# Patient Record
Sex: FEMALE | Race: WHITE
[De-identification: names, ages, dates, MRNs, and addresses within clinical notes are randomized per-mention and may not be internally consistent; named-entity substitution may affect disease eponyms.]

---

## 2018-03-18 ENCOUNTER — HOSPITAL ENCOUNTER (EMERGENCY)
Dept: HOSPITAL 72 - EMR | Age: 78
Discharge: HOME | End: 2018-03-18
Payer: MEDICARE

## 2018-03-18 VITALS — SYSTOLIC BLOOD PRESSURE: 107 MMHG | DIASTOLIC BLOOD PRESSURE: 60 MMHG

## 2018-03-18 VITALS — BODY MASS INDEX: 23.74 KG/M2 | WEIGHT: 134 LBS | HEIGHT: 63 IN

## 2018-03-18 DIAGNOSIS — Z95.0: ICD-10-CM
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
## 2018-03-18 DIAGNOSIS — I10: ICD-10-CM

## 2018-03-18 DIAGNOSIS — Z23: ICD-10-CM

## 2018-03-18 DIAGNOSIS — S81.812A: Primary | ICD-10-CM
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
## 2018-03-18 DIAGNOSIS — E11.9: ICD-10-CM

## 2018-03-18 PROCEDURE — 99284 EMERGENCY DEPT VISIT MOD MDM: CPT

## 2018-03-18 PROCEDURE — 90471 IMMUNIZATION ADMIN: CPT

## 2018-03-18 PROCEDURE — 90715 TDAP VACCINE 7 YRS/> IM: CPT

## 2018-03-18 NOTE — EMERGENCY ROOM REPORT
History of Present Illness


General


Chief Complaint:  Skin Rash/Abscess


Source:  Patient





Present Illness


HPI


Patient presents with injury to the left lower extremity


Injury occurred 3 days ago it is unclear exactly how the injury occurred


Patient felt that it might of been a bite from an animal


Caretaker family reports that the patient does have some underlying dementia


She also does get out of the house intermittently and the exact source of 

injury is not clear


Otherwise no reports of fever


Patient denies any knee pain denies any ankle pain/


Allergies:  


Coded Allergies:  


     No Known Allergies (Unverified , 3/26/14)





Patient History


Past Medical History:  see triage record


Pertinent Family History:  none


Last Menstrual Period:  na


Reviewed Nursing Documentation:  PMH: Agreed, PSxH: Agreed





Nursing Documentation-PMH


Past Medical History:  No History, Except For


Hx Cardiac Problems:  Yes


Hx Hypertension:  Yes


Hx Pacemaker:  Yes


Hx Diabetes:  Yes





Review of Systems


All Other Systems:  negative except mentioned in HPI





Physical Exam





Vital Signs








  Date Time  Temp Pulse Resp B/P (MAP) Pulse Ox O2 Delivery O2 Flow Rate FiO2


 


3/18/18 10:53 97.8 61 18 92/56 96 Room Air  





 97.9       








Sp02 EP Interpretation:  reviewed, normal


General Appearance:  well appearing, no apparent distress


Head:  normocephalic, atraumatic


Eyes:  bilateral eye PERRL, bilateral eye EOMI


ENT:  normal pharynx


Neck:  supple, thyroid normal


Respiratory:  lungs clear, normal breath sounds


Cardiovascular #1:  regular rate, rhythm


Gastrointestinal:  non tender, soft


Musculoskeletal:  normal inspection


Neurologic:  alert, responsive


Skin:  other - Patient has a semicircular approximately 3 cm laceration, 

appears to be secondarily scabbing already, no signs of surrounding erythema or 

fluctuance


Lymphatic:  no adenopathy





Procedures


Laceration/Wound Repair


Laceration/Wound Repair :  


   Consent:  Verbal


   Wound Location:  lower extremity


   Wound's Depth, Shape:  into muscle, irregular


   Wound Length (cm):  3


   Wound Explored:  contaminated


   Irrigated w/ Saline (ccs):  100


   Betadine Prep?:  Yes


   Wound Debrided:  minimal


   Layer Closure?:  No


   Sterile Dressing Applied?:  Yes


   Patient Tolerated:  Well


   Complications:  None


Progress


After appropriate cleansing patient had Steri-Strips applied the top of the 

laceration, the area has started to secondarily heal, further suture would be 

an appropriate





Medical Decision Making


Diagnostic Impression:  


 Primary Impression:  


 laceration


ER Course


Patient had wound care as noted above


Area does not appear to be actively infected





Already secondarily healing therefore suture was not applied





Patient placed on oral antibiotics requires close outpatient follow-up as was 

concern for possible secondary infection


Specifically given the patient's diabetic history





Last Vital Signs








  Date Time  Temp Pulse Resp B/P (MAP) Pulse Ox O2 Delivery O2 Flow Rate FiO2


 


3/18/18 12:10 98.0 87 20 107/60 99 Room Air  





 98.0       








Status:  improved


Disposition:  HOME, SELF-CARE


Condition:  Improved


Scripts


Amoxicillin/Potassium Clav 875-125* (AUGMENTIN 875-125 TABLET*) 1 Each Tablet


1 TAB ORAL TWICE A DAY, #14 TAB


   Prov: Jimmy Mi DO         3/18/18


Referrals:  


NOT CHOSEN IPA/MD,REFERRING (PCP)


Patient Instructions:  Skin Tear Care, Easy-to-Read, Nonsutured Laceration Care





Additional Instructions:  


Patient is provided with the discharge instructions notified to follow up with 

primary doctor in the next 2-3 days otherwise return to the er with any 

worsening symptoms.


Please note that this report is being documented using DRAGON technology.  This 

can lead to erroneous entry secondary to incorrect interpretation by the 

dictating instrument.











Jimmy Mi DO Mar 18, 2018 14:09

## 2018-04-12 ENCOUNTER — HOSPITAL ENCOUNTER (EMERGENCY)
Dept: HOSPITAL 72 - EMR | Age: 78
Discharge: HOME | End: 2018-04-12
Payer: MEDICARE

## 2018-04-12 VITALS — SYSTOLIC BLOOD PRESSURE: 163 MMHG | DIASTOLIC BLOOD PRESSURE: 83 MMHG

## 2018-04-12 VITALS — WEIGHT: 110 LBS | HEIGHT: 60 IN | BODY MASS INDEX: 21.6 KG/M2

## 2018-04-12 DIAGNOSIS — K44.9: ICD-10-CM

## 2018-04-12 DIAGNOSIS — K52.9: Primary | ICD-10-CM

## 2018-04-12 DIAGNOSIS — I10: ICD-10-CM

## 2018-04-12 DIAGNOSIS — M43.06: ICD-10-CM

## 2018-04-12 DIAGNOSIS — K57.30: ICD-10-CM

## 2018-04-12 DIAGNOSIS — M48.54XA: ICD-10-CM

## 2018-04-12 DIAGNOSIS — E11.9: ICD-10-CM

## 2018-04-12 DIAGNOSIS — Z95.0: ICD-10-CM

## 2018-04-12 LAB
ADD MANUAL DIFF: NO
ALBUMIN SERPL-MCNC: 3.5 G/DL (ref 3.4–5)
ALBUMIN/GLOB SERPL: 1.2 {RATIO} (ref 1–2.7)
ALP SERPL-CCNC: 51 U/L (ref 46–116)
ALT SERPL-CCNC: 15 U/L (ref 12–78)
ANION GAP SERPL CALC-SCNC: 9 MMOL/L (ref 5–15)
APPEARANCE UR: CLEAR
APTT BLD: 24 SEC (ref 23–33)
APTT PPP: (no result) S
AST SERPL-CCNC: 15 U/L (ref 15–37)
BASOPHILS NFR BLD AUTO: 0.3 % (ref 0–2)
BILIRUB SERPL-MCNC: 0.4 MG/DL (ref 0.2–1)
BUN SERPL-MCNC: 18 MG/DL (ref 7–18)
CALCIUM SERPL-MCNC: 8.9 MG/DL (ref 8.5–10.1)
CHLORIDE SERPL-SCNC: 110 MMOL/L (ref 98–107)
CO2 SERPL-SCNC: 26 MMOL/L (ref 21–32)
CREAT SERPL-MCNC: 1 MG/DL (ref 0.55–1.3)
EOSINOPHIL NFR BLD AUTO: 2.5 % (ref 0–3)
ERYTHROCYTE [DISTWIDTH] IN BLOOD BY AUTOMATED COUNT: 14.3 % (ref 11.6–14.8)
GLOBULIN SER-MCNC: 2.8 G/DL
GLUCOSE UR STRIP-MCNC: NEGATIVE MG/DL
HCT VFR BLD CALC: 34 % (ref 37–47)
HGB BLD-MCNC: 11.7 G/DL (ref 12–16)
INR PPP: 0.9 (ref 0.9–1.1)
KETONES UR QL STRIP: NEGATIVE
LEUKOCYTE ESTERASE UR QL STRIP: NEGATIVE
LYMPHOCYTES NFR BLD AUTO: 29.2 % (ref 20–45)
MCV RBC AUTO: 97 FL (ref 80–99)
MONOCYTES NFR BLD AUTO: 6.4 % (ref 1–10)
NEUTROPHILS NFR BLD AUTO: 61.7 % (ref 45–75)
NITRITE UR QL STRIP: NEGATIVE
PH UR STRIP: 5 [PH] (ref 4.5–8)
PLATELET # BLD: 194 K/UL (ref 150–450)
POTASSIUM SERPL-SCNC: 3.5 MMOL/L (ref 3.5–5.1)
PROT UR QL STRIP: NEGATIVE
RBC # BLD AUTO: 3.51 M/UL (ref 4.2–5.4)
SODIUM SERPL-SCNC: 145 MMOL/L (ref 136–145)
SP GR UR STRIP: 1.01 (ref 1–1.03)
UROBILINOGEN UR-MCNC: NORMAL MG/DL (ref 0–1)
WBC # BLD AUTO: 6.6 K/UL (ref 4.8–10.8)

## 2018-04-12 PROCEDURE — 85025 COMPLETE CBC W/AUTO DIFF WBC: CPT

## 2018-04-12 PROCEDURE — 36415 COLL VENOUS BLD VENIPUNCTURE: CPT

## 2018-04-12 PROCEDURE — 81003 URINALYSIS AUTO W/O SCOPE: CPT

## 2018-04-12 PROCEDURE — 85610 PROTHROMBIN TIME: CPT

## 2018-04-12 PROCEDURE — 96375 TX/PRO/DX INJ NEW DRUG ADDON: CPT

## 2018-04-12 PROCEDURE — 85730 THROMBOPLASTIN TIME PARTIAL: CPT

## 2018-04-12 PROCEDURE — 84484 ASSAY OF TROPONIN QUANT: CPT

## 2018-04-12 PROCEDURE — 86901 BLOOD TYPING SEROLOGIC RH(D): CPT

## 2018-04-12 PROCEDURE — 82962 GLUCOSE BLOOD TEST: CPT

## 2018-04-12 PROCEDURE — 96374 THER/PROPH/DIAG INJ IV PUSH: CPT

## 2018-04-12 PROCEDURE — 86850 RBC ANTIBODY SCREEN: CPT

## 2018-04-12 PROCEDURE — 74177 CT ABD & PELVIS W/CONTRAST: CPT

## 2018-04-12 PROCEDURE — 86900 BLOOD TYPING SEROLOGIC ABO: CPT

## 2018-04-12 PROCEDURE — 80053 COMPREHEN METABOLIC PANEL: CPT

## 2018-04-12 PROCEDURE — 93005 ELECTROCARDIOGRAM TRACING: CPT

## 2018-04-12 PROCEDURE — 83690 ASSAY OF LIPASE: CPT

## 2018-04-12 PROCEDURE — 99284 EMERGENCY DEPT VISIT MOD MDM: CPT

## 2018-04-12 NOTE — EMERGENCY ROOM REPORT
History of Present Illness


General


Chief Complaint:  Abdominal Pain


Source:  Patient





Present Illness


HPI


Patient is an 78-year-old female brought in by EMS after increased left-sided 

abdominal pain.  Patient reports having increased abdominal pain associated 

with nausea and vomiting.  She reports having a tightness sensation.  She 

denies any hematemesis.  She denies any bloody stools.  Patient denies any 

dysuria. The patient states she's had last bowel movement yesterday.  She 

believes that she has a hernia to the left lower abdomen.  The patient had 

prior history of pacemaker as well as the abdominal surgery for hernia.


Allergies:  


Coded Allergies:  


     No Known Allergies (Unverified , 3/26/14)





Patient History


Past Medical History:  see triage record


Past Surgical History:  pacemaker


Reviewed Nursing Documentation:  PMH: Agreed; PSxH: Agreed





Nursing Documentation-PMH


Past Medical History:  No Stated History


Hx Cardiac Problems:  Yes


Hx Hypertension:  Yes


Hx Pacemaker:  Yes


Hx Diabetes:  Yes





Review of Systems


All Other Systems:  negative except mentioned in HPI





Physical Exam





Vital Signs








  Date Time  Temp Pulse Resp B/P (MAP) Pulse Ox O2 Delivery O2 Flow Rate FiO2


 


4/12/18 15:10 98.0 78 16 142/60 98 Room Air  





 98.1       








Sp02 EP Interpretation:  reviewed, normal


General Appearance:  normal inspection, well appearing, no apparent distress, 

alert, GCS 15, Chronically Ill


Head:  atraumatic


ENT:  normal ENT inspection, hearing grossly normal, normal voice


Neck:  normal inspection, full range of motion, supple, no bony tend


Respiratory:  normal inspection, lungs clear, normal breath sounds, no 

respiratory distress, no retraction, no wheezing


Cardiovascular #1:  regular rate, rhythm, no edema


Gastrointestinal:  normal inspection, normal bowel sounds, non tender, soft, no 

guarding, no hernia


Genitourinary:  no CVA tenderness


Musculoskeletal:  normal inspection, back normal, normal range of motion


Neurologic:  normal inspection, alert, oriented x3, responsive, CNs III-XII nml 

as tested, speech normal


Psychiatric:  normal inspection, judgement/insight normal, mood/affect normal


Skin:  normal inspection, normal color, no rash





Medical Decision Making


Diagnostic Impression:  


 Primary Impression:  


 Abdominal pain


 Additional Impression:  


 Colitis


ER Course


Patient presented for abdominal pain. Differential diagnoses included ischemic 

bowel, appendicitis, perforated viscus, abdominal aortic aneurysm, inferior 

myocardial infarction, viral gastroenteritis. Because of complexity of patient'

s case laboratory testing and imaging studies were ordered.


CT imaging of the abdomen pelvis read by radiology showed evidence of the colon 

wall thickening consistent with possible colitis.  Laboratory testing was 

unremarkable.  Patient was noted to have improvement in discomfort after IV 

medications for nausea.  The patient was offered admission for further 

evaluation and treatment with Salvadorean .  The patient declined 

inpatient treatment stated she wanted to go home.  Patient was advised that she 

could return at any time.





Labs








Test


  4/12/18


15:55 4/12/18


17:30


 


White Blood Count


  6.6 K/UL


(4.8-10.8) 


 


 


Red Blood Count


  3.51 M/UL


(4.20-5.40) 


 


 


Hemoglobin


  11.7 G/DL


(12.0-16.0) 


 


 


Hematocrit


  34.0 %


(37.0-47.0) 


 


 


Mean Corpuscular Volume 97 FL (80-99)  


 


Mean Corpuscular Hemoglobin


  33.4 PG


(27.0-31.0) 


 


 


Mean Corpuscular Hemoglobin


Concent 34.6 G/DL


(32.0-36.0) 


 


 


Red Cell Distribution Width


  14.3 %


(11.6-14.8) 


 


 


Platelet Count


  194 K/UL


(150-450) 


 


 


Mean Platelet Volume


  7.9 FL


(6.5-10.1) 


 


 


Neutrophils (%) (Auto)


  61.7 %


(45.0-75.0) 


 


 


Lymphocytes (%) (Auto)


  29.2 %


(20.0-45.0) 


 


 


Monocytes (%) (Auto)


  6.4 %


(1.0-10.0) 


 


 


Eosinophils (%) (Auto)


  2.5 %


(0.0-3.0) 


 


 


Basophils (%) (Auto)


  0.3 %


(0.0-2.0) 


 


 


Prothrombin Time


  9.6 SEC


(9.30-11.50) 


 


 


Prothromb Time International


Ratio 0.9 (0.9-1.1) 


  


 


 


Activated Partial


Thromboplast Time 24 SEC (23-33) 


  


 


 


Sodium Level


  145 MMOL/L


(136-145) 


 


 


Potassium Level


  3.5 MMOL/L


(3.5-5.1) 


 


 


Chloride Level


  110 MMOL/L


() 


 


 


Carbon Dioxide Level


  26 MMOL/L


(21-32) 


 


 


Anion Gap


  9 mmol/L


(5-15) 


 


 


Blood Urea Nitrogen


  18 mg/dL


(7-18) 


 


 


Creatinine


  1.0 MG/DL


(0.55-1.30) 


 


 


Estimat Glomerular Filtration


Rate  mL/min (>60) 


  


 


 


Glucose Level


  95 MG/DL


() 


 


 


Calcium Level


  8.9 MG/DL


(8.5-10.1) 


 


 


Total Bilirubin


  0.4 MG/DL


(0.2-1.0) 


 


 


Aspartate Amino Transf


(AST/SGOT) 15 U/L (15-37) 


  


 


 


Alanine Aminotransferase


(ALT/SGPT) 15 U/L (12-78) 


  


 


 


Alkaline Phosphatase


  51 U/L


() 


 


 


Troponin I


  0.002 ng/mL


(0.000-0.056) 


 


 


Total Protein


  6.3 G/DL


(6.4-8.2) 


 


 


Albumin


  3.5 G/DL


(3.4-5.0) 


 


 


Globulin 2.8 g/dL  


 


Albumin/Globulin Ratio 1.2 (1.0-2.7)  


 


Lipase


  298 U/L


() 


 


 


Urine Color  Pale yellow 


 


Urine Appearance  Clear 


 


Urine pH  5 (4.5-8.0) 


 


Urine Specific Gravity


  


  1.015


(1.005-1.035)


 


Urine Protein


  


  Negative


(NEGATIVE)


 


Urine Glucose (UA)


  


  Negative


(NEGATIVE)


 


Urine Ketones


  


  Negative


(NEGATIVE)


 


Urine Occult Blood  1+ (NEGATIVE) 


 


Urine Nitrite


  


  Negative


(NEGATIVE)


 


Urine Bilirubin


  


  Negative


(NEGATIVE)


 


Urine Urobilinogen


  


  Normal MG/DL


(0.0-1.0)


 


Urine Leukocyte Esterase


  


  Negative


(NEGATIVE)


 


Urine RBC


  


  0-2 /HPF (0 -


2)


 


Urine WBC  0 /HPF (0 - 2) 


 


Urine Squamous Epithelial


Cells 


  None /LPF


(NONE/OCC)


 


Urine Bacteria


  


  Occasional


/HPF (NONE)











Last Vital Signs








  Date Time  Temp Pulse Resp B/P (MAP) Pulse Ox O2 Delivery O2 Flow Rate FiO2


 


4/12/18 15:10 98.0 78 16 142/60 98 Room Air  





 98.1       








Status:  improved


Disposition:  HOME, SELF-CARE


Condition:  Stable


Scripts


Dicyclomine Hcl* (DICYCLOMINE HCL*) 10 Mg Capsule


10 MG PO QID, #20 CAP


   Prov: Segun Rojas         4/12/18 


Famotidine (PEPCID) 20 Mg Tablet


20 MG ORAL BEDTIME, #7 TAB 0 Refills


   Prov: Segun Rojas         4/12/18











Segun Rojas Apr 12, 2018 15:34

## 2018-04-13 NOTE — DIAGNOSTIC IMAGING REPORT
Clinical Indication: Increasing left-sided abdominal pain, nausea, vomiting,

tightness sensation

 

Technique:   No oral contrast utilized, per emergency room physician request  IV

administration nonionic contrast. Venous phase spiral acquisition obtained through

the abdomen and pelvis. Multiplanar reconstructions were generated. Total dose length

product 654.97 mGycm. CTDIvol(s) 12.67 mGy. Dose reduction achieved using automated

exposure control

 

 

Comparison: none

 

Findings: There is colonic diverticulosis. No evidence of diverticulitis. The

appendix is normal. Small bowel loops are fluid-filled with equivocally mildly

enhancing walls. There is a moderate size hiatal hernia. The stomach and duodenum are

unremarkable. No small bowel distention. No free or loculated intraperitoneal air or

fluid. There are small bilateral inguinal hernias which contain only fat

 

The liver, gallbladder, bile ducts, pancreas, spleen, adrenals are unremarkable.

There are bilateral renal parapelvic cysts. There are bilateral subcentimeter renal

parenchymal lesions which are too small to characterize. No retroperitoneal or

mesenteric mass or adenopathy. No pelvic mass or adenopathy. Uterus and adnexal

structures are unremarkable.

 

There is bilateral L4 spondylolysis, grade 2 L4 on L5 spondylolisthesis and marked

secondary degenerative change, as well as degenerative spondylosis elsewhere. There

is a compression fracture deformity of the T9 vertebral body, age indeterminate.

Pacemaker wires are seen in the heart. Compressive atelectatic changes are seen at

both lung bases. The heart is enlarged

 

Impression: Mildly fluid-filled small bowel loops with slightly striking bowel wall

enhancement; of uncertain significance, could indicate mild enteritis changes

 

No other acute abnormality

 

Diverticulosis. No evidence of diverticulitis

 

Moderate size hiatal hernia

 

Cardiomegaly

 

Bilateral L4 spondylolysis, resultant grade 2 no 4 on L5 spondylolisthesis and

secondary degenerative change

 

T9 vertebral body compression fracture, age indeterminate. Consider MRI for better

characterization if this is considered clinically relevant

 

Bilateral renal parapelvic cysts. Bilateral subcentimeter renal parenchymal lesions

which are too small to characterize, most likely benign simple cortical cysts. No

further follow-up necessary

 

Other findings as noted, including pacemaker, small fat-containing bilateral inguinal

hernias, bilateral basilar compressive pulmonary parenchymal atelectasis.

 

This agrees with the preliminary interpretation provided overnight by [facility]

teleradiology service.

 

The CT scanner at [facility] is accredited by the American College of

Radiology and the scans are performed using protocols designed to limit radiation

exposure to as low as reasonably achievable to attain images of sufficient resolution

adequate for diagnostic evaluation.

## 2018-04-13 NOTE — CARDIOLOGY REPORT
--------------- APPROVED REPORT --------------





EKG Measurement

Heart [id number]

KS 182P30

[id number]

[id number]

[id number]





Normal sinus rhythm

Left axis deviation

Abnormal ECG

## 2018-09-08 ENCOUNTER — HOSPITAL ENCOUNTER (EMERGENCY)
Dept: HOSPITAL 72 - EMR | Age: 78
LOS: 1 days | Discharge: HOME | End: 2018-09-09
Payer: MEDICARE

## 2018-09-08 VITALS — DIASTOLIC BLOOD PRESSURE: 74 MMHG | SYSTOLIC BLOOD PRESSURE: 162 MMHG

## 2018-09-08 VITALS — HEIGHT: 64 IN | WEIGHT: 130 LBS | BODY MASS INDEX: 22.2 KG/M2

## 2018-09-08 DIAGNOSIS — F41.9: Primary | ICD-10-CM

## 2018-09-08 PROCEDURE — 99283 EMERGENCY DEPT VISIT LOW MDM: CPT

## 2018-09-08 NOTE — EMERGENCY ROOM REPORT
History of Present Illness


General


Chief Complaint:  Dyspnea/Respdistress


Source:  EMS





Present Illness


HPI


This 78F reportedly called 911 herself. The paramedics known this patient and 

state usually family keeps her there if/when she calls 911 but there was no 

family tonight. Pt. c/o "can't breathe." However while she says that she is 

very excited, lively, with no difficulty. She speaks only Beninese and we had a 

 on the phone. There really is nothing new. No cp, no cough, no 

fever. No travel. We spoke with her granddaughter who confirmed nothing new and 

she asked us to keep patient here overnight b/c it was hard for her to come 

pick her up. Reportedly no change in meds. 





Hx. limited due to age, language.


Allergies:  


Coded Allergies:  


     No Known Allergies (Unverified , 3/26/14)





Patient History


Limited by:  language barrier, age





Nursing Documentation-[facility]


Past Medical History:  No History, Except For


Hx Cardiac Problems:  Yes


Hx Hypertension:  Yes


Hx Pacemaker:  Yes


Hx Asthma:  Yes


Hx Diabetes:  Yes





Review of Systems


All Other Systems:  limited





Physical Exam





Vital Signs








  Date Time  Temp Pulse Resp B/P (MAP) Pulse Ox O2 Delivery O2 Flow Rate FiO2


 


9/8/18 21:26 98.0 88 16 162/74 100 Room Air  





 98.1       








General Appearance:  well appearing, no apparent distress


Head:  normocephalic, atraumatic


ENT:  hearing grossly normal, normal voice


Neck:  full range of motion, supple


Respiratory:  no respiratory distress, speaking full sentences


Musculoskeletal:  no calf tenderness


Neurologic:  alert, normal gait


Psychiatric:  anxious


Skin:  no rash





Medical Decision Making


Diagnostic Impression:  


 Primary Impression:  


 Anxiety


ER Course


Pt. is not at all dyspneic. RR 14-16, 99% on RA, able to talk with great energy

, full paragraphs without difficulty. She is an anxious person. On PE no acute 

abnorm. No w/u needed at this time. 





PE, ACS, pericarditis, pleural effusion, COPD exacerbation, asthma exacerbation

, CHF exacerbation, pulmonary edema, bronchitis, URI, pneumonia





A: Anxiety





Last Vital Signs








  Date Time  Temp Pulse Resp B/P (MAP) Pulse Ox O2 Delivery O2 Flow Rate FiO2


 


9/8/18 21:26 98.0 88 16 162/74 100 Room Air  





 98.1       








Status:  improved


Disposition:  HOME, SELF-CARE


Condition:  Stable


Patient Instructions:  Shortness of Breath, Easy-to-Read











Marquis Joel M.D. Sep 8, 2018 22:16

## 2018-09-09 VITALS — SYSTOLIC BLOOD PRESSURE: 162 MMHG | DIASTOLIC BLOOD PRESSURE: 74 MMHG

## 2018-09-16 ENCOUNTER — HOSPITAL ENCOUNTER (INPATIENT)
Dept: HOSPITAL 72 - EMR | Age: 78
LOS: 4 days | Discharge: LEFT BEFORE DISCHARGE | DRG: 280 | End: 2018-09-20
Payer: MEDICARE

## 2018-09-16 VITALS — SYSTOLIC BLOOD PRESSURE: 126 MMHG | DIASTOLIC BLOOD PRESSURE: 70 MMHG

## 2018-09-16 VITALS — SYSTOLIC BLOOD PRESSURE: 134 MMHG | DIASTOLIC BLOOD PRESSURE: 78 MMHG

## 2018-09-16 VITALS — WEIGHT: 134.04 LBS | BODY MASS INDEX: 22.88 KG/M2 | HEIGHT: 64 IN

## 2018-09-16 VITALS — DIASTOLIC BLOOD PRESSURE: 65 MMHG | SYSTOLIC BLOOD PRESSURE: 122 MMHG

## 2018-09-16 VITALS — DIASTOLIC BLOOD PRESSURE: 70 MMHG | SYSTOLIC BLOOD PRESSURE: 115 MMHG

## 2018-09-16 VITALS — DIASTOLIC BLOOD PRESSURE: 79 MMHG | SYSTOLIC BLOOD PRESSURE: 110 MMHG

## 2018-09-16 DIAGNOSIS — F03.90: ICD-10-CM

## 2018-09-16 DIAGNOSIS — I25.10: ICD-10-CM

## 2018-09-16 DIAGNOSIS — I50.23: ICD-10-CM

## 2018-09-16 DIAGNOSIS — F29: ICD-10-CM

## 2018-09-16 DIAGNOSIS — Z91.19: ICD-10-CM

## 2018-09-16 DIAGNOSIS — Z95.0: ICD-10-CM

## 2018-09-16 DIAGNOSIS — Z79.84: ICD-10-CM
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
## 2018-09-16 DIAGNOSIS — Z91.81: ICD-10-CM

## 2018-09-16 DIAGNOSIS — I82.A12: ICD-10-CM

## 2018-09-16 DIAGNOSIS — R06.00: ICD-10-CM

## 2018-09-16 DIAGNOSIS — E11.9: ICD-10-CM

## 2018-09-16 DIAGNOSIS — E78.00: ICD-10-CM

## 2018-09-16 DIAGNOSIS — I34.0: ICD-10-CM

## 2018-09-16 DIAGNOSIS — I36.1: ICD-10-CM

## 2018-09-16 DIAGNOSIS — Z95.1: ICD-10-CM

## 2018-09-16 DIAGNOSIS — I11.0: Primary | ICD-10-CM

## 2018-09-16 DIAGNOSIS — S32.392A: ICD-10-CM

## 2018-09-16 DIAGNOSIS — Z95.5: ICD-10-CM

## 2018-09-16 DIAGNOSIS — I21.4: ICD-10-CM

## 2018-09-16 DIAGNOSIS — Z79.82: ICD-10-CM

## 2018-09-16 DIAGNOSIS — S32.592A: ICD-10-CM

## 2018-09-16 DIAGNOSIS — G93.40: ICD-10-CM

## 2018-09-16 DIAGNOSIS — Z79.02: ICD-10-CM

## 2018-09-16 DIAGNOSIS — I25.5: ICD-10-CM

## 2018-09-16 LAB
ADD MANUAL DIFF: NO
ALBUMIN SERPL-MCNC: 3.2 G/DL (ref 3.4–5)
ALBUMIN/GLOB SERPL: 1 {RATIO} (ref 1–2.7)
ALP SERPL-CCNC: 70 U/L (ref 46–116)
ALT SERPL-CCNC: 24 U/L (ref 12–78)
ANION GAP SERPL CALC-SCNC: 11 MMOL/L (ref 5–15)
AST SERPL-CCNC: 27 U/L (ref 15–37)
BASOPHILS NFR BLD AUTO: 0.3 % (ref 0–2)
BILIRUB SERPL-MCNC: 0.6 MG/DL (ref 0.2–1)
BUN SERPL-MCNC: 11 MG/DL (ref 7–18)
CALCIUM SERPL-MCNC: 8.9 MG/DL (ref 8.5–10.1)
CHLORIDE SERPL-SCNC: 111 MMOL/L (ref 98–107)
CK MB SERPL-MCNC: 2.2 NG/ML (ref 0–3.6)
CK SERPL-CCNC: 488 U/L (ref 26–308)
CO2 SERPL-SCNC: 22 MMOL/L (ref 21–32)
CREAT SERPL-MCNC: 1 MG/DL (ref 0.55–1.3)
EOSINOPHIL NFR BLD AUTO: 1.3 % (ref 0–3)
ERYTHROCYTE [DISTWIDTH] IN BLOOD BY AUTOMATED COUNT: 17.5 % (ref 11.6–14.8)
GLOBULIN SER-MCNC: 3.3 G/DL
HCT VFR BLD CALC: 28.6 % (ref 37–47)
HGB BLD-MCNC: 8.9 G/DL (ref 12–16)
LYMPHOCYTES NFR BLD AUTO: 21.9 % (ref 20–45)
MCV RBC AUTO: 93 FL (ref 80–99)
MONOCYTES NFR BLD AUTO: 5.7 % (ref 1–10)
NEUTROPHILS NFR BLD AUTO: 70.8 % (ref 45–75)
PLATELET # BLD: 328 K/UL (ref 150–450)
POTASSIUM SERPL-SCNC: 4 MMOL/L (ref 3.5–5.1)
RBC # BLD AUTO: 3.08 M/UL (ref 4.2–5.4)
SODIUM SERPL-SCNC: 144 MMOL/L (ref 136–145)
WBC # BLD AUTO: 6.4 K/UL (ref 4.8–10.8)

## 2018-09-16 PROCEDURE — 80048 BASIC METABOLIC PNL TOTAL CA: CPT

## 2018-09-16 PROCEDURE — 93306 TTE W/DOPPLER COMPLETE: CPT

## 2018-09-16 PROCEDURE — 80053 COMPREHEN METABOLIC PANEL: CPT

## 2018-09-16 PROCEDURE — 80061 LIPID PANEL: CPT

## 2018-09-16 PROCEDURE — 83550 IRON BINDING TEST: CPT

## 2018-09-16 PROCEDURE — 84484 ASSAY OF TROPONIN QUANT: CPT

## 2018-09-16 PROCEDURE — 83735 ASSAY OF MAGNESIUM: CPT

## 2018-09-16 PROCEDURE — 93971 EXTREMITY STUDY: CPT

## 2018-09-16 PROCEDURE — 85730 THROMBOPLASTIN TIME PARTIAL: CPT

## 2018-09-16 PROCEDURE — 85610 PROTHROMBIN TIME: CPT

## 2018-09-16 PROCEDURE — 94664 DEMO&/EVAL PT USE INHALER: CPT

## 2018-09-16 PROCEDURE — 82746 ASSAY OF FOLIC ACID SERUM: CPT

## 2018-09-16 PROCEDURE — 96361 HYDRATE IV INFUSION ADD-ON: CPT

## 2018-09-16 PROCEDURE — 85651 RBC SED RATE NONAUTOMATED: CPT

## 2018-09-16 PROCEDURE — 71045 X-RAY EXAM CHEST 1 VIEW: CPT

## 2018-09-16 PROCEDURE — 36415 COLL VENOUS BLD VENIPUNCTURE: CPT

## 2018-09-16 PROCEDURE — 93005 ELECTROCARDIOGRAM TRACING: CPT

## 2018-09-16 PROCEDURE — 84443 ASSAY THYROID STIM HORMONE: CPT

## 2018-09-16 PROCEDURE — 82553 CREATINE MB FRACTION: CPT

## 2018-09-16 PROCEDURE — 83615 LACTATE (LD) (LDH) ENZYME: CPT

## 2018-09-16 PROCEDURE — 85025 COMPLETE CBC W/AUTO DIFF WBC: CPT

## 2018-09-16 PROCEDURE — 85060 BLOOD SMEAR INTERPRETATION: CPT

## 2018-09-16 PROCEDURE — 83880 ASSAY OF NATRIURETIC PEPTIDE: CPT

## 2018-09-16 PROCEDURE — 72192 CT PELVIS W/O DYE: CPT

## 2018-09-16 PROCEDURE — 85044 MANUAL RETICULOCYTE COUNT: CPT

## 2018-09-16 PROCEDURE — 96374 THER/PROPH/DIAG INJ IV PUSH: CPT

## 2018-09-16 PROCEDURE — 82378 CARCINOEMBRYONIC ANTIGEN: CPT

## 2018-09-16 PROCEDURE — 72170 X-RAY EXAM OF PELVIS: CPT

## 2018-09-16 PROCEDURE — 99291 CRITICAL CARE FIRST HOUR: CPT

## 2018-09-16 PROCEDURE — 82607 VITAMIN B-12: CPT

## 2018-09-16 PROCEDURE — 82550 ASSAY OF CK (CPK): CPT

## 2018-09-16 PROCEDURE — 96375 TX/PRO/DX INJ NEW DRUG ADDON: CPT

## 2018-09-16 PROCEDURE — 85007 BL SMEAR W/DIFF WBC COUNT: CPT

## 2018-09-16 PROCEDURE — 83690 ASSAY OF LIPASE: CPT

## 2018-09-16 PROCEDURE — 94640 AIRWAY INHALATION TREATMENT: CPT

## 2018-09-16 PROCEDURE — 83540 ASSAY OF IRON: CPT

## 2018-09-16 PROCEDURE — 86140 C-REACTIVE PROTEIN: CPT

## 2018-09-16 RX ADMIN — HEPARIN SODIUM SCH UNITS: 5000 INJECTION INTRAVENOUS; SUBCUTANEOUS at 20:36

## 2018-09-16 NOTE — CONSULTATION
DATE OF CONSULTATION:  09/16/2018



CARDIOLOGY CONSULTATION



CONSULTING PHYSICIAN:  Mc Nguyen M.D.



REFERRING PHYSICIAN:  Korin Junior M.D.



REASON FOR REFERRAL:  Abnormal cardiac enzymes.



HISTORY OF PRESENT ILLNESS:  This is an elderly Lebanese speaking female,

who really is a poor historian.  The patient was brought to the emergency

room at [facility], may have had a fall, the timing of which

is not really clear and part of the workup was cardiac enzymes, which were

mildly abnormal and this consultation was subsequently requested.  The

patient is being admitted to the hospital.  The patient herself denies any

pain.  As much as I can ascertain, she does indicate that she has had some

shortness of breath.  No palpitations.  No dizziness.  She indicates she

fell from a bed to the floor, but not clear when this occurred.  When she

presented to the emergency room, apparently, she was complaining of

shortness of breath, had several falls with contusion to her lip area,

apparently had an _____ several days ago with asthma exacerbation, and she

was told that was anxiety.  She does not want to come to the emergency

room and apparently, she has had some shortness of breath that worsens on

laying down.



The patient also has had delirium while she is in the intensive care

unit as noted above as well as ischemic cardiomyopathy with ejection

fraction most recently at [facility] of approximately 30%.  Based on last

ejection fraction at [facility] on 08/09/2018, it was actually an

ejection fraction of 23% with moderate mitral regurgitation and severe

tricuspid regurgitation being noted with a PA pressure in the 50s.



PAST MEDICAL HISTORY:  Positive for recent hospitalization at [facility] where she had presented with hypotension and was noted to

have a troponin of 17, underwent cardiac consultation by Dr. Tong, was

noted to have basically severe coronary disease, not be a candidate for

cardiac catheterization.  The patient underwent percutaneous coronary

intervention of the first obtuse marginal and the left anterior descending

artery.  She also has significant right coronary artery disease, but the

history indicates that the patient had severe agitation and was

subsequently discharged home relatively quickly after her percutaneous

coronary intervention.  She had drug-eluting stent that was placed in the

obtuse marginal 1 and another one in the 90% stenosis of left anterior

descending artery.  Her past medical history is positive for diabetes,

hyperlipidemia, hypertension, sick sinus syndrome, status post pacemaker

implantation, she has two pacemakers, one that was placed in 2000 and the

other one was in 2017 for sick sinus syndrome.



MEDICATIONS:  Previously had been listed as including metformin,

atorvastatin, Detrol, Klonopin, Tenormin, Diovan HCT, Prilosec, aspirin,

Plavix, and Crestor.



ALLERGIES:  She has no allergy to medications.  [facility] data indicates the

allergies to fish product derivatives and vancomycin.



SOCIAL HISTORY:  She denies any smoking or drinking.



REVIEW OF SYSTEMS:  GASTROINTESTINAL:  She denies any abdominal pain.

Denies any vomiting.  Denies any diarrhea.  Denies any bloody or black

bowel movement.  GENITOURINARY:   Denies any problem with her urination.

PULMONARY:  Denies any coughing.  CONSTITUTIONAL:  Denies any fevers,

chills, or night sweats.



PHYSICAL EXAM:

GENERAL:  Shows to be elderly female, in no respiratory distress.  She is

lying down with approximately 10 to 20 degrees head of bed elevation.

NECK:  Supple.

LUNGS:  Appear to be relatively clear to auscultation and

percussion.

CARDIAC:   S1 is normal.  S2 is normal.  Regular rate.  Holosystolic

regurgitant murmur noted.

ABDOMEN:  Soft and nontender.  Positive bowel sounds.

EXTREMITIES:  There is no edema.  There is ecchymosis of the area around

the left hip.

NEUROLOGICAL:  She is awake, alert, and responsive.



LABORATORY DATA:  Her blood tests at this time show white count of 6.4 with

a hemoglobin of 8.9 and platelet count of 328,000.  Sodium 144, potassium

4.0, chloride 111, bicarb 22, BUN of 11, creatinine 1.0, and glucose of

143.  Calcium is 8.9.  The CK of 488.  ProBNP of 3300.  Troponin of 0.457.

Albumin of 3.2.  Electrocardiogram shows atrial pacing, ventricular

sensing with some T-wave inversions in the inferolateral leads on the

ventricular spontaneously conducted QRS's and in direct comparison with

her prior EKGs done on 08/09/2018 at [facility].  The T-wave inversion

may not be all that new.  She did have some biphasic T-waves in 2, 3, AVF,

and some in lead V6 as well.  There may be slightly more prominent, but

that different equipment for recording was used at this time.  Her chest

x-ray shows cardiomegaly and two different pacemakers, one on the left and

one on the right.



ASSESSMENT AND PLAN:

1. History of multiple falls.

2. Pubic ramus fracture and pelvic fractures.

3. Coronary disease status post PCI of the LAD and obtuse marginal in

August of 2016 with significant right coronary artery stenosis

remaining.

4. Ischemic cardiomyopathy history.

5. Dementia history.

6. Delirium history.

7. Diabetes mellitus.

8. History of hypertension.

9. Permanent pacemaker implantation for sick sinus syndrome.

10. Possible gastrointestinal bleed based on communications with Dr. Tong regarding bloody stools post PCI.  Stool should be checked for

occult blood.  Iron studies should be performed.



PLAN:  Dr. Junior, this patient was seen in cardiac consultation.  She

seems to be denying any chest pain at this time for what that is ______.

Her EKG is not significantly abnormal though she does have mild troponin

abnormality, which in light of the fact that she has a history of coronary

disease and this needs to be followed.  Her creatinine is not

significantly elevated, but she is somewhat anemic with a hemoglobin of

8.9 in comparison.  Her last hemoglobin at [facility] was 10.9.  There are

some pain medications in the [facility] chart indicating that the patient may

have had some gastrointestinal bleeding and compliance with aspirin and

Plavix status is unknown.  The patient should have serial enzymes, EKGs,

and echocardiogram be monitored and I will let Dr. Tong, who is the

patient's primary treating cardiologist to follow the patient along,

should have stool studies, and stool for occult blood should be sent.









  ______________________________________________

  Mc Nguyen M.D.





DR:  VIPIN

D:  09/16/2018 14:35

T:  09/16/2018 17:19

JOB#:  [id number]

CC:

## 2018-09-16 NOTE — CARDIOLOGY PROGRESS NOTE
Assessment/Plan


Assessment/Plan


multipl fall 


minor torp abn 


anemia 


cad recenet pci lad om with colten with remaining rca disease not yeat treated 


dementia


med noncompliance 


icd ef 25% 8/2018


recent ? gi bleed  reproted wendy fonseca post pci called into dr alberto 








[id number]





Objective





Last 24 Hour Vital Signs








  Date Time  Temp Pulse Resp B/P (MAP) Pulse Ox O2 Delivery O2 Flow Rate FiO2


 


9/16/18 14:08 98.0 71 19 126/70 95 Room Air  





 98.0       


 


9/16/18 12:52  79 23  100 Room Air  21


 


9/16/18 12:43  72 14   Room Air  21


 


9/16/18 12:43  72 14  98 Room Air  21


 


9/16/18 12:10 98.4       


 


9/16/18 11:48 98.4  20 122/65 99 Room Air  





 98.4       


 


9/16/18 11:38 98.4 64 20 122/65  Room Air  





 98.4       











Laboratory Tests








Test


  9/16/18


12:00


 


White Blood Count


  6.4 K/UL


(4.8-10.8)


 


Red Blood Count


  3.08 M/UL


(4.20-5.40)  L


 


Hemoglobin


  8.9 G/DL


(12.0-16.0)  L


 


Hematocrit


  28.6 %


(37.0-47.0)  L


 


Mean Corpuscular Volume 93 FL (80-99)  


 


Mean Corpuscular Hemoglobin


  28.9 PG


(27.0-31.0)


 


Mean Corpuscular Hemoglobin


Concent 31.1 G/DL


(32.0-36.0)  L


 


Red Cell Distribution Width


  17.5 %


(11.6-14.8)  H


 


Platelet Count


  328 K/UL


(150-450)


 


Mean Platelet Volume


  7.2 FL


(6.5-10.1)


 


Neutrophils (%) (Auto)


  70.8 %


(45.0-75.0)


 


Lymphocytes (%) (Auto)


  21.9 %


(20.0-45.0)


 


Monocytes (%) (Auto)


  5.7 %


(1.0-10.0)


 


Eosinophils (%) (Auto)


  1.3 %


(0.0-3.0)


 


Basophils (%) (Auto)


  0.3 %


(0.0-2.0)


 


Sodium Level


  144 MMOL/L


(136-145)


 


Potassium Level


  4.0 MMOL/L


(3.5-5.1)


 


Chloride Level


  111 MMOL/L


()  H


 


Carbon Dioxide Level


  22 MMOL/L


(21-32)


 


Anion Gap


  11 mmol/L


(5-15)


 


Blood Urea Nitrogen


  11 mg/dL


(7-18)


 


Creatinine


  1.0 MG/DL


(0.55-1.30)


 


Estimat Glomerular Filtration


Rate  mL/min (>60)  


 


 


Glucose Level


  143 MG/DL


()  H


 


Calcium Level


  8.9 MG/DL


(8.5-10.1)


 


Total Bilirubin


  0.6 MG/DL


(0.2-1.0)


 


Aspartate Amino Transf


(AST/SGOT) 27 U/L (15-37)


 


 


Alanine Aminotransferase


(ALT/SGPT) 24 U/L (12-78)


 


 


Alkaline Phosphatase


  70 U/L


()


 


Total Creatine Kinase


  488 U/L


()  H


 


Creatine Kinase MB


  2.2 NG/ML


(0.0-3.6)


 


Creatine Kinase MB Relative


Index 0.4  


 


 


Troponin I


  0.457 ng/mL


(0.000-0.056)


 


Pro-B-Type Natriuretic Peptide


  27280 pg/mL


(0-125)  H


 


Total Protein


  6.5 G/DL


(6.4-8.2)


 


Albumin


  3.2 G/DL


(3.4-5.0)  L


 


Globulin 3.3 g/dL  


 


Albumin/Globulin Ratio 1.0 (1.0-2.7)  


 


Lipase


  180 U/L


()

















Mc Nguyen MD Sep 16, 2018 14:35

## 2018-09-16 NOTE — EMERGENCY ROOM REPORT
History of Present Illness


General


Chief Complaint:  General Complaint


Source:  Patient, Medical Record





Present Illness


HPI


Patient presents with complaints of shortness of breath


She has also had several falls with contusion to the left hip area


Patient saw me reports that the patient had called ambulance several days ago 

with asthma exacerbation





However they were told that it was anxiety and did not come to the emergency 

room





At this time patient feels that her breathing is worsened when laying flat





She has pain to the left hip area with obvious bruising


Denies any vomiting or diarrhea denies any pleurisy


Allergies:  


Coded Allergies:  


     No Known Allergies (Unverified , 3/26/14)





Patient History


Past Medical History:  see triage record


Pertinent Family History:  none


Last Menstrual Period:  unk


Reviewed Nursing Documentation:  PMH: Agreed; PSxH: Agreed





Nursing Documentation-PMH


Hx Cardiac Problems:  Yes


Hx Hypertension:  Yes


Hx Pacemaker:  Yes


Hx Asthma:  Yes


Hx Diabetes:  Yes





Review of Systems


All Other Systems:  negative except mentioned in HPI





Physical Exam





Vital Signs








  Date Time  Temp Pulse Resp B/P (MAP) Pulse Ox O2 Delivery O2 Flow Rate FiO2


 


9/16/18 11:38 98.4 64 20 122/65  Room Air  





 98.4       


 


9/16/18 11:48     99   








Sp02 EP Interpretation:  reviewed, normal


General Appearance:  mild distress - Appears short of breath


Head:  normocephalic, atraumatic


Eyes:  bilateral eye PERRL, bilateral eye EOMI


ENT:  normal pharynx, no angioedema


Neck:  supple, thyroid normal


Respiratory:  no retraction, no accessory muscle use, crackles - And wheezing 

bilaterally


Cardiovascular #1:  regular rate, rhythm, no edema


Gastrointestinal:  non tender, soft


Musculoskeletal:  other - Bruising to the left hip with ecchymosis


Neurologic:  alert, oriented x3, responsive


Skin:  other - As above


Lymphatic:  no adenopathy





Procedures


Critical Care Time


Critical Care Time


50 minutesMultiple re-evaluations initial critical presentation concerning for 

cardiopulmonary arrest and injury not including any procedural time





Medical Decision Making


Diagnostic Impression:  


 Primary Impression:  


 NSTEMI (non-ST elevated myocardial infarction)


 Additional Impressions:  


 CHF (congestive heart failure)


 Pelvic fracture


ER Course


Patient is a fairly complex patient with multiple differential to consideration 

including but not limited to cardiac cardiopulmonary and vascular emergencies





Patient appears short of breath some wheezing was noted as well


Breathing treatment was appropriately provided





Patient has done better except also reveals some congestion Lasix was provided





Patient's troponin level is elevated BNP is elevated


EKG shows a paced rhythm patient at this time does not have any chest pain


And shows concerning findings of non-ST elevation MI


Patient's CAT scan of the pelvic area also shows multiple fractures


Orthopedic consultation has been made





Patient will require admission and further evaluation





Labs








Test


  9/16/18


12:00


 


White Blood Count


  6.4 K/UL


(4.8-10.8)


 


Red Blood Count


  3.08 M/UL


(4.20-5.40)


 


Hemoglobin


  8.9 G/DL


(12.0-16.0)


 


Hematocrit


  28.6 %


(37.0-47.0)


 


Mean Corpuscular Volume 93 FL (80-99) 


 


Mean Corpuscular Hemoglobin


  28.9 PG


(27.0-31.0)


 


Mean Corpuscular Hemoglobin


Concent 31.1 G/DL


(32.0-36.0)


 


Red Cell Distribution Width


  17.5 %


(11.6-14.8)


 


Platelet Count


  328 K/UL


(150-450)


 


Mean Platelet Volume


  7.2 FL


(6.5-10.1)


 


Neutrophils (%) (Auto)


  70.8 %


(45.0-75.0)


 


Lymphocytes (%) (Auto)


  21.9 %


(20.0-45.0)


 


Monocytes (%) (Auto)


  5.7 %


(1.0-10.0)


 


Eosinophils (%) (Auto)


  1.3 %


(0.0-3.0)


 


Basophils (%) (Auto)


  0.3 %


(0.0-2.0)


 


Sodium Level


  144 MMOL/L


(136-145)


 


Potassium Level


  4.0 MMOL/L


(3.5-5.1)


 


Chloride Level


  111 MMOL/L


()


 


Carbon Dioxide Level


  22 MMOL/L


(21-32)


 


Anion Gap


  11 mmol/L


(5-15)


 


Blood Urea Nitrogen


  11 mg/dL


(7-18)


 


Creatinine


  1.0 MG/DL


(0.55-1.30)


 


Estimat Glomerular Filtration


Rate  mL/min (>60) 


 


 


Glucose Level


  143 MG/DL


()


 


Calcium Level


  8.9 MG/DL


(8.5-10.1)


 


Total Bilirubin


  0.6 MG/DL


(0.2-1.0)


 


Aspartate Amino Transf


(AST/SGOT) 27 U/L (15-37) 


 


 


Alanine Aminotransferase


(ALT/SGPT) 24 U/L (12-78) 


 


 


Alkaline Phosphatase


  70 U/L


()


 


Total Creatine Kinase


  488 U/L


()


 


Creatine Kinase MB


  2.2 NG/ML


(0.0-3.6)


 


Creatine Kinase MB Relative


Index 0.4 


 


 


Troponin I


  0.457 ng/mL


(0.000-0.056)


 


Pro-B-Type Natriuretic Peptide


  16081 pg/mL


(0-125)


 


Total Protein


  6.5 G/DL


(6.4-8.2)


 


Albumin


  3.2 G/DL


(3.4-5.0)


 


Globulin 3.3 g/dL 


 


Albumin/Globulin Ratio 1.0 (1.0-2.7) 


 


Lipase


  180 U/L


()








EKG Diagnostic Results


Rate:  other - pa


Rhythm:  other


ST Segments:  no acute changes





Rhythm Strip Diag. Results


EP Interpretation:  yes


Rate:  60


Rhythm:  no PVC's, no ectopy, other - paced





Chest X-Ray Diagnostic Results


Chest X-Ray Diagnostic Results :  


   Chest X-Ray Ordered:  Yes


   # of Views/Limited/Complete:  1 View


   Indication:  Chest Pain


   EP Interpretation:  Yes


   Interpretation:  no pneumothorax, other - Cardiomegaly, pulmonary congestion


   Impression:  Other - Acute CHF





Other X-Ray Diagnostic Results


Other X-Ray Diagnostic Results #1:  


   X-Ray ordered:  Pelvic


   # of Views/Limited Vs Complete:  1 View


   Indication:  Pain


   EP Interpretation:  Yes


   Interpretation:  no soft tissue swelling, other - Superior and inferior 

pubic rami fractures, displaced,


   Impression:  Other - Superior and inferior


Other X-Ray Diagnostic Results #2:  


   X-Ray ordered:  left femur


   # of Views/Limited Vs Complete:  2 View


   Indication:  Pain


   EP Interpretation:  Yes


   Interpretation:  no dislocation, no soft tissue swelling, no fractures


   Impression:  No acute disease - Of the femur


   Electronically Signed by:  Jimmy Mi DO





CT/MRI/US Diagnostic Results


CT/MRI/US Diagnostic Results :  


   Impression


CT pelvicComminuted left parasymphyseal fracture extending to the proximal 

portions of the left superior and


inferior pubic rami.


Mildlydistracted fracture of the superior left iliacwing paralleling and 

extending to the left SI joint


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 

L5with 4 mmoffset, stable


compared to 4/13/18.


Degenerative changeswith disc space loss, endplate osteophytes, and bilateral 

facet arthrosis, at L4-


5 and L5-S1.


Bilateral inguinal hernias containing loops of small bowel. No evidence of 

obstruction.





Last Vital Signs








  Date Time  Temp Pulse Resp B/P (MAP) Pulse Ox O2 Delivery O2 Flow Rate FiO2


 


9/16/18 11:48 98.4  20 122/65 99 Room Air  





 98.4       


 


9/16/18 11:38  64      








Status:  improved


Disposition:  ADMITTED AS INPATIENT


Condition:  Critical











Jimmy Mi DO Sep 16, 2018 11:57
inadequate energy intake

## 2018-09-17 VITALS — SYSTOLIC BLOOD PRESSURE: 147 MMHG | DIASTOLIC BLOOD PRESSURE: 79 MMHG

## 2018-09-17 VITALS — DIASTOLIC BLOOD PRESSURE: 70 MMHG | SYSTOLIC BLOOD PRESSURE: 142 MMHG

## 2018-09-17 VITALS — SYSTOLIC BLOOD PRESSURE: 145 MMHG | DIASTOLIC BLOOD PRESSURE: 80 MMHG

## 2018-09-17 VITALS — DIASTOLIC BLOOD PRESSURE: 85 MMHG | SYSTOLIC BLOOD PRESSURE: 140 MMHG

## 2018-09-17 VITALS — SYSTOLIC BLOOD PRESSURE: 159 MMHG | DIASTOLIC BLOOD PRESSURE: 91 MMHG

## 2018-09-17 LAB
ADD MANUAL DIFF: NO
ALBUMIN SERPL-MCNC: 3.1 G/DL (ref 3.4–5)
ALBUMIN/GLOB SERPL: 1 {RATIO} (ref 1–2.7)
ALP SERPL-CCNC: 58 U/L (ref 46–116)
ALT SERPL-CCNC: 26 U/L (ref 12–78)
ANION GAP SERPL CALC-SCNC: 11 MMOL/L (ref 5–15)
APTT BLD: 25 SEC (ref 23–33)
AST SERPL-CCNC: 25 U/L (ref 15–37)
BASOPHILS NFR BLD AUTO: 0.3 % (ref 0–2)
BILIRUB SERPL-MCNC: 0.6 MG/DL (ref 0.2–1)
BUN SERPL-MCNC: 17 MG/DL (ref 7–18)
CALCIUM SERPL-MCNC: 8.9 MG/DL (ref 8.5–10.1)
CHLORIDE SERPL-SCNC: 110 MMOL/L (ref 98–107)
CHOLEST SERPL-MCNC: 149 MG/DL (ref ?–200)
CO2 SERPL-SCNC: 23 MMOL/L (ref 21–32)
CREAT SERPL-MCNC: 1 MG/DL (ref 0.55–1.3)
EOSINOPHIL NFR BLD AUTO: 1.5 % (ref 0–3)
ERYTHROCYTE [DISTWIDTH] IN BLOOD BY AUTOMATED COUNT: 17.9 % (ref 11.6–14.8)
GLOBULIN SER-MCNC: 3.1 G/DL
HCT VFR BLD CALC: 28.4 % (ref 37–47)
HDLC SERPL-MCNC: 53 MG/DL (ref 40–60)
HGB BLD-MCNC: 8.9 G/DL (ref 12–16)
INR PPP: 1.2 (ref 0.9–1.1)
IRON SERPL-MCNC: 34 UG/DL (ref 50–175)
LYMPHOCYTES NFR BLD AUTO: 34.6 % (ref 20–45)
MCV RBC AUTO: 94 FL (ref 80–99)
MONOCYTES NFR BLD AUTO: 5 % (ref 1–10)
NEUTROPHILS NFR BLD AUTO: 58.6 % (ref 45–75)
PLATELET # BLD: 302 K/UL (ref 150–450)
POTASSIUM SERPL-SCNC: 4.2 MMOL/L (ref 3.5–5.1)
RBC # BLD AUTO: 3.02 M/UL (ref 4.2–5.4)
SODIUM SERPL-SCNC: 144 MMOL/L (ref 136–145)
TRIGL SERPL-MCNC: 69 MG/DL (ref 30–150)
WBC # BLD AUTO: 6.2 K/UL (ref 4.8–10.8)

## 2018-09-17 RX ADMIN — HEPARIN SODIUM SCH UNITS: 5000 INJECTION INTRAVENOUS; SUBCUTANEOUS at 20:47

## 2018-09-17 RX ADMIN — HEPARIN SODIUM SCH UNITS: 5000 INJECTION INTRAVENOUS; SUBCUTANEOUS at 08:36

## 2018-09-17 RX ADMIN — ALPRAZOLAM PRN MG: 0.5 TABLET ORAL at 22:20

## 2018-09-17 RX ADMIN — LORAZEPAM PRN MG: 2 INJECTION, SOLUTION INTRAMUSCULAR; INTRAVENOUS at 18:01

## 2018-09-17 NOTE — CONSULTATION
History of Present Illness


General


Chief Complaint:  General Complaint





Present Illness


HPI


78-year-old, Nigerian-speaking female with a history of psychosis coronary 

artery disease, pacemaker, and hypertension who presented to the emergency room 

because of shortness of breath, dizziness, and multiple falls. the pt is 

agitated and pw waxing and waning of consciousness. the pt is forgetful


Allergies:  


Coded Allergies:  


     No Known Allergies (Unverified , 3/26/14)





Medication History


Scheduled


Amoxicillin/Potassium Clav 875-125* (Augmentin 875-125 Tablet*), 1 TAB ORAL 

TWICE A DAY


Aspirin (Aspirin EC), 81 MG ORAL DAILY, (Reported)


Atenolol (Tenormin), 25 MG ORAL DAILY, (Reported)


Dicyclomine Hcl* (Dicyclomine Hcl*), 10 MG PO QID


Famotidine (Pepcid), 20 MG ORAL BEDTIME


No Known Medications* (NKM - No Known Medications*), 0 ., (Reported)





Scheduled PRN


Hydrocodone Bit/Acetaminophen 5-325* (Norco 5-325*), 1 TAB ORAL Q6H PRN for For 

Pain





Miscellaneous Medications


Unable to Obtain Medications (Unable To Obtain Meds), (Reported)





Patient History


Limited by:  medical condition


History Provided By:  Patient, Medical Record, PMD


Healthcare decision maker





Resuscitation status


Full Code


Advanced Directive on File








Past Medical/Surgical History


Past Medical/Surgical History:  


(1) right arm contusion


(2) Abdominal pain


(3) Colitis


(4) CHF (congestive heart failure)


(5) Pelvic fracture


(6) Pacemaker


(7) NSTEMI (non-ST elevated myocardial infarction)


(8) Dyspnea


(9) Delirium


(10) Acute on chronic systolic CHF (congestive heart failure)





Review of Systems


Psychiatric:  Reports: prior hx, anxiety, depressed feelings, emotional problems

, hallucinations





Physical Exam


General Appearance:  no apparent distress, alert, confused, agitated





Last 24 Hour Vital Signs








  Date Time  Temp Pulse Resp B/P (MAP) Pulse Ox O2 Delivery O2 Flow Rate FiO2


 


9/17/18 20:57  84 20  99 Nasal Cannula 2.0 28


 


9/17/18 20:50  80 18  98 Nasal Cannula 2.0 28


 


9/17/18 20:22  68 18   Room Air  21


 


9/17/18 20:00 99.1 85 18 159/91 (113) 97   





 99.1       


 


9/17/18 20:00  82      


 


9/17/18 16:00      Nasal Cannula 2.0 


 


9/17/18 14:36  65 18   Room Air  21


 


9/17/18 12:00 97.9 77 18 142/70 (94) 97   





 97.9       


 


9/17/18 12:00  81      


 


9/17/18 12:00      Nasal Cannula 2.0 


 


9/17/18 09:00  74      


 


9/17/18 08:34  65  147/79    


 


9/17/18 08:00 98.1 65 20 147/79 (101) 97   





 98.1       


 


9/17/18 08:00      Nasal Cannula 2.0 


 


9/17/18 04:00  83      


 


9/17/18 04:00      Nasal Cannula 2.0 


 


9/17/18 04:00 98.2 78 20 145/80 (101) 100   





 98.2       


 


9/17/18 00:00 98.3 72 20 140/85 (103) 95   





 98.3       


 


9/17/18 00:00  75      


 


9/17/18 00:00      Nasal Cannula 2.0 


 


9/16/18 23:33  77 20  99 Room Air  21


 


9/16/18 23:10  77 18  98 Room Air  21


 


9/16/18 22:58  79 18   Room Air  21

















Intake and Output  


 


 9/16/18 9/17/18





 19:00 07:00


 


Intake Total 120 ml 


 


Output Total 200 ml 


 


Balance -80 ml 


 


  


 


Intake Oral 120 ml 


 


Output Urine Total 200 ml 


 


# Voids 3 7











Laboratory Tests








Test


  9/17/18


03:59


 


White Blood Count


  6.2 K/UL


(4.8-10.8)


 


Red Blood Count


  3.02 M/UL


(4.20-5.40)  L


 


Hemoglobin


  8.9 G/DL


(12.0-16.0)  L


 


Hematocrit


  28.4 %


(37.0-47.0)  L


 


Mean Corpuscular Volume 94 FL (80-99)  


 


Mean Corpuscular Hemoglobin


  29.5 PG


(27.0-31.0)


 


Mean Corpuscular Hemoglobin


Concent 31.4 G/DL


(32.0-36.0)  L


 


Red Cell Distribution Width


  17.9 %


(11.6-14.8)  H


 


Platelet Count


  302 K/UL


(150-450)


 


Mean Platelet Volume


  6.7 FL


(6.5-10.1)


 


Neutrophils (%) (Auto)


  58.6 %


(45.0-75.0)


 


Lymphocytes (%) (Auto)


  34.6 %


(20.0-45.0)


 


Monocytes (%) (Auto)


  5.0 %


(1.0-10.0)


 


Eosinophils (%) (Auto)


  1.5 %


(0.0-3.0)


 


Basophils (%) (Auto)


  0.3 %


(0.0-2.0)


 


Prothrombin Time


  12.2 SEC


(9.30-11.50)  H


 


Prothromb Time International


Ratio 1.2 (0.9-1.1)


H


 


Activated Partial


Thromboplast Time 25 SEC (23-33)


 


 


Sodium Level


  144 MMOL/L


(136-145)


 


Potassium Level


  4.2 MMOL/L


(3.5-5.1)


 


Chloride Level


  110 MMOL/L


()  H


 


Carbon Dioxide Level


  23 MMOL/L


(21-32)


 


Anion Gap


  11 mmol/L


(5-15)


 


Blood Urea Nitrogen


  17 mg/dL


(7-18)


 


Creatinine


  1.0 MG/DL


(0.55-1.30)


 


Estimat Glomerular Filtration


Rate  mL/min (>60)  


 


 


Glucose Level


  89 MG/DL


()


 


Calcium Level


  8.9 MG/DL


(8.5-10.1)


 


Magnesium Level


  1.5 MG/DL


(1.8-2.4)  L


 


Iron Level


  34 ug/dL


()  L


 


Total Bilirubin


  0.6 MG/DL


(0.2-1.0)


 


Aspartate Amino Transf


(AST/SGOT) 25 U/L (15-37)


 


 


Alanine Aminotransferase


(ALT/SGPT) 26 U/L (12-78)


 


 


Alkaline Phosphatase


  58 U/L


()


 


Troponin I


  0.320 ng/mL


(0.000-0.056)


 


C-Reactive Protein,


Quantitative 1.0 mg/dL


(0.00-0.90)  H


 


Total Protein


  6.2 G/DL


(6.4-8.2)  L


 


Albumin


  3.1 G/DL


(3.4-5.0)  L


 


Globulin 3.1 g/dL  


 


Albumin/Globulin Ratio 1.0 (1.0-2.7)  


 


Triglycerides Level


  69 MG/DL


()


 


Cholesterol Level


  149 MG/DL (<


200)


 


LDL Cholesterol


  80 mg/dL


(<100)


 


HDL Cholesterol


  53 MG/DL


(40-60)


 


Cholesterol/HDL Ratio


  2.8 (3.3-4.4)


L


 


Thyroid Stimulating Hormone


(TSH) 3.306 uiU/mL


(0.358-3.740)








Height (Feet):  5


Height (Inches):  4.00


Weight (Pounds):  136


Medications





Current Medications








 Medications


  (Trade)  Dose


 Ordered  Sig/Violette


 Route


 PRN Reason  Start Time


 Stop Time Status Last Admin


Dose Admin


 


 Acetaminophen


  (Tylenol)  650 mg  Q4H  PRN


 ORAL


 FEVER  9/17/18 16:27


 10/16/18 16:26   


 


 


 Acetaminophen/


 Hydrocodone Bitart


  (Norco 5/325)  1 tab  Q6H  PRN


 ORAL


 Moderate Pain (Pain Scale 4-6)  9/17/18 16:29


 9/23/18 16:28   


 


 


 Albuterol/


 Ipratropium


  (Albuterol/


 Ipratropium)  3 ml  Q4H  PRN


 HHN


 Shortness of Breath  9/17/18 17:00


 9/22/18 16:59  9/17/18 20:56


 


 


 Alprazolam


  (Xanax)  1 mg  Q4H  PRN


 ORAL


 For Anxiety  9/17/18 16:28


 9/24/18 16:27   


 


 


 Aspirin


  (ASA)  162 mg  DAILY


 ORAL


   9/18/18 09:00


 10/17/18 08:59   


 


 


 Atenolol


  (Tenormin)  25 mg  DAILY


 ORAL


   9/18/18 09:00


 10/17/18 08:59   


 


 


 Diltiazem HCl


  (Cardizem)  10 mg  Q1H  PRN


 IV


 heart rate more than 120,   9/17/18 16:28


 10/16/18 16:27   


 


 


 Enalaprilat


  (Vasotec)  2.5 mg  Q6H  PRN


 IV


 sbp more than 160  9/17/18 18:00


 10/17/18 17:59   


 


 


 Heparin Sodium


  (Porcine)


  (Heparin 5000


 units/ml)  5,000 units  EVERY 12  HOURS


 SUBQ


   9/17/18 21:00


 10/16/18 20:59   


 


 


 Ketorolac


 Tromethamine


  (Toradol 30mg)  15 mg  Q6H  PRN


 IV


 moderate pain ( 4-6)  9/17/18 18:00


 9/22/18 17:59   


 


 


 Lorazepam


  (Ativan 2mg/ml


 1ml)  0.5 mg  Q4H  PRN


 IV


 For Anxiety  9/17/18 17:35


 9/24/18 17:14  9/17/18 18:01


 


 


 Morphine Sulfate


  (Morphine


 Sulfate)  2 mg  Q4H  PRN


 IVP


 severe Pain (Pain Scale 7-10)  9/17/18 16:29


 9/23/18 16:28   


 


 


 Nitroglycerin


  (Ntg)  0.4 mg  Q5MIN X 3 DOSES PRN


 SL


 Prn Chest Pain  9/17/18 16:30


 10/16/18 14:42   


 


 


 Ondansetron HCl


  (Zofran)  4 mg  Q6H  PRN


 IVP


 Nausea & Vomiting  9/17/18 16:29


 10/16/18 16:28   


 


 


 Polyethylene


 Glycol


  (Miralax)  17 gm  DAILYPRN  PRN


 ORAL


 Constipation  9/17/18 16:29


 10/17/18 16:28   


 


 


 Quetiapine


 Fumarate


  (SEROquel)  25 mg  Q6H  PRN


 ORAL


 ANXIETY/AGITATION  9/17/18 17:15


 10/17/18 17:14   


 


 


 Quetiapine


 Fumarate


  (SEROquel)  25 mg  THREE TIMES A  DAY


 ORAL


   9/17/18 18:00


 10/17/18 17:59  9/17/18 20:43


 


 


 Temazepam


  (Restoril)  15 mg  HSPRN  PRN


 ORAL


 Insomnia  9/17/18 21:00


 9/23/18 20:59   


 











Assessment/Plan


Problem List:  


(1) encephalopathy due to 


[place]essment [place] Plan[place]  [facility] [place]


psychotic d/o





seroquel prn


ativan prn





Status:  unchanged











Josh Nunez MD Sep 17, 2018 22:08

## 2018-09-17 NOTE — DIAGNOSTIC IMAGING REPORT
Indication: Chest pain

 

Technique: XRAY Chest 1v

 

Comparison: 1/20/2009

 

Findings: Interval placement of a right-sided pacemaker with lead tips projecting

over the expected regions of the right atrium and ventricle. The previously

identified left sided pacemaker is unchanged. Heart is enlarged. There is

interstitial opacification/edema and perihilar fullness. There are patchy bibasilar

airspace opacities. There is blunting of the bilateral costophrenic sulci, right

greater than left. No pneumothorax. There are degenerative changes of the spine.

 

Impression: 

Cardiomegaly with interstitial opacification/edema, patchy bibasilar airspace

opacities and possible trace bilateral pleural effusions. Findings are likely related

to CHF/pulmonary edema. Superimposed pneumonia not excluded. Clinical

correlation/follow-up recommended.

 

Bilateral chest pacemaker is in place.

 

This corresponds with the preliminary interpretation of the treating ER physician, as

documented in the electronic medical record. Study obtained via the emergency

department however patient admitted to the hospital at time of dictation of the final

report.

## 2018-09-17 NOTE — DIAGNOSTIC IMAGING REPORT
Indication: Pain, trauma

 

Technique: CT pelvis was performed utilizing automated exposure control without

intravenous contrast material. Axial, sagittal coronal images were generated.

 

CT dose: Total .48 mGycm; CTDI vol 12.46 mGy

 

Comparison: CT of the abdomen and pelvis 4/12/2018

 

Findings: There is a comminuted left parasymphyseal fracture extending to the left

inferior and, to a lesser extent, superior pubic rami. There is a mildly distracted

fracture of the left superior iliac wing paralleling and extending to the left

sacroiliac joint (series series 3 image #19; series 6 image #62). There is

degenerative change of the spine with disc space loss, endplate osteophytes, facet

arthrosis and vacuum disc phenomenon. There is bilateral L4 spondylolysis with

associated anterolisthesis of L4 relative to L5 with approximately 4 mm offset. This

is stable when compared to 4/12/2018.

 

There are bilateral inguinal hernias which contain a nonobstructed loops of small

bowel. There is extensive atherosclerotic calcification of the visualized abdominal

aorta, with areas of circumferential calcification. Calcification of the iliac

vessels also noted. There is calcifications in the posterior gluteal soft tissues.

 

IMPRESSION:

Left-sided pelvic fractures including the left pubic rami and iliac bone fractures as

detailed above.

 

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

compared to 4/12/2018.

 

Bilateral inguinal hernias containing loops of small bowel. No evidence of associated

small bowel obstruction.

 

This corresponds with the statrad preliminary report. 

 

The CT scanner at [facility] is accredited by the [employer] College [employer]

Radiology and the scans are performed using protocols designed to limit radiation

exposure to as low as reasonably achievable to attain images of sufficient resolution

adequate for diagnostic evaluation.

## 2018-09-17 NOTE — HISTORY & PHYSICAL
History and Physical


History & Physicial


Dictated for Int Med-dr Magana no. [id number].











Cheo Ortiz MD Sep 17, 2018 17:58

## 2018-09-17 NOTE — DIAGNOSTIC IMAGING REPORT
Indication: Pain

 

Technique: XRAY Pelvis 1v

 

Comparison: Images of the pelvis from CT of the abdomen and pelvis 4/12/2018.

 

Findings: Bones are demineralized. There is acute fracture involving the inferior

pubic ramus on the left. This likely also extends to the superior pubic ramus and

parasymphyseal region. No pubic symphysis widening. Additionally there is a

transverse lucency through the superior iliac bone on the left concerning for

fracture. There is degenerative change of the lower lumbar spine. There are

atherosclerotic vascular calcifications. Degenerative changes also noted in the

bilateral hips. No radiopaque foreign body identified.

 

IMPRESSION:

Left parasymphyseal fracture. Linear lucency through the left iliac bone concerning

for additional fracture.

## 2018-09-17 NOTE — DIAGNOSTIC IMAGING REPORT
Indication: Pain

 

Technique: XRAY Femur 2v L

 

Comparison: None

 

Findings: Bones appear demineralized. Fracture of the left pubic bone partially

visualized. The left hip is maintained, with some degenerative change. No femoral

fracture identified. Left knee joint is maintained, also some degenerative changes.

There are extensive atherosclerotic vascular calcifications. No radiopaque foreign

body identified.

 

Impression: No evidence of left femoral fracture. Additional findings as above.

## 2018-09-17 NOTE — CARDIOLOGY PROGRESS NOTE
Assessment/Plan


Assessment/Plan


congestive heart failure, systolic, acute on chronic


ischenmic cardiomyopathy


severe demential and delirium


the patient is agitated, standing in the mendez and refusing to cooperate


I spoke to the nurse and recommended to call admitting physician for sedation





Subjective


Subjective


the patient is agitated and delirious, refusing to go to her room and screaming





Objective





Last 24 Hour Vital Signs








  Date Time  Temp Pulse Resp B/P (MAP) Pulse Ox O2 Delivery O2 Flow Rate FiO2


 


9/17/18 14:36  65 18   Room Air  21


 


9/17/18 12:00 97.9 77 18 142/70 (94) 97   





 97.9       


 


9/17/18 12:00  81      


 


9/17/18 12:00      Nasal Cannula 2.0 


 


9/17/18 09:00  74      


 


9/17/18 08:34  65  147/79    


 


9/17/18 08:00 98.1 65 20 147/79 (101) 97   





 98.1       


 


9/17/18 08:00      Nasal Cannula 2.0 


 


9/17/18 04:00  83      


 


9/17/18 04:00      Nasal Cannula 2.0 


 


9/17/18 04:00 98.2 78 20 145/80 (101) 100   





 98.2       


 


9/17/18 00:00 98.3 72 20 140/85 (103) 95   





 98.3       


 


9/17/18 00:00  75      


 


9/17/18 00:00      Nasal Cannula 2.0 


 


9/16/18 23:33  77 20  99 Room Air  21


 


9/16/18 23:10  77 18  98 Room Air  21


 


9/16/18 22:58  79 18   Room Air  21


 


9/16/18 20:00  62      


 


9/16/18 20:00 97.9 66 20 115/70 (85) 95   





 97.9       


 


9/16/18 20:00      Room Air  


 


9/16/18 17:29      Room Air  


 


9/16/18 17:04 98.4 70 21 110/79 95 Room Air  21





 98.4       


 


9/16/18 16:50 97.7 77 20 134/78 (96)    





 97.7       








General Appearance:  alert, agitated, combative, other - confused


EENT:  PERRL/EOMI


Neck:  JVD


Rhythm:  NSR


Cardiovascular:  regular rhythm, gallop/S3


Respiratory/Chest:  crackles/rales


Abdomen:  non tender


Extremities:  no swelling











Intake and Output  


 


 9/16/18 9/17/18





 19:00 07:00


 


Intake Total 120 ml 


 


Output Total 200 ml 


 


Balance -80 ml 


 


  


 


Intake Oral 120 ml 


 


Output Urine Total 200 ml 


 


# Voids 3 7











Laboratory Tests








Test


  9/17/18


03:59


 


White Blood Count


  6.2 K/UL


(4.8-10.8)


 


Red Blood Count


  3.02 M/UL


(4.20-5.40)  L


 


Hemoglobin


  8.9 G/DL


(12.0-16.0)  L


 


Hematocrit


  28.4 %


(37.0-47.0)  L


 


Mean Corpuscular Volume 94 FL (80-99)  


 


Mean Corpuscular Hemoglobin


  29.5 PG


(27.0-31.0)


 


Mean Corpuscular Hemoglobin


Concent 31.4 G/DL


(32.0-36.0)  L


 


Red Cell Distribution Width


  17.9 %


(11.6-14.8)  H


 


Platelet Count


  302 K/UL


(150-450)


 


Mean Platelet Volume


  6.7 FL


(6.5-10.1)


 


Neutrophils (%) (Auto)


  58.6 %


(45.0-75.0)


 


Lymphocytes (%) (Auto)


  34.6 %


(20.0-45.0)


 


Monocytes (%) (Auto)


  5.0 %


(1.0-10.0)


 


Eosinophils (%) (Auto)


  1.5 %


(0.0-3.0)


 


Basophils (%) (Auto)


  0.3 %


(0.0-2.0)


 


Prothrombin Time


  12.2 SEC


(9.30-11.50)  H


 


Prothromb Time International


Ratio 1.2 (0.9-1.1)


H


 


Activated Partial


Thromboplast Time 25 SEC (23-33)


 


 


Sodium Level


  144 MMOL/L


(136-145)


 


Potassium Level


  4.2 MMOL/L


(3.5-5.1)


 


Chloride Level


  110 MMOL/L


()  H


 


Carbon Dioxide Level


  23 MMOL/L


(21-32)


 


Anion Gap


  11 mmol/L


(5-15)


 


Blood Urea Nitrogen


  17 mg/dL


(7-18)


 


Creatinine


  1.0 MG/DL


(0.55-1.30)


 


Estimat Glomerular Filtration


Rate  mL/min (>60)  


 


 


Glucose Level


  89 MG/DL


()


 


Calcium Level


  8.9 MG/DL


(8.5-10.1)


 


Magnesium Level


  1.5 MG/DL


(1.8-2.4)  L


 


Iron Level


  34 ug/dL


()  L


 


Total Bilirubin


  0.6 MG/DL


(0.2-1.0)


 


Aspartate Amino Transf


(AST/SGOT) 25 U/L (15-37)


 


 


Alanine Aminotransferase


(ALT/SGPT) 26 U/L (12-78)


 


 


Alkaline Phosphatase


  58 U/L


()


 


Troponin I


  0.320 ng/mL


(0.000-0.056)


 


C-Reactive Protein,


Quantitative 1.0 mg/dL


(0.00-0.90)  H


 


Total Protein


  6.2 G/DL


(6.4-8.2)  L


 


Albumin


  3.1 G/DL


(3.4-5.0)  L


 


Globulin 3.1 g/dL  


 


Albumin/Globulin Ratio 1.0 (1.0-2.7)  


 


Triglycerides Level


  69 MG/DL


()


 


Cholesterol Level


  149 MG/DL (<


200)


 


LDL Cholesterol


  80 mg/dL


(<100)


 


HDL Cholesterol


  53 MG/DL


(40-60)


 


Cholesterol/HDL Ratio


  2.8 (3.3-4.4)


L


 


Thyroid Stimulating Hormone


(TSH) 3.306 uiU/mL


(0.358-3.740)

















Lara Tong MD Sep 17, 2018 16:48

## 2018-09-17 NOTE — HISTORY AND PHYSICAL REPORT
DATE OF ADMISSION:  09/16/2018



CHIEF COMPLAINT:  The patient is an 78-year-old white female, who presents

with chief complaint of shortness of breath.



HISTORY OF PRESENT ILLNESS:  The patient was admitted to [facility] in August 2018.  The patient was diagnosed with acute

myocardial infarction, non-ST-elevated myocardial infarction.



The patient herself speaks mostly Maldivian.  The patient became short of

breath yesterday, September 16, 2018.  The patient was transported to

[facility].  The patient is admitted with shortness of breath

to rule out congestive heart failure versus acute myocardial infarction.



REVIEW OF SYSTEMS:  Unable to assess secondary to language barrier.



PAST MEDICAL HISTORY:  Significant for:



1. Diabetes type 2.

2. Hypertension.

3. Sick sinus syndrome.

4. Hypercholesterolemia.

5. Non-ST-elevated myocardial infarction as above.



PAST SURGICAL HISTORY:  Significant for:



1. Coronary artery bypass graft in 2017.

2. Pacemaker generator change in 2017.

3. History of elbow debridement.

4. Pacemaker implantation in 2014.

5. Debridement of septic elbow.



CURRENT MEDICATIONS:

1. Aspirin 81 mg one tablet p.o. daily.

2. Atenolol 25 mg p.o. daily.

3. Bentyl 10 mg p.o. 4 times daily p.r.n.

4. Pepcid 20 mg p.o. daily.

5. Norco 5/325 one tablet p.o. q.6 hours p.r.n.



ALLERGIES:  No known drug allergies.



SOCIAL HISTORY:  The patient is .  The patient denies tobacco or

alcohol use.



PHYSICAL EXAMINATION:

VITAL SIGNS:  Temperature 98.1, respirations 20, pulse 65, blood pressure

147/79.

GENERAL:  The patient is a well-developed, well-nourished, agitated white

female, in no apparent distress.

HEENT:  Eyes, pupils equal and responsive to light and accommodation.

Extraocular movements are intact.

NECK:  Supple without lymphadenopathy.

CHEST:  Lungs with few crackles in bilateral bases, otherwise clear to

auscultation.  No wheezes or rales.

CARDIOVASCULAR:  Regular rhythm and rate.  S1, S2 are normal without

murmurs, rubs, or gallops.

ABDOMEN:  Soft, nontender, and nondistended.  Positive bowel sounds.  No

evidence of hepatosplenomegaly.  Currently, no rebound or guarding

noted.

EXTREMITIES:  Negative for clubbing, cyanosis, or edema.

RECTAL:  Refused.

GENITAL:  Refused.

NEUROLOGIC:  Cranial nerves II through XII are grossly intact without focal

deficits.  Motor strength is 5/5 bilaterally intact.  Deep tendon reflexes

are 2+, plantar.



LABORATORY STUDIES:  WBC 6.4, hemoglobin 8.9, hematocrit 25.6, platelets

328,000.  Sodium 144, potassium 4.0, chloride 111, CO2 22, BUN 11,

creatinine 1.0, glucose 143.  BNP elevated at 33,171.  Troponin elevated

at 0.457.  Chest x-ray revealed cardiomegaly with interstitial

opacification/edema consistent with congestive heart failure and/or

pulmonary edema.



ASSESSMENT:  This is an 78-year-old white female with:



1. Shortness of breath.

2. Congestive heart failure.

3. Diabetes type 2.

4. Hypertension.

5. Sick sinus syndrome.

6. Hypercholesterolemia.

7. Pacemaker in situ.



TREATMENT:

1. Shortness of breath.  This is probably secondary to congestive heart

failure.  Cardiology consultation has been obtained with Dr. Tong.  We

will follow recommendations of Cardiology.  An echocardiogram is

pending.

2. Congestive heart failure.  As above, Cardiology consultation has been

obtained with Dr. Lara Tong.  We will follow recommendations of

Cardiology.

3. Diabetes type 2.  The patient has been started on NovoLog sliding

scale.

4. Hypertension.  Continue atenolol as above.

5. Sick sinus syndrome.  The patient is status post pacemaker

implantation.

6. Hypercholesterolemia, stable.

7. Pacemaker in situ.









  ______________________________________________

  Cheo Ortiz M.D. DR:  Carlee

D:  09/17/2018 17:56

T:  09/17/2018 18:07

JOB#:  [id number]

CC:

## 2018-09-17 NOTE — CARDIOLOGY REPORT
--------------- APPROVED REPORT --------------





EXAM: Two-dimensional and M-mode echocardiogram with Doppler and color 

Doppler.



INDICATION

Left Ventricular Function



M-Mode DIMENSIONS 

IVSd1.1 (0.7-1.1cm)Left Atrium (MM)5.1 (1.6-4.0cm)

LVDd5.8 (3.5-5.6cm)Aortic Root2.7 (2.0-3.7cm)

PWd1.1 (0.7-1.1cm)Aortic Cusp Exc.1.2 (1.5-2.0cm)



LVDs4.2 (2.5-4.0cm)

PWs1.4 cm





Mild left ventricular enlargement. 

Global left ventricular hypokinesis. 

Left ventricular ejection fraction estimated to be 30-35 %.

Borderline left ventricular hypertrophy.

No evidence of pericardial effusion.

Mild left atrial enlargement. 

Right cardiac chamber sizes are within normal limits.

Aortic valve calcification with decreased cusp excursion c/w aortic stenosis.

Mildly thickened mitral valve leaflets with normal excursion.

Moderate mitral annulus and aortic root calcification.

Normal pulmonic valve structure. 

Normal tricuspid valve structure.  

IVC dilated at 2.5 cm with physiological collapse, suggestive of increased RA 

pressure.



A  color flow and spectral Doppler study was performed and revealed:

Mild aortic insufficiency.

Peak aortic valve gradient of 12 mmHg and a mean of 6 mmHg.

Aortic valve area 1.3 cm2 calculated by continuity equation.

Moderate to severe mitral regurgitation.

Mitral inflow indicates increased left atrial pressure, suggestive restrictive pattern 

(Grade III).

Moderate tricuspid regurgitation.

Tricuspid systolic velocities suggests peak right ventricular systolic pressure of 83 

mmHg, consistent with severe pulmonary hypertension.

Moderate pulmonic regurgitation present.

## 2018-09-17 NOTE — CONSULTATION
History of Present Illness


General


Date patient seen:  Sep 17, 2018


Chief Complaint:  General Complaint





Present Illness


HPI


78 year old female wth hx of CAD, pacemaker, htn presented to ER by paramedics  

with complaints of shortness of breath, dizziness and episodes of fall. 


She has pain to the left hip area with obvious bruising.  Her troponin was 

positive. She is admitted to telemetry/akilyn to rule out pacemaker malfunction 

and rule out arrhythmias.


Allergies:  


Coded Allergies:  


     No Known Allergies (Unverified , 3/26/14)





Medication History


Scheduled


Amoxicillin/Potassium Clav 875-125* (Augmentin 875-125 Tablet*), 1 TAB ORAL 

TWICE A DAY


Aspirin (Aspirin EC), 81 MG ORAL DAILY, (Reported)


Atenolol (Tenormin), 25 MG ORAL DAILY, (Reported)


Dicyclomine Hcl* (Dicyclomine Hcl*), 10 MG PO QID


Famotidine (Pepcid), 20 MG ORAL BEDTIME


No Known Medications* (NKM - No Known Medications*), 0 ., (Reported)





Scheduled PRN


Hydrocodone Bit/Acetaminophen 5-325* (Norco 5-325*), 1 TAB ORAL Q6H PRN for For 

Pain





Miscellaneous Medications


Unable to Obtain Medications (Unable To Obtain Meds), (Reported)





Patient History


Healthcare decision maker





Resuscitation status


Full Code


Advanced Directive on File








Past Medical/Surgical History


Past Medical/Surgical History:  


(1) CHF (congestive heart failure)


(2) Pacemaker





Review of Systems


All Other Systems:  negative except mentioned in HPI





Physical Exam


General Appearance:  WD/WN


Lines, tubes and drains:  peripheral


HEENT:  normocephalic, atraumatic


Neck:  non-tender, supple


Respiratory/Chest:  chest wall non-tender, lungs clear


Cardiovascular/Chest:  normal peripheral pulses, normal rate


Abdomen:  normal bowel sounds, non tender


Genitourinary/Rectal:  normal genital exam, normal rectal exam


Skin Exam:  normal pigmentation


Neurologic:  CNs II-XII grossly normal





Last 24 Hour Vital Signs








  Date Time  Temp Pulse Resp B/P (MAP) Pulse Ox O2 Delivery O2 Flow Rate FiO2


 


9/17/18 09:00  74      


 


9/17/18 08:34  65  147/79    


 


9/17/18 08:00 98.1 65 20 147/79 (101) 97   





 98.1       


 


9/17/18 08:00      Nasal Cannula 2.0 


 


9/17/18 04:00  83      


 


9/17/18 04:00      Nasal Cannula 2.0 


 


9/17/18 04:00 98.2 78 20 145/80 (101) 100   





 98.2       


 


9/17/18 00:00 98.3 72 20 140/85 (103) 95   





 98.3       


 


9/17/18 00:00  75      


 


9/17/18 00:00      Nasal Cannula 2.0 


 


9/16/18 23:33  77 20  99 Room Air  21


 


9/16/18 23:10  77 18  98 Room Air  21


 


9/16/18 22:58  79 18   Room Air  21


 


9/16/18 20:00  62      


 


9/16/18 20:00 97.9 66 20 115/70 (85) 95   





 97.9       


 


9/16/18 20:00      Room Air  


 


9/16/18 17:29      Room Air  


 


9/16/18 17:04 98.4 70 21 110/79 95 Room Air  21





 98.4       


 


9/16/18 16:50 97.7 77 20 134/78 (96)    





 97.7       


 


9/16/18 15:54 98.4 70 21 110/79 95 Room Air  21





 98.4       


 


9/16/18 14:08 98.0 71 19 126/70 95 Room Air  





 98.0       


 


9/16/18 12:52  79 23  100 Room Air  21 9/16/18 12:43  72 14   Room Air  21


 


9/16/18 12:43  72 14  98 Room Air  21


 


9/16/18 12:10 98.4       


 


9/16/18 11:48 98.4  20 122/65 99 Room Air  





 98.4       


 


9/16/18 11:38 98.4 64 20 122/65  Room Air  





 98.4       

















Intake and Output  


 


 9/16/18 9/17/18





 19:00 07:00


 


Intake Total 120 ml 


 


Output Total 200 ml 


 


Balance -80 ml 


 


  


 


Intake Oral 120 ml 


 


Output Urine Total 200 ml 


 


# Voids 3 7











Laboratory Tests








Test


  9/16/18


12:00 9/17/18


03:59


 


White Blood Count


  6.4 K/UL


(4.8-10.8) 6.2 K/UL


(4.8-10.8)


 


Red Blood Count


  3.08 M/UL


(4.20-5.40)  L 3.02 M/UL


(4.20-5.40)  L


 


Hemoglobin


  8.9 G/DL


(12.0-16.0)  L 8.9 G/DL


(12.0-16.0)  L


 


Hematocrit


  28.6 %


(37.0-47.0)  L 28.4 %


(37.0-47.0)  L


 


Mean Corpuscular Volume 93 FL (80-99)   94 FL (80-99)  


 


Mean Corpuscular Hemoglobin


  28.9 PG


(27.0-31.0) 29.5 PG


(27.0-31.0)


 


Mean Corpuscular Hemoglobin


Concent 31.1 G/DL


(32.0-36.0)  L 31.4 G/DL


(32.0-36.0)  L


 


Red Cell Distribution Width


  17.5 %


(11.6-14.8)  H 17.9 %


(11.6-14.8)  H


 


Platelet Count


  328 K/UL


(150-450) 302 K/UL


(150-450)


 


Mean Platelet Volume


  7.2 FL


(6.5-10.1) 6.7 FL


(6.5-10.1)


 


Neutrophils (%) (Auto)


  70.8 %


(45.0-75.0) 58.6 %


(45.0-75.0)


 


Lymphocytes (%) (Auto)


  21.9 %


(20.0-45.0) 34.6 %


(20.0-45.0)


 


Monocytes (%) (Auto)


  5.7 %


(1.0-10.0) 5.0 %


(1.0-10.0)


 


Eosinophils (%) (Auto)


  1.3 %


(0.0-3.0) 1.5 %


(0.0-3.0)


 


Basophils (%) (Auto)


  0.3 %


(0.0-2.0) 0.3 %


(0.0-2.0)


 


Sodium Level


  144 MMOL/L


(136-145) 144 MMOL/L


(136-145)


 


Potassium Level


  4.0 MMOL/L


(3.5-5.1) 4.2 MMOL/L


(3.5-5.1)


 


Chloride Level


  111 MMOL/L


()  H 110 MMOL/L


()  H


 


Carbon Dioxide Level


  22 MMOL/L


(21-32) 23 MMOL/L


(21-32)


 


Anion Gap


  11 mmol/L


(5-15) 11 mmol/L


(5-15)


 


Blood Urea Nitrogen


  11 mg/dL


(7-18) 17 mg/dL


(7-18)


 


Creatinine


  1.0 MG/DL


(0.55-1.30) 1.0 MG/DL


(0.55-1.30)


 


Estimat Glomerular Filtration


Rate  mL/min (>60)  


   mL/min (>60)  


 


 


Glucose Level


  143 MG/DL


()  H 89 MG/DL


()


 


Calcium Level


  8.9 MG/DL


(8.5-10.1) 8.9 MG/DL


(8.5-10.1)


 


Total Bilirubin


  0.6 MG/DL


(0.2-1.0) 0.6 MG/DL


(0.2-1.0)


 


Aspartate Amino Transf


(AST/SGOT) 27 U/L (15-37)


  25 U/L (15-37)


 


 


Alanine Aminotransferase


(ALT/SGPT) 24 U/L (12-78)


  26 U/L (12-78)


 


 


Alkaline Phosphatase


  70 U/L


() 58 U/L


()


 


Total Creatine Kinase


  488 U/L


()  H 


 


 


Creatine Kinase MB


  2.2 NG/ML


(0.0-3.6) 


 


 


Creatine Kinase MB Relative


Index 0.4  


  


 


 


Troponin I


  0.457 ng/mL


(0.000-0.056) 0.320 ng/mL


(0.000-0.056)


 


Pro-B-Type Natriuretic Peptide


  40958 pg/mL


(0-125)  H 


 


 


Total Protein


  6.5 G/DL


(6.4-8.2) 6.2 G/DL


(6.4-8.2)  L


 


Albumin


  3.2 G/DL


(3.4-5.0)  L 3.1 G/DL


(3.4-5.0)  L


 


Globulin 3.3 g/dL   3.1 g/dL  


 


Albumin/Globulin Ratio 1.0 (1.0-2.7)   1.0 (1.0-2.7)  


 


Lipase


  180 U/L


() 


 


 


Prothrombin Time


  


  12.2 SEC


(9.30-11.50)  H


 


Prothromb Time International


Ratio 


  1.2 (0.9-1.1)


H


 


Activated Partial


Thromboplast Time 


  25 SEC (23-33)


 


 


Magnesium Level


  


  1.5 MG/DL


(1.8-2.4)  L


 


Iron Level


  


  34 ug/dL


()  L


 


C-Reactive Protein,


Quantitative 


  1.0 mg/dL


(0.00-0.90)  H


 


Triglycerides Level


  


  69 MG/DL


()


 


Cholesterol Level


  


  149 MG/DL (<


200)


 


LDL Cholesterol


  


  80 mg/dL


(<100)


 


HDL Cholesterol


  


  53 MG/DL


(40-60)


 


Cholesterol/HDL Ratio


  


  2.8 (3.3-4.4)


L


 


Thyroid Stimulating Hormone


(TSH) 


  3.306 uiU/mL


(0.358-3.740)








Height (Feet):  5


Height (Inches):  4.00


Weight (Pounds):  136


Medications





Current Medications








 Medications


  (Trade)  Dose


 Ordered  Sig/Violette


 Route


 PRN Reason  Start Time


 Stop Time Status Last Admin


Dose Admin


 


 Acetaminophen


  (Tylenol)  650 mg  Q4H  PRN


 ORAL


 FEVER  9/16/18 14:43


 10/16/18 14:42   


 


 


 Acetaminophen/


 Hydrocodone Bitart


  (Norco 5/325)  1 tab  Q6H  PRN


 ORAL


 Moderate Pain (Pain Scale 4-6)  9/16/18 23:45


 9/23/18 23:44  9/17/18 00:04


 


 


 Albuterol/


 Ipratropium


  (Albuterol/


 Ipratropium)  3 ml  EVERY 4 HOURS  PRN


 HHN


 Shortness of Breath  9/16/18 14:43


 9/21/18 14:42  9/16/18 23:12


 


 


 Alprazolam


  (Xanax)  1 mg  Q4H  PRN


 ORAL


 For Anxiety  9/17/18 07:15


 9/24/18 07:14   


 


 


 Aspirin


  (ASA)  162 mg  DAILY


 ORAL


   9/17/18 09:00


 10/17/18 08:59  9/17/18 08:33


 


 


 Atenolol


  (Tenormin)  25 mg  DAILY


 ORAL


   9/17/18 09:00


 10/17/18 08:59  9/17/18 08:34


 


 


 Diltiazem HCl


  (Cardizem)  10 mg  Q1H  PRN


 IV


 heart rate more than 120,   9/16/18 14:00


 10/16/18 13:59   


 


 


 Enalaprilat


  (Vasotec)  2.5 mg  EVERY 6 HOURS  PRN


 IV


 sbp more than 160  9/16/18 14:44


 10/16/18 14:43   


 


 


 Heparin Sodium


  (Porcine)


  (Heparin 5000


 units/ml)  5,000 units  EVERY 12  HOURS


 SUBQ


   9/16/18 21:00


 10/16/18 20:59  9/17/18 08:36


 


 


 Ketorolac


 Tromethamine


  (Toradol 30mg)  15 mg  Q6HR  PRN


 IV


 moderate pain ( 4-6)  9/16/18 14:48


 9/21/18 14:47   


 


 


 Morphine Sulfate


  (Morphine


 Sulfate)  2 mg  Q4H  PRN


 IVP


 severe Pain (Pain Scale 7-10)  9/16/18 14:48


 9/23/18 14:47   


 


 


 Nitroglycerin


  (Ntg)  0.4 mg  Q5MIN X 3 DOSES PRN


 SL


 Prn Chest Pain  9/16/18 14:43


 10/16/18 14:42   


 


 


 Ondansetron HCl


  (Zofran)  4 mg  Q6H  PRN


 IVP


 Nausea & Vomiting  9/16/18 14:43


 10/16/18 14:42   


 


 


 Polyethylene


 Glycol


  (Miralax)  17 gm  DAILYPRN  PRN


 ORAL


 Constipation  9/16/18 14:43


 10/16/18 14:42   


 


 


 Temazepam


  (Restoril)  15 mg  HSPRN  PRN


 ORAL


 Insomnia  9/16/18 21:00


 9/23/18 20:59  9/16/18 23:08


 











Assessment/Plan


Problem List:  


(1) Dyspnea


ICD Codes:  R06.00 - Dyspnea, unspecified


SNOMED:  350336634


(2) Pacemaker


ICD Codes:  Z95.0 - Presence of cardiac pacemaker


SNOMED:  108871315


(3) NSTEMI (non-ST elevated myocardial infarction)


ICD Codes:  I21.4 - Non-ST elevation (NSTEMI) myocardial infarction


SNOMED:  402723278


Assessment/Plan


pt's cxr is clear


she in not a high risk for PE


f/u troponin


check echo


f/u cardiology recommendations


keep bp at high normal levels











Korin Junior MD Sep 17, 2018 11:43

## 2018-09-17 NOTE — CONSULTATION
DATE OF CONSULTATION:  09/17/2018



ORTHOPEDIC CONSULTATION



CONSULTING PHYSICIAN:  Raffi Nix M.D.



REQUESTING PHYSICIAN:  Juan Carlos Magana M.D.



DIAGNOSIS:  Pelvic fracture.



HISTORY:  The patient is an 78-year-old, Italian-speaking woman with a

history of coronary artery disease, pacemaker, and hypertension who

presented to the emergency room because of shortness of breath, dizziness,

and multiple falls.  She was admitted to the hospital because of

presumptive cardiac complaints.  She had difficulty ambulating reportedly

at that time as well.



PAST MEDICAL HISTORY:  Significant for coronary artery disease and has had

percutaneous coronary intervention.  She has a pacemaker.



MEDICATIONS:  It is unclear _____ all medications include, but they may

include metformin, atorvastatin, Detrol, Tenormin, Diovan, Prilosec,

Plavix, and Crestor.



ALLERGIES:  She has allergies to fish product derivatives and vancomycin.



REVIEW OF SYSTEMS:  A 12-point review of system is difficult to elicit

because she is a relatively poor historian.



PHYSICAL EXAMINATION:

GENERAL:  She is well appearing and resting comfortably in bed.

EXTREMITIES:  A bilateral hip examination reveals normal range of motion,

no pain with log rolling, and ability to straight leg raise bilaterally.

She has no pelvic pain with lateral compression test or AP compression

test.



CT scan of 09/16/2018 of the pelvis revealed a comminuted left

parasymphyseal fracture to the proximal portions of the left superior and

inferior pubic rami and mildly distracted fracture of the left iliac wing

paralleling and extending into the left sacroiliac joint.  There are

degenerative changes of her spine noted as well.



The patient has multiple pelvic fractures that do not clinically appear

to be acute.  These may be old in nature and she has had multiple other

falls as well.  She has no pain on examination today.  No operation is

warranted from an orthopedic standpoint.  She may be weightbearing as

tolerated and seen by Physiotherapy.  Should she remain bed-bound,

appropriate DVT prophylaxis is recommended.



Thank you for the opportunity to consult.









  ______________________________________________

  Raffi Nix M.D.





DR:  CANDY

D:  09/17/2018 15:36

T:  09/17/2018 17:30

JOB#:  [id number]

CC:

## 2018-09-18 VITALS — DIASTOLIC BLOOD PRESSURE: 88 MMHG | SYSTOLIC BLOOD PRESSURE: 157 MMHG

## 2018-09-18 VITALS — DIASTOLIC BLOOD PRESSURE: 81 MMHG | SYSTOLIC BLOOD PRESSURE: 121 MMHG

## 2018-09-18 VITALS — SYSTOLIC BLOOD PRESSURE: 144 MMHG | DIASTOLIC BLOOD PRESSURE: 83 MMHG

## 2018-09-18 VITALS — DIASTOLIC BLOOD PRESSURE: 76 MMHG | SYSTOLIC BLOOD PRESSURE: 133 MMHG

## 2018-09-18 VITALS — DIASTOLIC BLOOD PRESSURE: 78 MMHG | SYSTOLIC BLOOD PRESSURE: 158 MMHG

## 2018-09-18 VITALS — SYSTOLIC BLOOD PRESSURE: 143 MMHG | DIASTOLIC BLOOD PRESSURE: 68 MMHG

## 2018-09-18 LAB
% IRON SATURATION: 12 % (ref 15–50)
ADD MANUAL DIFF: NO
ANION GAP SERPL CALC-SCNC: 12 MMOL/L (ref 5–15)
APTT BLD: 26 SEC (ref 23–33)
BASOPHILS NFR BLD AUTO: 0.3 % (ref 0–2)
BUN SERPL-MCNC: 20 MG/DL (ref 7–18)
CALCIUM SERPL-MCNC: 9.1 MG/DL (ref 8.5–10.1)
CHLORIDE SERPL-SCNC: 109 MMOL/L (ref 98–107)
CO2 SERPL-SCNC: 23 MMOL/L (ref 21–32)
CREAT SERPL-MCNC: 1.2 MG/DL (ref 0.55–1.3)
EOSINOPHIL NFR BLD AUTO: 1.2 % (ref 0–3)
ERYTHROCYTE [DISTWIDTH] IN BLOOD BY AUTOMATED COUNT: 17.5 % (ref 11.6–14.8)
HCT VFR BLD CALC: 28.9 % (ref 37–47)
HGB BLD-MCNC: 9 G/DL (ref 12–16)
INR PPP: 1.2 (ref 0.9–1.1)
IRON SERPL-MCNC: 29 UG/DL (ref 50–175)
LDH SERPL L TO P-CCNC: 285 U/L (ref 81–234)
LYMPHOCYTES NFR BLD AUTO: 20.9 % (ref 20–45)
MCV RBC AUTO: 92 FL (ref 80–99)
MONOCYTES NFR BLD AUTO: 6 % (ref 1–10)
NEUTROPHILS NFR BLD AUTO: 71.6 % (ref 45–75)
PLATELET # BLD: 273 K/UL (ref 150–450)
POTASSIUM SERPL-SCNC: 4.3 MMOL/L (ref 3.5–5.1)
RBC # BLD AUTO: 3.14 M/UL (ref 4.2–5.4)
SODIUM SERPL-SCNC: 144 MMOL/L (ref 136–145)
TIBC SERPL-MCNC: 237 UG/DL (ref 250–450)
UNSATURATED IRON BINDING: 208 UG/DL (ref 112–346)
WBC # BLD AUTO: 7.5 K/UL (ref 4.8–10.8)

## 2018-09-18 RX ADMIN — ALPRAZOLAM PRN MG: 0.5 TABLET ORAL at 09:53

## 2018-09-18 RX ADMIN — ATENOLOL SCH MG: 25 TABLET ORAL at 09:03

## 2018-09-18 RX ADMIN — HYDROCODONE BITARTRATE AND ACETAMINOPHEN PRN TAB: 5; 325 TABLET ORAL at 11:50

## 2018-09-18 RX ADMIN — LORAZEPAM PRN MG: 2 INJECTION, SOLUTION INTRAMUSCULAR; INTRAVENOUS at 23:53

## 2018-09-18 RX ADMIN — HEPARIN SODIUM SCH UNITS: 5000 INJECTION INTRAVENOUS; SUBCUTANEOUS at 09:00

## 2018-09-18 RX ADMIN — ASPIRIN 81 MG SCH MG: 81 TABLET ORAL at 09:03

## 2018-09-18 RX ADMIN — LORAZEPAM PRN MG: 2 INJECTION, SOLUTION INTRAMUSCULAR; INTRAVENOUS at 08:20

## 2018-09-18 RX ADMIN — HEPARIN SODIUM SCH UNITS: 5000 INJECTION INTRAVENOUS; SUBCUTANEOUS at 21:28

## 2018-09-18 RX ADMIN — HEPARIN SODIUM SCH UNITS: 5000 INJECTION INTRAVENOUS; SUBCUTANEOUS at 09:04

## 2018-09-18 RX ADMIN — LORAZEPAM PRN MG: 2 INJECTION, SOLUTION INTRAMUSCULAR; INTRAVENOUS at 01:30

## 2018-09-18 NOTE — PSYCH CONSULT PROGRESS NOTE
Psych Consult Progress Note


Vital Signs





Last 24 Hour Vital Signs








  Date Time  Temp Pulse Resp B/P (MAP) Pulse Ox O2 Delivery O2 Flow Rate FiO2


 


9/18/18 12:00 97.7 100 21 133/76 (95) 99   





 97.7       


 


9/18/18 10:05  78 18   Room Air  21


 


9/18/18 09:03  97  143/68    


 


9/18/18 09:00      Nasal Cannula 2.0 


 


9/18/18 08:00 97.7 97 21 143/68 (93) 94   





 97.7       


 


9/18/18 08:00  84      


 


9/18/18 04:00 98.6 81 21 157/88 (111) 97   





 98.6       


 


9/18/18 04:00  90      


 


9/18/18 00:00  86      


 


9/18/18 00:00 98.9 80 19 144/83 (103) 97   





 98.9       


 


9/17/18 21:00      Nasal Cannula 2.0 


 


9/17/18 20:57  84 20  99 Nasal Cannula 2.0 28


 


9/17/18 20:50  80 18  98 Nasal Cannula 2.0 28


 


9/17/18 20:22  68 18   Room Air  21


 


9/17/18 20:00 99.1 85 18 159/91 (113) 97   





 99.1       


 


9/17/18 20:00  82      


 


9/17/18 16:00      Nasal Cannula 2.0 


 


9/17/18 14:36  65 18   Room Air  21








Labs





Laboratory Tests








Test


  9/18/18


06:55


 


White Blood Count


  7.5 K/UL


(4.8-10.8)


 


Red Blood Count


  3.14 M/UL


(4.20-5.40)  L


 


Hemoglobin


  9.0 G/DL


(12.0-16.0)  L


 


Hematocrit


  28.9 %


(37.0-47.0)  L


 


Mean Corpuscular Volume 92 FL (80-99)  


 


Mean Corpuscular Hemoglobin


  28.8 PG


(27.0-31.0)


 


Mean Corpuscular Hemoglobin


Concent 31.3 G/DL


(32.0-36.0)  L


 


Red Cell Distribution Width


  17.5 %


(11.6-14.8)  H


 


Platelet Count


  273 K/UL


(150-450)


 


Mean Platelet Volume


  7.0 FL


(6.5-10.1)


 


Neutrophils (%) (Auto)


  71.6 %


(45.0-75.0)


 


Lymphocytes (%) (Auto)


  20.9 %


(20.0-45.0)


 


Monocytes (%) (Auto)


  6.0 %


(1.0-10.0)


 


Eosinophils (%) (Auto)


  1.2 %


(0.0-3.0)


 


Basophils (%) (Auto)


  0.3 %


(0.0-2.0)


 


Differential Total Cells


Counted 100  


 


 


Neutrophils % (Manual) 73 % (45-75)  


 


Lymphocytes % (Manual) 21 % (20-45)  


 


Monocytes % (Manual) 5 % (1-10)  


 


Eosinophils % (Manual) 1 % (0-3)  


 


Basophils % (Manual) 0 % (0-2)  


 


Band Neutrophils 0 % (0-8)  


 


Platelet Estimate Adequate  


 


Platelet Morphology Normal  


 


Hypochromasia 2+  


 


Anisocytosis 1+  


 


Erythrocyte Sedimentation Rate


  15 MM/HR


(0-30)


 


Reticulocyte Count


  2.9 %


(0.0-2.0)  H


 


Prothrombin Time


  12.9 SEC


(9.30-11.50)  H


 


Prothromb Time International


Ratio 1.2 (0.9-1.1)


H


 


Activated Partial


Thromboplast Time 26 SEC (23-33)


 


 


Sodium Level


  144 MMOL/L


(136-145)


 


Potassium Level


  4.3 MMOL/L


(3.5-5.1)


 


Chloride Level


  109 MMOL/L


()  H


 


Carbon Dioxide Level


  23 MMOL/L


(21-32)


 


Anion Gap


  12 mmol/L


(5-15)


 


Blood Urea Nitrogen


  20 mg/dL


(7-18)  H


 


Creatinine


  1.2 MG/DL


(0.55-1.30)


 


Estimat Glomerular Filtration


Rate  mL/min (>60)  


 


 


Glucose Level


  92 MG/DL


()


 


Calcium Level


  9.1 MG/DL


(8.5-10.1)


 


Iron Level


  29 ug/dL


()  L


 


Total Iron Binding Capacity


  237 ug/dL


(250-450)  L


 


Percent Iron Saturation 12 % (15-50)  L


 


Unsaturated Iron Binding


  208 ug/dL


(112-346)


 


Lactate Dehydrogenase


  285 U/L


()  H


 


Troponin I


  0.174 ng/mL


(0.000-0.056)


 


Pro-B-Type Natriuretic Peptide


  > 14736 pg/mL


(0-125)  H


 


Carcinoembryonic Antigen Pending  


 


Vitamin B12 Level


  606 PG/ML


(193-986)


 


Folate


  18.2 NG/ML


(8.6-58.9)








Medications





Current Medications








 Medications


  (Trade)  Dose


 Ordered  Sig/Violette


 Route


 PRN Reason  Start Time


 Stop Time Status Last Admin


Dose Admin


 


 Acetaminophen


  (Tylenol)  650 mg  Q4H  PRN


 ORAL


 FEVER  9/17/18 16:27


 10/16/18 16:26   


 


 


 Acetaminophen/


 Hydrocodone Bitart


  (Norco 5/325)  1 tab  Q6H  PRN


 ORAL


 Moderate Pain (Pain Scale 4-6)  9/17/18 16:29


 9/23/18 16:28  9/18/18 11:50


 


 


 Albuterol/


 Ipratropium


  (Albuterol/


 Ipratropium)  3 ml  Q4H  PRN


 HHN


 Shortness of Breath  9/17/18 17:00


 9/22/18 16:59  9/17/18 20:56


 


 


 Alprazolam


  (Xanax)  1 mg  Q4H  PRN


 ORAL


 For Anxiety  9/17/18 16:28


 9/24/18 16:27  9/18/18 09:53


 


 


 Aspirin


  (ASA)  162 mg  DAILY


 ORAL


   9/18/18 09:00


 10/17/18 08:59  9/18/18 09:03


 


 


 Atenolol


  (Tenormin)  25 mg  DAILY


 ORAL


   9/18/18 09:00


 10/17/18 08:59  9/18/18 09:03


 


 


 Diltiazem HCl


  (Cardizem)  10 mg  Q1H  PRN


 IV


 heart rate more than 120,   9/17/18 16:28


 10/16/18 16:27   


 


 


 Enalaprilat


  (Vasotec)  2.5 mg  Q6H  PRN


 IV


 sbp more than 160  9/17/18 18:00


 10/17/18 17:59   


 


 


 Heparin Sodium


  (Porcine)


  (Heparin 5000


 units/ml)  5,000 units  EVERY 12  HOURS


 SUBQ


   9/17/18 21:00


 10/16/18 20:59   


 


 


 Ketorolac


 Tromethamine


  (Toradol 30mg)  15 mg  Q6H  PRN


 IV


 moderate pain ( 4-6)  9/17/18 18:00


 9/22/18 17:59   


 


 


 Lorazepam


  (Ativan 2mg/ml


 1ml)  0.5 mg  Q4H  PRN


 IV


 For Anxiety  9/17/18 17:35


 9/24/18 17:14  9/18/18 08:20


 


 


 Morphine Sulfate


  (Morphine


 Sulfate)  2 mg  Q4H  PRN


 IVP


 severe Pain (Pain Scale 7-10)  9/17/18 16:29


 9/23/18 16:28   


 


 


 Nitroglycerin


  (Ntg)  0.4 mg  Q5MIN X 3 DOSES PRN


 SL


 Prn Chest Pain  9/17/18 16:30


 10/16/18 14:42   


 


 


 Ondansetron HCl


  (Zofran)  4 mg  Q6H  PRN


 IVP


 Nausea & Vomiting  9/17/18 16:29


 10/16/18 16:28   


 


 


 Polyethylene


 Glycol


  (Miralax)  17 gm  DAILYPRN  PRN


 ORAL


 Constipation  9/17/18 16:29


 10/17/18 16:28   


 


 


 Quetiapine


 Fumarate


  (SEROquel)  25 mg  Q6H  PRN


 ORAL


 ANXIETY/AGITATION  9/17/18 17:15


 10/17/18 17:14  9/18/18 05:01


 


 


 Quetiapine


 Fumarate


  (SEROquel)  50 mg  THREE TIMES A  DAY


 ORAL


   9/18/18 13:00


 10/18/18 12:59  9/18/18 13:57


 


 


 Temazepam


  (Restoril)  15 mg  HSPRN  PRN


 ORAL


 Insomnia  9/17/18 21:00


 9/23/18 20:59   


 








Problems:  


(1) encephalopathy due to 


Assessment & Plan:  encephalopathy due to C


psychotic d/o





seroquel prn


ativan prn


haldol prn


benadryl prn


dc the sitter 


restraints 














Josh Nunez MD Sep 18, 2018 14:06

## 2018-09-18 NOTE — CARDIOLOGY REPORT
--------------- APPROVED REPORT --------------





EKG Measurement

Heart [id number]

LA 

176P31

[id number]

[id number]

[id number]





Normal sinus rhythm

Left axis deviation

Septal infarct, age undetermined

Abnormal ECG

## 2018-09-18 NOTE — INTERNAL MED PROGRESS NOTE
Subjective


Date of Service:  Sep 18, 2018


Physician Name


Ortiz,Cheo


Attending Physician


Juan Carlos Magana MD





Current Medications








 Medications


  (Trade)  Dose


 Ordered  Sig/Violette


 Route


 PRN Reason  Start Time


 Stop Time Status Last Admin


Dose Admin


 


 Acetaminophen


  (Tylenol)  650 mg  Q4H  PRN


 ORAL


 FEVER  9/17/18 16:27


 10/16/18 16:26   


 


 


 Acetaminophen/


 Hydrocodone Bitart


  (Norco 5/325)  1 tab  Q6H  PRN


 ORAL


 Moderate Pain (Pain Scale 4-6)  9/17/18 16:29


 9/23/18 16:28  9/18/18 11:50


 


 


 Albuterol/


 Ipratropium


  (Albuterol/


 Ipratropium)  3 ml  Q4H  PRN


 HHN


 Shortness of Breath  9/17/18 17:00


 9/22/18 16:59  9/17/18 20:56


 


 


 Alprazolam


  (Xanax)  1 mg  Q4H  PRN


 ORAL


 For Anxiety  9/17/18 16:28


 9/24/18 16:27  9/18/18 09:53


 


 


 Aspirin


  (ASA)  162 mg  DAILY


 ORAL


   9/18/18 09:00


 10/17/18 08:59  9/18/18 09:03


 


 


 Atenolol


  (Tenormin)  25 mg  DAILY


 ORAL


   9/18/18 09:00


 10/17/18 08:59  9/18/18 09:03


 


 


 Diltiazem HCl


  (Cardizem)  10 mg  Q1H  PRN


 IV


 heart rate more than 120,   9/17/18 16:28


 10/16/18 16:27   


 


 


 Enalaprilat


  (Vasotec)  2.5 mg  Q6H  PRN


 IV


 sbp more than 160  9/17/18 18:00


 10/17/18 17:59   


 


 


 Heparin Sodium


  (Porcine)


  (Heparin 5000


 units/ml)  5,000 units  EVERY 12  HOURS


 SUBQ


   9/17/18 21:00


 10/16/18 20:59   


 


 


 Ketorolac


 Tromethamine


  (Toradol 30mg)  15 mg  Q6H  PRN


 IV


 moderate pain ( 4-6)  9/17/18 18:00


 9/22/18 17:59   


 


 


 Lorazepam


  (Ativan 2mg/ml


 1ml)  0.5 mg  Q4H  PRN


 IV


 For Anxiety  9/17/18 17:35


 9/24/18 17:14  9/18/18 08:20


 


 


 Morphine Sulfate


  (Morphine


 Sulfate)  2 mg  Q4H  PRN


 IVP


 severe Pain (Pain Scale 7-10)  9/17/18 16:29


 9/23/18 16:28   


 


 


 Nitroglycerin


  (Ntg)  0.4 mg  Q5MIN X 3 DOSES PRN


 SL


 Prn Chest Pain  9/17/18 16:30


 10/16/18 14:42   


 


 


 Ondansetron HCl


  (Zofran)  4 mg  Q6H  PRN


 IVP


 Nausea & Vomiting  9/17/18 16:29


 10/16/18 16:28   


 


 


 Polyethylene


 Glycol


  (Miralax)  17 gm  DAILYPRN  PRN


 ORAL


 Constipation  9/17/18 16:29


 10/17/18 16:28   


 


 


 Quetiapine


 Fumarate


  (SEROquel)  25 mg  Q6H  PRN


 ORAL


 ANXIETY/AGITATION  9/17/18 17:15


 10/17/18 17:14  9/18/18 05:01


 


 


 Quetiapine


 Fumarate


  (SEROquel)  50 mg  THREE TIMES A  DAY


 ORAL


   9/18/18 13:00


 10/18/18 12:59  9/18/18 13:57


 


 


 Temazepam


  (Restoril)  15 mg  HSPRN  PRN


 ORAL


 Insomnia  9/17/18 21:00


 9/23/18 20:59   


 








Allergies:  


Coded Allergies:  


     No Known Allergies (Unverified , 3/26/14)


ROS Limited/Unobtainable:  Yes


Subjective


77 YO F admitted with shortness of breath.  Now kathya heart failure.  Cover for 

Int Yovanny-Dr Magana





Objective





Last Vital Signs








  Date Time  Temp Pulse Resp B/P (MAP) Pulse Ox O2 Delivery O2 Flow Rate FiO2


 


9/18/18 16:00 98.5 81 21 121/81 (94) 96   





 98.5       


 


9/18/18 10:05      Room Air  21


 


9/18/18 09:00       2.0 








General Appearance:  WD/WN, no apparent distress, alert


EENT:  PERRL/EOMI, normal ENT inspection


Neck:  non-tender, normal alignment, supple, normal inspection


Cardiovascular:  normal peripheral pulses, normal rate, regular rhythm, no 

gallop/murmur, no JVD


Respiratory/Chest:  chest wall non-tender, respiratory distress, crackles/rales

, rhonchi - bilaterally, expiratory wheezing


Abdomen:  normal bowel sounds, non tender, soft, no organomegaly, no mass


Extremities:  normal range of motion, non-tender


Edema:  trace edema


Neurologic:  CNs II-XII grossly normal, no motor/sensory deficits


Skin:  normal pigmentation, warm/dry





Laboratory Tests








Test


  9/18/18


06:55


 


White Blood Count


  7.5 K/UL


(4.8-10.8)


 


Red Blood Count


  3.14 M/UL


(4.20-5.40)  L


 


Hemoglobin


  9.0 G/DL


(12.0-16.0)  L


 


Hematocrit


  28.9 %


(37.0-47.0)  L


 


Mean Corpuscular Volume 92 FL (80-99)  


 


Mean Corpuscular Hemoglobin


  28.8 PG


(27.0-31.0)


 


Mean Corpuscular Hemoglobin


Concent 31.3 G/DL


(32.0-36.0)  L


 


Red Cell Distribution Width


  17.5 %


(11.6-14.8)  H


 


Platelet Count


  273 K/UL


(150-450)


 


Mean Platelet Volume


  7.0 FL


(6.5-10.1)


 


Neutrophils (%) (Auto)


  71.6 %


(45.0-75.0)


 


Lymphocytes (%) (Auto)


  20.9 %


(20.0-45.0)


 


Monocytes (%) (Auto)


  6.0 %


(1.0-10.0)


 


Eosinophils (%) (Auto)


  1.2 %


(0.0-3.0)


 


Basophils (%) (Auto)


  0.3 %


(0.0-2.0)


 


Differential Total Cells


Counted 100  


 


 


Neutrophils % (Manual) 73 % (45-75)  


 


Lymphocytes % (Manual) 21 % (20-45)  


 


Monocytes % (Manual) 5 % (1-10)  


 


Eosinophils % (Manual) 1 % (0-3)  


 


Basophils % (Manual) 0 % (0-2)  


 


Band Neutrophils 0 % (0-8)  


 


Platelet Estimate Adequate  


 


Platelet Morphology Normal  


 


Hypochromasia 2+  


 


Anisocytosis 1+  


 


Erythrocyte Sedimentation Rate


  15 MM/HR


(0-30)


 


Reticulocyte Count


  2.9 %


(0.0-2.0)  H


 


Prothrombin Time


  12.9 SEC


(9.30-11.50)  H


 


Prothromb Time International


Ratio 1.2 (0.9-1.1)


H


 


Activated Partial


Thromboplast Time 26 SEC (23-33)


 


 


Sodium Level


  144 MMOL/L


(136-145)


 


Potassium Level


  4.3 MMOL/L


(3.5-5.1)


 


Chloride Level


  109 MMOL/L


()  H


 


Carbon Dioxide Level


  23 MMOL/L


(21-32)


 


Anion Gap


  12 mmol/L


(5-15)


 


Blood Urea Nitrogen


  20 mg/dL


(7-18)  H


 


Creatinine


  1.2 MG/DL


(0.55-1.30)


 


Estimat Glomerular Filtration


Rate  mL/min (>60)  


 


 


Glucose Level


  92 MG/DL


()


 


Calcium Level


  9.1 MG/DL


(8.5-10.1)


 


Iron Level


  29 ug/dL


()  L


 


Total Iron Binding Capacity


  237 ug/dL


(250-450)  L


 


Percent Iron Saturation 12 % (15-50)  L


 


Unsaturated Iron Binding


  208 ug/dL


(112-346)


 


Lactate Dehydrogenase


  285 U/L


()  H


 


Troponin I


  0.174 ng/mL


(0.000-0.056)


 


Pro-B-Type Natriuretic Peptide


  > 62220 pg/mL


(0-125)  H


 


Carcinoembryonic Antigen Pending  


 


Vitamin B12 Level


  606 PG/ML


(193-986)


 


Folate


  18.2 NG/ML


(8.6-58.9)

















Intake and Output  


 


 9/17/18 9/18/18





 19:00 07:00


 


Intake Total 600 ml 1015 ml


 


Output Total 3 ml 


 


Balance 597 ml 1015 ml


 


  


 


Intake Oral 600 ml 1015 ml


 


Output Urine Total 3 ml 


 


# Voids  6











Assessment/Plan


Problem List:  


(1) Sick sinus syndrome


Assessment & Plan:  S/P pacemaker





(2) Hypercholesteremia


(3) Fracture of pubic ramus


Assessment & Plan:  Non surgical - see ortho note





(4) Closed fracture of symphysis pubis


Assessment & Plan:  Non surgical-see ortho note.





(5) Diabetes mellitus, type II


(6) HTN (hypertension)


Assessment & Plan:  Continue vasotec and diltiazem





(7) Dyspnea


(8) Acute on chronic systolic CHF (congestive heart failure)


Assessment & Plan:  BNP >35,000.  see cardiology note.  LVEF=35%





(9) Pacemaker


Status:  not improved











Cheo Ortiz MD Sep 18, 2018 17:59

## 2018-09-18 NOTE — GENERAL PROGRESS NOTE
Assessment/Plan


Assessment/Plan


encephalopathy due to [facility]


psychotic d/o





seroquel prn


ativan prn





Subjective


Neurologic/Psychiatric:  Reports: anxiety, depressed, emotional problems


Allergies:  


Coded Allergies:  


     No Known Allergies (Unverified , 3/26/14)


Subjective


yelling agitated





Objective





Last 24 Hour Vital Signs








  Date Time  Temp Pulse Resp B/P (MAP) Pulse Ox O2 Delivery O2 Flow Rate FiO2


 


9/18/18 12:00 97.7 100 21 133/76 (95) 99   





 97.7       


 


9/18/18 09:03  97  143/68    


 


9/18/18 09:00      Nasal Cannula 2.0 


 


9/18/18 08:00 97.7 97 21 143/68 (93) 94   





 97.7       


 


9/18/18 08:00  84      


 


9/18/18 04:00 98.6 81 21 157/88 (111) 97   





 98.6       


 


9/18/18 04:00  90      


 


9/18/18 00:00  86      


 


9/18/18 00:00 98.9 80 19 144/83 (103) 97   





 98.9       


 


9/17/18 21:00      Nasal Cannula 2.0 


 


9/17/18 20:57  84 20  99 Nasal Cannula 2.0 28


 


9/17/18 20:50  80 18  98 Nasal Cannula 2.0 28


 


9/17/18 20:22  68 18   Room Air  21


 


9/17/18 20:00 99.1 85 18 159/91 (113) 97   





 99.1       


 


9/17/18 20:00  82      


 


9/17/18 16:00      Nasal Cannula 2.0 


 


9/17/18 14:36  65 18   Room Air  21

















Intake and Output  


 


 9/17/18 9/18/18





 19:00 07:00


 


Intake Total 600 ml 1015 ml


 


Output Total 3 ml 


 


Balance 597 ml 1015 ml


 


  


 


Intake Oral 600 ml 1015 ml


 


Output Urine Total 3 ml 


 


# Voids  6








Laboratory Tests


9/18/18 06:55: 


White Blood Count 7.5, Red Blood Count 3.14L, Hemoglobin 9.0L, Hematocrit 28.9L

, Mean Corpuscular Volume 92, Mean Corpuscular Hemoglobin 28.8, Mean 

Corpuscular Hemoglobin Concent 31.3L, Red Cell Distribution Width 17.5H, 

Platelet Count 273, Mean Platelet Volume 7.0, Neutrophils (%) (Auto) 71.6, 

Lymphocytes (%) (Auto) 20.9, Monocytes (%) (Auto) 6.0, Eosinophils (%) (Auto) 

1.2, Basophils (%) (Auto) 0.3, Erythrocyte Sedimentation Rate 15, Reticulocyte 

Count [Pending], Prothrombin Time 12.9H, Prothromb Time International Ratio 1.2H

, Activated Partial Thromboplast Time 26, Sodium Level 144, Potassium Level 4.3

, Chloride Level 109H, Carbon Dioxide Level 23, Anion Gap 12, Blood Urea 

Nitrogen 20H, Creatinine 1.2, Estimat Glomerular Filtration Rate , Glucose 

Level 92, Calcium Level 9.1, Iron Level 29L, Total Iron Binding Capacity 237L, 

Percent Iron Saturation 12L, Unsaturated Iron Binding 208, Lactate 

Dehydrogenase 285H, Troponin I 0.174H, Pro-B-Type Natriuretic Peptide > 04552Y, 

Carcinoembryonic Antigen [Pending], Vitamin B12 Level 606, Folate 18.2


Height (Feet):  5


Height (Inches):  4.00


Weight (Pounds):  136


General Appearance:  no apparent distress, alert, confused, agitated











Josh Nunez MD Sep 18, 2018 12:08

## 2018-09-18 NOTE — CARDIOLOGY REPORT
--------------- APPROVED REPORT --------------





EKG Measurement

Heart [id number]

GA 

148P23

[id number]

[id number]

[id number]





atrial pacing

## 2018-09-18 NOTE — PULMONOLOGY PROGRESS NOTE
Assessment/Plan


Problems:  


(1) Acute on chronic systolic CHF (congestive heart failure)


(2) Dyspnea


(3) Pacemaker


(4) NSTEMI (non-ST elevated myocardial infarction)


(5) Delirium


Assessment/Plan


diuretics


optimize cardiac meds


f/u BNP


sedation, and psychiatry f/u


f/u cxr


respiratory treatment


symptomatic treatment.





Subjective


ROS Limited/Unobtainable:  No


Constitutional:  Reports: no symptoms


HEENT:  Repors: no symptoms


Respiratory:  Reports: no symptoms


Allergies:  


Coded Allergies:  


     No Known Allergies (Unverified , 3/26/14)





Objective





Last 24 Hour Vital Signs








  Date Time  Temp Pulse Resp B/P (MAP) Pulse Ox O2 Delivery O2 Flow Rate FiO2


 


9/18/18 09:03  97  143/68    


 


9/18/18 09:00      Nasal Cannula 2.0 


 


9/18/18 08:00 97.7 97 21 143/68 (93) 94   





 97.7       


 


9/18/18 04:00 98.6 81 21 157/88 (111) 97   





 98.6       


 


9/18/18 04:00  90      


 


9/18/18 00:00  86      


 


9/18/18 00:00 98.9 80 19 144/83 (103) 97   





 98.9       


 


9/17/18 21:00      Nasal Cannula 2.0 


 


9/17/18 20:57  84 20  99 Nasal Cannula 2.0 28


 


9/17/18 20:50  80 18  98 Nasal Cannula 2.0 28


 


9/17/18 20:22  68 18   Room Air  21


 


9/17/18 20:00 99.1 85 18 159/91 (113) 97   





 99.1       


 


9/17/18 20:00  82      


 


9/17/18 16:00      Nasal Cannula 2.0 


 


9/17/18 14:36  65 18   Room Air  21


 


9/17/18 12:00 97.9 77 18 142/70 (94) 97   





 97.9       


 


9/17/18 12:00  81      


 


9/17/18 12:00      Nasal Cannula 2.0 

















Intake and Output  


 


 9/17/18 9/18/18





 19:00 07:00


 


Intake Total 600 ml 1015 ml


 


Output Total 3 ml 


 


Balance 597 ml 1015 ml


 


  


 


Intake Oral 600 ml 1015 ml


 


Output Urine Total 3 ml 


 


# Voids  6








General Appearance:  WD/WN


HEENT:  normocephalic, atraumatic


Respiratory/Chest:  chest wall non-tender, lungs clear


Breasts:  no masses


Cardiovascular:  normal peripheral pulses


Abdomen:  normal bowel sounds, soft, non tender


Genitourinary:  normal external genitalia


Extremities:  no clubbing


Neurologic/Psychiatric:  abnormal gait


Lymphatic:  no groin adenopathy


Laboratory Tests


9/18/18 06:55: 


White Blood Count 7.5, Red Blood Count 3.14L, Hemoglobin 9.0L, Hematocrit 28.9L

, Mean Corpuscular Volume 92, Mean Corpuscular Hemoglobin 28.8, Mean 

Corpuscular Hemoglobin Concent 31.3L, Red Cell Distribution Width 17.5H, 

Platelet Count 273, Mean Platelet Volume 7.0, Neutrophils (%) (Auto) 71.6, 

Lymphocytes (%) (Auto) 20.9, Monocytes (%) (Auto) 6.0, Eosinophils (%) (Auto) 

1.2, Basophils (%) (Auto) 0.3, Erythrocyte Sedimentation Rate 15, Reticulocyte 

Count [Pending], Prothrombin Time 12.9H, Prothromb Time International Ratio 1.2H

, Activated Partial Thromboplast Time 26, Sodium Level 144, Potassium Level 4.3

, Chloride Level 109H, Carbon Dioxide Level 23, Anion Gap 12, Blood Urea 

Nitrogen 20H, Creatinine 1.2, Estimat Glomerular Filtration Rate , Glucose 

Level 92, Calcium Level 9.1, Iron Level 29L, Total Iron Binding Capacity 237L, 

Percent Iron Saturation 12L, Unsaturated Iron Binding 208, Lactate 

Dehydrogenase 285H, Troponin I 0.174H, Pro-B-Type Natriuretic Peptide > 30313I, 

Carcinoembryonic Antigen [Pending], Vitamin B12 Level 606, Folate 18.2





Current Medications








 Medications


  (Trade)  Dose


 Ordered  Sig/Violette


 Route


 PRN Reason  Start Time


 Stop Time Status Last Admin


Dose Admin


 


 Acetaminophen


  (Tylenol)  650 mg  Q4H  PRN


 ORAL


 FEVER  9/17/18 16:27


 10/16/18 16:26   


 


 


 Acetaminophen/


 Hydrocodone Bitart


  (Norco 5/325)  1 tab  Q6H  PRN


 ORAL


 Moderate Pain (Pain Scale 4-6)  9/17/18 16:29


 9/23/18 16:28   


 


 


 Albuterol/


 Ipratropium


  (Albuterol/


 Ipratropium)  3 ml  Q4H  PRN


 HHN


 Shortness of Breath  9/17/18 17:00


 9/22/18 16:59  9/17/18 20:56


 


 


 Alprazolam


  (Xanax)  1 mg  Q4H  PRN


 ORAL


 For Anxiety  9/17/18 16:28


 9/24/18 16:27  9/18/18 09:53


 


 


 Aspirin


  (ASA)  162 mg  DAILY


 ORAL


   9/18/18 09:00


 10/17/18 08:59  9/18/18 09:03


 


 


 Atenolol


  (Tenormin)  25 mg  DAILY


 ORAL


   9/18/18 09:00


 10/17/18 08:59  9/18/18 09:03


 


 


 Diltiazem HCl


  (Cardizem)  10 mg  Q1H  PRN


 IV


 heart rate more than 120,   9/17/18 16:28


 10/16/18 16:27   


 


 


 Enalaprilat


  (Vasotec)  2.5 mg  Q6H  PRN


 IV


 sbp more than 160  9/17/18 18:00


 10/17/18 17:59   


 


 


 Heparin Sodium


  (Porcine)


  (Heparin 5000


 units/ml)  5,000 units  EVERY 12  HOURS


 SUBQ


   9/17/18 21:00


 10/16/18 20:59   


 


 


 Ketorolac


 Tromethamine


  (Toradol 30mg)  15 mg  Q6H  PRN


 IV


 moderate pain ( 4-6)  9/17/18 18:00


 9/22/18 17:59   


 


 


 Lorazepam


  (Ativan 2mg/ml


 1ml)  0.5 mg  Q4H  PRN


 IV


 For Anxiety  9/17/18 17:35


 9/24/18 17:14  9/18/18 08:20


 


 


 Morphine Sulfate


  (Morphine


 Sulfate)  2 mg  Q4H  PRN


 IVP


 severe Pain (Pain Scale 7-10)  9/17/18 16:29


 9/23/18 16:28   


 


 


 Nitroglycerin


  (Ntg)  0.4 mg  Q5MIN X 3 DOSES PRN


 SL


 Prn Chest Pain  9/17/18 16:30


 10/16/18 14:42   


 


 


 Ondansetron HCl


  (Zofran)  4 mg  Q6H  PRN


 IVP


 Nausea & Vomiting  9/17/18 16:29


 10/16/18 16:28   


 


 


 Polyethylene


 Glycol


  (Miralax)  17 gm  DAILYPRN  PRN


 ORAL


 Constipation  9/17/18 16:29


 10/17/18 16:28   


 


 


 Quetiapine


 Fumarate


  (SEROquel)  25 mg  Q6H  PRN


 ORAL


 ANXIETY/AGITATION  9/17/18 17:15


 10/17/18 17:14  9/18/18 05:01


 


 


 Quetiapine


 Fumarate


  (SEROquel)  25 mg  THREE TIMES A  DAY


 ORAL


   9/17/18 18:00


 10/17/18 17:59  9/18/18 09:03


 


 


 Temazepam


  (Restoril)  15 mg  HSPRN  PRN


 ORAL


 Insomnia  9/17/18 21:00


 9/23/18 20:59   


 

















Korin Junior MD Sep 18, 2018 10:32

## 2018-09-19 VITALS — DIASTOLIC BLOOD PRESSURE: 85 MMHG | SYSTOLIC BLOOD PRESSURE: 160 MMHG

## 2018-09-19 VITALS — DIASTOLIC BLOOD PRESSURE: 63 MMHG | SYSTOLIC BLOOD PRESSURE: 114 MMHG

## 2018-09-19 VITALS — SYSTOLIC BLOOD PRESSURE: 140 MMHG | DIASTOLIC BLOOD PRESSURE: 81 MMHG

## 2018-09-19 VITALS — DIASTOLIC BLOOD PRESSURE: 90 MMHG | SYSTOLIC BLOOD PRESSURE: 148 MMHG

## 2018-09-19 VITALS — DIASTOLIC BLOOD PRESSURE: 77 MMHG | SYSTOLIC BLOOD PRESSURE: 148 MMHG

## 2018-09-19 VITALS — SYSTOLIC BLOOD PRESSURE: 152 MMHG | DIASTOLIC BLOOD PRESSURE: 93 MMHG

## 2018-09-19 LAB
ADD MANUAL DIFF: NO
ALBUMIN SERPL-MCNC: 3 G/DL (ref 3.4–5)
ALBUMIN/GLOB SERPL: 0.9 {RATIO} (ref 1–2.7)
ALP SERPL-CCNC: 58 U/L (ref 46–116)
ALT SERPL-CCNC: 24 U/L (ref 12–78)
ANION GAP SERPL CALC-SCNC: 10 MMOL/L (ref 5–15)
AST SERPL-CCNC: 25 U/L (ref 15–37)
BASOPHILS NFR BLD AUTO: 0.3 % (ref 0–2)
BILIRUB SERPL-MCNC: 0.7 MG/DL (ref 0.2–1)
BUN SERPL-MCNC: 15 MG/DL (ref 7–18)
CALCIUM SERPL-MCNC: 8.9 MG/DL (ref 8.5–10.1)
CHLORIDE SERPL-SCNC: 112 MMOL/L (ref 98–107)
CO2 SERPL-SCNC: 24 MMOL/L (ref 21–32)
CREAT SERPL-MCNC: 1.1 MG/DL (ref 0.55–1.3)
EOSINOPHIL NFR BLD AUTO: 0.9 % (ref 0–3)
ERYTHROCYTE [DISTWIDTH] IN BLOOD BY AUTOMATED COUNT: 18 % (ref 11.6–14.8)
GLOBULIN SER-MCNC: 3.4 G/DL
HCT VFR BLD CALC: 30.6 % (ref 37–47)
HGB BLD-MCNC: 9.8 G/DL (ref 12–16)
LYMPHOCYTES NFR BLD AUTO: 15.1 % (ref 20–45)
MCV RBC AUTO: 93 FL (ref 80–99)
MONOCYTES NFR BLD AUTO: 7.3 % (ref 1–10)
NEUTROPHILS NFR BLD AUTO: 76.4 % (ref 45–75)
PLATELET # BLD: 287 K/UL (ref 150–450)
POTASSIUM SERPL-SCNC: 3.6 MMOL/L (ref 3.5–5.1)
RBC # BLD AUTO: 3.29 M/UL (ref 4.2–5.4)
SODIUM SERPL-SCNC: 146 MMOL/L (ref 136–145)
WBC # BLD AUTO: 6.8 K/UL (ref 4.8–10.8)

## 2018-09-19 RX ADMIN — LORAZEPAM PRN MG: 2 INJECTION, SOLUTION INTRAMUSCULAR; INTRAVENOUS at 22:44

## 2018-09-19 RX ADMIN — HEPARIN SODIUM SCH UNITS: 5000 INJECTION INTRAVENOUS; SUBCUTANEOUS at 20:34

## 2018-09-19 RX ADMIN — ALPRAZOLAM PRN MG: 0.5 TABLET ORAL at 09:14

## 2018-09-19 RX ADMIN — LOSARTAN POTASSIUM SCH MG: 25 TABLET, FILM COATED ORAL at 18:00

## 2018-09-19 RX ADMIN — CARVEDILOL SCH MG: 6.25 TABLET, FILM COATED ORAL at 20:33

## 2018-09-19 RX ADMIN — ASPIRIN 81 MG SCH MG: 81 TABLET ORAL at 09:13

## 2018-09-19 RX ADMIN — MORPHINE SULFATE PRN MG: 2 INJECTION, SOLUTION INTRAMUSCULAR; INTRAVENOUS at 14:44

## 2018-09-19 RX ADMIN — IPRATROPIUM BROMIDE AND ALBUTEROL SULFATE PRN ML: .5; 3 SOLUTION RESPIRATORY (INHALATION) at 21:15

## 2018-09-19 RX ADMIN — HEPARIN SODIUM SCH UNITS: 5000 INJECTION INTRAVENOUS; SUBCUTANEOUS at 09:16

## 2018-09-19 RX ADMIN — ATENOLOL SCH MG: 25 TABLET ORAL at 09:14

## 2018-09-19 RX ADMIN — HYDROCODONE BITARTRATE AND ACETAMINOPHEN PRN TAB: 5; 325 TABLET ORAL at 11:49

## 2018-09-19 NOTE — PULMONOLOGY PROGRESS NOTE
Assessment/Plan


Problems:  


(1) Acute on chronic systolic CHF (congestive heart failure)


(2) Dyspnea


(3) Pacemaker


(4) NSTEMI (non-ST elevated myocardial infarction)


(5) Delirium


Assessment/Plan


diuretics


optimize cardiac meds


f/u BNP


sedation, and psychiatry f/u


respiratory treatment


symptomatic treatment.





f/u cardiology recommendation





Subjective


ROS Limited/Unobtainable:  No


Constitutional:  Reports: no symptoms


HEENT:  Repors: no symptoms


Respiratory:  Reports: no symptoms


Allergies:  


Coded Allergies:  


     No Known Allergies (Unverified , 3/26/14)





Objective





Last 24 Hour Vital Signs








  Date Time  Temp Pulse Resp B/P (MAP) Pulse Ox O2 Delivery O2 Flow Rate FiO2


 


9/19/18 09:14  81  148/77    


 


9/19/18 09:00      Nasal Cannula 2.0 


 


9/19/18 08:00 98.1 81 20 148/77 (100) 93   





 98.1       


 


9/19/18 07:54  83      


 


9/19/18 04:00  80      


 


9/19/18 04:00 97.8 87 20 152/93 (112) 94   





 97.8       


 


9/19/18 00:00 97.0 93 20 114/63 (80) 97   





 97.0       


 


9/19/18 00:00  70      


 


9/18/18 22:07  77 18   Room Air  21


 


9/18/18 21:00      Nasal Cannula 2.0 


 


9/18/18 20:00 97.8 64 20 158/78 (104) 95   





 97.8       


 


9/18/18 20:00  68      


 


9/18/18 16:00  89      


 


9/18/18 16:00 98.5 81 21 121/81 (94) 96   





 98.5       


 


9/18/18 12:00 97.7 100 21 133/76 (95) 99   





 97.7       


 


9/18/18 12:00  76      

















Intake and Output  


 


 9/18/18 9/19/18





 19:00 07:00


 


Intake Total 90 ml 


 


Balance 90 ml 


 


  


 


Intake Oral 90 ml 


 


# Voids 2 3








General Appearance:  WD/WN


HEENT:  normocephalic, atraumatic


Respiratory/Chest:  chest wall non-tender, lungs clear, no respiratory distress


Cardiovascular:  normal peripheral pulses, normal rate


Abdomen:  normal bowel sounds, soft, non tender


Genitourinary:  normal external genitalia


Extremities:  no clubbing


Skin:  no rash


Laboratory Tests


9/19/18 09:30: 


White Blood Count 6.8, Red Blood Count 3.29L, Hemoglobin 9.8L, Hematocrit 30.6L

, Mean Corpuscular Volume 93, Mean Corpuscular Hemoglobin 29.8, Mean 

Corpuscular Hemoglobin Concent 32.0, Red Cell Distribution Width 18.0H, 

Platelet Count 287, Mean Platelet Volume 7.4, Neutrophils (%) (Auto) 76.4H, 

Lymphocytes (%) (Auto) 15.1L, Monocytes (%) (Auto) 7.3, Eosinophils (%) (Auto) 

0.9, Basophils (%) (Auto) 0.3, Sodium Level 146H, Potassium Level 3.6, Chloride 

Level 112H, Carbon Dioxide Level 24, Anion Gap 10, Blood Urea Nitrogen 15, 

Creatinine 1.1, Estimat Glomerular Filtration Rate , Glucose Level 138H, 

Calcium Level 8.9, Total Bilirubin 0.7, Aspartate Amino Transf (AST/SGOT) 25, 

Alanine Aminotransferase (ALT/SGPT) 24, Alkaline Phosphatase 58, Pro-B-Type 

Natriuretic Peptide 67335J, Total Protein 6.4, Albumin 3.0L, Globulin 3.4, 

Albumin/Globulin Ratio 0.9L





Current Medications








 Medications


  (Trade)  Dose


 Ordered  Sig/Violette


 Route


 PRN Reason  Start Time


 Stop Time Status Last Admin


Dose Admin


 


 Acetaminophen


  (Tylenol)  650 mg  Q4H  PRN


 ORAL


 FEVER  9/17/18 16:27


 10/16/18 16:26   


 


 


 Acetaminophen/


 Hydrocodone Bitart


  (Norco 5/325)  1 tab  Q6H  PRN


 ORAL


 Moderate Pain (Pain Scale 4-6)  9/17/18 16:29


 9/23/18 16:28  9/18/18 11:50


 


 


 Albuterol/


 Ipratropium


  (Albuterol/


 Ipratropium)  3 ml  Q4H  PRN


 HHN


 Shortness of Breath  9/17/18 17:00


 9/22/18 16:59  9/17/18 20:56


 


 


 Aspirin


  (ASA)  162 mg  DAILY


 ORAL


   9/18/18 09:00


 10/17/18 08:59  9/19/18 09:13


 


 


 Atenolol


  (Tenormin)  25 mg  DAILY


 ORAL


   9/18/18 09:00


 10/17/18 08:59  9/19/18 09:14


 


 


 Diltiazem HCl


  (Cardizem)  10 mg  Q1H  PRN


 IV


 heart rate more than 120,   9/17/18 16:28


 10/16/18 16:27   


 


 


 Enalaprilat


  (Vasotec)  2.5 mg  Q6H  PRN


 IV


 sbp more than 160  9/17/18 18:00


 10/17/18 17:59   


 


 


 Heparin Sodium


  (Porcine)


  (Heparin 5000


 units/ml)  5,000 units  EVERY 12  HOURS


 SUBQ


   9/17/18 21:00


 10/16/18 20:59  9/19/18 09:16


 


 


 Ketorolac


 Tromethamine


  (Toradol 30mg)  15 mg  Q6H  PRN


 IV


 moderate pain ( 4-6)  9/17/18 18:00


 9/22/18 17:59   


 


 


 Lorazepam


  (Ativan 2mg/ml


 1ml)  1 mg  Q4H  PRN


 IV


 For Anxiety  9/19/18 12:00


 9/26/18 11:59   


 


 


 Morphine Sulfate


  (Morphine


 Sulfate)  2 mg  Q4H  PRN


 IVP


 severe Pain (Pain Scale 7-10)  9/17/18 16:29


 9/23/18 16:28   


 


 


 Nitroglycerin


  (Ntg)  0.4 mg  Q5MIN X 3 DOSES PRN


 SL


 Prn Chest Pain  9/17/18 16:30


 10/16/18 14:42   


 


 


 Ondansetron HCl


  (Zofran)  4 mg  Q6H  PRN


 IVP


 Nausea & Vomiting  9/17/18 16:29


 10/16/18 16:28   


 


 


 Polyethylene


 Glycol


  (Miralax)  17 gm  DAILYPRN  PRN


 ORAL


 Constipation  9/17/18 16:29


 10/17/18 16:28   


 


 


 Quetiapine


 Fumarate


  (SEROquel)  25 mg  Q6H  PRN


 ORAL


 ANXIETY/AGITATION  9/17/18 17:15


 10/17/18 17:14  9/18/18 05:01


 


 


 Quetiapine


 Fumarate


  (SEROquel)  50 mg  THREE TIMES A  DAY


 ORAL


   9/18/18 13:00


 10/18/18 12:59  9/19/18 09:14


 


 


 Temazepam


  (Restoril)  15 mg  HSPRN  PRN


 ORAL


 Insomnia  9/17/18 21:00


 9/23/18 20:59   


 

















Korin Junior MD Sep 19, 2018 11:11

## 2018-09-19 NOTE — INTERNAL MED PROGRESS NOTE
Subjective


Date of Service:  Sep 19, 2018


Physician Name


DianaCheo


Attending Physician


Juan Carlos Magana MD





Current Medications








 Medications


  (Trade)  Dose


 Ordered  Sig/Violette


 Route


 PRN Reason  Start Time


 Stop Time Status Last Admin


Dose Admin


 


 Acetaminophen


  (Tylenol)  650 mg  Q4H  PRN


 ORAL


 FEVER  9/17/18 16:27


 10/16/18 16:26   


 


 


 Acetaminophen/


 Hydrocodone Bitart


  (Norco 5/325)  1 tab  Q6H  PRN


 ORAL


 Moderate Pain (Pain Scale 4-6)  9/17/18 16:29


 9/23/18 16:28  9/19/18 11:49


 


 


 Albuterol/


 Ipratropium


  (Albuterol/


 Ipratropium)  3 ml  Q4H  PRN


 HHN


 Shortness of Breath  9/17/18 17:00


 9/22/18 16:59  9/17/18 20:56


 


 


 Aspirin


  (ASA)  162 mg  DAILY


 ORAL


   9/18/18 09:00


 10/17/18 08:59  9/19/18 09:13


 


 


 Atenolol


  (Tenormin)  25 mg  DAILY


 ORAL


   9/18/18 09:00


 10/17/18 08:59  9/19/18 09:14


 


 


 Diltiazem HCl


  (Cardizem)  10 mg  Q1H  PRN


 IV


 heart rate more than 120,   9/17/18 16:28


 10/16/18 16:27   


 


 


 Enalaprilat


  (Vasotec)  2.5 mg  Q6H  PRN


 IV


 sbp more than 160  9/17/18 18:00


 10/17/18 17:59   


 


 


 Heparin Sodium


  (Porcine)


  (Heparin 5000


 units/ml)  5,000 units  EVERY 12  HOURS


 SUBQ


   9/17/18 21:00


 10/16/18 20:59  9/19/18 09:16


 


 


 Ketorolac


 Tromethamine


  (Toradol 30mg)  15 mg  Q6H  PRN


 IV


 moderate pain ( 4-6)  9/17/18 18:00


 9/22/18 17:59   


 


 


 Lorazepam


  (Ativan 2mg/ml


 1ml)  1 mg  Q4H  PRN


 IM


 Agitation  9/19/18 15:45


 9/26/18 15:44   


 


 


 Morphine Sulfate


  (Morphine


 Sulfate)  2 mg  Q4H  PRN


 IVP


 severe Pain (Pain Scale 7-10)  9/17/18 16:29


 9/23/18 16:28  9/19/18 14:44


 


 


 Nitroglycerin


  (Ntg)  0.4 mg  Q5MIN X 3 DOSES PRN


 SL


 Prn Chest Pain  9/17/18 16:30


 10/16/18 14:42   


 


 


 Ondansetron HCl


  (Zofran)  4 mg  Q6H  PRN


 IVP


 Nausea & Vomiting  9/17/18 16:29


 10/16/18 16:28   


 


 


 Polyethylene


 Glycol


  (Miralax)  17 gm  DAILYPRN  PRN


 ORAL


 Constipation  9/17/18 16:29


 10/17/18 16:28   


 


 


 Quetiapine


 Fumarate


  (SEROquel)  25 mg  Q6H  PRN


 ORAL


 ANXIETY/AGITATION  9/17/18 17:15


 10/17/18 17:14  9/18/18 05:01


 


 


 Quetiapine


 Fumarate


  (SEROquel)  50 mg  THREE TIMES A  DAY


 ORAL


   9/18/18 13:00


 10/18/18 12:59  9/19/18 09:14


 


 


 Temazepam


  (Restoril)  15 mg  HSPRN  PRN


 ORAL


 Insomnia  9/17/18 21:00


 9/23/18 20:59   


 








Allergies:  


Coded Allergies:  


     No Known Allergies (Unverified , 3/26/14)


ROS Limited/Unobtainable:  Yes


Subjective


79 YO F admitted with shortness of breath.  Now kathya heart failure.  Cover for 

Int Med-Dr Magana





Objective





Last Vital Signs








  Date Time  Temp Pulse Resp B/P (MAP) Pulse Ox O2 Delivery O2 Flow Rate FiO2


 


9/19/18 16:00  80      


 


9/19/18 16:00 97.5  18 148/90 (109) 99   





 97.5       


 


9/19/18 09:00      Nasal Cannula 2.0 


 


9/18/18 22:07        21











Laboratory Tests








Test


  9/19/18


09:30


 


White Blood Count


  6.8 K/UL


(4.8-10.8)


 


Red Blood Count


  3.29 M/UL


(4.20-5.40)  L


 


Hemoglobin


  9.8 G/DL


(12.0-16.0)  L


 


Hematocrit


  30.6 %


(37.0-47.0)  L


 


Mean Corpuscular Volume 93 FL (80-99)  


 


Mean Corpuscular Hemoglobin


  29.8 PG


(27.0-31.0)


 


Mean Corpuscular Hemoglobin


Concent 32.0 G/DL


(32.0-36.0)


 


Red Cell Distribution Width


  18.0 %


(11.6-14.8)  H


 


Platelet Count


  287 K/UL


(150-450)


 


Mean Platelet Volume


  7.4 FL


(6.5-10.1)


 


Neutrophils (%) (Auto)


  76.4 %


(45.0-75.0)  H


 


Lymphocytes (%) (Auto)


  15.1 %


(20.0-45.0)  L


 


Monocytes (%) (Auto)


  7.3 %


(1.0-10.0)


 


Eosinophils (%) (Auto)


  0.9 %


(0.0-3.0)


 


Basophils (%) (Auto)


  0.3 %


(0.0-2.0)


 


Sodium Level


  146 MMOL/L


(136-145)  H


 


Potassium Level


  3.6 MMOL/L


(3.5-5.1)


 


Chloride Level


  112 MMOL/L


()  H


 


Carbon Dioxide Level


  24 MMOL/L


(21-32)


 


Anion Gap


  10 mmol/L


(5-15)


 


Blood Urea Nitrogen


  15 mg/dL


(7-18)


 


Creatinine


  1.1 MG/DL


(0.55-1.30)


 


Estimat Glomerular Filtration


Rate  mL/min (>60)  


 


 


Glucose Level


  138 MG/DL


()  H


 


Calcium Level


  8.9 MG/DL


(8.5-10.1)


 


Total Bilirubin


  0.7 MG/DL


(0.2-1.0)


 


Aspartate Amino Transf


(AST/SGOT) 25 U/L (15-37)


 


 


Alanine Aminotransferase


(ALT/SGPT) 24 U/L (12-78)


 


 


Alkaline Phosphatase


  58 U/L


()


 


Pro-B-Type Natriuretic Peptide


  46506 pg/mL


(0-125)  H


 


Total Protein


  6.4 G/DL


(6.4-8.2)


 


Albumin


  3.0 G/DL


(3.4-5.0)  L


 


Globulin 3.4 g/dL  


 


Albumin/Globulin Ratio


  0.9 (1.0-2.7)


L

















Intake and Output  


 


 9/18/18 9/19/18





 19:00 07:00


 


Intake Total 90 ml 


 


Balance 90 ml 


 


  


 


Intake Oral 90 ml 


 


# Voids 2 3








Objective


General Appearance:  WD/WN, no apparent distress, alert


EENT:  PERRL/EOMI, normal ENT inspection


Neck:  non-tender, normal alignment, supple, normal inspection


Cardiovascular:  normal peripheral pulses, normal rate, regular rhythm, no 

gallop/murmur, no JVD


Respiratory/Chest:  chest wall non-tender, respiratory distress, crackles/rales

, rhonchi - bilaterally, expiratory wheezing


Abdomen:  normal bowel sounds, non tender, soft, no organomegaly, no mass


Extremities:  normal range of motion, non-tender


Edema:  trace edema


Neurologic:  CNs II-XII grossly normal, no motor/sensory deficits


Skin:  normal pigmentation, warm/dry





Assessment/Plan


Problem List:  


(1) Sick sinus syndrome


Assessment & Plan:  S/P pacemaker





(2) Hypercholesteremia


(3) Fracture of pubic ramus


Assessment & Plan:  Non surgical - see ortho note





(4) Closed fracture of symphysis pubis


Assessment & Plan:  Non surgical-see ortho note.





(5) Diabetes mellitus, type II


(6) HTN (hypertension)


Assessment & Plan:  Continue vasotec and diltiazem





(7) Dyspnea


(8) Acute on chronic systolic CHF (congestive heart failure)


Assessment & Plan:  BNP >35,000.  see cardiology note.  LVEF=35%





(9) Pacemaker


(10) Psychosis


Assessment & Plan:  See psych note.





Status:  progressing











Cheo Ortiz MD Sep 19, 2018 17:45

## 2018-09-19 NOTE — DIAGNOSTIC IMAGING REPORT
. Indication: Dyspnea

 

Technique: One view of the chest

 

Comparison: 9/16/2018

 

Findings: Bilateral pacemakers are again demonstrated. Bilateral interstitial

congestive changes and airspace consolidation appear slightly increased from the

prior studies. Left costophrenic angle remains slightly blunted, right costophrenic

angle is obscured by the overlying pacemaker power pack

 

Impression: Slightly increased interstitial congestion and bilateral airspace

opacities, over 3 days.

 

Other stable findings as described

## 2018-09-19 NOTE — CARDIOLOGY PROGRESS NOTE
Assessment/Plan


Assessment/Plan


the patient is restrianted, she does not seem ot be in acute distress, she 

clinically does not looks like CHF will change to cardvedilol and to losartan, 

add furosemide and atorvastatin, consider GI work up for recent GI bleed





Subjective


Subjective


the patient is laying down in restraint, both wrist


confused


 denies chest pin





Objective





Last 24 Hour Vital Signs








  Date Time  Temp Pulse Resp B/P (MAP) Pulse Ox O2 Delivery O2 Flow Rate FiO2


 


9/19/18 16:00  80      


 


9/19/18 16:00 97.5 84 18 148/90 (109) 99   





 97.5       


 


9/19/18 12:03  78      


 


9/19/18 12:00 97.3 73 20 140/81 (100) 96   





 97.3       


 


9/19/18 10:05  71 16   Room Air  21


 


9/19/18 09:14  81  148/77    


 


9/19/18 09:00      Nasal Cannula 2.0 


 


9/19/18 08:00 98.1 81 20 148/77 (100) 93   





 98.1       


 


9/19/18 07:54  83      


 


9/19/18 04:00  80      


 


9/19/18 04:00 97.8 87 20 152/93 (112) 94   





 97.8       


 


9/19/18 00:00 97.0 93 20 114/63 (80) 97   





 97.0       


 


9/19/18 00:00  70      


 


9/18/18 22:07  77 18   Room Air  21


 


9/18/18 21:00      Nasal Cannula 2.0 


 


9/18/18 20:00 97.8 64 20 158/78 (104) 95   





 97.8       


 


9/18/18 20:00  68      








General Appearance:  cachetic, other - confused


EENT:  PERRL/EOMI


Neck:  JVD


Rhythm:  NSR


Cardiovascular:  normal rate, gallop/S4


Respiratory/Chest:  crackles/rales -  at bases


Abdomen:  non tender


Extremities:  normal capillary refill


Neurologic:  disoriented











Intake and Output  


 


 9/18/18 9/19/18





 19:00 07:00


 


Intake Total 90 ml 


 


Balance 90 ml 


 


  


 


Intake Oral 90 ml 


 


# Voids 2 3











Laboratory Tests








Test


  9/19/18


09:30


 


White Blood Count


  6.8 K/UL


(4.8-10.8)


 


Red Blood Count


  3.29 M/UL


(4.20-5.40)  L


 


Hemoglobin


  9.8 G/DL


(12.0-16.0)  L


 


Hematocrit


  30.6 %


(37.0-47.0)  L


 


Mean Corpuscular Volume 93 FL (80-99)  


 


Mean Corpuscular Hemoglobin


  29.8 PG


(27.0-31.0)


 


Mean Corpuscular Hemoglobin


Concent 32.0 G/DL


(32.0-36.0)


 


Red Cell Distribution Width


  18.0 %


(11.6-14.8)  H


 


Platelet Count


  287 K/UL


(150-450)


 


Mean Platelet Volume


  7.4 FL


(6.5-10.1)


 


Neutrophils (%) (Auto)


  76.4 %


(45.0-75.0)  H


 


Lymphocytes (%) (Auto)


  15.1 %


(20.0-45.0)  L


 


Monocytes (%) (Auto)


  7.3 %


(1.0-10.0)


 


Eosinophils (%) (Auto)


  0.9 %


(0.0-3.0)


 


Basophils (%) (Auto)


  0.3 %


(0.0-2.0)


 


Sodium Level


  146 MMOL/L


(136-145)  H


 


Potassium Level


  3.6 MMOL/L


(3.5-5.1)


 


Chloride Level


  112 MMOL/L


()  H


 


Carbon Dioxide Level


  24 MMOL/L


(21-32)


 


Anion Gap


  10 mmol/L


(5-15)


 


Blood Urea Nitrogen


  15 mg/dL


(7-18)


 


Creatinine


  1.1 MG/DL


(0.55-1.30)


 


Estimat Glomerular Filtration


Rate  mL/min (>60)  


 


 


Glucose Level


  138 MG/DL


()  H


 


Calcium Level


  8.9 MG/DL


(8.5-10.1)


 


Total Bilirubin


  0.7 MG/DL


(0.2-1.0)


 


Aspartate Amino Transf


(AST/SGOT) 25 U/L (15-37)


 


 


Alanine Aminotransferase


(ALT/SGPT) 24 U/L (12-78)


 


 


Alkaline Phosphatase


  58 U/L


()


 


Pro-B-Type Natriuretic Peptide


  14039 pg/mL


(0-125)  H


 


Total Protein


  6.4 G/DL


(6.4-8.2)


 


Albumin


  3.0 G/DL


(3.4-5.0)  L


 


Globulin 3.4 g/dL  


 


Albumin/Globulin Ratio


  0.9 (1.0-2.7)


L

















Maxim Tonge Y. MD Sep 19, 2018 17:55

## 2018-09-19 NOTE — GENERAL PROGRESS NOTE
Assessment/Plan


Problem List:  


(1) encephalopathy due to 


Assessment & Plan:  encephalopathy due to [facility]


psychotic d/o





seroquel prn


ativan prn


haldol prn


benadryl prn


restraints 





Status:  stable, progressing


Assessment/Plan


encephalopathy due to [facility]


psychotic d/o





seroquel prn


ativan prn





Subjective


Date patient seen:  Sep 19, 2018


Neurologic/Psychiatric:  Reports: anxiety, depressed, emotional problems


Allergies:  


Coded Allergies:  


     No Known Allergies (Unverified , 3/26/14)


Subjective


yelling agitated





Objective





Last 24 Hour Vital Signs








  Date Time  Temp Pulse Resp B/P (MAP) Pulse Ox O2 Delivery O2 Flow Rate FiO2


 


9/19/18 09:14  81  148/77    


 


9/19/18 09:00      Nasal Cannula 2.0 


 


9/19/18 08:00 98.1 81 20 148/77 (100) 93   





 98.1       


 


9/19/18 07:54  83      


 


9/19/18 04:00  80      


 


9/19/18 04:00 97.8 87 20 152/93 (112) 94   





 97.8       


 


9/19/18 00:00 97.0 93 20 114/63 (80) 97   





 97.0       


 


9/19/18 00:00  70      


 


9/18/18 22:07  77 18   Room Air  21


 


9/18/18 21:00      Nasal Cannula 2.0 


 


9/18/18 20:00 97.8 64 20 158/78 (104) 95   





 97.8       


 


9/18/18 20:00  68      


 


9/18/18 16:00  89      


 


9/18/18 16:00 98.5 81 21 121/81 (94) 96   





 98.5       


 


9/18/18 12:00 97.7 100 21 133/76 (95) 99   





 97.7       


 


9/18/18 12:00  76      

















Intake and Output  


 


 9/18/18 9/19/18





 19:00 07:00


 


Intake Total 90 ml 


 


Balance 90 ml 


 


  


 


Intake Oral 90 ml 


 


# Voids 2 3








Laboratory Tests


9/19/18 09:30: 


White Blood Count 6.8, Red Blood Count 3.29L, Hemoglobin 9.8L, Hematocrit 30.6L

, Mean Corpuscular Volume 93, Mean Corpuscular Hemoglobin 29.8, Mean 

Corpuscular Hemoglobin Concent 32.0, Red Cell Distribution Width 18.0H, 

Platelet Count 287, Mean Platelet Volume 7.4, Neutrophils (%) (Auto) 76.4H, 

Lymphocytes (%) (Auto) 15.1L, Monocytes (%) (Auto) 7.3, Eosinophils (%) (Auto) 

0.9, Basophils (%) (Auto) 0.3, Sodium Level 146H, Potassium Level 3.6, Chloride 

Level 112H, Carbon Dioxide Level 24, Anion Gap 10, Blood Urea Nitrogen 15, 

Creatinine 1.1, Estimat Glomerular Filtration Rate , Glucose Level 138H, 

Calcium Level 8.9, Total Bilirubin 0.7, Aspartate Amino Transf (AST/SGOT) 25, 

Alanine Aminotransferase (ALT/SGPT) 24, Alkaline Phosphatase 58, Pro-B-Type 

Natriuretic Peptide 08048I, Total Protein 6.4, Albumin 3.0L, Globulin 3.4, 

Albumin/Globulin Ratio 0.9L


Height (Feet):  5


Height (Inches):  4.00


Weight (Pounds):  134


General Appearance:  no apparent distress, alert, confused, agitated











Josh Nunez MD Sep 19, 2018 11:06

## 2018-09-20 VITALS — SYSTOLIC BLOOD PRESSURE: 168 MMHG | DIASTOLIC BLOOD PRESSURE: 70 MMHG

## 2018-09-20 VITALS — DIASTOLIC BLOOD PRESSURE: 90 MMHG | SYSTOLIC BLOOD PRESSURE: 149 MMHG

## 2018-09-20 VITALS — DIASTOLIC BLOOD PRESSURE: 98 MMHG | SYSTOLIC BLOOD PRESSURE: 154 MMHG

## 2018-09-20 VITALS — SYSTOLIC BLOOD PRESSURE: 150 MMHG | DIASTOLIC BLOOD PRESSURE: 84 MMHG

## 2018-09-20 VITALS — SYSTOLIC BLOOD PRESSURE: 170 MMHG | DIASTOLIC BLOOD PRESSURE: 69 MMHG

## 2018-09-20 RX ADMIN — HEPARIN SODIUM SCH UNITS: 5000 INJECTION INTRAVENOUS; SUBCUTANEOUS at 08:17

## 2018-09-20 RX ADMIN — CARVEDILOL SCH MG: 6.25 TABLET, FILM COATED ORAL at 08:16

## 2018-09-20 RX ADMIN — MORPHINE SULFATE PRN MG: 2 INJECTION, SOLUTION INTRAMUSCULAR; INTRAVENOUS at 08:18

## 2018-09-20 RX ADMIN — LORAZEPAM PRN MG: 2 INJECTION, SOLUTION INTRAMUSCULAR; INTRAVENOUS at 05:26

## 2018-09-20 RX ADMIN — ASPIRIN 81 MG SCH MG: 81 TABLET ORAL at 08:16

## 2018-09-20 RX ADMIN — IPRATROPIUM BROMIDE AND ALBUTEROL SULFATE PRN ML: .5; 3 SOLUTION RESPIRATORY (INHALATION) at 03:06

## 2018-09-20 RX ADMIN — LOSARTAN POTASSIUM SCH MG: 25 TABLET, FILM COATED ORAL at 08:16

## 2018-09-20 NOTE — PULMONOLOGY PROGRESS NOTE
Assessment/Plan


Problems:  


(1) DVT of left axillary vein, acute


(2) Acute on chronic systolic CHF (congestive heart failure)


(3) Dyspnea


(4) Pacemaker


(5) NSTEMI (non-ST elevated myocardial infarction)


(6) Delirium


(7) Psychosis


Assessment/Plan


diuretics, on laix PO


optimize cardiac meds


f/u BNP


sedation, and psychiatry f/u


respiratory treatment


symptomatic treatment.





f/u cardiology recommendation





About left arm DVT, I am not sure if anticoagulation is a good option because 

of pts dementia and high risk of fall.





Subjective


ROS Limited/Unobtainable:  No


Constitutional:  Reports: no symptoms


HEENT:  Repors: no symptoms


Allergies:  


Coded Allergies:  


     No Known Allergies (Unverified , 3/26/14)





Objective





Last 24 Hour Vital Signs








  Date Time  Temp Pulse Resp B/P (MAP) Pulse Ox O2 Delivery O2 Flow Rate FiO2


 


9/20/18 09:00      Room Air  


 


9/20/18 08:16    154/98    


 


9/20/18 08:16  94  154/98    


 


9/20/18 08:00  79      


 


9/20/18 08:00 98.1 94 22 154/98 (116) 98   





 98.1       


 


9/20/18 03:53    170/69    


 


9/20/18 03:30    150/84 (106)    


 


9/20/18 03:18  95 24  96 Room Air  21


 


9/20/18 03:18        21


 


9/20/18 03:10  95 20  94 Room Air  21


 


9/20/18 01:00    170/69 (102)    


 


9/20/18 00:19  94      


 


9/20/18 00:00 98.0 98 20 168/70 (102) 98   





 98.0       


 


9/19/18 21:27  93 24  99 Room Air  21


 


9/19/18 21:15        21


 


9/19/18 21:15  87 20   Room Air  21


 


9/19/18 21:15  87 20  98 Room Air  21


 


9/19/18 21:00      Room Air  


 


9/19/18 20:33  88  160/85    


 


9/19/18 20:30 97.9 88 20 160/85 (110) 98   





 97.9       


 


9/19/18 20:00  84      


 


9/19/18 16:00  80      


 


9/19/18 16:00 97.5 84 18 148/90 (109) 99   





 97.5       


 


9/19/18 12:03  78      


 


9/19/18 12:00 97.3 73 20 140/81 (100) 96   





 97.3       

















Intake and Output  


 


 9/19/18 9/20/18





 19:00 07:00


 


Intake Total 120 ml 


 


Balance 120 ml 


 


  


 


Intake Oral 120 ml 


 


# Voids 1 4








General Appearance:  WD/WN


HEENT:  normocephalic


Respiratory/Chest:  chest wall non-tender, lungs clear


Breasts:  no masses


Cardiovascular:  normal rate


Abdomen:  normal bowel sounds, soft, non tender


Genitourinary:  normal external genitalia


Extremities:  no cyanosis





Current Medications








 Medications


  (Trade)  Dose


 Ordered  Sig/Violette


 Route


 PRN Reason  Start Time


 Stop Time Status Last Admin


Dose Admin


 


 Acetaminophen


  (Tylenol)  650 mg  Q4H  PRN


 ORAL


 FEVER  9/17/18 16:27


 10/16/18 16:26   


 


 


 Acetaminophen/


 Hydrocodone Bitart


  (Norco 5/325)  1 tab  Q6H  PRN


 ORAL


 Moderate Pain (Pain Scale 4-6)  9/17/18 16:29


 9/23/18 16:28  9/19/18 11:49


 


 


 Albuterol/


 Ipratropium


  (Albuterol/


 Ipratropium)  3 ml  Q6HRT  PRN


 HHN


 Shortness of Breath  9/19/18 01:00


 9/24/18 00:59  9/20/18 03:06


 


 


 Aspirin


  (ASA)  162 mg  DAILY


 ORAL


   9/18/18 09:00


 10/17/18 08:59  9/20/18 08:16


 


 


 Atorvastatin


 Calcium


  (Lipitor)  20 mg  BEDTIME


 ORAL


   9/19/18 21:00


 10/19/18 20:59  9/19/18 20:33


 


 


 Carvedilol


  (Coreg)  6.25 mg  EVERY 12  HOURS


 ORAL


   9/19/18 21:00


 10/19/18 20:59  9/20/18 08:16


 


 


 Diltiazem HCl


  (Cardizem)  10 mg  Q1H  PRN


 IV


 heart rate more than 120,   9/17/18 16:28


 10/16/18 16:27   


 


 


 Enalaprilat


  (Vasotec)  2.5 mg  Q6H  PRN


 IV


 sbp more than 160  9/17/18 18:00


 10/17/18 17:59  9/20/18 03:53


 


 


 Furosemide


  (Lasix)  40 mg  DAILY


 ORAL


   9/20/18 09:00


 10/20/18 08:59  9/20/18 08:15


 


 


 Heparin Sodium


  (Porcine)


  (Heparin 5000


 units/ml)  5,000 units  EVERY 12  HOURS


 SUBQ


   9/17/18 21:00


 10/16/18 20:59  9/20/18 08:17


 


 


 Ketorolac


 Tromethamine


  (Toradol 30mg)  15 mg  Q6H  PRN


 IV


 moderate pain ( 4-6)  9/17/18 18:00


 9/22/18 17:59   


 


 


 Lorazepam


  (Ativan 2mg/ml


 1ml)  1 mg  Q4H  PRN


 IM


 Agitation  9/19/18 15:45


 9/26/18 15:44  9/20/18 05:26


 


 


 Losartan Potassium


  (Cozaar)  25 mg  DAILY


 ORAL


   9/19/18 18:00


 10/19/18 17:59  9/20/18 08:16


 


 


 Morphine Sulfate


  (Morphine


 Sulfate)  2 mg  Q4H  PRN


 IVP


 severe Pain (Pain Scale 7-10)  9/17/18 16:29


 9/23/18 16:28  9/20/18 08:18


 


 


 Nitroglycerin


  (Ntg)  0.4 mg  Q5MIN X 3 DOSES PRN


 SL


 Prn Chest Pain  9/17/18 16:30


 10/16/18 14:42   


 


 


 Ondansetron HCl


  (Zofran)  4 mg  Q6H  PRN


 IVP


 Nausea & Vomiting  9/17/18 16:29


 10/16/18 16:28   


 


 


 Polyethylene


 Glycol


  (Miralax)  17 gm  DAILYPRN  PRN


 ORAL


 Constipation  9/17/18 16:29


 10/17/18 16:28   


 


 


 Quetiapine


 Fumarate


  (SEROquel)  25 mg  Q6H  PRN


 ORAL


 ANXIETY/AGITATION  9/17/18 17:15


 10/17/18 17:14  9/18/18 05:01


 


 


 Quetiapine


 Fumarate


  (SEROquel)  50 mg  THREE TIMES A  DAY


 ORAL


   9/18/18 13:00


 10/18/18 12:59  9/20/18 08:15


 


 


 Temazepam


  (Restoril)  15 mg  HSPRN  PRN


 ORAL


 Insomnia  9/17/18 21:00


 9/23/18 20:59   


 

















Korin Junior MD Sep 20, 2018 10:38

## 2018-09-20 NOTE — CARDIOLOGY PROGRESS NOTE
Assessment/Plan


Assessment/Plan


noted arm DVT the patient is restrained, otherwise she is very agitated and 

walks out of the floor her respiratory status is improved she is not a 

candidate for anticoagulation due to recent GI bleed, I believe it is better to 

discharge her she is better at home I had long discussion with other providers 

and with her  I told him that they should comply with prescribed 

medications, and her  promised to bring her to my office in one week. 

She should be discharged on b-blockers, arb and aspirin and furosemide





Subjective


Subjective


the patient is laying down in restraint, both wrist


confused


 denies chest pain  she is confused





Objective





Last 24 Hour Vital Signs








  Date Time  Temp Pulse Resp B/P (MAP) Pulse Ox O2 Delivery O2 Flow Rate FiO2


 


9/20/18 12:00 97.9 85 22 149/90 (109) 95   





 97.9       


 


9/20/18 12:00  92      


 


9/20/18 09:00      Room Air  


 


9/20/18 08:16    154/98    


 


9/20/18 08:16  94  154/98    


 


9/20/18 08:00  79      


 


9/20/18 08:00 98.1 94 22 154/98 (116) 98   





 98.1       


 


9/20/18 03:53    170/69    


 


9/20/18 03:30    150/84 (106)    


 


9/20/18 03:18  95 24  96 Room Air  21


 


9/20/18 03:18        21


 


9/20/18 03:10  95 20  94 Room Air  21


 


9/20/18 01:00    170/69 (102)    


 


9/20/18 00:19  94      


 


9/20/18 00:00 98.0 98 20 168/70 (102) 98   





 98.0       


 


9/19/18 21:27  93 24  99 Room Air  21


 


9/19/18 21:15        21


 


9/19/18 21:15  87 20   Room Air  21


 


9/19/18 21:15  87 20  98 Room Air  21


 


9/19/18 21:00      Room Air  


 


9/19/18 20:33  88  160/85    


 


9/19/18 20:30 97.9 88 20 160/85 (110) 98   





 97.9       


 


9/19/18 20:00  84      








General Appearance:  agitated - confused


EENT:  PERRL/EOMI


Neck:  JVD


Rhythm:  NSR


Cardiovascular:  normal rate, gallop/S3


Respiratory/Chest:  crackles/rales


Abdomen:  decreased bowel sounds


Extremities:  normal range of motion











Intake and Output  


 


 9/19/18 9/20/18





 19:00 07:00


 


Intake Total 120 ml 


 


Balance 120 ml 


 


  


 


Intake Oral 120 ml 


 


# Voids 1 4

















Lara Tong MD Sep 20, 2018 17:46

## 2018-09-21 NOTE — DISCHARGE SUMMARY
Discharge Summary


Discharge Summary


_


DATE OF ADMISSION: 09/16/2018


DATE OF DISCHARGE: 09/20/2018





CONSULTANTS: 


Dr. Lara Nix


   


[facility] COURSE:


Patient is an 78-year-old Faroese speaking female, who presented to ED with 

chief complaint of shortness of breath.  Patient was admitted to [facility] on 

August 2018.  She was diagnosed with acute MI.  She became short of breath, 

symptoms started on 09/16/2018.  She also had several falls with contusion to 

the left hip area.  She had pain to the left hip area without obvious bruising.

  She has medical history significant for type 2 diabetes mellitus, hypertension

, sick sinus syndrome, hypercholesterolemia and recent non-ST elevated MI. She 

was transported to [facility] for further evaluation. 





On evaluation at ED, vital signs were stable.  Blood work did not show any 

leukocytosis, hemoglobin was 8.9, hematocrit was 26.  Troponin was elevated to 

0.457.  BNP was elevated at 33,000.  EKG showed paced rhythm.  Chest x-ray 

revealed cardiomegaly with interstitial opacification/edema consistent with 

congestive heart failure and/or pulmonary edema.  She was given nebulizer 

treatment and Lasix IV.  X-ray of the pelvic area showed superior and inferior 

pubic rami fractures.  Left femur x-ray was negative for fractures.  A pelvic 

CT was then done and showed left sided pelvic fractures including the left 

pubic rami and iliac bone.  


She was admitted to [facility] for evaluation of non-ST elevated MI and pelvic 

fracture.  





She was seen by cardiologist.  Upon review of records from [facility], patient 

has ischemic cardiomyopathy with ejection fraction of 23% with moderate mitral 

regurgitation and severe tricuspid regurgitation being noted with PA pressure 

in the 50s.  She had severe coronary heart disease; she had drug eluding stent  

placed in the first obtuse marginal and left anterior descending artery.  She 

also had significant right coronary artery disease.  She has 2 pacemakers, one 

inserted on 2000 and another one on 2017 for sick sinus syndrome.





She underwent evaluation by orthopedic surgeon.  Patient had multiple pelvic 

fractures appear to be not clinically acute.  Maybe old in nature and she had 

multiple other falls as well.  Examination revealed no pain.  No surgical 

intervention was warranted.  She was advised weightbearing as tolerated.





Patient was agitated and screaming, refusing to cooperate with care.  

Psychiatric evaluation was done.  Patient was placed on restraints.  She was 

given cocktail of Ativan, Haldol and Benadryl prn.  She was given Seroquel.





She had a venous duplex scan.  Imaging showed acute thrombosis in the subclavian

, axillary and brachial vein.  There was acute thrombosis in the left basilar 

vein at the upper arm level.  Anticoagulation not a good option due to patient'

s dementia and high risk of falls.





Full treatment was not carried out as patient left AMA.





FINAL DIAGNOSES: 


Acute on chronic systolic congestive heart failure


Non-ST elevated MI


Acute DVT of the left axillary vein


Encephalopathy due to general medical condition


Ischemic cardiomyopathy


Delirium


Psychosis


Sick sinus syndrome status post pacemaker


Hypercholesterolemia


Pelvic fracture with history of multiple falls


Diabetes mellitus type 2


Hypertension


Coronary artery disease status post PCI of LAD and obtuse marginal in August 2016 with significant right coronary artery stenosis


Possible GI bleed from recent hospitalization





DISPOSITION: Patient left AMA





I have been assigned to dictate discharge summary on this account, and I was 

not involved in the patient's management.











Anna Proctor NP Sep 21, 2018 13:58

## 2018-09-23 NOTE — DIAGNOSTIC IMAGING REPORT
--------------- APPROVED REPORT --------------





CPT Code: 33701



Present Symptoms

Comments: Swelling



2D Evaluation







LEFT UPPER EXTREMITY: Venous imaging reveals acute thrombus in the subclavian, axillary 

and brachial veins. The remaining segments are within normal limits. There is no evidence 

of thrombus within the internal jugular, innominate, radial and ulnar veins.  



SUPERFICIAL VENOUS SYSTEM: Imaging reveals acute thrombus in the left basilic vein at 

upper arm level. Imaging also reveals patency of the left cephalic vein. 

Imaging also reveals a hematoma (3.5cm x 1.6cm) at the upper arm level.

ESCOBAR Conklin was notified of abnormal results at 0945 hours.

## 2018-10-02 ENCOUNTER — HOSPITAL ENCOUNTER (INPATIENT)
Dept: HOSPITAL 72 - EMR | Age: 78
LOS: 7 days | Discharge: HOME HEALTH | DRG: 469 | End: 2018-10-09
Payer: MEDICARE

## 2018-10-02 VITALS — BODY MASS INDEX: 19.3 KG/M2 | HEIGHT: 66 IN | WEIGHT: 120.06 LBS

## 2018-10-02 VITALS — SYSTOLIC BLOOD PRESSURE: 140 MMHG | DIASTOLIC BLOOD PRESSURE: 90 MMHG

## 2018-10-02 VITALS — DIASTOLIC BLOOD PRESSURE: 60 MMHG | SYSTOLIC BLOOD PRESSURE: 148 MMHG

## 2018-10-02 VITALS — SYSTOLIC BLOOD PRESSURE: 142 MMHG | DIASTOLIC BLOOD PRESSURE: 84 MMHG

## 2018-10-02 VITALS — DIASTOLIC BLOOD PRESSURE: 59 MMHG | SYSTOLIC BLOOD PRESSURE: 120 MMHG

## 2018-10-02 VITALS — SYSTOLIC BLOOD PRESSURE: 142 MMHG | DIASTOLIC BLOOD PRESSURE: 88 MMHG

## 2018-10-02 VITALS — SYSTOLIC BLOOD PRESSURE: 138 MMHG | DIASTOLIC BLOOD PRESSURE: 78 MMHG

## 2018-10-02 VITALS — DIASTOLIC BLOOD PRESSURE: 73 MMHG | SYSTOLIC BLOOD PRESSURE: 157 MMHG

## 2018-10-02 DIAGNOSIS — J45.909: ICD-10-CM

## 2018-10-02 DIAGNOSIS — Z91.81: ICD-10-CM

## 2018-10-02 DIAGNOSIS — B96.20: ICD-10-CM

## 2018-10-02 DIAGNOSIS — I11.0: ICD-10-CM

## 2018-10-02 DIAGNOSIS — G93.40: ICD-10-CM

## 2018-10-02 DIAGNOSIS — A41.9: ICD-10-CM

## 2018-10-02 DIAGNOSIS — E11.9: ICD-10-CM

## 2018-10-02 DIAGNOSIS — J18.9: ICD-10-CM

## 2018-10-02 DIAGNOSIS — I21.4: ICD-10-CM

## 2018-10-02 DIAGNOSIS — G93.41: ICD-10-CM

## 2018-10-02 DIAGNOSIS — F29: ICD-10-CM

## 2018-10-02 DIAGNOSIS — S72.011A: Primary | ICD-10-CM
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
## 2018-10-02 DIAGNOSIS — E78.00: ICD-10-CM
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
## 2018-10-02 DIAGNOSIS — Z95.1: ICD-10-CM

## 2018-10-02 DIAGNOSIS — Z95.0: ICD-10-CM

## 2018-10-02 DIAGNOSIS — F03.90: ICD-10-CM

## 2018-10-02 DIAGNOSIS — I25.10: ICD-10-CM

## 2018-10-02 DIAGNOSIS — N39.0: ICD-10-CM

## 2018-10-02 DIAGNOSIS — I50.20: ICD-10-CM

## 2018-10-02 LAB
ADD MANUAL DIFF: NO
ALBUMIN SERPL-MCNC: 3.1 G/DL (ref 3.4–5)
ALBUMIN/GLOB SERPL: 1 {RATIO} (ref 1–2.7)
ALP SERPL-CCNC: 69 U/L (ref 46–116)
ALT SERPL-CCNC: 27 U/L (ref 12–78)
ANION GAP SERPL CALC-SCNC: 10 MMOL/L (ref 5–15)
APPEARANCE UR: (no result)
APTT PPP: (no result) S
AST SERPL-CCNC: 27 U/L (ref 15–37)
BASOPHILS NFR BLD AUTO: 0.4 % (ref 0–2)
BILIRUB SERPL-MCNC: 0.4 MG/DL (ref 0.2–1)
BUN SERPL-MCNC: 20 MG/DL (ref 7–18)
CALCIUM SERPL-MCNC: 8.9 MG/DL (ref 8.5–10.1)
CHLORIDE SERPL-SCNC: 106 MMOL/L (ref 98–107)
CO2 SERPL-SCNC: 26 MMOL/L (ref 21–32)
CREAT SERPL-MCNC: 1.1 MG/DL (ref 0.55–1.3)
EOSINOPHIL NFR BLD AUTO: 0.4 % (ref 0–3)
ERYTHROCYTE [DISTWIDTH] IN BLOOD BY AUTOMATED COUNT: 17.3 % (ref 11.6–14.8)
GLOBULIN SER-MCNC: 3.2 G/DL
GLUCOSE UR STRIP-MCNC: NEGATIVE MG/DL
HCT VFR BLD CALC: 30.9 % (ref 37–47)
HGB BLD-MCNC: 9.5 G/DL (ref 12–16)
KETONES UR QL STRIP: NEGATIVE
LEUKOCYTE ESTERASE UR QL STRIP: (no result)
LYMPHOCYTES NFR BLD AUTO: 13.5 % (ref 20–45)
MCV RBC AUTO: 93 FL (ref 80–99)
MONOCYTES NFR BLD AUTO: 7.1 % (ref 1–10)
NEUTROPHILS NFR BLD AUTO: 78.6 % (ref 45–75)
NITRITE UR QL STRIP: POSITIVE
PH UR STRIP: 5 [PH] (ref 4.5–8)
PLATELET # BLD: 443 K/UL (ref 150–450)
POTASSIUM SERPL-SCNC: 4.4 MMOL/L (ref 3.5–5.1)
PROT UR QL STRIP: (no result)
RBC # BLD AUTO: 3.34 M/UL (ref 4.2–5.4)
SODIUM SERPL-SCNC: 141 MMOL/L (ref 136–145)
SP GR UR STRIP: 1.01 (ref 1–1.03)
UROBILINOGEN UR-MCNC: NORMAL MG/DL (ref 0–1)
WBC # BLD AUTO: 10.5 K/UL (ref 4.8–10.8)

## 2018-10-02 PROCEDURE — 96375 TX/PRO/DX INJ NEW DRUG ADDON: CPT

## 2018-10-02 PROCEDURE — 96374 THER/PROPH/DIAG INJ IV PUSH: CPT

## 2018-10-02 PROCEDURE — 85651 RBC SED RATE NONAUTOMATED: CPT

## 2018-10-02 PROCEDURE — 72131 CT LUMBAR SPINE W/O DYE: CPT

## 2018-10-02 PROCEDURE — 85060 BLOOD SMEAR INTERPRETATION: CPT

## 2018-10-02 PROCEDURE — 83615 LACTATE (LD) (LDH) ENZYME: CPT

## 2018-10-02 PROCEDURE — 82746 ASSAY OF FOLIC ACID SERUM: CPT

## 2018-10-02 PROCEDURE — 82607 VITAMIN B-12: CPT

## 2018-10-02 PROCEDURE — 36415 COLL VENOUS BLD VENIPUNCTURE: CPT

## 2018-10-02 PROCEDURE — 85007 BL SMEAR W/DIFF WBC COUNT: CPT

## 2018-10-02 PROCEDURE — 86703 HIV-1/HIV-2 1 RESULT ANTBDY: CPT

## 2018-10-02 PROCEDURE — 93005 ELECTROCARDIOGRAM TRACING: CPT

## 2018-10-02 PROCEDURE — 82378 CARCINOEMBRYONIC ANTIGEN: CPT

## 2018-10-02 PROCEDURE — 87181 SC STD AGAR DILUTION PER AGT: CPT

## 2018-10-02 PROCEDURE — 85025 COMPLETE CBC W/AUTO DIFF WBC: CPT

## 2018-10-02 PROCEDURE — 71045 X-RAY EXAM CHEST 1 VIEW: CPT

## 2018-10-02 PROCEDURE — 86705 HEP B CORE ANTIBODY IGM: CPT

## 2018-10-02 PROCEDURE — 84443 ASSAY THYROID STIM HORMONE: CPT

## 2018-10-02 PROCEDURE — 99285 EMERGENCY DEPT VISIT HI MDM: CPT

## 2018-10-02 PROCEDURE — 82962 GLUCOSE BLOOD TEST: CPT

## 2018-10-02 PROCEDURE — 81003 URINALYSIS AUTO W/O SCOPE: CPT

## 2018-10-02 PROCEDURE — 72170 X-RAY EXAM OF PELVIS: CPT

## 2018-10-02 PROCEDURE — 94150 VITAL CAPACITY TEST: CPT

## 2018-10-02 PROCEDURE — 83540 ASSAY OF IRON: CPT

## 2018-10-02 PROCEDURE — 94003 VENT MGMT INPAT SUBQ DAY: CPT

## 2018-10-02 PROCEDURE — 80053 COMPREHEN METABOLIC PANEL: CPT

## 2018-10-02 PROCEDURE — 93306 TTE W/DOPPLER COMPLETE: CPT

## 2018-10-02 PROCEDURE — 72192 CT PELVIS W/O DYE: CPT

## 2018-10-02 PROCEDURE — 83550 IRON BINDING TEST: CPT

## 2018-10-02 PROCEDURE — 96361 HYDRATE IV INFUSION ADD-ON: CPT

## 2018-10-02 PROCEDURE — 80048 BASIC METABOLIC PNL TOTAL CA: CPT

## 2018-10-02 PROCEDURE — 84100 ASSAY OF PHOSPHORUS: CPT

## 2018-10-02 PROCEDURE — 86803 HEPATITIS C AB TEST: CPT

## 2018-10-02 PROCEDURE — 87340 HEPATITIS B SURFACE AG IA: CPT

## 2018-10-02 PROCEDURE — 83735 ASSAY OF MAGNESIUM: CPT

## 2018-10-02 PROCEDURE — 85044 MANUAL RETICULOCYTE COUNT: CPT

## 2018-10-02 PROCEDURE — 85730 THROMBOPLASTIN TIME PARTIAL: CPT

## 2018-10-02 PROCEDURE — 83036 HEMOGLOBIN GLYCOSYLATED A1C: CPT

## 2018-10-02 PROCEDURE — 86709 HEPATITIS A IGM ANTIBODY: CPT

## 2018-10-02 PROCEDURE — 87086 URINE CULTURE/COLONY COUNT: CPT

## 2018-10-02 PROCEDURE — 85610 PROTHROMBIN TIME: CPT

## 2018-10-02 PROCEDURE — 87040 BLOOD CULTURE FOR BACTERIA: CPT

## 2018-10-02 RX ADMIN — ZOLPIDEM TARTRATE PRN MG: 5 TABLET ORAL at 22:09

## 2018-10-02 RX ADMIN — HALOPERIDOL LACTATE PRN MG: 5 INJECTION, SOLUTION INTRAMUSCULAR at 18:42

## 2018-10-02 RX ADMIN — LORAZEPAM PRN MG: 2 INJECTION, SOLUTION INTRAMUSCULAR; INTRAVENOUS at 20:22

## 2018-10-02 RX ADMIN — HEPARIN SODIUM SCH UNITS: 5000 INJECTION INTRAVENOUS; SUBCUTANEOUS at 21:00

## 2018-10-02 RX ADMIN — TRAZODONE HYDROCHLORIDE SCH MG: 50 TABLET ORAL at 20:58

## 2018-10-02 RX ADMIN — CLONAZEPAM SCH MG: 0.5 TABLET ORAL at 17:43

## 2018-10-02 RX ADMIN — HYDROCODONE BITARTRATE AND ACETAMINOPHEN PRN TAB: 5; 325 TABLET ORAL at 18:52

## 2018-10-02 RX ADMIN — DEXTROSE AND SODIUM CHLORIDE SCH MLS/HR: 5; .45 INJECTION, SOLUTION INTRAVENOUS at 15:36

## 2018-10-02 NOTE — DIAGNOSTIC IMAGING REPORT
Indication: Reason For Exam: PAIN

 

Technique: 2 views of the right hip and one view of the pelvis

 

Comparison: 9/16/2018

 

Findings: Interim development of comminuted subcapital right femoral neck fracture.

Unusual soft tissue calcifications are seen medial to the greater trochanter. Complex

left superior and inferior pubic ramus fracture also demonstrated. Left iliac bone

fracture demonstrated on recent CT scan is not clearly evident on plain radiograph.

 

Impression: Positive for right hip fracture, as described.

 

Findings previously discussed by phone with Dr. Giraldo in the emergency room

## 2018-10-02 NOTE — EMERGENCY ROOM REPORT
History of Present Illness


General


Chief Complaint:  Pain


Source:  Patient





Present Illness


HPI


This patient states that she has had multiple falls over the past 3 weeks.  She 

states that she has generalized weakness and pain in her right hip.  She states 

that she has been unable to walk and keeps falling at home.  She complains of 

pain in her right hip and inability to walk for the past week.  She states that 

she has fallen down multiple times.  She lives with her elderly  who has 

very severe edema and cannot walk and she cares for him.


Allergies:  


Coded Allergies:  


     No Known Allergies (Unverified , 3/26/14)





Patient History


Past Medical History:  see triage record, DM, HTN, asthma, dementia


Past Surgical History:  pacemaker


Social History:  Denies: smoking, alcohol use, drug use


Pregnant Now:  No


Reviewed Nursing Documentation:  PMH: Agreed; PSxH: Agreed





Nursing Documentation-PMH


Hx Cardiac Problems:  Yes


Hx Hypertension:  Yes


Hx Pacemaker:  Yes - left chest pacemaker


Hx Asthma:  Yes


Hx Diabetes:  Yes


Hx Cancer:  No


Hx Gastrointestinal Problems:  No


Hx Neurological Problems:  Yes


Hx Dementia:  Yes





Review of Systems


All Other Systems:  negative except mentioned in HPI





Physical Exam





Vital Signs








  Date Time  Temp Pulse Resp B/P (MAP) Pulse Ox O2 Delivery O2 Flow Rate FiO2


 


10/2/18 08:50  83 18 157/73 100 Room Air  


 


10/2/18 09:02 98.0       





 98.0       


 


10/2/18 10:35       1.0 








Sp02 EP Interpretation:  reviewed, normal


General Appearance:  no apparent distress, alert, GCS 15, non-toxic


Head:  normocephalic, atraumatic


Eyes:  bilateral eye normal inspection, bilateral eye PERRL


ENT:  hearing grossly normal, normal pharynx, no angioedema, normal voice


Neck:  full range of motion, supple/symm/no masses


Respiratory:  chest non-tender, lungs clear, normal breath sounds, no 

respiratory distress, no retraction, no accessory muscle use, speaking full 

sentences


Cardiovascular #1:  regular rate, rhythm, no edema


Gastrointestinal:  normal bowel sounds, non tender, soft, non-distended, no 

guarding, no rebound


Rectal:  deferred


Musculoskeletal:  other - Unable to ambulate.  Severe pain with palpation and 

ROM of R. hip.


Neurologic:  alert, oriented x3, responsive, motor strength/tone normal, 

sensory intact, speech normal


Psychiatric:  judgement/insight normal, memory normal, mood/affect normal, no 

suicidal/homicidal ideation


Skin:  normal color, no rash, warm/dry, well hydrated





Medical Decision Making


Diagnostic Impression:  


 Primary Impression:  


 Hip fracture, right


ER Course


This patient has a right hip fracture.  She will need surgical repair.  She is 

admitted for surgical repair.  She will also need a social work and case 

manager evaluation.  She and her elderly  reside home alone together and 

are not able to safely go about their activities of daily living.





Laboratory Tests








Test


  10/2/18


10:45


 


White Blood Count


  10.5 K/UL


(4.8-10.8)


 


Red Blood Count


  3.34 M/UL


(4.20-5.40)  L


 


Hemoglobin


  9.5 G/DL


(12.0-16.0)  L


 


Hematocrit


  30.9 %


(37.0-47.0)  L


 


Mean Corpuscular Volume 93 FL (80-99)  


 


Mean Corpuscular Hemoglobin


  28.6 PG


(27.0-31.0)


 


Mean Corpuscular Hemoglobin


Concent 30.8 G/DL


(32.0-36.0)  L


 


Red Cell Distribution Width


  17.3 %


(11.6-14.8)  H


 


Platelet Count


  443 K/UL


(150-450)


 


Mean Platelet Volume


  6.9 FL


(6.5-10.1)


 


Neutrophils (%) (Auto)


  78.6 %


(45.0-75.0)  H


 


Lymphocytes (%) (Auto)


  13.5 %


(20.0-45.0)  L


 


Monocytes (%) (Auto)


  7.1 %


(1.0-10.0)


 


Eosinophils (%) (Auto)


  0.4 %


(0.0-3.0)


 


Basophils (%) (Auto)


  0.4 %


(0.0-2.0)


 


Urine Color Pale yellow  


 


Urine Appearance Cloudy  


 


Urine pH 5 (4.5-8.0)  


 


Urine Specific Gravity


  1.015


(1.005-1.035)


 


Urine Protein


  1+ (NEGATIVE)


H


 


Urine Glucose (UA)


  Negative


(NEGATIVE)


 


Urine Ketones


  Negative


(NEGATIVE)


 


Urine Blood


  1+ (NEGATIVE)


H


 


Urine Nitrite


  Positive


(NEGATIVE)  H


 


Urine Bilirubin


  Negative


(NEGATIVE)


 


Urine Urobilinogen


  Normal MG/DL


(0.0-1.0)


 


Urine Leukocyte Esterase


  1+ (NEGATIVE)


H


 


Urine RBC


  0-2 /HPF (0 -


2)


 


Urine WBC


  5-10 /HPF (0 -


2)  H


 


Urine Squamous Epithelial


Cells Few /LPF


(NONE/OCC)


 


Urine Bacteria


  Many /HPF


(NONE)  H


 


Sodium Level


  141 MMOL/L


(136-145)


 


Potassium Level


  4.4 MMOL/L


(3.5-5.1)


 


Chloride Level


  106 MMOL/L


()


 


Carbon Dioxide Level


  26 MMOL/L


(21-32)


 


Anion Gap


  10 mmol/L


(5-15)


 


Blood Urea Nitrogen


  20 mg/dL


(7-18)  H


 


Creatinine


  1.1 MG/DL


(0.55-1.30)


 


Estimate Glomerular


Filtration Rate  mL/min (>60)  


 


 


Glucose Level


  89 MG/DL


()


 


Calcium Level


  8.9 MG/DL


(8.5-10.1)


 


Total Bilirubin


  0.4 MG/DL


(0.2-1.0)


 


Aspartate Amino Transferase


(AST) 27 U/L (15-37)


 


 


Alanine Aminotransferase (ALT)


  27 U/L (12-78)


 


 


Alkaline Phosphatase


  69 U/L


()


 


Total Protein


  6.3 G/DL


(6.4-8.2)  L


 


Albumin


  3.1 G/DL


(3.4-5.0)  L


 


Globulin 3.2 g/dL  


 


Albumin/Globulin Ratio 1.0 (1.0-2.7)  








CT/MRI/US Diagnostic Results


CT/MRI/US Diagnostic Results :  


   Imaging Test Ordered:  CT abd/pelvis, CT L-spine


   Impression


Impression: No acute bony trauma. No significant interim change from prior 

abdomen


pelvis CT of 4/12/2018


 


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 

Degenerative


change, resulting in possible impingement upon the spinal canal and narrowing 

of the


bilateral neural foramina. This is unchanged from earlier study of April 2018,


however.


 


Other less extensive multilevel degenerative changes, as detailed above.


 


Possible left renal mass, new/increased from prior CT scan of 4/12/2018. 

Although


likely a hyperattenuating proteinaceous cyst, solid neoplasm not excludable.


Recommend ultrasound for better characterization


 


Findings discussed by phone with Dr. Giraldo in the emergency room at the time 

of


interpretation





Impression: Comminuted fracture of the subcapital right femoral neck, as 

described


 


Unusual calcific/ossific densities about the right hip joint, as described, not


evident on prior study of 2 1/2 weeks earlier. This could be rapidly progressive


heterotopic ossification, given recent trauma to this area, but development 

over 16


days would be very unusual. Significance/etiology otherwise uncertain


 


Subacute left superior and inferior pubic fracture, left posterior iliac bone


fracture again demonstrated, with evidence of a slight degree of healing of the


inferior pubic ramus in the interim.


 


Previously demonstrated right inguinal hernia no longer contains bowel. 

Previously


demonstrated left inguinal hernia persists, appears smaller than on the previous


study.


 


Generalized mild soft tissue edema noted


 


Findings discussed by phone with Dr. Giraldo in the emergency room at the time 

of


interpretation





Last Vital Signs








  Date Time  Temp Pulse Resp B/P (MAP) Pulse Ox O2 Delivery O2 Flow Rate FiO2


 


10/2/18 12:33 98.0       


 


10/2/18 10:35  74 18 140/90 100 Nasal Cannula 1.0 








Disposition:  ADMITTED AS INPATIENT


Condition:  Serious


Referrals:  


NON PHYSICIAN (PCP)











Malika Marquis DO Oct 2, 2018 13:00

## 2018-10-02 NOTE — DIAGNOSTIC IMAGING REPORT
Indication: Right hip and leg pain, trauma

 

Technique: Noncontrast spiral acquisitions obtained through the pelvis. Multiplanar

reconstructions generated. Total dose length product 361.92 mGycm. CTDIvol(s) 11.52

mGy.  Dose reduction achieved using automated exposure control

 

 

Comparison: 9/16/2018

 

Findings:  There is a fracture of the right femoral head neck junction. This is

comminuted. There is upwardly displaced by about one half bone width, slightly

posteriorly angulated, anteriorly displaced by about one half bone width. There is

significant unusual calcific densities are seen posterior to the left acetabulum,

posterior to the left greater trochanter, lateral to the right ischium, and medial to

the proximal femur. These are not evident on the previous exam. There is mild

swelling of the adjacent musculature, but no definite soft tissue hematoma is

demonstrated. There is mild edema of the subcutaneous fat.

 

Fracture deformity of the right inferior and superior pubic rami are again

demonstrated, with some callus formation about the inferior pubic ramus fracture that

was not evident previously, otherwise unchanged in appearance there is also fracture

deformity of the posterior iliac bone, with evidence of some healing.

 

Previously demonstrated right inguinal no longer contains bowel, still contains a

small amount of fat. Previously demonstrated left inguinal hernia is still present,

but appears to contain less bowel than the prior study.

 

Degenerative changes of the lumbosacral junction are again noted.

 

Impression: Comminuted fracture of the subcapital right femoral neck, as described

 

Unusual calcific/ossific densities about the right hip joint, as described, not

evident on prior study of 2 1/2 weeks earlier. This could be rapidly progressive

heterotopic ossification, given recent trauma to this area, but development over 16

days would be very unusual. Significance/etiology otherwise uncertain

 

Subacute left superior and inferior pubic fracture, left posterior iliac bone

fracture again demonstrated, with evidence of a slight degree of healing of the

inferior pubic ramus in the interim.

 

Previously demonstrated right inguinal hernia no longer contains bowel. Previously

demonstrated left inguinal hernia persists, appears smaller than on the previous

study.

 

Generalized mild soft tissue edema noted

 

Findings discussed by phone with Dr. Giraldo in the emergency room at the time of

interpretation

 

The CT scanner at [facility] is accredited by the American College of

Radiology and the scans are performed using protocols designed to limit radiation

exposure to as low as reasonably achievable to attain images of sufficient resolution

adequate for diagnostic evaluation.

## 2018-10-02 NOTE — DIAGNOSTIC IMAGING REPORT
Indications: Reason For Exam: PAIN

 

Technique: Spiral acquisitions obtained through the lumbar spine. Multiplanar

reconstructions were generated. No IV contrast utilized. Total dose length product

485.71 mGycm. CTDIvol(s) 14.03 mGy.  Dose reduction achieved using automated exposure

control

 

 

Comparison: Reference made to abdomen pelvis CT dated 4/12/2018  

 

Findings: There is bilateral L4 spondylolysis. This results in grade 2 L4 on L5

spondylolisthesis. There is severe degenerative change at L4-5, with complete

obliteration of the disc, significant degenerative remodeling, and considerable

proliferative change posterior to the posterior margin of the L4 vertebral body. The

appearance, however, is unchanged from the previous abdomen pelvis CT of April 2018

 

Bony alignment is normal at the other segments. The vertebral body heights are

preserved. There is degenerative disc narrowing at T12-L1. The remaining lumbar disc

spaces are preserved. There is bone-on-bone apposition of the L2-3 and L3-4 spinous

processes with some secondary degenerative change as a result. No acute fractures. No

dislocations.

 

At at L1-2, there is circumferential annular bulge which may result in minimal

compromise of the right neural foramen. No significant spinal stenosis or left neural

foraminal stenosis.

 

At L2-3, there is bilateral facet arthrosis. There is mild circumferential annular

bulge, which may impinge slightly upon the bilateral neural foramina. Desiccation

spinal stenosis.

 

At L3-4, no significant disc bulge or protrusion or spinal stenosis. There is

bilateral facet arthrosis.

 

At L4-5, the alignment abnormality results in significant kinking of the spinal

canal. It is uncertain whether this results in significant impingement upon the

thecal sac, but at its narrowest point the spinal canal measures approximately 9 mm

minimum AP dimension. The alignment abnormality does result in at least moderate

narrowing of the bilateral neural foramina.

 

At L5-S1, there is minimal circumferential annular bulge which does not significant

compromise the spinal canal or the neural foramina.

 

The included extraspinal soft tissues demonstrate bilateral pleural effusions. There

are extensive vascular calcifications there is a 1.1 cm exophytic lesion of the upper

pole of the right kidney. Previous CT exam demonstrates a nonexophytic

hypoattenuating area at the same location, current finding more prominent than

previously..

 

Impression: No acute bony trauma. No significant interim change from prior abdomen

pelvis CT of 4/12/2018

 

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 Degenerative

change, resulting in possible impingement upon the spinal canal and narrowing of the

bilateral neural foramina. This is unchanged from earlier study of April 2018,

however.

 

Other less extensive multilevel degenerative changes, as detailed above.

 

Possible left renal mass, new/increased from prior CT scan of 4/12/2018. Although

likely a hyperattenuating proteinaceous cyst, solid neoplasm not excludable.

Recommend ultrasound for better characterization

 

Findings discussed by phone with Dr. Giraldo in the emergency room at the time of

interpretation

 

The CT scanner at [facility] is accredited by the American College of

Radiology and the scans are performed using protocols designed to limit radiation

exposure to as low as reasonably achievable to attain images of sufficient resolution

adequate for diagnostic evaluation.

## 2018-10-02 NOTE — HISTORY & PHYSICAL
History and Physical


History & Physicial


Dictated for Int Med Dr Magana no. [id number].











Cheo Ortiz MD Oct 2, 2018 18:41

## 2018-10-03 VITALS — DIASTOLIC BLOOD PRESSURE: 89 MMHG | SYSTOLIC BLOOD PRESSURE: 154 MMHG

## 2018-10-03 VITALS — DIASTOLIC BLOOD PRESSURE: 85 MMHG | SYSTOLIC BLOOD PRESSURE: 126 MMHG

## 2018-10-03 VITALS — SYSTOLIC BLOOD PRESSURE: 116 MMHG | DIASTOLIC BLOOD PRESSURE: 60 MMHG

## 2018-10-03 VITALS — SYSTOLIC BLOOD PRESSURE: 130 MMHG | DIASTOLIC BLOOD PRESSURE: 62 MMHG

## 2018-10-03 VITALS — SYSTOLIC BLOOD PRESSURE: 113 MMHG | DIASTOLIC BLOOD PRESSURE: 61 MMHG

## 2018-10-03 LAB
ADD MANUAL DIFF: NO
ALBUMIN SERPL-MCNC: 2.9 G/DL (ref 3.4–5)
ALBUMIN/GLOB SERPL: 0.9 {RATIO} (ref 1–2.7)
ALP SERPL-CCNC: 55 U/L (ref 46–116)
ALT SERPL-CCNC: 24 U/L (ref 12–78)
ANION GAP SERPL CALC-SCNC: 10 MMOL/L (ref 5–15)
APTT BLD: 27 SEC (ref 23–33)
AST SERPL-CCNC: 29 U/L (ref 15–37)
BASOPHILS NFR BLD AUTO: 0.3 % (ref 0–2)
BILIRUB SERPL-MCNC: 0.6 MG/DL (ref 0.2–1)
BUN SERPL-MCNC: 20 MG/DL (ref 7–18)
CALCIUM SERPL-MCNC: 8.8 MG/DL (ref 8.5–10.1)
CHLORIDE SERPL-SCNC: 107 MMOL/L (ref 98–107)
CO2 SERPL-SCNC: 26 MMOL/L (ref 21–32)
CREAT SERPL-MCNC: 1 MG/DL (ref 0.55–1.3)
EOSINOPHIL NFR BLD AUTO: 0.6 % (ref 0–3)
ERYTHROCYTE [DISTWIDTH] IN BLOOD BY AUTOMATED COUNT: 17.7 % (ref 11.6–14.8)
GLOBULIN SER-MCNC: 3.2 G/DL
HCT VFR BLD CALC: 32.7 % (ref 37–47)
HGB BLD-MCNC: 10 G/DL (ref 12–16)
INR PPP: 1.1 (ref 0.9–1.1)
LYMPHOCYTES NFR BLD AUTO: 20.6 % (ref 20–45)
MCV RBC AUTO: 93 FL (ref 80–99)
MONOCYTES NFR BLD AUTO: 6.5 % (ref 1–10)
NEUTROPHILS NFR BLD AUTO: 72 % (ref 45–75)
PLATELET # BLD: 475 K/UL (ref 150–450)
POTASSIUM SERPL-SCNC: 4.2 MMOL/L (ref 3.5–5.1)
RBC # BLD AUTO: 3.5 M/UL (ref 4.2–5.4)
SODIUM SERPL-SCNC: 143 MMOL/L (ref 136–145)
WBC # BLD AUTO: 9.5 K/UL (ref 4.8–10.8)

## 2018-10-03 RX ADMIN — LOSARTAN POTASSIUM SCH MG: 25 TABLET, FILM COATED ORAL at 08:42

## 2018-10-03 RX ADMIN — ATENOLOL SCH MG: 25 TABLET ORAL at 08:41

## 2018-10-03 RX ADMIN — MORPHINE SULFATE PRN MG: 4 INJECTION INTRAVENOUS at 09:38

## 2018-10-03 RX ADMIN — TRAZODONE HYDROCHLORIDE SCH MG: 50 TABLET ORAL at 20:47

## 2018-10-03 RX ADMIN — CLONAZEPAM SCH MG: 0.5 TABLET ORAL at 05:47

## 2018-10-03 RX ADMIN — LORAZEPAM PRN MG: 2 INJECTION, SOLUTION INTRAMUSCULAR; INTRAVENOUS at 06:22

## 2018-10-03 RX ADMIN — DEXTROSE AND SODIUM CHLORIDE SCH MLS/HR: 5; .45 INJECTION, SOLUTION INTRAVENOUS at 08:42

## 2018-10-03 RX ADMIN — CLONAZEPAM SCH MG: 0.5 TABLET ORAL at 17:02

## 2018-10-03 RX ADMIN — MORPHINE SULFATE PRN MG: 4 INJECTION INTRAVENOUS at 15:19

## 2018-10-03 RX ADMIN — HEPARIN SODIUM SCH UNITS: 5000 INJECTION INTRAVENOUS; SUBCUTANEOUS at 20:48

## 2018-10-03 RX ADMIN — DEXTROSE AND SODIUM CHLORIDE SCH MLS/HR: 5; .45 INJECTION, SOLUTION INTRAVENOUS at 22:07

## 2018-10-03 RX ADMIN — HEPARIN SODIUM SCH UNITS: 5000 INJECTION INTRAVENOUS; SUBCUTANEOUS at 08:46

## 2018-10-03 RX ADMIN — CLONAZEPAM SCH MG: 0.5 TABLET ORAL at 00:05

## 2018-10-03 RX ADMIN — HALOPERIDOL LACTATE PRN MG: 5 INJECTION, SOLUTION INTRAMUSCULAR at 04:36

## 2018-10-03 RX ADMIN — CLONAZEPAM SCH MG: 0.5 TABLET ORAL at 12:24

## 2018-10-03 NOTE — CONSULTATION
DATE OF CONSULTATION:  10/03/2018



PAIN MANAGEMENT CONSULTATION



CONSULTING PHYSICIAN:  Behnoush Zarrini, M.D.



REFERRING PHYSICIAN:  Korin Junior M.D.



PHYSICIAN ASSISTANT:  ROSIE Fitzpatrick.



CHIEF COMPLAINT:  Right hip pain.



HISTORY OF PRESENT ILLNESS:  This is an 78-year-old female, who is being

seen on the Med/Surg floor of [facility] for initial

comprehensive pain management consultation.  The patient is admitted under

the care of Dr. Magana, being seen by Dr. Ortiz, Italian-speaking, and

complaining of right hip pain, found to be on the floor.  CT scan of the

right hip was found to have a comminuted fracture of the right femoral

neck and is going for surgery with Dr. Bella.  At this time, she is on

morphine 2 to 4 mg IV every 4 hours as needed for moderate-to-severe pain

with Norco 5/325 one tablet every 4 hours as needed for mild pain.  At

this time, the patient is comfortable and showing no signs of pain or

distress at this time.



PAST MEDICAL HISTORY:  Diabetes mellitus, hypertension, sick sinus

syndrome, hypercholesteremia, and history of non-STEMI.



PAST SURGICAL HISTORY:  Coronary artery bypass, pacemaker placement,

history of elbow debridement, and pacemaker implantation.



SOCIAL HISTORY:  Denies smoking tobacco, drinking alcohol, or IV drug

abuse.



MEDICATIONS:  Aspirin, atenolol, Klonopin, clonidine, clopidogrel,

dicyclomine, Pepcid, furosemide, Norco, losartan, metoprolol, potassium,

Crestor, Janumet, trazodone, and vitamin D.



ALLERGIES:  No known drug allergies.



REVIEW OF SYSTEMS:  Denies rash, fever, chills, sweating, dizziness,

drowsiness, blurred vision, sore throat, or change in her weight.  No

shortness of breath, chest pain, palpitations, or cough.  She is

complaining of right hip pain.



PHYSICAL EXAMINATION:

GENERAL:  Alert and awake.

VITAL SIGNS:  Blood pressure 113/64, heart rate 75, oxygen saturation 95%,

respirations 18, and temperature 98.4 degrees Fahrenheit.

HEENT:  PERRLA.

NECK:  Range of motion is full in all directions.  No tenderness to

paracervical muscles.  No adenopathy.

LUNGS:  Decreased breath sounds bilaterally.

HEART:  S1 and S2 regular.

ABDOMEN:  Benign.

BACK:  Range of motion is full in flexion and extension.

EXTREMITIES:  Upper and lower extremity range of motion is decreased due to

the patient's condition with tenderness to palpation on the right hip.

Ecchymosis noted.

NEUROLOGIC:  Sensory is intact.  Reflexes are not obtainable.  No

adenopathy.



ASSESSMENT:  This is an 78-year-old female with right hip fracture and

right hip pain status post fall.  The patient will continue on Norco and

morphine as needed.  The patient is awaiting to have surgical intervention

with the orthopedic surgeon for the right hip fracture.  The patient was

discussed with Dr. Ennis and he concurred.  We will follow up with the

patient.



Thank you very much for the courtesy of this consultation.







  ______________________________________________

  Behnoush Zarrini, M.D.





  ______________________________________________

  ROSIE Ron





DR:  GURU

D:  10/03/2018 08:21

T:  10/03/2018 17:33

JOB#:  [id number]

CC:

## 2018-10-03 NOTE — DIAGNOSTIC IMAGING REPORT
Indication: Shortness of breath

 

Technique: One view of the chest

 

Comparison: 9/19/2018

 

Findings: Bilateral pacemakers are again demonstrated. There is possibly some right

perihilar infiltrate. The heart is enlarged. There is equivocal slight blunting of

left costophrenic sulcus. The remainder of the lungs are clear. Previously

demonstrated generalized mild interstitial congestion has resolved

 

Impression: Suspect right perihilar infiltrate

 

Questionable trace left pleural effusion

## 2018-10-03 NOTE — CONSULTATION
History of Present Illness


General


Date patient seen:  Oct 3, 2018





Present Illness


Allergies:  


Coded Allergies:  


     No Known Allergies (Unverified , 3/26/14)





Medication History


Scheduled


Amoxicillin/Potassium Clav 875-125* (Augmentin 875-125 Tablet*), 1 TAB ORAL 

TWICE A DAY


Aspirin (Aspirin EC), 81 MG ORAL DAILY, (Reported)


Atenolol (Tenormin), 25 MG ORAL DAILY, (Reported)


Clonazepam* (Klonopin*), 0.5 MG ORAL Q6H, (Reported)


Clonidine Hcl* (Catapres*), 0.1 MG ORAL EVERY 8 HOURS, (Reported)


Clopidogrel* (Clopidogrel*), 75 MG ORAL DAILY, (Reported)


Dicyclomine Hcl* (Dicyclomine Hcl*), 10 MG PO QID


Famotidine (Pepcid), 20 MG ORAL BEDTIME


Furosemide* (Lasix*), 40 MG ORAL DAILY, (Reported)


Losartan Potassium* (Losartan Potassium*), 25 MG ORAL DAILY, (Reported)


Metoprolol Succinate* (Metoprolol Succinate*), 50 MG ORAL DAILY, (Reported)


No Known Medications* (NKM - No Known Medications*), 0 ., (Reported)


Potassium Chloride (Potassium Chloride), 10 MEQ ORAL DAILY, (Reported)


Rosuvastatin Calcium* (Crestor*), 10 MG ORAL DAILY, (Reported)


Sitagliptin Phos/Metformin Hcl (Janumet Xr 50-1,000 Mg Tablet), 1 TAB ORAL DAILY

, (Reported)


Trazodone* (Trazodone*), 50 MG ORAL BEDTIME, (Reported)


Vitamin D (Vitamin D3), 5,000 UNITS ORAL DAILY, (Reported)





Scheduled PRN


Hydrocodone Bit/Acetaminophen 5-325* (Norco 5-325*), 1 TAB ORAL Q6H PRN for For 

Pain





Miscellaneous Medications


Unable to Obtain Medications (Unable To Obtain Meds), (Reported)





Patient History


Healthcare decision maker





Resuscitation status


Full Code


Advanced Directive on File








Physical Exam





Last 24 Hour Vital Signs








  Date Time  Temp Pulse Resp B/P (MAP) Pulse Ox O2 Delivery O2 Flow Rate FiO2


 


10/3/18 04:00 98.4 75 18 113/61 (78) 95   





 98.4       


 


10/3/18 00:00 98.2 87 18 130/62 (84) 99   





 98.2       


 


10/2/18 21:00      Room Air  


 


10/2/18 20:00 99.3 81 19 142/84 (103) 97   





 99.3       


 


10/2/18 18:52 98.1       


 


10/2/18 18:36 98.1 77 18 148/60 (89) 98   





 98.1       


 


10/2/18 15:46      Room Air  


 


10/2/18 15:40 97.8 77 18 138/78 (98) 95   





 97.8       


 


10/2/18 15:15 98.1 73 16 144/88 97 Room Air  


 


10/2/18 15:14 98.1 73 18 142/88 97 Room Air  





 98.1       


 


10/2/18 13:03 98.0       


 


10/2/18 12:33 98.0       


 


10/2/18 12:00 98.2 80 18 120/59 97 Room Air  





 98.2       


 


10/2/18 11:24 98.0       


 


10/2/18 10:54 98.0       


 


10/2/18 10:35 97.9 74 18 140/90 100 Nasal Cannula 1.0 





 97.9       


 


10/2/18 09:02 98.0  18 157/73 100 Room Air  





 98.0       


 


10/2/18 08:50  83 18 157/73 100 Room Air  

















Intake and Output  


 


 10/2/18 10/3/18





 19:00 07:00


 


Intake Total 50 ml 250 ml


 


Balance 50 ml 250 ml


 


  


 


Intake Oral 0 ml 


 


IV Total 50 ml 250 ml











Laboratory Tests








Test


  10/2/18


10:45 10/3/18


06:10


 


White Blood Count


  10.5 K/UL


(4.8-10.8) 9.5 K/UL


(4.8-10.8)


 


Red Blood Count


  3.34 M/UL


(4.20-5.40)  L 3.50 M/UL


(4.20-5.40)  L


 


Hemoglobin


  9.5 G/DL


(12.0-16.0)  L 10.0 G/DL


(12.0-16.0)  L


 


Hematocrit


  30.9 %


(37.0-47.0)  L 32.7 %


(37.0-47.0)  L


 


Mean Corpuscular Volume 93 FL (80-99)   93 FL (80-99)  


 


Mean Corpuscular Hemoglobin


  28.6 PG


(27.0-31.0) 28.5 PG


(27.0-31.0)


 


Mean Corpuscular Hemoglobin


Concent 30.8 G/DL


(32.0-36.0)  L 30.5 G/DL


(32.0-36.0)  L


 


Red Cell Distribution Width


  17.3 %


(11.6-14.8)  H 17.7 %


(11.6-14.8)  H


 


Platelet Count


  443 K/UL


(150-450) 475 K/UL


(150-450)  H


 


Mean Platelet Volume


  6.9 FL


(6.5-10.1) 6.7 FL


(6.5-10.1)


 


Neutrophils (%) (Auto)


  78.6 %


(45.0-75.0)  H 72.0 %


(45.0-75.0)


 


Lymphocytes (%) (Auto)


  13.5 %


(20.0-45.0)  L 20.6 %


(20.0-45.0)


 


Monocytes (%) (Auto)


  7.1 %


(1.0-10.0) 6.5 %


(1.0-10.0)


 


Eosinophils (%) (Auto)


  0.4 %


(0.0-3.0) 0.6 %


(0.0-3.0)


 


Basophils (%) (Auto)


  0.4 %


(0.0-2.0) 0.3 %


(0.0-2.0)


 


Urine Color Pale yellow   


 


Urine Appearance Cloudy   


 


Urine pH 5 (4.5-8.0)   


 


Urine Specific Gravity


  1.015


(1.005-1.035) 


 


 


Urine Protein


  1+ (NEGATIVE)


H 


 


 


Urine Glucose (UA)


  Negative


(NEGATIVE) 


 


 


Urine Ketones


  Negative


(NEGATIVE) 


 


 


Urine Blood


  1+ (NEGATIVE)


H 


 


 


Urine Nitrite


  Positive


(NEGATIVE)  H 


 


 


Urine Bilirubin


  Negative


(NEGATIVE) 


 


 


Urine Urobilinogen


  Normal MG/DL


(0.0-1.0) 


 


 


Urine Leukocyte Esterase


  1+ (NEGATIVE)


H 


 


 


Urine RBC


  0-2 /HPF (0 -


2) 


 


 


Urine WBC


  5-10 /HPF (0 -


2)  H 


 


 


Urine Squamous Epithelial


Cells Few /LPF


(NONE/OCC) 


 


 


Urine Bacteria


  Many /HPF


(NONE)  H 


 


 


Sodium Level


  141 MMOL/L


(136-145) Pending  


 


 


Potassium Level


  4.4 MMOL/L


(3.5-5.1) Pending  


 


 


Chloride Level


  106 MMOL/L


() Pending  


 


 


Carbon Dioxide Level


  26 MMOL/L


(21-32) Pending  


 


 


Anion Gap


  10 mmol/L


(5-15) 


 


 


Blood Urea Nitrogen


  20 mg/dL


(7-18)  H Pending  


 


 


Creatinine


  1.1 MG/DL


(0.55-1.30) Pending  


 


 


Estimat Glomerular Filtration


Rate  mL/min (>60)  


  Pending  


 


 


Glucose Level


  89 MG/DL


() Pending  


 


 


Calcium Level


  8.9 MG/DL


(8.5-10.1) Pending  


 


 


Total Bilirubin


  0.4 MG/DL


(0.2-1.0) Pending  


 


 


Aspartate Amino Transf


(AST/SGOT) 27 U/L (15-37)


  Pending  


 


 


Alanine Aminotransferase


(ALT/SGPT) 27 U/L (12-78)


  Pending  


 


 


Alkaline Phosphatase


  69 U/L


() Pending  


 


 


Total Protein


  6.3 G/DL


(6.4-8.2)  L Pending  


 


 


Albumin


  3.1 G/DL


(3.4-5.0)  L Pending  


 


 


Globulin 3.2 g/dL   Pending  


 


Albumin/Globulin Ratio 1.0 (1.0-2.7)   


 


Thyroid Stimulating Hormone


(TSH) 


  Pending  


 








Height (Feet):  5


Height (Inches):  6.00


Weight (Pounds):  120


Medications





Current Medications








 Medications


  (Trade)  Dose


 Ordered  Sig/Violette


 Route


 PRN Reason  Start Time


 Stop Time Status Last Admin


Dose Admin


 


 Acetaminophen


  (Tylenol)  650 mg  Q4H  PRN


 ORAL


 fever  10/2/18 14:30


 11/1/18 14:29   


 


 


 Acetaminophen/


 Hydrocodone Bitart


  (Norco 5/325)  1 tab  Q6H  PRN


 ORAL


 For PAIN 1-3  10/2/18 14:30


 10/9/18 14:29  10/2/18 18:52


 


 


 Al Hydroxide/Mg


 Hydroxide


  (Mylanta II)  30 ml  Q6H  PRN


 ORAL


 dyspepsia  10/2/18 14:23


 11/1/18 14:22   


 


 


 Atenolol


  (Tenormin)  25 mg  DAILY


 ORAL


   10/3/18 09:00


 11/2/18 08:59   


 


 


 Clonazepam


  (KlonoPIN)  0.5 mg  Q6HR


 ORAL


   10/2/18 18:00


 10/9/18 17:59  10/3/18 05:47


 


 


 Dextrose


  (Dextrose 50%)  25 ml  Q30M  PRN


 IV


 Hypoglycemia  10/2/18 14:30


 11/1/18 14:25   


 


 


 Dextrose


  (Dextrose 50%)  50 ml  Q30M  PRN


 IV


 hypoglycemia  10/2/18 14:30


 11/1/18 14:29   


 


 


 Dextrose/Sodium


 Chloride  1,000 ml @ 


 50 mls/hr  Q20H


 IV


   10/2/18 14:22


 11/1/18 14:21  10/2/18 15:36


 


 


 Haloperidol


 Lactate


  (Haldol)  5 mg  Q4H  PRN


 IM


 Agitation  10/2/18 18:45


 11/1/18 18:44  10/3/18 04:36


 


 


 Heparin Sodium


  (Porcine)


  (Heparin 5000


 units/ml)  5,000 units  EVERY 12  HOURS


 SUBQ


   10/2/18 21:00


 11/1/18 20:59  10/2/18 21:00


 


 


 Lorazepam


  (Ativan 2mg/ml


 1ml)  0.5 mg  Q4H  PRN


 IV


 For Anxiety  10/2/18 14:30


 10/9/18 14:29  10/3/18 06:22


 


 


 Losartan Potassium


  (Cozaar)  25 mg  DAILY


 ORAL


   10/3/18 09:00


 11/2/18 08:59   


 


 


 Morphine Sulfate


  (Morphine


 Sulfate)  2 mg  Q4H  PRN


 IVP


 For Pain 4-6  10/2/18 14:30


 10/9/18 14:29   


 


 


 Morphine Sulfate


  (Morphine


 Sulfate)  4 mg  Q4H  PRN


 IVP


 For Pain 7-10  10/2/18 14:30


 10/9/18 14:29   


 


 


 Ondansetron HCl


  (Zofran)  4 mg  Q6H  PRN


 IVP


 Nausea & Vomiting  10/2/18 14:30


 11/1/18 14:29   


 


 


 Polyethylene


 Glycol


  (Miralax)  17 gm  HSPRN  PRN


 ORAL


 Constipation  10/2/18 14:30


 11/1/18 14:29   


 


 


 Quetiapine


 Fumarate


  (SEROquel)  25 mg  QHS


 ORAL


   10/3/18 00:30


 11/2/18 00:29  10/3/18 00:34


 


 


 Trazodone HCl


  (Desyrel)  50 mg  BEDTIME


 ORAL


   10/2/18 21:00


 11/1/18 20:59  10/2/18 20:58


 


 


 Zolpidem Tartrate


  (Ambien)  5 mg  HSPRN  PRN


 ORAL


 Insomnia  10/2/18 14:30


 10/9/18 14:29  10/2/18 22:09


 











Assessment/Plan


Assessment/Plan


(1) Right hip fracture


(2) Right hip pain


(3) S/p fall


seen dictated.











Ethan Newby Oct 3, 2018 08:16

## 2018-10-03 NOTE — INTERNAL MED PROGRESS NOTE
Subjective


Date of Service:  Oct 3, 2018


Physician Name


Cheo Ortiz


Attending Physician


Juan Carlos Magana MD





Current Medications








 Medications


  (Trade)  Dose


 Ordered  Sig/Violette


 Route


 PRN Reason  Start Time


 Stop Time Status Last Admin


Dose Admin


 


 Acetaminophen


  (Tylenol)  650 mg  Q4H  PRN


 ORAL


 fever  10/2/18 14:30


 11/1/18 14:29   


 


 


 Acetaminophen/


 Hydrocodone Bitart


  (Norco 5/325)  1 tab  Q6H  PRN


 ORAL


 For PAIN 1-3  10/2/18 14:30


 10/9/18 14:29  10/2/18 18:52


 


 


 Al Hydroxide/Mg


 Hydroxide


  (Mylanta II)  30 ml  Q6H  PRN


 ORAL


 dyspepsia  10/2/18 14:23


 11/1/18 14:22   


 


 


 Atenolol


  (Tenormin)  25 mg  DAILY


 ORAL


   10/3/18 09:00


 11/2/18 08:59  10/3/18 08:41


 


 


 Clonazepam


  (KlonoPIN)  0.5 mg  Q6HR


 ORAL


   10/2/18 18:00


 10/9/18 17:59  10/3/18 12:24


 


 


 Dextrose


  (Dextrose 50%)  25 ml  Q30M  PRN


 IV


 Hypoglycemia  10/2/18 14:30


 11/1/18 14:25   


 


 


 Dextrose


  (Dextrose 50%)  50 ml  Q30M  PRN


 IV


 hypoglycemia  10/2/18 14:30


 11/1/18 14:29   


 


 


 Dextrose/Sodium


 Chloride  1,000 ml @ 


 50 mls/hr  Q20H


 IV


   10/2/18 14:22


 11/1/18 14:21  10/3/18 08:42


 


 


 Haloperidol


 Lactate


  (Haldol)  5 mg  Q4H  PRN


 IM


 Agitation  10/2/18 18:45


 11/1/18 18:44  10/3/18 04:36


 


 


 Heparin Sodium


  (Porcine)


  (Heparin 5000


 units/ml)  5,000 units  EVERY 12  HOURS


 SUBQ


   10/2/18 21:00


 11/1/18 20:59  10/3/18 08:46


 


 


 Lorazepam


  (Ativan 2mg/ml


 1ml)  0.5 mg  Q4H  PRN


 IV


 For Anxiety  10/2/18 14:30


 10/9/18 14:29  10/3/18 06:22


 


 


 Losartan Potassium


  (Cozaar)  25 mg  DAILY


 ORAL


   10/3/18 09:00


 11/2/18 08:59  10/3/18 08:42


 


 


 Morphine Sulfate


  (Morphine


 Sulfate)  2 mg  Q4H  PRN


 IVP


 For Pain 4-6  10/2/18 14:30


 10/9/18 14:29   


 


 


 Morphine Sulfate


  (Morphine


 Sulfate)  4 mg  Q4H  PRN


 IVP


 For Pain 7-10  10/2/18 14:30


 10/9/18 14:29  10/3/18 09:38


 


 


 Ondansetron HCl


  (Zofran)  4 mg  Q6H  PRN


 IVP


 Nausea & Vomiting  10/2/18 14:30


 11/1/18 14:29   


 


 


 Polyethylene


 Glycol


  (Miralax)  17 gm  HSPRN  PRN


 ORAL


 Constipation  10/2/18 14:30


 11/1/18 14:29   


 


 


 Quetiapine


 Fumarate


  (SEROquel)  25 mg  QHS


 ORAL


   10/3/18 00:30


 11/2/18 00:29  10/3/18 00:34


 


 


 Trazodone HCl


  (Desyrel)  50 mg  BEDTIME


 ORAL


   10/2/18 21:00


 11/1/18 20:59  10/2/18 20:58


 


 


 Zolpidem Tartrate


  (Ambien)  5 mg  HSPRN  PRN


 ORAL


 Insomnia  10/2/18 14:30


 10/9/18 14:29  10/2/18 22:09


 








Allergies:  


Coded Allergies:  


     No Known Allergies (Unverified , 3/26/14)


ROS Limited/Unobtainable:  Yes


Subjective


79 YO F admitted with right hip pain; S/P fall.  Now right femoral neck 

fracture.  Cover for Int Yovanny-Dr Magana





Objective





Last Vital Signs








  Date Time  Temp Pulse Resp B/P (MAP) Pulse Ox O2 Delivery O2 Flow Rate FiO2


 


10/3/18 12:07 97.7 69 18 126/85 (99) 99   





 97.7       


 


10/3/18 09:00      Room Air  


 


10/2/18 10:35       1.0 











Laboratory Tests








Test


  10/3/18


06:10


 


White Blood Count


  9.5 K/UL


(4.8-10.8)


 


Red Blood Count


  3.50 M/UL


(4.20-5.40)  L


 


Hemoglobin


  10.0 G/DL


(12.0-16.0)  L


 


Hematocrit


  32.7 %


(37.0-47.0)  L


 


Mean Corpuscular Volume 93 FL (80-99)  


 


Mean Corpuscular Hemoglobin


  28.5 PG


(27.0-31.0)


 


Mean Corpuscular Hemoglobin


Concent 30.5 G/DL


(32.0-36.0)  L


 


Red Cell Distribution Width


  17.7 %


(11.6-14.8)  H


 


Platelet Count


  475 K/UL


(150-450)  H


 


Mean Platelet Volume


  6.7 FL


(6.5-10.1)


 


Neutrophils (%) (Auto)


  72.0 %


(45.0-75.0)


 


Lymphocytes (%) (Auto)


  20.6 %


(20.0-45.0)


 


Monocytes (%) (Auto)


  6.5 %


(1.0-10.0)


 


Eosinophils (%) (Auto)


  0.6 %


(0.0-3.0)


 


Basophils (%) (Auto)


  0.3 %


(0.0-2.0)


 


Sodium Level


  143 MMOL/L


(136-145)


 


Potassium Level


  4.2 MMOL/L


(3.5-5.1)


 


Chloride Level


  107 MMOL/L


()


 


Carbon Dioxide Level


  26 MMOL/L


(21-32)


 


Anion Gap


  10 mmol/L


(5-15)


 


Blood Urea Nitrogen


  20 mg/dL


(7-18)  H


 


Creatinine


  1.0 MG/DL


(0.55-1.30)


 


Estimat Glomerular Filtration


Rate  mL/min (>60)  


 


 


Glucose Level


  114 MG/DL


()  H


 


Calcium Level


  8.8 MG/DL


(8.5-10.1)


 


Total Bilirubin


  0.6 MG/DL


(0.2-1.0)


 


Aspartate Amino Transf


(AST/SGOT) 29 U/L (15-37)


 


 


Alanine Aminotransferase


(ALT/SGPT) 24 U/L (12-78)


 


 


Alkaline Phosphatase


  55 U/L


()


 


Total Protein


  6.1 G/DL


(6.4-8.2)  L


 


Albumin


  2.9 G/DL


(3.4-5.0)  L


 


Globulin 3.2 g/dL  


 


Albumin/Globulin Ratio


  0.9 (1.0-2.7)


L


 


Thyroid Stimulating Hormone


(TSH) 3.103 uiU/mL


(0.358-3.740)











Microbiology








 Date/Time


Source Procedure


Growth Status


 


 


 10/2/18 10:45


Urine,Clean Catch Urine Culture - Preliminary


Gram Negative Bacillus 1 Resulted

















Intake and Output  


 


 10/2/18 10/3/18





 19:00 07:00


 


Intake Total 50 ml 250 ml


 


Balance 50 ml 250 ml


 


  


 


Intake Oral 0 ml 


 


IV Total 50 ml 250 ml








Objective


PHYSICAL EXAMINATION:


GENERAL:  The patient is well-developed and well-nourished thin-appearing


white female, in no apparent distress.


HEENT:  Eyes, pupils are equal and responsive to light and accommodation.


Extraocular movements are intact.


NECK:  Supple without lymphadenopathy.


CHEST:  Lungs are clear to auscultation bilaterally without wheezes or


rales.


CARDIOVASCULAR:  Regular rhythm and rate.  S1 and S2 are normal without


murmurs, rubs, or gallops.


ABDOMEN:  Soft, nontender, and nondistended.  Positive bowel sounds.  No


evidence of hepatosplenomegaly.  Currently, no rebound or guarding


noted.


EXTREMITIES:  Negative for clubbing, cyanosis, or edema.  Pain to palp right hip


RECTAL/GENITAL:  Refused.


NEUROLOGIC:  Cranial nerves II through XII are grossly intact without focal


deficits.  Motor strength is 5/5 bilaterally.  Deep tendon reflexes are 2+


plantar.





Assessment/Plan


Problem List:  


(1) Hypercholesterolemia


(2) CAD (coronary artery disease)


Assessment & Plan:  see dardiology note





(3) Fracture of femoral neck, right, closed


Assessment & Plan:  Surgery on Thurs 10/4/18-see ortho note.





(4) HTN (hypertension)


(5) Sick sinus syndrome


Assessment & Plan:  S/P pacemaker





(6) Pacemaker


Status:  not improved











Cheo Ortiz MD Oct 3, 2018 12:37

## 2018-10-03 NOTE — CONSULTATION
History of Present Illness


General


Date patient seen:  Oct 3, 2018


Chief Complaint:  Pain





Present Illness


HPI


78-year-old [place]-speaking female, presents with chief complaint of right hip 

pain. The pt is having waxing and waning of consciousness. the pt has episodes 

of agitation


Allergies:  


Coded Allergies:  


     No Known Allergies (Unverified , 3/26/14)





Medication History


Scheduled


Amoxicillin/Potassium Clav 875-125* (Augmentin 875-125 Tablet*), 1 TAB ORAL 

TWICE A DAY


Aspirin (Aspirin EC), 81 MG ORAL DAILY, (Reported)


Atenolol (Tenormin), 25 MG ORAL DAILY, (Reported)


Clonazepam* (Klonopin*), 0.5 MG ORAL Q6H, (Reported)


Clonidine Hcl* (Catapres*), 0.1 MG ORAL EVERY 8 HOURS, (Reported)


Clopidogrel* (Clopidogrel*), 75 MG ORAL DAILY, (Reported)


Dicyclomine Hcl* (Dicyclomine Hcl*), 10 MG PO QID


Famotidine (Pepcid), 20 MG ORAL BEDTIME


Furosemide* (Lasix*), 40 MG ORAL DAILY, (Reported)


Losartan Potassium* (Losartan Potassium*), 25 MG ORAL DAILY, (Reported)


Metoprolol Succinate* (Metoprolol Succinate*), 50 MG ORAL DAILY, (Reported)


No Known Medications* (NKM - No Known Medications*), 0 ., (Reported)


Potassium Chloride (Potassium Chloride), 10 MEQ ORAL DAILY, (Reported)


Rosuvastatin Calcium* (Crestor*), 10 MG ORAL DAILY, (Reported)


Sitagliptin Phos/Metformin Hcl (Janumet Xr 50-1,000 Mg Tablet), 1 TAB ORAL DAILY

, (Reported)


Trazodone* (Trazodone*), 50 MG ORAL BEDTIME, (Reported)


Vitamin D (Vitamin D3), 5,000 UNITS ORAL DAILY, (Reported)





Scheduled PRN


Hydrocodone Bit/Acetaminophen 5-325* (Norco 5-325*), 1 TAB ORAL Q6H PRN for For 

Pain





Miscellaneous Medications


Unable to Obtain Medications (Unable To Obtain Meds), (Reported)





Patient History


Limited by:  medical condition


History Provided By:  Patient, Medical Record


Healthcare decision maker





Resuscitation status


Full Code


Advanced Directive on File








Past Medical/Surgical History


Past Medical/Surgical History:  


(1) Delirium


(2) Acute on chronic systolic CHF (congestive heart failure)


(3) Colitis


(4) Abdominal pain


(5) right arm contusion


(6) encephalopathy due to 


(7) Diabetes mellitus, type II


(8) Hypercholesteremia


(9) Closed fracture of symphysis pubis


(10) Fracture of pubic ramus


(11) Psychosis


(12) DVT of left axillary vein, acute


(13) Hip fracture, right


(14) Sick sinus syndrome


(15) CAD (coronary artery disease)


(16) Hypercholesterolemia


(17) HTN (hypertension)


(18) Pacemaker


(19) Fracture of femoral neck, right, closed





Review of Systems


Psychiatric:  Reports: prior hx, anxiety





Physical Exam


General Appearance:  no apparent distress, alert, agitated


Neurologic:  depressed affect





Last 24 Hour Vital Signs








  Date Time  Temp Pulse Resp B/P (MAP) Pulse Ox O2 Delivery O2 Flow Rate FiO2


 


10/3/18 12:07 97.7 69 18 126/85 (99) 99   





 97.7       


 


10/3/18 10:08 97.7       


 


10/3/18 09:00      Room Air  


 


10/3/18 08:42    116/60    


 


10/3/18 08:41  93  116/60    


 


10/3/18 08:00 97.7 93 21 116/60 (78) 93   





 97.7       


 


10/3/18 04:00 98.4 75 18 113/61 (78) 95   





 98.4       


 


10/3/18 00:00 98.2 87 18 130/62 (84) 99   





 98.2       


 


10/2/18 21:00      Room Air  


 


10/2/18 20:00 99.3 81 19 142/84 (103) 97   





 99.3       


 


10/2/18 18:52 98.1       


 


10/2/18 18:36 98.1 77 18 148/60 (89) 98   





 98.1       


 


10/2/18 15:46      Room Air  


 


10/2/18 15:40 97.8 77 18 138/78 (98) 95   





 97.8       


 


10/2/18 15:15 98.1 73 16 144/88 97 Room Air  


 


10/2/18 15:14 98.1 73 18 142/88 97 Room Air  





 98.1       

















Intake and Output  


 


 10/2/18 10/3/18





 19:00 07:00


 


Intake Total 50 ml 250 ml


 


Balance 50 ml 250 ml


 


  


 


Intake Oral 0 ml 


 


IV Total 50 ml 250 ml











Laboratory Tests








Test


  10/3/18


06:10


 


White Blood Count


  9.5 K/UL


(4.8-10.8)


 


Red Blood Count


  3.50 M/UL


(4.20-5.40)  L


 


Hemoglobin


  10.0 G/DL


(12.0-16.0)  L


 


Hematocrit


  32.7 %


(37.0-47.0)  L


 


Mean Corpuscular Volume 93 FL (80-99)  


 


Mean Corpuscular Hemoglobin


  28.5 PG


(27.0-31.0)


 


Mean Corpuscular Hemoglobin


Concent 30.5 G/DL


(32.0-36.0)  L


 


Red Cell Distribution Width


  17.7 %


(11.6-14.8)  H


 


Platelet Count


  475 K/UL


(150-450)  H


 


Mean Platelet Volume


  6.7 FL


(6.5-10.1)


 


Neutrophils (%) (Auto)


  72.0 %


(45.0-75.0)


 


Lymphocytes (%) (Auto)


  20.6 %


(20.0-45.0)


 


Monocytes (%) (Auto)


  6.5 %


(1.0-10.0)


 


Eosinophils (%) (Auto)


  0.6 %


(0.0-3.0)


 


Basophils (%) (Auto)


  0.3 %


(0.0-2.0)


 


Sodium Level


  143 MMOL/L


(136-145)


 


Potassium Level


  4.2 MMOL/L


(3.5-5.1)


 


Chloride Level


  107 MMOL/L


()


 


Carbon Dioxide Level


  26 MMOL/L


(21-32)


 


Anion Gap


  10 mmol/L


(5-15)


 


Blood Urea Nitrogen


  20 mg/dL


(7-18)  H


 


Creatinine


  1.0 MG/DL


(0.55-1.30)


 


Estimat Glomerular Filtration


Rate  mL/min (>60)  


 


 


Glucose Level


  114 MG/DL


()  H


 


Calcium Level


  8.8 MG/DL


(8.5-10.1)


 


Total Bilirubin


  0.6 MG/DL


(0.2-1.0)


 


Aspartate Amino Transf


(AST/SGOT) 29 U/L (15-37)


 


 


Alanine Aminotransferase


(ALT/SGPT) 24 U/L (12-78)


 


 


Alkaline Phosphatase


  55 U/L


()


 


Total Protein


  6.1 G/DL


(6.4-8.2)  L


 


Albumin


  2.9 G/DL


(3.4-5.0)  L


 


Globulin 3.2 g/dL  


 


Albumin/Globulin Ratio


  0.9 (1.0-2.7)


L


 


Thyroid Stimulating Hormone


(TSH) 3.103 uiU/mL


(0.358-3.740)








Height (Feet):  5


Height (Inches):  6.00


Weight (Pounds):  120


Medications





Current Medications








 Medications


  (Trade)  Dose


 Ordered  Sig/Violette


 Route


 PRN Reason  Start Time


 Stop Time Status Last Admin


Dose Admin


 


 Acetaminophen


  (Tylenol)  650 mg  Q4H  PRN


 ORAL


 fever  10/2/18 14:30


 11/1/18 14:29   


 


 


 Acetaminophen/


 Hydrocodone Bitart


  (Norco 5/325)  1 tab  Q6H  PRN


 ORAL


 For PAIN 1-3  10/2/18 14:30


 10/9/18 14:29  10/2/18 18:52


 


 


 Al Hydroxide/Mg


 Hydroxide


  (Mylanta II)  30 ml  Q6H  PRN


 ORAL


 dyspepsia  10/2/18 14:23


 11/1/18 14:22   


 


 


 Atenolol


  (Tenormin)  25 mg  DAILY


 ORAL


   10/3/18 09:00


 11/2/18 08:59  10/3/18 08:41


 


 


 Clonazepam


  (KlonoPIN)  0.5 mg  Q6HR


 ORAL


   10/2/18 18:00


 10/9/18 17:59  10/3/18 12:24


 


 


 Dextrose


  (Dextrose 50%)  25 ml  Q30M  PRN


 IV


 Hypoglycemia  10/2/18 14:30


 11/1/18 14:25   


 


 


 Dextrose


  (Dextrose 50%)  50 ml  Q30M  PRN


 IV


 hypoglycemia  10/2/18 14:30


 11/1/18 14:29   


 


 


 Dextrose/Sodium


 Chloride  1,000 ml @ 


 50 mls/hr  Q20H


 IV


   10/2/18 14:22


 11/1/18 14:21  10/3/18 08:42


 


 


 Haloperidol


 Lactate


  (Haldol)  5 mg  Q4H  PRN


 IM


 Agitation  10/2/18 18:45


 11/1/18 18:44  10/3/18 04:36


 


 


 Heparin Sodium


  (Porcine)


  (Heparin 5000


 units/ml)  5,000 units  EVERY 12  HOURS


 SUBQ


   10/2/18 21:00


 11/1/18 20:59  10/3/18 08:46


 


 


 Lorazepam


  (Ativan 2mg/ml


 1ml)  0.5 mg  Q4H  PRN


 IV


 For Anxiety  10/2/18 14:30


 10/9/18 14:29  10/3/18 06:22


 


 


 Losartan Potassium


  (Cozaar)  25 mg  DAILY


 ORAL


   10/3/18 09:00


 11/2/18 08:59  10/3/18 08:42


 


 


 Morphine Sulfate


  (Morphine


 Sulfate)  2 mg  Q4H  PRN


 IVP


 For Pain 4-6  10/2/18 14:30


 10/9/18 14:29   


 


 


 Morphine Sulfate


  (Morphine


 Sulfate)  4 mg  Q4H  PRN


 IVP


 For Pain 7-10  10/2/18 14:30


 10/9/18 14:29  10/3/18 09:38


 


 


 Ondansetron HCl


  (Zofran)  4 mg  Q6H  PRN


 IVP


 Nausea & Vomiting  10/2/18 14:30


 11/1/18 14:29   


 


 


 Polyethylene


 Glycol


  (Miralax)  17 gm  HSPRN  PRN


 ORAL


 Constipation  10/2/18 14:30


 11/1/18 14:29   


 


 


 Quetiapine


 Fumarate


  (SEROquel)  25 mg  QHS


 ORAL


   10/3/18 00:30


 11/2/18 00:29  10/3/18 00:34


 


 


 Trazodone HCl


  (Desyrel)  50 mg  BEDTIME


 ORAL


   10/2/18 21:00


 11/1/18 20:59  10/2/18 20:58


 


 


 Zolpidem Tartrate


  (Ambien)  5 mg  HSPRN  PRN


 ORAL


 Insomnia  10/2/18 14:30


 10/9/18 14:29  10/2/18 22:09


 











Assessment/Plan


Status:  stable


Status Narrative


encephalopathy due to metabolic condition





-seroquel prn


-provided keyonna/Josh Burgos MD Oct 3, 2018 14:34

## 2018-10-03 NOTE — HISTORY AND PHYSICAL REPORT
DATE OF ADMISSION:  10/02/2018



CHIEF COMPLAINT:  The patient is an 78-year-old Stateless-speaking female,

presents with chief complaint of right hip pain.



HISTORY OF PRESENT ILLNESS:  The patient was admitted to [facility] from 09/16/2018 to 09/22/2018.  The patient was status post non-ST

elevated myocardial infarction.  The patient also had congestive heart

failure.  Please see history and physical and discharge summary dictated

at that time.



The patient has been falling since she returned home.  The patient

presented to [facility] emergency room after being found on the floor.  The

patient was complaining of right hip pain.  A CT scan of the right hip

revealed comminuted fracture of the right femoral neck.  The patient is

admitted for right femoral neck fracture.



PAST MEDICAL HISTORY:  Significant for:



1. Diabetes, type 2.

2. Hypertension.

3. Sick sinus syndrome.

4. Hypercholesterolemia.

5. Non ST elevated myocardial infarction as above.



PAST SURGICAL HISTORY:  Significant for:



1. Coronary artery bypass graft in 2017.

2. Pacemaker generator change in 2017.

3. History of elbow debridement.

4. Pacemaker implantation in 2014.



CURRENT MEDICATIONS:

1. Aspirin 81 mg p.o. daily.

2. Atenolol 25 mg p.o. daily.

3. Klonopin 0.5 mg p.o. q.6 h. p.r.n.

4. Clonidine 0.1 mg p.o. q.8 h.

5. Clopidogrel 75 mg p.o. daily.

6. Dicyclomine 10 mg p.o. four times daily.

7. Pepcid 20 mg p.o. daily.

8. Furosemide 40 mg p.o. daily.

9. Norco 5/325 mg one tablet p.o. q.6 h.

10. Losartan 25 mg p.o. daily.

11. Metoprolol 50 mg p.o. daily.

12. Potassium chloride 10 mEq p.o. daily.

13. Crestor 10 mg p.o. daily.

14. Janumet 50/1000 one tablet p.o. daily.

15. Trazodone 50 mg p.o. at bedtime.

16. Vitamin D 400 mg one tablet p.o. daily.



ALLERGIES:  No known drug allergies.



SOCIAL HISTORY:  The patient is  and lives with her .  The

patient denies tobacco or alcohol use.



REVIEW OF SYSTEMS:  CONSTITUTIONAL:  The patient denies weight loss or

weight gain.  The patient denies fevers or chills.  HEENT:  The patient

denies ear or throat pain.  The patient denies headache.  CARDIOVASCULAR:

The patient denies palpitations or chest pain.  CHEST:  The patient denies

wheezing or shortness of breath.  ABDOMINAL:  The patient denies nausea,

vomiting, diarrhea, or constipation.  GENITOURINARY:  The patient denies

dysuria or increased frequency of urination.  NEUROMUSCULAR:  The patient

complains of right hip pain as above.  The patient denies seizures or

generalized weakness.



PHYSICAL EXAMINATION:

VITAL SIGNS:  Temperature 98.0 degrees, respirations 18, pulse 73, and

blood pressure 142/80.

GENERAL:  The patient is well-developed and well-nourished thin-appearing

white female, in no apparent distress.

HEENT:  Eyes, pupils are equal and responsive to light and accommodation.

Extraocular movements are intact.

NECK:  Supple without lymphadenopathy.

CHEST:  Lungs are clear to auscultation bilaterally without wheezes or

rales.

CARDIOVASCULAR:  Regular rhythm and rate.  S1 and S2 are normal without

murmurs, rubs, or gallops.

ABDOMEN:  Soft, nontender, and nondistended.  Positive bowel sounds.  No

evidence of hepatosplenomegaly.  Currently, no rebound or guarding

noted.

EXTREMITIES:  Negative for clubbing, cyanosis, or edema.

RECTAL/GENITAL:  Refused.

NEUROLOGIC:  Cranial nerves II through XII are grossly intact without focal

deficits.  Motor strength is 5/5 bilaterally.  Deep tendon reflexes are 2+

plantar.



LABORATORY STUDIES:  WBC 10.5, hemoglobin 9.5, hematocrit 30.9, and

platelets 442,000.  Sodium 141, potassium 4.4, chloride 106, CO2 26, BUN

20, creatinine 1.1, and glucose 89.  CT of the pelvis revealed a

comminuted fracture of the right femoral neck.



ASSESSMENT:  This is an 78-year-old white female.



1. Comminuted fracture of the right femoral neck.

2. Coronary artery disease.

3. Hypertension.

4. Sick sinus syndrome.

5. Hypercholesterolemia.

6. Diabetes, type 2.

7. Pacemaker.



TREATMENT:

1. Right femoral neck fracture.  An Orthopedic consultation has been

obtained with Dr. Juan Antonio Bella.  The patient will require surgery.  We will

follow recommendations of Orthopedic Surgery.

2. Coronary artery disease.  A Cardiology consultation has been obtained

with Dr. Tong.  The patient is to follow as an outpatient by Dr. Tong.

3. Diabetes type 2.  A regular insulin sliding scale has been

instituted.

4. Hypertension.  Continue clonidine, metoprolol, and losartan as

above.

5. Sick sinus syndrome.

6. Hypercholesterolemia.  The patient is status post pacemaker

implantation.

7. Hypercholesterolemia.  Continue Crestor as above.

8. Pacemaker in situ.









  ______________________________________________

  Cheo Ortiz M.D.





DR:  ROSI

D:  10/02/2018 18:40

T:  10/03/2018 01:35

JOB#:  [id number]

CC:

## 2018-10-03 NOTE — CONSULTATION
History of Present Illness


General


Date patient seen:  Oct 3, 2018


Chief Complaint:  Pain





Present Illness


HPI


78 year old female with hx of pacemaker, psychosis, DM, CAD presented to ER 

with CC of  multiple falls over the past 3 weeks.  She states that she has 

generalized weakness and pain in her right hip.  She states that she has been 

unable to walk and keeps falling at home.  She complains of pain in her right 

hip and inability to walk for the past week.  she was diagnosed to have hip 

fracture and admitted for further work up.


Allergies:  


Coded Allergies:  


     No Known Allergies (Unverified , 3/26/14)





Medication History


Scheduled


Amoxicillin/Potassium Clav 875-125* (Augmentin 875-125 Tablet*), 1 TAB ORAL 

TWICE A DAY


Aspirin (Aspirin EC), 81 MG ORAL DAILY, (Reported)


Atenolol (Tenormin), 25 MG ORAL DAILY, (Reported)


Clonazepam* (Klonopin*), 0.5 MG ORAL Q6H, (Reported)


Clonidine Hcl* (Catapres*), 0.1 MG ORAL EVERY 8 HOURS, (Reported)


Clopidogrel* (Clopidogrel*), 75 MG ORAL DAILY, (Reported)


Dicyclomine Hcl* (Dicyclomine Hcl*), 10 MG PO QID


Famotidine (Pepcid), 20 MG ORAL BEDTIME


Furosemide* (Lasix*), 40 MG ORAL DAILY, (Reported)


Losartan Potassium* (Losartan Potassium*), 25 MG ORAL DAILY, (Reported)


Metoprolol Succinate* (Metoprolol Succinate*), 50 MG ORAL DAILY, (Reported)


No Known Medications* (NKM - No Known Medications*), 0 ., (Reported)


Potassium Chloride (Potassium Chloride), 10 MEQ ORAL DAILY, (Reported)


Rosuvastatin Calcium* (Crestor*), 10 MG ORAL DAILY, (Reported)


Sitagliptin Phos/Metformin Hcl (Janumet Xr 50-1,000 Mg Tablet), 1 TAB ORAL DAILY

, (Reported)


Trazodone* (Trazodone*), 50 MG ORAL BEDTIME, (Reported)


Vitamin D (Vitamin D3), 5,000 UNITS ORAL DAILY, (Reported)





Scheduled PRN


Hydrocodone Bit/Acetaminophen 5-325* (Norco 5-325*), 1 TAB ORAL Q6H PRN for For 

Pain





Miscellaneous Medications


Unable to Obtain Medications (Unable To Obtain Meds), (Reported)





Patient History


Healthcare decision maker





Resuscitation status


Full Code


Advanced Directive on File








Past Medical/Surgical History


Past Medical/Surgical History:  


(1) Pacemaker


(2) HTN (hypertension)


(3) CAD (coronary artery disease)


(4) Sick sinus syndrome


(5) Psychosis


(6) Diabetes mellitus, type II





Review of Systems


All Other Systems:  negative except mentioned in HPI





Physical Exam


General Appearance:  cachetic


Lines, tubes and drains:  peripheral


HEENT:  normocephalic, atraumatic


Neck:  non-tender, normal alignment


Respiratory/Chest:  chest wall non-tender, lungs clear


Breasts:  no masses


Cardiovascular/Chest:  normal peripheral pulses


Abdomen:  normal bowel sounds, non tender


Genitourinary/Rectal:  normal genital exam


Extremities:  normal range of motion


Skin Exam:  normal pigmentation





Last 24 Hour Vital Signs








  Date Time  Temp Pulse Resp B/P (MAP) Pulse Ox O2 Delivery O2 Flow Rate FiO2


 


10/3/18 12:07 97.7 69 18 126/85 (99) 99   





 97.7       


 


10/3/18 10:08 97.7       


 


10/3/18 09:00      Room Air  


 


10/3/18 08:42    116/60    


 


10/3/18 08:41  93  116/60    


 


10/3/18 08:00 97.7 93 21 116/60 (78) 93   





 97.7       


 


10/3/18 04:00 98.4 75 18 113/61 (78) 95   





 98.4       


 


10/3/18 00:00 98.2 87 18 130/62 (84) 99   





 98.2       


 


10/2/18 21:00      Room Air  


 


10/2/18 20:00 99.3 81 19 142/84 (103) 97   





 99.3       


 


10/2/18 18:52 98.1       


 


10/2/18 18:36 98.1 77 18 148/60 (89) 98   





 98.1       


 


10/2/18 15:46      Room Air  


 


10/2/18 15:40 97.8 77 18 138/78 (98) 95   





 97.8       


 


10/2/18 15:15 98.1 73 16 144/88 97 Room Air  


 


10/2/18 15:14 98.1 73 18 142/88 97 Room Air  





 98.1       

















Intake and Output  


 


 10/2/18 10/3/18





 19:00 07:00


 


Intake Total 50 ml 250 ml


 


Balance 50 ml 250 ml


 


  


 


Intake Oral 0 ml 


 


IV Total 50 ml 250 ml











Laboratory Tests








Test


  10/3/18


06:10


 


White Blood Count


  9.5 K/UL


(4.8-10.8)


 


Red Blood Count


  3.50 M/UL


(4.20-5.40)  L


 


Hemoglobin


  10.0 G/DL


(12.0-16.0)  L


 


Hematocrit


  32.7 %


(37.0-47.0)  L


 


Mean Corpuscular Volume 93 FL (80-99)  


 


Mean Corpuscular Hemoglobin


  28.5 PG


(27.0-31.0)


 


Mean Corpuscular Hemoglobin


Concent 30.5 G/DL


(32.0-36.0)  L


 


Red Cell Distribution Width


  17.7 %


(11.6-14.8)  H


 


Platelet Count


  475 K/UL


(150-450)  H


 


Mean Platelet Volume


  6.7 FL


(6.5-10.1)


 


Neutrophils (%) (Auto)


  72.0 %


(45.0-75.0)


 


Lymphocytes (%) (Auto)


  20.6 %


(20.0-45.0)


 


Monocytes (%) (Auto)


  6.5 %


(1.0-10.0)


 


Eosinophils (%) (Auto)


  0.6 %


(0.0-3.0)


 


Basophils (%) (Auto)


  0.3 %


(0.0-2.0)


 


Sodium Level


  143 MMOL/L


(136-145)


 


Potassium Level


  4.2 MMOL/L


(3.5-5.1)


 


Chloride Level


  107 MMOL/L


()


 


Carbon Dioxide Level


  26 MMOL/L


(21-32)


 


Anion Gap


  10 mmol/L


(5-15)


 


Blood Urea Nitrogen


  20 mg/dL


(7-18)  H


 


Creatinine


  1.0 MG/DL


(0.55-1.30)


 


Estimat Glomerular Filtration


Rate  mL/min (>60)  


 


 


Glucose Level


  114 MG/DL


()  H


 


Calcium Level


  8.8 MG/DL


(8.5-10.1)


 


Total Bilirubin


  0.6 MG/DL


(0.2-1.0)


 


Aspartate Amino Transf


(AST/SGOT) 29 U/L (15-37)


 


 


Alanine Aminotransferase


(ALT/SGPT) 24 U/L (12-78)


 


 


Alkaline Phosphatase


  55 U/L


()


 


Total Protein


  6.1 G/DL


(6.4-8.2)  L


 


Albumin


  2.9 G/DL


(3.4-5.0)  L


 


Globulin 3.2 g/dL  


 


Albumin/Globulin Ratio


  0.9 (1.0-2.7)


L


 


Thyroid Stimulating Hormone


(TSH) 3.103 uiU/mL


(0.358-3.740)








Height (Feet):  5


Height (Inches):  6.00


Weight (Pounds):  120


Medications





Current Medications








 Medications


  (Trade)  Dose


 Ordered  Sig/Violette


 Route


 PRN Reason  Start Time


 Stop Time Status Last Admin


Dose Admin


 


 Acetaminophen


  (Tylenol)  650 mg  Q4H  PRN


 ORAL


 fever  10/2/18 14:30


 11/1/18 14:29   


 


 


 Acetaminophen/


 Hydrocodone Bitart


  (Norco 5/325)  1 tab  Q6H  PRN


 ORAL


 For PAIN 1-3  10/2/18 14:30


 10/9/18 14:29  10/2/18 18:52


 


 


 Al Hydroxide/Mg


 Hydroxide


  (Mylanta II)  30 ml  Q6H  PRN


 ORAL


 dyspepsia  10/2/18 14:23


 11/1/18 14:22   


 


 


 Atenolol


  (Tenormin)  25 mg  DAILY


 ORAL


   10/3/18 09:00


 11/2/18 08:59  10/3/18 08:41


 


 


 Clonazepam


  (KlonoPIN)  0.5 mg  Q6HR


 ORAL


   10/2/18 18:00


 10/9/18 17:59  10/3/18 12:24


 


 


 Dextrose


  (Dextrose 50%)  25 ml  Q30M  PRN


 IV


 Hypoglycemia  10/2/18 14:30


 11/1/18 14:25   


 


 


 Dextrose


  (Dextrose 50%)  50 ml  Q30M  PRN


 IV


 hypoglycemia  10/2/18 14:30


 11/1/18 14:29   


 


 


 Dextrose/Sodium


 Chloride  1,000 ml @ 


 50 mls/hr  Q20H


 IV


   10/2/18 14:22


 11/1/18 14:21  10/3/18 08:42


 


 


 Haloperidol


 Lactate


  (Haldol)  5 mg  Q4H  PRN


 IM


 Agitation  10/2/18 18:45


 11/1/18 18:44  10/3/18 04:36


 


 


 Heparin Sodium


  (Porcine)


  (Heparin 5000


 units/ml)  5,000 units  EVERY 12  HOURS


 SUBQ


   10/2/18 21:00


 11/1/18 20:59  10/3/18 08:46


 


 


 Lorazepam


  (Ativan 2mg/ml


 1ml)  0.5 mg  Q4H  PRN


 IV


 For Anxiety  10/2/18 14:30


 10/9/18 14:29  10/3/18 06:22


 


 


 Losartan Potassium


  (Cozaar)  25 mg  DAILY


 ORAL


   10/3/18 09:00


 11/2/18 08:59  10/3/18 08:42


 


 


 Morphine Sulfate


  (Morphine


 Sulfate)  2 mg  Q4H  PRN


 IVP


 For Pain 4-6  10/2/18 14:30


 10/9/18 14:29   


 


 


 Morphine Sulfate


  (Morphine


 Sulfate)  4 mg  Q4H  PRN


 IVP


 For Pain 7-10  10/2/18 14:30


 10/9/18 14:29  10/3/18 09:38


 


 


 Ondansetron HCl


  (Zofran)  4 mg  Q6H  PRN


 IVP


 Nausea & Vomiting  10/2/18 14:30


 11/1/18 14:29   


 


 


 Polyethylene


 Glycol


  (Miralax)  17 gm  HSPRN  PRN


 ORAL


 Constipation  10/2/18 14:30


 11/1/18 14:29   


 


 


 Quetiapine


 Fumarate


  (SEROquel)  25 mg  QHS


 ORAL


   10/3/18 00:30


 11/2/18 00:29  10/3/18 00:34


 


 


 Trazodone HCl


  (Desyrel)  50 mg  BEDTIME


 ORAL


   10/2/18 21:00


 11/1/18 20:59  10/2/18 20:58


 


 


 Zolpidem Tartrate


  (Ambien)  5 mg  HSPRN  PRN


 ORAL


 Insomnia  10/2/18 14:30


 10/9/18 14:29  10/2/18 22:09


 











Assessment/Plan


Problem List:  


(1) Fracture of femoral neck, right, closed


ICD Codes:  S72.001A - Fracture of unspecified part of neck of right femur, 

initial encounter for closed fracture


SNOMED:  482089983


(2) Pacemaker


ICD Codes:  Z95.0 - Presence of cardiac pacemaker


SNOMED:  315626764


(3) HTN (hypertension)


ICD Codes:  I10 - Essential (primary) hypertension


SNOMED:  63520679


(4) CAD (coronary artery disease)


ICD Codes:  I25.10 - Atherosclerotic heart disease of native coronary artery 

without angina pectoris


SNOMED:  68755398


(5) Sick sinus syndrome


ICD Codes:  I49.5 - Sick sinus syndrome


SNOMED:  43926313


(6) Hip fracture, right


ICD Codes:  S72.001A - Fracture of unspecified part of neck of right femur, 

initial encounter for closed fracture


SNOMED:  156683725


(7) Hypercholesterolemia


ICD Codes:  E78.00 - Pure hypercholesterolemia, unspecified


SNOMED:  54072735


Assessment/Plan


surgical evaluation


symptomatic treatment


pain management


cxr, Echo for cardiac evaluation


sliding scale, diabetic diet


dvt prophylaxis.











Korin Junior MD Oct 3, 2018 14:21

## 2018-10-04 VITALS — DIASTOLIC BLOOD PRESSURE: 62 MMHG | SYSTOLIC BLOOD PRESSURE: 130 MMHG

## 2018-10-04 VITALS — DIASTOLIC BLOOD PRESSURE: 80 MMHG | SYSTOLIC BLOOD PRESSURE: 154 MMHG

## 2018-10-04 VITALS — DIASTOLIC BLOOD PRESSURE: 58 MMHG | SYSTOLIC BLOOD PRESSURE: 118 MMHG

## 2018-10-04 VITALS — DIASTOLIC BLOOD PRESSURE: 56 MMHG | SYSTOLIC BLOOD PRESSURE: 121 MMHG

## 2018-10-04 VITALS — SYSTOLIC BLOOD PRESSURE: 115 MMHG | DIASTOLIC BLOOD PRESSURE: 55 MMHG

## 2018-10-04 VITALS — DIASTOLIC BLOOD PRESSURE: 71 MMHG | SYSTOLIC BLOOD PRESSURE: 142 MMHG

## 2018-10-04 VITALS — SYSTOLIC BLOOD PRESSURE: 157 MMHG | DIASTOLIC BLOOD PRESSURE: 91 MMHG

## 2018-10-04 VITALS — SYSTOLIC BLOOD PRESSURE: 152 MMHG | DIASTOLIC BLOOD PRESSURE: 70 MMHG

## 2018-10-04 VITALS — DIASTOLIC BLOOD PRESSURE: 54 MMHG | SYSTOLIC BLOOD PRESSURE: 114 MMHG

## 2018-10-04 VITALS — DIASTOLIC BLOOD PRESSURE: 73 MMHG | SYSTOLIC BLOOD PRESSURE: 148 MMHG

## 2018-10-04 VITALS — DIASTOLIC BLOOD PRESSURE: 65 MMHG | SYSTOLIC BLOOD PRESSURE: 127 MMHG

## 2018-10-04 VITALS — SYSTOLIC BLOOD PRESSURE: 118 MMHG | DIASTOLIC BLOOD PRESSURE: 56 MMHG

## 2018-10-04 VITALS — SYSTOLIC BLOOD PRESSURE: 155 MMHG | DIASTOLIC BLOOD PRESSURE: 75 MMHG

## 2018-10-04 LAB
ADD MANUAL DIFF: NO
ANION GAP SERPL CALC-SCNC: 8 MMOL/L (ref 5–15)
BASOPHILS NFR BLD AUTO: 0.3 % (ref 0–2)
BUN SERPL-MCNC: 12 MG/DL (ref 7–18)
CALCIUM SERPL-MCNC: 8.7 MG/DL (ref 8.5–10.1)
CHLORIDE SERPL-SCNC: 108 MMOL/L (ref 98–107)
CO2 SERPL-SCNC: 28 MMOL/L (ref 21–32)
CREAT SERPL-MCNC: 0.8 MG/DL (ref 0.55–1.3)
EOSINOPHIL NFR BLD AUTO: 0.7 % (ref 0–3)
ERYTHROCYTE [DISTWIDTH] IN BLOOD BY AUTOMATED COUNT: 17.1 % (ref 11.6–14.8)
HCT VFR BLD CALC: 30.7 % (ref 37–47)
HGB BLD-MCNC: 9.6 G/DL (ref 12–16)
LYMPHOCYTES NFR BLD AUTO: 14.2 % (ref 20–45)
MCV RBC AUTO: 93 FL (ref 80–99)
MONOCYTES NFR BLD AUTO: 7.8 % (ref 1–10)
NEUTROPHILS NFR BLD AUTO: 76.9 % (ref 45–75)
PLATELET # BLD: 453 K/UL (ref 150–450)
POTASSIUM SERPL-SCNC: 3.8 MMOL/L (ref 3.5–5.1)
RBC # BLD AUTO: 3.31 M/UL (ref 4.2–5.4)
SODIUM SERPL-SCNC: 143 MMOL/L (ref 136–145)
WBC # BLD AUTO: 8 K/UL (ref 4.8–10.8)

## 2018-10-04 PROCEDURE — 0SRR0JZ REPLACEMENT OF RIGHT HIP JOINT, FEMORAL SURFACE WITH SYNTHETIC SUBSTITUTE, OPEN APPROACH: ICD-10-PCS

## 2018-10-04 RX ADMIN — MORPHINE SULFATE PRN MG: 4 INJECTION INTRAVENOUS at 05:40

## 2018-10-04 RX ADMIN — HEPARIN SODIUM SCH UNITS: 5000 INJECTION INTRAVENOUS; SUBCUTANEOUS at 09:00

## 2018-10-04 RX ADMIN — DEXTROSE, SODIUM CHLORIDE, AND POTASSIUM CHLORIDE SCH MLS/HR: 5; .45; .15 INJECTION INTRAVENOUS at 22:25

## 2018-10-04 RX ADMIN — TRAZODONE HYDROCHLORIDE SCH MG: 50 TABLET ORAL at 22:02

## 2018-10-04 RX ADMIN — ZOLPIDEM TARTRATE PRN MG: 5 TABLET ORAL at 00:14

## 2018-10-04 RX ADMIN — CLONAZEPAM SCH MG: 0.5 TABLET ORAL at 00:14

## 2018-10-04 RX ADMIN — LORAZEPAM PRN MG: 2 INJECTION, SOLUTION INTRAMUSCULAR; INTRAVENOUS at 12:57

## 2018-10-04 RX ADMIN — HEPARIN SODIUM SCH UNITS: 5000 INJECTION INTRAVENOUS; SUBCUTANEOUS at 22:03

## 2018-10-04 RX ADMIN — LOSARTAN POTASSIUM SCH MG: 25 TABLET, FILM COATED ORAL at 09:00

## 2018-10-04 RX ADMIN — CLONAZEPAM SCH MG: 0.5 TABLET ORAL at 22:04

## 2018-10-04 RX ADMIN — DOCUSATE SODIUM SCH MG: 100 CAPSULE, LIQUID FILLED ORAL at 22:02

## 2018-10-04 RX ADMIN — ATENOLOL SCH MG: 25 TABLET ORAL at 09:00

## 2018-10-04 RX ADMIN — CLONAZEPAM SCH MG: 0.5 TABLET ORAL at 05:39

## 2018-10-04 RX ADMIN — CLONAZEPAM SCH MG: 0.5 TABLET ORAL at 12:00

## 2018-10-04 NOTE — INTERNAL MED PROGRESS NOTE
Subjective


Date of Service:  Oct 4, 2018


Physician Name


Cheo Ortiz


Attending Physician


Juan Carlos Magana MD





Current Medications








 Medications


  (Trade)  Dose


 Ordered  Sig/Violette


 Route


 PRN Reason  Start Time


 Stop Time Status Last Admin


Dose Admin


 


 Acetaminophen


  (Tylenol)  650 mg  Q4H  PRN


 ORAL


 fever  10/2/18 14:30


 11/1/18 14:29   


 


 


 Acetaminophen/


 Hydrocodone Bitart


  (Norco 5/325)  1 tab  Q1H  PRN


 ORAL


 Mild Pain (Pain Scale 1-3)  10/4/18 17:15


 10/4/18 20:00   


 


 


 Acetaminophen/


 Hydrocodone Bitart


  (Norco 5/325)  1 tab  Q6H  PRN


 ORAL


 For PAIN 1-3  10/2/18 14:30


 10/9/18 14:29  10/2/18 18:52


 


 


 Acetaminophen/


 Hydrocodone Bitart


  (Norco 5/325)  2 tab  Q6H  PRN


 ORAL


 Severe Pain (Pain Scale 7-10)  10/4/18 17:30


 10/11/18 17:29 UNV  


 


 


 Acetaminophen/


 Hydrocodone Bitart


  (Norco 7.5/325)  1 tab  Q1H  PRN


 ORAL


 Moderate Pain (Pain Scale 4-6)  10/4/18 17:15


 10/4/18 20:00   


 


 


 Al Hydroxide/Mg


 Hydroxide


  (Mylanta II)  30 ml  Q6H  PRN


 ORAL


 dyspepsia  10/2/18 14:23


 11/1/18 14:22   


 


 


 Al Hydroxide/Mg


 Hydroxide


  (Mylanta)  15 ml  Q1H  PRN


 ORAL


 gi upset  10/4/18 17:15


 10/4/18 20:00   


 


 


 Atenolol


  (Tenormin)  25 mg  DAILY


 ORAL


   10/3/18 09:00


 11/2/18 08:59  10/3/18 08:41


 


 


 Atropine Sulfate


  (Atropine 0.4mg/


 ml)  0.5 mg  Q5M  PRN


 IVP


 HR<40  10/4/18 17:15


 10/4/18 20:00   


 


 


 Cefazolin Sodium


 2 gm/Dextrose  110 ml @ 


 220 mls/hr  EVERY 8  HOURS


 IV


   10/4/18 22:00


 10/5/18 06:29 UNV  


 


 


 Ceftriaxone


 Sodium 1 gm/


 Dextrose  55 ml @ 


 110 mls/hr  Q24H


 IVPB


   10/4/18 12:30


 10/11/18 12:29  10/4/18 13:07


 


 


 Clonazepam


  (KlonoPIN)  0.5 mg  Q6HR


 ORAL


   10/2/18 18:00


 10/9/18 17:59  10/4/18 05:39


 


 


 Dextrose


  (Dextrose 50%)  25 ml  Q30M  PRN


 IV


 Hypoglycemia  10/2/18 14:30


 11/1/18 14:25   


 


 


 Dextrose


  (Dextrose 50%)  50 ml  Q30M  PRN


 IV


 hypoglycemia  10/2/18 14:30


 11/1/18 14:29   


 


 


 Dextrose/


 Electrolytes  1,000 ml @ 


 75 mls/hr  [id number]


 IV


   10/4/18 17:28


 11/3/18 17:27 UNV  


 


 


 Dextrose/Sodium


 Chloride  1,000 ml @ 


 50 mls/hr  Q20H


 IV


   10/2/18 14:22


 11/1/18 14:21  10/3/18 22:07


 


 


 Diphenhydramine


 HCl


  (Benadryl)  25 mg  Q15M  PRN


 IVP


 Itching  10/4/18 17:15


 10/4/18 20:00   


 


 


 Docusate Sodium


  (Colace)  100 mg  THREE TIMES A  DAY


 ORAL


   10/4/18 18:00


 11/3/18 17:59 UNV  


 


 


 Fentanyl Citrate


  (Sublimaze 100


 mcg/2 mL)  25 mcg  Q10M  PRN


 IV


 Moderate Pain (Pain Scale 4-6)  10/4/18 17:15


 10/4/18 20:00   


 


 


 Ferrous Sulfate


  (Feosol)  325 mg  THREE TIMES A  DAY


 ORAL


   10/4/18 18:00


 11/3/18 17:59 UNV  


 


 


 Haloperidol


 Lactate


  (Haldol)  5 mg  Q4H  PRN


 IM


 Agitation  10/2/18 18:45


 11/1/18 18:44  10/3/18 04:36


 


 


 Heparin Sodium


  (Porcine)


  (Heparin 5000


 units/ml)  5,000 units  EVERY 12  HOURS


 SUBQ


   10/2/18 21:00


 11/1/18 20:59  10/3/18 20:48


 


 


 Hydralazine HCl


  (Apresoline)  5 mg  Q30M  PRN


 IV


 SBP>160 / DBP>90  10/4/18 17:15


 10/4/18 20:00   


 


 


 Hydromorphone HCl


  (Dilaudid)  0.5 mg  Q15M  PRN


 IVP


 Severe Pain (Pain Scale 7-10)  10/4/18 17:15


 10/4/18 20:00   


 


 


 Ketorolac


 Tromethamine


  (Toradol 30mg)  15 mg  Q1H  PRN


 IV


 Moderate Breakthru Pain (5-7)  10/4/18 17:15


 10/4/18 20:00   


 


 


 Ketorolac


 Tromethamine


  (Toradol 30mg)  30 mg  Q1H  PRN


 IV


 Severe Breakthru Pain (>7)  10/4/18 17:15


 10/4/18 20:00   


 


 


 Labetalol HCl


  (Normodyne)  5 mg  Q10M  PRN


 IV


 SBP>160 / DBP>90  10/4/18 17:15


 10/4/18 20:00   


 


 


 Lactated Ringer's  1,000 ml @ 


 10 mls/hr  Q24H


 IVLG


   10/4/18 17:01


 10/4/18 20:00   


 


 


 Lorazepam


  (Ativan 2mg/ml


 1ml)  0.5 mg  Q4H  PRN


 IV


 For Anxiety  10/2/18 14:30


 10/9/18 14:29  10/4/18 12:57


 


 


 Lorazepam


  (Ativan 2mg/ml


 1ml)  1 mg  Q15M  PRN


 IV


 For Anxiety  10/4/18 17:15


 10/4/18 20:00   


 


 


 Losartan Potassium


  (Cozaar)  25 mg  DAILY


 ORAL


   10/3/18 09:00


 11/2/18 08:59  10/3/18 08:42


 


 


 Magnesium


 Hydroxide


  (Mom)  30 ml  DAILYPRN  PRN


 ORAL


 Constipation  10/4/18 17:30


 11/3/18 17:29 UNV  


 


 


 Meperidine HCl


  (Demerol)  25 mg  Q5M  PRN


 IVP


 Shivering.May repeat x 1  10/4/18 17:15


 10/4/18 20:00   


 


 


 Midazolam HCl


  (Versed 2mg/2ml


 vial)  1 mg  Q15M  PRN


 IVP


 For Anxiety  10/4/18 17:15


 10/4/18 20:00   


 


 


 Morphine Sulfate


  (Morphine


 Sulfate)  1 mg  Q3H  PRN


 IVP


 Pain scale 1-3  10/4/18 17:30


 10/11/18 17:29 UNV  


 


 


 Morphine Sulfate


  (Morphine


 Sulfate)  2 mg  Q3H  PRN


 IVP


 Moderate Pain (Pain Scale 4-6)  10/4/18 17:30


 10/11/18 17:29 UNV  


 


 


 Morphine Sulfate


  (Morphine


 Sulfate)  2 mg  Q4H  PRN


 IVP


 For Pain 4-6  10/2/18 14:30


 10/9/18 14:29   


 


 


 Morphine Sulfate


  (Morphine


 Sulfate)  4 mg  Q4H  PRN


 IVP


 For Pain 7-10  10/2/18 14:30


 10/9/18 14:29  10/4/18 05:40


 


 


 Ondansetron HCl


  (Zofran)  4 mg  Q1H  PRN


 IVP


 Nausea & Vomiting  10/4/18 17:15


 10/4/18 20:00   


 


 


 Ondansetron HCl


  (Zofran)  4 mg  Q6H  PRN


 IVP


 Nausea & Vomiting  10/2/18 14:30


 11/1/18 14:29   


 


 


 Oxycodone/


 Acetaminophen


  (Percocet 5-325)  1 tab  Q1H  PRN


 ORAL


 Severe Pain (Pain Scale 7-10)  10/4/18 17:15


 10/4/18 20:00   


 


 


 Polyethylene


 Glycol


  (Miralax)  17 gm  HSPRN  PRN


 ORAL


 Constipation  10/2/18 14:30


 11/1/18 14:29   


 


 


 Prochlorperazine


  (Compazine)  10 mg  Q6H  PRN


 IVP


 Nausea & Vomiting  10/4/18 17:30


 11/3/18 17:29 UNV  


 


 


 Quetiapine


 Fumarate


  (SEROquel)  12.5 mg  Q4H  PRN


 ORAL


 agitation  10/3/18 14:45


 11/2/18 14:44   


 


 


 Quetiapine


 Fumarate


  (SEROquel)  25 mg  QHS


 ORAL


   10/3/18 00:30


 11/2/18 00:29  10/3/18 20:47


 


 


 Trazodone HCl


  (Desyrel)  50 mg  BEDTIME


 ORAL


   10/2/18 21:00


 11/1/18 20:59  10/3/18 20:47


 


 


 Zolpidem Tartrate


  (Ambien)  5 mg  HSPRN  PRN


 ORAL


 Insomnia  10/2/18 14:30


 10/9/18 14:29  10/4/18 00:14


 








Allergies:  


Coded Allergies:  


     No Known Allergies (Unverified , 3/26/14)


ROS Limited/Unobtainable:  No


Constitutional:  Reports: no symptoms


HEENT:  Reports: no symptoms


Cardiovascular:  Reports: no symptoms


Respiratory:  Reports: no symptoms


Gastrointestinal/Abdominal:  Reports: no symptoms


Genitourinary:  Reports: no symptoms


Neurologic/Psychiatric:  Reports: no symptoms


Subjective


77 YO F admitted with right hip pain; S/P fall.  Now right femoral neck 

fracture.  Cover for Int Med-Dr Magana.  S/P ORIF right hip fracture 10/4/18





Objective





Last Vital Signs








  Date Time  Temp Pulse Resp B/P (MAP) Pulse Ox O2 Delivery O2 Flow Rate FiO2


 


10/4/18 19:35 98.8 77 13 127/65 100 Nasal Cannula 3 





 98.8       











Laboratory Tests








Test


  10/4/18


10:20


 


White Blood Count


  8.0 K/UL


(4.8-10.8)


 


Red Blood Count


  3.31 M/UL


(4.20-5.40)  L


 


Hemoglobin


  9.6 G/DL


(12.0-16.0)  L


 


Hematocrit


  30.7 %


(37.0-47.0)  L


 


Mean Corpuscular Volume 93 FL (80-99)  


 


Mean Corpuscular Hemoglobin


  28.9 PG


(27.0-31.0)


 


Mean Corpuscular Hemoglobin


Concent 31.1 G/DL


(32.0-36.0)  L


 


Red Cell Distribution Width


  17.1 %


(11.6-14.8)  H


 


Platelet Count


  453 K/UL


(150-450)  H


 


Mean Platelet Volume


  7.1 FL


(6.5-10.1)


 


Neutrophils (%) (Auto)


  76.9 %


(45.0-75.0)  H


 


Lymphocytes (%) (Auto)


  14.2 %


(20.0-45.0)  L


 


Monocytes (%) (Auto)


  7.8 %


(1.0-10.0)


 


Eosinophils (%) (Auto)


  0.7 %


(0.0-3.0)


 


Basophils (%) (Auto)


  0.3 %


(0.0-2.0)


 


Sodium Level


  143 MMOL/L


(136-145)


 


Potassium Level


  3.8 MMOL/L


(3.5-5.1)


 


Chloride Level


  108 MMOL/L


()  H


 


Carbon Dioxide Level


  28 MMOL/L


(21-32)


 


Anion Gap


  8 mmol/L


(5-15)


 


Blood Urea Nitrogen


  12 mg/dL


(7-18)


 


Creatinine


  0.8 MG/DL


(0.55-1.30)


 


Estimat Glomerular Filtration


Rate  mL/min (>60)  


 


 


Glucose Level


  139 MG/DL


()  H


 


Calcium Level


  8.7 MG/DL


(8.5-10.1)











Microbiology








 Date/Time


Source Procedure


Growth Status


 


 


 10/2/18 10:45


Urine,Clean Catch Urine Culture - Final


Escherichia Coli Complete

















Intake and Output  


 


 10/3/18 10/4/18





 19:00 07:00


 


Intake Total 500 ml 550 ml


 


Balance 500 ml 550 ml


 


  


 


IV Total 500 ml 550 ml


 


# Voids 3 7








Objective


PHYSICAL EXAMINATION:


GENERAL:  The patient is well-developed and well-nourished thin-appearing


white female, in no apparent distress.


HEENT:  Eyes, pupils are equal and responsive to light and accommodation.


Extraocular movements are intact.


NECK:  Supple without lymphadenopathy.


CHEST:  Lungs are clear to auscultation bilaterally without wheezes or


rales.


CARDIOVASCULAR:  Regular rhythm and rate.  S1 and S2 are normal without


murmurs, rubs, or gallops.


ABDOMEN:  Soft, nontender, and nondistended.  Positive bowel sounds.  No


evidence of hepatosplenomegaly.  Currently, no rebound or guarding


noted.


EXTREMITIES:  Negative for clubbing, cyanosis, or edema.  Pain to palp right hip


RECTAL/GENITAL:  Refused.


NEUROLOGIC:  Cranial nerves II through XII are grossly intact without focal


deficits.  Motor strength is 5/5 bilaterally.  Deep tendon reflexes are 2+


plantar.





Assessment/Plan


Problem List:  


(1) Hypercholesterolemia


(2) CAD (coronary artery disease)


Assessment & Plan:  see dardiology note





(3) Fracture of femoral neck, right, closed


Assessment & Plan:  S/P ORIF Thurs 10/4/18-see ortho note.





(4) HTN (hypertension)


(5) Sick sinus syndrome


Assessment & Plan:  S/P pacemaker





(6) Pacemaker


Status:  not improved











Cheo Ortiz MD Oct 4, 2018 19:48

## 2018-10-04 NOTE — PSYCH CONSULT PROGRESS NOTE
Psych Consult Progress Note


Consult


10/2/18





encephalopathy due to metabolic condition





-seroquel prn


-provided ro/st


Vital Signs





Last 24 Hour Vital Signs








  Date Time  Temp Pulse Resp B/P (MAP) Pulse Ox O2 Delivery O2 Flow Rate FiO2


 


10/4/18 19:40 98.8 80 15 118/58 100 Nasal Cannula 3 





 98.8       


 


10/4/18 19:35 98.8 77 13 127/65 100 Nasal Cannula 3 





 98.8       


 


10/4/18 19:20  84 12 121/56 100 Nasal Cannula 3 


 


10/4/18 19:10  80 18 114/54 100 Simple Mask 6 


 


10/4/18 18:59  78 13 115/55 100 Simple Mask 6 


 


10/4/18 18:54  75 15 118/56 100 Simple Mask 6 


 


10/4/18 18:54 209.7 82 16  100   


 


10/4/18 18:51 98.7 78 26 142/71 100 Simple Mask 6 





 98.7       


 


10/4/18 15:54 98.6 78 22 152/70 (97) 99   





 98.6       


 


10/4/18 12:09 97.7 81 20 155/75 (101) 97   





 97.7       


 


10/4/18 09:00      Room Air  


 


10/4/18 08:22 94.1 95 19 157/91 (113) 100   





 94.1       


 


10/4/18 04:00 97.3 85 20 154/80 (104) 99   





 97.3       


 


10/4/18 00:00 97.9 80 22 148/73 (98) 99   





 97.9       








Labs





Laboratory Tests








Test


  10/4/18


10:20


 


White Blood Count


  8.0 K/UL


(4.8-10.8)


 


Red Blood Count


  3.31 M/UL


(4.20-5.40)  L


 


Hemoglobin


  9.6 G/DL


(12.0-16.0)  L


 


Hematocrit


  30.7 %


(37.0-47.0)  L


 


Mean Corpuscular Volume 93 FL (80-99)  


 


Mean Corpuscular Hemoglobin


  28.9 PG


(27.0-31.0)


 


Mean Corpuscular Hemoglobin


Concent 31.1 G/DL


(32.0-36.0)  L


 


Red Cell Distribution Width


  17.1 %


(11.6-14.8)  H


 


Platelet Count


  453 K/UL


(150-450)  H


 


Mean Platelet Volume


  7.1 FL


(6.5-10.1)


 


Neutrophils (%) (Auto)


  76.9 %


(45.0-75.0)  H


 


Lymphocytes (%) (Auto)


  14.2 %


(20.0-45.0)  L


 


Monocytes (%) (Auto)


  7.8 %


(1.0-10.0)


 


Eosinophils (%) (Auto)


  0.7 %


(0.0-3.0)


 


Basophils (%) (Auto)


  0.3 %


(0.0-2.0)


 


Sodium Level


  143 MMOL/L


(136-145)


 


Potassium Level


  3.8 MMOL/L


(3.5-5.1)


 


Chloride Level


  108 MMOL/L


()  H


 


Carbon Dioxide Level


  28 MMOL/L


(21-32)


 


Anion Gap


  8 mmol/L


(5-15)


 


Blood Urea Nitrogen


  12 mg/dL


(7-18)


 


Creatinine


  0.8 MG/DL


(0.55-1.30)


 


Estimat Glomerular Filtration


Rate  mL/min (>60)  


 


 


Glucose Level


  139 MG/DL


()  H


 


Calcium Level


  8.7 MG/DL


(8.5-10.1)








Medications





Current Medications








 Medications


  (Trade)  Dose


 Ordered  Sig/Violette


 Route


 PRN Reason  Start Time


 Stop Time Status Last Admin


Dose Admin


 


 Acetaminophen


  (Tylenol)  650 mg  Q4H  PRN


 ORAL


 fever  10/2/18 14:30


 11/1/18 14:29   


 


 


 Acetaminophen/


 Hydrocodone Bitart


  (Norco 5/325)  1 tab  Q6H  PRN


 ORAL


 For PAIN 1-3  10/2/18 14:30


 10/9/18 14:29  10/2/18 18:52


 


 


 Acetaminophen/


 Hydrocodone Bitart


  (Norco 5/325)  2 tab  Q6H  PRN


 ORAL


 Severe Pain (Pain Scale 7-10)  10/4/18 17:30


 10/11/18 17:29   


 


 


 Al Hydroxide/Mg


 Hydroxide


  (Mylanta II)  30 ml  Q6H  PRN


 ORAL


 dyspepsia  10/2/18 14:23


 11/1/18 14:22   


 


 


 Atenolol


  (Tenormin)  25 mg  DAILY


 ORAL


   10/3/18 09:00


 11/2/18 08:59  10/3/18 08:41


 


 


 Cefazolin Sodium  50 ml @ 


 100 mls/hr  Q8H


 IV


   10/5/18 01:00


 10/5/18 09:29   


 


 


 Ceftriaxone


 Sodium 1 gm/


 Dextrose  55 ml @ 


 110 mls/hr  Q24H


 IVPB


   10/4/18 12:30


 10/11/18 12:29  10/4/18 13:07


 


 


 Clonazepam


  (KlonoPIN)  0.5 mg  Q6HR


 ORAL


   10/2/18 18:00


 10/9/18 17:59  10/4/18 22:04


 


 


 Dextrose


  (Dextrose 50%)  25 ml  Q30M  PRN


 IV


 Hypoglycemia  10/2/18 14:30


 11/1/18 14:25   


 


 


 Dextrose


  (Dextrose 50%)  50 ml  Q30M  PRN


 IV


 hypoglycemia  10/2/18 14:30


 11/1/18 14:29   


 


 


 Dextrose/


 Electrolytes  1,000 ml @ 


 75 mls/hr  [id number]


 IV


   10/4/18 21:00


 11/3/18 20:59   


 


 


 Docusate Sodium


  (Colace)  100 mg  THREE TIMES A  DAY


 ORAL


   10/4/18 18:00


 11/3/18 17:59  10/4/18 22:02


 


 


 Ferrous Sulfate


  (Feosol)  325 mg  THREE TIMES A  DAY


 ORAL


   10/4/18 18:00


 11/3/18 17:59  10/4/18 22:02


 


 


 Haloperidol


 Lactate


  (Haldol)  5 mg  Q4H  PRN


 IM


 Agitation  10/2/18 18:45


 11/1/18 18:44  10/3/18 04:36


 


 


 Heparin Sodium


  (Porcine)


  (Heparin 5000


 units/ml)  5,000 units  EVERY 12  HOURS


 SUBQ


   10/2/18 21:00


 11/1/18 20:59  10/4/18 22:03


 


 


 Lorazepam


  (Ativan 2mg/ml


 1ml)  0.5 mg  Q4H  PRN


 IV


 For Anxiety  10/2/18 14:30


 10/9/18 14:29  10/4/18 12:57


 


 


 Losartan Potassium


  (Cozaar)  25 mg  DAILY


 ORAL


   10/3/18 09:00


 11/2/18 08:59  10/3/18 08:42


 


 


 Magnesium


 Hydroxide


  (Mom)  30 ml  DAILYPRN  PRN


 ORAL


 Constipation  10/4/18 17:30


 11/3/18 17:29   


 


 


 Morphine Sulfate


  (Morphine


 Sulfate)  1 mg  Q3H  PRN


 IVP


 Pain scale 1-3  10/4/18 17:30


 10/11/18 17:29   


 


 


 Morphine Sulfate


  (Morphine


 Sulfate)  2 mg  Q3H  PRN


 IVP


 Moderate Pain (Pain Scale 4-6)  10/4/18 17:30


 10/11/18 17:29   


 


 


 Morphine Sulfate


  (Morphine


 Sulfate)  4 mg  Q4H  PRN


 IVP


 For Pain 7-10  10/2/18 14:30


 10/9/18 14:29  10/4/18 05:40


 


 


 Ondansetron HCl


  (Zofran)  4 mg  Q6H  PRN


 IVP


 Nausea & Vomiting  10/2/18 14:30


 11/1/18 14:29   


 


 


 Polyethylene


 Glycol


  (Miralax)  17 gm  HSPRN  PRN


 ORAL


 Constipation  10/2/18 14:30


 11/1/18 14:29   


 


 


 Prochlorperazine


  (Compazine)  10 mg  Q6H  PRN


 IVP


 Nausea & Vomiting  10/4/18 17:30


 11/3/18 17:29   


 


 


 Quetiapine


 Fumarate


  (SEROquel)  12.5 mg  Q4H  PRN


 ORAL


 agitation  10/3/18 14:45


 11/2/18 14:44   


 


 


 Quetiapine


 Fumarate


  (SEROquel)  25 mg  QHS


 ORAL


   10/3/18 00:30


 11/2/18 00:29  10/4/18 22:02


 


 


 Trazodone HCl


  (Desyrel)  50 mg  BEDTIME


 ORAL


   10/2/18 21:00


 11/1/18 20:59  10/4/18 22:02


 


 


 Zolpidem Tartrate


  (Ambien)  5 mg  HSPRN  PRN


 ORAL


 Insomnia  10/2/18 14:30


 10/9/18 14:29  10/4/18 00:14


 








Problems:  


(1) Psychosis


(2) encephalopathy due to 











Josh Nunez MD Oct 4, 2018 22:06

## 2018-10-04 NOTE — IMMEDIATE POST-OP EVALUATION
Immediate Post-Op Evalulation


Immediate Post-Op Evalulation


Procedure:  R Hip Hemiarthroplasty


Date of Evaluation:  Oct 4, 2018


Time of Evaluation:  19:05


IV Fluids:  300 NS


Blood Products:  0


Estimated Blood Loss:  25


Urinary Output:  100


Blood Pressure Systolic:  142


Blood Pressure Diastolic:  71


Pulse Rate:  82


Respiratory Rate:  16


O2 Sat by Pulse Oximetry:  100


Temperature (Fahrenheit):  98.7


Pain Score (1-10):  1


Nausea:  No


Vomiting:  No


Complications


0


Patient Status:  awake, reacts, patent, none


Hydration Status:  adequate


Dru Gram Ancef IV


Given Within 1 Hr of Incision:  Yes


Time Given:  17:36











Darrell Herndon MD Oct 4, 2018 17:10

## 2018-10-04 NOTE — OPERATIVE NOTE - PDOC
Operative Note


Operative Note


Pre-op Diagnosis:


right femoral neck fracture


Procedure:


see op report


Post-op Diagnosis:  same as pre-op plus


Operative Findings:  consistent w/pre-op dx studies


Specimen:  none


Complications:  none


Condition:  stable


Estimated Blood Loss:  none


Implant(s) used?:  Yes











Juan Antonio Bella MD Oct 4, 2018 17:29

## 2018-10-04 NOTE — ANETHESIA PREOPERATIVE EVAL
Anesthesia Pre-op PMH/ROS


General


Date of Evaluation:  Oct 4, 2018


Time of Evaluation:  17:11


Anesthesiologist:  Yanick


ASA Score:  ASA 4 - Emergency


Mallampati Score


Class I : Soft palate, uvula, fauces, pillars visible


Class II: Soft palate, uvula, fauces visible


Class III: Soft palate, base of uvula visible


Class IV: Only hard plate visible


Mallampati Classification:  Class III


Surgeon:  Shayne


Diagnosis:  R Hip Fx


Surgical Procedure:  R Hip Hemiarthroplasty


Anesthesia History:  none


Family History:  no anesthesia problems


Allergies:  


Coded Allergies:  


     No Known Allergies (Unverified , 3/26/14)


Medications:  see eMAR





Past Medical History


Cardiovascular:  Reports: HTN, CAD - CHF, arrhythmia - SSS, other - HL


Gastrointestinal/Genitourinary:  Reports: other - Colitis


Neurologic/Psychiatric:  Reports: dementia, depression/anxiety


Hematology/Immune:  Reports: anemia


Musculoskeletal/Integumentary:  Reports: other - R Hip Fracture


PSxH Narrative:


Pacemaker





Anesthesia Pre-op Phys. Exam


Physician Exam





Last Vital Signs








  Date Time  Temp Pulse Resp B/P (MAP) Pulse Ox O2 Delivery O2 Flow Rate FiO2


 


10/4/18 15:54 98.6 78 22 152/70 (97) 99   





 98.6       


 


10/4/18 09:00      Room Air  


 


10/2/18 10:35       1.0 








Constitutional:  NAD


Neurologic:  CN 2-12 intact


Cardiovascular:  RRR


Respiratory:  CTA


Gastrointestinal:  S/NT/ND





Airway Exam


Mallampati Score:  Class III


MO:  limited


ROM:  limited


Teeth:  missing, intact





Anesthesia Pre-op A/P


Labs





Hematology








Test


  10/4/18


10:20


 


White Blood Count


  8.0 K/UL


(4.8-10.8)


 


Red Blood Count


  3.31 M/UL


(4.20-5.40)  L


 


Hemoglobin


  9.6 G/DL


(12.0-16.0)  L


 


Hematocrit


  30.7 %


(37.0-47.0)  L


 


Mean Corpuscular Volume 93 FL (80-99)  


 


Mean Corpuscular Hemoglobin


  28.9 PG


(27.0-31.0)


 


Mean Corpuscular Hemoglobin


Concent 31.1 G/DL


(32.0-36.0)  L


 


Red Cell Distribution Width


  17.1 %


(11.6-14.8)  H


 


Platelet Count


  453 K/UL


(150-450)  H


 


Mean Platelet Volume


  7.1 FL


(6.5-10.1)


 


Neutrophils (%) (Auto)


  76.9 %


(45.0-75.0)  H


 


Lymphocytes (%) (Auto)


  14.2 %


(20.0-45.0)  L


 


Monocytes (%) (Auto)


  7.8 %


(1.0-10.0)


 


Eosinophils (%) (Auto)


  0.7 %


(0.0-3.0)


 


Basophils (%) (Auto)


  0.3 %


(0.0-2.0)








Coagulation








Test


  10/3/18


18:00


 


Prothrombin Time


  11.9 SEC


(9.30-11.50)  H


 


Prothromb Time International


Ratio 1.1 (0.9-1.1)  


 


 


Activated Partial


Thromboplast Time 27 SEC (23-33)


 








Chemistry








Test


  10/4/18


10:20


 


Sodium Level


  143 MMOL/L


(136-145)


 


Potassium Level


  3.8 MMOL/L


(3.5-5.1)


 


Chloride Level


  108 MMOL/L


()  H


 


Carbon Dioxide Level


  28 MMOL/L


(21-32)


 


Anion Gap


  8 mmol/L


(5-15)


 


Blood Urea Nitrogen


  12 mg/dL


(7-18)


 


Creatinine


  0.8 MG/DL


(0.55-1.30)


 


Estimat Glomerular Filtration


Rate  mL/min (>60)  


 


 


Glucose Level


  139 MG/DL


()  H


 


Calcium Level


  8.7 MG/DL


(8.5-10.1)











Risk Assessment & Plan


Assessment:


ASA 4


Plan:


GA, SED, Spinal


Status Change Before Surgery:  No





Pre-Antibiotics


Dru Gram Ancef IV


Given Within 1 Hr of Incision:  Yes


Time Given:  17:36











Darrell Herndon MD Oct 4, 2018 17:09

## 2018-10-04 NOTE — PULMONOLOGY PROGRESS NOTE
Assessment/Plan


Problems:  


(1) Fracture of femoral neck, right, closed


(2) Pacemaker


(3) HTN (hypertension)


(4) CAD (coronary artery disease)


(5) Sick sinus syndrome


(6) Hip fracture, right


(7) Hypercholesterolemia


Assessment/Plan


cxr reviewed, questionable Right perihilar infiltrate noted. doubt pneumonia, 

will call ID


titrate cardiology meds


symptomatic treatment


surgery when ok with Cardio





Subjective


ROS Limited/Unobtainable:  No


Constitutional:  Reports: no symptoms


HEENT:  Repors: no symptoms


Respiratory:  Reports: no symptoms


Allergies:  


Coded Allergies:  


     No Known Allergies (Unverified , 3/26/14)





Objective





Last 24 Hour Vital Signs








  Date Time  Temp Pulse Resp B/P (MAP) Pulse Ox O2 Delivery O2 Flow Rate FiO2


 


10/4/18 12:09 97.7 81 20 155/75 (101) 97   





 97.7       


 


10/4/18 09:00      Room Air  


 


10/4/18 08:22 94.1 95 19 157/91 (113) 100   





 94.1       


 


10/4/18 04:00 97.3 85 20 154/80 (104) 99   





 97.3       


 


10/4/18 00:00 97.9 80 22 148/73 (98) 99   





 97.9       


 


10/3/18 21:00      Room Air  


 


10/3/18 20:00 97.9 99 20 154/89 (110) 100   





 97.9       


 


10/3/18 15:49 97.7       

















Intake and Output  


 


 10/3/18 10/4/18





 19:00 07:00


 


Intake Total 500 ml 550 ml


 


Balance 500 ml 550 ml


 


  


 


IV Total 500 ml 550 ml


 


# Voids 3 7








General Appearance:  WD/WN


HEENT:  normocephalic, anicteric


Breasts:  no masses


Cardiovascular:  normal peripheral pulses, regular rhythm


Abdomen:  soft, non tender, no organomegaly


Extremities:  no cyanosis


Skin:  no rash





Microbiology








 Date/Time


Source Procedure


Growth Status


 


 


 10/2/18 10:45


Urine,Clean Catch Urine Culture - Final


Escherichia Coli Complete








Laboratory Tests


10/3/18 18:00: 


Prothrombin Time 11.9H, Prothromb Time International Ratio 1.1, Activated 

Partial Thromboplast Time 27


10/4/18 10:20: 


White Blood Count 8.0, Red Blood Count 3.31L, Hemoglobin 9.6L, Hematocrit 30.7L

, Mean Corpuscular Volume 93, Mean Corpuscular Hemoglobin 28.9, Mean 

Corpuscular Hemoglobin Concent 31.1L, Red Cell Distribution Width 17.1H, 

Platelet Count 453H, Mean Platelet Volume 7.1, Neutrophils (%) (Auto) 76.9H, 

Lymphocytes (%) (Auto) 14.2L, Monocytes (%) (Auto) 7.8, Eosinophils (%) (Auto) 

0.7, Basophils (%) (Auto) 0.3, Sodium Level 143, Potassium Level 3.8, Chloride 

Level 108H, Carbon Dioxide Level 28, Anion Gap 8, Blood Urea Nitrogen 12, 

Creatinine 0.8, Estimat Glomerular Filtration Rate , Glucose Level 139H, 

Calcium Level 8.7





Current Medications








 Medications


  (Trade)  Dose


 Ordered  Sig/Violette


 Route


 PRN Reason  Start Time


 Stop Time Status Last Admin


Dose Admin


 


 Acetaminophen


  (Tylenol)  650 mg  Q4H  PRN


 ORAL


 fever  10/2/18 14:30


 11/1/18 14:29   


 


 


 Acetaminophen/


 Hydrocodone Bitart


  (Norco 5/325)  1 tab  Q6H  PRN


 ORAL


 For PAIN 1-3  10/2/18 14:30


 10/9/18 14:29  10/2/18 18:52


 


 


 Al Hydroxide/Mg


 Hydroxide


  (Mylanta II)  30 ml  Q6H  PRN


 ORAL


 dyspepsia  10/2/18 14:23


 11/1/18 14:22   


 


 


 Atenolol


  (Tenormin)  25 mg  DAILY


 ORAL


   10/3/18 09:00


 11/2/18 08:59  10/3/18 08:41


 


 


 Ceftriaxone


 Sodium 1 gm/


 Dextrose  55 ml @ 


 110 mls/hr  Q24H


 IVPB


   10/4/18 12:30


 10/11/18 12:29  10/4/18 13:07


 


 


 Clonazepam


  (KlonoPIN)  0.5 mg  Q6HR


 ORAL


   10/2/18 18:00


 10/9/18 17:59  10/4/18 05:39


 


 


 Dextrose


  (Dextrose 50%)  25 ml  Q30M  PRN


 IV


 Hypoglycemia  10/2/18 14:30


 11/1/18 14:25   


 


 


 Dextrose


  (Dextrose 50%)  50 ml  Q30M  PRN


 IV


 hypoglycemia  10/2/18 14:30


 11/1/18 14:29   


 


 


 Dextrose/Sodium


 Chloride  1,000 ml @ 


 50 mls/hr  Q20H


 IV


   10/2/18 14:22


 11/1/18 14:21  10/3/18 22:07


 


 


 Haloperidol


 Lactate


  (Haldol)  5 mg  Q4H  PRN


 IM


 Agitation  10/2/18 18:45


 11/1/18 18:44  10/3/18 04:36


 


 


 Heparin Sodium


  (Porcine)


  (Heparin 5000


 units/ml)  5,000 units  EVERY 12  HOURS


 SUBQ


   10/2/18 21:00


 11/1/18 20:59  10/3/18 20:48


 


 


 Lorazepam


  (Ativan 2mg/ml


 1ml)  0.5 mg  Q4H  PRN


 IV


 For Anxiety  10/2/18 14:30


 10/9/18 14:29  10/4/18 12:57


 


 


 Losartan Potassium


  (Cozaar)  25 mg  DAILY


 ORAL


   10/3/18 09:00


 11/2/18 08:59  10/3/18 08:42


 


 


 Morphine Sulfate


  (Morphine


 Sulfate)  2 mg  Q4H  PRN


 IVP


 For Pain 4-6  10/2/18 14:30


 10/9/18 14:29   


 


 


 Morphine Sulfate


  (Morphine


 Sulfate)  4 mg  Q4H  PRN


 IVP


 For Pain 7-10  10/2/18 14:30


 10/9/18 14:29  10/4/18 05:40


 


 


 Ondansetron HCl


  (Zofran)  4 mg  Q6H  PRN


 IVP


 Nausea & Vomiting  10/2/18 14:30


 11/1/18 14:29   


 


 


 Polyethylene


 Glycol


  (Miralax)  17 gm  HSPRN  PRN


 ORAL


 Constipation  10/2/18 14:30


 11/1/18 14:29   


 


 


 Quetiapine


 Fumarate


  (SEROquel)  12.5 mg  Q4H  PRN


 ORAL


 agitation  10/3/18 14:45


 11/2/18 14:44   


 


 


 Quetiapine


 Fumarate


  (SEROquel)  25 mg  QHS


 ORAL


   10/3/18 00:30


 11/2/18 00:29  10/3/18 20:47


 


 


 Trazodone HCl


  (Desyrel)  50 mg  BEDTIME


 ORAL


   10/2/18 21:00


 11/1/18 20:59  10/3/18 20:47


 


 


 Zolpidem Tartrate


  (Ambien)  5 mg  HSPRN  PRN


 ORAL


 Insomnia  10/2/18 14:30


 10/9/18 14:29  10/4/18 00:14


 

















Korin Junior MD Oct 4, 2018 15:08

## 2018-10-04 NOTE — GENERAL PROGRESS NOTE
Assessment/Plan


Assessment/Plan


(1) Right hip fracture


(2) Right hip pain


(3) S/p fall


Patient to be continued on Morphine and Norco as needed.


D/w Dr. Ennis and he concurred.





Subjective


Date patient seen:  Oct 4, 2018


Time patient seen:  01:15 - pm


Allergies:  


Coded Allergies:  


     No Known Allergies (Unverified , 3/26/14)


Subjective


REVIEW OF SYSTEMS:  Denies rash, fever, chills, sweating, dizziness,


drowsiness, blurred vision, sore throat, or change in her weight.  No


shortness of breath, chest pain, palpitations, or cough.  She is


complaining of right hip pain.





SUBJECTIVE: Patient is in bed and showing no signs of pain or distress 


at this time. Had been c/o pain which was severe and received 3 doses


of Morphine and 1 Norco in the last 24hrs. Waiting for Ortho eval.





Objective





Last 24 Hour Vital Signs








  Date Time  Temp Pulse Resp B/P (MAP) Pulse Ox O2 Delivery O2 Flow Rate FiO2


 


10/4/18 12:09 97.7 81 20 155/75 (101) 97   





 97.7       


 


10/4/18 09:00      Room Air  


 


10/4/18 08:22 94.1 95 19 157/91 (113) 100   





 94.1       


 


10/4/18 04:00 97.3 85 20 154/80 (104) 99   





 97.3       


 


10/4/18 00:00 97.9 80 22 148/73 (98) 99   





 97.9       


 


10/3/18 21:00      Room Air  


 


10/3/18 20:00 97.9 99 20 154/89 (110) 100   





 97.9       


 


10/3/18 15:49 97.7       

















Intake and Output  


 


 10/3/18 10/4/18





 19:00 07:00


 


Intake Total 500 ml 550 ml


 


Balance 500 ml 550 ml


 


  


 


IV Total 500 ml 550 ml


 


# Voids 3 7








Laboratory Tests


10/3/18 18:00: 


Prothrombin Time 11.9H, Prothromb Time International Ratio 1.1, Activated 

Partial Thromboplast Time 27


10/4/18 10:20: 


White Blood Count 8.0, Red Blood Count 3.31L, Hemoglobin 9.6L, Hematocrit 30.7L

, Mean Corpuscular Volume 93, Mean Corpuscular Hemoglobin 28.9, Mean 

Corpuscular Hemoglobin Concent 31.1L, Red Cell Distribution Width 17.1H, 

Platelet Count 453H, Mean Platelet Volume 7.1, Neutrophils (%) (Auto) 76.9H, 

Lymphocytes (%) (Auto) 14.2L, Monocytes (%) (Auto) 7.8, Eosinophils (%) (Auto) 

0.7, Basophils (%) (Auto) 0.3, Sodium Level 143, Potassium Level 3.8, Chloride 

Level 108H, Carbon Dioxide Level 28, Anion Gap 8, Blood Urea Nitrogen 12, 

Creatinine 0.8, Estimat Glomerular Filtration Rate , Glucose Level 139H, 

Calcium Level 8.7


Height (Feet):  5


Height (Inches):  6.00


Weight (Pounds):  120


Objective





GENERAL:  Alert and awake.


LUNGS:  Decreased breath sounds bilaterally.


HEART:  S1 and S2 regular.


ABDOMEN:  Benign.


EXTREMITIES:  No CCE noted.


NEURO: No changes.











Ethan Newby Oct 4, 2018 14:16

## 2018-10-04 NOTE — PRE-PROCEDURE NOTE/ATTESTATION
Pre-Procedure Note/Attestation


Complete Prior to Procedure


Planned Procedure:  right


Procedure Narrative:


hip hemiarthroplasty





Indications for Procedure


Pre-Operative Diagnosis:


right femoral neck fracture





Attestation


I attest that I discussed the nature of the procedure; its benefits; risks and 

complications; and alternatives (and the risks and benefits of such alternatives

), prior to the procedure, with the patient (or the patient's legal 

representative).





I attest that, if there was a reasonable possibility of needing a blood 

transfusion, the patient (or the patient's legal representative) was given the 

[facility] standardized written summary, pursuant 

to the [place] Blood Safety Act ([place] Health and Safety Code # 1645, as 

amended).





I attest that I re-evaluated the patient just prior to the surgery and that 

there has been no change in the patient's H&P, except as documented below:











Juan Antonio Bella MD Oct 4, 2018 17:29

## 2018-10-04 NOTE — CONSULTATION
DATE OF CONSULTATION:  10/02/2018



NOTE: "POOR AUDIO QUALITY"



ORTHOPEDIC CONSULTATION



CONSULTING PHYSICIAN:  Juan Antonio Bella M.D.



CHIEF COMPLAINT:  Right hip pain.



HISTORY OF PRESENT ILLNESS:  The patient is a pleasant female, who

sustained a mechanical fall.  She was brought to the ER where she was

diagnosed with a hip fracture.  Orthopedic consultation was obtained for

further care and recommendation.



She has history of multiple falls and generalized weakness.



PAST MEDICAL HISTORY:  Significant for diabetes, hypertension, asthma, and

dementia.



PAST SURGICAL HISTORY:  Pacemaker placement.



MEDICATIONS:  Reviewed from the intake chart.



ALLERGIES:  None.



SOCIAL HISTORY:  The patient resides with her .



FAMILY HISTORY:  Noncontributory.



PHYSICAL EXAMINATION:

GENERAL:  The patient is alert.  She is resting comfortably at this time in

bed.

EXTREMITIES:  She has pain with internal and external rotation of the right

leg.  The right leg is shortened and internally rotated.



DIAGNOSTIC DATA:  Imaging studies showed displaced femoral neck fracture.



ASSESSMENT:  Right displaced femoral neck fracture.



DISCUSSION:  At this point, what I recommend is to proceed with right hip

hemiarthroplasty.  She will be medically optimized _____ of surgery is

tomorrow, Wednesday.  Risks, limitations, expectations, and complications

of procedure were discussed in detail with her family, Jennie who is her

daughter.









  ______________________________________________

  Juan Antonio Bella M.D.





DR:  Alysha

D:  10/04/2018 17:34

T:  10/04/2018 22:49

JOB#:  [id number]

CC:

## 2018-10-05 VITALS — DIASTOLIC BLOOD PRESSURE: 81 MMHG | SYSTOLIC BLOOD PRESSURE: 144 MMHG

## 2018-10-05 VITALS — DIASTOLIC BLOOD PRESSURE: 94 MMHG | SYSTOLIC BLOOD PRESSURE: 159 MMHG

## 2018-10-05 VITALS — SYSTOLIC BLOOD PRESSURE: 142 MMHG | DIASTOLIC BLOOD PRESSURE: 96 MMHG

## 2018-10-05 VITALS — DIASTOLIC BLOOD PRESSURE: 96 MMHG | SYSTOLIC BLOOD PRESSURE: 158 MMHG

## 2018-10-05 VITALS — DIASTOLIC BLOOD PRESSURE: 76 MMHG | SYSTOLIC BLOOD PRESSURE: 129 MMHG

## 2018-10-05 LAB
ADD MANUAL DIFF: YES
ALBUMIN SERPL-MCNC: 2.8 G/DL (ref 3.4–5)
ALBUMIN/GLOB SERPL: 0.9 {RATIO} (ref 1–2.7)
ALP SERPL-CCNC: 51 U/L (ref 46–116)
ALT SERPL-CCNC: 26 U/L (ref 12–78)
ANION GAP SERPL CALC-SCNC: 11 MMOL/L (ref 5–15)
AST SERPL-CCNC: 41 U/L (ref 15–37)
BILIRUB SERPL-MCNC: 0.9 MG/DL (ref 0.2–1)
BUN SERPL-MCNC: 9 MG/DL (ref 7–18)
CALCIUM SERPL-MCNC: 8.4 MG/DL (ref 8.5–10.1)
CHLORIDE SERPL-SCNC: 107 MMOL/L (ref 98–107)
CO2 SERPL-SCNC: 23 MMOL/L (ref 21–32)
CREAT SERPL-MCNC: 0.8 MG/DL (ref 0.55–1.3)
ERYTHROCYTE [DISTWIDTH] IN BLOOD BY AUTOMATED COUNT: 16.7 % (ref 11.6–14.8)
GLOBULIN SER-MCNC: 3 G/DL
HCT VFR BLD CALC: 29.1 % (ref 37–47)
HGB BLD-MCNC: 9.2 G/DL (ref 12–16)
MCV RBC AUTO: 91 FL (ref 80–99)
PHOSPHATE SERPL-MCNC: 2.5 MG/DL (ref 2.5–4.9)
PLATELET # BLD: 376 K/UL (ref 150–450)
POTASSIUM SERPL-SCNC: 3.5 MMOL/L (ref 3.5–5.1)
RBC # BLD AUTO: 3.19 M/UL (ref 4.2–5.4)
SODIUM SERPL-SCNC: 140 MMOL/L (ref 136–145)
WBC # BLD AUTO: 11.8 K/UL (ref 4.8–10.8)

## 2018-10-05 RX ADMIN — TRAZODONE HYDROCHLORIDE SCH MG: 50 TABLET ORAL at 21:24

## 2018-10-05 RX ADMIN — HEPARIN SODIUM SCH UNITS: 5000 INJECTION INTRAVENOUS; SUBCUTANEOUS at 09:55

## 2018-10-05 RX ADMIN — HYDROCODONE BITARTRATE AND ACETAMINOPHEN PRN TAB: 5; 325 TABLET ORAL at 09:53

## 2018-10-05 RX ADMIN — LOSARTAN POTASSIUM SCH MG: 25 TABLET, FILM COATED ORAL at 09:52

## 2018-10-05 RX ADMIN — CLONAZEPAM SCH MG: 0.5 TABLET ORAL at 00:00

## 2018-10-05 RX ADMIN — CLONAZEPAM SCH MG: 0.5 TABLET ORAL at 11:13

## 2018-10-05 RX ADMIN — DEXTROSE, SODIUM CHLORIDE, AND POTASSIUM CHLORIDE SCH MLS/HR: 5; .45; .15 INJECTION INTRAVENOUS at 23:16

## 2018-10-05 RX ADMIN — CLONAZEPAM SCH MG: 0.5 TABLET ORAL at 17:47

## 2018-10-05 RX ADMIN — HEPARIN SODIUM SCH UNITS: 5000 INJECTION INTRAVENOUS; SUBCUTANEOUS at 21:26

## 2018-10-05 RX ADMIN — CEFAZOLIN SODIUM SCH MLS/HR: 1 SOLUTION INTRAVENOUS at 02:07

## 2018-10-05 RX ADMIN — CLONAZEPAM SCH MG: 0.5 TABLET ORAL at 23:15

## 2018-10-05 RX ADMIN — DOCUSATE SODIUM SCH MG: 100 CAPSULE, LIQUID FILLED ORAL at 17:47

## 2018-10-05 RX ADMIN — LORAZEPAM PRN MG: 2 INJECTION, SOLUTION INTRAMUSCULAR; INTRAVENOUS at 23:15

## 2018-10-05 RX ADMIN — HALOPERIDOL LACTATE PRN MG: 5 INJECTION, SOLUTION INTRAMUSCULAR at 09:03

## 2018-10-05 RX ADMIN — ATENOLOL SCH MG: 25 TABLET ORAL at 09:52

## 2018-10-05 RX ADMIN — CEFAZOLIN SODIUM SCH MLS/HR: 1 SOLUTION INTRAVENOUS at 09:00

## 2018-10-05 RX ADMIN — DOCUSATE SODIUM SCH MG: 100 CAPSULE, LIQUID FILLED ORAL at 09:52

## 2018-10-05 RX ADMIN — DEXTROSE, SODIUM CHLORIDE, AND POTASSIUM CHLORIDE SCH MLS/HR: 5; .45; .15 INJECTION INTRAVENOUS at 11:13

## 2018-10-05 RX ADMIN — CLONAZEPAM SCH MG: 0.5 TABLET ORAL at 06:05

## 2018-10-05 RX ADMIN — HALOPERIDOL LACTATE PRN MG: 5 INJECTION, SOLUTION INTRAMUSCULAR at 02:00

## 2018-10-05 RX ADMIN — DOCUSATE SODIUM SCH MG: 100 CAPSULE, LIQUID FILLED ORAL at 14:06

## 2018-10-05 RX ADMIN — HYDROCODONE BITARTRATE AND ACETAMINOPHEN PRN TAB: 5; 325 TABLET ORAL at 19:55

## 2018-10-05 RX ADMIN — LORAZEPAM PRN MG: 2 INJECTION, SOLUTION INTRAMUSCULAR; INTRAVENOUS at 15:17

## 2018-10-05 RX ADMIN — MORPHINE SULFATE PRN MG: 4 INJECTION INTRAVENOUS at 11:14

## 2018-10-05 NOTE — DIAGNOSTIC IMAGING REPORT
Indication: Postoperative, status post right hip arthroplasty

 

Technique: One view of the pelvis

 

Comparison: 10/2/2018

 

Findings: Interim right hip hemiarthroplasty for repair of previously demonstrated

subcapital fracture. Satisfactory anatomic alignment of the prosthesis. Gas within

the soft tissues presumably reflects retained air from the surgical exposure. A Fuller

catheter is noted. Old healed fracture deformities of the left pubic bone are noted.

 

Impression: Postoperative right hip. No unusual features

## 2018-10-05 NOTE — 48 HOUR POST ANESTHESIA EVAL
Post Anesthesia Evaluation


Procedure:  R Hip Hemiarthroplasty


Date of Evaluation:  Oct 5, 2018


Time of Evaluation:  06:10


Blood Pressure Systolic:  159


0:  94


Pulse Rate:  117


Respiratory Rate:  20


Temperature (Fahrenheit):  97.9


O2 Sat by Pulse Oximetry:  100


Airway:  patent


Nausea:  No


Vomiting:  No


Pain Intensity:  0


Hydration Status:  adequate


Cardiopulmonary Status:


at baseline


Mental Status/LOC:  patient returned to baseline


Post-Anesthesia Complications:


0


Follow-up care needed:  N/A - further care as per primary team











Betzy Price MD Oct 5, 2018 10:00

## 2018-10-05 NOTE — PULMONOLOGY PROGRESS NOTE
Assessment/Plan


Problems:  


(1) Fracture of femoral neck, right, closed


(2) Pacemaker


(3) HTN (hypertension)


(4) CAD (coronary artery disease)


(5) Sick sinus syndrome


(6) Hip fracture, right


(7) Hypercholesterolemia


Assessment/Plan


doing better


surgery done yesterday


titrate cardiology meds


symptomatic treatment


pt/ot





Subjective


ROS Limited/Unobtainable:  No


Constitutional:  Reports: no symptoms


HEENT:  Repors: no symptoms


Respiratory:  Reports: no symptoms


Allergies:  


Coded Allergies:  


     No Known Allergies (Unverified , 3/26/14)





Objective





Last 24 Hour Vital Signs








  Date Time  Temp Pulse Resp B/P (MAP) Pulse Ox O2 Delivery O2 Flow Rate FiO2


 


10/5/18 11:14 97.9       


 


10/5/18 10:23 97.9       


 


10/5/18 10:00 208.2 117 20  100   


 


10/5/18 09:53 97.9       


 


10/5/18 09:52    142/96    


 


10/5/18 09:52  63  142/96    


 


10/5/18 08:00 97.5 63 20 142/96 (111) 100   





 97.5       


 


10/5/18 04:00 97.9 117 20 159/94 (115) 100   





 97.9       


 


10/5/18 00:00 97.7 103 20 144/81 (102) 98   





 97.7       


 


10/4/18 21:00      Room Air  


 


10/4/18 20:00 97.4 88 17 130/62 (84) 97   





 97.4       


 


10/4/18 19:40 98.8 80 15 118/58 100 Nasal Cannula 3 





 98.8       


 


10/4/18 19:35 98.8 77 13 127/65 100 Nasal Cannula 3 





 98.8       


 


10/4/18 19:20  84 12 121/56 100 Nasal Cannula 3 


 


10/4/18 19:10  80 18 114/54 100 Simple Mask 6 


 


10/4/18 18:59  78 13 115/55 100 Simple Mask 6 


 


10/4/18 18:54  75 15 118/56 100 Simple Mask 6 


 


10/4/18 18:54 209.7 82 16  100   


 


10/4/18 18:51 98.7 78 26 142/71 100 Simple Mask 6 





 98.7       


 


10/4/18 15:54 98.6 78 22 152/70 (97) 99   





 98.6       

















Intake and Output  


 


 10/4/18 10/5/18





 19:00 07:00


 


Intake Total 880 ml 580 ml


 


Output Total 125 ml 


 


Balance 755 ml 580 ml


 


  


 


Intake Oral  480 ml


 


IV Total 880 ml 100 ml


 


Output Urine Total 100 ml 


 


Estimated Blood Loss 25 ml 


 


# Voids 4 








Objective


General Appearance:  WD/WN


HEENT:  normocephalic, anicteric


Breasts:  no masses


Cardiovascular:  normal peripheral pulses, regular rhythm


Abdomen:  soft, non tender, no organomegaly


Extremities:  no cyanosis


Skin:  no rash


Laboratory Tests


10/5/18 09:50: 


White Blood Count 11.8H, Red Blood Count 3.19L, Hemoglobin 9.2L, Hematocrit 

29.1L, Mean Corpuscular Volume 91, Mean Corpuscular Hemoglobin 28.7, Mean 

Corpuscular Hemoglobin Concent 31.5L, Red Cell Distribution Width 16.7H, 

Platelet Count 376, Mean Platelet Volume 6.8, Neutrophils (%) (Auto) , 

Lymphocytes (%) (Auto) , Monocytes (%) (Auto) , Eosinophils (%) (Auto) , 

Basophils (%) (Auto) , Differential Total Cells Counted 100, Neutrophils % (

Manual) 86H, Lymphocytes % (Manual) 8L, Monocytes % (Manual) 6, Eosinophils % (

Manual) 0, Basophils % (Manual) 0, Band Neutrophils 0, Platelet Estimate 

Adequate, Platelet Morphology Normal, Hypochromasia 2+, Anisocytosis 1+, Sodium 

Level 140, Potassium Level 3.5, Chloride Level 107, Carbon Dioxide Level 23, 

Anion Gap 11, Blood Urea Nitrogen 9, Creatinine 0.8, Estimat Glomerular 

Filtration Rate , Glucose Level 118H, Calcium Level 8.4L, Phosphorus Level 2.5, 

Magnesium Level 1.2L, Total Bilirubin 0.9, Aspartate Amino Transf (AST/SGOT) 41H

, Alanine Aminotransferase (ALT/SGPT) 26, Alkaline Phosphatase 51, Total 

Protein 5.8L, Albumin 2.8L, Globulin 3.0, Albumin/Globulin Ratio 0.9L





Current Medications








 Medications


  (Trade)  Dose


 Ordered  Sig/Violette


 Route


 PRN Reason  Start Time


 Stop Time Status Last Admin


Dose Admin


 


 Acetaminophen


  (Tylenol)  650 mg  Q4H  PRN


 ORAL


 fever  10/2/18 14:30


 11/1/18 14:29   


 


 


 Acetaminophen/


 Hydrocodone Bitart


  (Norco 5/325)  1 tab  Q4H  PRN


 ORAL


 Moderate Pain (Pain Scale 4-6)  10/5/18 09:06


 10/12/18 09:05   


 


 


 Acetaminophen/


 Hydrocodone Bitart


  (Norco 5/325)  1 tab  Q6H  PRN


 ORAL


 For PAIN 1-3  10/2/18 14:30


 10/9/18 14:29  10/5/18 09:53


 


 


 Al Hydroxide/Mg


 Hydroxide


  (Mylanta II)  30 ml  Q6H  PRN


 ORAL


 dyspepsia  10/2/18 14:23


 11/1/18 14:22   


 


 


 Atenolol


  (Tenormin)  25 mg  DAILY


 ORAL


   10/3/18 09:00


 11/2/18 08:59  10/5/18 09:52


 


 


 Ceftriaxone


 Sodium 1 gm/


 Dextrose  55 ml @ 


 110 mls/hr  Q24H


 IVPB


   10/4/18 12:30


 10/11/18 12:29  10/4/18 13:07


 


 


 Clonazepam


  (KlonoPIN)  0.5 mg  Q6HR


 ORAL


   10/2/18 18:00


 10/9/18 17:59  10/5/18 11:13


 


 


 Dextrose


  (Dextrose 50%)  25 ml  Q30M  PRN


 IV


 Hypoglycemia  10/2/18 14:30


 11/1/18 14:25   


 


 


 Dextrose


  (Dextrose 50%)  50 ml  Q30M  PRN


 IV


 hypoglycemia  10/2/18 14:30


 11/1/18 14:29   


 


 


 Dextrose/


 Electrolytes  1,000 ml @ 


 75 mls/hr  [id number]


 IV


   10/4/18 21:00


 11/3/18 20:59  10/5/18 11:13


 


 


 Docusate Sodium


  (Colace)  100 mg  THREE TIMES A  DAY


 ORAL


   10/4/18 18:00


 11/3/18 17:59  10/5/18 09:52


 


 


 Ferrous Sulfate


  (Feosol)  325 mg  THREE TIMES A  DAY


 ORAL


   10/4/18 18:00


 11/3/18 17:59  10/5/18 09:53


 


 


 Haloperidol


 Lactate


  (Haldol)  5 mg  Q4H  PRN


 IM


 Agitation  10/2/18 18:45


 11/1/18 18:44  10/5/18 09:03


 


 


 Heparin Sodium


  (Porcine)


  (Heparin 5000


 units/ml)  5,000 units  EVERY 12  HOURS


 SUBQ


   10/2/18 21:00


 11/1/18 20:59  10/5/18 09:55


 


 


 Lorazepam


  (Ativan 2mg/ml


 1ml)  0.5 mg  Q4H  PRN


 IV


 For Anxiety  10/2/18 14:30


 10/9/18 14:29  10/4/18 12:57


 


 


 Losartan Potassium


  (Cozaar)  25 mg  DAILY


 ORAL


   10/3/18 09:00


 11/2/18 08:59  10/5/18 09:52


 


 


 Magnesium


 Hydroxide


  (Mom)  30 ml  DAILYPRN  PRN


 ORAL


 Constipation  10/4/18 17:30


 11/3/18 17:29   


 


 


 Morphine Sulfate


  (Morphine


 Sulfate)  4 mg  Q4H  PRN


 IVP


 For Pain 7-10  10/2/18 14:30


 10/9/18 14:29  10/5/18 11:14


 


 


 Ondansetron HCl


  (Zofran)  4 mg  Q6H  PRN


 IVP


 Nausea & Vomiting  10/2/18 14:30


 11/1/18 14:29   


 


 


 Polyethylene


 Glycol


  (Miralax)  17 gm  HSPRN  PRN


 ORAL


 Constipation  10/2/18 14:30


 11/1/18 14:29   


 


 


 Prochlorperazine


  (Compazine)  10 mg  Q6H  PRN


 IVP


 Nausea & Vomiting  10/4/18 17:30


 11/3/18 17:29   


 


 


 Quetiapine


 Fumarate


  (SEROquel)  12.5 mg  Q4H  PRN


 ORAL


 agitation  10/3/18 14:45


 11/2/18 14:44   


 


 


 Quetiapine


 Fumarate


  (SEROquel)  25 mg  QHS


 ORAL


   10/3/18 00:30


 11/2/18 00:29  10/4/18 22:02


 


 


 Trazodone HCl


  (Desyrel)  50 mg  BEDTIME


 ORAL


   10/2/18 21:00


 11/1/18 20:59  10/4/18 22:02


 


 


 Zolpidem Tartrate


  (Ambien)  5 mg  HSPRN  PRN


 ORAL


 Insomnia  10/2/18 14:30


 10/9/18 14:29  10/4/18 00:14


 

















Korin Junior MD Oct 5, 2018 12:12

## 2018-10-05 NOTE — GENERAL PROGRESS NOTE
Assessment/Plan


Assessment/Plan


(1) Right hip fracture


(2) Right hip pain


(3) S/p fall and right hip arthroplasty 


Patient to be continued on Morphine and Norco as needed.


D/w Dr. Ennis and he concurred.





Subjective


Date patient seen:  Oct 5, 2018


Time patient seen:  07:15 - am


Allergies:  


Coded Allergies:  


     No Known Allergies (Unverified , 3/26/14)


Subjective


REVIEW OF SYSTEMS:  Denies rash, fever, chills, sweating, dizziness,


drowsiness, blurred vision, sore throat, or change in her weight.  No


shortness of breath, chest pain, palpitations, or cough.  She is


complaining of right hip pain.





SUBJECTIVE: Patient is s/p right hip hemiarthroplasty. She is in


bed with  at bed side. No signs of pain or distress at this time.





Objective





Last 24 Hour Vital Signs








  Date Time  Temp Pulse Resp B/P (MAP) Pulse Ox O2 Delivery O2 Flow Rate FiO2


 


10/5/18 04:00 97.9 117 20 159/94 (115) 100   





 97.9       


 


10/5/18 00:00 97.7 103 20 144/81 (102) 98   





 97.7       


 


10/4/18 21:00      Room Air  


 


10/4/18 20:00 97.4 88 17 130/62 (84) 97   





 97.4       


 


10/4/18 19:40 98.8 80 15 118/58 100 Nasal Cannula 3 





 98.8       


 


10/4/18 19:35 98.8 77 13 127/65 100 Nasal Cannula 3 





 98.8       


 


10/4/18 19:20  84 12 121/56 100 Nasal Cannula 3 


 


10/4/18 19:10  80 18 114/54 100 Simple Mask 6 


 


10/4/18 18:59  78 13 115/55 100 Simple Mask 6 


 


10/4/18 18:54  75 15 118/56 100 Simple Mask 6 


 


10/4/18 18:54 209.7 82 16  100   


 


10/4/18 18:51 98.7 78 26 142/71 100 Simple Mask 6 





 98.7       


 


10/4/18 15:54 98.6 78 22 152/70 (97) 99   





 98.6       


 


10/4/18 12:09 97.7 81 20 155/75 (101) 97   





 97.7       

















Intake and Output  


 


 10/4/18 10/5/18





 19:00 07:00


 


Intake Total 880 ml 580 ml


 


Output Total 125 ml 


 


Balance 755 ml 580 ml


 


  


 


Intake Oral  480 ml


 


IV Total 880 ml 100 ml


 


Output Urine Total 100 ml 


 


Estimated Blood Loss 25 ml 


 


# Voids 4 








Laboratory Tests


10/4/18 10:20: 


White Blood Count 8.0, Red Blood Count 3.31L, Hemoglobin 9.6L, Hematocrit 30.7L

, Mean Corpuscular Volume 93, Mean Corpuscular Hemoglobin 28.9, Mean 

Corpuscular Hemoglobin Concent 31.1L, Red Cell Distribution Width 17.1H, 

Platelet Count 453H, Mean Platelet Volume 7.1, Neutrophils (%) (Auto) 76.9H, 

Lymphocytes (%) (Auto) 14.2L, Monocytes (%) (Auto) 7.8, Eosinophils (%) (Auto) 

0.7, Basophils (%) (Auto) 0.3, Sodium Level 143, Potassium Level 3.8, Chloride 

Level 108H, Carbon Dioxide Level 28, Anion Gap 8, Blood Urea Nitrogen 12, 

Creatinine 0.8, Estimat Glomerular Filtration Rate , Glucose Level 139H, 

Calcium Level 8.7


Height (Feet):  5


Height (Inches):  6.00


Weight (Pounds):  120


Objective


GENERAL:  Alert and awake.


LUNGS:  Decreased breath sounds bilaterally.


HEART:  S1 and S2 regular.


ABDOMEN:  Benign.


EXTREMITIES:  No CCE noted. Right hip bandages placed. 


NEURO: No changes.











Ethan Newby Oct 5, 2018 09:12

## 2018-10-05 NOTE — INTERNAL MED PROGRESS NOTE
Subjective


Date of Service:  Oct 5, 2018


Physician Name


Cheo Ortiz


Attending Physician


Juan Carlos Magana MD





Current Medications








 Medications


  (Trade)  Dose


 Ordered  Sig/Violette


 Route


 PRN Reason  Start Time


 Stop Time Status Last Admin


Dose Admin


 


 Acetaminophen


  (Tylenol)  650 mg  Q4H  PRN


 ORAL


 fever  10/2/18 14:30


 11/1/18 14:29   


 


 


 Acetaminophen/


 Hydrocodone Bitart


  (Norco 5/325)  1 tab  Q4H  PRN


 ORAL


 Moderate Pain (Pain Scale 4-6)  10/5/18 09:06


 10/12/18 09:05   


 


 


 Acetaminophen/


 Hydrocodone Bitart


  (Norco 5/325)  1 tab  Q6H  PRN


 ORAL


 For PAIN 1-3  10/2/18 14:30


 10/9/18 14:29  10/5/18 19:55


 


 


 Al Hydroxide/Mg


 Hydroxide


  (Mylanta II)  30 ml  Q6H  PRN


 ORAL


 dyspepsia  10/2/18 14:23


 11/1/18 14:22   


 


 


 Atenolol


  (Tenormin)  25 mg  DAILY


 ORAL


   10/3/18 09:00


 11/2/18 08:59  10/5/18 09:52


 


 


 Ceftriaxone


 Sodium 1 gm/


 Dextrose  55 ml @ 


 110 mls/hr  Q24H


 IVPB


   10/6/18 12:30


 10/13/18 12:29   


 


 


 Clonazepam


  (KlonoPIN)  0.5 mg  Q6HR


 ORAL


   10/2/18 18:00


 10/9/18 17:59  10/5/18 17:47


 


 


 Dextrose


  (Dextrose 50%)  25 ml  Q30M  PRN


 IV


 Hypoglycemia  10/2/18 14:30


 11/1/18 14:25   


 


 


 Dextrose


  (Dextrose 50%)  50 ml  Q30M  PRN


 IV


 hypoglycemia  10/2/18 14:30


 11/1/18 14:29   


 


 


 Dextrose/


 Electrolytes  1,000 ml @ 


 75 mls/hr  Q22Q50C


 IV


   10/4/18 21:00


 11/3/18 20:59  10/5/18 11:13


 


 


 Docusate Sodium


  (Colace)  100 mg  THREE TIMES A  DAY


 ORAL


   10/4/18 18:00


 11/3/18 17:59  10/5/18 17:47


 


 


 Ferrous Sulfate


  (Feosol)  325 mg  THREE TIMES A  DAY


 ORAL


   10/4/18 18:00


 11/3/18 17:59  10/5/18 17:47


 


 


 Haloperidol


 Lactate


  (Haldol)  5 mg  Q4H  PRN


 IM


 Agitation  10/2/18 18:45


 11/1/18 18:44  10/5/18 09:03


 


 


 Heparin Sodium


  (Porcine)


  (Heparin 5000


 units/ml)  5,000 units  EVERY 12  HOURS


 SUBQ


   10/2/18 21:00


 11/1/18 20:59  10/5/18 09:55


 


 


 Lorazepam


  (Ativan 2mg/ml


 1ml)  0.5 mg  Q4H  PRN


 IV


 For Anxiety  10/2/18 14:30


 10/9/18 14:29  10/5/18 15:17


 


 


 Losartan Potassium


  (Cozaar)  25 mg  DAILY


 ORAL


   10/3/18 09:00


 11/2/18 08:59  10/5/18 09:52


 


 


 Magnesium


 Hydroxide


  (Mom)  30 ml  DAILYPRN  PRN


 ORAL


 Constipation  10/4/18 17:30


 11/3/18 17:29   


 


 


 Morphine Sulfate


  (Morphine


 Sulfate)  4 mg  Q4H  PRN


 IVP


 For Pain 7-10  10/2/18 14:30


 10/9/18 14:29  10/5/18 11:14


 


 


 Ondansetron HCl


  (Zofran)  4 mg  Q6H  PRN


 IVP


 Nausea & Vomiting  10/2/18 14:30


 11/1/18 14:29   


 


 


 Polyethylene


 Glycol


  (Miralax)  17 gm  HSPRN  PRN


 ORAL


 Constipation  10/2/18 14:30


 11/1/18 14:29   


 


 


 Prochlorperazine


  (Compazine)  10 mg  Q6H  PRN


 IVP


 Nausea & Vomiting  10/4/18 17:30


 11/3/18 17:29   


 


 


 Quetiapine


 Fumarate


  (SEROquel)  12.5 mg  Q4H  PRN


 ORAL


 agitation  10/3/18 14:45


 11/2/18 14:44   


 


 


 Quetiapine


 Fumarate


  (SEROquel)  25 mg  QHS


 ORAL


   10/3/18 00:30


 11/2/18 00:29  10/4/18 22:02


 


 


 Trazodone HCl


  (Desyrel)  50 mg  BEDTIME


 ORAL


   10/2/18 21:00


 11/1/18 20:59  10/4/18 22:02


 


 


 Zolpidem Tartrate


  (Ambien)  5 mg  HSPRN  PRN


 ORAL


 Insomnia  10/2/18 14:30


 10/9/18 14:29  10/4/18 00:14


 








Allergies:  


Coded Allergies:  


     No Known Allergies (Unverified , 3/26/14)


Subjective


77 YO F admitted with right hip pain; S/P fall.  Now right femoral neck 

fracture.  Cover for Int Med-Dr Magana.  S/P ORIF right hip fracture 10/4/18





Objective





Last Vital Signs








  Date Time  Temp Pulse Resp B/P (MAP) Pulse Ox O2 Delivery O2 Flow Rate FiO2


 


10/5/18 11:44 97.9       


 


10/5/18 10:00  117 20  100   


 


10/5/18 09:52    142/96    


 


10/5/18 09:00      Room Air  


 


10/4/18 19:40       3 











Laboratory Tests








Test


  10/5/18


09:50


 


White Blood Count


  11.8 K/UL


(4.8-10.8)  H


 


Red Blood Count


  3.19 M/UL


(4.20-5.40)  L


 


Hemoglobin


  9.2 G/DL


(12.0-16.0)  L


 


Hematocrit


  29.1 %


(37.0-47.0)  L


 


Mean Corpuscular Volume 91 FL (80-99)  


 


Mean Corpuscular Hemoglobin


  28.7 PG


(27.0-31.0)


 


Mean Corpuscular Hemoglobin


Concent 31.5 G/DL


(32.0-36.0)  L


 


Red Cell Distribution Width


  16.7 %


(11.6-14.8)  H


 


Platelet Count


  376 K/UL


(150-450)


 


Mean Platelet Volume


  6.8 FL


(6.5-10.1)


 


Neutrophils (%) (Auto)


  % (45.0-75.0)


 


 


Lymphocytes (%) (Auto)


  % (20.0-45.0)


 


 


Monocytes (%) (Auto)  % (1.0-10.0)  


 


Eosinophils (%) (Auto)  % (0.0-3.0)  


 


Basophils (%) (Auto)  % (0.0-2.0)  


 


Differential Total Cells


Counted 100  


 


 


Neutrophils % (Manual) 86 % (45-75)  H


 


Lymphocytes % (Manual) 8 % (20-45)  L


 


Monocytes % (Manual) 6 % (1-10)  


 


Eosinophils % (Manual) 0 % (0-3)  


 


Basophils % (Manual) 0 % (0-2)  


 


Band Neutrophils 0 % (0-8)  


 


Platelet Estimate Adequate  


 


Platelet Morphology Normal  


 


Hypochromasia 2+  


 


Anisocytosis 1+  


 


Sodium Level


  140 MMOL/L


(136-145)


 


Potassium Level


  3.5 MMOL/L


(3.5-5.1)


 


Chloride Level


  107 MMOL/L


()


 


Carbon Dioxide Level


  23 MMOL/L


(21-32)


 


Anion Gap


  11 mmol/L


(5-15)


 


Blood Urea Nitrogen


  9 mg/dL (7-18)


 


 


Creatinine


  0.8 MG/DL


(0.55-1.30)


 


Estimat Glomerular Filtration


Rate  mL/min (>60)  


 


 


Glucose Level


  118 MG/DL


()  H


 


Calcium Level


  8.4 MG/DL


(8.5-10.1)  L


 


Phosphorus Level


  2.5 MG/DL


(2.5-4.9)


 


Magnesium Level


  1.2 MG/DL


(1.8-2.4)  L


 


Total Bilirubin


  0.9 MG/DL


(0.2-1.0)


 


Aspartate Amino Transf


(AST/SGOT) 41 U/L (15-37)


H


 


Alanine Aminotransferase


(ALT/SGPT) 26 U/L (12-78)


 


 


Alkaline Phosphatase


  51 U/L


()


 


Total Protein


  5.8 G/DL


(6.4-8.2)  L


 


Albumin


  2.8 G/DL


(3.4-5.0)  L


 


Globulin 3.0 g/dL  


 


Albumin/Globulin Ratio


  0.9 (1.0-2.7)


L

















Intake and Output  


 


 10/4/18 10/5/18





 19:00 07:00


 


Intake Total 880 ml 580 ml


 


Output Total 125 ml 


 


Balance 755 ml 580 ml


 


  


 


Intake Oral  480 ml


 


IV Total 880 ml 100 ml


 


Output Urine Total 100 ml 


 


Estimated Blood Loss 25 ml 


 


# Voids 4 








Objective


PHYSICAL EXAMINATION:


GENERAL:  The patient is well-developed and well-nourished thin-appearing


white female, in no apparent distress.


HEENT:  Eyes, pupils are equal and responsive to light and accommodation.


Extraocular movements are intact.


NECK:  Supple without lymphadenopathy.


CHEST:  Lungs are clear to auscultation bilaterally without wheezes or


rales.


CARDIOVASCULAR:  Regular rhythm and rate.  S1 and S2 are normal without


murmurs, rubs, or gallops.


ABDOMEN:  Soft, nontender, and nondistended.  Positive bowel sounds.  No


evidence of hepatosplenomegaly.  Currently, no rebound or guarding


noted.


EXTREMITIES:  Negative for clubbing, cyanosis, or edema.  Pain to palp right hip


RECTAL/GENITAL:  Refused.


NEUROLOGIC:  Cranial nerves II through XII are grossly intact without focal


deficits.  Motor strength is 5/5 bilaterally.  Deep tendon reflexes are 2+


plantar.





Assessment/Plan


Problem List:  


(1) Hypercholesterolemia


(2) CAD (coronary artery disease)


Assessment & Plan:  see dardiology note





(3) Fracture of femoral neck, right, closed


Assessment & Plan:  S/P ORIF Thurs 10/4/18-see ortho note.





(4) HTN (hypertension)


(5) Sick sinus syndrome


Assessment & Plan:  S/P pacemaker





(6) Pacemaker


Status:  progressing











Cheo Ortiz MD Oct 5, 2018 20:20

## 2018-10-05 NOTE — OPERATIVE NOTE - DICTATED
DATE OF OPERATION:  10/04/2018



PREOPERATIVE DIAGNOSIS:  Right displaced femoral neck fracture.



POSTOPERATIVE DIAGNOSIS:  Right displaced femoral neck fracture.



PROCEDURE:  Right hip hemiarthroplasty.



SURGEON:  Juan Antonio Bella M.D.



ANESTHESIA:  General.



INDICATION FOR PROCEDURE:  A pleasant female, who sustained mechanical

fall.  She was diagnosed with displaced femoral neck fracture.  Internal

fixation with right hip hemiarthroplasty.  Risks, limitations,

expectations, and complications of procedure were discussed in detail.

All questions were addressed.



DESCRIPTION OF PROCEDURE:  After informed consent was obtained, the patient

was brought to the operating room.  We placed the patient under general

anesthesia.  The patient was then carefully placed in lateral decubitus

position.  Right hip was prepped and draped in sterile manner.  Time-out

was performed.  Posterolateral approach to the posterior hip was

performed.  The head was removed.  Sequential broaching up to 10 was

performed.  A 10 standard 0 head-and-neck combo was selected.  Hip was

reduced, flexed to 120 degrees, 90 degrees flexion, and internal rotation

to 60 degrees.  Extension and external rotation were stable and this would

be clinically equal.  She had a little bit laxity to the soft tissue and

therefore, we felt that implant selection was reasonable.  Therefore, the trial

components were removed.  Final implants were packed into place.  Hip

flexed to 130 degrees, 90 degrees flexion, and internal rotation to 60

degrees.  Extension and external rotation were stable.  Leg lengths were

clinically equal.  Good soft tissue tensioning.  At this point, the skin

was closed with #1 Vicryl suture, 2-0 Vicryl suture, and 3-0 Monocryl

suture.  Steri-Strips and sterile dressing were applied.  The patient was

awoken and taken to recovery room with stable vital signs.



ESTIMATED BLOOD LOSS:  Minimal.



COMPLICATIONS:  None.



SPECIMENS:  None.



IMPLANTS:  Include consensus size 10 stem, high offset stem with a 0-47

bipolar head.









  ______________________________________________

  Juan Antonio Bella M.D.





DR:  Iron

D:  10/04/2018 18:38

T:  10/05/2018 04:43

JOB#:  [id number]

CC:



CECE

## 2018-10-05 NOTE — CONSULTATION
DATE OF CONSULTATION:  10/05/2018



INFECTIOUS DISEASE CONSULTATION



CONSULTING PHYSICIAN:  Jamey Dorman M.D.



REFERRING PHYSICIAN:  Juan Carlos Magana M.D. and Korin Junior M.D.



REASON FOR CONSULTATION:  Evaluation of the patient for probable urinary

tract infection.



HISTORY OF PRESENT ILLNESS:  The patient is an 78-year-old female with

multiple medical problems who was admitted to this medical center after

the patient had multiple falls at home and was found to have right femoral

neck fracture.  The patient underwent surgery yesterday.  The patient has

been confused since admission.  I am not sure what the patient's baseline

mental status; however, I tried to reach patient's family though

unfortunately I was not able to reach anyone.  According to latest notes

less than a month ago, the patient was alert and oriented.  The patient

has positive urine culture.  Infectious Diseases consultation requested

for further evaluation of the patient and antibiotic management.



PAST MEDICAL HISTORY:

1. Diabetes.

2. Hypertension.

3. Sick sinus syndrome.

4. ______ .

5. History of non-ST elevation.

6. History of coronary artery bypass surgery.

7. History of pacemaker in 2017.

8. History of pacemaker implantation.



ALLERGIES:  No known drug allergies.



SOCIAL HISTORY:  The patient lives at home.



FAMILY HISTORY:  Unavailable.



REVIEW OF SYSTEMS:  Unobtainable.



PHYSICAL EXAMINATION:

VITAL SIGNS:  Temperature 97.8, pulse 86, respiratory rate 18.

HEENT:  No pale conjunctivae.  No icterus.

NECK:  No lymphadenopathy.

CHEST:  Clear.

HEART:  S1 and S2.

ABDOMEN:  Soft.

EXTREMITIES:  Incision site on the right hip is healing.  No sign of

erythema.

NEUROLOGIC:  Confused.



LABORATORY AND DIAGNOSTIC DATA:  White blood 11.8, hemoglobin 9.2,

platelets 376.  UA showed positive nitrites, 5 to 10 white blood cells.

BUN 9 and creatinine 0.8.  ALT, AST, alkaline phosphatase unremarkable.

Urine culture is growing more than 100,000 colonies of E. coli.  Chest

x-ray, right perihilar infiltrate.  CT of pelvis subcapital right femoral

neck fracture.



ASSESSMENT:  The patient is an 78-year-old female with:



1. Probable urinary tract infection.  Urine culture, E. coli.

2. Probable pneumonia.

3. Probable sepsis (history of several falls and altered level of

consciousness), this may be due to an infectious process.

4. Right hip fracture status post ORIF.



PLAN:

1. We will start patient on Rocephin 1 g daily for coverage of possible

healthcare-associated pneumonia and ______ hospital-acquired

pneumonia/urinary tract infection.

2. Monitor CBC.

3. Monitor BMP.

4. Monitor cultures, urine and blood.

5. Based on the patient's clinical course and labs, we will do further

recommendations.

6. Monitor chest x-ray.



Thank you for this consultation.  I will follow the patient with you

during this hospital.









  ______________________________________________

  Jamey Dorman M.D.





DR:  Bushra

D:  10/05/2018 14:05

T:  10/05/2018 17:24

JOB#:  [id number]

CC:

## 2018-10-05 NOTE — PROGRESS NOTE
DATE:  10/03/2018



SUBJECTIVE:  No issues overnight.



OBJECTIVE:  The patient is resting comfortably at this time in bed.  Stable

vital signs.  Right hip examination shows internally rotated and

shortened.  Posterior calf is soft.  Dorsalis pedis +2.



ASSESSMENT:  Right displaced femoral neck fracture.



DISCUSSION:  At this point, we will follow up with Cardiology consult.  She

is optimized for surgery.  We will proceed accordingly.  She should be

able to have surgery tomorrow.  She will be made NPO after midnight in

anticipation of surgery.









  ______________________________________________

  Juan Antonio Bella M.D.





DR:  YUNG

D:  10/04/2018 17:36

T:  10/05/2018 03:52

JOB#:  [id number]

CC:

## 2018-10-06 VITALS — DIASTOLIC BLOOD PRESSURE: 70 MMHG | SYSTOLIC BLOOD PRESSURE: 130 MMHG

## 2018-10-06 VITALS — SYSTOLIC BLOOD PRESSURE: 138 MMHG | DIASTOLIC BLOOD PRESSURE: 82 MMHG

## 2018-10-06 VITALS — SYSTOLIC BLOOD PRESSURE: 146 MMHG | DIASTOLIC BLOOD PRESSURE: 84 MMHG

## 2018-10-06 VITALS — SYSTOLIC BLOOD PRESSURE: 137 MMHG | DIASTOLIC BLOOD PRESSURE: 82 MMHG

## 2018-10-06 VITALS — DIASTOLIC BLOOD PRESSURE: 78 MMHG | SYSTOLIC BLOOD PRESSURE: 136 MMHG

## 2018-10-06 LAB
% IRON SATURATION: 6 % (ref 15–50)
ADD MANUAL DIFF: NO
ALBUMIN SERPL-MCNC: 2.6 G/DL (ref 3.4–5)
ALBUMIN/GLOB SERPL: 0.8 {RATIO} (ref 1–2.7)
ALP SERPL-CCNC: 56 U/L (ref 46–116)
ALT SERPL-CCNC: 24 U/L (ref 12–78)
ANION GAP SERPL CALC-SCNC: 11 MMOL/L (ref 5–15)
APTT BLD: 27 SEC (ref 23–33)
AST SERPL-CCNC: 39 U/L (ref 15–37)
BASOPHILS NFR BLD AUTO: 0.5 % (ref 0–2)
BILIRUB SERPL-MCNC: 0.7 MG/DL (ref 0.2–1)
BUN SERPL-MCNC: 8 MG/DL (ref 7–18)
CALCIUM SERPL-MCNC: 8.5 MG/DL (ref 8.5–10.1)
CHLORIDE SERPL-SCNC: 108 MMOL/L (ref 98–107)
CO2 SERPL-SCNC: 24 MMOL/L (ref 21–32)
CREAT SERPL-MCNC: 0.8 MG/DL (ref 0.55–1.3)
EOSINOPHIL NFR BLD AUTO: 0.1 % (ref 0–3)
ERYTHROCYTE [DISTWIDTH] IN BLOOD BY AUTOMATED COUNT: 17.1 % (ref 11.6–14.8)
GLOBULIN SER-MCNC: 3.3 G/DL
HCT VFR BLD CALC: 32.7 % (ref 37–47)
HGB BLD-MCNC: 10.5 G/DL (ref 12–16)
INR PPP: 1.1 (ref 0.9–1.1)
IRON SERPL-MCNC: 14 UG/DL (ref 50–175)
LDH SERPL L TO P-CCNC: 461 U/L (ref 81–234)
LYMPHOCYTES NFR BLD AUTO: 9.3 % (ref 20–45)
MCV RBC AUTO: 91 FL (ref 80–99)
MONOCYTES NFR BLD AUTO: 5.8 % (ref 1–10)
NEUTROPHILS NFR BLD AUTO: 84.3 % (ref 45–75)
PHOSPHATE SERPL-MCNC: 2.5 MG/DL (ref 2.5–4.9)
PLATELET # BLD: 324 K/UL (ref 150–450)
POTASSIUM SERPL-SCNC: 3.4 MMOL/L (ref 3.5–5.1)
RBC # BLD AUTO: 3.58 M/UL (ref 4.2–5.4)
SODIUM SERPL-SCNC: 143 MMOL/L (ref 136–145)
TIBC SERPL-MCNC: 216 UG/DL (ref 250–450)
UNSATURATED IRON BINDING: 202 UG/DL (ref 112–346)
WBC # BLD AUTO: 11.8 K/UL (ref 4.8–10.8)

## 2018-10-06 RX ADMIN — CLONAZEPAM SCH MG: 0.5 TABLET ORAL at 23:44

## 2018-10-06 RX ADMIN — LOSARTAN POTASSIUM SCH MG: 25 TABLET, FILM COATED ORAL at 09:22

## 2018-10-06 RX ADMIN — CLONAZEPAM SCH MG: 0.5 TABLET ORAL at 05:00

## 2018-10-06 RX ADMIN — HEPARIN SODIUM SCH UNITS: 5000 INJECTION INTRAVENOUS; SUBCUTANEOUS at 09:24

## 2018-10-06 RX ADMIN — DOCUSATE SODIUM SCH MG: 100 CAPSULE, LIQUID FILLED ORAL at 17:04

## 2018-10-06 RX ADMIN — DOCUSATE SODIUM SCH MG: 100 CAPSULE, LIQUID FILLED ORAL at 09:22

## 2018-10-06 RX ADMIN — TRAZODONE HYDROCHLORIDE SCH MG: 50 TABLET ORAL at 21:55

## 2018-10-06 RX ADMIN — HYDROCODONE BITARTRATE AND ACETAMINOPHEN PRN TAB: 5; 325 TABLET ORAL at 05:00

## 2018-10-06 RX ADMIN — CLONAZEPAM SCH MG: 0.5 TABLET ORAL at 17:04

## 2018-10-06 RX ADMIN — CLONAZEPAM SCH MG: 0.5 TABLET ORAL at 12:45

## 2018-10-06 RX ADMIN — ATENOLOL SCH MG: 25 TABLET ORAL at 09:23

## 2018-10-06 RX ADMIN — SODIUM CHLORIDE SCH MLS/HR: 0.9 INJECTION INTRAVENOUS at 12:46

## 2018-10-06 RX ADMIN — HEPARIN SODIUM SCH UNITS: 5000 INJECTION INTRAVENOUS; SUBCUTANEOUS at 21:56

## 2018-10-06 RX ADMIN — DOCUSATE SODIUM SCH MG: 100 CAPSULE, LIQUID FILLED ORAL at 12:54

## 2018-10-06 RX ADMIN — DEXTROSE, SODIUM CHLORIDE, AND POTASSIUM CHLORIDE SCH MLS/HR: 5; .45; .15 INJECTION INTRAVENOUS at 12:55

## 2018-10-06 NOTE — INTERNAL MED PROGRESS NOTE
Subjective


Date of Service:  Oct 6, 2018


Physician Name


Cheo Ortiz


Attending Physician


Juan Carlos Magana MD





Current Medications








 Medications


  (Trade)  Dose


 Ordered  Sig/Violette


 Route


 PRN Reason  Start Time


 Stop Time Status Last Admin


Dose Admin


 


 Acetaminophen


  (Tylenol)  650 mg  Q4H  PRN


 ORAL


 fever  10/2/18 14:30


 11/1/18 14:29   


 


 


 Acetaminophen/


 Hydrocodone Bitart


  (Norco 5/325)  1 tab  Q4H  PRN


 ORAL


 Moderate Pain (Pain Scale 4-6)  10/5/18 09:06


 10/12/18 09:05   


 


 


 Acetaminophen/


 Hydrocodone Bitart


  (Norco 5/325)  1 tab  Q6H  PRN


 ORAL


 For PAIN 1-3  10/2/18 14:30


 10/9/18 14:29  10/6/18 05:00


 


 


 Al Hydroxide/Mg


 Hydroxide


  (Mylanta II)  30 ml  Q6H  PRN


 ORAL


 dyspepsia  10/2/18 14:23


 11/1/18 14:22   


 


 


 Atenolol


  (Tenormin)  25 mg  DAILY


 ORAL


   10/3/18 09:00


 11/2/18 08:59  10/6/18 09:23


 


 


 Ceftriaxone


 Sodium 1 gm/


 Dextrose  55 ml @ 


 110 mls/hr  Q24H


 IVPB


   10/6/18 12:30


 10/13/18 12:29  10/6/18 12:46


 


 


 Clonazepam


  (KlonoPIN)  0.5 mg  Q6HR


 ORAL


   10/2/18 18:00


 10/9/18 17:59  10/6/18 17:04


 


 


 Dextrose


  (Dextrose 50%)  25 ml  Q30M  PRN


 IV


 Hypoglycemia  10/2/18 14:30


 11/1/18 14:25   


 


 


 Dextrose


  (Dextrose 50%)  50 ml  Q30M  PRN


 IV


 hypoglycemia  10/2/18 14:30


 11/1/18 14:29   


 


 


 Docusate Sodium


  (Colace)  100 mg  THREE TIMES A  DAY


 ORAL


   10/4/18 18:00


 11/3/18 17:59  10/6/18 17:04


 


 


 Ferrous Sulfate


  (Feosol)  325 mg  THREE TIMES A  DAY


 ORAL


   10/4/18 18:00


 11/3/18 17:59  10/6/18 17:04


 


 


 Haloperidol


 Lactate


  (Haldol)  5 mg  Q4H  PRN


 IM


 Agitation  10/2/18 18:45


 11/1/18 18:44  10/5/18 09:03


 


 


 Heparin Sodium


  (Porcine)


  (Heparin 5000


 units/ml)  5,000 units  EVERY 12  HOURS


 SUBQ


   10/2/18 21:00


 11/1/18 20:59  10/6/18 09:24


 


 


 Lorazepam


  (Ativan 2mg/ml


 1ml)  0.5 mg  Q4H  PRN


 IV


 For Anxiety  10/2/18 14:30


 10/9/18 14:29  10/5/18 23:15


 


 


 Losartan Potassium


  (Cozaar)  25 mg  DAILY


 ORAL


   10/3/18 09:00


 11/2/18 08:59  10/6/18 09:22


 


 


 Magnesium


 Hydroxide


  (Mom)  30 ml  DAILYPRN  PRN


 ORAL


 Constipation  10/4/18 17:30


 11/3/18 17:29   


 


 


 Morphine Sulfate


  (Morphine


 Sulfate)  4 mg  Q4H  PRN


 IVP


 For Pain 7-10  10/2/18 14:30


 10/9/18 14:29  10/5/18 11:14


 


 


 Ondansetron HCl


  (Zofran)  4 mg  Q6H  PRN


 IVP


 Nausea & Vomiting  10/2/18 14:30


 11/1/18 14:29   


 


 


 Polyethylene


 Glycol


  (Miralax)  17 gm  HSPRN  PRN


 ORAL


 Constipation  10/2/18 14:30


 11/1/18 14:29   


 


 


 Potassium Chloride


  (K-Dur)  20 meq  DAILY


 ORAL


   10/6/18 21:00


 11/5/18 20:59   


 


 


 Prochlorperazine


  (Compazine)  10 mg  Q6H  PRN


 IVP


 Nausea & Vomiting  10/4/18 17:30


 11/3/18 17:29   


 


 


 Quetiapine


 Fumarate


  (SEROquel)  12.5 mg  Q4H  PRN


 ORAL


 agitation  10/3/18 14:45


 11/2/18 14:44   


 


 


 Quetiapine


 Fumarate


  (SEROquel)  25 mg  QHS


 ORAL


   10/3/18 00:30


 11/2/18 00:29  10/5/18 21:24


 


 


 Trazodone HCl


  (Desyrel)  50 mg  BEDTIME


 ORAL


   10/2/18 21:00


 11/1/18 20:59  10/5/18 21:24


 


 


 Zolpidem Tartrate


  (Ambien)  5 mg  HSPRN  PRN


 ORAL


 Insomnia  10/2/18 14:30


 10/9/18 14:29  10/4/18 00:14


 








Allergies:  


Coded Allergies:  


     No Known Allergies (Unverified , 3/26/14)


ROS Limited/Unobtainable:  Yes


Subjective


79 YO F admitted with right hip pain; S/P fall.  Now right femoral neck 

fracture.  Cover for Int Med-Dr Magana.  S/P ORIF right hip fracture 10/4/18





Objective





Last Vital Signs








  Date Time  Temp Pulse Resp B/P (MAP) Pulse Ox O2 Delivery O2 Flow Rate FiO2


 


10/6/18 16:00 98.8 98 18 136/78 (97)    





 98.8       


 


10/6/18 12:00     96   


 


10/6/18 07:46      Room Air  


 


10/4/18 19:40       3 











Laboratory Tests








Test


  10/6/18


07:10


 


White Blood Count


  11.8 K/UL


(4.8-10.8)  H


 


Red Blood Count


  3.58 M/UL


(4.20-5.40)  L


 


Hemoglobin


  10.5 G/DL


(12.0-16.0)  L


 


Hematocrit


  32.7 %


(37.0-47.0)  L


 


Mean Corpuscular Volume 91 FL (80-99)  


 


Mean Corpuscular Hemoglobin


  29.3 PG


(27.0-31.0)


 


Mean Corpuscular Hemoglobin


Concent 32.0 G/DL


(32.0-36.0)


 


Red Cell Distribution Width


  17.1 %


(11.6-14.8)  H


 


Platelet Count


  324 K/UL


(150-450)


 


Mean Platelet Volume


  6.1 FL


(6.5-10.1)  L


 


Neutrophils (%) (Auto)


  84.3 %


(45.0-75.0)  H


 


Lymphocytes (%) (Auto)


  9.3 %


(20.0-45.0)  L


 


Monocytes (%) (Auto)


  5.8 %


(1.0-10.0)


 


Eosinophils (%) (Auto)


  0.1 %


(0.0-3.0)


 


Basophils (%) (Auto)


  0.5 %


(0.0-2.0)


 


Differential Total Cells


Counted 100  


 


 


Neutrophils % (Manual) 88 % (45-75)  H


 


Lymphocytes % (Manual) 8 % (20-45)  L


 


Monocytes % (Manual) 4 % (1-10)  


 


Eosinophils % (Manual) 0 % (0-3)  


 


Basophils % (Manual) 0 % (0-2)  


 


Band Neutrophils 0 % (0-8)  


 


Platelet Estimate Adequate  


 


Platelet Morphology Normal  


 


Anisocytosis 1+  


 


Erythrocyte Sedimentation Rate


  22 MM/HR


(0-30)


 


Reticulocyte Count


  1.8 %


(0.0-2.0)


 


Prothrombin Time


  12.0 SEC


(9.30-11.50)  H


 


Prothromb Time International


Ratio 1.1 (0.9-1.1)  


 


 


Activated Partial


Thromboplast Time 27 SEC (23-33)


 


 


Sodium Level


  143 MMOL/L


(136-145)


 


Potassium Level


  3.4 MMOL/L


(3.5-5.1)  L


 


Chloride Level


  108 MMOL/L


()  H


 


Carbon Dioxide Level


  24 MMOL/L


(21-32)


 


Anion Gap


  11 mmol/L


(5-15)


 


Blood Urea Nitrogen


  8 mg/dL (7-18)


 


 


Creatinine


  0.8 MG/DL


(0.55-1.30)


 


Estimat Glomerular Filtration


Rate  mL/min (>60)  


 


 


Glucose Level


  135 MG/DL


()  H


 


Calcium Level


  8.5 MG/DL


(8.5-10.1)


 


Phosphorus Level


  2.5 MG/DL


(2.5-4.9)


 


Magnesium Level


  1.4 MG/DL


(1.8-2.4)  L


 


Iron Level


  14 ug/dL


()  L


 


Total Iron Binding Capacity


  216 ug/dL


(250-450)  L


 


Percent Iron Saturation 6 % (15-50)  L


 


Unsaturated Iron Binding


  202 ug/dL


(112-346)


 


Total Bilirubin


  0.7 MG/DL


(0.2-1.0)


 


Aspartate Amino Transf


(AST/SGOT) 39 U/L (15-37)


H


 


Alanine Aminotransferase


(ALT/SGPT) 24 U/L (12-78)


 


 


Alkaline Phosphatase


  56 U/L


()


 


Lactate Dehydrogenase


  461 U/L


()  H


 


Total Protein


  5.9 G/DL


(6.4-8.2)  L


 


Albumin


  2.6 G/DL


(3.4-5.0)  L


 


Globulin 3.3 g/dL  


 


Albumin/Globulin Ratio


  0.8 (1.0-2.7)


L


 


Carcinoembryonic Antigen Pending  


 


Vitamin B12 Level


  959 PG/ML


(193-986)


 


Folate


  21.6 NG/ML


(8.6-58.9)


 


Hepatitis A IgM Antibody Pending  


 


Hepatitis B Surface Antigen Pending  


 


Hepatitis B Core IgM Antibody Pending  


 


Hepatitis C Antibody Pending  


 


HIV (1&2) Antibody Rapid


  Negative


(NEGATIVE)

















Intake and Output  


 


 10/5/18 10/6/18





 19:00 07:00


 


Intake Total 585 ml 1375 ml


 


Balance 585 ml 1375 ml


 


  


 


Intake Oral 510 ml 550 ml


 


IV Total 75 ml 825 ml


 


# Voids 2 4








Objective


PHYSICAL EXAMINATION:


GENERAL:  The patient is well-developed and well-nourished thin-appearing


white female, in no apparent distress.


HEENT:  Eyes, pupils are equal and responsive to light and accommodation.


Extraocular movements are intact.


NECK:  Supple without lymphadenopathy.


CHEST:  Lungs are clear to auscultation bilaterally without wheezes or


rales.


CARDIOVASCULAR:  Regular rhythm and rate.  S1 and S2 are normal without


murmurs, rubs, or gallops.


ABDOMEN:  Soft, nontender, and nondistended.  Positive bowel sounds.  No


evidence of hepatosplenomegaly.  Currently, no rebound or guarding


noted.


EXTREMITIES:  Negative for clubbing, cyanosis, or edema.  Pain to palp right hip


RECTAL/GENITAL:  Refused.


NEUROLOGIC:  Cranial nerves II through XII are grossly intact without focal


deficits.  Motor strength is 5/5 bilaterally.  Deep tendon reflexes are 2+


plantar.





Assessment/Plan


Problem List:  


(1) Hypercholesterolemia


(2) CAD (coronary artery disease)


Assessment & Plan:  see dardiology note





(3) Fracture of femoral neck, right, closed


Assessment & Plan:  S/P ORIF Thurs 10/4/18-see ortho note.





(4) HTN (hypertension)


(5) Sick sinus syndrome


Assessment & Plan:  S/P pacemaker





(6) Pacemaker


(7) UTI (urinary tract infection)


Assessment & Plan:  E.Coli.  Continue rocephin per ID





(8) E coli infection


Status:  progressing


Assessment/Plan


Discharge planning:  SNF vs Acute rehab











Cheo Ortiz MD Oct 6, 2018 17:31

## 2018-10-06 NOTE — PULMONOLOGY PROGRESS NOTE
Assessment/Plan


Problems:  


(1) Fracture of femoral neck, right, closed


(2) Pacemaker


(3) HTN (hypertension)


(4) CAD (coronary artery disease)


(5) Sick sinus syndrome


(6) Hip fracture, right


(7) Hypercholesterolemia


Assessment/Plan


confused


doing better


surgery done yesterday


titrate cardiology meds


symptomatic treatment


pt/ot 


dc planning





Subjective


ROS Limited/Unobtainable:  No


Constitutional:  Reports: no symptoms


HEENT:  Repors: no symptoms


Allergies:  


Coded Allergies:  


     No Known Allergies (Unverified , 3/26/14)





Objective





Last 24 Hour Vital Signs








  Date Time  Temp Pulse Resp B/P (MAP) Pulse Ox O2 Delivery O2 Flow Rate FiO2


 


10/6/18 09:23  94  138/62    


 


10/6/18 09:22    138/62    


 


10/6/18 08:00 98.7 94 22 137/82 (100) 100   





 98.7       


 


10/6/18 07:46      Room Air  


 


10/6/18 05:30 98.9       


 


10/6/18 04:00 98.9 101 20 138/82 (100) 98   





 98.9       


 


10/5/18 23:30 98.2 104 20 129/76 (93) 100   





 98.2       


 


10/5/18 23:27      Room Air  


 


10/5/18 20:22 98.2 117 20 158/96 (116) 100   





 98.2       

















Intake and Output  


 


 10/5/18 10/6/18





 19:00 07:00


 


Intake Total 585 ml 1375 ml


 


Balance 585 ml 1375 ml


 


  


 


Intake Oral 510 ml 550 ml


 


IV Total 75 ml 825 ml


 


# Voids 2 4








Objective


General Appearance:  WD/WN


HEENT:  normocephalic, anicteric


Breasts:  no masses


Cardiovascular:  normal peripheral pulses, regular rhythm


Abdomen:  soft, non tender, no organomegaly


Extremities:  no cyanosis


Skin:  no rash


Laboratory Tests


10/6/18 07:10: 


White Blood Count 11.8H, Red Blood Count 3.58L, Hemoglobin 10.5L, Hematocrit 

32.7L, Mean Corpuscular Volume 91, Mean Corpuscular Hemoglobin 29.3, Mean 

Corpuscular Hemoglobin Concent 32.0, Red Cell Distribution Width 17.1H, 

Platelet Count 324, Mean Platelet Volume 6.1L, Neutrophils (%) (Auto) 84.3H, 

Lymphocytes (%) (Auto) 9.3L, Monocytes (%) (Auto) 5.8, Eosinophils (%) (Auto) 

0.1, Basophils (%) (Auto) 0.5, Neutrophils % (Manual) [Pending], Lymphocytes % (

Manual) [Pending], Platelet Estimate [Pending], Platelet Morphology [Pending], 

Erythrocyte Sedimentation Rate 22, Reticulocyte Count [Pending], Prothrombin 

Time 12.0H, Prothromb Time International Ratio 1.1, Activated Partial 

Thromboplast Time 27, Sodium Level 143, Potassium Level 3.4L, Chloride Level 

108H, Carbon Dioxide Level 24, Anion Gap 11, Blood Urea Nitrogen 8, Creatinine 

0.8, Estimat Glomerular Filtration Rate , Glucose Level 135H, Calcium Level 8.5

, Phosphorus Level 2.5, Magnesium Level 1.4L, Iron Level 14L, Total Iron 

Binding Capacity 216L, Percent Iron Saturation 6L, Unsaturated Iron Binding 202

, Total Bilirubin 0.7, Aspartate Amino Transf (AST/SGOT) 39H, Alanine 

Aminotransferase (ALT/SGPT) 24, Alkaline Phosphatase 56, Lactate Dehydrogenase 

461H, Total Protein 5.9L, Albumin 2.6L, Globulin 3.3, Albumin/Globulin Ratio 

0.8L, Carcinoembryonic Antigen [Pending], Vitamin B12 Level 959, Folate 21.6, 

Hepatitis A IgM Antibody [Pending], Hepatitis B Surface Antigen [Pending], 

Hepatitis B Core IgM Antibody [Pending], Hepatitis C Antibody [Pending], HIV (1&

2) Antibody Rapid [Pending]





Current Medications








 Medications


  (Trade)  Dose


 Ordered  Sig/Violette


 Route


 PRN Reason  Start Time


 Stop Time Status Last Admin


Dose Admin


 


 Acetaminophen


  (Tylenol)  650 mg  Q4H  PRN


 ORAL


 fever  10/2/18 14:30


 11/1/18 14:29   


 


 


 Acetaminophen/


 Hydrocodone Bitart


  (Norco 5/325)  1 tab  Q4H  PRN


 ORAL


 Moderate Pain (Pain Scale 4-6)  10/5/18 09:06


 10/12/18 09:05   


 


 


 Acetaminophen/


 Hydrocodone Bitart


  (Norco 5/325)  1 tab  Q6H  PRN


 ORAL


 For PAIN 1-3  10/2/18 14:30


 10/9/18 14:29  10/6/18 05:00


 


 


 Al Hydroxide/Mg


 Hydroxide


  (Mylanta II)  30 ml  Q6H  PRN


 ORAL


 dyspepsia  10/2/18 14:23


 11/1/18 14:22   


 


 


 Atenolol


  (Tenormin)  25 mg  DAILY


 ORAL


   10/3/18 09:00


 11/2/18 08:59  10/6/18 09:23


 


 


 Ceftriaxone


 Sodium 1 gm/


 Dextrose  55 ml @ 


 110 mls/hr  Q24H


 IVPB


   10/6/18 12:30


 10/13/18 12:29   


 


 


 Clonazepam


  (KlonoPIN)  0.5 mg  Q6HR


 ORAL


   10/2/18 18:00


 10/9/18 17:59  10/6/18 05:00


 


 


 Dextrose


  (Dextrose 50%)  25 ml  Q30M  PRN


 IV


 Hypoglycemia  10/2/18 14:30


 11/1/18 14:25   


 


 


 Dextrose


  (Dextrose 50%)  50 ml  Q30M  PRN


 IV


 hypoglycemia  10/2/18 14:30


 11/1/18 14:29   


 


 


 Dextrose/


 Electrolytes  1,000 ml @ 


 75 mls/hr  [id number]


 IV


   10/4/18 21:00


 11/3/18 20:59  10/5/18 23:16


 


 


 Docusate Sodium


  (Colace)  100 mg  THREE TIMES A  DAY


 ORAL


   10/4/18 18:00


 11/3/18 17:59  10/6/18 09:22


 


 


 Ferrous Sulfate


  (Feosol)  325 mg  THREE TIMES A  DAY


 ORAL


   10/4/18 18:00


 11/3/18 17:59  10/6/18 09:22


 


 


 Haloperidol


 Lactate


  (Haldol)  5 mg  Q4H  PRN


 IM


 Agitation  10/2/18 18:45


 11/1/18 18:44  10/5/18 09:03


 


 


 Heparin Sodium


  (Porcine)


  (Heparin 5000


 units/ml)  5,000 units  EVERY 12  HOURS


 SUBQ


   10/2/18 21:00


 11/1/18 20:59  10/6/18 09:24


 


 


 Lorazepam


  (Ativan 2mg/ml


 1ml)  0.5 mg  Q4H  PRN


 IV


 For Anxiety  10/2/18 14:30


 10/9/18 14:29  10/5/18 23:15


 


 


 Losartan Potassium


  (Cozaar)  25 mg  DAILY


 ORAL


   10/3/18 09:00


 11/2/18 08:59  10/6/18 09:22


 


 


 Magnesium


 Hydroxide


  (Mom)  30 ml  DAILYPRN  PRN


 ORAL


 Constipation  10/4/18 17:30


 11/3/18 17:29   


 


 


 Morphine Sulfate


  (Morphine


 Sulfate)  4 mg  Q4H  PRN


 IVP


 For Pain 7-10  10/2/18 14:30


 10/9/18 14:29  10/5/18 11:14


 


 


 Ondansetron HCl


  (Zofran)  4 mg  Q6H  PRN


 IVP


 Nausea & Vomiting  10/2/18 14:30


 11/1/18 14:29   


 


 


 Polyethylene


 Glycol


  (Miralax)  17 gm  HSPRN  PRN


 ORAL


 Constipation  10/2/18 14:30


 11/1/18 14:29   


 


 


 Prochlorperazine


  (Compazine)  10 mg  Q6H  PRN


 IVP


 Nausea & Vomiting  10/4/18 17:30


 11/3/18 17:29   


 


 


 Quetiapine


 Fumarate


  (SEROquel)  12.5 mg  Q4H  PRN


 ORAL


 agitation  10/3/18 14:45


 11/2/18 14:44   


 


 


 Quetiapine


 Fumarate


  (SEROquel)  25 mg  QHS


 ORAL


   10/3/18 00:30


 11/2/18 00:29  10/5/18 21:24


 


 


 Trazodone HCl


  (Desyrel)  50 mg  BEDTIME


 ORAL


   10/2/18 21:00


 11/1/18 20:59  10/5/18 21:24


 


 


 Zolpidem Tartrate


  (Ambien)  5 mg  HSPRN  PRN


 ORAL


 Insomnia  10/2/18 14:30


 10/9/18 14:29  10/4/18 00:14


 

















Korin Junior MD Oct 6, 2018 11:48

## 2018-10-06 NOTE — GENERAL PROGRESS NOTE
Assessment/Plan


Problem List:  


(1) Psychosis


ICD Codes:  F29 - Unspecified psychosis not due to a substance or known 

physiological condition


SNOMED:  54260884


(2) encephalopathy due to 


Status:  stable, progressing


Assessment/Plan


encephalopathy due to metabolic condition





-seroquel prn


-provided ro/st





Subjective


Neurologic/Psychiatric:  Reports: anxiety


Allergies:  


Coded Allergies:  


     No Known Allergies (Unverified , 3/26/14)





Objective





Last 24 Hour Vital Signs








  Date Time  Temp Pulse Resp B/P (MAP) Pulse Ox O2 Delivery O2 Flow Rate FiO2


 


10/6/18 21:00      Room Air  


 


10/6/18 20:00 98.1 97 19 130/70 (90) 98   





 98.1       


 


10/6/18 16:00 98.8 98 18 136/78 (97)    





 98.8       


 


10/6/18 12:00 98.6 112 20 146/84 (104) 96   





 98.6       


 


10/6/18 09:23  94  138/62    


 


10/6/18 09:22    138/62    


 


10/6/18 08:00 98.7 94 22 137/82 (100) 100   





 98.7       


 


10/6/18 07:46      Room Air  


 


10/6/18 05:30 98.9       


 


10/6/18 04:00 98.9 101 20 138/82 (100) 98   





 98.9       

















Intake and Output  


 


 10/5/18 10/6/18





 19:00 07:00


 


Intake Total 585 ml 1375 ml


 


Balance 585 ml 1375 ml


 


  


 


Intake Oral 510 ml 550 ml


 


IV Total 75 ml 825 ml


 


# Voids 2 4








Laboratory Tests


10/6/18 07:10: 


White Blood Count 11.8H, Red Blood Count 3.58L, Hemoglobin 10.5L, Hematocrit 

32.7L, Mean Corpuscular Volume 91, Mean Corpuscular Hemoglobin 29.3, Mean 

Corpuscular Hemoglobin Concent 32.0, Red Cell Distribution Width 17.1H, 

Platelet Count 324, Mean Platelet Volume 6.1L, Neutrophils (%) (Auto) 84.3H, 

Lymphocytes (%) (Auto) 9.3L, Monocytes (%) (Auto) 5.8, Eosinophils (%) (Auto) 

0.1, Basophils (%) (Auto) 0.5, Differential Total Cells Counted 100, 

Neutrophils % (Manual) 88H, Lymphocytes % (Manual) 8L, Monocytes % (Manual) 4, 

Eosinophils % (Manual) 0, Basophils % (Manual) 0, Band Neutrophils 0, Platelet 

Estimate Adequate, Platelet Morphology Normal, Anisocytosis 1+, Erythrocyte 

Sedimentation Rate 22, Reticulocyte Count 1.8, Prothrombin Time 12.0H, 

Prothromb Time International Ratio 1.1, Activated Partial Thromboplast Time 27, 

Sodium Level 143, Potassium Level 3.4L, Chloride Level 108H, Carbon Dioxide 

Level 24, Anion Gap 11, Blood Urea Nitrogen 8, Creatinine 0.8, Estimat 

Glomerular Filtration Rate , Glucose Level 135H, Calcium Level 8.5, Phosphorus 

Level 2.5, Magnesium Level 1.4L, Iron Level 14L, Total Iron Binding Capacity 

216L, Percent Iron Saturation 6L, Unsaturated Iron Binding 202, Total Bilirubin 

0.7, Aspartate Amino Transf (AST/SGOT) 39H, Alanine Aminotransferase (ALT/SGPT) 

24, Alkaline Phosphatase 56, Lactate Dehydrogenase 461H, Total Protein 5.9L, 

Albumin 2.6L, Globulin 3.3, Albumin/Globulin Ratio 0.8L, Carcinoembryonic 

Antigen [Pending], Vitamin B12 Level 959, Folate 21.6, Hepatitis A IgM Antibody 

[Pending], Hepatitis B Surface Antigen [Pending], Hepatitis B Core IgM Antibody 

[Pending], Hepatitis C Antibody [Pending], HIV (1&2) Antibody Rapid Negative


Height (Feet):  5


Height (Inches):  6.00


Weight (Pounds):  120


General Appearance:  no apparent distress, alert, confused











Josh Nunez MD Oct 6, 2018 23:49

## 2018-10-06 NOTE — PSYCH CONSULT PROGRESS NOTE
Psych Consult Progress Note


Vital Signs





Last 24 Hour Vital Signs








  Date Time  Temp Pulse Resp B/P (MAP) Pulse Ox O2 Delivery O2 Flow Rate FiO2


 


10/6/18 21:00      Room Air  


 


10/6/18 20:00 98.1 97 19 130/70 (90) 98   





 98.1       


 


10/6/18 16:00 98.8 98 18 136/78 (97)    





 98.8       


 


10/6/18 12:00 98.6 112 20 146/84 (104) 96   





 98.6       


 


10/6/18 09:23  94  138/62    


 


10/6/18 09:22    138/62    


 


10/6/18 08:00 98.7 94 22 137/82 (100) 100   





 98.7       


 


10/6/18 07:46      Room Air  


 


10/6/18 05:30 98.9       


 


10/6/18 04:00 98.9 101 20 138/82 (100) 98   





 98.9       








Labs





Laboratory Tests








Test


  10/6/18


07:10


 


White Blood Count


  11.8 K/UL


(4.8-10.8)  H


 


Red Blood Count


  3.58 M/UL


(4.20-5.40)  L


 


Hemoglobin


  10.5 G/DL


(12.0-16.0)  L


 


Hematocrit


  32.7 %


(37.0-47.0)  L


 


Mean Corpuscular Volume 91 FL (80-99)  


 


Mean Corpuscular Hemoglobin


  29.3 PG


(27.0-31.0)


 


Mean Corpuscular Hemoglobin


Concent 32.0 G/DL


(32.0-36.0)


 


Red Cell Distribution Width


  17.1 %


(11.6-14.8)  H


 


Platelet Count


  324 K/UL


(150-450)


 


Mean Platelet Volume


  6.1 FL


(6.5-10.1)  L


 


Neutrophils (%) (Auto)


  84.3 %


(45.0-75.0)  H


 


Lymphocytes (%) (Auto)


  9.3 %


(20.0-45.0)  L


 


Monocytes (%) (Auto)


  5.8 %


(1.0-10.0)


 


Eosinophils (%) (Auto)


  0.1 %


(0.0-3.0)


 


Basophils (%) (Auto)


  0.5 %


(0.0-2.0)


 


Differential Total Cells


Counted 100  


 


 


Neutrophils % (Manual) 88 % (45-75)  H


 


Lymphocytes % (Manual) 8 % (20-45)  L


 


Monocytes % (Manual) 4 % (1-10)  


 


Eosinophils % (Manual) 0 % (0-3)  


 


Basophils % (Manual) 0 % (0-2)  


 


Band Neutrophils 0 % (0-8)  


 


Platelet Estimate Adequate  


 


Platelet Morphology Normal  


 


Anisocytosis 1+  


 


Erythrocyte Sedimentation Rate


  22 MM/HR


(0-30)


 


Reticulocyte Count


  1.8 %


(0.0-2.0)


 


Prothrombin Time


  12.0 SEC


(9.30-11.50)  H


 


Prothromb Time International


Ratio 1.1 (0.9-1.1)  


 


 


Activated Partial


Thromboplast Time 27 SEC (23-33)


 


 


Sodium Level


  143 MMOL/L


(136-145)


 


Potassium Level


  3.4 MMOL/L


(3.5-5.1)  L


 


Chloride Level


  108 MMOL/L


()  H


 


Carbon Dioxide Level


  24 MMOL/L


(21-32)


 


Anion Gap


  11 mmol/L


(5-15)


 


Blood Urea Nitrogen


  8 mg/dL (7-18)


 


 


Creatinine


  0.8 MG/DL


(0.55-1.30)


 


Estimat Glomerular Filtration


Rate  mL/min (>60)  


 


 


Glucose Level


  135 MG/DL


()  H


 


Calcium Level


  8.5 MG/DL


(8.5-10.1)


 


Phosphorus Level


  2.5 MG/DL


(2.5-4.9)


 


Magnesium Level


  1.4 MG/DL


(1.8-2.4)  L


 


Iron Level


  14 ug/dL


()  L


 


Total Iron Binding Capacity


  216 ug/dL


(250-450)  L


 


Percent Iron Saturation 6 % (15-50)  L


 


Unsaturated Iron Binding


  202 ug/dL


(112-346)


 


Total Bilirubin


  0.7 MG/DL


(0.2-1.0)


 


Aspartate Amino Transf


(AST/SGOT) 39 U/L (15-37)


H


 


Alanine Aminotransferase


(ALT/SGPT) 24 U/L (12-78)


 


 


Alkaline Phosphatase


  56 U/L


()


 


Lactate Dehydrogenase


  461 U/L


()  H


 


Total Protein


  5.9 G/DL


(6.4-8.2)  L


 


Albumin


  2.6 G/DL


(3.4-5.0)  L


 


Globulin 3.3 g/dL  


 


Albumin/Globulin Ratio


  0.8 (1.0-2.7)


L


 


Carcinoembryonic Antigen Pending  


 


Vitamin B12 Level


  959 PG/ML


(193-986)


 


Folate


  21.6 NG/ML


(8.6-58.9)


 


Hepatitis A IgM Antibody Pending  


 


Hepatitis B Surface Antigen Pending  


 


Hepatitis B Core IgM Antibody Pending  


 


Hepatitis C Antibody Pending  


 


HIV (1&2) Antibody Rapid


  Negative


(NEGATIVE)








Medications





Current Medications








 Medications


  (Trade)  Dose


 Ordered  Sig/Violette


 Route


 PRN Reason  Start Time


 Stop Time Status Last Admin


Dose Admin


 


 Acetaminophen


  (Tylenol)  650 mg  Q4H  PRN


 ORAL


 fever  10/2/18 14:30


 11/1/18 14:29   


 


 


 Acetaminophen/


 Hydrocodone Bitart


  (Norco 5/325)  1 tab  Q4H  PRN


 ORAL


 Moderate Pain (Pain Scale 4-6)  10/5/18 09:06


 10/12/18 09:05   


 


 


 Acetaminophen/


 Hydrocodone Bitart


  (Norco 5/325)  1 tab  Q6H  PRN


 ORAL


 For PAIN 1-3  10/2/18 14:30


 10/9/18 14:29  10/6/18 05:00


 


 


 Al Hydroxide/Mg


 Hydroxide


  (Mylanta II)  30 ml  Q6H  PRN


 ORAL


 dyspepsia  10/2/18 14:23


 11/1/18 14:22   


 


 


 Atenolol


  (Tenormin)  25 mg  DAILY


 ORAL


   10/3/18 09:00


 11/2/18 08:59  10/6/18 09:23


 


 


 Ceftriaxone


 Sodium 1 gm/


 Dextrose  55 ml @ 


 110 mls/hr  Q24H


 IVPB


   10/6/18 12:30


 10/13/18 12:29  10/6/18 12:46


 


 


 Clonazepam


  (KlonoPIN)  0.5 mg  Q6HR


 ORAL


   10/2/18 18:00


 10/9/18 17:59  10/6/18 23:44


 


 


 Dextrose


  (Dextrose 50%)  25 ml  Q30M  PRN


 IV


 Hypoglycemia  10/2/18 14:30


 11/1/18 14:25   


 


 


 Dextrose


  (Dextrose 50%)  50 ml  Q30M  PRN


 IV


 hypoglycemia  10/2/18 14:30


 11/1/18 14:29   


 


 


 Docusate Sodium


  (Colace)  100 mg  THREE TIMES A  DAY


 ORAL


   10/4/18 18:00


 11/3/18 17:59  10/6/18 17:04


 


 


 Ferrous Sulfate


  (Feosol)  325 mg  THREE TIMES A  DAY


 ORAL


   10/4/18 18:00


 11/3/18 17:59  10/6/18 17:04


 


 


 Haloperidol


 Lactate


  (Haldol)  5 mg  Q4H  PRN


 IM


 Agitation  10/2/18 18:45


 11/1/18 18:44  10/5/18 09:03


 


 


 Heparin Sodium


  (Porcine)


  (Heparin 5000


 units/ml)  5,000 units  EVERY 12  HOURS


 SUBQ


   10/2/18 21:00


 11/1/18 20:59  10/6/18 21:56


 


 


 Lorazepam


  (Ativan 2mg/ml


 1ml)  0.5 mg  Q4H  PRN


 IV


 For Anxiety  10/2/18 14:30


 10/9/18 14:29  10/5/18 23:15


 


 


 Losartan Potassium


  (Cozaar)  25 mg  DAILY


 ORAL


   10/3/18 09:00


 11/2/18 08:59  10/6/18 09:22


 


 


 Magnesium


 Hydroxide


  (Mom)  30 ml  DAILYPRN  PRN


 ORAL


 Constipation  10/4/18 17:30


 11/3/18 17:29   


 


 


 Morphine Sulfate


  (Morphine


 Sulfate)  4 mg  Q4H  PRN


 IVP


 For Pain 7-10  10/2/18 14:30


 10/9/18 14:29  10/5/18 11:14


 


 


 Ondansetron HCl


  (Zofran)  4 mg  Q6H  PRN


 IVP


 Nausea & Vomiting  10/2/18 14:30


 11/1/18 14:29   


 


 


 Polyethylene


 Glycol


  (Miralax)  17 gm  HSPRN  PRN


 ORAL


 Constipation  10/2/18 14:30


 11/1/18 14:29   


 


 


 Potassium Chloride


  (K-Dur)  20 meq  DAILY


 ORAL


   10/6/18 21:00


 11/5/18 20:59  10/6/18 21:55


 


 


 Prochlorperazine


  (Compazine)  10 mg  Q6H  PRN


 IVP


 Nausea & Vomiting  10/4/18 17:30


 11/3/18 17:29   


 


 


 Quetiapine


 Fumarate


  (SEROquel)  12.5 mg  Q4H  PRN


 ORAL


 agitation  10/3/18 14:45


 11/2/18 14:44   


 


 


 Quetiapine


 Fumarate


  (SEROquel)  25 mg  QHS


 ORAL


   10/3/18 00:30


 11/2/18 00:29  10/6/18 21:55


 


 


 Trazodone HCl


  (Desyrel)  50 mg  BEDTIME


 ORAL


   10/2/18 21:00


 11/1/18 20:59  10/6/18 21:55


 


 


 Zolpidem Tartrate


  (Ambien)  5 mg  HSPRN  PRN


 ORAL


 Insomnia  10/2/18 14:30


 10/9/18 14:29  10/4/18 00:14


 








Problems:  


(1) Psychosis


(2) encephalopathy due to 


Assessment & Plan:  encephalopathy due to metabolic condition





-seroquel prn


-provided keyonna/Josh Burgos MD Oct 6, 2018 23:49

## 2018-10-07 VITALS — DIASTOLIC BLOOD PRESSURE: 83 MMHG | SYSTOLIC BLOOD PRESSURE: 138 MMHG

## 2018-10-07 VITALS — DIASTOLIC BLOOD PRESSURE: 63 MMHG | SYSTOLIC BLOOD PRESSURE: 125 MMHG

## 2018-10-07 VITALS — SYSTOLIC BLOOD PRESSURE: 156 MMHG | DIASTOLIC BLOOD PRESSURE: 79 MMHG

## 2018-10-07 VITALS — DIASTOLIC BLOOD PRESSURE: 78 MMHG | SYSTOLIC BLOOD PRESSURE: 132 MMHG

## 2018-10-07 VITALS — SYSTOLIC BLOOD PRESSURE: 136 MMHG | DIASTOLIC BLOOD PRESSURE: 86 MMHG

## 2018-10-07 VITALS — DIASTOLIC BLOOD PRESSURE: 78 MMHG | SYSTOLIC BLOOD PRESSURE: 135 MMHG

## 2018-10-07 VITALS — DIASTOLIC BLOOD PRESSURE: 81 MMHG | SYSTOLIC BLOOD PRESSURE: 142 MMHG

## 2018-10-07 VITALS — SYSTOLIC BLOOD PRESSURE: 137 MMHG | DIASTOLIC BLOOD PRESSURE: 82 MMHG

## 2018-10-07 VITALS — SYSTOLIC BLOOD PRESSURE: 136 MMHG | DIASTOLIC BLOOD PRESSURE: 88 MMHG

## 2018-10-07 LAB
ADD MANUAL DIFF: NO
ANION GAP SERPL CALC-SCNC: 10 MMOL/L (ref 5–15)
ANION GAP SERPL CALC-SCNC: 9 MMOL/L (ref 5–15)
BASOPHILS NFR BLD AUTO: 0.3 % (ref 0–2)
BUN SERPL-MCNC: 10 MG/DL (ref 7–18)
BUN SERPL-MCNC: 9 MG/DL (ref 7–18)
CALCIUM SERPL-MCNC: 8.3 MG/DL (ref 8.5–10.1)
CALCIUM SERPL-MCNC: 8.5 MG/DL (ref 8.5–10.1)
CHLORIDE SERPL-SCNC: 109 MMOL/L (ref 98–107)
CHLORIDE SERPL-SCNC: 111 MMOL/L (ref 98–107)
CO2 SERPL-SCNC: 23 MMOL/L (ref 21–32)
CO2 SERPL-SCNC: 24 MMOL/L (ref 21–32)
CREAT SERPL-MCNC: 0.7 MG/DL (ref 0.55–1.3)
CREAT SERPL-MCNC: 0.8 MG/DL (ref 0.55–1.3)
EOSINOPHIL NFR BLD AUTO: 0.3 % (ref 0–3)
EOSINOPHIL NFR BLD AUTO: 0.3 % (ref 0–3)
EOSINOPHIL NFR BLD AUTO: 0.5 % (ref 0–3)
ERYTHROCYTE [DISTWIDTH] IN BLOOD BY AUTOMATED COUNT: 17 % (ref 11.6–14.8)
ERYTHROCYTE [DISTWIDTH] IN BLOOD BY AUTOMATED COUNT: 17.1 % (ref 11.6–14.8)
ERYTHROCYTE [DISTWIDTH] IN BLOOD BY AUTOMATED COUNT: 17.2 % (ref 11.6–14.8)
HCT VFR BLD CALC: 29.9 % (ref 37–47)
HCT VFR BLD CALC: 30.1 % (ref 37–47)
HCT VFR BLD CALC: 35.4 % (ref 37–47)
HGB BLD-MCNC: 11.1 G/DL (ref 12–16)
HGB BLD-MCNC: 9.4 G/DL (ref 12–16)
HGB BLD-MCNC: 9.4 G/DL (ref 12–16)
LYMPHOCYTES NFR BLD AUTO: 10.5 % (ref 20–45)
LYMPHOCYTES NFR BLD AUTO: 10.5 % (ref 20–45)
LYMPHOCYTES NFR BLD AUTO: 9.4 % (ref 20–45)
MCV RBC AUTO: 92 FL (ref 80–99)
MCV RBC AUTO: 92 FL (ref 80–99)
MCV RBC AUTO: 96 FL (ref 80–99)
MONOCYTES NFR BLD AUTO: 6.1 % (ref 1–10)
MONOCYTES NFR BLD AUTO: 7.3 % (ref 1–10)
MONOCYTES NFR BLD AUTO: 7.5 % (ref 1–10)
NEUTROPHILS NFR BLD AUTO: 81.4 % (ref 45–75)
NEUTROPHILS NFR BLD AUTO: 82.4 % (ref 45–75)
NEUTROPHILS NFR BLD AUTO: 82.8 % (ref 45–75)
PLATELET # BLD: 288 K/UL (ref 150–450)
PLATELET # BLD: 295 K/UL (ref 150–450)
PLATELET # BLD: 304 K/UL (ref 150–450)
POTASSIUM SERPL-SCNC: 3.5 MMOL/L (ref 3.5–5.1)
POTASSIUM SERPL-SCNC: 3.5 MMOL/L (ref 3.5–5.1)
RBC # BLD AUTO: 3.25 M/UL (ref 4.2–5.4)
RBC # BLD AUTO: 3.27 M/UL (ref 4.2–5.4)
RBC # BLD AUTO: 3.68 M/UL (ref 4.2–5.4)
SODIUM SERPL-SCNC: 143 MMOL/L (ref 136–145)
SODIUM SERPL-SCNC: 143 MMOL/L (ref 136–145)
WBC # BLD AUTO: 11.1 K/UL (ref 4.8–10.8)
WBC # BLD AUTO: 11.2 K/UL (ref 4.8–10.8)
WBC # BLD AUTO: 9.6 K/UL (ref 4.8–10.8)

## 2018-10-07 RX ADMIN — DOCUSATE SODIUM SCH MG: 100 CAPSULE, LIQUID FILLED ORAL at 13:48

## 2018-10-07 RX ADMIN — CLONAZEPAM SCH MG: 0.5 TABLET ORAL at 05:53

## 2018-10-07 RX ADMIN — DOCUSATE SODIUM SCH MG: 100 CAPSULE, LIQUID FILLED ORAL at 08:56

## 2018-10-07 RX ADMIN — HEPARIN SODIUM SCH UNITS: 5000 INJECTION INTRAVENOUS; SUBCUTANEOUS at 08:46

## 2018-10-07 RX ADMIN — DOCUSATE SODIUM SCH MG: 100 CAPSULE, LIQUID FILLED ORAL at 13:20

## 2018-10-07 RX ADMIN — DOCUSATE SODIUM SCH MG: 100 CAPSULE, LIQUID FILLED ORAL at 18:34

## 2018-10-07 RX ADMIN — LOSARTAN POTASSIUM SCH MG: 25 TABLET, FILM COATED ORAL at 08:48

## 2018-10-07 RX ADMIN — CLONAZEPAM SCH MG: 0.5 TABLET ORAL at 13:48

## 2018-10-07 RX ADMIN — HEPARIN SODIUM SCH UNITS: 5000 INJECTION INTRAVENOUS; SUBCUTANEOUS at 21:14

## 2018-10-07 RX ADMIN — ATENOLOL SCH MG: 25 TABLET ORAL at 08:48

## 2018-10-07 RX ADMIN — SODIUM CHLORIDE SCH MLS/HR: 0.9 INJECTION INTRAVENOUS at 13:20

## 2018-10-07 RX ADMIN — CLONAZEPAM SCH MG: 0.5 TABLET ORAL at 13:20

## 2018-10-07 RX ADMIN — CLONAZEPAM SCH MG: 0.5 TABLET ORAL at 18:34

## 2018-10-07 NOTE — INTERNAL MED PROGRESS NOTE
Subjective


Date of Service:  Oct 7, 2018


Physician Name


Cheo Ortiz


Attending Physician


Juan Carlos Magana MD





Current Medications








 Medications


  (Trade)  Dose


 Ordered  Sig/Violette


 Route


 PRN Reason  Start Time


 Stop Time Status Last Admin


Dose Admin


 


 Acetaminophen


  (Tylenol)  650 mg  Q4H  PRN


 ORAL


 fever  10/2/18 14:30


 11/1/18 14:29   


 


 


 Acetaminophen/


 Hydrocodone Bitart


  (Norco 5/325)  1 tab  Q4H  PRN


 ORAL


 Moderate Pain (Pain Scale 4-6)  10/5/18 09:06


 10/12/18 09:05   


 


 


 Acetaminophen/


 Hydrocodone Bitart


  (Norco 5/325)  1 tab  Q6H  PRN


 ORAL


 For PAIN 1-3  10/2/18 14:30


 10/9/18 14:29  10/6/18 05:00


 


 


 Al Hydroxide/Mg


 Hydroxide


  (Mylanta II)  30 ml  Q6H  PRN


 ORAL


 dyspepsia  10/2/18 14:23


 11/1/18 14:22   


 


 


 Atenolol


  (Tenormin)  25 mg  DAILY


 ORAL


   10/3/18 09:00


 11/2/18 08:59  10/7/18 08:48


 


 


 Ceftriaxone


 Sodium 1 gm/


 Dextrose  55 ml @ 


 110 mls/hr  Q24H


 IVPB


   10/6/18 12:30


 10/13/18 12:29  10/7/18 13:20


 


 


 Clonazepam


  (KlonoPIN)  0.5 mg  Q6HR


 ORAL


   10/2/18 18:00


 10/9/18 17:59  10/7/18 05:53


 


 


 Dextrose


  (Dextrose 50%)  25 ml  Q30M  PRN


 IV


 Hypoglycemia  10/2/18 14:30


 11/1/18 14:25   


 


 


 Dextrose


  (Dextrose 50%)  50 ml  Q30M  PRN


 IV


 hypoglycemia  10/2/18 14:30


 11/1/18 14:29   


 


 


 Docusate Sodium


  (Colace)  100 mg  THREE TIMES A  DAY


 ORAL


   10/4/18 18:00


 11/3/18 17:59  10/6/18 17:04


 


 


 Ferrous Sulfate


  (Feosol)  325 mg  THREE TIMES A  DAY


 ORAL


   10/4/18 18:00


 11/3/18 17:59  10/7/18 08:48


 


 


 Haloperidol


 Lactate


  (Haldol)  5 mg  Q4H  PRN


 IM


 Agitation  10/2/18 18:45


 11/1/18 18:44  10/5/18 09:03


 


 


 Heparin Sodium


  (Porcine)


  (Heparin 5000


 units/ml)  5,000 units  EVERY 12  HOURS


 SUBQ


   10/2/18 21:00


 11/1/18 20:59  10/7/18 08:46


 


 


 Lorazepam


  (Ativan 2mg/ml


 1ml)  0.5 mg  Q4H  PRN


 IV


 For Anxiety  10/2/18 14:30


 10/9/18 14:29  10/5/18 23:15


 


 


 Losartan Potassium


  (Cozaar)  25 mg  DAILY


 ORAL


   10/3/18 09:00


 11/2/18 08:59  10/7/18 08:48


 


 


 Magnesium


 Hydroxide


  (Mom)  30 ml  DAILYPRN  PRN


 ORAL


 Constipation  10/4/18 17:30


 11/3/18 17:29   


 


 


 Morphine Sulfate


  (Morphine


 Sulfate)  4 mg  Q4H  PRN


 IVP


 For Pain 7-10  10/2/18 14:30


 10/9/18 14:29  10/5/18 11:14


 


 


 Ondansetron HCl


  (Zofran)  4 mg  Q6H  PRN


 IVP


 Nausea & Vomiting  10/2/18 14:30


 11/1/18 14:29   


 


 


 Polyethylene


 Glycol


  (Miralax)  17 gm  HSPRN  PRN


 ORAL


 Constipation  10/2/18 14:30


 11/1/18 14:29   


 


 


 Potassium Chloride


  (K-Dur)  20 meq  DAILY


 ORAL


   10/6/18 21:00


 11/5/18 20:59  10/7/18 08:47


 


 


 Prochlorperazine


  (Compazine)  10 mg  Q6H  PRN


 IVP


 Nausea & Vomiting  10/4/18 17:30


 11/3/18 17:29   


 


 


 Quetiapine


 Fumarate


  (SEROquel)  12.5 mg  Q4H  PRN


 ORAL


 agitation  10/3/18 14:45


 11/2/18 14:44   


 


 


 Quetiapine


 Fumarate


  (SEROquel)  25 mg  QHS


 ORAL


   10/3/18 00:30


 11/2/18 00:29  10/6/18 21:55


 


 


 Trazodone HCl


  (Desyrel)  50 mg  BEDTIME


 ORAL


   10/2/18 21:00


 11/1/18 20:59  10/6/18 21:55


 


 


 Zolpidem Tartrate


  (Ambien)  5 mg  HSPRN  PRN


 ORAL


 Insomnia  10/2/18 14:30


 10/9/18 14:29  10/4/18 00:14


 








Allergies:  


Coded Allergies:  


     No Known Allergies (Unverified , 3/26/14)


ROS Limited/Unobtainable:  No


Constitutional:  Reports: no symptoms


HEENT:  Reports: no symptoms


Cardiovascular:  Reports: no symptoms


Respiratory:  Reports: no symptoms


Gastrointestinal/Abdominal:  Reports: no symptoms


Genitourinary:  Reports: no symptoms


Neurologic/Psychiatric:  Reports: no symptoms


Subjective


79 YO F admitted with right hip pain; S/P fall.  Now right femoral neck 

fracture.  Cover for Int Med-Dr Magana.  S/P ORIF right hip fracture 10/4/18.  

Patient had syncopal episode transferring from chair to bed today-now on Tele





Objective





Last Vital Signs








  Date Time  Temp Pulse Resp B/P (MAP) Pulse Ox O2 Delivery O2 Flow Rate FiO2


 


10/7/18 11:21  71 18 138/83 (101) 100   


 


10/7/18 09:00      Room Air  


 


10/7/18 08:00 98.6       





 98.6       


 


10/4/18 19:40       3 











Laboratory Tests








Test


  10/7/18


06:35 10/7/18


11:45


 


White Blood Count


  11.1 K/UL


(4.8-10.8)  H 9.6 K/UL


(4.8-10.8)


 


Red Blood Count


  3.27 M/UL


(4.20-5.40)  L 3.25 M/UL


(4.20-5.40)  L


 


Hemoglobin


  9.4 G/DL


(12.0-16.0)  L 9.4 G/DL


(12.0-16.0)  L


 


Hematocrit


  30.1 %


(37.0-47.0)  L 29.9 %


(37.0-47.0)  L


 


Mean Corpuscular Volume 92 FL (80-99)   92 FL (80-99)  


 


Mean Corpuscular Hemoglobin


  28.7 PG


(27.0-31.0) 29.0 PG


(27.0-31.0)


 


Mean Corpuscular Hemoglobin


Concent 31.2 G/DL


(32.0-36.0)  L 31.4 G/DL


(32.0-36.0)  L


 


Red Cell Distribution Width


  17.0 %


(11.6-14.8)  H 17.2 %


(11.6-14.8)  H


 


Platelet Count


  295 K/UL


(150-450) 288 K/UL


(150-450)


 


Mean Platelet Volume


  5.8 FL


(6.5-10.1)  L 6.2 FL


(6.5-10.1)  L


 


Neutrophils (%) (Auto)


  82.8 %


(45.0-75.0)  H 82.4 %


(45.0-75.0)  H


 


Lymphocytes (%) (Auto)


  10.5 %


(20.0-45.0)  L 9.4 %


(20.0-45.0)  L


 


Monocytes (%) (Auto)


  6.1 %


(1.0-10.0) 7.5 %


(1.0-10.0)


 


Eosinophils (%) (Auto)


  0.3 %


(0.0-3.0) 0.3 %


(0.0-3.0)


 


Basophils (%) (Auto)


  0.3 %


(0.0-2.0) 0.3 %


(0.0-2.0)


 


Sodium Level


  143 MMOL/L


(136-145) 143 MMOL/L


(136-145)


 


Potassium Level


  3.5 MMOL/L


(3.5-5.1) 3.5 MMOL/L


(3.5-5.1)


 


Chloride Level


  111 MMOL/L


()  H 109 MMOL/L


()  H


 


Carbon Dioxide Level


  23 MMOL/L


(21-32) 24 MMOL/L


(21-32)


 


Anion Gap


  9 mmol/L


(5-15) 10 mmol/L


(5-15)


 


Blood Urea Nitrogen


  10 mg/dL


(7-18) 9 mg/dL (7-18)


 


 


Creatinine


  0.8 MG/DL


(0.55-1.30) 0.7 MG/DL


(0.55-1.30)


 


Estimat Glomerular Filtration


Rate  mL/min (>60)  


   mL/min (>60)  


 


 


Glucose Level


  88 MG/DL


() 104 MG/DL


()


 


Calcium Level


  8.3 MG/DL


(8.5-10.1)  L 8.5 MG/DL


(8.5-10.1)











Microbiology








 Date/Time


Source Procedure


Growth Status


 


 


 10/5/18 18:45


Blood Blood Culture - Preliminary


NO GROWTH AFTER 24 HOURS Resulted


 


 10/5/18 18:30


Blood Blood Culture - Preliminary


NO GROWTH AFTER 24 HOURS Resulted

















Intake and Output  


 


 10/6/18 10/7/18





 19:00 07:00


 


Intake Total 75 ml 560 ml


 


Balance 75 ml 560 ml


 


  


 


Intake Oral  560 ml


 


IV Total 75 ml 


 


# Voids  4








Objective


PHYSICAL EXAMINATION:


GENERAL:  The patient is well-developed and well-nourished thin-appearing


white female, in no apparent distress.


HEENT:  Eyes, pupils are equal and responsive to light and accommodation.


Extraocular movements are intact.


NECK:  Supple without lymphadenopathy.


CHEST:  Lungs are clear to auscultation bilaterally without wheezes or


rales.


CARDIOVASCULAR:  Regular rhythm and rate.  S1 and S2 are normal without


murmurs, rubs, or gallops.


ABDOMEN:  Soft, nontender, and nondistended.  Positive bowel sounds.  No


evidence of hepatosplenomegaly.  Currently, no rebound or guarding


noted.


EXTREMITIES:  Negative for clubbing, cyanosis, or edema.  Pain to palp right hip


RECTAL/GENITAL:  Refused.


NEUROLOGIC:  Cranial nerves II through XII are grossly intact without focal


deficits.  Motor strength is 5/5 bilaterally.  Deep tendon reflexes are 2+


plantar.





Assessment/Plan


Problem List:  


(1) Hypercholesterolemia


(2) CAD (coronary artery disease)


Assessment & Plan:  see dardiology note





(3) Fracture of femoral neck, right, closed


Assessment & Plan:  S/P ORIF Thurs 10/4/18-see ortho note.





(4) HTN (hypertension)


(5) Sick sinus syndrome


Assessment & Plan:  S/P pacemaker





(6) Pacemaker


(7) UTI (urinary tract infection)


Assessment & Plan:  E.Coli.  Continue rocephin per ID





(8) E coli infection


(9) Syncope


Assessment & Plan:  ?vasovagal vs hypoglycemia?  Transfer to [facility]; [facility] 

qAC & qHS





Status:  not improved


Assessment/Plan


Discharge planning:  SNF vs Acute rehab











Cheo Ortiz MD Oct 7, 2018 14:42

## 2018-10-07 NOTE — GENERAL PROGRESS NOTE
Assessment/Plan


Assessment/Plan


(1) Right hip fracture


(2) Right hip pain


(3) S/p fall and right hip arthroplasty 


Patient to be discontinued off Morphine and


continued on Norco or Tylenol as needed.


D/w Dr. Ennis and he concurred.





Subjective


Date patient seen:  Oct 7, 2018


Time patient seen:  03:45 - pm


Allergies:  


Coded Allergies:  


     No Known Allergies (Unverified , 3/26/14)


Subjective


REVIEW OF SYSTEMS:  Denies rash, fever, chills, sweating, dizziness,


drowsiness, blurred vision, sore throat, or change in her weight.  No


shortness of breath, chest pain, palpitations, or cough.  She is


complaining of right hip pain.





SUBJECTIVE: Patient is in bed showing no signs of pain or distress.


Has not requested the Morphine or Norco in the last 24hrs. D/w nurse.





Objective





Last 24 Hour Vital Signs








  Date Time  Temp Pulse Resp B/P (MAP) Pulse Ox O2 Delivery O2 Flow Rate FiO2


 


10/7/18 11:21  71 18 138/83 (101) 100   


 


10/7/18 11:00  77 18 142/81 (101) 100   


 


10/7/18 10:50  69 18 156/79 (104) 95   


 


10/7/18 09:00      Room Air  


 


10/7/18 08:48    132/78    


 


10/7/18 08:48  96  132/78    


 


10/7/18 08:00 98.6 96 18 132/78 (96) 98   





 98.6       


 


10/7/18 04:00 99.6 93 19 125/63 (83) 98   





 99.6       


 


10/7/18 00:00 98.0 95 20 137/82 (100) 98   





 98.0       


 


10/6/18 21:00      Room Air  


 


10/6/18 20:00 98.1 97 19 130/70 (90) 98   





 98.1       

















Intake and Output  


 


 10/6/18 10/7/18





 19:00 07:00


 


Intake Total 75 ml 560 ml


 


Balance 75 ml 560 ml


 


  


 


Intake Oral  560 ml


 


IV Total 75 ml 


 


# Voids  4








Laboratory Tests


10/7/18 06:35: 


White Blood Count 11.1H, Red Blood Count 3.27L, Hemoglobin 9.4L, Hematocrit 

30.1L, Mean Corpuscular Volume 92, Mean Corpuscular Hemoglobin 28.7, Mean 

Corpuscular Hemoglobin Concent 31.2L, Red Cell Distribution Width 17.0H, 

Platelet Count 295, Mean Platelet Volume 5.8L, Neutrophils (%) (Auto) 82.8H, 

Lymphocytes (%) (Auto) 10.5L, Monocytes (%) (Auto) 6.1, Eosinophils (%) (Auto) 

0.3, Basophils (%) (Auto) 0.3, Sodium Level 143, Potassium Level 3.5, Chloride 

Level 111H, Carbon Dioxide Level 23, Anion Gap 9, Blood Urea Nitrogen 10, 

Creatinine 0.8, Estimat Glomerular Filtration Rate , Glucose Level 88, Calcium 

Level 8.3L


10/7/18 11:45: 


White Blood Count 9.6, Red Blood Count 3.25L, Hemoglobin 9.4L, Hematocrit 29.9L

, Mean Corpuscular Volume 92, Mean Corpuscular Hemoglobin 29.0, Mean 

Corpuscular Hemoglobin Concent 31.4L, Red Cell Distribution Width 17.2H, 

Platelet Count 288, Mean Platelet Volume 6.2L, Neutrophils (%) (Auto) 82.4H, 

Lymphocytes (%) (Auto) 9.4L, Monocytes (%) (Auto) 7.5, Eosinophils (%) (Auto) 

0.3, Basophils (%) (Auto) 0.3, Sodium Level 143, Potassium Level 3.5, Chloride 

Level 109H, Carbon Dioxide Level 24, Anion Gap 10, Blood Urea Nitrogen 9, 

Creatinine 0.7, Estimat Glomerular Filtration Rate , Glucose Level 104, Calcium 

Level 8.5


Height (Feet):  5


Height (Inches):  6.00


Weight (Pounds):  120


Objective


GENERAL:  Alert and awake.


LUNGS:  Decreased breath sounds bilaterally.


HEART:  S1 and S2 regular.


ABDOMEN:  Benign.


EXTREMITIES:  No CCE noted. Right hip bandages placed. 


NEURO: No changes.











Ethan Newby Oct 7, 2018 16:16

## 2018-10-08 VITALS — SYSTOLIC BLOOD PRESSURE: 120 MMHG | DIASTOLIC BLOOD PRESSURE: 79 MMHG

## 2018-10-08 VITALS — SYSTOLIC BLOOD PRESSURE: 159 MMHG | DIASTOLIC BLOOD PRESSURE: 75 MMHG

## 2018-10-08 VITALS — DIASTOLIC BLOOD PRESSURE: 71 MMHG | SYSTOLIC BLOOD PRESSURE: 133 MMHG

## 2018-10-08 VITALS — SYSTOLIC BLOOD PRESSURE: 126 MMHG | DIASTOLIC BLOOD PRESSURE: 64 MMHG

## 2018-10-08 VITALS — DIASTOLIC BLOOD PRESSURE: 82 MMHG | SYSTOLIC BLOOD PRESSURE: 145 MMHG

## 2018-10-08 VITALS — SYSTOLIC BLOOD PRESSURE: 154 MMHG | DIASTOLIC BLOOD PRESSURE: 85 MMHG

## 2018-10-08 LAB
ANION GAP SERPL CALC-SCNC: 14 MMOL/L (ref 5–15)
BUN SERPL-MCNC: 12 MG/DL (ref 7–18)
CALCIUM SERPL-MCNC: 8.3 MG/DL (ref 8.5–10.1)
CHLORIDE SERPL-SCNC: 109 MMOL/L (ref 98–107)
CO2 SERPL-SCNC: 18 MMOL/L (ref 21–32)
CREAT SERPL-MCNC: 0.6 MG/DL (ref 0.55–1.3)
POTASSIUM SERPL-SCNC: 4.6 MMOL/L (ref 3.5–5.1)
SODIUM SERPL-SCNC: 141 MMOL/L (ref 136–145)

## 2018-10-08 RX ADMIN — DOCUSATE SODIUM SCH MG: 100 CAPSULE, LIQUID FILLED ORAL at 17:35

## 2018-10-08 RX ADMIN — DOCUSATE SODIUM SCH MG: 100 CAPSULE, LIQUID FILLED ORAL at 12:27

## 2018-10-08 RX ADMIN — ATENOLOL SCH MG: 25 TABLET ORAL at 09:23

## 2018-10-08 RX ADMIN — HEPARIN SODIUM SCH UNITS: 5000 INJECTION INTRAVENOUS; SUBCUTANEOUS at 09:26

## 2018-10-08 RX ADMIN — HEPARIN SODIUM SCH UNITS: 5000 INJECTION INTRAVENOUS; SUBCUTANEOUS at 21:25

## 2018-10-08 RX ADMIN — DOCUSATE SODIUM SCH MG: 100 CAPSULE, LIQUID FILLED ORAL at 09:23

## 2018-10-08 RX ADMIN — SODIUM CHLORIDE SCH MLS/HR: 0.9 INJECTION INTRAVENOUS at 14:23

## 2018-10-08 RX ADMIN — LOSARTAN POTASSIUM SCH MG: 25 TABLET, FILM COATED ORAL at 09:23

## 2018-10-08 RX ADMIN — CLONAZEPAM SCH MG: 0.5 TABLET ORAL at 00:04

## 2018-10-08 RX ADMIN — CLONAZEPAM SCH MG: 0.5 TABLET ORAL at 06:04

## 2018-10-08 NOTE — PULMONOLOGY PROGRESS NOTE
Assessment/Plan


Problems:  


(1) Fracture of femoral neck, right, closed


(2) Pacemaker


(3) HTN (hypertension)


(4) CAD (coronary artery disease)


(5) Sick sinus syndrome


(6) Hip fracture, right


(7) Hypercholesterolemia


Assessment/Plan


sinus rhythm, v-paced\


confused


doing better


surgery done yesterday


titrate cardiology meds


symptomatic treatment


pt/ot 


dc planning





Subjective


ROS Limited/Unobtainable:  Yes


Interval Events:  confused


HEENT:  Repors: no symptoms


Allergies:  


Coded Allergies:  


     No Known Allergies (Unverified , 3/26/14)





Objective





Last 24 Hour Vital Signs








  Date Time  Temp Pulse Resp B/P (MAP) Pulse Ox O2 Delivery O2 Flow Rate FiO2


 


10/8/18 09:23    159/75    


 


10/8/18 09:23  100  159/75    


 


10/8/18 07:55 96.3 100 18 159/75 (103) 100   





 96.3       


 


10/8/18 04:02 97.9 86 19 133/71 (91) 97   





 97.9       


 


10/8/18 00:37 97.7 81 19 126/64 (84) 98   





 97.7       


 


10/8/18 00:00  99      


 


10/7/18 21:00      Room Air  


 


10/7/18 20:00  76      


 


10/7/18 19:19 98.1 89 18 135/78 (97) 99   





 98.1       


 


10/7/18 16:00  84      


 


10/7/18 16:00 98.8 96 22 136/86 (103) 98   





 98.8       


 


10/7/18 12:00  79      


 


10/7/18 12:00 98.7 86 20 136/88 (104) 100   





 98.7       

















Intake and Output  


 


 10/7/18 10/8/18





 19:00 07:00


 


  


 


# Voids  2








Objective


General Appearance:  WD/WN


HEENT:  normocephalic, anicteric


Breasts:  no masses


Cardiovascular:  normal peripheral pulses, regular rhythm


Abdomen:  soft, non tender, no organomegaly


Extremities:  no cyanosis


Skin:  no rash





Microbiology








 Date/Time


Source Procedure


Growth Status


 


 


 10/5/18 18:45


Blood Blood Culture - Preliminary


NO GROWTH AFTER 48 HOURS Resulted


 


 10/5/18 18:30


Blood Blood Culture - Preliminary


NO GROWTH AFTER 48 HOURS Resulted








Laboratory Tests


10/7/18 16:15: 


White Blood Count 11.2H, Red Blood Count 3.68L, Hemoglobin 11.1L, Hematocrit 

35.4L, Mean Corpuscular Volume 96, Mean Corpuscular Hemoglobin 30.1, Mean 

Corpuscular Hemoglobin Concent 31.4L, Red Cell Distribution Width 17.1H, 

Platelet Count 304, Mean Platelet Volume 7.0, Neutrophils (%) (Auto) 81.4H, 

Lymphocytes (%) (Auto) 10.5L, Monocytes (%) (Auto) 7.3, Eosinophils (%) (Auto) 

0.5, Basophils (%) (Auto) 0.3


10/8/18 08:15: 


Sodium Level 141, Potassium Level 4.6, Chloride Level 109H, Carbon Dioxide 

Level 18L, Anion Gap 14, Blood Urea Nitrogen 12, Creatinine 0.6, Estimat 

Glomerular Filtration Rate , Glucose Level 155H, Calcium Level 8.3L





Current Medications








 Medications


  (Trade)  Dose


 Ordered  Sig/Violette


 Route


 PRN Reason  Start Time


 Stop Time Status Last Admin


Dose Admin


 


 Acetaminophen


  (Tylenol)  650 mg  Q4H  PRN


 ORAL


 moderate pain/fever  10/7/18 16:15


 11/1/18 16:14   


 


 


 Acetaminophen/


 Hydrocodone Bitart


  (Norco 5/325)  1 tab  Q4H  PRN


 ORAL


 severe pain  10/7/18 16:15


 10/12/18 16:14  10/8/18 02:52


 


 


 Al Hydroxide/Mg


 Hydroxide


  (Mylanta II)  30 ml  Q6H  PRN


 ORAL


 dyspepsia  10/7/18 15:16


 11/1/18 15:15   


 


 


 Atenolol


  (Tenormin)  25 mg  DAILY


 ORAL


   10/8/18 09:00


 11/2/18 08:59  10/8/18 09:23


 


 


 Ceftriaxone


 Sodium 1 gm/


 Dextrose  55 ml @ 


 110 mls/hr  Q24H


 IVPB


   10/8/18 12:30


 10/13/18 12:29   


 


 


 Dextrose


  (Dextrose 50%)  25 ml  Q30M  PRN


 IV


 Hypoglycemia  10/7/18 15:30


 11/1/18 14:25   


 


 


 Dextrose


  (Dextrose 50%)  50 ml  Q30M  PRN


 IV


 hypoglycemia  10/7/18 15:30


 11/1/18 14:29   


 


 


 Docusate Sodium


  (Colace)  100 mg  THREE TIMES A  DAY


 ORAL


   10/7/18 18:00


 11/3/18 17:59  10/8/18 09:23


 


 


 Ferrous Sulfate


  (Feosol)  325 mg  TIAC


 ORAL


   10/7/18 16:30


 11/6/18 16:29  10/8/18 06:04


 


 


 Heparin Sodium


  (Porcine)


  (Heparin 5000


 units/ml)  5,000 units  EVERY 12  HOURS


 SUBQ


   10/7/18 21:00


 11/1/18 20:59  10/8/18 09:26


 


 


 Losartan Potassium


  (Cozaar)  25 mg  DAILY


 ORAL


   10/8/18 09:00


 11/2/18 08:59  10/8/18 09:23


 


 


 Magnesium


 Hydroxide


  (Mom)  30 ml  DAILYPRN  PRN


 ORAL


 Constipation  10/7/18 15:19


 11/3/18 15:18   


 


 


 Ondansetron HCl


  (Zofran)  4 mg  Q6H  PRN


 IVP


 Nausea & Vomiting  10/7/18 15:19


 11/1/18 15:18   


 


 


 Polyethylene


 Glycol


  (Miralax)  17 gm  HSPRN  PRN


 ORAL


 Constipation  10/7/18 15:25


 11/6/18 15:18   


 


 


 Potassium Chloride


  (K-Dur)  20 meq  DAILY


 ORAL


   10/8/18 09:00


 11/5/18 20:59  10/8/18 09:24


 


 


 Prochlorperazine


  (Compazine)  10 mg  Q6H  PRN


 IVP


 Nausea & Vomiting  10/7/18 15:20


 11/3/18 15:19   


 


 


 Quetiapine


 Fumarate


  (SEROquel)  25 mg  Q4H  PRN


 ORAL


 agitation  10/8/18 11:20


 11/7/18 11:19   


 


 


 Quetiapine


 Fumarate


  (SEROquel)  25 mg  QHS


 ORAL


   10/7/18 21:00


 11/2/18 00:29  10/7/18 21:12


 

















Korin Junior MD Oct 8, 2018 12:04

## 2018-10-08 NOTE — CARDIOLOGY REPORT
--------------- APPROVED REPORT --------------





EKG Measurement

Heart [id number]

WV 

[id number]

[id number]

[id number]

[id number]





Normal sinus rhythm

Septal infarct, age undetermined

Lateral infarct, age undetermined

Abnormal ECG

## 2018-10-08 NOTE — CARDIOLOGY REPORT
--------------- APPROVED REPORT --------------





EXAM: Two-dimensional and M-mode echocardiogram with Doppler and color Doppler.



M-Mode DIMENSIONS 

IVSd1.3 (0.7-1.1cm)Left Atrium (MM)4.3 (1.6-4.0cm)

LVDd6.3 (3.5-5.6cm)Aortic Root3.4 (2.0-3.7cm)

PWd1.1 (0.7-1.1cm)Aortic Cusp Exc.1.2 (1.5-2.0cm)



LVDs5.4 (2.5-4.0cm)

PWs1.5 cm





Limited 2-D Echo study due to pt's resistance.

Moderate left ventricular enlargement. 

Global left ventricular hypokinesis, worse in posterior wall.

Left ventricular ejection fraction estimated to be 30-35 %.

Increased E point-interventricular septal separation c/w left ventricular 

dysfunction.

Mild left ventricular hypertrophy.

No evidence of pericardial effusion.

Moderate bi-atrial enlargement. 

Right cardiac chamber size is within normal limits.

Aortic valve calcification with decreased cusp excursion c/w aortic stenosis.

Thickened mitral valve leaflets with normal excursion.

Moderate mitral annulus and aortic root calcification.

Normal pulmonic valve structure. 

Normal tricuspid valve structure.  

No subcostal views obtained.

Pacemaker wire present in the right side chambers.



A  color flow and spectral Doppler study was performed and revealed:

Mild aortic insufficiency.

Peak aortic valve gradient of 20 mmHg and a mean of 8 mmHg.

Aortic valve area 1.2 cm2 calculated by continuity equation suggestive of mild to 

moderate aortic stenosis.

Moderate mitral regurgitation.

Mitral inflow velocities indicates possible pseudo normalization pattern implying 

moderately elevated left atrial pressure (Grade II ).   

Moderate tricuspid regurgitation.

Tricuspid systolic velocities suggests peak right ventricular systolic pressure of 82 

mmHg, consistent with severe pulmonary hypertension.

Moderate pulmonic regurgitation present.

## 2018-10-08 NOTE — INFECTIOUS DISEASES PROG NOTE
Assessment/Plan


Assessment/Plan


ASSESSMENT:  The patient is an 78-year-old female with:








Mild leukocytosis 


Probable urinary tract infection.  Urine culture, E. coli. (R amp, bactrim 

otherwise S)


 Probable pneumonia.


Probable sepsis (history of several falls and altered level of consciousness), 

this may be due to an infectious process.





Neg: HIV< Hep panel 





Right hip fracture status post ORIF.


Diabetes.


Hypertension.


Sick sinus syndrome.


History of non-ST elevation.


History of coronary artery bypass surgery.


History of pacemaker in 2017.


History of pacemaker implantation.














PLAN:








- cont on Rocephin d # 3/5-7


- Monitor CBC. BMP


- Monitor CXR.


- Monitor cultures, urine and blood.


-CBC, CXR am





Subjective


Allergies:  


Coded Allergies:  


     No Known Allergies (Unverified , 3/26/14)


Subjective


afebrile


 mild leukocytosis, no cbc today





Objective


Vital Signs





Last 24 Hour Vital Signs








  Date Time  Temp Pulse Resp B/P (MAP) Pulse Ox O2 Delivery O2 Flow Rate FiO2


 


10/8/18 09:23    159/75    


 


10/8/18 09:23  100  159/75    


 


10/8/18 07:55 96.3 100 18 159/75 (103) 100   





 96.3       


 


10/8/18 04:02 97.9 86 19 133/71 (91) 97   





 97.9       


 


10/8/18 00:37 97.7 81 19 126/64 (84) 98   





 97.7       


 


10/8/18 00:00  99      


 


10/7/18 21:00      Room Air  


 


10/7/18 20:00  76      


 


10/7/18 19:19 98.1 89 18 135/78 (97) 99   





 98.1       


 


10/7/18 16:00  84      


 


10/7/18 16:00 98.8 96 22 136/86 (103) 98   





 98.8       








Height (Feet):  5


Height (Inches):  6.00


Weight (Pounds):  120


Objective


General Appearance:  WD/WN


HEENT:  normocephalic, anicteric


Breasts:  no masses


Cardiovascular:  normal peripheral pulses, regular rhythm


Abdomen:  soft, non tender, no organomegaly


Extremities:  no cyanosis


Skin:  no rash





Microbiology








 Date/Time


Source Procedure


Growth Status


 


 


 10/5/18 18:45


Blood Blood Culture - Preliminary


NO GROWTH AFTER 48 HOURS Resulted


 


 10/5/18 18:30


Blood Blood Culture - Preliminary


NO GROWTH AFTER 48 HOURS Resulted











Laboratory Tests








Test


  10/7/18


16:15 10/8/18


08:15


 


White Blood Count


  11.2 K/UL


(4.8-10.8)  H 


 


 


Red Blood Count


  3.68 M/UL


(4.20-5.40)  L 


 


 


Hemoglobin


  11.1 G/DL


(12.0-16.0)  L 


 


 


Hematocrit


  35.4 %


(37.0-47.0)  L 


 


 


Mean Corpuscular Volume 96 FL (80-99)   


 


Mean Corpuscular Hemoglobin


  30.1 PG


(27.0-31.0) 


 


 


Mean Corpuscular Hemoglobin


Concent 31.4 G/DL


(32.0-36.0)  L 


 


 


Red Cell Distribution Width


  17.1 %


(11.6-14.8)  H 


 


 


Platelet Count


  304 K/UL


(150-450) 


 


 


Mean Platelet Volume


  7.0 FL


(6.5-10.1) 


 


 


Neutrophils (%) (Auto)


  81.4 %


(45.0-75.0)  H 


 


 


Lymphocytes (%) (Auto)


  10.5 %


(20.0-45.0)  L 


 


 


Monocytes (%) (Auto)


  7.3 %


(1.0-10.0) 


 


 


Eosinophils (%) (Auto)


  0.5 %


(0.0-3.0) 


 


 


Basophils (%) (Auto)


  0.3 %


(0.0-2.0) 


 


 


Sodium Level


  


  141 MMOL/L


(136-145)


 


Potassium Level


  


  4.6 MMOL/L


(3.5-5.1)


 


Chloride Level


  


  109 MMOL/L


()  H


 


Carbon Dioxide Level


  


  18 MMOL/L


(21-32)  L


 


Anion Gap


  


  14 mmol/L


(5-15)


 


Blood Urea Nitrogen


  


  12 mg/dL


(7-18)


 


Creatinine


  


  0.6 MG/DL


(0.55-1.30)


 


Estimat Glomerular Filtration


Rate 


   mL/min (>60)  


 


 


Glucose Level


  


  155 MG/DL


()  H


 


Calcium Level


  


  8.3 MG/DL


(8.5-10.1)  L











Current Medications








 Medications


  (Trade)  Dose


 Ordered  Sig/Violette


 Route


 PRN Reason  Start Time


 Stop Time Status Last Admin


Dose Admin


 


 Acetaminophen


  (Tylenol)  650 mg  Q4H  PRN


 ORAL


 moderate pain/fever  10/7/18 16:15


 11/1/18 16:14   


 


 


 Acetaminophen/


 Hydrocodone Bitart


  (Norco 5/325)  1 tab  Q4H  PRN


 ORAL


 severe pain  10/7/18 16:15


 10/12/18 16:14  10/8/18 02:52


 


 


 Al Hydroxide/Mg


 Hydroxide


  (Mylanta II)  30 ml  Q6H  PRN


 ORAL


 dyspepsia  10/7/18 15:16


 11/1/18 15:15   


 


 


 Atenolol


  (Tenormin)  25 mg  DAILY


 ORAL


   10/8/18 09:00


 11/2/18 08:59  10/8/18 09:23


 


 


 Ceftriaxone


 Sodium 1 gm/


 Dextrose  55 ml @ 


 110 mls/hr  Q24H


 IVPB


   10/8/18 12:30


 10/13/18 12:29   


 


 


 Dextrose


  (Dextrose 50%)  25 ml  Q30M  PRN


 IV


 Hypoglycemia  10/7/18 15:30


 11/1/18 14:25   


 


 


 Dextrose


  (Dextrose 50%)  50 ml  Q30M  PRN


 IV


 hypoglycemia  10/7/18 15:30


 11/1/18 14:29   


 


 


 Docusate Sodium


  (Colace)  100 mg  THREE TIMES A  DAY


 ORAL


   10/7/18 18:00


 11/3/18 17:59  10/8/18 09:23


 


 


 Ferrous Sulfate


  (Feosol)  325 mg  TIAC


 ORAL


   10/7/18 16:30


 11/6/18 16:29  10/8/18 06:04


 


 


 Heparin Sodium


  (Porcine)


  (Heparin 5000


 units/ml)  5,000 units  EVERY 12  HOURS


 SUBQ


   10/7/18 21:00


 11/1/18 20:59  10/8/18 09:26


 


 


 Losartan Potassium


  (Cozaar)  25 mg  DAILY


 ORAL


   10/8/18 09:00


 11/2/18 08:59  10/8/18 09:23


 


 


 Magnesium


 Hydroxide


  (Mom)  30 ml  DAILYPRN  PRN


 ORAL


 Constipation  10/7/18 15:19


 11/3/18 15:18   


 


 


 Ondansetron HCl


  (Zofran)  4 mg  Q6H  PRN


 IVP


 Nausea & Vomiting  10/7/18 15:19


 11/1/18 15:18   


 


 


 Polyethylene


 Glycol


  (Miralax)  17 gm  HSPRN  PRN


 ORAL


 Constipation  10/7/18 15:25


 11/6/18 15:18   


 


 


 Potassium Chloride


  (K-Dur)  20 meq  DAILY


 ORAL


   10/8/18 09:00


 11/5/18 20:59  10/8/18 09:24


 


 


 Prochlorperazine


  (Compazine)  10 mg  Q6H  PRN


 IVP


 Nausea & Vomiting  10/7/18 15:20


 11/3/18 15:19   


 


 


 Quetiapine


 Fumarate


  (SEROquel)  25 mg  Q4H  PRN


 ORAL


 agitation  10/8/18 11:20


 11/7/18 11:19   


 


 


 Quetiapine


 Fumarate


  (SEROquel)  25 mg  QHS


 ORAL


   10/7/18 21:00


 11/2/18 00:29  10/7/18 21:12


 

















Olivia Rosas M.D. Oct 8, 2018 12:13

## 2018-10-08 NOTE — GENERAL PROGRESS NOTE
Assessment/Plan


Assessment/Plan


(1) Right hip fracture


(2) Right hip pain


(3) S/p fall and right hip arthroplasty 


Patient to be continued on Norco or Tylenol as needed.


D/w Dr. Ennis and he concurred.





Subjective


Date patient seen:  Oct 8, 2018


Time patient seen:  07:45 - am


Allergies:  


Coded Allergies:  


     No Known Allergies (Unverified , 3/26/14)


Subjective


REVIEW OF SYSTEMS:  Denies rash, fever, chills, sweating, dizziness,


drowsiness, blurred vision, sore throat, or change in her weight.  No


shortness of breath, chest pain, palpitations, or cough.  She is


complaining of right hip pain.





SUBJECTIVE: Patient has continued pain more so with movement. 


The pain has been tolerated on the norco using one dose in the 


last 24hrs. No new complaints at this time.





Objective





Last 24 Hour Vital Signs








  Date Time  Temp Pulse Resp B/P (MAP) Pulse Ox O2 Delivery O2 Flow Rate FiO2


 


10/8/18 07:55 96.3 100 18 159/75 (103) 100   





 96.3       


 


10/8/18 04:02 97.9 86 19 133/71 (91) 97   





 97.9       


 


10/8/18 00:37 97.7 81 19 126/64 (84) 98   





 97.7       


 


10/8/18 00:00  99      


 


10/7/18 21:00      Room Air  


 


10/7/18 20:00  76      


 


10/7/18 19:19 98.1 89 18 135/78 (97) 99   





 98.1       


 


10/7/18 16:00  84      


 


10/7/18 16:00 98.8 96 22 136/86 (103) 98   





 98.8       


 


10/7/18 12:00  79      


 


10/7/18 12:00 98.7 86 20 136/88 (104) 100   





 98.7       


 


10/7/18 11:21  71 18 138/83 (101) 100   


 


10/7/18 11:00  77 18 142/81 (101) 100   


 


10/7/18 10:50  69 18 156/79 (104) 95   


 


10/7/18 09:00      Room Air  

















Intake and Output  


 


 10/7/18 10/8/18





 19:00 07:00


 


  


 


# Voids  2








Laboratory Tests


10/7/18 11:45: 


White Blood Count 9.6, Red Blood Count 3.25L, Hemoglobin 9.4L, Hematocrit 29.9L

, Mean Corpuscular Volume 92, Mean Corpuscular Hemoglobin 29.0, Mean 

Corpuscular Hemoglobin Concent 31.4L, Red Cell Distribution Width 17.2H, 

Platelet Count 288, Mean Platelet Volume 6.2L, Neutrophils (%) (Auto) 82.4H, 

Lymphocytes (%) (Auto) 9.4L, Monocytes (%) (Auto) 7.5, Eosinophils (%) (Auto) 

0.3, Basophils (%) (Auto) 0.3, Sodium Level 143, Potassium Level 3.5, Chloride 

Level 109H, Carbon Dioxide Level 24, Anion Gap 10, Blood Urea Nitrogen 9, 

Creatinine 0.7, Estimat Glomerular Filtration Rate , Glucose Level 104, Calcium 

Level 8.5


10/7/18 16:15: 


White Blood Count 11.2H, Red Blood Count 3.68L, Hemoglobin 11.1L, Hematocrit 

35.4L, Mean Corpuscular Volume 96, Mean Corpuscular Hemoglobin 30.1, Mean 

Corpuscular Hemoglobin Concent 31.4L, Red Cell Distribution Width 17.1H, 

Platelet Count 304, Mean Platelet Volume 7.0, Neutrophils (%) (Auto) 81.4H, 

Lymphocytes (%) (Auto) 10.5L, Monocytes (%) (Auto) 7.3, Eosinophils (%) (Auto) 

0.5, Basophils (%) (Auto) 0.3


10/8/18 08:15: 


Sodium Level [Pending], Potassium Level [Pending], Chloride Level [Pending], 

Carbon Dioxide Level [Pending], Blood Urea Nitrogen [Pending], Creatinine [

Pending], Estimat Glomerular Filtration Rate [Pending], Glucose Level [Pending]

, Calcium Level [Pending]


Height (Feet):  5


Height (Inches):  6.00


Weight (Pounds):  120


Objective


GENERAL:  Alert and awake.


LUNGS:  Decreased breath sounds bilaterally.


HEART:  S1 and S2 regular.


ABDOMEN:  Benign.


EXTREMITIES:  No CCE noted. Right hip bandages placed. 


NEURO: No changes.











Ethan Newby Oct 8, 2018 08:57

## 2018-10-08 NOTE — CONSULTATION
History of Present Illness


General


Date patient seen:  Oct 8, 2018


Time patient seen:  15:23


Chief Complaint:  Pain





Present Illness


HPI


78 year old female complains of right hip and right leg pain, 10/10 pain score 

and noted screaming and swelling noted on right ankle. The patient was admitted 

to [facility] from 09/16/2018 to 09/22/2018.  The patient was status post non-ST 

elevated myocardial infarction.  The patient also had congestive heart failure. 

The patient has been falling since she returned home.  The patient presented to 

[facility] emergency room after being found on the floor.  The patient was 

complaining of right hip pain.  A CT scan of the right hip


revealed comminuted fracture of the right femoral neck.  The patient is 

admitted for right femoral neck fracture.








Limited 2-D Echo study due to pt's resistance.


Moderate left ventricular enlargement. 


Global left ventricular hypokinesis, worse in posterior wall.


Left ventricular ejection fraction estimated to be 30-35 %.


Increased E point-interventricular septal separation c/w left ventricular 


dysfunction.


Mild left ventricular hypertrophy.


No evidence of pericardial effusion.


Moderate bi-atrial enlargement. 


Right cardiac chamber size is within normal limits.


Aortic valve calcification with decreased cusp excursion c/w aortic stenosis.


Thickened mitral valve leaflets with normal excursion.


Moderate mitral annulus and aortic root calcification.


Normal pulmonic valve structure. 


Normal tricuspid valve structure.  


No subcostal views obtained.


Pacemaker wire present in the right side chambers.





A  color flow and spectral Doppler study was performed and revealed:


Mild aortic insufficiency.


Peak aortic valve gradient of 20 mmHg and a mean of 8 mmHg.      


Aortic valve area 1.2 cm2 calculated by continuity equation suggestive of mild 

to 


moderate aortic stenosis.


Moderate mitral regurgitation.


Mitral inflow velocities indicates possible pseudo normalization pattern 

implying 


moderately elevated left atrial pressure (Grade II ).   


Moderate tricuspid regurgitation.


Tricuspid systolic velocities suggests peak right ventricular systolic pressure 

of 82 


mmHg, consistent with severe pulmonary hypertension.


Moderate pulmonic regurgitation present.


Allergies:  


Coded Allergies:  


     No Known Allergies (Unverified , 3/26/14)





Medication History


Scheduled


Amoxicillin/Potassium Clav 875-125* (Augmentin 875-125 Tablet*), 1 TAB ORAL 

TWICE A DAY


Aspirin (Aspirin EC), 81 MG ORAL DAILY, (Reported)


Atenolol (Tenormin), 25 MG ORAL DAILY, (Reported)


Clonazepam* (Klonopin*), 0.5 MG ORAL Q6H, (Reported)


Clonidine Hcl* (Catapres*), 0.1 MG ORAL EVERY 8 HOURS, (Reported)


Clopidogrel* (Clopidogrel*), 75 MG ORAL DAILY, (Reported)


Dicyclomine Hcl* (Dicyclomine Hcl*), 10 MG PO QID


Famotidine (Pepcid), 20 MG ORAL BEDTIME


Furosemide* (Lasix*), 40 MG ORAL DAILY, (Reported)


Losartan Potassium* (Losartan Potassium*), 25 MG ORAL DAILY, (Reported)


Metoprolol Succinate* (Metoprolol Succinate*), 50 MG ORAL DAILY, (Reported)


No Known Medications* (NKM - No Known Medications*), 0 ., (Reported)


Potassium Chloride (Potassium Chloride), 10 MEQ ORAL DAILY, (Reported)


Rosuvastatin Calcium* (Crestor*), 10 MG ORAL DAILY, (Reported)


Sitagliptin Phos/Metformin Hcl (Janumet Xr 50-1,000 Mg Tablet), 1 TAB ORAL DAILY

, (Reported)


Trazodone* (Trazodone*), 50 MG ORAL BEDTIME, (Reported)


Vitamin D (Vitamin D3), 5,000 UNITS ORAL DAILY, (Reported)





Scheduled PRN


Hydrocodone Bit/Acetaminophen 5-325* (Norco 5-325*), 1 TAB ORAL Q6H PRN for For 

Pain





Miscellaneous Medications


Unable to Obtain Medications (Unable To Obtain Meds), (Reported)





Patient History


Healthcare decision maker





Resuscitation status


Full Code


Advanced Directive on File








Review of Systems


Constitutional:  Reports: no symptoms


Eye:  Reports: no symptoms


ENT:  Reports: no symptoms


Respiratory:  Reports: no symptoms


Cardiovascular:  Reports: no symptoms


Gastrointestinal:  Reports: no symptoms


Genitourinary:  Reports: no symptoms


Musculoskeletal:  Reports: no symptoms


Skin:  Reports: no symptoms


Psychiatric:  Reports: no symptoms


Neurological:  Reports: no symptoms


Endocrine:  Reports: no symptoms


Hematologic/Lymphatic:  Reports: no symptoms





Physical Exam


General Appearance:  no apparent distress, lethargic, confused


Lines, tubes and drains:  peripheral


HEENT:  normocephalic, atraumatic, anicteric, mucous membranes moist, PERRL


Neck:  non-tender, normal alignment, supple, normal inspection


Respiratory/Chest:  chest wall non-tender, lungs clear, normal breath sounds


Cardiovascular/Chest:  normal peripheral pulses, normal rate, regular rhythm, 

diastolic murmur, systolic murmur, gallop/S3, gallop/S4, pacemaker/AICD


Abdomen:  normal bowel sounds, non tender


Extremities:  normal range of motion, non-tender, normal inspection


Neurologic:  CNs II-XII grossly normal, disoriented





Last 24 Hour Vital Signs








  Date Time  Temp Pulse Resp B/P (MAP) Pulse Ox O2 Delivery O2 Flow Rate FiO2


 


10/8/18 16:00 97.7 85 18 120/79 (93) 99   





 97.7       


 


10/8/18 16:00  98      


 


10/8/18 12:00 97.7 79 18 154/85 (108) 100   





 97.7       


 


10/8/18 12:00  84      


 


10/8/18 09:23    159/75    


 


10/8/18 09:23  100  159/75    


 


10/8/18 09:00      Room Air  


 


10/8/18 07:55 96.3 100 18 159/75 (103) 100   





 96.3       


 


10/8/18 04:02 97.9 86 19 133/71 (91) 97   





 97.9       


 


10/8/18 00:37 97.7 81 19 126/64 (84) 98   





 97.7       


 


10/8/18 00:00  99      

















Intake and Output  


 


 10/7/18 10/8/18





 19:00 07:00


 


  


 


# Voids  2











Laboratory Tests








Test


  10/8/18


08:15


 


Sodium Level


  141 MMOL/L


(136-145)


 


Potassium Level


  4.6 MMOL/L


(3.5-5.1)


 


Chloride Level


  109 MMOL/L


()  H


 


Carbon Dioxide Level


  18 MMOL/L


(21-32)  L


 


Anion Gap


  14 mmol/L


(5-15)


 


Blood Urea Nitrogen


  12 mg/dL


(7-18)


 


Creatinine


  0.6 MG/DL


(0.55-1.30)


 


Estimat Glomerular Filtration


Rate  mL/min (>60)  


 


 


Glucose Level


  155 MG/DL


()  H


 


Calcium Level


  8.3 MG/DL


(8.5-10.1)  L








Height (Feet):  5


Height (Inches):  6.00


Weight (Pounds):  120


Medications





Current Medications








 Medications


  (Trade)  Dose


 Ordered  Sig/Violette


 Route


 PRN Reason  Start Time


 Stop Time Status Last Admin


Dose Admin


 


 Acetaminophen


  (Tylenol)  650 mg  Q4H  PRN


 ORAL


 moderate pain/fever  10/7/18 16:15


 11/1/18 16:14   


 


 


 Acetaminophen/


 Hydrocodone Bitart


  (Norco 5/325)  1 tab  Q4H  PRN


 ORAL


 severe pain  10/7/18 16:15


 10/12/18 16:14  10/8/18 02:52


 


 


 Al Hydroxide/Mg


 Hydroxide


  (Mylanta II)  30 ml  Q6H  PRN


 ORAL


 dyspepsia  10/7/18 15:16


 11/1/18 15:15   


 


 


 Atenolol


  (Tenormin)  25 mg  DAILY


 ORAL


   10/8/18 09:00


 11/2/18 08:59  10/8/18 09:23


 


 


 Ceftriaxone


 Sodium 1 gm/


 Dextrose  55 ml @ 


 110 mls/hr  Q24H


 IVPB


   10/8/18 12:30


 10/13/18 12:29  10/8/18 14:23


 


 


 Dextrose


  (Dextrose 50%)  25 ml  Q30M  PRN


 IV


 Hypoglycemia  10/7/18 15:30


 11/1/18 14:25   


 


 


 Dextrose


  (Dextrose 50%)  50 ml  Q30M  PRN


 IV


 hypoglycemia  10/7/18 15:30


 11/1/18 14:29   


 


 


 Docusate Sodium


  (Colace)  100 mg  THREE TIMES A  DAY


 ORAL


   10/7/18 18:00


 11/3/18 17:59  10/8/18 17:35


 


 


 Ferrous Sulfate


  (Feosol)  325 mg  TIAC


 ORAL


   10/7/18 16:30


 11/6/18 16:29  10/8/18 17:35


 


 


 Heparin Sodium


  (Porcine)


  (Heparin 5000


 units/ml)  5,000 units  EVERY 12  HOURS


 SUBQ


   10/7/18 21:00


 11/1/18 20:59  10/8/18 21:25


 


 


 Losartan Potassium


  (Cozaar)  25 mg  DAILY


 ORAL


   10/8/18 09:00


 11/2/18 08:59  10/8/18 09:23


 


 


 Magnesium


 Hydroxide


  (Mom)  30 ml  DAILYPRN  PRN


 ORAL


 Constipation  10/7/18 15:19


 11/3/18 15:18   


 


 


 Ondansetron HCl


  (Zofran)  4 mg  Q6H  PRN


 IVP


 Nausea & Vomiting  10/7/18 15:19


 11/1/18 15:18   


 


 


 Polyethylene


 Glycol


  (Miralax)  17 gm  HSPRN  PRN


 ORAL


 Constipation  10/7/18 15:25


 11/6/18 15:18   


 


 


 Potassium Chloride


  (K-Dur)  20 meq  DAILY


 ORAL


   10/8/18 09:00


 11/5/18 20:59  10/8/18 09:24


 


 


 Prochlorperazine


  (Compazine)  10 mg  Q6H  PRN


 IVP


 Nausea & Vomiting  10/7/18 15:20


 11/3/18 15:19   


 


 


 Quetiapine


 Fumarate


  (SEROquel)  25 mg  Q4H  PRN


 ORAL


 agitation  10/8/18 11:20


 11/7/18 11:19   


 


 


 Quetiapine


 Fumarate


  (SEROquel)  25 mg  QHS


 ORAL


   10/7/18 21:00


 11/2/18 00:29  10/8/18 21:17


 











Assessment/Plan


Status:  stable


Assessment/Plan


Assessment:


1. Diabetes, type 2.


2. Hypertension.


3. Sick sinus syndrome.


4. Hypercholesterolemia.


5. Non ST elevated myocardial infarction as above.


6. Coronary artery bypass graft in 2017.


7. Pacemaker generator change in 2017.


8.  History of elbow debridement.


9. Pacemaker implantation in 2014.


10. Hip fracture s/p sugery 10/4


11. Aortic stenosis


12. Mitral regurgitation





Plan:


Pain control


Incentive spirometry


Aspirin


Crestor


TTE: LVEF 35% with AS and MR and elevated filling pressures


-change atenolol to coreg 3.125 BID and titrate to max tolerated


-Losartan


-Add aldactone


-CV stable, no indication for cath/stress test at this time











Raffi Izaguirre MD Oct 8, 2018 21:38

## 2018-10-08 NOTE — GENERAL PROGRESS NOTE
Assessment/Plan


Problem List:  


(1) Psychosis


ICD Codes:  F29 - Unspecified psychosis not due to a substance or known 

physiological condition


SNOMED:  09810045


(2) encephalopathy due to 


Assessment & Plan:  encephalopathy due to metabolic condition





-seroquel prn


-provided ro/st





Status:  deteriorating


Assessment/Plan


encephalopathy due to metabolic condition





-seroquel prn


-provided ro/st


-seroquel standing


-dc haldol


-dc sitter


-restraints





Subjective


Date patient seen:  Oct 8, 2018


Neurologic/Psychiatric:  Reports: anxiety, emotional problems


Allergies:  


Coded Allergies:  


     No Known Allergies (Unverified , 3/26/14)


Subjective


the pt was agitated and had a sitter pulls out IV noncompliance





Objective





Last 24 Hour Vital Signs








  Date Time  Temp Pulse Resp B/P (MAP) Pulse Ox O2 Delivery O2 Flow Rate FiO2


 


10/8/18 09:23    159/75    


 


10/8/18 09:23  100  159/75    


 


10/8/18 07:55 96.3 100 18 159/75 (103) 100   





 96.3       


 


10/8/18 04:02 97.9 86 19 133/71 (91) 97   





 97.9       


 


10/8/18 00:37 97.7 81 19 126/64 (84) 98   





 97.7       


 


10/8/18 00:00  99      


 


10/7/18 21:00      Room Air  


 


10/7/18 20:00  76      


 


10/7/18 19:19 98.1 89 18 135/78 (97) 99   





 98.1       


 


10/7/18 16:00  84      


 


10/7/18 16:00 98.8 96 22 136/86 (103) 98   





 98.8       


 


10/7/18 12:00  79      


 


10/7/18 12:00 98.7 86 20 136/88 (104) 100   





 98.7       


 


10/7/18 11:21  71 18 138/83 (101) 100   


 


10/7/18 11:00  77 18 142/81 (101) 100   


 


10/7/18 10:50  69 18 156/79 (104) 95   

















Intake and Output  


 


 10/7/18 10/8/18





 19:00 07:00


 


  


 


# Voids  2








Laboratory Tests


10/7/18 11:45: 


White Blood Count 9.6, Red Blood Count 3.25L, Hemoglobin 9.4L, Hematocrit 29.9L

, Mean Corpuscular Volume 92, Mean Corpuscular Hemoglobin 29.0, Mean 

Corpuscular Hemoglobin Concent 31.4L, Red Cell Distribution Width 17.2H, 

Platelet Count 288, Mean Platelet Volume 6.2L, Neutrophils (%) (Auto) 82.4H, 

Lymphocytes (%) (Auto) 9.4L, Monocytes (%) (Auto) 7.5, Eosinophils (%) (Auto) 

0.3, Basophils (%) (Auto) 0.3, Sodium Level 143, Potassium Level 3.5, Chloride 

Level 109H, Carbon Dioxide Level 24, Anion Gap 10, Blood Urea Nitrogen 9, 

Creatinine 0.7, Estimat Glomerular Filtration Rate , Glucose Level 104, Calcium 

Level 8.5


10/7/18 16:15: 


White Blood Count 11.2H, Red Blood Count 3.68L, Hemoglobin 11.1L, Hematocrit 

35.4L, Mean Corpuscular Volume 96, Mean Corpuscular Hemoglobin 30.1, Mean 

Corpuscular Hemoglobin Concent 31.4L, Red Cell Distribution Width 17.1H, 

Platelet Count 304, Mean Platelet Volume 7.0, Neutrophils (%) (Auto) 81.4H, 

Lymphocytes (%) (Auto) 10.5L, Monocytes (%) (Auto) 7.3, Eosinophils (%) (Auto) 

0.5, Basophils (%) (Auto) 0.3


10/8/18 08:15: 


Sodium Level 141, Potassium Level 4.6, Chloride Level 109H, Carbon Dioxide 

Level 18L, Anion Gap 14, Blood Urea Nitrogen 12, Creatinine 0.6, Estimat 

Glomerular Filtration Rate , Glucose Level 155H, Calcium Level 8.3L


Height (Feet):  5


Height (Inches):  6.00


Weight (Pounds):  120


General Appearance:  alert, confused, agitated











Josh Nunez MD Oct 8, 2018 10:38

## 2018-10-08 NOTE — PSYCH CONSULT PROGRESS NOTE
Psych Consult Progress Note


Consult


10/7/18


Vital Signs





Last 24 Hour Vital Signs








  Date Time  Temp Pulse Resp B/P (MAP) Pulse Ox O2 Delivery O2 Flow Rate FiO2


 


10/8/18 21:00      Room Air  


 


10/8/18 20:00 98.5 87 19 145/82 (103) 95   





 98.5       


 


10/8/18 20:00  94      


 


10/8/18 16:00 97.7 85 18 120/79 (93) 99   





 97.7       


 


10/8/18 16:00  98      


 


10/8/18 12:00 97.7 79 18 154/85 (108) 100   





 97.7       


 


10/8/18 12:00  84      


 


10/8/18 09:23    159/75    


 


10/8/18 09:23  100  159/75    


 


10/8/18 09:00      Room Air  


 


10/8/18 07:55 96.3 100 18 159/75 (103) 100   





 96.3       


 


10/8/18 04:02 97.9 86 19 133/71 (91) 97   





 97.9       


 


10/8/18 00:37 97.7 81 19 126/64 (84) 98   





 97.7       


 


10/8/18 00:00  99      








Labs





Laboratory Tests








Test


  10/8/18


08:15


 


Sodium Level


  141 MMOL/L


(136-145)


 


Potassium Level


  4.6 MMOL/L


(3.5-5.1)


 


Chloride Level


  109 MMOL/L


()  H


 


Carbon Dioxide Level


  18 MMOL/L


(21-32)  L


 


Anion Gap


  14 mmol/L


(5-15)


 


Blood Urea Nitrogen


  12 mg/dL


(7-18)


 


Creatinine


  0.6 MG/DL


(0.55-1.30)


 


Estimat Glomerular Filtration


Rate  mL/min (>60)  


 


 


Glucose Level


  155 MG/DL


()  H


 


Calcium Level


  8.3 MG/DL


(8.5-10.1)  L








Medications





Current Medications








 Medications


  (Trade)  Dose


 Ordered  Sig/Violette


 Route


 PRN Reason  Start Time


 Stop Time Status Last Admin


Dose Admin


 


 Acetaminophen


  (Tylenol)  650 mg  Q4H  PRN


 ORAL


 moderate pain/fever  10/7/18 16:15


 11/1/18 16:14   


 


 


 Acetaminophen/


 Hydrocodone Bitart


  (Norco 5/325)  1 tab  Q4H  PRN


 ORAL


 severe pain  10/7/18 16:15


 10/12/18 16:14  10/8/18 02:52


 


 


 Al Hydroxide/Mg


 Hydroxide


  (Mylanta II)  30 ml  Q6H  PRN


 ORAL


 dyspepsia  10/7/18 15:16


 11/1/18 15:15   


 


 


 Atenolol


  (Tenormin)  25 mg  DAILY


 ORAL


   10/8/18 09:00


 11/2/18 08:59  10/8/18 09:23


 


 


 Ceftriaxone


 Sodium 1 gm/


 Dextrose  55 ml @ 


 110 mls/hr  Q24H


 IVPB


   10/8/18 12:30


 10/13/18 12:29  10/8/18 14:23


 


 


 Dextrose


  (Dextrose 50%)  25 ml  Q30M  PRN


 IV


 Hypoglycemia  10/7/18 15:30


 11/1/18 14:25   


 


 


 Dextrose


  (Dextrose 50%)  50 ml  Q30M  PRN


 IV


 hypoglycemia  10/7/18 15:30


 11/1/18 14:29   


 


 


 Docusate Sodium


  (Colace)  100 mg  THREE TIMES A  DAY


 ORAL


   10/7/18 18:00


 11/3/18 17:59  10/8/18 17:35


 


 


 Ferrous Sulfate


  (Feosol)  325 mg  TIAC


 ORAL


   10/7/18 16:30


 11/6/18 16:29  10/8/18 17:35


 


 


 Heparin Sodium


  (Porcine)


  (Heparin 5000


 units/ml)  5,000 units  EVERY 12  HOURS


 SUBQ


   10/7/18 21:00


 11/1/18 20:59  10/8/18 21:25


 


 


 Losartan Potassium


  (Cozaar)  25 mg  DAILY


 ORAL


   10/8/18 09:00


 11/2/18 08:59  10/8/18 09:23


 


 


 Magnesium


 Hydroxide


  (Mom)  30 ml  DAILYPRN  PRN


 ORAL


 Constipation  10/7/18 15:19


 11/3/18 15:18   


 


 


 Ondansetron HCl


  (Zofran)  4 mg  Q6H  PRN


 IVP


 Nausea & Vomiting  10/7/18 15:19


 11/1/18 15:18   


 


 


 Polyethylene


 Glycol


  (Miralax)  17 gm  HSPRN  PRN


 ORAL


 Constipation  10/7/18 15:25


 11/6/18 15:18   


 


 


 Potassium Chloride


  (K-Dur)  20 meq  DAILY


 ORAL


   10/8/18 09:00


 11/5/18 20:59  10/8/18 09:24


 


 


 Prochlorperazine


  (Compazine)  10 mg  Q6H  PRN


 IVP


 Nausea & Vomiting  10/7/18 15:20


 11/3/18 15:19   


 


 


 Quetiapine


 Fumarate


  (SEROquel)  25 mg  Q4H  PRN


 ORAL


 agitation  10/8/18 11:20


 11/7/18 11:19   


 


 


 Quetiapine


 Fumarate


  (SEROquel)  25 mg  QHS


 ORAL


   10/7/18 21:00


 11/2/18 00:29  10/8/18 21:17


 








Problems:  


(1) Psychosis


(2) encephalopathy due to 


Assessment & Plan:  encephalopathy due to metabolic condition





-seroquel prn


-provided ro/st


-seroquel standing














Josh Nunez MD Oct 8, 2018 23:32

## 2018-10-08 NOTE — INTERNAL MED PROGRESS NOTE
Subjective


Date of Service:  Oct 8, 2018


Physician Name


Cheo Ortiz


Attending Physician


Juan Carlos Magana MD





Current Medications








 Medications


  (Trade)  Dose


 Ordered  Sig/Violette


 Route


 PRN Reason  Start Time


 Stop Time Status Last Admin


Dose Admin


 


 Acetaminophen


  (Tylenol)  650 mg  Q4H  PRN


 ORAL


 moderate pain/fever  10/7/18 16:15


 11/1/18 16:14   


 


 


 Acetaminophen/


 Hydrocodone Bitart


  (Norco 5/325)  1 tab  Q4H  PRN


 ORAL


 severe pain  10/7/18 16:15


 10/12/18 16:14  10/8/18 02:52


 


 


 Al Hydroxide/Mg


 Hydroxide


  (Mylanta II)  30 ml  Q6H  PRN


 ORAL


 dyspepsia  10/7/18 15:16


 11/1/18 15:15   


 


 


 Atenolol


  (Tenormin)  25 mg  DAILY


 ORAL


   10/8/18 09:00


 11/2/18 08:59  10/8/18 09:23


 


 


 Ceftriaxone


 Sodium 1 gm/


 Dextrose  55 ml @ 


 110 mls/hr  Q24H


 IVPB


   10/8/18 12:30


 10/13/18 12:29  10/8/18 14:23


 


 


 Dextrose


  (Dextrose 50%)  25 ml  Q30M  PRN


 IV


 Hypoglycemia  10/7/18 15:30


 11/1/18 14:25   


 


 


 Dextrose


  (Dextrose 50%)  50 ml  Q30M  PRN


 IV


 hypoglycemia  10/7/18 15:30


 11/1/18 14:29   


 


 


 Docusate Sodium


  (Colace)  100 mg  THREE TIMES A  DAY


 ORAL


   10/7/18 18:00


 11/3/18 17:59  10/8/18 17:35


 


 


 Ferrous Sulfate


  (Feosol)  325 mg  TIAC


 ORAL


   10/7/18 16:30


 11/6/18 16:29  10/8/18 17:35


 


 


 Heparin Sodium


  (Porcine)


  (Heparin 5000


 units/ml)  5,000 units  EVERY 12  HOURS


 SUBQ


   10/7/18 21:00


 11/1/18 20:59  10/8/18 09:26


 


 


 Losartan Potassium


  (Cozaar)  25 mg  DAILY


 ORAL


   10/8/18 09:00


 11/2/18 08:59  10/8/18 09:23


 


 


 Magnesium


 Hydroxide


  (Mom)  30 ml  DAILYPRN  PRN


 ORAL


 Constipation  10/7/18 15:19


 11/3/18 15:18   


 


 


 Ondansetron HCl


  (Zofran)  4 mg  Q6H  PRN


 IVP


 Nausea & Vomiting  10/7/18 15:19


 11/1/18 15:18   


 


 


 Polyethylene


 Glycol


  (Miralax)  17 gm  HSPRN  PRN


 ORAL


 Constipation  10/7/18 15:25


 11/6/18 15:18   


 


 


 Potassium Chloride


  (K-Dur)  20 meq  DAILY


 ORAL


   10/8/18 09:00


 11/5/18 20:59  10/8/18 09:24


 


 


 Prochlorperazine


  (Compazine)  10 mg  Q6H  PRN


 IVP


 Nausea & Vomiting  10/7/18 15:20


 11/3/18 15:19   


 


 


 Quetiapine


 Fumarate


  (SEROquel)  25 mg  Q4H  PRN


 ORAL


 agitation  10/8/18 11:20


 11/7/18 11:19   


 


 


 Quetiapine


 Fumarate


  (SEROquel)  25 mg  QHS


 ORAL


   10/7/18 21:00


 11/2/18 00:29  10/7/18 21:12


 








Allergies:  


Coded Allergies:  


     No Known Allergies (Unverified , 3/26/14)


ROS Limited/Unobtainable:  No


Constitutional:  Reports: no symptoms


HEENT:  Reports: no symptoms


Cardiovascular:  Reports: no symptoms


Respiratory:  Reports: no symptoms


Gastrointestinal/Abdominal:  Reports: no symptoms


Genitourinary:  Reports: no symptoms


Neurologic/Psychiatric:  Reports: no symptoms


Subjective


79 YO F admitted with right hip pain; S/P fall.  Now right femoral neck 

fracture.  Cover for Int Med-Dr Magana.  S/P ORIF right hip fracture 10/4/18.  

Patient had syncopal episode transferring from chair to bed today-now on Tele





Objective





Last Vital Signs








  Date Time  Temp Pulse Resp B/P (MAP) Pulse Ox O2 Delivery O2 Flow Rate FiO2


 


10/8/18 16:00 97.7 85 18 120/79 (93) 99   





 97.7       


 


10/8/18 09:00      Room Air  


 


10/4/18 19:40       3 











Laboratory Tests








Test


  10/8/18


08:15


 


Sodium Level


  141 MMOL/L


(136-145)


 


Potassium Level


  4.6 MMOL/L


(3.5-5.1)


 


Chloride Level


  109 MMOL/L


()  H


 


Carbon Dioxide Level


  18 MMOL/L


(21-32)  L


 


Anion Gap


  14 mmol/L


(5-15)


 


Blood Urea Nitrogen


  12 mg/dL


(7-18)


 


Creatinine


  0.6 MG/DL


(0.55-1.30)


 


Estimat Glomerular Filtration


Rate  mL/min (>60)  


 


 


Glucose Level


  155 MG/DL


()  H


 


Calcium Level


  8.3 MG/DL


(8.5-10.1)  L

















Intake and Output  


 


 10/7/18 10/8/18





 19:00 07:00


 


  


 


# Voids  2








Objective


PHYSICAL EXAMINATION:


GENERAL:  The patient is well-developed and well-nourished thin-appearing


white female, in no apparent distress.


HEENT:  Eyes, pupils are equal and responsive to light and accommodation.


Extraocular movements are intact.


NECK:  Supple without lymphadenopathy.


CHEST:  Lungs are clear to auscultation bilaterally without wheezes or


rales.


CARDIOVASCULAR:  Regular rhythm and rate.  S1 and S2 are normal without


murmurs, rubs, or gallops.


ABDOMEN:  Soft, nontender, and nondistended.  Positive bowel sounds.  No


evidence of hepatosplenomegaly.  Currently, no rebound or guarding


noted.


EXTREMITIES:  Negative for clubbing, cyanosis, or edema.  Pain to palp right hip


RECTAL/GENITAL:  Refused.


NEUROLOGIC:  Cranial nerves II through XII are grossly intact without focal


deficits.  Motor strength is 5/5 bilaterally.  Deep tendon reflexes are 2+


plantar.





Assessment/Plan


Problem List:  


(1) Hypercholesterolemia


(2) CAD (coronary artery disease)


Assessment & Plan:  see dardiology note





(3) Fracture of femoral neck, right, closed


Assessment & Plan:  S/P ORIF Thurs 10/4/18-see ortho note.





(4) HTN (hypertension)


(5) Sick sinus syndrome


Assessment & Plan:  S/P pacemaker





(6) Pacemaker


(7) UTI (urinary tract infection)


Assessment & Plan:  E.Coli.  Continue rocephin per ID





(8) E coli infection


(9) Syncope


Assessment & Plan:  ?vasovagal vs hypoglycemia?  Transfer to [facility] & qHS





Status:  stable


Assessment/Plan


Discharge planning:  SNF vs Acute rehab











Cheo Ortiz MD Oct 8, 2018 19:23

## 2018-10-08 NOTE — CARDIOLOGY REPORT
--------------- APPROVED REPORT --------------





EKG Measurement

Heart [id number]

WI [id number]

[id number]

[id number]

[id number]





Left axis deviation

Left ventricular hypertrophy with repolarization abnormality

Possible Lateral infarct, age undetermined

Abnormal ECG

## 2018-10-09 VITALS — SYSTOLIC BLOOD PRESSURE: 126 MMHG | DIASTOLIC BLOOD PRESSURE: 89 MMHG

## 2018-10-09 VITALS — SYSTOLIC BLOOD PRESSURE: 149 MMHG | DIASTOLIC BLOOD PRESSURE: 73 MMHG

## 2018-10-09 VITALS — SYSTOLIC BLOOD PRESSURE: 130 MMHG | DIASTOLIC BLOOD PRESSURE: 67 MMHG

## 2018-10-09 VITALS — DIASTOLIC BLOOD PRESSURE: 72 MMHG | SYSTOLIC BLOOD PRESSURE: 135 MMHG

## 2018-10-09 VITALS — DIASTOLIC BLOOD PRESSURE: 80 MMHG | SYSTOLIC BLOOD PRESSURE: 133 MMHG

## 2018-10-09 LAB
ADD MANUAL DIFF: NO
ALBUMIN SERPL-MCNC: 2.4 G/DL (ref 3.4–5)
ALBUMIN/GLOB SERPL: 0.7 {RATIO} (ref 1–2.7)
ALP SERPL-CCNC: 52 U/L (ref 46–116)
ALT SERPL-CCNC: 16 U/L (ref 12–78)
ANION GAP SERPL CALC-SCNC: 10 MMOL/L (ref 5–15)
AST SERPL-CCNC: 31 U/L (ref 15–37)
BASOPHILS NFR BLD AUTO: 0.3 % (ref 0–2)
BILIRUB SERPL-MCNC: 0.4 MG/DL (ref 0.2–1)
BUN SERPL-MCNC: 10 MG/DL (ref 7–18)
CALCIUM SERPL-MCNC: 8.5 MG/DL (ref 8.5–10.1)
CHLORIDE SERPL-SCNC: 110 MMOL/L (ref 98–107)
CO2 SERPL-SCNC: 24 MMOL/L (ref 21–32)
CREAT SERPL-MCNC: 0.8 MG/DL (ref 0.55–1.3)
EOSINOPHIL NFR BLD AUTO: 1.1 % (ref 0–3)
ERYTHROCYTE [DISTWIDTH] IN BLOOD BY AUTOMATED COUNT: 16.8 % (ref 11.6–14.8)
GLOBULIN SER-MCNC: 3.3 G/DL
HCT VFR BLD CALC: 32.5 % (ref 37–47)
HGB BLD-MCNC: 10.1 G/DL (ref 12–16)
LYMPHOCYTES NFR BLD AUTO: 18.7 % (ref 20–45)
MCV RBC AUTO: 92 FL (ref 80–99)
MONOCYTES NFR BLD AUTO: 7.4 % (ref 1–10)
NEUTROPHILS NFR BLD AUTO: 72.5 % (ref 45–75)
PLATELET # BLD: 210 K/UL (ref 150–450)
POTASSIUM SERPL-SCNC: 3.7 MMOL/L (ref 3.5–5.1)
RBC # BLD AUTO: 3.54 M/UL (ref 4.2–5.4)
SODIUM SERPL-SCNC: 144 MMOL/L (ref 136–145)
WBC # BLD AUTO: 7.4 K/UL (ref 4.8–10.8)

## 2018-10-09 RX ADMIN — DOCUSATE SODIUM SCH MG: 100 CAPSULE, LIQUID FILLED ORAL at 09:16

## 2018-10-09 RX ADMIN — ATENOLOL SCH MG: 25 TABLET ORAL at 09:17

## 2018-10-09 RX ADMIN — SODIUM CHLORIDE SCH MLS/HR: 0.9 INJECTION INTRAVENOUS at 11:33

## 2018-10-09 RX ADMIN — HEPARIN SODIUM SCH UNITS: 5000 INJECTION INTRAVENOUS; SUBCUTANEOUS at 09:20

## 2018-10-09 RX ADMIN — DOCUSATE SODIUM SCH MG: 100 CAPSULE, LIQUID FILLED ORAL at 11:58

## 2018-10-09 RX ADMIN — LOSARTAN POTASSIUM SCH MG: 25 TABLET, FILM COATED ORAL at 09:17

## 2018-10-09 NOTE — GENERAL PROGRESS NOTE
Assessment/Plan


Problem List:  


(1) Psychosis


ICD Codes:  F29 - Unspecified psychosis not due to a substance or known 

physiological condition


SNOMED:  [id number]


(2) encephalopathy due to 


Assessment & Plan:  encephalopathy due to metabolic condition





-seroquel prn


-provided ro/st


-seroquel standing





Status:  stable


Assessment/Plan


encephalopathy due to metabolic condition





-seroquel prn


-provided ro/st


-seroquel standing


-dc haldol


-dc sitter


-restraints





Subjective


Date patient seen:  Oct 9, 2018


Neurologic/Psychiatric:  Reports: anxiety, depressed, emotional problems


Allergies:  


Coded Allergies:  


     No Known Allergies (Unverified , 3/26/14)


Subjective


the pt was agitated and unable to make decisions. the pts family would like to 

take the pt home.





Objective





Last 24 Hour Vital Signs








  Date Time  Temp Pulse Resp B/P (MAP) Pulse Ox O2 Delivery O2 Flow Rate FiO2


 


10/9/18 09:17    133/80    


 


10/9/18 09:17  83  133/80    


 


10/9/18 08:00 97.9 83 19 133/80 (97) 100   





 97.9       


 


10/9/18 04:00  85      


 


10/9/18 04:00 99.0 72 19 130/67 (88) 99   





 99.0       


 


10/9/18 00:00  97      


 


10/9/18 00:00 99.1 94 19 126/89 (101) 100   





 99.1       


 


10/8/18 21:00      Room Air  


 


10/8/18 20:00 98.5 87 19 145/82 (103) 95   





 98.5       


 


10/8/18 20:00  94      


 


10/8/18 16:00 97.7 85 18 120/79 (93) 99   





 97.7       


 


10/8/18 16:00  98      


 


10/8/18 12:00 97.7 79 18 154/85 (108) 100   





 97.7       


 


10/8/18 12:00  84      

















Intake and Output  


 


 10/8/18 10/9/18





 19:00 07:00


 


Intake Total 60 ml 60 ml


 


Balance 60 ml 60 ml


 


  


 


Intake Oral 60 ml 60 ml


 


# Voids 2 2








Laboratory Tests


10/9/18 07:45: 


White Blood Count 7.4, Red Blood Count 3.54L, Hemoglobin 10.1L, Hematocrit 32.5L

, Mean Corpuscular Volume 92, Mean Corpuscular Hemoglobin 28.4, Mean 

Corpuscular Hemoglobin Concent 30.9L, Red Cell Distribution Width 16.8H, 

Platelet Count 210, Mean Platelet Volume 6.8, Neutrophils (%) (Auto) 72.5, 

Lymphocytes (%) (Auto) 18.7L, Monocytes (%) (Auto) 7.4, Eosinophils (%) (Auto) 

1.1, Basophils (%) (Auto) 0.3, Sodium Level 144, Potassium Level 3.7, Chloride 

Level 110H, Carbon Dioxide Level 24, Anion Gap 10, Blood Urea Nitrogen 10, 

Creatinine 0.8, Estimat Glomerular Filtration Rate , Glucose Level 94, Calcium 

Level 8.5, Total Bilirubin 0.4, Aspartate Amino Transf (AST/SGOT) 31, Alanine 

Aminotransferase (ALT/SGPT) 16, Alkaline Phosphatase 52, Total Protein 5.7L, 

Albumin 2.4L, Globulin 3.3, Albumin/Globulin Ratio 0.7L


Height (Feet):  5


Height (Inches):  6.00


Weight (Pounds):  120


General Appearance:  no apparent distress, alert, confused


Neurologic:  alert, disoriented, depressed affect











Josh Nunez MD Oct 9, 2018 11:41

## 2018-10-09 NOTE — CARDIOLOGY PROGRESS NOTE
Assessment/Plan


Status:  stable


Assessment/Plan


Assessment:


1. Diabetes, type 2.


2. Hypertension.


3. Sick sinus syndrome.


4. Hypercholesterolemia.


5. Non ST elevated myocardial infarction as above.


6. Coronary artery bypass graft in 2017.


7. Pacemaker generator change in 2017.


8.  History of elbow debridement.


9. Pacemaker implantation in 2014.


10. Hip fracture s/p sugery 10/4


11. Aortic stenosis


12. Mitral regurgitation





Plan:


Pain control


Incentive spirometry


Aspirin


Crestor


TTE: LVEF 35% with AS and MR and elevated filling pressures


-change atenolol to coreg 3.125 BID and titrate to max tolerated


-Losartan


-Add aldactone


-CV stable, no indication for cath/stress test at this time





Subjective


Cardiovascular:  Reports: no symptoms


Respiratory:  Reports: no symptoms


Gastrointestinal/Abdominal:  Reports: no symptoms


Genitourinary:  Reports: no symptoms


Subjective


no acute events, patient altered, vitals stable, mild pain





Objective





Last 24 Hour Vital Signs








  Date Time  Temp Pulse Resp B/P (MAP) Pulse Ox O2 Delivery O2 Flow Rate FiO2


 


10/9/18 12:00 97.7 78 20 135/72 (93) 100   





 97.7       


 


10/9/18 12:00  68      


 


10/9/18 09:17    133/80    


 


10/9/18 09:17  83  133/80    


 


10/9/18 09:00      Room Air  


 


10/9/18 08:00  77      


 


10/9/18 08:00 97.9 83 19 133/80 (97) 100   





 97.9       


 


10/9/18 04:00  85      


 


10/9/18 04:00 99.0 72 19 130/67 (88) 99   





 99.0       


 


10/9/18 00:00  97      


 


10/9/18 00:00 99.1 94 19 126/89 (101) 100   





 99.1       


 


10/8/18 21:00      Room Air  


 


10/8/18 20:00 98.5 87 19 145/82 (103) 95   





 98.5       


 


10/8/18 20:00  94      


 


10/8/18 16:00 97.7 85 18 120/79 (93) 99   





 97.7       


 


10/8/18 16:00  98      








General Appearance:  no apparent distress, agitated, combative


EENT:  PERRL/EOMI, normal ENT inspection, TMs normal


Neck:  non-tender, normal alignment, supple, normal inspection


Rhythm:  NSR


Cardiovascular:  normal peripheral pulses, normal rate, regular rhythm, 

diastolic murmur, systolic murmur


Respiratory/Chest:  chest wall non-tender, lungs clear, normal breath sounds, 

no respiratory distress


Abdomen:  normal bowel sounds, non tender, soft, no organomegaly


Extremities:  normal range of motion, non-tender, normal inspection


Neurologic:  CNs II-XII grossly normal, no motor/sensory deficits, disoriented, 

unresponsiveness











Intake and Output  


 


 10/8/18 10/9/18





 19:00 07:00


 


Intake Total 60 ml 60 ml


 


Balance 60 ml 60 ml


 


  


 


Intake Oral 60 ml 60 ml


 


# Voids 2 2











Laboratory Tests








Test


  10/9/18


07:45


 


White Blood Count


  7.4 K/UL


(4.8-10.8)


 


Red Blood Count


  3.54 M/UL


(4.20-5.40)  L


 


Hemoglobin


  10.1 G/DL


(12.0-16.0)  L


 


Hematocrit


  32.5 %


(37.0-47.0)  L


 


Mean Corpuscular Volume 92 FL (80-99)  


 


Mean Corpuscular Hemoglobin


  28.4 PG


(27.0-31.0)


 


Mean Corpuscular Hemoglobin


Concent 30.9 G/DL


(32.0-36.0)  L


 


Red Cell Distribution Width


  16.8 %


(11.6-14.8)  H


 


Platelet Count


  210 K/UL


(150-450)


 


Mean Platelet Volume


  6.8 FL


(6.5-10.1)


 


Neutrophils (%) (Auto)


  72.5 %


(45.0-75.0)


 


Lymphocytes (%) (Auto)


  18.7 %


(20.0-45.0)  L


 


Monocytes (%) (Auto)


  7.4 %


(1.0-10.0)


 


Eosinophils (%) (Auto)


  1.1 %


(0.0-3.0)


 


Basophils (%) (Auto)


  0.3 %


(0.0-2.0)


 


Sodium Level


  144 MMOL/L


(136-145)


 


Potassium Level


  3.7 MMOL/L


(3.5-5.1)


 


Chloride Level


  110 MMOL/L


()  H


 


Carbon Dioxide Level


  24 MMOL/L


(21-32)


 


Anion Gap


  10 mmol/L


(5-15)


 


Blood Urea Nitrogen


  10 mg/dL


(7-18)


 


Creatinine


  0.8 MG/DL


(0.55-1.30)


 


Estimat Glomerular Filtration


Rate  mL/min (>60)  


 


 


Glucose Level


  94 MG/DL


()


 


Calcium Level


  8.5 MG/DL


(8.5-10.1)


 


Total Bilirubin


  0.4 MG/DL


(0.2-1.0)


 


Aspartate Amino Transf


(AST/SGOT) 31 U/L (15-37)


 


 


Alanine Aminotransferase


(ALT/SGPT) 16 U/L (12-78)


 


 


Alkaline Phosphatase


  52 U/L


()


 


Total Protein


  5.7 G/DL


(6.4-8.2)  L


 


Albumin


  2.4 G/DL


(3.4-5.0)  L


 


Globulin 3.3 g/dL  


 


Albumin/Globulin Ratio


  0.7 (1.0-2.7)


L

















Raffi Izaguirre MD Oct 9, 2018 14:35

## 2018-10-09 NOTE — PULMONOLOGY PROGRESS NOTE
Assessment/Plan


Problems:  


(1) Fracture of femoral neck, right, closed


(2) Pacemaker


(3) HTN (hypertension)


(4) CAD (coronary artery disease)


(5) Sick sinus syndrome


(6) Hip fracture, right


(7) Hypercholesterolemia


Assessment/Plan


sinus rhythm, v-paced\


confused


doing better


surgery done yesterday


titrate cardiology meds


symptomatic treatment


pt/ot 


dc planning home, family doesn't want any skilled facility





Subjective


ROS Limited/Unobtainable:  No


Constitutional:  Reports: no symptoms


HEENT:  Repors: no symptoms


Respiratory:  Reports: no symptoms


Allergies:  


Coded Allergies:  


     No Known Allergies (Unverified , 3/26/14)





Objective





Last 24 Hour Vital Signs








  Date Time  Temp Pulse Resp B/P (MAP) Pulse Ox O2 Delivery O2 Flow Rate FiO2


 


10/9/18 09:17    133/80    


 


10/9/18 09:17  83  133/80    


 


10/9/18 08:00 97.9 83 19 133/80 (97) 100   





 97.9       


 


10/9/18 04:00  85      


 


10/9/18 04:00 99.0 72 19 130/67 (88) 99   





 99.0       


 


10/9/18 00:00  97      


 


10/9/18 00:00 99.1 94 19 126/89 (101) 100   





 99.1       


 


10/8/18 21:00      Room Air  


 


10/8/18 20:00 98.5 87 19 145/82 (103) 95   





 98.5       


 


10/8/18 20:00  94      


 


10/8/18 16:00 97.7 85 18 120/79 (93) 99   





 97.7       


 


10/8/18 16:00  98      


 


10/8/18 12:00 97.7 79 18 154/85 (108) 100   





 97.7       


 


10/8/18 12:00  84      

















Intake and Output  


 


 10/8/18 10/9/18





 19:00 07:00


 


Intake Total 60 ml 60 ml


 


Balance 60 ml 60 ml


 


  


 


Intake Oral 60 ml 60 ml


 


# Voids 2 2








Objective


General Appearance:  WD/WN


HEENT:  normocephalic, anicteric


Breasts:  no masses


Cardiovascular:  normal peripheral pulses, regular rhythm


Abdomen:  soft, non tender, no organomegaly


Extremities:  no cyanosis


Skin:  no rash


Laboratory Tests


10/9/18 07:45: 


White Blood Count 7.4, Red Blood Count 3.54L, Hemoglobin 10.1L, Hematocrit 32.5L

, Mean Corpuscular Volume 92, Mean Corpuscular Hemoglobin 28.4, Mean 

Corpuscular Hemoglobin Concent 30.9L, Red Cell Distribution Width 16.8H, 

Platelet Count 210, Mean Platelet Volume 6.8, Neutrophils (%) (Auto) 72.5, 

Lymphocytes (%) (Auto) 18.7L, Monocytes (%) (Auto) 7.4, Eosinophils (%) (Auto) 

1.1, Basophils (%) (Auto) 0.3, Sodium Level 144, Potassium Level 3.7, Chloride 

Level 110H, Carbon Dioxide Level 24, Anion Gap 10, Blood Urea Nitrogen 10, 

Creatinine 0.8, Estimat Glomerular Filtration Rate , Glucose Level 94, Calcium 

Level 8.5, Total Bilirubin 0.4, Aspartate Amino Transf (AST/SGOT) 31, Alanine 

Aminotransferase (ALT/SGPT) 16, Alkaline Phosphatase 52, Total Protein 5.7L, 

Albumin 2.4L, Globulin 3.3, Albumin/Globulin Ratio 0.7L





Current Medications








 Medications


  (Trade)  Dose


 Ordered  Sig/Violette


 Route


 PRN Reason  Start Time


 Stop Time Status Last Admin


Dose Admin


 


 Acetaminophen


  (Tylenol)  650 mg  Q4H  PRN


 ORAL


 moderate pain/fever  10/7/18 16:15


 11/1/18 16:14   


 


 


 Acetaminophen/


 Hydrocodone Bitart


  (Norco 5/325)  1 tab  Q4H  PRN


 ORAL


 severe pain  10/7/18 16:15


 10/12/18 16:14  10/8/18 02:52


 


 


 Al Hydroxide/Mg


 Hydroxide


  (Mylanta II)  30 ml  Q6H  PRN


 ORAL


 dyspepsia  10/7/18 15:16


 11/1/18 15:15   


 


 


 Atenolol


  (Tenormin)  25 mg  DAILY


 ORAL


   10/8/18 09:00


 11/2/18 08:59  10/9/18 09:17


 


 


 Ceftriaxone


 Sodium 1 gm/


 Dextrose  55 ml @ 


 110 mls/hr  Q24H


 IVPB


   10/8/18 12:30


 10/13/18 12:29  10/8/18 14:23


 


 


 Dextrose


  (Dextrose 50%)  25 ml  Q30M  PRN


 IV


 Hypoglycemia  10/7/18 15:30


 11/1/18 14:25   


 


 


 Dextrose


  (Dextrose 50%)  50 ml  Q30M  PRN


 IV


 hypoglycemia  10/7/18 15:30


 11/1/18 14:29   


 


 


 Docusate Sodium


  (Colace)  100 mg  THREE TIMES A  DAY


 ORAL


   10/7/18 18:00


 11/3/18 17:59  10/9/18 09:16


 


 


 Ferrous Sulfate


  (Feosol)  325 mg  TIAC


 ORAL


   10/7/18 16:30


 11/6/18 16:29  10/9/18 06:06


 


 


 Heparin Sodium


  (Porcine)


  (Heparin 5000


 units/ml)  5,000 units  EVERY 12  HOURS


 SUBQ


   10/7/18 21:00


 11/1/18 20:59  10/9/18 09:20


 


 


 Losartan Potassium


  (Cozaar)  25 mg  DAILY


 ORAL


   10/8/18 09:00


 11/2/18 08:59  10/9/18 09:17


 


 


 Magnesium


 Hydroxide


  (Mom)  30 ml  DAILYPRN  PRN


 ORAL


 Constipation  10/7/18 15:19


 11/3/18 15:18   


 


 


 Ondansetron HCl


  (Zofran)  4 mg  Q6H  PRN


 IVP


 Nausea & Vomiting  10/7/18 15:19


 11/1/18 15:18   


 


 


 Polyethylene


 Glycol


  (Miralax)  17 gm  HSPRN  PRN


 ORAL


 Constipation  10/7/18 15:25


 11/6/18 15:18   


 


 


 Potassium Chloride


  (K-Dur)  20 meq  DAILY


 ORAL


   10/8/18 09:00


 11/5/18 20:59  10/9/18 09:17


 


 


 Prochlorperazine


  (Compazine)  10 mg  Q6H  PRN


 IVP


 Nausea & Vomiting  10/7/18 15:20


 11/3/18 15:19   


 


 


 Quetiapine


 Fumarate


  (SEROquel)  25 mg  Q4H  PRN


 ORAL


 agitation  10/8/18 11:20


 11/7/18 11:19   


 


 


 Quetiapine


 Fumarate


  (SEROquel)  25 mg  QHS


 ORAL


   10/7/18 21:00


 11/2/18 00:29  10/8/18 21:17


 

















Korin Junior MD Oct 9, 2018 11:10

## 2018-10-09 NOTE — DIAGNOSTIC IMAGING REPORT
Indication: Dyspnea

 

Comparison:  10/3/2018

 

A single view chest radiograph was obtained.

 

Findings:

 

Cardiomegaly is present and relatively stable. There is a hazy opacity at the right

lung bases. There is a possible effusion accounting for this. Central vessels appear

prominent at the hilum. There is no evidence of interstitial edema or pneumonia.

Bones are unremarkable. Pacemaker also present on the left.

 

IMPRESSION:

 

No significant change from the previous examination.

 

Possible small right pleural effusion

 

Hilar vascular prominence without overt CHF.

 

Bilateral pacemakers

## 2018-10-09 NOTE — INFECTIOUS DISEASES PROG NOTE
Assessment/Plan


Assessment/Plan


ASSESSMENT:  The patient is an 78-year-old female with:








Mild leukocytosis , SP


Probable urinary tract infection.  Urine culture, E. coli. (R amp, bactrim 

otherwise S)


 Probable pneumonia.


Probable sepsis (history of several falls and altered level of consciousness), 

this may be due to an infectious process.- SP





Neg: HIV< Hep panel 





Right hip fracture status post ORIF.


Diabetes.


Hypertension.


Sick sinus syndrome.


History of non-ST elevation.


History of coronary artery bypass surgery.


History of pacemaker in 2017.


History of pacemaker implantation.














PLAN:








- cont on Rocephin d # 4/5-7


- Monitor CBC. BMP


- Monitor CXR.


- Monitor cultures, urine and blood.


-f/u CXR am





Subjective


Allergies:  


Coded Allergies:  


     No Known Allergies (Unverified , 3/26/14)


Subjective


afebrile


leukocytosis resolved


Bcx NTD





Objective


Vital Signs





Last 24 Hour Vital Signs








  Date Time  Temp Pulse Resp B/P (MAP) Pulse Ox O2 Delivery O2 Flow Rate FiO2


 


10/9/18 09:17    133/80    


 


10/9/18 09:17  83  133/80    


 


10/9/18 08:00 97.9 83 19 133/80 (97) 100   





 97.9       


 


10/9/18 04:00  85      


 


10/9/18 04:00 99.0 72 19 130/67 (88) 99   





 99.0       


 


10/9/18 00:00  97      


 


10/9/18 00:00 99.1 94 19 126/89 (101) 100   





 99.1       


 


10/8/18 21:00      Room Air  


 


10/8/18 20:00 98.5 87 19 145/82 (103) 95   





 98.5       


 


10/8/18 20:00  94      


 


10/8/18 16:00 97.7 85 18 120/79 (93) 99   





 97.7       


 


10/8/18 16:00  98      








Height (Feet):  5


Height (Inches):  6.00


Weight (Pounds):  120


Objective


General Appearance:  WD/WN


HEENT:  normocephalic, anicteric


Breasts:  no masses


Cardiovascular:  normal peripheral pulses, regular rhythm


Abdomen:  soft, non tender, no organomegaly


Extremities:  no cyanosis


Skin:  no rash





Laboratory Tests








Test


  10/9/18


07:45


 


White Blood Count


  7.4 K/UL


(4.8-10.8)


 


Red Blood Count


  3.54 M/UL


(4.20-5.40)  L


 


Hemoglobin


  10.1 G/DL


(12.0-16.0)  L


 


Hematocrit


  32.5 %


(37.0-47.0)  L


 


Mean Corpuscular Volume 92 FL (80-99)  


 


Mean Corpuscular Hemoglobin


  28.4 PG


(27.0-31.0)


 


Mean Corpuscular Hemoglobin


Concent 30.9 G/DL


(32.0-36.0)  L


 


Red Cell Distribution Width


  16.8 %


(11.6-14.8)  H


 


Platelet Count


  210 K/UL


(150-450)


 


Mean Platelet Volume


  6.8 FL


(6.5-10.1)


 


Neutrophils (%) (Auto)


  72.5 %


(45.0-75.0)


 


Lymphocytes (%) (Auto)


  18.7 %


(20.0-45.0)  L


 


Monocytes (%) (Auto)


  7.4 %


(1.0-10.0)


 


Eosinophils (%) (Auto)


  1.1 %


(0.0-3.0)


 


Basophils (%) (Auto)


  0.3 %


(0.0-2.0)


 


Sodium Level


  144 MMOL/L


(136-145)


 


Potassium Level


  3.7 MMOL/L


(3.5-5.1)


 


Chloride Level


  110 MMOL/L


()  H


 


Carbon Dioxide Level


  24 MMOL/L


(21-32)


 


Anion Gap


  10 mmol/L


(5-15)


 


Blood Urea Nitrogen


  10 mg/dL


(7-18)


 


Creatinine


  0.8 MG/DL


(0.55-1.30)


 


Estimat Glomerular Filtration


Rate  mL/min (>60)  


 


 


Glucose Level


  94 MG/DL


()


 


Calcium Level


  8.5 MG/DL


(8.5-10.1)


 


Total Bilirubin


  0.4 MG/DL


(0.2-1.0)


 


Aspartate Amino Transf


(AST/SGOT) 31 U/L (15-37)


 


 


Alanine Aminotransferase


(ALT/SGPT) 16 U/L (12-78)


 


 


Alkaline Phosphatase


  52 U/L


()


 


Total Protein


  5.7 G/DL


(6.4-8.2)  L


 


Albumin


  2.4 G/DL


(3.4-5.0)  L


 


Globulin 3.3 g/dL  


 


Albumin/Globulin Ratio


  0.7 (1.0-2.7)


L











Current Medications








 Medications


  (Trade)  Dose


 Ordered  Sig/Violette


 Route


 PRN Reason  Start Time


 Stop Time Status Last Admin


Dose Admin


 


 Acetaminophen


  (Tylenol)  650 mg  Q4H  PRN


 ORAL


 moderate pain/fever  10/7/18 16:15


 11/1/18 16:14   


 


 


 Acetaminophen/


 Hydrocodone Bitart


  (Norco 5/325)  1 tab  Q4H  PRN


 ORAL


 severe pain  10/7/18 16:15


 10/12/18 16:14  10/8/18 02:52


 


 


 Al Hydroxide/Mg


 Hydroxide


  (Mylanta II)  30 ml  Q6H  PRN


 ORAL


 dyspepsia  10/7/18 15:16


 11/1/18 15:15   


 


 


 Atenolol


  (Tenormin)  25 mg  DAILY


 ORAL


   10/8/18 09:00


 11/2/18 08:59  10/9/18 09:17


 


 


 Ceftriaxone


 Sodium 1 gm/


 Dextrose  55 ml @ 


 110 mls/hr  Q24H


 IVPB


   10/8/18 12:30


 10/13/18 12:29  10/9/18 11:33


 


 


 Dextrose


  (Dextrose 50%)  25 ml  Q30M  PRN


 IV


 Hypoglycemia  10/7/18 15:30


 11/1/18 14:25   


 


 


 Dextrose


  (Dextrose 50%)  50 ml  Q30M  PRN


 IV


 hypoglycemia  10/7/18 15:30


 11/1/18 14:29   


 


 


 Docusate Sodium


  (Colace)  100 mg  THREE TIMES A  DAY


 ORAL


   10/7/18 18:00


 11/3/18 17:59  10/9/18 11:58


 


 


 Ferrous Sulfate


  (Feosol)  325 mg  TIAC


 ORAL


   10/7/18 16:30


 11/6/18 16:29  10/9/18 11:33


 


 


 Heparin Sodium


  (Porcine)


  (Heparin 5000


 units/ml)  5,000 units  EVERY 12  HOURS


 SUBQ


   10/7/18 21:00


 11/1/18 20:59  10/9/18 09:20


 


 


 Losartan Potassium


  (Cozaar)  25 mg  DAILY


 ORAL


   10/8/18 09:00


 11/2/18 08:59  10/9/18 09:17


 


 


 Magnesium


 Hydroxide


  (Mom)  30 ml  DAILYPRN  PRN


 ORAL


 Constipation  10/7/18 15:19


 11/3/18 15:18   


 


 


 Ondansetron HCl


  (Zofran)  4 mg  Q6H  PRN


 IVP


 Nausea & Vomiting  10/7/18 15:19


 11/1/18 15:18   


 


 


 Polyethylene


 Glycol


  (Miralax)  17 gm  HSPRN  PRN


 ORAL


 Constipation  10/7/18 15:25


 11/6/18 15:18   


 


 


 Potassium Chloride


  (K-Dur)  20 meq  DAILY


 ORAL


   10/8/18 09:00


 11/5/18 20:59  10/9/18 09:17


 


 


 Prochlorperazine


  (Compazine)  10 mg  Q6H  PRN


 IVP


 Nausea & Vomiting  10/7/18 15:20


 11/3/18 15:19   


 


 


 Quetiapine


 Fumarate


  (SEROquel)  25 mg  Q4H  PRN


 ORAL


 agitation  10/8/18 11:20


 11/7/18 11:19   


 


 


 Quetiapine


 Fumarate


  (SEROquel)  25 mg  QHS


 ORAL


   10/7/18 21:00


 11/2/18 00:29  10/8/18 21:17


 

















Olivia Rosas M.D. Oct 9, 2018 12:19

## 2018-10-12 NOTE — DISCHARGE SUMMARY
Discharge Summary


Discharge Summary


_


DATE OF ADMISSION: 10/02/2018 





DATE OF DISCHARGE: 10/09/2018





REASON FOR ADMISSION: 


78 years old female with past medical history of diabetes mellitus, hypertension

, asthma, dementia, pacemaker, was brought for evaluation due to generalized 

weakness and right hip pain.  


Patient reported multiply falls over the past 3 weeks.  


Patient reported inability to walk . 


She stated, that she kept falling at home.  


Upon evaluation vital signs were stable.  


No leukocytosis, evidence of anemia with hemoglobin 9.5 hematocrit 30.9 


Urinalysis with evidence of UTI.  


Stable electrolytes and renal parameters.  


X-ray right hip revealed comminuted R hip fracture.


CT L-spine revealed no acute bony trauma , bilateral   spondylolysis at L4 and 

spondylolisthesis L4 on L5 ;  less extensive other  multilevel degenerative 

changes.  Possible left renal mass,  increased from prior CT scan on 4/12.  


CT of the pelvis revealed comminuted fracture of the subcapital right femoral 

neck.  


Chest x-ray revealed suspicion of right perihilar infiltrate.  


Patient admitted with diagnoses  status post fall , acute right hip fracture, 

urinary tract infection, probable pneumonia, probable sepsis (with history of 

several falls and altered level of consciousness, possibly due to infectious 

process), coronary artery disease with  history of CABG, sick sinus syndrome , 

status post pacemaker, hypertension, diabetes mellitus, asthma ,dementia , 

anemia.  





CONSULTANTS:


cardiologist  Dr. Izaguirre


pulmonary Dr. Junior


ID specialist Dr. Dorman


surgery Dr. Bella


psychiatrist 


pain specialist Dr. Ennis  


 


HOSPITAL COURSE: 


Patient admitted to telemetry floor.  


Orthopedic surgery consult was requested.  


Patient subsequently undergone right hip hemiarthroplasty on October 4.  


Patient was working with physical and occupational therapists.  


Fall precautions maintained.  Hip surgery  precautions maintained.


Pain management was addressed as per pain specialist recommendations.  


Pain was controlled.  


Bowel regimen instituted.  


DVT and GI prophylaxis provided.


Patient started initially on empiric antibiotic.  


Infectious disease specialist followed.  Patient  noted to have  leukocytosis .


Chest x-ray with   possible pneumonia .


Urine culture revealed Escherichia coli.  Blood culture were negative. 


Antibiotic provided as per ID specialist recommendations.  


According to ID  specialist , patient probably had sepsis due to history of 

multiple falls at home  and altered level of consciousness, which could be 

possibly due to infectious process.  


Leukocytosis resolved , no   fevers.  


Hepatitis panel and HIV  test were negative.


Cardiologist closely followed.  


Echocardiogram revealed ejection fraction of 30-35%, grade 2 diastolic 

dysfunction, moderate mitral regurgitation, moderate aortic stenosis, and 

evidence of severe pulmonary hypertension with right ventricular systolic 

pressure of 82.  Cardiologist optimized anti-failure medication regimen. 


Atenolol  was changed to Coreg and to be  titrated to maximum dose  as 

tolerated as outpatient .    Aldactone was added.  ARB was continued.   


Blood rpessure was stable with current regimen. 


Cardiovascular status was stable,  no evidence of decompensation at this time.  


Per cardiologist,  no indication for cardiac catheterization or stress test at 

this time.  


Patient was V pacing on telemetry.  


Supplemental oxygen provided as needed to keep pulse oximetry above 92%.  


Pulmonary toilet provided as needed.  


No evidence of asthma exacerbation. 


Blood sugar was stable, DlO4d-3.3.


Hemoglobin and hematocrit were closely monitored with goal to keep hemoglobin 

above 7.  


CEA was negative.  Patient started on iron supplement.  


Prior to discharge hemoglobin 10.1 hematocrit 22.5.   


Psychiatrist seen and evaluated patient, and  diagnosed patient with 

encephalopathy due to metabolic condition and psychosis.  


Patient started on Seroquel.  


Reality orientation and supportive therapy provided.  


Family declined SNF placement.  


Patient was discharged home with home health services to follow .











FINAL DIAGNOSES: 


Status post multiply falls


Right hip displaced femoral neck fracture


Status post right hip hemiarthroplasty


Encephalopathy due to metabolic condition


Probably sepsis


UTI Escherichia coli


Probably pneumonia


Coronary artery disease with  history of CABG


Sick sinus syndrome , status post permanent pacemaker implantation


Hypertension


Diabetes mellitus


Dementia


Anemia


Cardiomyopathy(  per Echo)


Aortic stenosis


Mitral regurgitation


Severe pulmonary hypertension





DISCHARGE MEDICATIONS:


See Medication Reconciliation list.





DISCHARGE INSTRUCTIONS:


Patient was discharged home with home health services.  


Follow up with primary care provider in one week.





I have been assigned to dictate discharge summary for this account. I was not 

involved in the patient's management.











Kavitha Carrero NP Oct 12, 2018 10:28

## 2018-10-14 ENCOUNTER — HOSPITAL ENCOUNTER (INPATIENT)
Dept: HOSPITAL 72 - EMR | Age: 78
LOS: 5 days | Discharge: NURSING, REHAB OR LONG-TERM CARE | DRG: 467 | End: 2018-10-19
Payer: MEDICARE

## 2018-10-14 VITALS — SYSTOLIC BLOOD PRESSURE: 152 MMHG | DIASTOLIC BLOOD PRESSURE: 74 MMHG

## 2018-10-14 VITALS — DIASTOLIC BLOOD PRESSURE: 54 MMHG | SYSTOLIC BLOOD PRESSURE: 111 MMHG

## 2018-10-14 VITALS — HEIGHT: 63 IN | BODY MASS INDEX: 24.14 KG/M2 | WEIGHT: 136.25 LBS

## 2018-10-14 VITALS — SYSTOLIC BLOOD PRESSURE: 120 MMHG | DIASTOLIC BLOOD PRESSURE: 58 MMHG

## 2018-10-14 VITALS — SYSTOLIC BLOOD PRESSURE: 150 MMHG | DIASTOLIC BLOOD PRESSURE: 72 MMHG

## 2018-10-14 VITALS — SYSTOLIC BLOOD PRESSURE: 119 MMHG | DIASTOLIC BLOOD PRESSURE: 76 MMHG

## 2018-10-14 VITALS — SYSTOLIC BLOOD PRESSURE: 110 MMHG | DIASTOLIC BLOOD PRESSURE: 59 MMHG

## 2018-10-14 VITALS — DIASTOLIC BLOOD PRESSURE: 59 MMHG | SYSTOLIC BLOOD PRESSURE: 117 MMHG

## 2018-10-14 VITALS — SYSTOLIC BLOOD PRESSURE: 110 MMHG | DIASTOLIC BLOOD PRESSURE: 57 MMHG
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
## 2018-10-14 DIAGNOSIS — E78.5: ICD-10-CM

## 2018-10-14 DIAGNOSIS — I25.10: ICD-10-CM

## 2018-10-14 DIAGNOSIS — E11.9: ICD-10-CM

## 2018-10-14 DIAGNOSIS — F29: ICD-10-CM

## 2018-10-14 DIAGNOSIS — F02.81: ICD-10-CM

## 2018-10-14 DIAGNOSIS — Z86.718: ICD-10-CM

## 2018-10-14 DIAGNOSIS — G30.9: ICD-10-CM
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
## 2018-10-14 DIAGNOSIS — Z95.0: ICD-10-CM

## 2018-10-14 DIAGNOSIS — S32.512A: ICD-10-CM

## 2018-10-14 DIAGNOSIS — Z79.82: ICD-10-CM

## 2018-10-14 DIAGNOSIS — I25.2: ICD-10-CM

## 2018-10-14 DIAGNOSIS — F02.80: ICD-10-CM

## 2018-10-14 DIAGNOSIS — T84.020A: Primary | ICD-10-CM

## 2018-10-14 DIAGNOSIS — I27.20: ICD-10-CM

## 2018-10-14 DIAGNOSIS — I35.0: ICD-10-CM

## 2018-10-14 DIAGNOSIS — G93.40: ICD-10-CM

## 2018-10-14 LAB
ADD MANUAL DIFF: NO
ALBUMIN SERPL-MCNC: 2.5 G/DL (ref 3.4–5)
ALBUMIN/GLOB SERPL: 0.8 {RATIO} (ref 1–2.7)
ALP SERPL-CCNC: 64 U/L (ref 46–116)
ALT SERPL-CCNC: 22 U/L (ref 12–78)
ANION GAP SERPL CALC-SCNC: 6 MMOL/L (ref 5–15)
APTT BLD: 22 SEC (ref 23–33)
AST SERPL-CCNC: 23 U/L (ref 15–37)
BASOPHILS NFR BLD AUTO: 0.3 % (ref 0–2)
BILIRUB SERPL-MCNC: 0.3 MG/DL (ref 0.2–1)
BUN SERPL-MCNC: 14 MG/DL (ref 7–18)
CALCIUM SERPL-MCNC: 8.7 MG/DL (ref 8.5–10.1)
CHLORIDE SERPL-SCNC: 109 MMOL/L (ref 98–107)
CO2 SERPL-SCNC: 25 MMOL/L (ref 21–32)
CREAT SERPL-MCNC: 0.9 MG/DL (ref 0.55–1.3)
EOSINOPHIL NFR BLD AUTO: 1.2 % (ref 0–3)
ERYTHROCYTE [DISTWIDTH] IN BLOOD BY AUTOMATED COUNT: 17.6 % (ref 11.6–14.8)
GLOBULIN SER-MCNC: 3.2 G/DL
HCT VFR BLD CALC: 29.4 % (ref 37–47)
HGB BLD-MCNC: 9.1 G/DL (ref 12–16)
INR PPP: 1.1 (ref 0.9–1.1)
LYMPHOCYTES NFR BLD AUTO: 18.4 % (ref 20–45)
MCV RBC AUTO: 92 FL (ref 80–99)
MONOCYTES NFR BLD AUTO: 9.4 % (ref 1–10)
NEUTROPHILS NFR BLD AUTO: 70.7 % (ref 45–75)
PLATELET # BLD: 239 K/UL (ref 150–450)
POTASSIUM SERPL-SCNC: 3.8 MMOL/L (ref 3.5–5.1)
RBC # BLD AUTO: 3.2 M/UL (ref 4.2–5.4)
SODIUM SERPL-SCNC: 140 MMOL/L (ref 136–145)
WBC # BLD AUTO: 6.1 K/UL (ref 4.8–10.8)

## 2018-10-14 PROCEDURE — 96375 TX/PRO/DX INJ NEW DRUG ADDON: CPT

## 2018-10-14 PROCEDURE — 80048 BASIC METABOLIC PNL TOTAL CA: CPT

## 2018-10-14 PROCEDURE — 83735 ASSAY OF MAGNESIUM: CPT

## 2018-10-14 PROCEDURE — 85730 THROMBOPLASTIN TIME PARTIAL: CPT

## 2018-10-14 PROCEDURE — 93005 ELECTROCARDIOGRAM TRACING: CPT

## 2018-10-14 PROCEDURE — 87070 CULTURE OTHR SPECIMN AEROBIC: CPT

## 2018-10-14 PROCEDURE — 76001: CPT

## 2018-10-14 PROCEDURE — 85610 PROTHROMBIN TIME: CPT

## 2018-10-14 PROCEDURE — 94003 VENT MGMT INPAT SUBQ DAY: CPT

## 2018-10-14 PROCEDURE — 84100 ASSAY OF PHOSPHORUS: CPT

## 2018-10-14 PROCEDURE — 87081 CULTURE SCREEN ONLY: CPT

## 2018-10-14 PROCEDURE — 94150 VITAL CAPACITY TEST: CPT

## 2018-10-14 PROCEDURE — 85025 COMPLETE CBC W/AUTO DIFF WBC: CPT

## 2018-10-14 PROCEDURE — 72170 X-RAY EXAM OF PELVIS: CPT

## 2018-10-14 PROCEDURE — 82803 BLOOD GASES ANY COMBINATION: CPT

## 2018-10-14 PROCEDURE — 96374 THER/PROPH/DIAG INJ IV PUSH: CPT

## 2018-10-14 PROCEDURE — 99285 EMERGENCY DEPT VISIT HI MDM: CPT

## 2018-10-14 PROCEDURE — 36415 COLL VENOUS BLD VENIPUNCTURE: CPT

## 2018-10-14 PROCEDURE — 87205 SMEAR GRAM STAIN: CPT

## 2018-10-14 PROCEDURE — 36600 WITHDRAWAL OF ARTERIAL BLOOD: CPT

## 2018-10-14 PROCEDURE — 71045 X-RAY EXAM CHEST 1 VIEW: CPT

## 2018-10-14 PROCEDURE — 80053 COMPREHEN METABOLIC PANEL: CPT

## 2018-10-14 PROCEDURE — 87075 CULTR BACTERIA EXCEPT BLOOD: CPT

## 2018-10-14 RX ADMIN — HEPARIN SODIUM SCH UNITS: 5000 INJECTION INTRAVENOUS; SUBCUTANEOUS at 20:26

## 2018-10-14 RX ADMIN — TRAZODONE HYDROCHLORIDE SCH MG: 50 TABLET ORAL at 20:26

## 2018-10-14 NOTE — DIAGNOSTIC IMAGING REPORT
INDICATION: Pain

 

COMPARISON: CT dated 9/16/80

 

FINDINGS: Patient is status post right total hip arthroplasty with 

dislocation of the arthroplasty.  Fracture deformity of the left inferior 

and superior pubic rami seen.  Limited visualization of the sacrum due to 

bowel.  Decreased bone mineral density.

 

IMPRESSION: 

1.  Status post right total hip arthroplasty with dislocation of the 

arthroplasty.  Correlation can be obtained with CT for exact anatomic 

location.

 

2.  Fracture deformity of left superior pubic rami.

 

 

[employer]

 

10/14/18 12:05 Call Doctor Regarding Other, called Kennedy Lockhart 

 

10/14/18 12:17 Call Doctor Regarding Other, called Bob DINH on 10/14 12:

17 (-07:00)

## 2018-10-14 NOTE — EMERGENCY ROOM REPORT
History of Present Illness


General


Chief Complaint:  Pain


Source:  Medical Record





Present Illness


HPI


Patient is an 78-year-old female presented after fall from the bed.  Patient 

had a recent right hip fracture and was noted to have the surgical repair.  The 

patient's approximately 10 days postoperative.  The patient had been unable to 

ambulate since falling.  History is markedly limited by patient's mental status 

and dementia.


Allergies:  


Coded Allergies:  


     No Known Allergies (Unverified , 3/26/14)





Patient History


Past Medical History:  see triage record


Reviewed Nursing Documentation:  PMH: Agreed; PSxH: Agreed





Nursing Documentation-PMH


Past Medical History:  No History, Except For


Hx Cardiac Problems:  Yes


Hx Hypertension:  Yes


Hx Pacemaker:  Yes - left chest pacemaker


Hx Asthma:  Yes


Hx Diabetes:  Yes


Hx Cancer:  No


Hx Gastrointestinal Problems:  No


Hx Neurological Problems:  Yes


Hx Dementia:  Yes





Review of Systems


All Other Systems:  limited - by mental status





Physical Exam





Vital Signs








  Date Time  Temp Pulse Resp B/P (MAP) Pulse Ox O2 Delivery O2 Flow Rate FiO2


 


10/14/18 10:52 97.8 70 13 111/54 100 Room Air  





 97.9       








Sp02 EP Interpretation:  reviewed, normal


General Appearance:  normal inspection, alert, Chronically Ill


Head:  atraumatic


ENT:  normal ENT inspection, hearing grossly normal, normal voice


Neck:  normal inspection, supple, no bony tend, limited range of motion


Respiratory:  normal inspection, lungs clear, normal breath sounds, no 

respiratory distress, no retraction, no wheezing


Cardiovascular #1:  regular rate, rhythm, edema - right lower extremity


Gastrointestinal:  normal inspection, normal bowel sounds, non tender, soft, no 

guarding, no hernia


Genitourinary:  no CVA tenderness


Musculoskeletal:  normal inspection, back normal, other - deformity and 

shortening of left hip


Neurologic:  normal inspection, alert, responsive, speech normal


Psychiatric:  normal inspection, mood/affect normal


Skin:  normal inspection, normal color, no rash





Procedures


Procedural Sedation


Consent:  Emergent


Time out called at:  13:13


Pre-Sedation Assessment:  Elective


Airway Assessment (Malampati):  I


Heart:  abnormal - chf


Lungs:  normal


Abdomen:  normal


Extremities:  abnormal - dislocation


Plan for Moderate Sedation:  Propofol


ASA Score:  III


Procedure Narrative


The patient was given IV propofol in 10 mg aliquots until the patient was 

adequately sedated.  The reduction was attempted without successful reduction.


Start Time:  13:15


End Time:  13:25


Communication:  No Apparent Limitation


Mental Status:  Awake


Respiration:  Unlabored


Skin Condition:  WNL


Abdomen:  WNL


Nausea:  NO


Vomiting:  NO


Additional Comments:


unable to reduce hip


Post procedure xray without change in alignement.





Medical Decision Making


Diagnostic Impression:  


 Primary Impression:  


 Hip dislocation, right


 Additional Impression:  


 Closed fracture of symphysis pubis


ER Course


Patient presented for hip pain.  Differential diagnosis included was not 

limited to fracture, dislocation, sprain, contusion and among others.  X-ray 

imaging of the right hip the interpreted by me showed posterior dislocation of 

prosthesis.  The pelvic fracture was noted.  The patient's hip could not be 

successfully reduced by me in emergency department.  Dr. Bella was contacted 

for orthopedic consult.  Dr. Juan Carlos Magana was contacted for inpatient management





Labs








Test


  10/14/18


11:50


 


White Blood Count


  6.1 K/UL


(4.8-10.8)


 


Red Blood Count


  3.20 M/UL


(4.20-5.40)


 


Hemoglobin


  9.1 G/DL


(12.0-16.0)


 


Hematocrit


  29.4 %


(37.0-47.0)


 


Mean Corpuscular Volume 92 FL (80-99) 


 


Mean Corpuscular Hemoglobin


  28.6 PG


(27.0-31.0)


 


Mean Corpuscular Hemoglobin


Concent 31.2 G/DL


(32.0-36.0)


 


Red Cell Distribution Width


  17.6 %


(11.6-14.8)


 


Platelet Count


  239 K/UL


(150-450)


 


Mean Platelet Volume


  7.9 FL


(6.5-10.1)


 


Neutrophils (%) (Auto)


  70.7 %


(45.0-75.0)


 


Lymphocytes (%) (Auto)


  18.4 %


(20.0-45.0)


 


Monocytes (%) (Auto)


  9.4 %


(1.0-10.0)


 


Eosinophils (%) (Auto)


  1.2 %


(0.0-3.0)


 


Basophils (%) (Auto)


  0.3 %


(0.0-2.0)


 


Prothrombin Time


  11.4 SEC


(9.30-11.50)


 


Prothromb Time International


Ratio 1.1 (0.9-1.1) 


 


 


Activated Partial


Thromboplast Time 22 SEC (23-33) 


 


 


Sodium Level


  140 MMOL/L


(136-145)


 


Potassium Level


  3.8 MMOL/L


(3.5-5.1)


 


Chloride Level


  109 MMOL/L


()


 


Carbon Dioxide Level


  25 MMOL/L


(21-32)


 


Anion Gap


  6 mmol/L


(5-15)


 


Blood Urea Nitrogen


  14 mg/dL


(7-18)


 


Creatinine


  0.9 MG/DL


(0.55-1.30)


 


Estimat Glomerular Filtration


Rate  mL/min (>60) 


 


 


Glucose Level


  134 MG/DL


()


 


Calcium Level


  8.7 MG/DL


(8.5-10.1)


 


Total Bilirubin


  0.3 MG/DL


(0.2-1.0)


 


Aspartate Amino Transf


(AST/SGOT) 23 U/L (15-37) 


 


 


Alanine Aminotransferase


(ALT/SGPT) 22 U/L (12-78) 


 


 


Alkaline Phosphatase


  64 U/L


()


 


Total Protein


  5.7 G/DL


(6.4-8.2)


 


Albumin


  2.5 G/DL


(3.4-5.0)


 


Globulin 3.2 g/dL 


 


Albumin/Globulin Ratio 0.8 (1.0-2.7) 











Last Vital Signs








  Date Time  Temp Pulse Resp B/P (MAP) Pulse Ox O2 Delivery O2 Flow Rate FiO2


 


10/14/18 11:55 97.9       


 


10/14/18 11:09  61 16 111/54 100 Room Air  








Status:  improved


Referrals:  


NOT CHOSEN [facility]/MD,REFERRING (PCP)











Segun Rojas MD Oct 14, 2018 12:32

## 2018-10-15 VITALS — DIASTOLIC BLOOD PRESSURE: 63 MMHG | SYSTOLIC BLOOD PRESSURE: 106 MMHG

## 2018-10-15 VITALS — SYSTOLIC BLOOD PRESSURE: 150 MMHG | DIASTOLIC BLOOD PRESSURE: 94 MMHG

## 2018-10-15 VITALS — SYSTOLIC BLOOD PRESSURE: 158 MMHG | DIASTOLIC BLOOD PRESSURE: 76 MMHG

## 2018-10-15 VITALS — DIASTOLIC BLOOD PRESSURE: 58 MMHG | SYSTOLIC BLOOD PRESSURE: 119 MMHG

## 2018-10-15 VITALS — DIASTOLIC BLOOD PRESSURE: 60 MMHG | SYSTOLIC BLOOD PRESSURE: 118 MMHG

## 2018-10-15 VITALS — DIASTOLIC BLOOD PRESSURE: 58 MMHG | SYSTOLIC BLOOD PRESSURE: 131 MMHG

## 2018-10-15 VITALS — SYSTOLIC BLOOD PRESSURE: 141 MMHG | DIASTOLIC BLOOD PRESSURE: 82 MMHG

## 2018-10-15 VITALS — DIASTOLIC BLOOD PRESSURE: 77 MMHG | SYSTOLIC BLOOD PRESSURE: 165 MMHG

## 2018-10-15 VITALS — SYSTOLIC BLOOD PRESSURE: 162 MMHG | DIASTOLIC BLOOD PRESSURE: 73 MMHG

## 2018-10-15 VITALS — DIASTOLIC BLOOD PRESSURE: 78 MMHG | SYSTOLIC BLOOD PRESSURE: 155 MMHG

## 2018-10-15 VITALS — DIASTOLIC BLOOD PRESSURE: 92 MMHG | SYSTOLIC BLOOD PRESSURE: 161 MMHG

## 2018-10-15 VITALS — SYSTOLIC BLOOD PRESSURE: 163 MMHG | DIASTOLIC BLOOD PRESSURE: 81 MMHG

## 2018-10-15 LAB
ADD MANUAL DIFF: NO
ALBUMIN SERPL-MCNC: 2.2 G/DL (ref 3.4–5)
ALBUMIN/GLOB SERPL: 0.8 {RATIO} (ref 1–2.7)
ALP SERPL-CCNC: 64 U/L (ref 46–116)
ALT SERPL-CCNC: 19 U/L (ref 12–78)
ANION GAP SERPL CALC-SCNC: 6 MMOL/L (ref 5–15)
APTT BLD: 27 SEC (ref 23–33)
AST SERPL-CCNC: 22 U/L (ref 15–37)
BASOPHILS NFR BLD AUTO: 0.6 % (ref 0–2)
BILIRUB SERPL-MCNC: 0.3 MG/DL (ref 0.2–1)
BUN SERPL-MCNC: 14 MG/DL (ref 7–18)
CALCIUM SERPL-MCNC: 8.3 MG/DL (ref 8.5–10.1)
CHLORIDE SERPL-SCNC: 110 MMOL/L (ref 98–107)
CO2 SERPL-SCNC: 24 MMOL/L (ref 21–32)
CREAT SERPL-MCNC: 0.8 MG/DL (ref 0.55–1.3)
EOSINOPHIL NFR BLD AUTO: 1.7 % (ref 0–3)
ERYTHROCYTE [DISTWIDTH] IN BLOOD BY AUTOMATED COUNT: 17.3 % (ref 11.6–14.8)
GLOBULIN SER-MCNC: 2.9 G/DL
HCT VFR BLD CALC: 28 % (ref 37–47)
HGB BLD-MCNC: 8.9 G/DL (ref 12–16)
INR PPP: 1.1 (ref 0.9–1.1)
LYMPHOCYTES NFR BLD AUTO: 22.1 % (ref 20–45)
MCV RBC AUTO: 91 FL (ref 80–99)
MONOCYTES NFR BLD AUTO: 7.6 % (ref 1–10)
NEUTROPHILS NFR BLD AUTO: 68 % (ref 45–75)
PLATELET # BLD: 241 K/UL (ref 150–450)
POTASSIUM SERPL-SCNC: 3.8 MMOL/L (ref 3.5–5.1)
RBC # BLD AUTO: 3.07 M/UL (ref 4.2–5.4)
SODIUM SERPL-SCNC: 140 MMOL/L (ref 136–145)
WBC # BLD AUTO: 5.3 K/UL (ref 4.8–10.8)

## 2018-10-15 PROCEDURE — 0SW90JZ REVISION OF SYNTHETIC SUBSTITUTE IN RIGHT HIP JOINT, OPEN APPROACH: ICD-10-PCS

## 2018-10-15 RX ADMIN — TRAZODONE HYDROCHLORIDE SCH MG: 50 TABLET ORAL at 21:00

## 2018-10-15 RX ADMIN — IMIPRAMINE HYDROCHLORIDE SCH MG: 10 TABLET, FILM COATED ORAL at 08:29

## 2018-10-15 RX ADMIN — LOSARTAN POTASSIUM SCH MG: 25 TABLET, FILM COATED ORAL at 09:00

## 2018-10-15 RX ADMIN — IMIPRAMINE HYDROCHLORIDE SCH MG: 10 TABLET, FILM COATED ORAL at 09:00

## 2018-10-15 RX ADMIN — HEPARIN SODIUM SCH UNITS: 5000 INJECTION INTRAVENOUS; SUBCUTANEOUS at 08:32

## 2018-10-15 RX ADMIN — HEPARIN SODIUM SCH UNITS: 5000 INJECTION INTRAVENOUS; SUBCUTANEOUS at 21:00

## 2018-10-15 RX ADMIN — METOPROLOL SUCCINATE SCH MG: 50 TABLET, EXTENDED RELEASE ORAL at 11:47

## 2018-10-15 RX ADMIN — FUROSEMIDE SCH MG: 40 TABLET ORAL at 08:30

## 2018-10-15 RX ADMIN — ASPIRIN SCH MG: 81 TABLET, DELAYED RELEASE ORAL at 08:30

## 2018-10-15 RX ADMIN — FUROSEMIDE SCH MG: 40 TABLET ORAL at 09:00

## 2018-10-15 RX ADMIN — ASPIRIN SCH MG: 81 TABLET, DELAYED RELEASE ORAL at 09:00

## 2018-10-15 RX ADMIN — LOSARTAN POTASSIUM SCH MG: 25 TABLET, FILM COATED ORAL at 08:30

## 2018-10-15 RX ADMIN — DEXTROSE, SODIUM CHLORIDE, AND POTASSIUM CHLORIDE SCH MLS/HR: 5; .45; .15 INJECTION INTRAVENOUS at 22:31

## 2018-10-15 RX ADMIN — HEPARIN SODIUM SCH UNITS: 5000 INJECTION INTRAVENOUS; SUBCUTANEOUS at 09:00

## 2018-10-15 NOTE — CONSULTATION
History of Present Illness


General


Date patient seen:  Oct 15, 2018


Chief Complaint:  Pain





Present Illness


HPI


 78-year-old female with hx of dementia, CAD, HTN, with recent fall and 

fracture of right hip and s/p R hip replacement  presented to ER by paramedics  

after and an episode of  fall from the bed.   The patient had been unable to 

ambulate since falling.  Pt was diagnosed to have a dislocation of her right 

and admitted for surgical correction.


Allergies:  


Coded Allergies:  


     No Known Allergies (Unverified , 3/26/14)





Medication History


Scheduled


Amoxicillin/Potassium Clav 875-125* (Augmentin 875-125 Tablet*), 1 TAB ORAL 

TWICE A DAY


Aspirin (Aspirin EC), 81 MG ORAL DAILY, (Reported)


Atenolol (Tenormin), 25 MG ORAL DAILY, (Reported)


Atenolol* (Tenormin*), 50 MG ORAL BID, (Reported)


Clonazepam* (Klonopin*), 0.5 MG ORAL Q6H, (Reported)


Clonidine Hcl* (Catapres*), 0.1 MG ORAL EVERY 8 HOURS, (Reported)


Clopidogrel* (Clopidogrel*), 75 MG ORAL DAILY, (Reported)


Dicyclomine Hcl* (Dicyclomine Hcl*), 10 MG PO QID


Famotidine (Pepcid), 20 MG ORAL BEDTIME


Furosemide* (Lasix*), 40 MG ORAL DAILY, (Reported)


Imipramine Hcl* (Tofranil*), 25 MG ORAL DAILY, (Reported)


Losartan Potassium* (Losartan Potassium*), 25 MG ORAL DAILY, (Reported)


Metoprolol Succinate* (Metoprolol Succinate*), 50 MG ORAL DAILY, (Reported)


No Known Medications* (NKM - No Known Medications*), 0 ., (Reported)


Potassium Chloride (Potassium Chloride), 10 MEQ ORAL DAILY, (Reported)


Rosuvastatin Calcium* (Crestor*), 10 MG ORAL DAILY, (Reported)


Sitagliptin Phos/Metformin Hcl (Janumet Xr 50-1,000 Mg Tablet), 1 TAB ORAL DAILY

, (Reported)


Trazodone* (Trazodone*), 50 MG ORAL BEDTIME, (Reported)


Vitamin D (Vitamin D3), 5,000 UNITS ORAL DAILY, (Reported)





Scheduled PRN


Hydrocodone Bit/Acetaminophen 5-325* (Norco 5-325*), 1 TAB ORAL Q6H PRN for For 

Pain





Miscellaneous Medications


Unable to Obtain Medications (Unable To Obtain Meds), (Reported)





Patient History


Healthcare decision maker





Resuscitation status


Full Code


Advanced Directive on File


No





Past Medical/Surgical History


Past Medical/Surgical History:  


(1) Pacemaker


(2) HTN (hypertension)


(3) CAD (coronary artery disease)


(4) Psychosis





Review of Systems


All Other Systems:  negative except mentioned in HPI





Physical Exam


General Appearance:  cachetic


Lines, tubes and drains:  peripheral


HEENT:  normocephalic, atraumatic


Neck:  non-tender, normal alignment


Respiratory/Chest:  chest wall non-tender, lungs clear


Cardiovascular/Chest:  normal peripheral pulses, normal rate


Abdomen:  normal bowel sounds, non tender


Genitourinary/Rectal:  normal genital exam


Extremities:  normal range of motion





Last 24 Hour Vital Signs








  Date Time  Temp Pulse Resp B/P (MAP) Pulse Ox O2 Delivery O2 Flow Rate FiO2


 


10/15/18 09:00      Room Air  


 


10/15/18 09:00    119/58    


 


10/15/18 08:00 97.9 76 19 119/58 (78) 98   





 97.9       


 


10/15/18 07:45  80      


 


10/15/18 04:00  77      


 


10/15/18 04:00 97.0 76 20 118/60 (79) 98   





 97.0       


 


10/15/18 00:00 97.3 69 20 106/63 (77) 98   





 97.3       


 


10/15/18 00:00  68      


 


10/14/18 21:00      Room Air  


 


10/14/18 20:00 98.1 68 20 117/59 (78) 97   





 98.1       


 


10/14/18 20:00  68      


 


10/14/18 17:55      Room Air  


 


10/14/18 17:01  61      


 


10/14/18 16:45 97.7 65 19 110/57 (74) 95   





 97.7       


 


10/14/18 16:44 97.9 69 15 150/72 100 Room Air  





 97.9       


 


10/14/18 15:56  69 15 150/72 100 Room Air  


 


10/14/18 14:45  68 12 152/74 100 Room Air  


 


10/14/18 13:38 97.9 77 15 110/59 100 Room Air  





 97.9       


 


10/14/18 13:12 98.2 60 15  100 Room Air  





 98.2 69   94   





  64   96   





  62   100   





  72      


 


10/14/18 13:12      Room Air  


 


10/14/18 13:08 97.9       


 


10/14/18 12:39 97.9 61 16 120/58 100 Room Air  





 97.9       


 


10/14/18 11:55 97.9       


 


10/14/18 11:09 97.9 61 16 111/54 100 Room Air  





 97.9       

















Intake and Output  


 


 10/14/18 10/15/18





 19:00 07:00


 


Intake Total  220 ml


 


Balance  220 ml


 


  


 


Intake Oral  220 ml


 


# Voids 1 2











Laboratory Tests








Test


  10/14/18


11:50 10/15/18


07:01


 


White Blood Count


  6.1 K/UL


(4.8-10.8) 5.3 K/UL


(4.8-10.8)


 


Red Blood Count


  3.20 M/UL


(4.20-5.40)  L 3.07 M/UL


(4.20-5.40)  L


 


Hemoglobin


  9.1 G/DL


(12.0-16.0)  L 8.9 G/DL


(12.0-16.0)  L


 


Hematocrit


  29.4 %


(37.0-47.0)  L 28.0 %


(37.0-47.0)  L


 


Mean Corpuscular Volume 92 FL (80-99)   91 FL (80-99)  


 


Mean Corpuscular Hemoglobin


  28.6 PG


(27.0-31.0) 29.1 PG


(27.0-31.0)


 


Mean Corpuscular Hemoglobin


Concent 31.2 G/DL


(32.0-36.0)  L 31.9 G/DL


(32.0-36.0)  L


 


Red Cell Distribution Width


  17.6 %


(11.6-14.8)  H 17.3 %


(11.6-14.8)  H


 


Platelet Count


  239 K/UL


(150-450) 241 K/UL


(150-450)


 


Mean Platelet Volume


  7.9 FL


(6.5-10.1) 7.4 FL


(6.5-10.1)


 


Neutrophils (%) (Auto)


  70.7 %


(45.0-75.0) 68.0 %


(45.0-75.0)


 


Lymphocytes (%) (Auto)


  18.4 %


(20.0-45.0)  L 22.1 %


(20.0-45.0)


 


Monocytes (%) (Auto)


  9.4 %


(1.0-10.0) 7.6 %


(1.0-10.0)


 


Eosinophils (%) (Auto)


  1.2 %


(0.0-3.0) 1.7 %


(0.0-3.0)


 


Basophils (%) (Auto)


  0.3 %


(0.0-2.0) 0.6 %


(0.0-2.0)


 


Prothrombin Time


  11.4 SEC


(9.30-11.50) 11.4 SEC


(9.30-11.50)


 


Prothromb Time International


Ratio 1.1 (0.9-1.1)  


  1.1 (0.9-1.1)  


 


 


Activated Partial


Thromboplast Time 22 SEC (23-33)


L 27 SEC (23-33)


 


 


Sodium Level


  140 MMOL/L


(136-145) 140 MMOL/L


(136-145)


 


Potassium Level


  3.8 MMOL/L


(3.5-5.1) 3.8 MMOL/L


(3.5-5.1)


 


Chloride Level


  109 MMOL/L


()  H 110 MMOL/L


()  H


 


Carbon Dioxide Level


  25 MMOL/L


(21-32) 24 MMOL/L


(21-32)


 


Anion Gap


  6 mmol/L


(5-15) 6 mmol/L


(5-15)


 


Blood Urea Nitrogen


  14 mg/dL


(7-18) 14 mg/dL


(7-18)


 


Creatinine


  0.9 MG/DL


(0.55-1.30) 0.8 MG/DL


(0.55-1.30)


 


Estimat Glomerular Filtration


Rate  mL/min (>60)  


   mL/min (>60)  


 


 


Glucose Level


  134 MG/DL


()  H 102 MG/DL


()


 


Calcium Level


  8.7 MG/DL


(8.5-10.1) 8.3 MG/DL


(8.5-10.1)  L


 


Total Bilirubin


  0.3 MG/DL


(0.2-1.0) 0.3 MG/DL


(0.2-1.0)


 


Aspartate Amino Transf


(AST/SGOT) 23 U/L (15-37)


  22 U/L (15-37)


 


 


Alanine Aminotransferase


(ALT/SGPT) 22 U/L (12-78)


  19 U/L (12-78)


 


 


Alkaline Phosphatase


  64 U/L


() 64 U/L


()


 


Total Protein


  5.7 G/DL


(6.4-8.2)  L 5.1 G/DL


(6.4-8.2)  L


 


Albumin


  2.5 G/DL


(3.4-5.0)  L 2.2 G/DL


(3.4-5.0)  L


 


Globulin 3.2 g/dL   2.9 g/dL  


 


Albumin/Globulin Ratio


  0.8 (1.0-2.7)


L 0.8 (1.0-2.7)


L








Height (Feet):  5


Height (Inches):  4.00


Weight (Pounds):  120


Medications





Current Medications








 Medications


  (Trade)  Dose


 Ordered  Sig/Violette


 Route


 PRN Reason  Start Time


 Stop Time Status Last Admin


Dose Admin


 


 Acetaminophen/


 Hydrocodone Bitart


  (Norco 5/325)  1 tab  Q6H  PRN


 ORAL


 For Pain  10/14/18 17:45


 10/21/18 17:44  10/15/18 01:35


 


 


 Aspirin


  (Ecotrin)  81 mg  DAILY


 ORAL


   10/15/18 09:00


 11/14/18 08:59   


 


 


 Atenolol


  (Tenormin)  50 mg  BID


 ORAL


   10/14/18 18:00


 11/13/18 17:59 UNV  


 


 


 Clonazepam


  (KlonoPIN)  0.5 mg  Q6H  PRN


 ORAL


 For Anxiety  10/14/18 17:45


 10/21/18 17:44   


 


 


 Famotidine


  (Pepcid)  20 mg  DAILY


 ORAL


   10/15/18 09:00


 11/14/18 08:59   


 


 


 Furosemide


  (Lasix)  40 mg  DAILY


 ORAL


   10/15/18 09:00


 11/14/18 08:59   


 


 


 Heparin Sodium


  (Porcine)


  (Heparin 5000


 units/ml)  5,000 units  EVERY 12  HOURS


 SUBQ


   10/14/18 21:00


 11/13/18 20:59  10/14/18 20:26


 


 


 Imipramine HCl


  (Tofranil)  25 mg  DAILY


 ORAL


   10/15/18 09:00


 11/14/18 08:59   


 


 


 Losartan Potassium


  (Cozaar)  25 mg  DAILY


 ORAL


   10/15/18 09:00


 11/14/18 08:59   


 


 


 Metoprolol


 Succinate


  (Toprol XL)  50 mg  DAILY


 ORAL


   10/15/18 09:00


 11/14/18 08:59 UNV  


 


 


 Potassium Chloride


  (K-Dur)  10 meq  DAILY


 ORAL


   10/15/18 09:00


 11/14/18 08:59   


 


 


 Trazodone HCl


  (Desyrel)  50 mg  BEDTIME


 ORAL


   10/14/18 21:00


 11/13/18 20:59  10/14/18 20:26


 











Assessment/Plan


Problem List:  


(1) Hip dislocation, right


ICD Codes:  S73.004A - Unspecified dislocation of right hip, initial encounter


SNOMED:  816161397


(2) Delirium


ICD Codes:  R41.0 - Disorientation, unspecified


SNOMED:  7045588


(3) CAD (coronary artery disease)


ICD Codes:  I25.10 - Atherosclerotic heart disease of native coronary artery 

without angina pectoris


SNOMED:  60056159


(4) HTN (hypertension)


ICD Codes:  I10 - Essential (primary) hypertension


SNOMED:  41821403


(5) Pacemaker


ICD Codes:  Z95.0 - Presence of cardiac pacemaker


SNOMED:  930276052


(6) Diabetes mellitus, type II


ICD Codes:  E11.9 - Type 2 diabetes mellitus without complications


SNOMED:  43648745


Assessment/Plan


symptomatic treatment


psychology f/u


sliding scale


iv fluids


ortho consult


dvt prophylaxis


monitor  BP











Korin Junior MD Oct 15, 2018 11:00

## 2018-10-15 NOTE — ANETHESIA PREOPERATIVE EVAL
Anesthesia Pre-op PMH/ROS


General


Date of Evaluation:  Oct 15, 2018


Time of Evaluation:  19:30


Anesthesiologist:  xenia


ASA Score:  ASA 4


Mallampati Score


Class I : Soft palate, uvula, fauces, pillars visible


Class II: Soft palate, uvula, fauces visible


Class III: Soft palate, base of uvula visible


Class IV: Only hard plate visible


Mallampati Classification:  Class III


Surgeon:  john


Diagnosis:  hip dislocation


Surgical Procedure:  closed and open right hip reduction


Anesthesia History:  none


Family History:  no anesthesia problems


Allergies:  


Coded Allergies:  


     No Known Allergies (Unverified , 3/26/14)


Medications:  see eMAR


Patient NPO?:  Yes


NPO Date:  Oct 15, 2018


NPO Time:  0800





Past Medical History


Cardiovascular:  Reports: HTN, CAD, arrhythmia - hx sick sinus, other - 

pacemaker AV paced - 2014; chf ef 35%


Pulmonary:  Reports: asthma, other - chf


Gastrointestinal/Genitourinary:  Denies: GERD, CRI, ESRD, other


Neurologic/Psychiatric:  Reports: dementia, depression/anxiety; 


   Denies: CVA, TIA, other


Endocrine:  Reports: DM; 


   Denies: hypothyroidism, steroids, other


HEENT:  Denies: cataract (L), cataract (R), glaucoma, [place] (L), [place] (R), other


Hematology/Immune:  Denies: anemia, DVT, bleeding disorder, other


Musculoskeletal/Integumentary:  Reports: DJD, other - dislocated hip; 


   Denies: OA, RA, DDD, edema


PSxH Narrative:


hip replacement 10d ago; cardiac bypass 2017





Anesthesia Pre-op Phys. Exam


Physician Exam





Last Vital Signs








  Date Time  Temp Pulse Resp B/P (MAP) Pulse Ox O2 Delivery O2 Flow Rate FiO2


 


10/15/18 16:00 97.7 89 18 131/58 (82) 98   





 97.7       


 


10/15/18 09:00      Room Air  








Constitutional:  other - vss at this time; disoriented


Neurologic:  other - demented


Cardiovascular:  other - av paced


Respiratory:  CTA


Gastrointestinal:  S/NT/ND





Airway Exam


Mallampati Classification


2


Mallampati Score:  Class II


MO:  limited


ROM:  limited


Dentures:  no upper, no lower





Anesthesia Pre-op A/P


Labs





Hematology








Test


  10/15/18


07:01


 


White Blood Count


  5.3 K/UL


(4.8-10.8)


 


Red Blood Count


  3.07 M/UL


(4.20-5.40)  L


 


Hemoglobin


  8.9 G/DL


(12.0-16.0)  L


 


Hematocrit


  28.0 %


(37.0-47.0)  L


 


Mean Corpuscular Volume 91 FL (80-99)  


 


Mean Corpuscular Hemoglobin


  29.1 PG


(27.0-31.0)


 


Mean Corpuscular Hemoglobin


Concent 31.9 G/DL


(32.0-36.0)  L


 


Red Cell Distribution Width


  17.3 %


(11.6-14.8)  H


 


Platelet Count


  241 K/UL


(150-450)


 


Mean Platelet Volume


  7.4 FL


(6.5-10.1)


 


Neutrophils (%) (Auto)


  68.0 %


(45.0-75.0)


 


Lymphocytes (%) (Auto)


  22.1 %


(20.0-45.0)


 


Monocytes (%) (Auto)


  7.6 %


(1.0-10.0)


 


Eosinophils (%) (Auto)


  1.7 %


(0.0-3.0)


 


Basophils (%) (Auto)


  0.6 %


(0.0-2.0)








Coagulation








Test


  10/15/18


07:01


 


Prothrombin Time


  11.4 SEC


(9.30-11.50)


 


Prothromb Time International


Ratio 1.1 (0.9-1.1)  


 


 


Activated Partial


Thromboplast Time 27 SEC (23-33)


 








Chemistry








Test


  10/15/18


07:01


 


Sodium Level


  140 MMOL/L


(136-145)


 


Potassium Level


  3.8 MMOL/L


(3.5-5.1)


 


Chloride Level


  110 MMOL/L


()  H


 


Carbon Dioxide Level


  24 MMOL/L


(21-32)


 


Anion Gap


  6 mmol/L


(5-15)


 


Blood Urea Nitrogen


  14 mg/dL


(7-18)


 


Creatinine


  0.8 MG/DL


(0.55-1.30)


 


Estimat Glomerular Filtration


Rate  mL/min (>60)  


 


 


Glucose Level


  102 MG/DL


()


 


Calcium Level


  8.3 MG/DL


(8.5-10.1)  L


 


Total Bilirubin


  0.3 MG/DL


(0.2-1.0)


 


Aspartate Amino Transf


(AST/SGOT) 22 U/L (15-37)


 


 


Alanine Aminotransferase


(ALT/SGPT) 19 U/L (12-78)


 


 


Alkaline Phosphatase


  64 U/L


()


 


Total Protein


  5.1 G/DL


(6.4-8.2)  L


 


Albumin


  2.2 G/DL


(3.4-5.0)  L


 


Globulin 2.9 g/dL  


 


Albumin/Globulin Ratio


  0.8 (1.0-2.7)


L











Studies


Pre-op Studies:  EKG - AV paced, echo - ef 35%





Risk Assessment & Plan


Assessment:


admitted to ;


Plan:


general


Status Change Before Surgery:  No





Pre-Antibiotics


Drug:  ancef


Given Within 1 Hr of Incision:  Yes


Time Given:  20:15











Latoya Burns CRNA Oct 15, 2018 20:48

## 2018-10-15 NOTE — CONSULTATION
History of Present Illness


General


Chief Complaint:  Pain





Present Illness


HPI


78-year-old female presented with mmp who was admitted for hip displacement. 

The pt was agitated and yelling. the pt is [place] speaking and is 

disorganized. the pt is unable to provide hx. The pt is disoriented. She get 

Trazodone outside of the hospital


Allergies:  


Coded Allergies:  


     No Known Allergies (Unverified , 3/26/14)





Medication History


Scheduled


Amoxicillin/Potassium Clav 875-125* (Augmentin 875-125 Tablet*), 1 TAB ORAL 

TWICE A DAY


Aspirin (Aspirin EC), 81 MG ORAL DAILY, (Reported)


Atenolol (Tenormin), 25 MG ORAL DAILY, (Reported)


Atenolol* (Tenormin*), 50 MG ORAL BID, (Reported)


Clonazepam* (Klonopin*), 0.5 MG ORAL Q6H, (Reported)


Clonidine Hcl* (Catapres*), 0.1 MG ORAL EVERY 8 HOURS, (Reported)


Clopidogrel* (Clopidogrel*), 75 MG ORAL DAILY, (Reported)


Dicyclomine Hcl* (Dicyclomine Hcl*), 10 MG PO QID


Famotidine (Pepcid), 20 MG ORAL BEDTIME


Furosemide* (Lasix*), 40 MG ORAL DAILY, (Reported)


Imipramine Hcl* (Tofranil*), 25 MG ORAL DAILY, (Reported)


Losartan Potassium* (Losartan Potassium*), 25 MG ORAL DAILY, (Reported)


Metoprolol Succinate* (Metoprolol Succinate*), 50 MG ORAL DAILY, (Reported)


No Known Medications* (NKM - No Known Medications*), 0 ., (Reported)


Potassium Chloride (Potassium Chloride), 10 MEQ ORAL DAILY, (Reported)


Rosuvastatin Calcium* (Crestor*), 10 MG ORAL DAILY, (Reported)


Sitagliptin Phos/Metformin Hcl (Janumet Xr 50-1,000 Mg Tablet), 1 TAB ORAL DAILY

, (Reported)


Trazodone* (Trazodone*), 50 MG ORAL BEDTIME, (Reported)


Vitamin D (Vitamin D3), 5,000 UNITS ORAL DAILY, (Reported)





Scheduled PRN


Hydrocodone Bit/Acetaminophen 5-325* (Norco 5-325*), 1 TAB ORAL Q6H PRN for For 

Pain





Miscellaneous Medications


Unable to Obtain Medications (Unable To Obtain Meds), (Reported)





Patient History


Limited by:  medical condition


History Provided By:  Patient, Medical Record


Healthcare decision maker





Resuscitation status


Full Code


Advanced Directive on File


No





Past Medical/Surgical History


Past Medical/Surgical History:  


(1) Delirium


(2) Acute on chronic systolic CHF (congestive heart failure)


(3) Colitis


(4) Diabetes mellitus, type II


(5) Closed fracture of symphysis pubis


(6) Psychosis


(7) DVT of left axillary vein, acute


(8) Sick sinus syndrome


(9) CAD (coronary artery disease)


(10) HTN (hypertension)


(11) Pacemaker


(12) Fracture of femoral neck, right, closed


(13) Hip fracture requiring operative repair


(14) UTI (urinary tract infection)


(15) E coli infection


(16) Hip dislocation, right





Review of Systems


Psychiatric:  Reports: anxiety, hallucinations





Physical Exam


General Appearance:  alert, confused, severe distress, agitated





Last 24 Hour Vital Signs








  Date Time  Temp Pulse Resp B/P (MAP) Pulse Ox O2 Delivery O2 Flow Rate FiO2


 


10/15/18 09:00      Room Air  


 


10/15/18 09:00    119/58    


 


10/15/18 08:00 97.9 76 19 119/58 (78) 98   





 97.9       


 


10/15/18 07:45  80      


 


10/15/18 04:00  77      


 


10/15/18 04:00 97.0 76 20 118/60 (79) 98   





 97.0       


 


10/15/18 00:00 97.3 69 20 106/63 (77) 98   





 97.3       


 


10/15/18 00:00  68      


 


10/14/18 21:00      Room Air  


 


10/14/18 20:00 98.1 68 20 117/59 (78) 97   





 98.1       


 


10/14/18 20:00  68      


 


10/14/18 17:55      Room Air  


 


10/14/18 17:01  61      


 


10/14/18 16:45 97.7 65 19 110/57 (74) 95   





 97.7       


 


10/14/18 16:44 97.9 69 15 150/72 100 Room Air  





 97.9       


 


10/14/18 15:56  69 15 150/72 100 Room Air  


 


10/14/18 14:45  68 12 152/74 100 Room Air  


 


10/14/18 13:38 97.9 77 15 110/59 100 Room Air  





 97.9       


 


10/14/18 13:12 98.2 60 15  100 Room Air  





 98.2 69   94   





  64   96   





  62   100   





  72      


 


10/14/18 13:12      Room Air  


 


10/14/18 13:08 97.9       


 


10/14/18 12:39 97.9 61 16 120/58 100 Room Air  





 97.9       


 


10/14/18 11:55 97.9       


 


10/14/18 11:09 97.9 61 16 111/54 100 Room Air  





 97.9       


 


10/14/18 10:52 97.8 70 13 111/54 100 Room Air  





 97.9       

















Intake and Output  


 


 10/14/18 10/15/18





 19:00 07:00


 


Intake Total  220 ml


 


Balance  220 ml


 


  


 


Intake Oral  220 ml


 


# Voids 1 2











Laboratory Tests








Test


  10/14/18


11:50 10/15/18


07:01


 


White Blood Count


  6.1 K/UL


(4.8-10.8) 5.3 K/UL


(4.8-10.8)


 


Red Blood Count


  3.20 M/UL


(4.20-5.40)  L 3.07 M/UL


(4.20-5.40)  L


 


Hemoglobin


  9.1 G/DL


(12.0-16.0)  L 8.9 G/DL


(12.0-16.0)  L


 


Hematocrit


  29.4 %


(37.0-47.0)  L 28.0 %


(37.0-47.0)  L


 


Mean Corpuscular Volume 92 FL (80-99)   91 FL (80-99)  


 


Mean Corpuscular Hemoglobin


  28.6 PG


(27.0-31.0) 29.1 PG


(27.0-31.0)


 


Mean Corpuscular Hemoglobin


Concent 31.2 G/DL


(32.0-36.0)  L 31.9 G/DL


(32.0-36.0)  L


 


Red Cell Distribution Width


  17.6 %


(11.6-14.8)  H 17.3 %


(11.6-14.8)  H


 


Platelet Count


  239 K/UL


(150-450) 241 K/UL


(150-450)


 


Mean Platelet Volume


  7.9 FL


(6.5-10.1) 7.4 FL


(6.5-10.1)


 


Neutrophils (%) (Auto)


  70.7 %


(45.0-75.0) 68.0 %


(45.0-75.0)


 


Lymphocytes (%) (Auto)


  18.4 %


(20.0-45.0)  L 22.1 %


(20.0-45.0)


 


Monocytes (%) (Auto)


  9.4 %


(1.0-10.0) 7.6 %


(1.0-10.0)


 


Eosinophils (%) (Auto)


  1.2 %


(0.0-3.0) 1.7 %


(0.0-3.0)


 


Basophils (%) (Auto)


  0.3 %


(0.0-2.0) 0.6 %


(0.0-2.0)


 


Prothrombin Time


  11.4 SEC


(9.30-11.50) 11.4 SEC


(9.30-11.50)


 


Prothromb Time International


Ratio 1.1 (0.9-1.1)  


  1.1 (0.9-1.1)  


 


 


Activated Partial


Thromboplast Time 22 SEC (23-33)


L 27 SEC (23-33)


 


 


Sodium Level


  140 MMOL/L


(136-145) 140 MMOL/L


(136-145)


 


Potassium Level


  3.8 MMOL/L


(3.5-5.1) 3.8 MMOL/L


(3.5-5.1)


 


Chloride Level


  109 MMOL/L


()  H 110 MMOL/L


()  H


 


Carbon Dioxide Level


  25 MMOL/L


(21-32) 24 MMOL/L


(21-32)


 


Anion Gap


  6 mmol/L


(5-15) 6 mmol/L


(5-15)


 


Blood Urea Nitrogen


  14 mg/dL


(7-18) 14 mg/dL


(7-18)


 


Creatinine


  0.9 MG/DL


(0.55-1.30) 0.8 MG/DL


(0.55-1.30)


 


Estimat Glomerular Filtration


Rate  mL/min (>60)  


   mL/min (>60)  


 


 


Glucose Level


  134 MG/DL


()  H 102 MG/DL


()


 


Calcium Level


  8.7 MG/DL


(8.5-10.1) 8.3 MG/DL


(8.5-10.1)  L


 


Total Bilirubin


  0.3 MG/DL


(0.2-1.0) 0.3 MG/DL


(0.2-1.0)


 


Aspartate Amino Transf


(AST/SGOT) 23 U/L (15-37)


  22 U/L (15-37)


 


 


Alanine Aminotransferase


(ALT/SGPT) 22 U/L (12-78)


  19 U/L (12-78)


 


 


Alkaline Phosphatase


  64 U/L


() 64 U/L


()


 


Total Protein


  5.7 G/DL


(6.4-8.2)  L 5.1 G/DL


(6.4-8.2)  L


 


Albumin


  2.5 G/DL


(3.4-5.0)  L 2.2 G/DL


(3.4-5.0)  L


 


Globulin 3.2 g/dL   2.9 g/dL  


 


Albumin/Globulin Ratio


  0.8 (1.0-2.7)


L 0.8 (1.0-2.7)


L








Height (Feet):  5


Height (Inches):  4.00


Weight (Pounds):  120


Medications





Current Medications








 Medications


  (Trade)  Dose


 Ordered  Sig/Violette


 Route


 PRN Reason  Start Time


 Stop Time Status Last Admin


Dose Admin


 


 Acetaminophen/


 Hydrocodone Bitart


  (Norco 5/325)  1 tab  Q6H  PRN


 ORAL


 For Pain  10/14/18 17:45


 10/21/18 17:44  10/15/18 01:35


 


 


 Aspirin


  (Ecotrin)  81 mg  DAILY


 ORAL


   10/15/18 09:00


 11/14/18 08:59   


 


 


 Atenolol


  (Tenormin)  50 mg  BID


 ORAL


   10/14/18 18:00


 11/13/18 17:59 UNV  


 


 


 Clonazepam


  (KlonoPIN)  0.5 mg  Q6H  PRN


 ORAL


 For Anxiety  10/14/18 17:45


 10/21/18 17:44   


 


 


 Famotidine


  (Pepcid)  20 mg  DAILY


 ORAL


   10/15/18 09:00


 11/14/18 08:59   


 


 


 Furosemide


  (Lasix)  40 mg  DAILY


 ORAL


   10/15/18 09:00


 11/14/18 08:59   


 


 


 Heparin Sodium


  (Porcine)


  (Heparin 5000


 units/ml)  5,000 units  EVERY 12  HOURS


 SUBQ


   10/14/18 21:00


 11/13/18 20:59  10/14/18 20:26


 


 


 Imipramine HCl


  (Tofranil)  25 mg  DAILY


 ORAL


   10/15/18 09:00


 11/14/18 08:59   


 


 


 Losartan Potassium


  (Cozaar)  25 mg  DAILY


 ORAL


   10/15/18 09:00


 11/14/18 08:59   


 


 


 Metoprolol


 Succinate


  (Toprol XL)  50 mg  DAILY


 ORAL


   10/15/18 09:00


 11/14/18 08:59 UNV  


 


 


 Potassium Chloride


  (K-Dur)  10 meq  DAILY


 ORAL


   10/15/18 09:00


 11/14/18 08:59   


 


 


 Trazodone HCl


  (Desyrel)  50 mg  BEDTIME


 ORAL


   10/14/18 21:00


 11/13/18 20:59  10/14/18 20:26


 











Assessment/Plan


Status:  stable


Assessment/Plan


Dementia with behavioral dist


Encephalopathy


Psychotic d/o





Risperdal .5mg tid 


ativan prn


trazodone 50mg qhs











Josh Nunez MD Oct 15, 2018 10:52

## 2018-10-15 NOTE — CONSULTATION
DATE OF CONSULTATION:  10/14/2018



ORTHOPEDIC CONSULTATION



CONSULTING PHYSICIAN:  Juan Antonio Bella M.D.



CHIEF COMPLAINT:  Right hip pain.



HISTORY OF PRESENT ILLNESS:  The patient is an 78-year-old female, who

underwent a right hip hemiarthroplasty last week.  She subsequently was

brought to the ER with shortened, internally rotated leg.  Imaging studies

showed what appeared to be dislocation of the hip prosthesis.  Now, the

patient was noticed to have a fall from her bed.



PAST MEDICAL HISTORY:  Reviewed from the intake chart.



SURGICAL HISTORY:  Reviewed from the intake chart.



MEDICATION:  Reviewed from the intake chart.



PHYSICAL EXAMINATION:

SKIN:  Shows incision is clean, dry, and intact.  No ecchymosis or swelling

of the right hip.

MUSCULOSKELETAL:  Right leg is shortened and internally rotated.



DIAGNOSTIC DATA:  Imaging studies showed hip dislocation with

hemiarthroplasty.  No obvious loosening or periprosthetic fracture.



ASSESSMENT:  Right hip hemiarthroplasty dislocation.



DISCUSSION:  At this point, ER tried multiple times to try to reduce it.

It was unsuccessful.  Therefore, we will preop her for possible closed

reduction versus open reduction in the operating room tomorrow.  She will

be made n.p.o. after midnight in anticipation of surgery.









  ______________________________________________

  Juan Antonio Bella M.D.





DR:  Alysha

D:  10/15/2018 20:56

T:  10/15/2018 21:51

JOB#:  [id number]

CC:

## 2018-10-15 NOTE — OPERATIVE NOTE - PDOC
Operative Note


Operative Note


Pre-op Diagnosis:


right hip dislocation


Procedure:


see op report


Post-op Diagnosis:  same as pre-op plus


Operative Findings:  consistent w/pre-op dx studies


Anesthesia:  general


Specimen:  none


Complications:  none


Condition:  stable


Estimated Blood Loss:  none


Implant(s) used?:  Juan Antonio Lim MD Oct 15, 2018 20:01

## 2018-10-15 NOTE — OPERATIVE NOTE - DICTATED
DATE OF OPERATION:  10/15/2018



NOTE: "POOR AUDIO QUALITY"



SURGEON:  Juan Antonio Bella M.D.



PREOPERATIVE DIAGNOSIS:  Right dislocated hemiarthroplasty.



POSTOPERATIVE DIAGNOSIS:  Right dislocated hemiarthroplasty.



PROCEDURE:  Open reduction of a right hip hemiarthroplasty dislocation.



SURGEON:  Juan Antonio Bella M.D.



ANESTHESIA:  General.



INDICATION FOR PROCEDURE:  The patient is a pleasant 78-year-old female,

who sustained a mechanical fall and underwent right hip hemiarthroplasty

approximately a week ago.  She was in rehabilitation center.  She was

noted to have a fall from her bed and subsequently presented with

shortened, internally rotated leg.  Attempted reduction of the hip

dislocation was attempted by the ER staff, which was unsuccessful and

therefore, she was brought to the ER for possible closed reduction versus

open reduction.  I attempted to contact her daughter, Shoshana, was

attempted, however, voicemail was full at the contact number provided.



DESCRIPTION OF PROCEDURE:  After informed consent was obtained, the patient

was brought to the operating room.  The patient was placed under sedation.

Attempted reduction of the hip was performed which was unsuccessful.

Therefore, it was felt that a formal open reduction was necessary.  The

patient was then carefully positioned in lateral decubitus position.  The

right hip was prepped and draped in sterile manner.  Previous skin

incision was marked out.  The skin was incised.  Blunt dissection through

the fascia was performed.  A hematoma was evacuated.  Once this was done,

care was taken to make sure there was no any soft tissue in the acetabulum

that might be causing dislocation difficult with capsule head torn off the

greater tuberosity.  Reduction maneuver was performed.  Once reduction

maneuver was performed, hip flexed to 110 degrees, 90 degrees of flexion,

internal rotation was 45 in extension, extension in external rotation was

somewhat tight.  It seemed like the patient had been out for at least a

couple of days given that the soft tissue tension was much harder than I

remembered it to be last week.  Therefore, given that there was no

loosening of the implant and postoperative x-rays looked reasonable in

terms of the leg length, so it was felt that this was appropriate soft

tissue tensioning.  The capsule was reapproximated to the greater

tuberosity.  Fascia candelario was approximated with #1 Vicryl suture, 2-0

Vicryl suture, and 3-0 Monocryl suture.  Steri-Strips and a sterile

dressing were applied.  The patient was awoken and taken to recovery room

with stable signs.



ESTIMATED BLOOD LOSS:  None.



COMPLICATIONS:  None.



SPECIMENS:  Include right hip aspirate for cultures.









  ______________________________________________

  Juan Antonio Bella M.D.





DR:  Alysha

D:  10/15/2018 20:59

T:  10/15/2018 22:26

JOB#:  [id number]

CC:

## 2018-10-15 NOTE — PSYCH CONSULT PROGRESS NOTE
Psych Consult Progress Note


Consult


10/14/18





the pt was agitated and confused. the pt was yelling and became more agitated 

when seeing her dislocated hip


Vital Signs





Last 24 Hour Vital Signs








  Date Time  Temp Pulse Resp B/P (MAP) Pulse Ox O2 Delivery O2 Flow Rate FiO2


 


10/15/18 21:42  75 14 158/76 100 Nasal Cannula 3 


 


10/15/18 21:35 97.7 76 14 165/77 100 Nasal Cannula 3 





 97.7       


 


10/15/18 21:30  75 15 155/78 100 Nasal Cannula 3 


 


10/15/18 21:20  76 14 162/73 100 Nasal Cannula 3 


 


10/15/18 21:15 210.0 73 14  98   


 


10/15/18 21:10  73 14 150/94 100 Simple Mask 6 


 


10/15/18 21:05  73 17 163/81 100 Simple Mask 6 


 


10/15/18 21:00 98.9 73 14 161/92 100 Simple Mask 6 





 98.9       


 


10/15/18 16:00 97.7 89 18 131/58 (82) 98   





 97.7       


 


10/15/18 16:00  86      


 


10/15/18 13:03  97      


 


10/15/18 12:00 97.7 83 19 141/82 (101) 92   





 97.7       


 


10/15/18 11:47  83  141/82    


 


10/15/18 09:00      Room Air  


 


10/15/18 09:00    119/58    


 


10/15/18 08:00 97.9 76 19 119/58 (78) 98   





 97.9       


 


10/15/18 07:45  80      


 


10/15/18 04:00  77      


 


10/15/18 04:00 97.0 76 20 118/60 (79) 98   





 97.0       


 


10/15/18 00:00 97.3 69 20 106/63 (77) 98   





 97.3       


 


10/15/18 00:00  68      








Labs





Laboratory Tests








Test


  10/15/18


07:01


 


White Blood Count


  5.3 K/UL


(4.8-10.8)


 


Red Blood Count


  3.07 M/UL


(4.20-5.40)  L


 


Hemoglobin


  8.9 G/DL


(12.0-16.0)  L


 


Hematocrit


  28.0 %


(37.0-47.0)  L


 


Mean Corpuscular Volume 91 FL (80-99)  


 


Mean Corpuscular Hemoglobin


  29.1 PG


(27.0-31.0)


 


Mean Corpuscular Hemoglobin


Concent 31.9 G/DL


(32.0-36.0)  L


 


Red Cell Distribution Width


  17.3 %


(11.6-14.8)  H


 


Platelet Count


  241 K/UL


(150-450)


 


Mean Platelet Volume


  7.4 FL


(6.5-10.1)


 


Neutrophils (%) (Auto)


  68.0 %


(45.0-75.0)


 


Lymphocytes (%) (Auto)


  22.1 %


(20.0-45.0)


 


Monocytes (%) (Auto)


  7.6 %


(1.0-10.0)


 


Eosinophils (%) (Auto)


  1.7 %


(0.0-3.0)


 


Basophils (%) (Auto)


  0.6 %


(0.0-2.0)


 


Prothrombin Time


  11.4 SEC


(9.30-11.50)


 


Prothromb Time International


Ratio 1.1 (0.9-1.1)  


 


 


Activated Partial


Thromboplast Time 27 SEC (23-33)


 


 


Sodium Level


  140 MMOL/L


(136-145)


 


Potassium Level


  3.8 MMOL/L


(3.5-5.1)


 


Chloride Level


  110 MMOL/L


()  H


 


Carbon Dioxide Level


  24 MMOL/L


(21-32)


 


Anion Gap


  6 mmol/L


(5-15)


 


Blood Urea Nitrogen


  14 mg/dL


(7-18)


 


Creatinine


  0.8 MG/DL


(0.55-1.30)


 


Estimat Glomerular Filtration


Rate  mL/min (>60)  


 


 


Glucose Level


  102 MG/DL


()


 


Calcium Level


  8.3 MG/DL


(8.5-10.1)  L


 


Total Bilirubin


  0.3 MG/DL


(0.2-1.0)


 


Aspartate Amino Transf


(AST/SGOT) 22 U/L (15-37)


 


 


Alanine Aminotransferase


(ALT/SGPT) 19 U/L (12-78)


 


 


Alkaline Phosphatase


  64 U/L


()


 


Total Protein


  5.1 G/DL


(6.4-8.2)  L


 


Albumin


  2.2 G/DL


(3.4-5.0)  L


 


Globulin 2.9 g/dL  


 


Albumin/Globulin Ratio


  0.8 (1.0-2.7)


L








Medications





Current Medications








 Medications


  (Trade)  Dose


 Ordered  Sig/Violette


 Route


 PRN Reason  Start Time


 Stop Time Status Last Admin


Dose Admin


 


 Acetaminophen/


 Hydrocodone Bitart


  (Norco 5/325)  1 tab  Q6H  PRN


 ORAL


 For Pain  10/14/18 17:45


 10/21/18 17:44  10/15/18 01:35


 


 


 Aspirin


  (Ecotrin)  81 mg  DAILY


 ORAL


   10/15/18 09:00


 11/14/18 08:59   


 


 


 Dextrose/


 Electrolytes  1,000 ml @ 


 75 mls/hr  [id number]


 IV


   10/15/18 20:00


 11/14/18 19:59  10/15/18 22:31


 


 


 Famotidine


  (Pepcid)  20 mg  DAILY


 ORAL


   10/15/18 09:00


 11/14/18 08:59   


 


 


 Fentanyl Citrate


  (Sublimaze 100


 mcg/2 mL)  25 mcg  Q10M  PRN


 IV


 Moderate Pain (Pain Scale 4-6)  10/15/18 20:45


 10/16/18 01:00   


 


 


 Furosemide


  (Lasix)  40 mg  DAILY


 ORAL


   10/15/18 09:00


 11/14/18 08:59   


 


 


 Heparin Sodium


  (Porcine)


  (Heparin 5000


 units/ml)  5,000 units  EVERY 12  HOURS


 SUBQ


   10/14/18 21:00


 11/13/18 20:59  10/14/18 20:26


 


 


 Hydromorphone HCl


  (Dilaudid)  0.5 mg  Q15M  PRN


 IVP


 Severe Pain (Pain Scale 7-10)  10/15/18 20:45


 10/16/18 01:00  10/15/18 22:31


 


 


 Imipramine HCl


  (Tofranil)  25 mg  DAILY


 ORAL


   10/15/18 09:00


 11/14/18 08:59   


 


 


 Lorazepam


  (Ativan 2mg/ml


 1ml)  0.5 mg  Q15M  PRN


 IV


 For Anxiety  10/15/18 20:45


 10/16/18 01:00   


 


 


 Lorazepam


  (Ativan 2mg/ml


 1ml)  2 mg  Q6H  PRN


 IV


 For Anxiety  10/15/18 17:45


 10/22/18 17:44   


 


 


 Losartan Potassium


  (Cozaar)  25 mg  DAILY


 ORAL


   10/15/18 09:00


 11/14/18 08:59   


 


 


 Metoclopramide HCl


  (Reglan)  10 mg  Q1H  PRN


 IVP


 Nausea & Vomiting  10/15/18 20:45


 10/16/18 01:00   


 


 


 Metoprolol


 Succinate


  (Toprol XL)  50 mg  DAILY


 ORAL


   10/15/18 11:47


 11/14/18 11:46   


 


 


 Ondansetron HCl


  (Zofran)  4 mg  Q1H  PRN


 IVP


 Nausea & Vomiting  10/15/18 20:45


 10/16/18 01:00   


 


 


 Potassium Chloride


  (K-Dur)  10 meq  DAILY


 ORAL


   10/15/18 09:00


 11/14/18 08:59   


 


 


 Risperidone


  (RisperDAL)  0.5 mg  TID


 ORAL


   10/15/18 13:00


 11/14/18 12:59   


 


 


 Trazodone HCl


  (Desyrel)  50 mg  BEDTIME


 ORAL


   10/14/18 21:00


 11/13/18 20:59  10/14/18 20:26


 








Problems:  


(1) Delirium


Assessment & Plan:  Dementia with behavioral dist


Encephalopathy


Psychotic d/o





Risperdal .5mg tid 


ativan prn


trazodone 50mg qhs





(2) Psychosis


(3) Hip dislocation, right


Status:  Acute











Josh Nunez MD Oct 15, 2018 22:59

## 2018-10-15 NOTE — HISTORY & PHYSICAL
History and Physical


History & Physicial


Dictated for Int Med-dr Magana no. [id number].











Cheo Ortiz MD Oct 15, 2018 18:13

## 2018-10-15 NOTE — PRE-PROCEDURE NOTE/ATTESTATION
Pre-Procedure Note/Attestation


Complete Prior to Procedure


Planned Procedure:  right


Procedure Narrative:


hip closed reduction verses open reduction





Indications for Procedure


Pre-Operative Diagnosis:


right hip dislocation





Attestation


I attest that I discussed the nature of the procedure; its benefits; risks and 

complications; and alternatives (and the risks and benefits of such alternatives

), prior to the procedure, with the patient (or the patient's legal 

representative).





I attest that, if there was a reasonable possibility of needing a blood 

transfusion, the patient (or the patient's legal representative) was given the 

[facility] standardized written summary, pursuant 

to the [place] Blood Safety Act ([place] Health and Safety Code # 1645, as 

amended).





I attest that I re-evaluated the patient just prior to the surgery and that 

there has been no change in the patient's H&P, except as documented below:











Juan Antonio Bella MD Oct 15, 2018 20:01

## 2018-10-15 NOTE — HISTORY AND PHYSICAL REPORT
DATE OF ADMISSION:  10/14/2018



CHIEF COMPLAINT:  This is an 78-year-old German-speaking female who

presents with chief complaint of right hip pain.



HISTORY OF PRESENT ILLNESS:  The patient was admitted to [facility] from October 2, 2018 to October 9, 2018.  The patient was admitted

with right femoral neck fracture.  The patient is status post right hip

hemiarthroplasty on October 4, 2018.



The patient was discharged home with home health.  The patient then fell

out of bed yesterday October 14, 2018.  The patient was transported to

[facility] emergency room with right hip pain.  An x-ray

demonstrated a dislocation of the right hip arthroplasty.  The patient was

admitted with dislocation of the right hip arthroplasty for Orthopedic

consultation.



REVIEW OF SYSTEMS:  Unable to assess secondary to patient's mental

status.



PAST MEDICAL HISTORY:  Significant for:



1. Right hip fracture as above.

2. Diabetes type 2.

3. Hypertension.

4. Sick sinus syndrome.

5. Hypercholesterolemia.

6. Coronary artery disease, status post non-ST elevated myocardial

infarction.

7. Hypertension.

8. Aortic stenosis.

9. Pulmonary hypertension.

10. Alzheimer's dementia.



PAST SURGICAL HISTORY:  Significant for:



1. Right hip hemiarthroplasty on October 4, 2018.

2. Coronary artery bypass graft in 2017.

3. Pacemaker generator change in 2017.

4. Elbow debridement.

5. Pacemaker implantation in 2014.



CURRENT MEDICATIONS:

1. Aspirin 81 mg one tablet p.o. daily.

2. Atenolol 25 mg p.o. daily.

3. Klonopin 0.5 mg p.o. twice daily.

4. Clonidine 0.1 mg p.o. q.8 hours p.r.n.

5. Clopidogrel 75 mg p.o. daily.

6. Dicyclomine 10 mg p.o. four times daily.

7. Pepcid 20 mg p.o. at bedtime.

8. Lasix 40 mg p.o. daily.

9. Norco 5/325 mg one tablet p.o. q.6 h. p.r.n.

10. Imipramine 25 mg p.o. daily.

11. Losartan 25 mg p.o. daily.

12. Metoprolol 50 mg p.o. daily.

13. Potassium chloride 10 mEq p.o. daily.

14. Rosuvastatin 10 mg p.o. daily.

15. Sitagliptin/metformin 50/1000 one tab p.o. daily.

16. Trazodone 50 mg p.o. at bedtime.



ALLERGIES:  No known drug allergies.



SOCIAL HISTORY:  The patient is  and lives with her .  The

patient denies tobacco or alcohol use.



PHYSICAL EXAMINATION:

VITAL SIGNS:  Temperature 97.9, respirations 19, pulse 76, blood pressure

119/58.

GENERAL:  The patient is well-developed and well-nourished confused elderly

female, in no apparent distress.

HEENT:  Eyes, pupils are equal and responsive to light and accommodation.

Extraocular movements are intact.

NECK:  Supple without lymphadenopathy.

CHEST:  Lungs are clear to auscultation bilaterally without wheezes or

rales.

CARDIOVASCULAR:  Regular rate.  S1 and S2 are normal without murmurs, rubs,

or gallops.

ABDOMEN:  Soft, nontender, nondistended.  Positive bowel sounds.  No

evidence of hepatosplenomegaly.  Currently, no rebound or guarding

noted.

EXTREMITIES:  Negative for clubbing, cyanosis, or edema.  There is pain to

palpation in the right hip.

RECTAL/GENITAL:  Refused.

NEUROLOGICAL:  Cranial nerves II through XII are grossly intact without

focal deficits.  Motor strength is 5/5 bilaterally.  Deep tendon reflexes

2+ plantar.



LABORATORY AND DIAGNOSTIC DATA:  An x-ray of the pelvis showed dislocation

of the right total hip arthroplasty and fracture of the left superior

pubic rami.  WBC 6.1, hemoglobin 9.1, hematocrit 29.4, platelets 239,000.

Sodium 140, potassium 3.8, chloride 109, CO2 25, BUN 14, creatinine 0.9,

glucose 134.



ASSESSMENT:  This is an 78-year-old white female.



1. Dislocation of the right hip arthroplasty.

2. Left superior rami fracture.

3. Coronary artery disease.

4. Diabetes type 2.

5. Hypertension.

6. Sick sinus syndrome.

7. Hypercholesteremia.

8. Aortic stenosis.

9. Alzheimer's dementia.

10. Pulmonary hypertension.



TREATMENT:

1. Dislocation of the right hip arthroplasty.  An Orthopedic

consultation obtained with Dr. Paco Bella.  We will follow recommendation

of Orthopedic Surgery.  The patient will require repair of dislocation

right hip arthroplasty.

2. Left superior rami fracture.

3. Coronary artery disease.  The patient is status post coronary artery

bypass graft in 2017.

4. Diabetes type 2.  The patient has been started on NovoLog sliding

scale.

5. Hypertension.  The patient is currently hypotensive.  Hold

antihypertensive medication.

6. Sick sinus syndrome.  The patient is status post pacemaker

implantation.

7. Hypercholesteremia.  Continue rosuvastatin as above.

8. Aortic stenosis.

9. Pulmonary hypertension.

10. Alzheimer's dementia.









  ______________________________________________

  Cheo Ortiz M.D.





DR:  Davi

D:  10/15/2018 18:12

T:  10/15/2018 22:39

JOB#:  [id number]

CC:

## 2018-10-15 NOTE — IMMEDIATE POST-OP EVALUATION
Immediate Post-Op Evalulation


Immediate Post-Op Evalulation


Procedure:  closed and open reduction of right hip


Date of Evaluation:  Oct 15, 2018


Time of Evaluation:  21:15


IV Fluids:  400


Blood Products:  0


Estimated Blood Loss:  5


Blood Pressure Systolic:  164


Blood Pressure Diastolic:  81


Pulse Rate:  73


Respiratory Rate:  14


O2 Sat by Pulse Oximetry:  98


Temperature (Fahrenheit):  98.9


Pain Score (1-10):  0


Nausea:  No


Vomiting:  No


Complications


none


Patient Status:  awake, reacts, patent


Hydration Status:  adequate


Drug:  ancef


Given Within 1 Hr of Incision:  Yes


Time Given:  20:15











Latoya Burns CRNA Oct 15, 2018 21:15

## 2018-10-16 VITALS — SYSTOLIC BLOOD PRESSURE: 150 MMHG | DIASTOLIC BLOOD PRESSURE: 84 MMHG

## 2018-10-16 VITALS — SYSTOLIC BLOOD PRESSURE: 174 MMHG | DIASTOLIC BLOOD PRESSURE: 94 MMHG

## 2018-10-16 VITALS — SYSTOLIC BLOOD PRESSURE: 144 MMHG | DIASTOLIC BLOOD PRESSURE: 82 MMHG

## 2018-10-16 VITALS — DIASTOLIC BLOOD PRESSURE: 55 MMHG | SYSTOLIC BLOOD PRESSURE: 117 MMHG

## 2018-10-16 VITALS — SYSTOLIC BLOOD PRESSURE: 113 MMHG | DIASTOLIC BLOOD PRESSURE: 61 MMHG

## 2018-10-16 VITALS — DIASTOLIC BLOOD PRESSURE: 69 MMHG | SYSTOLIC BLOOD PRESSURE: 132 MMHG

## 2018-10-16 VITALS — DIASTOLIC BLOOD PRESSURE: 88 MMHG | SYSTOLIC BLOOD PRESSURE: 144 MMHG

## 2018-10-16 RX ADMIN — ASPIRIN SCH MG: 81 TABLET, DELAYED RELEASE ORAL at 08:52

## 2018-10-16 RX ADMIN — FUROSEMIDE SCH MG: 40 TABLET ORAL at 08:52

## 2018-10-16 RX ADMIN — SODIUM CHLORIDE PRN MG: 9 INJECTION, SOLUTION INTRAVENOUS at 09:11

## 2018-10-16 RX ADMIN — TRAZODONE HYDROCHLORIDE SCH MG: 50 TABLET ORAL at 21:10

## 2018-10-16 RX ADMIN — IMIPRAMINE HYDROCHLORIDE SCH MG: 10 TABLET, FILM COATED ORAL at 08:51

## 2018-10-16 RX ADMIN — LORAZEPAM PRN MG: 2 INJECTION, SOLUTION INTRAMUSCULAR; INTRAVENOUS at 08:53

## 2018-10-16 RX ADMIN — HEPARIN SODIUM SCH UNITS: 5000 INJECTION INTRAVENOUS; SUBCUTANEOUS at 21:11

## 2018-10-16 RX ADMIN — DEXTROSE, SODIUM CHLORIDE, AND POTASSIUM CHLORIDE SCH MLS/HR: 5; .45; .15 INJECTION INTRAVENOUS at 09:02

## 2018-10-16 RX ADMIN — HEPARIN SODIUM SCH UNITS: 5000 INJECTION INTRAVENOUS; SUBCUTANEOUS at 08:53

## 2018-10-16 RX ADMIN — DEXTROSE, SODIUM CHLORIDE, AND POTASSIUM CHLORIDE SCH MLS/HR: 5; .45; .15 INJECTION INTRAVENOUS at 21:17

## 2018-10-16 RX ADMIN — LOSARTAN POTASSIUM SCH MG: 25 TABLET, FILM COATED ORAL at 08:51

## 2018-10-16 RX ADMIN — SODIUM CHLORIDE PRN MG: 9 INJECTION, SOLUTION INTRAVENOUS at 02:15

## 2018-10-16 RX ADMIN — METOPROLOL SUCCINATE SCH MG: 50 TABLET, EXTENDED RELEASE ORAL at 08:52

## 2018-10-16 NOTE — 48 HOUR POST ANESTHESIA EVAL
Post Anesthesia Evaluation


Procedure:  closed and open reduction of right hip


Date of Evaluation:  Oct 16, 2018


Time of Evaluation:  13:25


Nausea:  No


Vomiting:  No











Amadou Louise MD Oct 16, 2018 13:25

## 2018-10-16 NOTE — GENERAL PROGRESS NOTE
Assessment/Plan


Problem List:  


(1) Delirium


Assessment & Plan:  Dementia with behavioral dist


Encephalopathy


Psychotic d/o





Risperdal .5mg tid 


ativan prn


trazodone 50mg qhs


ICD Codes:  R41.0 - Disorientation, unspecified


SNOMED:  0661798


(2) Psychosis


ICD Codes:  F29 - Unspecified psychosis not due to a substance or known 

physiological condition


SNOMED:  70484880


(3) Hip dislocation, right


ICD Codes:  S73.004A - Unspecified dislocation of right hip, initial encounter


SNOMED:  217994536


Status:  stable, progressing





Subjective


Date patient seen:  Oct 16, 2018


Neurologic/Psychiatric:  Reports: anxiety, depressed, emotional problems


Allergies:  


Coded Allergies:  


     No Known Allergies (Unverified , 3/26/14)


Subjective


the pt cont to be confused however calm and resting in NAD





Objective





Last 24 Hour Vital Signs








  Date Time  Temp Pulse Resp B/P (MAP) Pulse Ox O2 Delivery O2 Flow Rate FiO2


 


10/16/18 09:11 98.0       


 


10/16/18 08:52  96  144/82    


 


10/16/18 08:51    144/82    


 


10/16/18 07:10  105  144/88 (106) 97   


 


10/16/18 04:00 98.0 115 22 150/84 (106) 86   





 98.0       


 


10/16/18 04:00  79      


 


10/16/18 00:00 98.1 118 22 174/94 (120) 88   





 98.1       


 


10/16/18 00:00  131      


 


10/15/18 21:42  75 14 158/76 100 Nasal Cannula 3 


 


10/15/18 21:35 97.7 76 14 165/77 100 Nasal Cannula 3 





 97.7       


 


10/15/18 21:30  75 15 155/78 100 Nasal Cannula 3 


 


10/15/18 21:20  76 14 162/73 100 Nasal Cannula 3 


 


10/15/18 21:15 210.0 73 14  98   


 


10/15/18 21:10  73 14 150/94 100 Simple Mask 6 


 


10/15/18 21:05  73 17 163/81 100 Simple Mask 6 


 


10/15/18 21:00      Room Air  


 


10/15/18 21:00 98.9 73 14 161/92 100 Simple Mask 6 





 98.9       


 


10/15/18 20:00  93      


 


10/15/18 16:00 97.7 89 18 131/58 (82) 98   





 97.7       


 


10/15/18 16:00  86      


 


10/15/18 13:03  97      


 


10/15/18 12:00 97.7 83 19 141/82 (101) 92   





 97.7       


 


10/15/18 11:47  83  141/82    

















Intake and Output  


 


 10/15/18 10/16/18





 19:00 07:00


 


Intake Total  450 ml


 


Output Total  10 ml


 


Balance  440 ml


 


  


 


IV Total  450 ml


 


Output Urine Total  0 ml


 


Estimated Blood Loss  10 ml


 


# Voids 3 








Height (Feet):  5


Height (Inches):  3.00


Weight (Pounds):  120


General Appearance:  no apparent distress, alert, confused


Neurologic:  depressed affect











Josh Nunez MD Oct 16, 2018 11:14

## 2018-10-16 NOTE — INTERNAL MED PROGRESS NOTE
Subjective


Date of Service:  Oct 16, 2018


Physician Name


Cheo Ortiz


Attending Physician


Juan Carlos Magana MD





Current Medications








 Medications


  (Trade)  Dose


 Ordered  Sig/Violette


 Route


 PRN Reason  Start Time


 Stop Time Status Last Admin


Dose Admin


 


 Acetaminophen/


 Hydrocodone Bitart


  (Norco 5/325)  1 tab  Q6H  PRN


 ORAL


 For Pain  10/14/18 17:45


 10/21/18 17:44  10/15/18 01:35


 


 


 Aspirin


  (Ecotrin)  81 mg  DAILY


 ORAL


   10/15/18 09:00


 11/14/18 08:59  10/16/18 08:52


 


 


 Dextrose/


 Electrolytes  1,000 ml @ 


 75 mls/hr  [id number]


 IV


   10/15/18 20:00


 11/14/18 19:59  10/16/18 09:02


 


 


 Famotidine


  (Pepcid)  20 mg  DAILY


 ORAL


   10/15/18 09:00


 11/14/18 08:59  10/16/18 08:52


 


 


 Furosemide


  (Lasix)  40 mg  DAILY


 ORAL


   10/15/18 09:00


 11/14/18 08:59  10/16/18 08:52


 


 


 Heparin Sodium


  (Porcine)


  (Heparin 5000


 units/ml)  5,000 units  EVERY 12  HOURS


 SUBQ


   10/14/18 21:00


 11/13/18 20:59  10/16/18 08:53


 


 


 Hydromorphone HCl


  (Dilaudid)  0.5 mg  Q4H  PRN


 IVP


 For SEVERE Pain 7-10/10  10/16/18 06:00


 10/23/18 01:59  10/16/18 09:11


 


 


 Imipramine HCl


  (Tofranil)  25 mg  DAILY


 ORAL


   10/15/18 09:00


 11/14/18 08:59  10/16/18 08:51


 


 


 Lorazepam


  (Ativan 2mg/ml


 1ml)  2 mg  Q6H  PRN


 IV


 For Anxiety  10/15/18 17:45


 10/22/18 17:44  10/16/18 08:53


 


 


 Losartan Potassium


  (Cozaar)  25 mg  DAILY


 ORAL


   10/15/18 09:00


 11/14/18 08:59  10/16/18 08:51


 


 


 Metoprolol


 Succinate


  (Toprol XL)  50 mg  DAILY


 ORAL


   10/15/18 11:47


 11/14/18 11:46  10/16/18 08:52


 


 


 Potassium Chloride


  (K-Dur)  10 meq  DAILY


 ORAL


   10/15/18 09:00


 11/14/18 08:59  10/16/18 08:52


 


 


 Risperidone


  (RisperDAL)  0.5 mg  BEDTIME


 ORAL


   10/16/18 21:00


 11/15/18 20:59   


 


 


 Trazodone HCl


  (Desyrel)  50 mg  BEDTIME


 ORAL


   10/14/18 21:00


 11/13/18 20:59  10/14/18 20:26


 








Allergies:  


Coded Allergies:  


     No Known Allergies (Unverified , 3/26/14)


ROS Limited/Unobtainable:  No


Constitutional:  Reports: no symptoms


HEENT:  Reports: no symptoms


Cardiovascular:  Reports: no symptoms


Respiratory:  Reports: no symptoms


Gastrointestinal/Abdominal:  Reports: no symptoms


Genitourinary:  Reports: no symptoms


Neurologic/Psychiatric:  Reports: no symptoms


Subjective


77 YO F admitted with dislocation of right hip hemiarthroplasty.  S/P open 

reduction right hip hemiarthroplasty dislocation on 10/15/18.  Cover for [facility]

-Dr Magana.





Objective





Last Vital Signs








  Date Time  Temp Pulse Resp B/P (MAP) Pulse Ox O2 Delivery O2 Flow Rate FiO2


 


10/16/18 12:00 97.2 83 18 113/61 (78) 99   





 97.2       


 


10/16/18 09:00      Room Air  


 


10/15/18 21:42       3 








General Appearance:  WD/WN, no apparent distress, alert


EENT:  PERRL/EOMI, normal ENT inspection


Neck:  non-tender, normal alignment, supple, normal inspection


Cardiovascular:  normal peripheral pulses, normal rate, regular rhythm, no 

gallop/murmur, no JVD


Respiratory/Chest:  chest wall non-tender, lungs clear, normal breath sounds, 

no respiratory distress, no accessory muscle use


Abdomen:  normal bowel sounds, non tender, soft, no organomegaly, no mass


Extremities:  normal range of motion, other - tender right hip


Neurologic:  CNs II-XII grossly normal, no motor/sensory deficits


Skin:  normal pigmentation, warm/dry





Microbiology








 Date/Time


Source Procedure


Growth Status


 


 


 10/14/18 14:00


Nasal Nares MRSA Culture - Final


Staphylococcus Aureus - Mrsa Complete





 10/15/18 20:27


Hip Right Gram Stain - Final Resulted





 10/15/18 20:27


Hip Right Aerobic Culture - Preliminary


NO GROWTH Resulted


 


 10/15/18 20:27


Hip Right Anaerobic Culture


Pending Resulted


 


 10/14/18 14:00


Rectum VRE Culture - Final


NO VANCOMYCIN RESISTANT ENTEROCOCCUS ... Complete

















Intake and Output  


 


 10/15/18 10/16/18





 19:00 07:00


 


Intake Total  450 ml


 


Output Total  10 ml


 


Balance  440 ml


 


  


 


IV Total  450 ml


 


Output Urine Total  0 ml


 


Estimated Blood Loss  10 ml


 


# Voids 3 











Assessment/Plan


Problem List:  


(1) Hypercholesterolemia


(2) Aortic stenosis


(3) Pulmonary hypertension


(4) Alzheimer's dementia


(5) Recurrent dislocation, right hip


Assessment & Plan:  S/P open reduction right hip hemiarthroplasty dislocation 

on 10/15/18-see ortho note





(6) Fracture of superior pubic ramus


(7) CAD (coronary artery disease)


(8) Diabetes mellitus, type II


(9) HTN (hypertension)


Assessment & Plan:  Continue cozaar





(10) Sick sinus syndrome











Cheo Ortiz MD Oct 16, 2018 16:22

## 2018-10-16 NOTE — PULMONOLOGY PROGRESS NOTE
Assessment/Plan


Problems:  


(1) Hip dislocation, right


(2) Delirium


(3) CAD (coronary artery disease)


(4) HTN (hypertension)


(5) Pacemaker


(6) Diabetes mellitus, type II


Assessment/Plan


symptomatic treatment


pain management


ortho f/u


sliding scale


one episode of fever.


Open reduction was done yesterday.





Subjective


ROS Limited/Unobtainable:  No


Constitutional:  Reports: no symptoms


HEENT:  Repors: no symptoms


Respiratory:  Reports: no symptoms


Allergies:  


Coded Allergies:  


     No Known Allergies (Unverified , 3/26/14)





Objective





Last 24 Hour Vital Signs








  Date Time  Temp Pulse Resp B/P (MAP) Pulse Ox O2 Delivery O2 Flow Rate FiO2


 


10/16/18 09:11 98.0       


 


10/16/18 08:52  96  144/82    


 


10/16/18 08:51    144/82    


 


10/16/18 07:10  105  144/88 (106) 97   


 


10/16/18 04:00 98.0 115 22 150/84 (106) 86   





 98.0       


 


10/16/18 04:00  79      


 


10/16/18 00:00 98.1 118 22 174/94 (120) 88   





 98.1       


 


10/16/18 00:00  131      


 


10/15/18 21:42  75 14 158/76 100 Nasal Cannula 3 


 


10/15/18 21:35 97.7 76 14 165/77 100 Nasal Cannula 3 





 97.7       


 


10/15/18 21:30  75 15 155/78 100 Nasal Cannula 3 


 


10/15/18 21:20  76 14 162/73 100 Nasal Cannula 3 


 


10/15/18 21:15 210.0 73 14  98   


 


10/15/18 21:10  73 14 150/94 100 Simple Mask 6 


 


10/15/18 21:05  73 17 163/81 100 Simple Mask 6 


 


10/15/18 21:00      Room Air  


 


10/15/18 21:00 98.9 73 14 161/92 100 Simple Mask 6 





 98.9       


 


10/15/18 20:00  93      


 


10/15/18 16:00 97.7 89 18 131/58 (82) 98   





 97.7       


 


10/15/18 16:00  86      


 


10/15/18 13:03  97      


 


10/15/18 12:00 97.7 83 19 141/82 (101) 92   





 97.7       


 


10/15/18 11:47  83  141/82    

















Intake and Output  


 


 10/15/18 10/16/18





 19:00 07:00


 


Intake Total  450 ml


 


Output Total  10 ml


 


Balance  440 ml


 


  


 


IV Total  450 ml


 


Output Urine Total  0 ml


 


Estimated Blood Loss  10 ml


 


# Voids 3 








General Appearance:  cachetic


HEENT:  normocephalic, atraumatic


Respiratory/Chest:  chest wall non-tender, lungs clear


Cardiovascular:  normal peripheral pulses, normal rate


Abdomen:  normal bowel sounds, soft, non tender


Skin:  no rash


Neurologic/Psychiatric:  CNs II-XII grossly normal





Microbiology








 Date/Time


Source Procedure


Growth Status


 


 


 10/14/18 14:00


Nasal Nares MRSA Culture - Final


Staphylococcus Aureus - Mrsa Complete





 10/15/18 20:27


Hip Right Gram Stain - Final Resulted





 10/15/18 20:27


Hip Right Aerobic Culture - Preliminary


NO GROWTH Resulted


 


 10/15/18 20:27


Hip Right Anaerobic Culture


Pending Resulted


 


 10/14/18 14:00


Rectum VRE Culture - Final


NO VANCOMYCIN RESISTANT ENTEROCOCCUS ... Complete











Current Medications








 Medications


  (Trade)  Dose


 Ordered  Sig/Violette


 Route


 PRN Reason  Start Time


 Stop Time Status Last Admin


Dose Admin


 


 Acetaminophen/


 Hydrocodone Bitart


  (Norco 5/325)  1 tab  Q6H  PRN


 ORAL


 For Pain  10/14/18 17:45


 10/21/18 17:44  10/15/18 01:35


 


 


 Aspirin


  (Ecotrin)  81 mg  DAILY


 ORAL


   10/15/18 09:00


 11/14/18 08:59  10/16/18 08:52


 


 


 Dextrose/


 Electrolytes  1,000 ml @ 


 75 mls/hr  [id number]


 IV


   10/15/18 20:00


 11/14/18 19:59  10/16/18 09:02


 


 


 Famotidine


  (Pepcid)  20 mg  DAILY


 ORAL


   10/15/18 09:00


 11/14/18 08:59  10/16/18 08:52


 


 


 Furosemide


  (Lasix)  40 mg  DAILY


 ORAL


   10/15/18 09:00


 11/14/18 08:59  10/16/18 08:52


 


 


 Heparin Sodium


  (Porcine)


  (Heparin 5000


 units/ml)  5,000 units  EVERY 12  HOURS


 SUBQ


   10/14/18 21:00


 11/13/18 20:59  10/16/18 08:53


 


 


 Hydromorphone HCl


  (Dilaudid)  0.5 mg  Q4H  PRN


 IVP


 For SEVERE Pain 7-10/10  10/16/18 06:00


 10/23/18 01:59  10/16/18 09:11


 


 


 Imipramine HCl


  (Tofranil)  25 mg  DAILY


 ORAL


   10/15/18 09:00


 11/14/18 08:59  10/16/18 08:51


 


 


 Lorazepam


  (Ativan 2mg/ml


 1ml)  2 mg  Q6H  PRN


 IV


 For Anxiety  10/15/18 17:45


 10/22/18 17:44  10/16/18 08:53


 


 


 Losartan Potassium


  (Cozaar)  25 mg  DAILY


 ORAL


   10/15/18 09:00


 11/14/18 08:59  10/16/18 08:51


 


 


 Metoprolol


 Succinate


  (Toprol XL)  50 mg  DAILY


 ORAL


   10/15/18 11:47


 11/14/18 11:46  10/16/18 08:52


 


 


 Potassium Chloride


  (K-Dur)  10 meq  DAILY


 ORAL


   10/15/18 09:00


 11/14/18 08:59  10/16/18 08:52


 


 


 Risperidone


  (RisperDAL)  0.5 mg  BEDTIME


 ORAL


   10/16/18 21:00


 11/15/18 20:59   


 


 


 Trazodone HCl


  (Desyrel)  50 mg  BEDTIME


 ORAL


   10/14/18 21:00


 11/13/18 20:59  10/14/18 20:26


 

















Korin Junior MD Oct 16, 2018 11:30

## 2018-10-16 NOTE — DIAGNOSTIC IMAGING REPORT
Indication: POST-OP, hip pain, status post prosthesis dislocation

 

Technique: One view of the pelvis

 

Comparison: 10/14/2018

 

Findings: Interim reduction of previously demonstrated dislocated right hip

hemiarthroplasty prosthesis, prosthesis in good position. A small amount of gas is

seen in the soft tissues, presumably retained air from the surgical exposure. Old

fracture deformity of the left pubic bones is noted.

 

Impression: Interim reduction of previously demonstrated dislocated right hip

hemiarthroplasty prosthesis. No unusual features

## 2018-10-17 VITALS — DIASTOLIC BLOOD PRESSURE: 59 MMHG | SYSTOLIC BLOOD PRESSURE: 123 MMHG

## 2018-10-17 VITALS — DIASTOLIC BLOOD PRESSURE: 90 MMHG | SYSTOLIC BLOOD PRESSURE: 142 MMHG

## 2018-10-17 VITALS — DIASTOLIC BLOOD PRESSURE: 96 MMHG | SYSTOLIC BLOOD PRESSURE: 151 MMHG

## 2018-10-17 VITALS — DIASTOLIC BLOOD PRESSURE: 68 MMHG | SYSTOLIC BLOOD PRESSURE: 129 MMHG

## 2018-10-17 VITALS — SYSTOLIC BLOOD PRESSURE: 125 MMHG | DIASTOLIC BLOOD PRESSURE: 66 MMHG

## 2018-10-17 VITALS — DIASTOLIC BLOOD PRESSURE: 72 MMHG | SYSTOLIC BLOOD PRESSURE: 120 MMHG

## 2018-10-17 VITALS — SYSTOLIC BLOOD PRESSURE: 108 MMHG | DIASTOLIC BLOOD PRESSURE: 71 MMHG

## 2018-10-17 LAB
ADD MANUAL DIFF: NO
ALBUMIN SERPL-MCNC: 2.2 G/DL (ref 3.4–5)
ALBUMIN/GLOB SERPL: 0.7 {RATIO} (ref 1–2.7)
ALP SERPL-CCNC: 57 U/L (ref 46–116)
ALT SERPL-CCNC: 16 U/L (ref 12–78)
ANION GAP SERPL CALC-SCNC: 10 MMOL/L (ref 5–15)
AST SERPL-CCNC: 44 U/L (ref 15–37)
BASOPHILS NFR BLD AUTO: 0.5 % (ref 0–2)
BILIRUB SERPL-MCNC: 0.5 MG/DL (ref 0.2–1)
BUN SERPL-MCNC: 10 MG/DL (ref 7–18)
CALCIUM SERPL-MCNC: 8.5 MG/DL (ref 8.5–10.1)
CHLORIDE SERPL-SCNC: 106 MMOL/L (ref 98–107)
CO2 SERPL-SCNC: 23 MMOL/L (ref 21–32)
CREAT SERPL-MCNC: 0.7 MG/DL (ref 0.55–1.3)
EOSINOPHIL NFR BLD AUTO: 0.7 % (ref 0–3)
ERYTHROCYTE [DISTWIDTH] IN BLOOD BY AUTOMATED COUNT: 17.8 % (ref 11.6–14.8)
GLOBULIN SER-MCNC: 3 G/DL
HCT VFR BLD CALC: 29.3 % (ref 37–47)
HGB BLD-MCNC: 9.1 G/DL (ref 12–16)
LYMPHOCYTES NFR BLD AUTO: 15.4 % (ref 20–45)
MCV RBC AUTO: 93 FL (ref 80–99)
MONOCYTES NFR BLD AUTO: 6 % (ref 1–10)
NEUTROPHILS NFR BLD AUTO: 77.4 % (ref 45–75)
PHOSPHATE SERPL-MCNC: 3.1 MG/DL (ref 2.5–4.9)
PLATELET # BLD: 221 K/UL (ref 150–450)
POTASSIUM SERPL-SCNC: 4.3 MMOL/L (ref 3.5–5.1)
RBC # BLD AUTO: 3.14 M/UL (ref 4.2–5.4)
SODIUM SERPL-SCNC: 139 MMOL/L (ref 136–145)
WBC # BLD AUTO: 5.8 K/UL (ref 4.8–10.8)

## 2018-10-17 RX ADMIN — DEXTROSE, SODIUM CHLORIDE, AND POTASSIUM CHLORIDE SCH MLS/HR: 5; .45; .15 INJECTION INTRAVENOUS at 12:10

## 2018-10-17 RX ADMIN — FUROSEMIDE SCH MG: 40 TABLET ORAL at 09:03

## 2018-10-17 RX ADMIN — IMIPRAMINE HYDROCHLORIDE SCH MG: 10 TABLET, FILM COATED ORAL at 09:03

## 2018-10-17 RX ADMIN — SODIUM CHLORIDE PRN MG: 9 INJECTION, SOLUTION INTRAVENOUS at 00:12

## 2018-10-17 RX ADMIN — METOPROLOL SUCCINATE SCH MG: 50 TABLET, EXTENDED RELEASE ORAL at 09:03

## 2018-10-17 RX ADMIN — LORAZEPAM PRN MG: 2 INJECTION, SOLUTION INTRAMUSCULAR; INTRAVENOUS at 08:33

## 2018-10-17 RX ADMIN — DEXTROSE, SODIUM CHLORIDE, AND POTASSIUM CHLORIDE SCH MLS/HR: 5; .45; .15 INJECTION INTRAVENOUS at 17:45

## 2018-10-17 RX ADMIN — HEPARIN SODIUM SCH UNITS: 5000 INJECTION INTRAVENOUS; SUBCUTANEOUS at 09:06

## 2018-10-17 RX ADMIN — ASPIRIN SCH MG: 81 TABLET, DELAYED RELEASE ORAL at 09:03

## 2018-10-17 RX ADMIN — HEPARIN SODIUM SCH UNITS: 5000 INJECTION INTRAVENOUS; SUBCUTANEOUS at 20:09

## 2018-10-17 RX ADMIN — LOSARTAN POTASSIUM SCH MG: 25 TABLET, FILM COATED ORAL at 09:03

## 2018-10-17 NOTE — GENERAL PROGRESS NOTE
Assessment/Plan


Problem List:  


(1) Delirium


Assessment & Plan:  Dementia with behavioral dist


Encephalopathy


Psychotic d/o





Risperdal .5mg tid 


ativan prn


trazodone 50mg qhs


ICD Codes:  R41.0 - Disorientation, unspecified


SNOMED:  3903766


(2) Psychosis


ICD Codes:  F29 - Unspecified psychosis not due to a substance or known 

physiological condition


SNOMED:  99874074


(3) Hip dislocation, right


ICD Codes:  S73.004A - Unspecified dislocation of right hip, initial encounter


SNOMED:  938062233


Status:  stable - risperdal .5 bid





Subjective


Date patient seen:  Oct 17, 2018


Neurologic/Psychiatric:  Reports: anxiety, depressed


Allergies:  


Coded Allergies:  


     No Known Allergies (Unverified , 3/26/14)


Subjective


the pt cont to be confused and was agitated this am. the pt was given ativan





Objective





Last 24 Hour Vital Signs








  Date Time  Temp Pulse Resp B/P (MAP) Pulse Ox O2 Delivery O2 Flow Rate FiO2


 


10/17/18 09:03  87  120/72    


 


10/17/18 09:03    120/72    


 


10/17/18 09:00      Room Air  


 


10/17/18 08:00  101      


 


10/17/18 08:00 97.7 87 18 120/72 (88) 92   





 97.7       


 


10/17/18 04:00  87      


 


10/17/18 04:00 97.0 79 20 129/68 (88) 93   





 97.0       


 


10/17/18 00:00  94      


 


10/17/18 00:00 97.0 84 23 108/71 (83) 97   





 97.0       


 


10/16/18 21:00      Room Air  


 


10/16/18 20:00 96.8 88 19 132/69 (90) 97   





 96.8       


 


10/16/18 20:00  86      


 


10/16/18 16:00 97.1 76 18 117/55 (75) 97   





 97.1       


 


10/16/18 16:00  76      


 


10/16/18 12:00 97.2 83 18 113/61 (78) 99   





 97.2       


 


10/16/18 12:00  78      

















Intake and Output  


 


 10/16/18 10/17/18





 19:00 07:00


 


Intake Total 150 ml 1075 ml


 


Output Total 1000 ml 310 ml


 


Balance -850 ml 765 ml


 


  


 


Intake Oral 150 ml 150 ml


 


IV Total  925 ml


 


Output Urine Total 1000 ml 300 ml


 


Estimated Blood Loss  10 ml


 


# Voids  3








Laboratory Tests


10/17/18 06:13: 


White Blood Count 5.8, Red Blood Count 3.14L, Hemoglobin 9.1L, Hematocrit 29.3L

, Mean Corpuscular Volume 93, Mean Corpuscular Hemoglobin 29.0, Mean 

Corpuscular Hemoglobin Concent 31.1L, Red Cell Distribution Width 17.8H, 

Platelet Count 221, Mean Platelet Volume 7.4, Neutrophils (%) (Auto) 77.4H, 

Lymphocytes (%) (Auto) 15.4L, Monocytes (%) (Auto) 6.0, Eosinophils (%) (Auto) 

0.7, Basophils (%) (Auto) 0.5, Sodium Level 139, Potassium Level 4.3, Chloride 

Level 106, Carbon Dioxide Level 23, Anion Gap 10, Blood Urea Nitrogen 10, 

Creatinine 0.7, Estimat Glomerular Filtration Rate , Glucose Level 105, Calcium 

Level 8.5, Phosphorus Level 3.1, Magnesium Level 1.4L, Total Bilirubin 0.5, 

Aspartate Amino Transf (AST/SGOT) 44H, Alanine Aminotransferase (ALT/SGPT) 16, 

Alkaline Phosphatase 57, Total Protein 5.2L, Albumin 2.2L, Globulin 3.0, Albumin

/Globulin Ratio 0.7L


Height (Feet):  5


Height (Inches):  3.00


Weight (Pounds):  136


General Appearance:  no apparent distress, alert, confused, agitated











Josh Nunez MD Oct 17, 2018 11:02

## 2018-10-17 NOTE — INTERNAL MED PROGRESS NOTE
Subjective


Date of Service:  Oct 17, 2018


Physician Name


Cheo Ortiz


Attending Physician


Juan Carlos Magana MD





Current Medications








 Medications


  (Trade)  Dose


 Ordered  Sig/Violette


 Route


 PRN Reason  Start Time


 Stop Time Status Last Admin


Dose Admin


 


 Acetaminophen/


 Hydrocodone Bitart


  (Norco 5/325)  1 tab  Q6H  PRN


 ORAL


 For Pain  10/14/18 17:45


 10/21/18 17:44  10/15/18 01:35


 


 


 Aspirin


  (Ecotrin)  81 mg  DAILY


 ORAL


   10/15/18 09:00


 11/14/18 08:59  10/17/18 09:03


 


 


 Dextrose/


 Electrolytes  1,000 ml @ 


 75 mls/hr  [id number]


 IV


   10/15/18 20:00


 11/14/18 19:59  10/16/18 21:17


 


 


 Famotidine


  (Pepcid)  20 mg  DAILY


 ORAL


   10/15/18 09:00


 11/14/18 08:59  10/17/18 09:03


 


 


 Furosemide


  (Lasix)  40 mg  DAILY


 ORAL


   10/15/18 09:00


 11/14/18 08:59  10/17/18 09:03


 


 


 Heparin Sodium


  (Porcine)


  (Heparin 5000


 units/ml)  5,000 units  EVERY 12  HOURS


 SUBQ


   10/14/18 21:00


 11/13/18 20:59  10/17/18 09:06


 


 


 Hydromorphone HCl


  (Dilaudid)  0.5 mg  Q4H  PRN


 IVP


 For SEVERE Pain 7-10/10  10/16/18 06:00


 10/23/18 01:59  10/17/18 00:12


 


 


 Imipramine HCl


  (Tofranil)  25 mg  DAILY


 ORAL


   10/15/18 09:00


 11/14/18 08:59  10/17/18 09:03


 


 


 Lorazepam


  (Ativan 2mg/ml


 1ml)  2 mg  Q6H  PRN


 IV


 For Anxiety  10/15/18 17:45


 10/22/18 17:44  10/17/18 08:33


 


 


 Losartan Potassium


  (Cozaar)  25 mg  DAILY


 ORAL


   10/15/18 09:00


 11/14/18 08:59  10/17/18 09:03


 


 


 Metoprolol


 Succinate


  (Toprol XL)  50 mg  DAILY


 ORAL


   10/15/18 11:47


 11/14/18 11:46  10/17/18 09:03


 


 


 Potassium Chloride


  (K-Dur)  10 meq  DAILY


 ORAL


   10/15/18 09:00


 11/14/18 08:59  10/17/18 09:03


 


 


 Risperidone


  (RisperDAL)  0.5 mg  BID


 ORAL


   10/17/18 18:00


 11/16/18 17:59   


 


 


 Trazodone HCl


  (Desyrel)  50 mg  BEDTIME


 ORAL


   10/14/18 21:00


 11/13/18 20:59  10/16/18 21:10


 








Allergies:  


Coded Allergies:  


     No Known Allergies (Unverified , 3/26/14)


ROS Limited/Unobtainable:  No


Constitutional:  Reports: no symptoms


HEENT:  Reports: no symptoms


Cardiovascular:  Reports: no symptoms


Respiratory:  Reports: no symptoms


Gastrointestinal/Abdominal:  Reports: no symptoms


Genitourinary:  Reports: no symptoms


Neurologic/Psychiatric:  Reports: no symptoms


Subjective


79 YO F admitted with dislocation of right hip hemiarthroplasty.  S/P open 

reduction right hip hemiarthroplasty dislocation on 10/15/18.  Cover for Int Yovanny

-Dr Magana.  Less agitated today





Objective





Last Vital Signs








  Date Time  Temp Pulse Resp B/P (MAP) Pulse Ox O2 Delivery O2 Flow Rate FiO2


 


10/17/18 09:03  87  120/72    


 


10/17/18 09:00      Room Air  


 


10/17/18 08:00 97.7  18  92   





 97.7       


 


10/15/18 21:42       3 











Laboratory Tests








Test


  10/17/18


06:13


 


White Blood Count


  5.8 K/UL


(4.8-10.8)


 


Red Blood Count


  3.14 M/UL


(4.20-5.40)  L


 


Hemoglobin


  9.1 G/DL


(12.0-16.0)  L


 


Hematocrit


  29.3 %


(37.0-47.0)  L


 


Mean Corpuscular Volume 93 FL (80-99)  


 


Mean Corpuscular Hemoglobin


  29.0 PG


(27.0-31.0)


 


Mean Corpuscular Hemoglobin


Concent 31.1 G/DL


(32.0-36.0)  L


 


Red Cell Distribution Width


  17.8 %


(11.6-14.8)  H


 


Platelet Count


  221 K/UL


(150-450)


 


Mean Platelet Volume


  7.4 FL


(6.5-10.1)


 


Neutrophils (%) (Auto)


  77.4 %


(45.0-75.0)  H


 


Lymphocytes (%) (Auto)


  15.4 %


(20.0-45.0)  L


 


Monocytes (%) (Auto)


  6.0 %


(1.0-10.0)


 


Eosinophils (%) (Auto)


  0.7 %


(0.0-3.0)


 


Basophils (%) (Auto)


  0.5 %


(0.0-2.0)


 


Sodium Level


  139 MMOL/L


(136-145)


 


Potassium Level


  4.3 MMOL/L


(3.5-5.1)


 


Chloride Level


  106 MMOL/L


()


 


Carbon Dioxide Level


  23 MMOL/L


(21-32)


 


Anion Gap


  10 mmol/L


(5-15)


 


Blood Urea Nitrogen


  10 mg/dL


(7-18)


 


Creatinine


  0.7 MG/DL


(0.55-1.30)


 


Estimat Glomerular Filtration


Rate  mL/min (>60)  


 


 


Glucose Level


  105 MG/DL


()


 


Calcium Level


  8.5 MG/DL


(8.5-10.1)


 


Phosphorus Level


  3.1 MG/DL


(2.5-4.9)


 


Magnesium Level


  1.4 MG/DL


(1.8-2.4)  L


 


Total Bilirubin


  0.5 MG/DL


(0.2-1.0)


 


Aspartate Amino Transf


(AST/SGOT) 44 U/L (15-37)


H


 


Alanine Aminotransferase


(ALT/SGPT) 16 U/L (12-78)


 


 


Alkaline Phosphatase


  57 U/L


()


 


Total Protein


  5.2 G/DL


(6.4-8.2)  L


 


Albumin


  2.2 G/DL


(3.4-5.0)  L


 


Globulin 3.0 g/dL  


 


Albumin/Globulin Ratio


  0.7 (1.0-2.7)


L











Microbiology








 Date/Time


Source Procedure


Growth Status


 


 


 10/14/18 14:00


Nasal Nares MRSA Culture - Final


Staphylococcus Aureus - Mrsa Complete





 10/15/18 20:27


Hip Right Gram Stain - Final Resulted





 10/15/18 20:27


Hip Right Aerobic Culture - Preliminary


NO GROWTH AFTER 24 HOURS Resulted


 


 10/15/18 20:27


Hip Right Anaerobic Culture - Preliminary


NO GROWTH AFTER 48 HOURS Resulted


 


 10/14/18 14:00


Rectum VRE Culture - Final


NO VANCOMYCIN RESISTANT ENTEROCOCCUS ... Complete

















Intake and Output  


 


 10/16/18 10/17/18





 19:00 07:00


 


Intake Total 150 ml 1075 ml


 


Output Total 1000 ml 310 ml


 


Balance -850 ml 765 ml


 


  


 


Intake Oral 150 ml 150 ml


 


IV Total  925 ml


 


Output Urine Total 1000 ml 300 ml


 


Estimated Blood Loss  10 ml


 


# Voids  3








Objective


General Appearance:  WD/WN, no apparent distress, alert


EENT:  PERRL/EOMI, normal ENT inspection


Neck:  non-tender, normal alignment, supple, normal inspection


Cardiovascular:  normal peripheral pulses, normal rate, regular rhythm, no 

gallop/murmur, no JVD


Respiratory/Chest:  chest wall non-tender, lungs clear, normal breath sounds, 

no respiratory distress, no accessory muscle use


Abdomen:  normal bowel sounds, non tender, soft, no organomegaly, no mass


Extremities:  normal range of motion, other - tender right hip


Neurologic:  CNs II-XII grossly normal, no motor/sensory deficits


Skin:  normal pigmentation, warm/dry





Assessment/Plan


Problem List:  


(1) Hypercholesterolemia


(2) Aortic stenosis


(3) Pulmonary hypertension


(4) Alzheimer's dementia


(5) Recurrent dislocation, right hip


Assessment & Plan:  S/P open reduction right hip hemiarthroplasty dislocation 

on 10/15/18-see ortho note





(6) Fracture of superior pubic ramus


(7) CAD (coronary artery disease)


(8) Diabetes mellitus, type II


(9) HTN (hypertension)


Assessment & Plan:  Continue cozaar





(10) Sick sinus syndrome


Assessment/Plan


Discharge planning:  SNF vs Acute Rehab











Cheo Ortiz MD Oct 17, 2018 11:54

## 2018-10-17 NOTE — PULMONOLOGY PROGRESS NOTE
Assessment/Plan


Problems:  


(1) Hip dislocation, right


(2) Delirium


(3) CAD (coronary artery disease)


(4) HTN (hypertension)


(5) Pacemaker


(6) Diabetes mellitus, type II


Assessment/Plan


symptomatic treatment


pain management


ortho f/u


sliding scale


one episode of fever.


Open reduction was done





Subjective


ROS Limited/Unobtainable:  No


Constitutional:  Reports: no symptoms


HEENT:  Repors: no symptoms


Respiratory:  Reports: no symptoms


Allergies:  


Coded Allergies:  


     No Known Allergies (Unverified , 3/26/14)





Objective





Last 24 Hour Vital Signs








  Date Time  Temp Pulse Resp B/P (MAP) Pulse Ox O2 Delivery O2 Flow Rate FiO2


 


10/17/18 09:03  87  120/72    


 


10/17/18 09:03    120/72    


 


10/17/18 09:00      Room Air  


 


10/17/18 08:00  101      


 


10/17/18 08:00 97.7 87 18 120/72 (88) 92   





 97.7       


 


10/17/18 04:00  87      


 


10/17/18 04:00 97.0 79 20 129/68 (88) 93   





 97.0       


 


10/17/18 00:00  94      


 


10/17/18 00:00 97.0 84 23 108/71 (83) 97   





 97.0       


 


10/16/18 21:00      Room Air  


 


10/16/18 20:00 96.8 88 19 132/69 (90) 97   





 96.8       


 


10/16/18 20:00  86      


 


10/16/18 16:00 97.1 76 18 117/55 (75) 97   





 97.1       


 


10/16/18 16:00  76      


 


10/16/18 12:00 97.2 83 18 113/61 (78) 99   





 97.2       


 


10/16/18 12:00  78      

















Intake and Output  


 


 10/16/18 10/17/18





 19:00 07:00


 


Intake Total 150 ml 1075 ml


 


Output Total 1000 ml 310 ml


 


Balance -850 ml 765 ml


 


  


 


Intake Oral 150 ml 150 ml


 


IV Total  925 ml


 


Output Urine Total 1000 ml 300 ml


 


Estimated Blood Loss  10 ml


 


# Voids  3








General Appearance:  cachetic


HEENT:  normocephalic, atraumatic


Respiratory/Chest:  chest wall non-tender, lungs clear


Breasts:  no masses


Cardiovascular:  normal peripheral pulses


Abdomen:  normal bowel sounds


Genitourinary:  normal external genitalia


Skin:  no rash





Microbiology








 Date/Time


Source Procedure


Growth Status


 


 


 10/14/18 14:00


Nasal Nares MRSA Culture - Final


Staphylococcus Aureus - Mrsa Complete





 10/15/18 20:27


Hip Right Gram Stain - Final Resulted





 10/15/18 20:27


Hip Right Aerobic Culture - Preliminary


NO GROWTH AFTER 24 HOURS Resulted


 


 10/15/18 20:27


Hip Right Anaerobic Culture - Preliminary


NO GROWTH AFTER 48 HOURS Resulted


 


 10/14/18 14:00


Rectum VRE Culture - Final


NO VANCOMYCIN RESISTANT ENTEROCOCCUS ... Complete








Laboratory Tests


10/17/18 06:13: 


White Blood Count 5.8, Red Blood Count 3.14L, Hemoglobin 9.1L, Hematocrit 29.3L

, Mean Corpuscular Volume 93, Mean Corpuscular Hemoglobin 29.0, Mean 

Corpuscular Hemoglobin Concent 31.1L, Red Cell Distribution Width 17.8H, 

Platelet Count 221, Mean Platelet Volume 7.4, Neutrophils (%) (Auto) 77.4H, 

Lymphocytes (%) (Auto) 15.4L, Monocytes (%) (Auto) 6.0, Eosinophils (%) (Auto) 

0.7, Basophils (%) (Auto) 0.5, Sodium Level 139, Potassium Level 4.3, Chloride 

Level 106, Carbon Dioxide Level 23, Anion Gap 10, Blood Urea Nitrogen 10, 

Creatinine 0.7, Estimat Glomerular Filtration Rate , Glucose Level 105, Calcium 

Level 8.5, Phosphorus Level 3.1, Magnesium Level 1.4L, Total Bilirubin 0.5, 

Aspartate Amino Transf (AST/SGOT) 44H, Alanine Aminotransferase (ALT/SGPT) 16, 

Alkaline Phosphatase 57, Total Protein 5.2L, Albumin 2.2L, Globulin 3.0, Albumin

/Globulin Ratio 0.7L





Current Medications








 Medications


  (Trade)  Dose


 Ordered  Sig/Violette


 Route


 PRN Reason  Start Time


 Stop Time Status Last Admin


Dose Admin


 


 Acetaminophen/


 Hydrocodone Bitart


  (Norco 5/325)  1 tab  Q6H  PRN


 ORAL


 For Pain  10/14/18 17:45


 10/21/18 17:44  10/15/18 01:35


 


 


 Aspirin


  (Ecotrin)  81 mg  DAILY


 ORAL


   10/15/18 09:00


 11/14/18 08:59  10/17/18 09:03


 


 


 Dextrose/


 Electrolytes  1,000 ml @ 


 75 mls/hr  [id number]


 IV


   10/15/18 20:00


 11/14/18 19:59  10/16/18 21:17


 


 


 Famotidine


  (Pepcid)  20 mg  DAILY


 ORAL


   10/15/18 09:00


 11/14/18 08:59  10/17/18 09:03


 


 


 Furosemide


  (Lasix)  40 mg  DAILY


 ORAL


   10/15/18 09:00


 11/14/18 08:59  10/17/18 09:03


 


 


 Heparin Sodium


  (Porcine)


  (Heparin 5000


 units/ml)  5,000 units  EVERY 12  HOURS


 SUBQ


   10/14/18 21:00


 11/13/18 20:59  10/17/18 09:06


 


 


 Hydromorphone HCl


  (Dilaudid)  0.5 mg  Q4H  PRN


 IVP


 For SEVERE Pain 7-10/10  10/16/18 06:00


 10/23/18 01:59  10/17/18 00:12


 


 


 Imipramine HCl


  (Tofranil)  25 mg  DAILY


 ORAL


   10/15/18 09:00


 11/14/18 08:59  10/17/18 09:03


 


 


 Lorazepam


  (Ativan 2mg/ml


 1ml)  2 mg  Q6H  PRN


 IV


 For Anxiety  10/15/18 17:45


 10/22/18 17:44  10/17/18 08:33


 


 


 Losartan Potassium


  (Cozaar)  25 mg  DAILY


 ORAL


   10/15/18 09:00


 11/14/18 08:59  10/17/18 09:03


 


 


 Metoprolol


 Succinate


  (Toprol XL)  50 mg  DAILY


 ORAL


   10/15/18 11:47


 11/14/18 11:46  10/17/18 09:03


 


 


 Potassium Chloride


  (K-Dur)  10 meq  DAILY


 ORAL


   10/15/18 09:00


 11/14/18 08:59  10/17/18 09:03


 


 


 Risperidone


  (RisperDAL)  0.5 mg  BID


 ORAL


   10/17/18 18:00


 11/16/18 17:59   


 


 


 Trazodone HCl


  (Desyrel)  50 mg  BEDTIME


 ORAL


   10/14/18 21:00


 11/13/18 20:59  10/16/18 21:10


 

















Korin Junior MD Oct 17, 2018 11:26

## 2018-10-18 VITALS — SYSTOLIC BLOOD PRESSURE: 98 MMHG | DIASTOLIC BLOOD PRESSURE: 44 MMHG

## 2018-10-18 VITALS — SYSTOLIC BLOOD PRESSURE: 129 MMHG | DIASTOLIC BLOOD PRESSURE: 89 MMHG

## 2018-10-18 VITALS — SYSTOLIC BLOOD PRESSURE: 121 MMHG | DIASTOLIC BLOOD PRESSURE: 56 MMHG

## 2018-10-18 VITALS — DIASTOLIC BLOOD PRESSURE: 73 MMHG | SYSTOLIC BLOOD PRESSURE: 149 MMHG

## 2018-10-18 VITALS — DIASTOLIC BLOOD PRESSURE: 60 MMHG | SYSTOLIC BLOOD PRESSURE: 122 MMHG

## 2018-10-18 VITALS — DIASTOLIC BLOOD PRESSURE: 84 MMHG | SYSTOLIC BLOOD PRESSURE: 125 MMHG

## 2018-10-18 VITALS — SYSTOLIC BLOOD PRESSURE: 111 MMHG | DIASTOLIC BLOOD PRESSURE: 55 MMHG

## 2018-10-18 LAB
ADD MANUAL DIFF: NO
ANION GAP SERPL CALC-SCNC: 9 MMOL/L (ref 5–15)
BASOPHILS NFR BLD AUTO: 0.6 % (ref 0–2)
BUN SERPL-MCNC: 7 MG/DL (ref 7–18)
CALCIUM SERPL-MCNC: 8.3 MG/DL (ref 8.5–10.1)
CHLORIDE SERPL-SCNC: 105 MMOL/L (ref 98–107)
CO2 SERPL-SCNC: 25 MMOL/L (ref 21–32)
CREAT SERPL-MCNC: 0.6 MG/DL (ref 0.55–1.3)
EOSINOPHIL NFR BLD AUTO: 1.2 % (ref 0–3)
ERYTHROCYTE [DISTWIDTH] IN BLOOD BY AUTOMATED COUNT: 17.4 % (ref 11.6–14.8)
HCT VFR BLD CALC: 30.2 % (ref 37–47)
HGB BLD-MCNC: 9.4 G/DL (ref 12–16)
LYMPHOCYTES NFR BLD AUTO: 16 % (ref 20–45)
MCV RBC AUTO: 94 FL (ref 80–99)
MONOCYTES NFR BLD AUTO: 14 % (ref 1–10)
NEUTROPHILS NFR BLD AUTO: 68.3 % (ref 45–75)
PLATELET # BLD: 181 K/UL (ref 150–450)
POTASSIUM SERPL-SCNC: 3.3 MMOL/L (ref 3.5–5.1)
RBC # BLD AUTO: 3.22 M/UL (ref 4.2–5.4)
SODIUM SERPL-SCNC: 139 MMOL/L (ref 136–145)
WBC # BLD AUTO: 5.4 K/UL (ref 4.8–10.8)

## 2018-10-18 RX ADMIN — FUROSEMIDE SCH MG: 40 TABLET ORAL at 09:05

## 2018-10-18 RX ADMIN — METOPROLOL SUCCINATE SCH MG: 50 TABLET, EXTENDED RELEASE ORAL at 09:06

## 2018-10-18 RX ADMIN — HEPARIN SODIUM SCH UNITS: 5000 INJECTION INTRAVENOUS; SUBCUTANEOUS at 21:01

## 2018-10-18 RX ADMIN — LOSARTAN POTASSIUM SCH MG: 25 TABLET, FILM COATED ORAL at 09:06

## 2018-10-18 RX ADMIN — HEPARIN SODIUM SCH UNITS: 5000 INJECTION INTRAVENOUS; SUBCUTANEOUS at 09:10

## 2018-10-18 RX ADMIN — DEXTROSE, SODIUM CHLORIDE, AND POTASSIUM CHLORIDE SCH MLS/HR: 5; .45; .15 INJECTION INTRAVENOUS at 06:01

## 2018-10-18 RX ADMIN — LORAZEPAM PRN MG: 2 INJECTION, SOLUTION INTRAMUSCULAR; INTRAVENOUS at 11:19

## 2018-10-18 RX ADMIN — ASPIRIN SCH MG: 81 TABLET, DELAYED RELEASE ORAL at 09:07

## 2018-10-18 RX ADMIN — IMIPRAMINE HYDROCHLORIDE SCH MG: 10 TABLET, FILM COATED ORAL at 09:08

## 2018-10-18 NOTE — CARDIOLOGY REPORT
--------------- APPROVED REPORT --------------





EKG Measurement

Heart [id number]

NE 

[id number]

[id number]

[id number]

[id number]





Normal sinus rhythm

Left axis deviation

Septal infarct, age undetermined

Abnormal ECG

## 2018-10-18 NOTE — DIAGNOSTIC IMAGING REPORT
Indication: Cough

 

Comparison:  10/9/2018

 

A single view chest radiograph was obtained.

 

Findings:

 

Heart is borderline enlarged. Bilateral pacemakers are present. Bones are osteopenic.

Aorta is ectatic and mildly calcified.

 

IMPRESSION:

 

No acute disease

## 2018-10-18 NOTE — PULMONOLOGY PROGRESS NOTE
Assessment/Plan


Problems:  


(1) Hip dislocation, right


(2) Delirium


(3) CAD (coronary artery disease)


(4) HTN (hypertension)


(5) Pacemaker


(6) Diabetes mellitus, type II


Assessment/Plan


symptomatic treatment


pain management


ortho f/u


sliding scale


one episode of fever.


Open reduction was done 


f/u by psychiatry


dc home





Subjective


ROS Limited/Unobtainable:  No


Constitutional:  Reports: no symptoms


HEENT:  Repors: no symptoms


Respiratory:  Reports: no symptoms


Allergies:  


Coded Allergies:  


     No Known Allergies (Unverified , 3/26/14)





Objective





Last 24 Hour Vital Signs








  Date Time  Temp Pulse Resp B/P (MAP) Pulse Ox O2 Delivery O2 Flow Rate FiO2


 


10/18/18 12:00 97.5 86 20 98/44 (62) 95   





 97.5       


 


10/18/18 09:06  80  122/60    


 


10/18/18 09:06    122/60    


 


10/18/18 09:00      Room Air  


 


10/18/18 08:00 97.9 80 18 122/60 (80) 99   





 97.9       


 


10/18/18 04:00 98.0 64 20 129/89 (102) 94   





 98.0       


 


10/18/18 00:00 98.5 80 20 149/73 (98) 92   





 98.5       


 


10/17/18 21:00      Room Air  


 


10/17/18 20:00 99.3 112 20 142/90 (107) 92   





 99.3       


 


10/17/18 17:48  51  123/59 (80) 95   





  51      


 


10/17/18 16:00 97.5 90 22 125/66 (85) 98   





 97.5       

















Intake and Output  


 


 10/17/18 10/18/18





 19:00 07:00


 


Intake Total 907.5 ml 


 


Balance 907.5 ml 


 


  


 


Intake Oral 240 ml 


 


IV Total 667.5 ml 


 


# Voids 2 4








General Appearance:  WD/WN


HEENT:  normocephalic, atraumatic


Respiratory/Chest:  chest wall non-tender, lungs clear


Cardiovascular:  normal peripheral pulses, normal rate


Abdomen:  normal bowel sounds, soft, non tender


Genitourinary:  normal external genitalia


Extremities:  no cyanosis


Skin:  no rash


Neurologic/Psychiatric:  CNs II-XII grossly normal





Microbiology








 Date/Time


Source Procedure


Growth Status


 


 


 10/15/18 20:27


Hip Right Gram Stain - Final Resulted





 10/15/18 20:27


Hip Right Aerobic Culture - Preliminary


NO GROWTH AFTER 48 HOURS Resulted


 


 10/15/18 20:27


Hip Right Anaerobic Culture - Preliminary


NO GROWTH AFTER 48 HOURS Resulted








Laboratory Tests


10/18/18 06:40: 


Sodium Level 139, Potassium Level 3.3L, Chloride Level 105, Carbon Dioxide 

Level 25, Anion Gap 9, Blood Urea Nitrogen 7, Creatinine 0.6, Estimat 

Glomerular Filtration Rate , Glucose Level 95, Calcium Level 8.3L


10/18/18 08:40: 


White Blood Count 5.4, Red Blood Count 3.22L, Hemoglobin 9.4L, Hematocrit 30.2L

, Mean Corpuscular Volume 94, Mean Corpuscular Hemoglobin 29.2, Mean 

Corpuscular Hemoglobin Concent 31.1L, Red Cell Distribution Width 17.4H, 

Platelet Count 181, Mean Platelet Volume 7.1, Neutrophils (%) (Auto) 68.3, 

Lymphocytes (%) (Auto) 16.0L, Monocytes (%) (Auto) 14.0H, Eosinophils (%) (Auto

) 1.2, Basophils (%) (Auto) 0.6


10/18/18 08:54: 


Arterial Blood pH 7.443, Arterial Blood Partial Pressure CO2 38.4, Arterial 

Blood Partial Pressure O2 182.9H, Arterial Blood HCO3 25.7, Arterial Blood 

Oxygen Saturation 98.8, Arterial Blood Base Excess 1.5, Frank Test Positive





Current Medications








 Medications


  (Trade)  Dose


 Ordered  Sig/Violette


 Route


 PRN Reason  Start Time


 Stop Time Status Last Admin


Dose Admin


 


 Acetaminophen/


 Hydrocodone Bitart


  (Norco 5/325)  1 tab  Q6H  PRN


 ORAL


 For Pain  10/17/18 17:45


 10/21/18 17:44   


 


 


 Aspirin


  (Ecotrin)  81 mg  DAILY


 ORAL


   10/18/18 09:00


 11/14/18 08:59  10/18/18 09:07


 


 


 Famotidine


  (Pepcid)  20 mg  DAILY


 ORAL


   10/18/18 09:00


 11/14/18 08:59  10/18/18 09:06


 


 


 Furosemide


  (Lasix)  40 mg  DAILY


 ORAL


   10/18/18 09:00


 11/14/18 08:59  10/18/18 09:05


 


 


 Heparin Sodium


  (Porcine)


  (Heparin 5000


 units/ml)  5,000 units  EVERY 12  HOURS


 SUBQ


   10/17/18 21:00


 11/13/18 20:59  10/18/18 09:10


 


 


 Imipramine HCl


  (Tofranil)  25 mg  DAILY


 ORAL


   10/18/18 09:00


 11/14/18 08:59  10/18/18 09:08


 


 


 Lorazepam


  (Ativan 2mg/ml


 1ml)  1 mg  ONCE


 IV


   10/18/18 12:30


 10/18/18 14:00  10/18/18 12:51


 


 


 Lorazepam


  (Ativan 2mg/ml


 1ml)  2 mg  Q6H  PRN


 IV


 For Anxiety  10/17/18 17:45


 10/22/18 17:44  10/18/18 11:19


 


 


 Losartan Potassium


  (Cozaar)  25 mg  DAILY


 ORAL


   10/18/18 09:00


 11/14/18 08:59  10/18/18 09:06


 


 


 Metoprolol


 Succinate


  (Toprol XL)  50 mg  DAILY


 ORAL


   10/18/18 09:00


 11/14/18 11:46  10/18/18 09:06


 


 


 Mirtazapine


  (Remeron)  7.5 mg  BEDTIME


 ORAL


   10/18/18 21:00


 11/17/18 20:59   


 


 


 Potassium Chloride


  (K-Dur)  20 meq  DAILY


 ORAL


   10/19/18 09:00


 11/18/18 08:59   


 


 


 Risperidone


  (RisperDAL)  1 mg  DAILY


 ORAL


   10/19/18 09:00


 11/18/18 08:59   


 

















Korin Junior MD Oct 18, 2018 12:52

## 2018-10-18 NOTE — INTERNAL MED PROGRESS NOTE
Subjective


Date of Service:  Oct 18, 2018


Physician Name


Cheo Ortiz


Attending Physician


Juan Carlos Magana MD





Current Medications








 Medications


  (Trade)  Dose


 Ordered  Sig/Violette


 Route


 PRN Reason  Start Time


 Stop Time Status Last Admin


Dose Admin


 


 Acetaminophen/


 Hydrocodone Bitart


  (Norco 5/325)  1 tab  Q6H  PRN


 ORAL


 For Pain  10/17/18 17:45


 10/21/18 17:44   


 


 


 Aspirin


  (Ecotrin)  81 mg  DAILY


 ORAL


   10/18/18 09:00


 11/14/18 08:59  10/18/18 09:07


 


 


 Famotidine


  (Pepcid)  20 mg  DAILY


 ORAL


   10/18/18 09:00


 11/14/18 08:59  10/18/18 09:06


 


 


 Furosemide


  (Lasix)  40 mg  DAILY


 ORAL


   10/18/18 09:00


 11/14/18 08:59  10/18/18 09:05


 


 


 Heparin Sodium


  (Porcine)


  (Heparin 5000


 units/ml)  5,000 units  EVERY 12  HOURS


 SUBQ


   10/17/18 21:00


 11/13/18 20:59  10/18/18 09:10


 


 


 Imipramine HCl


  (Tofranil)  25 mg  DAILY


 ORAL


   10/18/18 09:00


 11/14/18 08:59  10/18/18 09:08


 


 


 Lorazepam


  (Ativan 2mg/ml


 1ml)  2 mg  Q6H  PRN


 IV


 For Anxiety  10/17/18 17:45


 10/22/18 17:44  10/18/18 11:19


 


 


 Losartan Potassium


  (Cozaar)  25 mg  DAILY


 ORAL


   10/18/18 09:00


 11/14/18 08:59  10/18/18 09:06


 


 


 Metoprolol


 Succinate


  (Toprol XL)  50 mg  DAILY


 ORAL


   10/18/18 09:00


 11/14/18 11:46  10/18/18 09:06


 


 


 Mirtazapine


  (Remeron)  7.5 mg  BEDTIME


 ORAL


   10/18/18 21:00


 11/17/18 20:59   


 


 


 Potassium Chloride


  (K-Dur)  20 meq  DAILY


 ORAL


   10/19/18 09:00


 11/18/18 08:59   


 


 


 Risperidone


  (RisperDAL)  1 mg  DAILY


 ORAL


   10/19/18 09:00


 11/18/18 08:59   


 








Allergies:  


Coded Allergies:  


     No Known Allergies (Unverified , 3/26/14)


ROS Limited/Unobtainable:  No


Constitutional:  Reports: no symptoms


HEENT:  Reports: no symptoms


Cardiovascular:  Reports: no symptoms


Respiratory:  Reports: no symptoms


Gastrointestinal/Abdominal:  Reports: no symptoms


Genitourinary:  Reports: no symptoms


Neurologic/Psychiatric:  Reports: no symptoms


Subjective


79 YO F admitted with dislocation of right hip hemiarthroplasty.  S/P open 

reduction right hip hemiarthroplasty dislocation on 10/15/18.  Cover for Int Yovanny

-Dr Magana.  Less agitated today.   at bedside





Objective





Last Vital Signs








  Date Time  Temp Pulse Resp B/P (MAP) Pulse Ox O2 Delivery O2 Flow Rate FiO2


 


10/18/18 16:00 98.5 85 16 111/55 (73) 96   





 98.5       


 


10/18/18 09:00      Room Air  


 


10/15/18 21:42       3 











Laboratory Tests








Test


  10/18/18


06:40 10/18/18


08:40 10/18/18


08:54


 


Sodium Level


  139 MMOL/L


(136-145) 


  


 


 


Potassium Level


  3.3 MMOL/L


(3.5-5.1)  L 


  


 


 


Chloride Level


  105 MMOL/L


() 


  


 


 


Carbon Dioxide Level


  25 MMOL/L


(21-32) 


  


 


 


Anion Gap


  9 mmol/L


(5-15) 


  


 


 


Blood Urea Nitrogen


  7 mg/dL (7-18)


  


  


 


 


Creatinine


  0.6 MG/DL


(0.55-1.30) 


  


 


 


Estimat Glomerular Filtration


Rate  mL/min (>60)  


  


  


 


 


Glucose Level


  95 MG/DL


() 


  


 


 


Calcium Level


  8.3 MG/DL


(8.5-10.1)  L 


  


 


 


White Blood Count


  


  5.4 K/UL


(4.8-10.8) 


 


 


Red Blood Count


  


  3.22 M/UL


(4.20-5.40)  L 


 


 


Hemoglobin


  


  9.4 G/DL


(12.0-16.0)  L 


 


 


Hematocrit


  


  30.2 %


(37.0-47.0)  L 


 


 


Mean Corpuscular Volume  94 FL (80-99)   


 


Mean Corpuscular Hemoglobin


  


  29.2 PG


(27.0-31.0) 


 


 


Mean Corpuscular Hemoglobin


Concent 


  31.1 G/DL


(32.0-36.0)  L 


 


 


Red Cell Distribution Width


  


  17.4 %


(11.6-14.8)  H 


 


 


Platelet Count


  


  181 K/UL


(150-450) 


 


 


Mean Platelet Volume


  


  7.1 FL


(6.5-10.1) 


 


 


Neutrophils (%) (Auto)


  


  68.3 %


(45.0-75.0) 


 


 


Lymphocytes (%) (Auto)


  


  16.0 %


(20.0-45.0)  L 


 


 


Monocytes (%) (Auto)


  


  14.0 %


(1.0-10.0)  H 


 


 


Eosinophils (%) (Auto)


  


  1.2 %


(0.0-3.0) 


 


 


Basophils (%) (Auto)


  


  0.6 %


(0.0-2.0) 


 


 


Arterial Blood pH


  


  


  7.443


(7.350-7.450)


 


Arterial Blood Partial


Pressure CO2 


  


  38.4 mmHg


(35.0-45.0)


 


Arterial Blood Partial


Pressure O2 


  


  182.9 mmHg


(75.0-100.0)  H


 


Arterial Blood HCO3


  


  


  25.7 mmol/L


(22.0-26.0)


 


Arterial Blood Oxygen


Saturation 


  


  98.8 %


()


 


Arterial Blood Base Excess   1.5 (-2-2)  


 


Frank Test   Positive  











Microbiology








 Date/Time


Source Procedure


Growth Status


 


 


 10/15/18 20:27


Hip Right Gram Stain - Final Resulted





 10/15/18 20:27


Hip Right Aerobic Culture - Preliminary


NO GROWTH AFTER 48 HOURS Resulted


 


 10/15/18 20:27


Hip Right Anaerobic Culture - Preliminary


NO GROWTH AFTER 48 HOURS Resulted

















Intake and Output  


 


 10/17/18 10/18/18





 19:00 07:00


 


Intake Total 907.5 ml 


 


Balance 907.5 ml 


 


  


 


Intake Oral 240 ml 


 


IV Total 667.5 ml 


 


# Voids 2 4








Objective


General Appearance:  WD/WN, no apparent distress, alert


EENT:  PERRL/EOMI, normal ENT inspection


Neck:  non-tender, normal alignment, supple, normal inspection


Cardiovascular:  normal peripheral pulses, normal rate, regular rhythm, no 

gallop/murmur, no JVD


Respiratory/Chest:  chest wall non-tender, lungs clear, normal breath sounds, 

no respiratory distress, no accessory muscle use


Abdomen:  normal bowel sounds, non tender, soft, no organomegaly, no mass


Extremities:  normal range of motion, other - tender right hip


Neurologic:  CNs II-XII grossly normal, no motor/sensory deficits


Skin:  normal pigmentation, warm/dry





Assessment/Plan


Problem List:  


(1) Hypercholesterolemia


(2) Aortic stenosis


(3) Pulmonary hypertension


(4) Alzheimer's dementia


(5) Recurrent dislocation, right hip


Assessment & Plan:  S/P open reduction right hip hemiarthroplasty dislocation 

on 10/15/18-see ortho note





(6) Fracture of superior pubic ramus


(7) CAD (coronary artery disease)


(8) Diabetes mellitus, type II


(9) HTN (hypertension)


Assessment & Plan:  Continue cozaar





(10) Sick sinus syndrome


Status:  progressing


Assessment/Plan


Discharge planning:  [facility]NF-[facility] vs [facility] MD Oct 18, 2018 18:30

## 2018-10-19 VITALS — SYSTOLIC BLOOD PRESSURE: 125 MMHG | DIASTOLIC BLOOD PRESSURE: 74 MMHG

## 2018-10-19 VITALS — SYSTOLIC BLOOD PRESSURE: 97 MMHG | DIASTOLIC BLOOD PRESSURE: 42 MMHG

## 2018-10-19 VITALS — SYSTOLIC BLOOD PRESSURE: 144 MMHG | DIASTOLIC BLOOD PRESSURE: 66 MMHG

## 2018-10-19 LAB
ADD MANUAL DIFF: NO
ANION GAP SERPL CALC-SCNC: 8 MMOL/L (ref 5–15)
BASOPHILS NFR BLD AUTO: 0.5 % (ref 0–2)
BUN SERPL-MCNC: 10 MG/DL (ref 7–18)
CALCIUM SERPL-MCNC: 8.7 MG/DL (ref 8.5–10.1)
CHLORIDE SERPL-SCNC: 106 MMOL/L (ref 98–107)
CO2 SERPL-SCNC: 27 MMOL/L (ref 21–32)
CREAT SERPL-MCNC: 0.7 MG/DL (ref 0.55–1.3)
EOSINOPHIL NFR BLD AUTO: 2.7 % (ref 0–3)
ERYTHROCYTE [DISTWIDTH] IN BLOOD BY AUTOMATED COUNT: 16.7 % (ref 11.6–14.8)
HCT VFR BLD CALC: 26.3 % (ref 37–47)
HGB BLD-MCNC: 8.5 G/DL (ref 12–16)
LYMPHOCYTES NFR BLD AUTO: 27.9 % (ref 20–45)
MCV RBC AUTO: 91 FL (ref 80–99)
MONOCYTES NFR BLD AUTO: 10.1 % (ref 1–10)
NEUTROPHILS NFR BLD AUTO: 58.8 % (ref 45–75)
PLATELET # BLD: 308 K/UL (ref 150–450)
POTASSIUM SERPL-SCNC: 3.6 MMOL/L (ref 3.5–5.1)
RBC # BLD AUTO: 2.9 M/UL (ref 4.2–5.4)
SODIUM SERPL-SCNC: 141 MMOL/L (ref 136–145)
WBC # BLD AUTO: 4.3 K/UL (ref 4.8–10.8)

## 2018-10-19 RX ADMIN — FUROSEMIDE SCH MG: 40 TABLET ORAL at 09:17

## 2018-10-19 RX ADMIN — METOPROLOL SUCCINATE SCH MG: 50 TABLET, EXTENDED RELEASE ORAL at 09:16

## 2018-10-19 RX ADMIN — LOSARTAN POTASSIUM SCH MG: 25 TABLET, FILM COATED ORAL at 09:16

## 2018-10-19 RX ADMIN — LORAZEPAM PRN MG: 2 INJECTION, SOLUTION INTRAMUSCULAR; INTRAVENOUS at 00:13

## 2018-10-19 RX ADMIN — ASPIRIN SCH MG: 81 TABLET, DELAYED RELEASE ORAL at 09:17

## 2018-10-19 RX ADMIN — LORAZEPAM PRN MG: 2 INJECTION, SOLUTION INTRAMUSCULAR; INTRAVENOUS at 11:31

## 2018-10-19 RX ADMIN — IMIPRAMINE HYDROCHLORIDE SCH MG: 10 TABLET, FILM COATED ORAL at 09:17

## 2018-10-19 RX ADMIN — HEPARIN SODIUM SCH UNITS: 5000 INJECTION INTRAVENOUS; SUBCUTANEOUS at 09:21

## 2018-10-19 NOTE — PULMONOLOGY PROGRESS NOTE
Assessment/Plan


Problems:  


(1) Hip dislocation, right


(2) Delirium


(3) CAD (coronary artery disease)


(4) HTN (hypertension)


(5) Pacemaker


(6) Diabetes mellitus, type II


Assessment/Plan


symptomatic treatment


pain management


ortho f/u


sliding scale


one episode of fever.


Open reduction was done 


f/u by psychiatry


dc to nursing home





Subjective


ROS Limited/Unobtainable:  No


Constitutional:  Reports: no symptoms


HEENT:  Repors: no symptoms


Respiratory:  Reports: no symptoms


Allergies:  


Coded Allergies:  


     No Known Allergies (Unverified , 3/26/14)





Objective





Last 24 Hour Vital Signs








  Date Time  Temp Pulse Resp B/P (MAP) Pulse Ox O2 Delivery O2 Flow Rate FiO2


 


10/19/18 11:43 98.2 83 18 97/42 (60) 95   





 98.2       


 


10/19/18 09:16  79  125/74    


 


10/19/18 09:16    125/74    


 


10/19/18 09:14      Room Air  


 


10/19/18 08:00 97.7 79 18 125/74 (91) 95   





 97.7       


 


10/19/18 04:00 97.6 79 20 144/66 (92) 96   





 97.6       


 


10/18/18 23:47 98.1 86 20 125/84 (98) 98   





 98.1       


 


10/18/18 21:00      Room Air  


 


10/18/18 20:00 99.6 99 20 121/56 (77) 100   





 99.6       


 


10/18/18 16:00 98.5 85 16 111/55 (73) 96   





 98.5       

















Intake and Output  


 


 10/18/18 10/19/18





 18:59 06:59


 


Intake Total 240 ml 60 ml


 


Balance 240 ml 60 ml


 


  


 


Intake Oral 240 ml 60 ml


 


# Voids 5 3








General Appearance:  WD/WN


HEENT:  normocephalic, anicteric


Respiratory/Chest:  lungs clear, no respiratory distress


Breasts:  no masses


Cardiovascular:  normal rate


Abdomen:  normal bowel sounds, soft, non tender


Laboratory Tests


10/19/18 06:30: 


White Blood Count 4.3L, Red Blood Count 2.90L, Hemoglobin 8.5L, Hematocrit 26.3L

, Mean Corpuscular Volume 91, Mean Corpuscular Hemoglobin 29.2, Mean 

Corpuscular Hemoglobin Concent 32.2, Red Cell Distribution Width 16.7H, 

Platelet Count 308#, Mean Platelet Volume 6.0L, Neutrophils (%) (Auto) 58.8, 

Lymphocytes (%) (Auto) 27.9, Monocytes (%) (Auto) 10.1H, Eosinophils (%) (Auto) 

2.7, Basophils (%) (Auto) 0.5, Sodium Level 141, Potassium Level 3.6, Chloride 

Level 106, Carbon Dioxide Level 27, Anion Gap 8, Blood Urea Nitrogen 10, 

Creatinine 0.7, Estimat Glomerular Filtration Rate , Glucose Level 89, Calcium 

Level 8.7





Current Medications








 Medications


  (Trade)  Dose


 Ordered  Sig/Violette


 Route


 PRN Reason  Start Time


 Stop Time Status Last Admin


Dose Admin


 


 Acetaminophen/


 Hydrocodone Bitart


  (Norco 5/325)  1 tab  Q6H  PRN


 ORAL


 For Pain  10/17/18 17:45


 10/21/18 17:44   


 


 


 Aspirin


  (Ecotrin)  81 mg  DAILY


 ORAL


   10/18/18 09:00


 11/14/18 08:59  10/19/18 09:17


 


 


 Famotidine


  (Pepcid)  20 mg  DAILY


 ORAL


   10/18/18 09:00


 11/14/18 08:59  10/19/18 09:16


 


 


 Furosemide


  (Lasix)  40 mg  DAILY


 ORAL


   10/18/18 09:00


 11/14/18 08:59  10/19/18 09:17


 


 


 Heparin Sodium


  (Porcine)


  (Heparin 5000


 units/ml)  5,000 units  EVERY 12  HOURS


 SUBQ


   10/17/18 21:00


 11/13/18 20:59  10/19/18 09:21


 


 


 Imipramine HCl


  (Tofranil)  25 mg  DAILY


 ORAL


   10/18/18 09:00


 11/14/18 08:59  10/19/18 09:17


 


 


 Lorazepam


  (Ativan)  2 mg  Q6H  PRN


 ORAL


 For Anxiety  10/19/18 11:45


 10/26/18 11:44   


 


 


 Losartan Potassium


  (Cozaar)  25 mg  DAILY


 ORAL


   10/18/18 09:00


 11/14/18 08:59  10/19/18 09:16


 


 


 Metoprolol


 Succinate


  (Toprol XL)  50 mg  DAILY


 ORAL


   10/18/18 09:00


 11/14/18 11:46  10/19/18 09:16


 


 


 Mirtazapine


  (Remeron)  7.5 mg  BEDTIME


 ORAL


   10/18/18 21:00


 11/17/18 20:59  10/18/18 20:57


 


 


 Potassium Chloride


  (K-Dur)  20 meq  DAILY


 ORAL


   10/19/18 09:00


 11/18/18 08:59  10/19/18 09:23


 


 


 Risperidone


  (RisperDAL)  1 mg  BID


 ORAL


   10/19/18 18:00


 11/18/18 17:59   


 

















Korin Junior MD Oct 19, 2018 13:32

## 2018-10-19 NOTE — PROGRESS NOTE
DATE:  10/18/2018

SUBJECTIVE:  The patient continues to have episodes of agitation with

yelling and screaming today, not following staff's direction.  She is

having anxiety.  Her blood pressure was too low.  We were not able to

administer antianxiety medication until later.  The patient at some point

became combative.



MENTAL STATUS EXAMINATION:  The patient is alert and oriented times self,

confused.  Mood is agitated.  Affect is flat.  Thought process, there is

paucity of thought content.  Thought content, no suicidal or homicidal

ideations.



ASSESSMENT:  Encephalopathy due to general medical condition.



PLAN:  We will continue current medication.  Provide the patient with

supportive therapy and reality orientation.









  ______________________________________________

  Josh Nunez M.D.





DR:  Marycarmen

D:  10/18/2018 13:39

T:  10/19/2018 02:12

JOB#:  [id number]

CC:

## 2018-10-19 NOTE — DISCHARGE SUMMARY
Discharge Summary


Hospital Course


Date of Admission


Oct 14, 2018 at 15:37


Date of Discharge





Admitting Diagnosis


Prosthetic Hip Dislocation


ROCK Carrasquillo is a 78 year old female who was admitted on Oct 14, 2018 at 15

:37 for Prosthetic Hip Dislocation


Hospital Course


Job 3 [id number]








Last 24 Hour Vital Signs








  Date Time  Temp Pulse Resp B/P (MAP) Pulse Ox O2 Delivery O2 Flow Rate FiO2


 


10/19/18 09:16  79  125/74    


 


10/19/18 09:16    125/74    


 


10/19/18 09:14      Room Air  


 


10/19/18 08:00 97.7 79 18 125/74 (91) 95   





 97.7       


 


10/19/18 04:00 97.6 79 20 144/66 (92) 96   





 97.6       


 


10/18/18 23:47 98.1 86 20 125/84 (98) 98   





 98.1       


 


10/18/18 21:00      Room Air  


 


10/18/18 20:00 99.6 99 20 121/56 (77) 100   





 99.6       


 


10/18/18 16:00 98.5 85 16 111/55 (73) 96   





 98.5       


 


10/18/18 12:00 97.5 86 20 98/44 (62) 95   





 97.5       





Physical Exam





General: No acute distress, awake and alert


HEENT: NCAT, sclera anicteric, PERRL, EOMI.


Neck: Supple, no significant jugular venous distention, 


Lungs: fair inspiratory effort,  clear to auscultation bilaterally, no Wheeze 

or Rales.


Heart: Regular rate and rhythm, normal S1/S2, + STEVEN @ LCW, PPM @ LCW.


Abdomen: soft, nontender, nondistended. Normoactive bowel sounds.


 / Rectal: Refused and deferred.


Extremities: No Cyanosis , clubbing or edema. Right hip surgical incision 

intact.


Neuro: A&O x 3, Able to move all extremities


Skin: warm, no rashes or lesions


Psych: Normal mood and affect





Discharge


Discharge Disposition


Patient was discharged to











Juan Carlos Magana MD Oct 19, 2018 11:07

## 2018-10-19 NOTE — GENERAL PROGRESS NOTE
Assessment/Plan


Problem List:  


(1) Delirium


Assessment & Plan:  Dementia with behavioral dist


Encephalopathy


Psychotic d/o





Risperdal .5mg tid 


ativan prn


trazodone 50mg qhs


ICD Codes:  R41.0 - Disorientation, unspecified


SNOMED:  3986530


(2) Psychosis


ICD Codes:  F29 - Unspecified psychosis not due to a substance or known 

physiological condition


SNOMED:  23578259


(3) Hip dislocation, right


ICD Codes:  S73.004A - Unspecified dislocation of right hip, initial encounter


SNOMED:  586405665


Status:  stable


Assessment/Plan


risperdal 1mg bid


ativan po prn





Subjective


Date patient seen:  Oct 19, 2018


Neurologic/Psychiatric:  Reports: anxiety, depressed, emotional problems


Allergies:  


Coded Allergies:  


     No Known Allergies (Unverified , 3/26/14)


Subjective


the pt cont to be confused and was agitated this am. the pt was given ativan 

again the pt is more agitated today





Objective





Last 24 Hour Vital Signs








  Date Time  Temp Pulse Resp B/P (MAP) Pulse Ox O2 Delivery O2 Flow Rate FiO2


 


10/19/18 11:43 98.2 83 18 97/42 (60) 95   





 98.2       


 


10/19/18 09:16  79  125/74    


 


10/19/18 09:16    125/74    


 


10/19/18 09:14      Room Air  


 


10/19/18 08:00 97.7 79 18 125/74 (91) 95   





 97.7       


 


10/19/18 04:00 97.6 79 20 144/66 (92) 96   





 97.6       


 


10/18/18 23:47 98.1 86 20 125/84 (98) 98   





 98.1       

















Intake and Output  


 


 10/18/18 10/19/18





 19:00 07:00


 


Intake Total 240 ml 60 ml


 


Balance 240 ml 60 ml


 


  


 


Intake Oral 240 ml 60 ml


 


# Voids 5 3








Laboratory Tests


10/19/18 06:30: 


White Blood Count 4.3L, Red Blood Count 2.90L, Hemoglobin 8.5L, Hematocrit 26.3L

, Mean Corpuscular Volume 91, Mean Corpuscular Hemoglobin 29.2, Mean 

Corpuscular Hemoglobin Concent 32.2, Red Cell Distribution Width 16.7H, 

Platelet Count 308#, Mean Platelet Volume 6.0L, Neutrophils (%) (Auto) 58.8, 

Lymphocytes (%) (Auto) 27.9, Monocytes (%) (Auto) 10.1H, Eosinophils (%) (Auto) 

2.7, Basophils (%) (Auto) 0.5, Sodium Level 141, Potassium Level 3.6, Chloride 

Level 106, Carbon Dioxide Level 27, Anion Gap 8, Blood Urea Nitrogen 10, 

Creatinine 0.7, Estimat Glomerular Filtration Rate , Glucose Level 89, Calcium 

Level 8.7


Height (Feet):  5


Height (Inches):  3.00


Weight (Pounds):  136


General Appearance:  alert, confused, moderate distress, agitated











Josh Nunez MD Oct 19, 2018 21:40

## 2018-10-20 NOTE — DISCHARGE SUMMARY
DATE OF ADMISSION:  10/14/2018



DATE OF DISCHARGE:  10/19/2018



HOSPITAL COURSE:  This is an 78-year-old [place]-speaking female with past

medical history significant for sick sinus syndrome, status post

pacemaker; hypertension; diabetes type 2; history of recent right hip

fracture, status post ORIF; dyslipidemia; coronary artery disease;

hypertension; aortic stenosis; pulmonary hypertension; and Alzheimer

dementia who presented to the hospital after she had complained about

right hip pain.  The patient was noted to have ORIF of the right hip on

October 4, 2018 at [facility] due to the fracture.  The patient

tolerated the procedure well, discharged home and felt that the right hip

became painful again, and upon arrival to the emergency room, x-ray

confirmed the patient had dislocation of the right hip arthroplasty.  The

patient underwent a right hip revision by Dr. Juan Antonio Bella on October 15,

2018, underwent open reduction of the right hip hemiarthroplasty

dislocation.  The patient tolerated the procedure well and subsequently

was discharged to the nursing home to be followed as outpatient.

Throughout the hospital course, the patient was followed up with Dr. Juan Antonio Bella from orthopedics, Dr. Korin Junior from Pulmonary Critical Care,

and Dr. Nunez from Psychiatry.



MEDICATIONS ON DISCHARGE:  Continue discharge medication list.



ACTIVITY:  As tolerated.



DIET:  1800-ADA cardiac diet.



FINAL DIAGNOSES:

1. Dislocation of the right arthroplasty, hip.

2. Dyslipidemia.

3. Aortic stenosis.

4. Pulmonary hypertension.

5. Alzheimer dementia.

6. History of fracture of the superior pubic ramus.

7. Coronary artery disease.

8. Diabetes, type 2.

9. Hypertension.

10. Sick sinus syndrome, status post pacemaker.









  ______________________________________________

  Juan Carlos Magana M.D. DR:  PIO

D:  10/19/2018 11:06

T:  10/20/2018 03:29

JOB#:  [id number]

CC:

## 2018-11-16 ENCOUNTER — HOSPITAL ENCOUNTER (INPATIENT)
Dept: HOSPITAL 72 - EMR | Age: 78
LOS: 12 days | Discharge: ANOTHER HOSPITAL | DRG: 466 | End: 2018-11-28
Payer: MEDICARE

## 2018-11-16 VITALS — BODY MASS INDEX: 24.66 KG/M2 | WEIGHT: 148 LBS | HEIGHT: 65 IN

## 2018-11-16 VITALS — SYSTOLIC BLOOD PRESSURE: 117 MMHG | DIASTOLIC BLOOD PRESSURE: 92 MMHG

## 2018-11-16 VITALS — DIASTOLIC BLOOD PRESSURE: 97 MMHG | SYSTOLIC BLOOD PRESSURE: 159 MMHG

## 2018-11-16 VITALS — DIASTOLIC BLOOD PRESSURE: 88 MMHG | SYSTOLIC BLOOD PRESSURE: 151 MMHG

## 2018-11-16 DIAGNOSIS — F29: ICD-10-CM

## 2018-11-16 DIAGNOSIS — I25.2: ICD-10-CM

## 2018-11-16 DIAGNOSIS — F02.80: ICD-10-CM

## 2018-11-16 DIAGNOSIS — E43: ICD-10-CM

## 2018-11-16 DIAGNOSIS — Z95.0: ICD-10-CM

## 2018-11-16 DIAGNOSIS — E11.9: ICD-10-CM

## 2018-11-16 DIAGNOSIS — K64.8: ICD-10-CM

## 2018-11-16 DIAGNOSIS — T84.020A: Primary | ICD-10-CM

## 2018-11-16 DIAGNOSIS — E78.00: ICD-10-CM

## 2018-11-16 DIAGNOSIS — I25.10: ICD-10-CM

## 2018-11-16 DIAGNOSIS — G30.9: ICD-10-CM

## 2018-11-16 DIAGNOSIS — K59.00: ICD-10-CM

## 2018-11-16 DIAGNOSIS — I35.0: ICD-10-CM

## 2018-11-16 DIAGNOSIS — L89.153: ICD-10-CM

## 2018-11-16 DIAGNOSIS — R62.7: ICD-10-CM

## 2018-11-16 DIAGNOSIS — I10: ICD-10-CM

## 2018-11-16 DIAGNOSIS — R71.0: ICD-10-CM

## 2018-11-16 DIAGNOSIS — G93.41: ICD-10-CM

## 2018-11-16 DIAGNOSIS — R13.10: ICD-10-CM

## 2018-11-16 DIAGNOSIS — I27.20: ICD-10-CM

## 2018-11-16 LAB
ADD MANUAL DIFF: NO
ALBUMIN SERPL-MCNC: 2.4 G/DL (ref 3.4–5)
ALBUMIN/GLOB SERPL: 0.5 {RATIO} (ref 1–2.7)
ALP SERPL-CCNC: 105 U/L (ref 46–116)
ALT SERPL-CCNC: 18 U/L (ref 12–78)
ANION GAP SERPL CALC-SCNC: 9 MMOL/L (ref 5–15)
APPEARANCE UR: (no result)
APTT PPP: (no result) S
AST SERPL-CCNC: 19 U/L (ref 15–37)
BASOPHILS NFR BLD AUTO: 0.2 % (ref 0–2)
BILIRUB SERPL-MCNC: 0.6 MG/DL (ref 0.2–1)
BUN SERPL-MCNC: 13 MG/DL (ref 7–18)
CALCIUM SERPL-MCNC: 8.6 MG/DL (ref 8.5–10.1)
CHLORIDE SERPL-SCNC: 109 MMOL/L (ref 98–107)
CK MB SERPL-MCNC: 1.8 NG/ML (ref 0–3.6)
CK SERPL-CCNC: 354 U/L (ref 26–308)
CO2 SERPL-SCNC: 24 MMOL/L (ref 21–32)
CREAT SERPL-MCNC: 0.7 MG/DL (ref 0.55–1.3)
EOSINOPHIL NFR BLD AUTO: 0.4 % (ref 0–3)
ERYTHROCYTE [DISTWIDTH] IN BLOOD BY AUTOMATED COUNT: 16.4 % (ref 11.6–14.8)
GLOBULIN SER-MCNC: 4.4 G/DL
GLUCOSE UR STRIP-MCNC: NEGATIVE MG/DL
HCT VFR BLD CALC: 30 % (ref 37–47)
HGB BLD-MCNC: 9.1 G/DL (ref 12–16)
KETONES UR QL STRIP: (no result)
LEUKOCYTE ESTERASE UR QL STRIP: (no result)
LYMPHOCYTES NFR BLD AUTO: 18.7 % (ref 20–45)
MCV RBC AUTO: 86 FL (ref 80–99)
MONOCYTES NFR BLD AUTO: 7.1 % (ref 1–10)
NEUTROPHILS NFR BLD AUTO: 73.6 % (ref 45–75)
NITRITE UR QL STRIP: NEGATIVE
PH UR STRIP: 5 [PH] (ref 4.5–8)
PLATELET # BLD: 574 K/UL (ref 150–450)
POTASSIUM SERPL-SCNC: 3.7 MMOL/L (ref 3.5–5.1)
PROT UR QL STRIP: (no result)
RBC # BLD AUTO: 3.5 M/UL (ref 4.2–5.4)
SODIUM SERPL-SCNC: 142 MMOL/L (ref 136–145)
SP GR UR STRIP: 1.02 (ref 1–1.03)
UROBILINOGEN UR-MCNC: 4 MG/DL (ref 0–1)
WBC # BLD AUTO: 6.6 K/UL (ref 4.8–10.8)

## 2018-11-16 PROCEDURE — 71045 X-RAY EXAM CHEST 1 VIEW: CPT

## 2018-11-16 PROCEDURE — 82962 GLUCOSE BLOOD TEST: CPT

## 2018-11-16 PROCEDURE — 96361 HYDRATE IV INFUSION ADD-ON: CPT

## 2018-11-16 PROCEDURE — 85007 BL SMEAR W/DIFF WBC COUNT: CPT

## 2018-11-16 PROCEDURE — 93005 ELECTROCARDIOGRAM TRACING: CPT

## 2018-11-16 PROCEDURE — 84100 ASSAY OF PHOSPHORUS: CPT

## 2018-11-16 PROCEDURE — 94150 VITAL CAPACITY TEST: CPT

## 2018-11-16 PROCEDURE — 83550 IRON BINDING TEST: CPT

## 2018-11-16 PROCEDURE — 83735 ASSAY OF MAGNESIUM: CPT

## 2018-11-16 PROCEDURE — 72170 X-RAY EXAM OF PELVIS: CPT

## 2018-11-16 PROCEDURE — 87070 CULTURE OTHR SPECIMN AEROBIC: CPT

## 2018-11-16 PROCEDURE — 80048 BASIC METABOLIC PNL TOTAL CA: CPT

## 2018-11-16 PROCEDURE — 87181 SC STD AGAR DILUTION PER AGT: CPT

## 2018-11-16 PROCEDURE — 84484 ASSAY OF TROPONIN QUANT: CPT

## 2018-11-16 PROCEDURE — 86850 RBC ANTIBODY SCREEN: CPT

## 2018-11-16 PROCEDURE — 36415 COLL VENOUS BLD VENIPUNCTURE: CPT

## 2018-11-16 PROCEDURE — 83605 ASSAY OF LACTIC ACID: CPT

## 2018-11-16 PROCEDURE — 85651 RBC SED RATE NONAUTOMATED: CPT

## 2018-11-16 PROCEDURE — 82550 ASSAY OF CK (CPK): CPT

## 2018-11-16 PROCEDURE — 99285 EMERGENCY DEPT VISIT HI MDM: CPT

## 2018-11-16 PROCEDURE — 85025 COMPLETE CBC W/AUTO DIFF WBC: CPT

## 2018-11-16 PROCEDURE — 94664 DEMO&/EVAL PT USE INHALER: CPT

## 2018-11-16 PROCEDURE — 86900 BLOOD TYPING SEROLOGIC ABO: CPT

## 2018-11-16 PROCEDURE — 86901 BLOOD TYPING SEROLOGIC RH(D): CPT

## 2018-11-16 PROCEDURE — 86920 COMPATIBILITY TEST SPIN: CPT

## 2018-11-16 PROCEDURE — 87075 CULTR BACTERIA EXCEPT BLOOD: CPT

## 2018-11-16 PROCEDURE — 96374 THER/PROPH/DIAG INJ IV PUSH: CPT

## 2018-11-16 PROCEDURE — 82378 CARCINOEMBRYONIC ANTIGEN: CPT

## 2018-11-16 PROCEDURE — 80053 COMPREHEN METABOLIC PANEL: CPT

## 2018-11-16 PROCEDURE — 85730 THROMBOPLASTIN TIME PARTIAL: CPT

## 2018-11-16 PROCEDURE — 87040 BLOOD CULTURE FOR BACTERIA: CPT

## 2018-11-16 PROCEDURE — 82553 CREATINE MB FRACTION: CPT

## 2018-11-16 PROCEDURE — 82728 ASSAY OF FERRITIN: CPT

## 2018-11-16 PROCEDURE — 87205 SMEAR GRAM STAIN: CPT

## 2018-11-16 PROCEDURE — 81003 URINALYSIS AUTO W/O SCOPE: CPT

## 2018-11-16 PROCEDURE — 94003 VENT MGMT INPAT SUBQ DAY: CPT

## 2018-11-16 PROCEDURE — 94760 N-INVAS EAR/PLS OXIMETRY 1: CPT

## 2018-11-16 PROCEDURE — 70450 CT HEAD/BRAIN W/O DYE: CPT

## 2018-11-16 PROCEDURE — 85610 PROTHROMBIN TIME: CPT

## 2018-11-16 PROCEDURE — 83540 ASSAY OF IRON: CPT

## 2018-11-16 PROCEDURE — 86140 C-REACTIVE PROTEIN: CPT

## 2018-11-16 RX ADMIN — HEPARIN SODIUM SCH UNITS: 5000 INJECTION INTRAVENOUS; SUBCUTANEOUS at 21:33

## 2018-11-16 RX ADMIN — DICYCLOMINE HYDROCHLORIDE SCH MG: 10 CAPSULE ORAL at 19:12

## 2018-11-16 RX ADMIN — DICYCLOMINE HYDROCHLORIDE SCH MG: 10 CAPSULE ORAL at 20:32

## 2018-11-16 RX ADMIN — TRAZODONE HYDROCHLORIDE SCH MG: 50 TABLET ORAL at 20:32

## 2018-11-16 NOTE — EMERGENCY ROOM REPORT
History of Present Illness


General


Chief Complaint:  Generalized Weakness


Source:  Medical Record





Present Illness


Allergies:  


Coded Allergies:  


     No Known Allergies (Unverified , 3/26/14)





Nursing Documentation-[facility]


Past Medical History:  No History, Except For


Hx Cardiac Problems:  Yes


Hx Hypertension:  Yes


Hx Pacemaker:  Yes - left chest pacemaker


Hx Asthma:  Yes


Hx Diabetes:  Yes


Hx Cancer:  No


Hx Gastrointestinal Problems:  No


Hx Neurological Problems:  Yes


Hx Dementia:  Yes





Physical Exam





Vital Signs








  Date Time  Temp Pulse Resp B/P (MAP) Pulse Ox O2 Delivery O2 Flow Rate FiO2


 


11/16/18 12:29 98.1 91 14 155/69 98 Room Air  


 


11/16/18 13:42        98











Medical Decision Making


Diagnostic Impression:  


 Primary Impression:  


 Failure to thrive


 Additional Impression:  


 Gravely disabled





Laboratory Tests








Test


  11/16/18


13:15


 


White Blood Count


  6.6 K/UL


(4.8-10.8)


 


Red Blood Count


  3.50 M/UL


(4.20-5.40)  L


 


Hemoglobin


  9.1 G/DL


(12.0-16.0)  L


 


Hematocrit


  30.0 %


(37.0-47.0)  L


 


Mean Corpuscular Volume 86 FL (80-99)  


 


Mean Corpuscular Hemoglobin


  26.1 PG


(27.0-31.0)  L


 


Mean Corpuscular Hemoglobin


Concent 30.5 G/DL


(32.0-36.0)  L


 


Red Cell Distribution Width


  16.4 %


(11.6-14.8)  H


 


Platelet Count


  574 K/UL


(150-450)  H


 


Mean Platelet Volume


  6.6 FL


(6.5-10.1)


 


Neutrophils (%) (Auto)


  73.6 %


(45.0-75.0)


 


Lymphocytes (%) (Auto)


  18.7 %


(20.0-45.0)  L


 


Monocytes (%) (Auto)


  7.1 %


(1.0-10.0)


 


Eosinophils (%) (Auto)


  0.4 %


(0.0-3.0)


 


Basophils (%) (Auto)


  0.2 %


(0.0-2.0)


 


Urine Color Brown  


 


Urine Appearance


  Slightly


cloudy


 


Urine pH 5 (4.5-8.0)  


 


Urine Specific Gravity


  1.020


(1.005-1.035)


 


Urine Protein


  2+ (NEGATIVE)


H


 


Urine Glucose (UA)


  Negative


(NEGATIVE)


 


Urine Ketones


  2+ (NEGATIVE)


H


 


Urine Blood


  Negative


(NEGATIVE)


 


Urine Nitrite


  Negative


(NEGATIVE)


 


Urine Bilirubin


  1+ (NEGATIVE)


H


 


Urine Ictotest


  Negative


(NEGATIVE)


 


Urine Urobilinogen


  4 MG/DL


(0.0-1.0)  H


 


Urine Leukocyte Esterase


  1+ (NEGATIVE)


H


 


Urine RBC


  0 /HPF (0 - 2)


 


 


Urine WBC


  0-2 /HPF (0 -


2)


 


Urine Squamous Epithelial


Cells Few /LPF


(NONE/OCC)


 


Urine Bacteria


  Occasional


/HPF (NONE)


 


Sodium Level


  142 MMOL/L


(136-145)


 


Potassium Level


  3.7 MMOL/L


(3.5-5.1)


 


Chloride Level


  109 MMOL/L


()  H


 


Carbon Dioxide Level


  24 MMOL/L


(21-32)


 


Anion Gap


  9 mmol/L


(5-15)


 


Blood Urea Nitrogen


  13 mg/dL


(7-18)


 


Creatinine


  0.7 MG/DL


(0.55-1.30)


 


Estimate Glomerular


Filtration Rate  mL/min (>60)  


 


 


Glucose Level


  86 MG/DL


()


 


Lactic Acid Level


  1.10 mmol/L


(0.4-2.0)


 


Calcium Level


  8.6 MG/DL


(8.5-10.1)


 


Total Bilirubin


  0.6 MG/DL


(0.2-1.0)


 


Aspartate Amino Transferase


(AST) 19 U/L (15-37)


 


 


Alanine Aminotransferase (ALT)


  18 U/L (12-78)


 


 


Alkaline Phosphatase


  105 U/L


()


 


Total Creatine Kinase


  354 U/L


()  H


 


Creatine Kinase MB


  1.8 NG/ML


(0.0-3.6)


 


Creatine Kinase MB Relative


Index 0.5  


 


 


Troponin I


  0.042 ng/mL


(0.000-0.056)


 


Total Protein


  6.8 G/DL


(6.4-8.2)


 


Albumin


  2.4 G/DL


(3.4-5.0)  L


 


Globulin 4.4 g/dL  


 


Albumin/Globulin Ratio


  0.5 (1.0-2.7)


L








EKG Diagnostic Results


Rate:  normal


Rhythm:  NSR


ST Segments:  no acute changes





Rhythm Strip Diag. Results


EP Interpretation:  yes


Rate:  70


Rhythm:  NSR, no PVC's, no ectopy





Chest X-Ray Diagnostic Results


Chest X-Ray Diagnostic Results :  


   Chest X-Ray Ordered:  Yes


   # of Views/Limited/Complete:  1 View


   Indication:  Other


   Impression:  Other - Impression: Bilateral pleural effusions, developing 

since prior study 10/18/2018


   Electronically Signed by:  Chris





CT/MRI/US Diagnostic Results


CT/MRI/US Diagnostic Results :  


   Imaging Test Ordered:  CT head


   Impression


No acute findings.  Specifically no intracranial bleed, mass effect or edema.  

See official report.





Last Vital Signs








  Date Time  Temp Pulse Resp B/P (MAP) Pulse Ox O2 Delivery O2 Flow Rate FiO2


 


11/16/18 13:42  77 17   Room Air  98


 


11/16/18 13:42 98.1   117/92 98   








Disposition:  ADMITTED AS INPATIENT


Condition:  Serious


Referrals:  


NOT CHOSEN IPA/MD,REFERRING (PCP)











Malika Marquis DO Nov 16, 2018 15:50

## 2018-11-16 NOTE — HISTORY AND PHYSICAL REPORT
DATE OF ADMISSION:  11/16/2018

CHIEF COMPLAINT:  The patient is an 78-year-old Central [place]-speaking female who

presents with chief complaint of generalized weakness.



HISTORY OF PRESENT ILLNESS:  The patient was admitted to [facility] from 10/14/2018 to 10/19/2018.  The patient is status post open

reduction and internal fixation and right hip hemiarthroplasty on

10/15/2018.  The patient is status post open reduction and internal

fixation of the right hip on 10/04/2018.  The patient had dislocation on

10/14/2018.  The patient underwent open reduction of right hip

hemiarthroplasty dislocation on 10/15/2018.



The patient was discharged home with home health.



The patient has been noted to have increasing weakness at home.  The

patient has not been able to ambulate.  The patient presented to [place]

emergency room.  The patient is admitted for generalized weakness.



REVIEW OF SYSTEMS:  Unable to assess secondary to the patient's mental

status.



PAST MEDICAL HISTORY:  Significant for:



1. Right hip fracture on 10/02/2018 as above, status post right hip

hemiarthroplasty on 10/04/2018.

2. Dislocation of right hip arthroplasty and subsequent reduction of

dislocation on 10/15/2018 as above.

3. Diabetes type 2.

4. Hypertension.

5. Sick sinus syndrome.

6. Hypercholesterolemia.

7. Coronary artery disease, status post non-ST elevated myocardial

infarction.

8. Hypertension.

9. Aortic stenosis.

10. Pulmonary hypertension.

11. Alzheimer dementia.



PAST SURGICAL HISTORY:  Significant for:



1. Reduction of right hip hemiarthroplasty dislocation on 10/15/2018.

2. Right hip hemiarthroplasty on 10/04/2018.

3. Coronary artery bypass graft in 2017.

4. Pacemaker generator change in 2017.

5. Elbow debridement.

6. Pacemaker implantation in 2014.



CURRENT MEDICATIONS:

1. Aspirin 81 mg p.o. daily.

2. Atenolol 50 mg p.o. twice daily.

3. Klonopin 0.5 mg p.o. q.6 h. p.r.n.

4. Clonidine 0.1 mg p.o. q.8 h.

5. Clopidogrel 75 mg p.o. daily.

6. Bentyl 10 mg p.o. four times daily.

7. Pepcid 20 mg p.o. at bedtime.

8. Lasix 40 mg p.o. daily.

9. Norco 5/325 one tablet p.o. q.6 h. p.r.n.

10. Losartan 25 mg p.o. daily.

11. Potassium chloride 10 mEq p.o. daily.

12. Crestor 10 mg p.o. daily.

13. Sitagliptin/metformin 50/1000 one tablet p.o. daily.

14. Trazodone 50 mg p.o. at bedtime.

15. Vitamin D 5000 units p.o. daily.



ALLERGIES:  No known drug allergies.



SOCIAL HISTORY:  The patient is  and lives with her .  The

patient denies tobacco or alcohol use.



PHYSICAL EXAMINATION:

VITAL SIGNS:  Temperature 98.1, respirations 14, blood pressure 155/69, and

pulse 91.

GENERAL:  The patient is a well-developed and well-nourished thin-appearing

white female, in no apparent distress.

HEENT:  Eyes, pupils equal responsive to light and accommodation.

Extraocular movements are intact.

NECK:  Supple.  No lymphadenopathy.

CHEST:  Lungs are clear to auscultation bilaterally without wheezes or

rales.

CARDIOVASCULAR:  Regular rhythm and rate.  S1 and S2 are normal without

murmurs, rubs, or gallops.

ABDOMEN:  Soft, nontender, and nondistended.  Positive bowel sounds.  No

evidence of hepatosplenomegaly.  Currently, no rebounding or guarding

noted.

EXTREMITIES:  Negative for clubbing, cyanosis, or edema.

RECTAL/GENITAL:  Not performed.

NEUROLOGIC:  Cranial nerves II through XII are grossly intact without focal

deficits.  Motor strength is 5/5 bilaterally.  Motor strength is 3/5

bilaterally.  Deep tendon reflexes are 2+ plantar.



LABORATORY STUDIES:  WBC 6.6, hemoglobin 9.1, hematocrit 30.0, and

platelets 535,000.  Sodium 142, potassium 3.7, chloride 109, CO2 24, BUN

13, creatinine 0.7, glucose 86, and troponin 0.042.



ASSESSMENT:  This is an 78-year-old white female.



1. Generalized weakness.

2. Failure to thrive.

3. Coronary artery disease.

4. Diabetes, type 2.

5. Hypertension.

6. Sick sinus syndrome.

7. Hypercholesterolemia.

8. Aortic stenosis.

9. Pulmonary hypertension.

10. Alzheimer dementia.



TREATMENT:

1. Generalized weakness/failure to thrive.  A physical therapy

consultation has been obtained.  We will follow recommendation of physical

therapy.  The patient may have decreased condition secondary to previous

surgeries.

2. Coronary artery disease.  The patient is status post coronary artery

bypass graft in 2017.

3. Diabetes, type 2.  The patient has been started on NovoLog sliding

scale.  Januvia and metformin have been discontinued, as the patient is

not tolerating p.o. well.

4. Hypertension.  The patient is to continue on atenolol and losartan as

above.

5. Sick sinus syndrome.  The patient is status post pacemaker

implantation.

6. Hypercholesterolemia.  Continue simvastatin as above.

7. Aortic stenosis.

8. Pulmonary hypertension.

9. Alzheimer dementia.









  ______________________________________________

  Cheo Ortiz M.D.





DR:  ROSI

D:  11/16/2018 18:58

T:  11/16/2018 21:50

JOB#:  [id number]

CC:

## 2018-11-16 NOTE — HISTORY & PHYSICAL
History and Physical


History & Physicial


Dictated for Int Med-dr Magana no. [id number].











Cheo Ortiz MD Nov 16, 2018 18:58

## 2018-11-16 NOTE — DIAGNOSTIC IMAGING REPORT
Indication: Shortness of breath

 

Technique: One view of the chest

 

Comparison: 10/18/2018

 

Findings: Interim development of bilateral pleural effusions. There is questionable

mild interstitial congestive change. The heart is borderline enlarged. Bilateral

pacemakers are again demonstrated

 

Impression: Bilateral pleural effusions, developing since prior study 10/18/2018

 

Questionable mild interstitial edema

 

Cardiomegaly

## 2018-11-16 NOTE — DIAGNOSTIC IMAGING REPORT
Indications: Altered mental status

 

Technique: Spiral acquisitions obtained through the brain. Angled axial and coronal 5

x 5 mm slices were reconstructed. Total dose length product 1372.57 mGycm.  CTDI

vol(s) 70.38 mGy. Dose reduction achieved using automated exposure control

 

Comparison: None.

 

Findings: There is age-related enlargement of the ventricles and extra axial CSF

spaces. There is periventricular deep white matter low-attenuation consistent with

chronic ischemic change. No acute intercranial hemorrhage nor edema. No mass effect

nor midline shift. The calvarium is intact. Visualized orbits and sinuses are

unremarkable. The mastoids are clear.

 

Impression: Chronic and age-related changes, as described. Negative for acute

intracranial bleed or mass effect

 

 

 

 

 

The CT scanner at [facility] is accredited by the American College of

Radiology and the scans are performed using protocols designed to limit radiation

exposure to as low as reasonably achievable to attain images of sufficient resolution

adequate for diagnostic evaluation.

## 2018-11-17 VITALS — SYSTOLIC BLOOD PRESSURE: 151 MMHG | DIASTOLIC BLOOD PRESSURE: 83 MMHG

## 2018-11-17 VITALS — DIASTOLIC BLOOD PRESSURE: 96 MMHG | SYSTOLIC BLOOD PRESSURE: 158 MMHG

## 2018-11-17 VITALS — SYSTOLIC BLOOD PRESSURE: 149 MMHG | DIASTOLIC BLOOD PRESSURE: 74 MMHG

## 2018-11-17 VITALS — SYSTOLIC BLOOD PRESSURE: 146 MMHG | DIASTOLIC BLOOD PRESSURE: 63 MMHG

## 2018-11-17 VITALS — SYSTOLIC BLOOD PRESSURE: 156 MMHG | DIASTOLIC BLOOD PRESSURE: 89 MMHG

## 2018-11-17 VITALS — SYSTOLIC BLOOD PRESSURE: 148 MMHG | DIASTOLIC BLOOD PRESSURE: 75 MMHG

## 2018-11-17 VITALS — SYSTOLIC BLOOD PRESSURE: 149 MMHG | DIASTOLIC BLOOD PRESSURE: 80 MMHG

## 2018-11-17 LAB
ADD MANUAL DIFF: YES
ALBUMIN SERPL-MCNC: 2 G/DL (ref 3.4–5)
ALBUMIN/GLOB SERPL: 0.5 {RATIO} (ref 1–2.7)
ALP SERPL-CCNC: 90 U/L (ref 46–116)
ALT SERPL-CCNC: 14 U/L (ref 12–78)
ANION GAP SERPL CALC-SCNC: 11 MMOL/L (ref 5–15)
AST SERPL-CCNC: 14 U/L (ref 15–37)
BILIRUB SERPL-MCNC: 0.5 MG/DL (ref 0.2–1)
BUN SERPL-MCNC: 10 MG/DL (ref 7–18)
CALCIUM SERPL-MCNC: 8 MG/DL (ref 8.5–10.1)
CHLORIDE SERPL-SCNC: 111 MMOL/L (ref 98–107)
CO2 SERPL-SCNC: 22 MMOL/L (ref 21–32)
CREAT SERPL-MCNC: 0.7 MG/DL (ref 0.55–1.3)
ERYTHROCYTE [DISTWIDTH] IN BLOOD BY AUTOMATED COUNT: 16.9 % (ref 11.6–14.8)
GLOBULIN SER-MCNC: 3.8 G/DL
HCT VFR BLD CALC: 25.6 % (ref 37–47)
HGB BLD-MCNC: 7.8 G/DL (ref 12–16)
MCV RBC AUTO: 86 FL (ref 80–99)
PHOSPHATE SERPL-MCNC: 2.8 MG/DL (ref 2.5–4.9)
PLATELET # BLD: 510 K/UL (ref 150–450)
POTASSIUM SERPL-SCNC: 3.5 MMOL/L (ref 3.5–5.1)
RBC # BLD AUTO: 2.99 M/UL (ref 4.2–5.4)
SODIUM SERPL-SCNC: 144 MMOL/L (ref 136–145)
WBC # BLD AUTO: 5.8 K/UL (ref 4.8–10.8)

## 2018-11-17 RX ADMIN — DICYCLOMINE HYDROCHLORIDE SCH MG: 10 CAPSULE ORAL at 08:43

## 2018-11-17 RX ADMIN — HEPARIN SODIUM SCH UNITS: 5000 INJECTION INTRAVENOUS; SUBCUTANEOUS at 13:45

## 2018-11-17 RX ADMIN — HEPARIN SODIUM SCH UNITS: 5000 INJECTION INTRAVENOUS; SUBCUTANEOUS at 05:55

## 2018-11-17 RX ADMIN — ASPIRIN 81 MG SCH MG: 81 TABLET ORAL at 08:42

## 2018-11-17 RX ADMIN — FUROSEMIDE SCH MG: 40 TABLET ORAL at 08:45

## 2018-11-17 RX ADMIN — TRAZODONE HYDROCHLORIDE SCH MG: 50 TABLET ORAL at 20:31

## 2018-11-17 RX ADMIN — SITAGLIPTIN SCH MG: 50 TABLET, FILM COATED ORAL at 05:51

## 2018-11-17 RX ADMIN — VITAMIN D, TAB 1000IU (100/BT) SCH INTLU: 25 TAB at 08:46

## 2018-11-17 RX ADMIN — DICYCLOMINE HYDROCHLORIDE SCH MG: 10 CAPSULE ORAL at 20:31

## 2018-11-17 RX ADMIN — METFORMIN HYDROCHLORIDE SCH MG: 500 TABLET ORAL at 18:00

## 2018-11-17 RX ADMIN — ATORVASTATIN CALCIUM SCH MG: 20 TABLET, FILM COATED ORAL at 20:31

## 2018-11-17 RX ADMIN — METFORMIN HYDROCHLORIDE SCH MG: 500 TABLET ORAL at 08:45

## 2018-11-17 RX ADMIN — HEPARIN SODIUM SCH UNITS: 5000 INJECTION INTRAVENOUS; SUBCUTANEOUS at 21:13

## 2018-11-17 RX ADMIN — DICYCLOMINE HYDROCHLORIDE SCH MG: 10 CAPSULE ORAL at 13:46

## 2018-11-17 RX ADMIN — DICYCLOMINE HYDROCHLORIDE SCH MG: 10 CAPSULE ORAL at 18:00

## 2018-11-17 RX ADMIN — CLONAZEPAM PRN MG: 0.5 TABLET ORAL at 23:31

## 2018-11-17 NOTE — CONSULTATION
History of Present Illness


General


Date patient seen:  Nov 16, 2018


Chief Complaint:  Generalized Weakness





Present Illness


HPI


78-year-old [place]-speaking female who presents with chief complaint of 

generalized weakness. the pt has hx of anxiety and depression . the pt has 

memory impairment and pw depressed mood and low energy. the pt is a poor 

historian and is confused. the pt has been agitated and given ativan


Allergies:  


Coded Allergies:  


     No Known Allergies (Unverified , 3/26/14)





Medication History


Scheduled


Aspirin (Aspirin EC), 81 MG ORAL DAILY, (Reported)


Atenolol* (Tenormin*), 50 MG ORAL BID, (Reported)


Clonazepam* (Klonopin*), 0.5 MG ORAL Q6H, (Reported)


Clonidine Hcl* (Catapres*), 0.1 MG ORAL EVERY 8 HOURS, (Reported)


Clopidogrel* (Clopidogrel*), 75 MG ORAL DAILY, (Reported)


Dicyclomine Hcl* (Dicyclomine Hcl*), 10 MG PO QID


Famotidine (Pepcid), 20 MG ORAL BEDTIME


Furosemide* (Lasix*), 40 MG ORAL DAILY, (Reported)


Losartan Potassium* (Losartan Potassium*), 25 MG ORAL DAILY, (Reported)


Potassium Chloride (Potassium Chloride), 10 MEQ ORAL DAILY, (Reported)


Rosuvastatin Calcium* (Crestor*), 10 MG ORAL DAILY, (Reported)


Sitagliptin Phos/Metformin Hcl (Janumet Xr 50-1,000 Mg Tablet), 1 TAB ORAL DAILY

, (Reported)


Trazodone* (Trazodone*), 50 MG ORAL BEDTIME, (Reported)


Vitamin D (Vitamin D3), 5,000 UNITS ORAL DAILY, (Reported)





Scheduled PRN


Hydrocodone Bit/Acetaminophen 5-325* (Norco 5-325*), 1 TAB ORAL Q6H PRN for For 

Pain





Patient History


Limited by:  medical condition


History Provided By:  Patient, Medical Record


Healthcare decision maker





Resuscitation status


Full Code


Advanced Directive on File


No





Past Medical/Surgical History


Past Medical/Surgical History:  


(1) Recurrent dislocation, right hip


(2) Fracture of superior pubic ramus


(3) Gravely disabled


(4) Failure to thrive


(5) Pulmonary hypertension


(6) Aortic stenosis


(7) Generalized weakness


(8) Hypercholesteremia


(9) Hypercholesterolemia


(10) Alzheimer's dementia


(11) Delirium


(12) Acute on chronic systolic CHF (congestive heart failure)


(13) Colitis


(14) Diabetes mellitus, type II


(15) Closed fracture of symphysis pubis


(16) Psychosis


(17) DVT of left axillary vein, acute


(18) Sick sinus syndrome


(19) CAD (coronary artery disease)


(20) HTN (hypertension)


(21) Pacemaker


(22) Fracture of femoral neck, right, closed


(23) Hip fracture requiring operative repair


(24) UTI (urinary tract infection)


(25) E coli infection





Review of Systems


Psychiatric:  Reports: prior hx, anxiety, depressed feelings





Physical Exam


General Appearance:  alert, confused, moderate distress, agitated





Last 24 Hour Vital Signs








  Date Time  Temp Pulse Resp B/P (MAP) Pulse Ox O2 Delivery O2 Flow Rate FiO2


 


11/17/18 21:09    148/71    


 


11/17/18 21:00      Room Air  





      Room Air  


 


11/17/18 20:31  69  149/74    


 


11/17/18 20:00 97.9 69 20 149/74 (99) 99   


 


11/17/18 16:00 97.6 66 18 146/63 (90) 100   


 


11/17/18 13:46    154/82    


 


11/17/18 12:09  65 18 156/89 (111) 99   


 


11/17/18 09:00      Room Air  





      Room Air  


 


11/17/18 08:46  80  149/80    


 


11/17/18 08:45  88  149/80 (103)    


 


11/17/18 08:44    149/80    


 


11/17/18 08:00 97.9 77 16 148/75 (99) 97   


 


11/17/18 05:51    151/83    


 


11/17/18 04:00 97.8 74 20 151/83 (105) 93   


 


11/17/18 00:00 97.6 80 20 158/96 (116) 95   

















Intake and Output  


 


 11/16/18 11/17/18





 18:59 06:59


 


Intake Total  350 ml


 


Output Total  2 ml


 


Balance  348 ml


 


  


 


Intake Oral  350 ml


 


Output Urine Total  2 ml


 


# Voids 1 











Laboratory Tests








Test


  11/17/18


05:40


 


White Blood Count


  5.8 K/UL


(4.8-10.8)


 


Red Blood Count


  2.99 M/UL


(4.20-5.40)  L


 


Hemoglobin


  7.8 G/DL


(12.0-16.0)  L


 


Hematocrit


  25.6 %


(37.0-47.0)  L


 


Mean Corpuscular Volume 86 FL (80-99)  


 


Mean Corpuscular Hemoglobin


  25.9 PG


(27.0-31.0)  L


 


Mean Corpuscular Hemoglobin


Concent 30.3 G/DL


(32.0-36.0)  L


 


Red Cell Distribution Width


  16.9 %


(11.6-14.8)  H


 


Platelet Count


  510 K/UL


(150-450)  H


 


Mean Platelet Volume


  6.9 FL


(6.5-10.1)


 


Neutrophils (%) (Auto)


  % (45.0-75.0)


 


 


Lymphocytes (%) (Auto)


  % (20.0-45.0)


 


 


Monocytes (%) (Auto)  % (1.0-10.0)  


 


Eosinophils (%) (Auto)  % (0.0-3.0)  


 


Basophils (%) (Auto)  % (0.0-2.0)  


 


Differential Total Cells


Counted 100  


 


 


Neutrophils % (Manual) 68 % (45-75)  


 


Lymphocytes % (Manual) 27 % (20-45)  


 


Monocytes % (Manual) 5 % (1-10)  


 


Eosinophils % (Manual) 0 % (0-3)  


 


Basophils % (Manual) 0 % (0-2)  


 


Band Neutrophils 0 % (0-8)  


 


Platelet Estimate Increased  H


 


Platelet Morphology Normal  


 


Polychromasia Occasional  


 


Hypochromasia 2+  


 


Anisocytosis 1+  


 


Ovalocytes 1+  


 


Sodium Level


  144 MMOL/L


(136-145)


 


Potassium Level


  3.5 MMOL/L


(3.5-5.1)


 


Chloride Level


  111 MMOL/L


()  H


 


Carbon Dioxide Level


  22 MMOL/L


(21-32)


 


Anion Gap


  11 mmol/L


(5-15)


 


Blood Urea Nitrogen


  10 mg/dL


(7-18)


 


Creatinine


  0.7 MG/DL


(0.55-1.30)


 


Estimat Glomerular Filtration


Rate  mL/min (>60)  


 


 


Glucose Level


  72 MG/DL


()  L


 


Calcium Level


  8.0 MG/DL


(8.5-10.1)  L


 


Phosphorus Level


  2.8 MG/DL


(2.5-4.9)


 


Magnesium Level


  1.6 MG/DL


(1.8-2.4)  L


 


Total Bilirubin


  0.5 MG/DL


(0.2-1.0)


 


Aspartate Amino Transf


(AST/SGOT) 14 U/L (15-37)


L


 


Alanine Aminotransferase


(ALT/SGPT) 14 U/L (12-78)


 


 


Alkaline Phosphatase


  90 U/L


()


 


Total Protein


  5.8 G/DL


(6.4-8.2)  L


 


Albumin


  2.0 G/DL


(3.4-5.0)  L


 


Globulin 3.8 g/dL  


 


Albumin/Globulin Ratio


  0.5 (1.0-2.7)


L








Height (Feet):  5


Height (Inches):  0.00


Weight (Pounds):  100


Medications





Current Medications








 Medications


  (Trade)  Dose


 Ordered  Sig/Violette


 Route


 PRN Reason  Start Time


 Stop Time Status Last Admin


Dose Admin


 


 Acetaminophen


  (Tylenol)  650 mg  Q6H  PRN


 ORAL


 Mild Pain/Temp > 100.5  11/16/18 17:15


 12/16/18 17:14   


 


 


 Acetaminophen/


 Hydrocodone Bitart


  (Norco 5/325)  1 tab  Q6H  PRN


 ORAL


 Severe Pain (Pain Scale 7-10)  11/16/18 20:30


 11/23/18 20:29   


 


 


 Aspirin


  (ASA)  81 mg  DAILY


 ORAL


   11/17/18 09:00


 12/17/18 08:59  11/17/18 08:42


 


 


 Atenolol


  (Tenormin)  50 mg  Q12HR


 ORAL


   11/16/18 21:00


 12/16/18 20:59  11/17/18 20:31


 


 


 Atorvastatin


 Calcium


  (Lipitor)  20 mg  BEDTIME


 ORAL


   11/17/18 21:00


 12/17/18 20:59  11/17/18 20:31


 


 


 Clonazepam


  (KlonoPIN)  0.5 mg  Q6H  PRN


 ORAL


 For Anxiety  11/16/18 17:30


 11/23/18 17:29   


 


 


 Clonidine HCl


  (Catapres Tab)  0.1 mg  EVERY 8  HOURS


 ORAL


   11/16/18 22:00


 12/16/18 21:59  11/17/18 21:09


 


 


 Clopidogrel


 Bisulfate


  (Plavix)  75 mg  DAILY


 ORAL


   11/17/18 09:00


 12/17/18 08:59  11/17/18 08:45


 


 


 Dicyclomine HCl


  (Bentyl)  10 mg  QID


 ORAL


   11/16/18 18:00


 12/16/18 17:59  11/17/18 20:31


 


 


 Furosemide


  (Lasix)  40 mg  DAILY


 ORAL


   11/17/18 09:00


 12/17/18 08:59  11/17/18 08:45


 


 


 Heparin Sodium


  (Porcine)


  (Heparin 5000


 units/ml)  5,000 units  EVERY 8  HOURS


 SUBQ


   11/16/18 22:00


 12/16/18 21:59  11/17/18 21:13


 


 


 Losartan Potassium


  (Cozaar)  50 mg  DAILY


 ORAL


   11/18/18 09:00


 12/17/18 08:59   


 


 


 Metformin HCl


  (Glucophage)  500 mg  BID


 ORAL


   11/17/18 09:00


 12/17/18 08:59  11/17/18 18:00


 


 


 Potassium Chloride


  (K-Dur)  10 meq  DAILY


 ORAL


   11/17/18 09:00


 12/17/18 08:59  11/17/18 08:45


 


 


 Sitagliptin


 Phosphate


  (Januvia)  50 mg  ACBREAKFAST


 ORAL


   11/17/18 06:30


 12/17/18 06:29  11/17/18 05:51


 


 


 Trazodone HCl


  (Desyrel)  50 mg  BEDTIME


 ORAL


   11/16/18 21:00


 12/16/18 20:59  11/17/18 20:31


 


 


 Vitamin D


  (Vitamin D)  5,000 intlu  DAILY


 ORAL


   11/17/18 09:00


 12/17/18 08:59  11/17/18 08:46


 











Assessment/Plan


Problem List:  


(1) encephalopathy due to metabolic diorder


(2) Psychosis


ICD Codes:  F29 - Unspecified psychosis not due to a substance or known 

physiological condition


SNOMED:  69450141


(3) Alzheimer's dementia


ICD Codes:  G30.9 - Alzheimer's disease, unspecified; F02.80 - Dementia in 

other diseases classified elsewhere without behavioral disturbance


SNOMED:  05723610


Assessment/Plan


trazadone 50mg qhs


klomopin .5mg q 6hr prn


the pt lacks capacity to make decisions.











Josh Nunez MD Nov 17, 2018 22:42

## 2018-11-17 NOTE — INTERNAL MED PROGRESS NOTE
Subjective


Date of Service:  Nov 17, 2018


Physician Name


Cheo Ortiz


Attending Physician


Juan Carlos Magana MD





Current Medications








 Medications


  (Trade)  Dose


 Ordered  Sig/Violette


 Route


 PRN Reason  Start Time


 Stop Time Status Last Admin


Dose Admin


 


 Acetaminophen


  (Tylenol)  650 mg  Q6H  PRN


 ORAL


 Mild Pain/Temp > 100.5  11/16/18 17:15


 12/16/18 17:14   


 


 


 Acetaminophen/


 Hydrocodone Bitart


  (Norco 5/325)  1 tab  Q6H  PRN


 ORAL


 Severe Pain (Pain Scale 7-10)  11/16/18 20:30


 11/23/18 20:29   


 


 


 Aspirin


  (ASA)  81 mg  DAILY


 ORAL


   11/17/18 09:00


 12/17/18 08:59  11/17/18 08:42


 


 


 Atenolol


  (Tenormin)  50 mg  Q12HR


 ORAL


   11/16/18 21:00


 12/16/18 20:59  11/17/18 08:46


 


 


 Atorvastatin


 Calcium


  (Lipitor)  20 mg  BEDTIME


 ORAL


   11/17/18 21:00


 12/17/18 20:59   


 


 


 Clonazepam


  (KlonoPIN)  0.5 mg  Q6H  PRN


 ORAL


 For Anxiety  11/16/18 17:30


 11/23/18 17:29   


 


 


 Clonidine HCl


  (Catapres Tab)  0.1 mg  EVERY 8  HOURS


 ORAL


   11/16/18 22:00


 12/16/18 21:59  11/17/18 13:46


 


 


 Clopidogrel


 Bisulfate


  (Plavix)  75 mg  DAILY


 ORAL


   11/17/18 09:00


 12/17/18 08:59  11/17/18 08:45


 


 


 Dicyclomine HCl


  (Bentyl)  10 mg  QID


 ORAL


   11/16/18 18:00


 12/16/18 17:59  11/17/18 13:46


 


 


 Furosemide


  (Lasix)  40 mg  DAILY


 ORAL


   11/17/18 09:00


 12/17/18 08:59  11/17/18 08:45


 


 


 Heparin Sodium


  (Porcine)


  (Heparin 5000


 units/ml)  5,000 units  EVERY 8  HOURS


 SUBQ


   11/16/18 22:00


 12/16/18 21:59  11/17/18 13:45


 


 


 Losartan Potassium


  (Cozaar)  25 mg  DAILY


 ORAL


   11/17/18 09:00


 12/17/18 08:59  11/17/18 08:44


 


 


 Metformin HCl


  (Glucophage)  500 mg  BID


 ORAL


   11/17/18 09:00


 12/17/18 08:59  11/17/18 08:45


 


 


 Potassium Chloride


  (K-Dur)  10 meq  DAILY


 ORAL


   11/17/18 09:00


 12/17/18 08:59  11/17/18 08:45


 


 


 Sitagliptin


 Phosphate


  (Januvia)  50 mg  ACBREAKFAST


 ORAL


   11/17/18 06:30


 12/17/18 06:29  11/17/18 05:51


 


 


 Trazodone HCl


  (Desyrel)  50 mg  BEDTIME


 ORAL


   11/16/18 21:00


 12/16/18 20:59  11/16/18 20:32


 


 


 Vitamin D


  (Vitamin D)  5,000 intlu  DAILY


 ORAL


   11/17/18 09:00


 12/17/18 08:59  11/17/18 08:46


 








Allergies:  


Coded Allergies:  


     No Known Allergies (Unverified , 3/26/14)


ROS Limited/Unobtainable:  Yes


Subjective


79 YO F admitted with generalized weakness.  Cover for Int Med-Dr Magana.





Objective





Last Vital Signs








  Date Time  Temp Pulse Resp B/P (MAP) Pulse Ox O2 Delivery O2 Flow Rate FiO2


 


11/17/18 13:46    154/82    


 


11/17/18 12:09  65 18  99   


 


11/17/18 09:00      Room Air  





      Room Air  


 


11/17/18 08:00 97.9       


 


11/16/18 18:13        98








General Appearance:  WD/WN, mild distress, agitated


EENT:  PERRL/EOMI, normal ENT inspection


Neck:  non-tender, normal alignment, supple, normal inspection


Cardiovascular:  normal peripheral pulses, normal rate, regular rhythm, no 

gallop/murmur, no JVD


Respiratory/Chest:  chest wall non-tender, lungs clear, normal breath sounds, 

no accessory muscle use, respiratory distress


Abdomen:  normal bowel sounds, non tender, soft, no organomegaly, no mass


Extremities:  normal range of motion


Neurologic:  CNs II-XII grossly normal


Skin:  normal pigmentation, warm/dry





Laboratory Tests








Test


  11/17/18


05:40


 


White Blood Count


  5.8 K/UL


(4.8-10.8)


 


Red Blood Count


  2.99 M/UL


(4.20-5.40)  L


 


Hemoglobin


  7.8 G/DL


(12.0-16.0)  L


 


Hematocrit


  25.6 %


(37.0-47.0)  L


 


Mean Corpuscular Volume 86 FL (80-99)  


 


Mean Corpuscular Hemoglobin


  25.9 PG


(27.0-31.0)  L


 


Mean Corpuscular Hemoglobin


Concent 30.3 G/DL


(32.0-36.0)  L


 


Red Cell Distribution Width


  16.9 %


(11.6-14.8)  H


 


Platelet Count


  510 K/UL


(150-450)  H


 


Mean Platelet Volume


  6.9 FL


(6.5-10.1)


 


Neutrophils (%) (Auto)


  % (45.0-75.0)


 


 


Lymphocytes (%) (Auto)


  % (20.0-45.0)


 


 


Monocytes (%) (Auto)  % (1.0-10.0)  


 


Eosinophils (%) (Auto)  % (0.0-3.0)  


 


Basophils (%) (Auto)  % (0.0-2.0)  


 


Differential Total Cells


Counted 100  


 


 


Neutrophils % (Manual) 68 % (45-75)  


 


Lymphocytes % (Manual) 27 % (20-45)  


 


Monocytes % (Manual) 5 % (1-10)  


 


Eosinophils % (Manual) 0 % (0-3)  


 


Basophils % (Manual) 0 % (0-2)  


 


Band Neutrophils 0 % (0-8)  


 


Platelet Estimate Increased  H


 


Platelet Morphology Normal  


 


Polychromasia Occasional  


 


Hypochromasia 2+  


 


Anisocytosis 1+  


 


Ovalocytes 1+  


 


Sodium Level


  144 MMOL/L


(136-145)


 


Potassium Level


  3.5 MMOL/L


(3.5-5.1)


 


Chloride Level


  111 MMOL/L


()  H


 


Carbon Dioxide Level


  22 MMOL/L


(21-32)


 


Anion Gap


  11 mmol/L


(5-15)


 


Blood Urea Nitrogen


  10 mg/dL


(7-18)


 


Creatinine


  0.7 MG/DL


(0.55-1.30)


 


Estimat Glomerular Filtration


Rate  mL/min (>60)  


 


 


Glucose Level


  72 MG/DL


()  L


 


Calcium Level


  8.0 MG/DL


(8.5-10.1)  L


 


Phosphorus Level


  2.8 MG/DL


(2.5-4.9)


 


Magnesium Level


  1.6 MG/DL


(1.8-2.4)  L


 


Total Bilirubin


  0.5 MG/DL


(0.2-1.0)


 


Aspartate Amino Transf


(AST/SGOT) 14 U/L (15-37)


L


 


Alanine Aminotransferase


(ALT/SGPT) 14 U/L (12-78)


 


 


Alkaline Phosphatase


  90 U/L


()


 


Total Protein


  5.8 G/DL


(6.4-8.2)  L


 


Albumin


  2.0 G/DL


(3.4-5.0)  L


 


Globulin 3.8 g/dL  


 


Albumin/Globulin Ratio


  0.5 (1.0-2.7)


L

















Intake and Output  


 


 11/16/18 11/17/18





 19:00 07:00


 


Intake Total  350 ml


 


Output Total  2 ml


 


Balance  348 ml


 


  


 


Intake Oral  350 ml


 


Output Urine Total  2 ml


 


# Voids 1 











Assessment/Plan


Problem List:  


(1) Generalized weakness


Assessment & Plan:  ?deconditioning due to recent hip surgery?  Await PT/OT eval





(2) Diabetes mellitus, type II


Assessment & Plan:  Continue metformin and januvia





(3) HTN (hypertension)


Assessment & Plan:  continue atenolol and cozaar





(4) Sick sinus syndrome


Assessment & Plan:  S/P pacemaker





(5) Aortic stenosis


(6) Pulmonary hypertension


(7) Alzheimer's dementia


(8) Hypercholesterolemia


Status:  not improved











Cheo Ortiz MD Nov 17, 2018 17:00

## 2018-11-17 NOTE — GENERAL PROGRESS NOTE
Assessment/Plan


Problem List:  


(1) encephalopathy due to metabolic diorder


(2) Psychosis


ICD Codes:  F29 - Unspecified psychosis not due to a substance or known 

physiological condition


SNOMED:  85142316


(3) Alzheimer's dementia


ICD Codes:  G30.9 - Alzheimer's disease, unspecified; F02.80 - Dementia in 

other diseases classified elsewhere without behavioral disturbance


SNOMED:  14149657


Status:  unchanged


Assessment/Plan


trazadone 50mg qhs


klomopin .5mg q 6hr prn


the pt lacks capacity to make decisions.





Subjective


Date patient seen:  Nov 17, 2018


Neurologic/Psychiatric:  Reports: anxiety, depressed, emotional problems


Allergies:  


Coded Allergies:  


     No Known Allergies (Unverified , 3/26/14)





Objective





Last 24 Hour Vital Signs








  Date Time  Temp Pulse Resp B/P (MAP) Pulse Ox O2 Delivery O2 Flow Rate FiO2


 


11/17/18 21:09    148/71    


 


11/17/18 21:00      Room Air  





      Room Air  


 


11/17/18 20:31  69  149/74    


 


11/17/18 20:00 97.9 69 20 149/74 (99) 99   


 


11/17/18 16:00 97.6 66 18 146/63 (90) 100   


 


11/17/18 13:46    154/82    


 


11/17/18 12:09  65 18 156/89 (111) 99   


 


11/17/18 09:00      Room Air  





      Room Air  


 


11/17/18 08:46  80  149/80    


 


11/17/18 08:45  88  149/80 (103)    


 


11/17/18 08:44    149/80    


 


11/17/18 08:00 97.9 77 16 148/75 (99) 97   


 


11/17/18 05:51    151/83    


 


11/17/18 04:00 97.8 74 20 151/83 (105) 93   


 


11/17/18 00:00 97.6 80 20 158/96 (116) 95   

















Intake and Output  


 


 11/16/18 11/17/18





 18:59 06:59


 


Intake Total  350 ml


 


Output Total  2 ml


 


Balance  348 ml


 


  


 


Intake Oral  350 ml


 


Output Urine Total  2 ml


 


# Voids 1 








Laboratory Tests


11/17/18 05:40: 


White Blood Count 5.8, Red Blood Count 2.99L, Hemoglobin 7.8L, Hematocrit 25.6L

, Mean Corpuscular Volume 86, Mean Corpuscular Hemoglobin 25.9L, Mean 

Corpuscular Hemoglobin Concent 30.3L, Red Cell Distribution Width 16.9H, 

Platelet Count 510H, Mean Platelet Volume 6.9, Neutrophils (%) (Auto) , 

Lymphocytes (%) (Auto) , Monocytes (%) (Auto) , Eosinophils (%) (Auto) , 

Basophils (%) (Auto) , Differential Total Cells Counted 100, Neutrophils % (

Manual) 68, Lymphocytes % (Manual) 27, Monocytes % (Manual) 5, Eosinophils % (

Manual) 0, Basophils % (Manual) 0, Band Neutrophils 0, Platelet Estimate 

IncreasedH, Platelet Morphology Normal, Polychromasia Occasional, Hypochromasia 

2+, Anisocytosis 1+, Ovalocytes 1+, Sodium Level 144, Potassium Level 3.5, 

Chloride Level 111H, Carbon Dioxide Level 22, Anion Gap 11, Blood Urea Nitrogen 

10, Creatinine 0.7, Estimat Glomerular Filtration Rate , Glucose Level 72L, 

Calcium Level 8.0L, Phosphorus Level 2.8, Magnesium Level 1.6L, Total Bilirubin 

0.5, Aspartate Amino Transf (AST/SGOT) 14L, Alanine Aminotransferase (ALT/SGPT) 

14, Alkaline Phosphatase 90, Total Protein 5.8L, Albumin 2.0L, Globulin 3.8, 

Albumin/Globulin Ratio 0.5L


Height (Feet):  5


Height (Inches):  0.00


Weight (Pounds):  100


General Appearance:  alert, confused, agitated











Josh Nunez MD Nov 17, 2018 22:46

## 2018-11-18 VITALS — SYSTOLIC BLOOD PRESSURE: 127 MMHG | DIASTOLIC BLOOD PRESSURE: 69 MMHG

## 2018-11-18 VITALS — SYSTOLIC BLOOD PRESSURE: 141 MMHG | DIASTOLIC BLOOD PRESSURE: 70 MMHG

## 2018-11-18 VITALS — SYSTOLIC BLOOD PRESSURE: 147 MMHG | DIASTOLIC BLOOD PRESSURE: 67 MMHG

## 2018-11-18 VITALS — DIASTOLIC BLOOD PRESSURE: 68 MMHG | SYSTOLIC BLOOD PRESSURE: 160 MMHG

## 2018-11-18 VITALS — DIASTOLIC BLOOD PRESSURE: 68 MMHG | SYSTOLIC BLOOD PRESSURE: 143 MMHG

## 2018-11-18 VITALS — DIASTOLIC BLOOD PRESSURE: 87 MMHG | SYSTOLIC BLOOD PRESSURE: 144 MMHG

## 2018-11-18 LAB
ADD MANUAL DIFF: NO
ANION GAP SERPL CALC-SCNC: 10 MMOL/L (ref 5–15)
BASOPHILS NFR BLD AUTO: 0.3 % (ref 0–2)
BUN SERPL-MCNC: 13 MG/DL (ref 7–18)
CALCIUM SERPL-MCNC: 8.2 MG/DL (ref 8.5–10.1)
CHLORIDE SERPL-SCNC: 105 MMOL/L (ref 98–107)
CO2 SERPL-SCNC: 24 MMOL/L (ref 21–32)
CREAT SERPL-MCNC: 0.7 MG/DL (ref 0.55–1.3)
EOSINOPHIL NFR BLD AUTO: 0.4 % (ref 0–3)
ERYTHROCYTE [DISTWIDTH] IN BLOOD BY AUTOMATED COUNT: 16.7 % (ref 11.6–14.8)
HCT VFR BLD CALC: 26.9 % (ref 37–47)
HGB BLD-MCNC: 8.2 G/DL (ref 12–16)
LYMPHOCYTES NFR BLD AUTO: 14.9 % (ref 20–45)
MCV RBC AUTO: 84 FL (ref 80–99)
MONOCYTES NFR BLD AUTO: 8.9 % (ref 1–10)
NEUTROPHILS NFR BLD AUTO: 75.5 % (ref 45–75)
PLATELET # BLD: 509 K/UL (ref 150–450)
POTASSIUM SERPL-SCNC: 3 MMOL/L (ref 3.5–5.1)
RBC # BLD AUTO: 3.19 M/UL (ref 4.2–5.4)
SODIUM SERPL-SCNC: 139 MMOL/L (ref 136–145)
WBC # BLD AUTO: 7.7 K/UL (ref 4.8–10.8)

## 2018-11-18 RX ADMIN — HEPARIN SODIUM SCH UNITS: 5000 INJECTION INTRAVENOUS; SUBCUTANEOUS at 06:01

## 2018-11-18 RX ADMIN — ASPIRIN 81 MG SCH MG: 81 TABLET ORAL at 08:17

## 2018-11-18 RX ADMIN — DICYCLOMINE HYDROCHLORIDE SCH MG: 10 CAPSULE ORAL at 13:19

## 2018-11-18 RX ADMIN — VITAMIN D, TAB 1000IU (100/BT) SCH INTLU: 25 TAB at 08:16

## 2018-11-18 RX ADMIN — LOSARTAN POTASSIUM SCH MG: 25 TABLET, FILM COATED ORAL at 08:18

## 2018-11-18 RX ADMIN — DICYCLOMINE HYDROCHLORIDE SCH MG: 10 CAPSULE ORAL at 20:24

## 2018-11-18 RX ADMIN — HYDROCODONE BITARTRATE AND ACETAMINOPHEN PRN TAB: 5; 325 TABLET ORAL at 16:16

## 2018-11-18 RX ADMIN — FUROSEMIDE SCH MG: 40 TABLET ORAL at 08:17

## 2018-11-18 RX ADMIN — METFORMIN HYDROCHLORIDE SCH MG: 500 TABLET ORAL at 08:17

## 2018-11-18 RX ADMIN — METFORMIN HYDROCHLORIDE SCH MG: 500 TABLET ORAL at 17:11

## 2018-11-18 RX ADMIN — DICYCLOMINE HYDROCHLORIDE SCH MG: 10 CAPSULE ORAL at 17:11

## 2018-11-18 RX ADMIN — DICYCLOMINE HYDROCHLORIDE SCH MG: 10 CAPSULE ORAL at 08:17

## 2018-11-18 RX ADMIN — SITAGLIPTIN SCH MG: 50 TABLET, FILM COATED ORAL at 05:56

## 2018-11-18 RX ADMIN — CLONAZEPAM PRN MG: 0.5 TABLET ORAL at 08:18

## 2018-11-18 RX ADMIN — TRAZODONE HYDROCHLORIDE SCH MG: 50 TABLET ORAL at 20:24

## 2018-11-18 RX ADMIN — ATORVASTATIN CALCIUM SCH MG: 20 TABLET, FILM COATED ORAL at 20:24

## 2018-11-18 NOTE — INTERNAL MED PROGRESS NOTE
Subjective


Date of Service:  Nov 18, 2018


Physician Name


DianaCheo


Attending Physician


Juan Carlos Magana MD





Current Medications








 Medications


  (Trade)  Dose


 Ordered  Sig/Violette


 Route


 PRN Reason  Start Time


 Stop Time Status Last Admin


Dose Admin


 


 Acetaminophen


  (Tylenol)  650 mg  Q6H  PRN


 ORAL


 Mild Pain/Temp > 100.5  11/16/18 17:15


 12/16/18 17:14   


 


 


 Acetaminophen/


 Hydrocodone Bitart


  (Norco 5/325)  1 tab  Q6H  PRN


 ORAL


 Severe Pain (Pain Scale 7-10)  11/16/18 20:30


 11/23/18 20:29   


 


 


 Atenolol


  (Tenormin)  50 mg  Q12HR


 ORAL


   11/16/18 21:00


 12/16/18 20:59  11/18/18 08:17


 


 


 Atorvastatin


 Calcium


  (Lipitor)  20 mg  BEDTIME


 ORAL


   11/17/18 21:00


 12/17/18 20:59  11/17/18 20:31


 


 


 Clonazepam


  (KlonoPIN)  0.5 mg  Q6H  PRN


 ORAL


 For Anxiety  11/16/18 17:30


 11/23/18 17:29  11/18/18 08:18


 


 


 Clonidine HCl


  (Catapres Tab)  0.1 mg  EVERY 8  HOURS


 ORAL


   11/16/18 22:00


 12/16/18 21:59  11/18/18 05:56


 


 


 Dicyclomine HCl


  (Bentyl)  10 mg  QID


 ORAL


   11/16/18 18:00


 12/16/18 17:59  11/18/18 08:17


 


 


 Furosemide


  (Lasix)  40 mg  DAILY


 ORAL


   11/17/18 09:00


 12/17/18 08:59  11/18/18 08:17


 


 


 Haloperidol


 Lactate


  (Haldol)  5 mg  Q4H  PRN


 IM


 Agitation  11/18/18 13:00


 12/18/18 12:59   


 


 


 Losartan Potassium


  (Cozaar)  50 mg  DAILY


 ORAL


   11/18/18 09:00


 12/17/18 08:59  11/18/18 08:18


 


 


 Metformin HCl


  (Glucophage)  500 mg  BID


 ORAL


   11/17/18 09:00


 12/17/18 08:59  11/18/18 08:17


 


 


 Potassium Chloride


  (K-Dur)  10 meq  DAILY


 ORAL


   11/17/18 09:00


 12/17/18 08:59  11/18/18 08:17


 


 


 Sitagliptin


 Phosphate


  (Januvia)  50 mg  ACBREAKFAST


 ORAL


   11/17/18 06:30


 12/17/18 06:29  11/18/18 05:56


 


 


 Trazodone HCl


  (Desyrel)  50 mg  BEDTIME


 ORAL


   11/16/18 21:00


 12/16/18 20:59  11/17/18 20:31


 


 


 Vitamin D


  (Vitamin D)  5,000 intlu  DAILY


 ORAL


   11/17/18 09:00


 12/17/18 08:59  11/18/18 08:16


 








Allergies:  


Coded Allergies:  


     No Known Allergies (Unverified , 3/26/14)


ROS Limited/Unobtainable:  No


Subjective


77 YO F admitted with generalized weakness.  Cover for Int Med-Dr Magana.





Objective





Last Vital Signs








  Date Time  Temp Pulse Resp B/P (MAP) Pulse Ox O2 Delivery O2 Flow Rate FiO2


 


11/18/18 12:00 98.1 72 20 160/68 (98) 99   


 


11/18/18 09:00      Room Air  





      Room Air  


 


11/16/18 18:13        98











Laboratory Tests








Test


  11/18/18


05:15


 


White Blood Count


  7.7 K/UL


(4.8-10.8)


 


Red Blood Count


  3.19 M/UL


(4.20-5.40)  L


 


Hemoglobin


  8.2 G/DL


(12.0-16.0)  L


 


Hematocrit


  26.9 %


(37.0-47.0)  L


 


Mean Corpuscular Volume 84 FL (80-99)  


 


Mean Corpuscular Hemoglobin


  25.7 PG


(27.0-31.0)  L


 


Mean Corpuscular Hemoglobin


Concent 30.4 G/DL


(32.0-36.0)  L


 


Red Cell Distribution Width


  16.7 %


(11.6-14.8)  H


 


Platelet Count


  509 K/UL


(150-450)  H


 


Mean Platelet Volume


  6.6 FL


(6.5-10.1)


 


Neutrophils (%) (Auto)


  75.5 %


(45.0-75.0)  H


 


Lymphocytes (%) (Auto)


  14.9 %


(20.0-45.0)  L


 


Monocytes (%) (Auto)


  8.9 %


(1.0-10.0)


 


Eosinophils (%) (Auto)


  0.4 %


(0.0-3.0)


 


Basophils (%) (Auto)


  0.3 %


(0.0-2.0)


 


Sodium Level


  139 MMOL/L


(136-145)


 


Potassium Level


  3.0 MMOL/L


(3.5-5.1)  L


 


Chloride Level


  105 MMOL/L


()


 


Carbon Dioxide Level


  24 MMOL/L


(21-32)


 


Anion Gap


  10 mmol/L


(5-15)


 


Blood Urea Nitrogen


  13 mg/dL


(7-18)


 


Creatinine


  0.7 MG/DL


(0.55-1.30)


 


Estimat Glomerular Filtration


Rate  mL/min (>60)  


 


 


Glucose Level


  111 MG/DL


()  H


 


Calcium Level


  8.2 MG/DL


(8.5-10.1)  L











Microbiology








 Date/Time


Source Procedure


Growth Status


 


 


 11/16/18 13:00


Blood Blood Culture - Preliminary


NO GROWTH AFTER 24 HOURS Resulted


 


 11/16/18 12:00


Blood Blood Culture - Preliminary


NO GROWTH AFTER 24 HOURS Resulted

















Intake and Output  


 


 11/17/18 11/18/18





 19:00 07:00


 


Intake Total 478 ml 


 


Balance 478 ml 


 


  


 


Intake Oral 478 ml 


 


# Voids 5 2








Objective


General Appearance:  WD/WN, mild distress, agitated


EENT:  PERRL/EOMI, normal ENT inspection


Neck:  non-tender, normal alignment, supple, normal inspection


Cardiovascular:  normal peripheral pulses, normal rate, regular rhythm, no 

gallop/murmur, no JVD


Respiratory/Chest:  chest wall non-tender, lungs clear, normal breath sounds, 

no accessory muscle use, respiratory distress


Abdomen:  normal bowel sounds, non tender, soft, no organomegaly, no mass


Extremities:  normal range of motion


Neurologic:  CNs II-XII grossly normal


Skin:  normal pigmentation, warm/dry





Assessment/Plan


Problem List:  


(1) Generalized weakness


Assessment & Plan:  ?deconditioning due to recent hip surgery?  Await PT/OT eval





(2) Diabetes mellitus, type II


Assessment & Plan:  Continue metformin and januvia





(3) HTN (hypertension)


Assessment & Plan:  continue atenolol and cozaar





(4) Sick sinus syndrome


Assessment & Plan:  S/P pacemaker





(5) Aortic stenosis


(6) Pulmonary hypertension


(7) Alzheimer's dementia


(8) Hypercholesterolemia











Cheo Ortiz MD Nov 18, 2018 13:08

## 2018-11-18 NOTE — GENERAL PROGRESS NOTE
Subjective


Allergies:  


Coded Allergies:  


     No Known Allergies (Unverified , 3/26/14)





Objective





Last 24 Hour Vital Signs








  Date Time  Temp Pulse Resp B/P (MAP) Pulse Ox O2 Delivery O2 Flow Rate FiO2


 


11/18/18 15:52 98.3 63 16 127/69 (88) 99   


 


11/18/18 13:20    160/68    


 


11/18/18 12:00 98.1 72 20 160/68 (98) 99   


 


11/18/18 09:00      Room Air  





      Room Air  


 


11/18/18 08:18    143/78    


 


11/18/18 08:17  70  143/78    


 


11/18/18 08:00 99.0 70 20 143/68 (93) 99   


 


11/18/18 05:56    144/87    


 


11/18/18 04:00 97.9 99 20 144/87 (106) 99   


 


11/18/18 00:00 97.0 67 20 141/70 (93) 98   


 


11/17/18 21:09    148/71    


 


11/17/18 21:00      Room Air  





      Room Air  


 


11/17/18 20:31  69  149/74    


 


11/17/18 20:00 97.9 69 20 149/74 (99) 99   

















Intake and Output  


 


 11/17/18 11/18/18





 19:00 07:00


 


Intake Total 478 ml 


 


Balance 478 ml 


 


  


 


Intake Oral 478 ml 


 


# Voids 5 2








Laboratory Tests


11/18/18 05:15: 


White Blood Count 7.7, Red Blood Count 3.19L, Hemoglobin 8.2L, Hematocrit 26.9L

, Mean Corpuscular Volume 84, Mean Corpuscular Hemoglobin 25.7L, Mean 

Corpuscular Hemoglobin Concent 30.4L, Red Cell Distribution Width 16.7H, 

Platelet Count 509H, Mean Platelet Volume 6.6, Neutrophils (%) (Auto) 75.5H, 

Lymphocytes (%) (Auto) 14.9L, Monocytes (%) (Auto) 8.9, Eosinophils (%) (Auto) 

0.4, Basophils (%) (Auto) 0.3, Sodium Level 139, Potassium Level 3.0L, Chloride 

Level 105, Carbon Dioxide Level 24, Anion Gap 10, Blood Urea Nitrogen 13, 

Creatinine 0.7, Estimat Glomerular Filtration Rate , Glucose Level 111H, 

Calcium Level 8.2L


Height (Feet):  5


Height (Inches):  0.00


Weight (Pounds):  100











Florina Butler MD Nov 18, 2018 19:57

## 2018-11-18 NOTE — CONSULTATION
History of Present Illness


General


Date patient seen:  Nov 18, 2018


Chief Complaint:  Generalized Weakness





Present Illness


HPI


78 year old female with multiple medical comorbidities presented for 

generalized weakness/failure to thrive.  Has been in care facility and recently 

has had Ortho surgery.  In recovery when she declined and required admission.  

On admission noted to have sacral decubitus ulcers which may need intervention.

  Surgery called to evaluate and assist with care / management.  patient seen, 

chart reviewed, patient examined.  in bed comfortable.  alert.  significant 

dementia.


Allergies:  


Coded Allergies:  


     No Known Allergies (Unverified , 3/26/14)





Medication History


Scheduled


Aspirin (Aspirin EC), 81 MG ORAL DAILY, (Reported)


Atenolol* (Tenormin*), 50 MG ORAL BID, (Reported)


Clonazepam* (Klonopin*), 0.5 MG ORAL Q6H, (Reported)


Clonidine Hcl* (Catapres*), 0.1 MG ORAL EVERY 8 HOURS, (Reported)


Clopidogrel* (Clopidogrel*), 75 MG ORAL DAILY, (Reported)


Dicyclomine Hcl* (Dicyclomine Hcl*), 10 MG PO QID


Famotidine (Pepcid), 20 MG ORAL BEDTIME


Furosemide* (Lasix*), 40 MG ORAL DAILY, (Reported)


Losartan Potassium* (Losartan Potassium*), 25 MG ORAL DAILY, (Reported)


Potassium Chloride (Potassium Chloride), 10 MEQ ORAL DAILY, (Reported)


Rosuvastatin Calcium* (Crestor*), 10 MG ORAL DAILY, (Reported)


Sitagliptin Phos/Metformin Hcl (Janumet Xr 50-1,000 Mg Tablet), 1 TAB ORAL DAILY

, (Reported)


Trazodone* (Trazodone*), 50 MG ORAL BEDTIME, (Reported)


Vitamin D (Vitamin D3), 5,000 UNITS ORAL DAILY, (Reported)





Scheduled PRN


Hydrocodone Bit/Acetaminophen 5-325* (Norco 5-325*), 1 TAB ORAL Q6H PRN for For 

Pain





Patient History


Limited by:  age, medical condition


History Provided By:  Patient, Medical Record, PMD


Healthcare decision maker





Resuscitation status


Full Code


Advanced Directive on File


No





Past Medical/Surgical History


Past Medical/Surgical History:  


(1) Gravely disabled


(2) Pulmonary hypertension


(3) Aortic stenosis


(4) Generalized weakness


(5) Hypercholesteremia


(6) Hypercholesterolemia


(7) Alzheimer's dementia


(8) Failure to thrive


(9) Recurrent dislocation, right hip


(10) Fracture of superior pubic ramus


(11) encephalopathy due to metabolic diorder


(12) Pressure ulcer


(13) Delirium


(14) Acute on chronic systolic CHF (congestive heart failure)


(15) Colitis


(16) Diabetes mellitus, type II


(17) Closed fracture of symphysis pubis


(18) Psychosis


(19) DVT of left axillary vein, acute


(20) Sick sinus syndrome


(21) CAD (coronary artery disease)


(22) HTN (hypertension)


(23) Pacemaker


(24) Fracture of femoral neck, right, closed


(25) Hip fracture requiring operative repair


(26) UTI (urinary tract infection)


(27) E coli infection





Review of Systems


All Other Systems:  negative except mentioned in HPI


ROS Narrative


cannot obtain given medical condition





Physical Exam


General Appearance:  no apparent distress, alert


Lines, tubes and drains:  peripheral


HEENT:  mucous membranes moist


Neck:  normal inspection


Respiratory/Chest:  normal breath sounds, no respiratory distress


Cardiovascular/Chest:  normal rate


Abdomen:  normal bowel sounds, non tender, soft, no organomegaly


Extremities:  normal inspection, other


Skin Exam:  other


Neurologic:  alert, disoriented





Last 24 Hour Vital Signs








  Date Time  Temp Pulse Resp B/P (MAP) Pulse Ox O2 Delivery O2 Flow Rate FiO2


 


11/18/18 15:52 98.3 63 16 127/69 (88) 99   


 


11/18/18 13:20    160/68    


 


11/18/18 12:00 98.1 72 20 160/68 (98) 99   


 


11/18/18 09:00      Room Air  





      Room Air  


 


11/18/18 08:18    143/78    


 


11/18/18 08:17  70  143/78    


 


11/18/18 08:00 99.0 70 20 143/68 (93) 99   


 


11/18/18 05:56    144/87    


 


11/18/18 04:00 97.9 99 20 144/87 (106) 99   


 


11/18/18 00:00 97.0 67 20 141/70 (93) 98   


 


11/17/18 21:09    148/71    


 


11/17/18 21:00      Room Air  





      Room Air  


 


11/17/18 20:31  69  149/74    


 


11/17/18 20:00 97.9 69 20 149/74 (99) 99   

















Intake and Output  


 


 11/17/18 11/18/18





 19:00 07:00


 


Intake Total 478 ml 


 


Balance 478 ml 


 


  


 


Intake Oral 478 ml 


 


# Voids 5 2











Laboratory Tests








Test


  11/18/18


05:15


 


White Blood Count


  7.7 K/UL


(4.8-10.8)


 


Red Blood Count


  3.19 M/UL


(4.20-5.40)  L


 


Hemoglobin


  8.2 G/DL


(12.0-16.0)  L


 


Hematocrit


  26.9 %


(37.0-47.0)  L


 


Mean Corpuscular Volume 84 FL (80-99)  


 


Mean Corpuscular Hemoglobin


  25.7 PG


(27.0-31.0)  L


 


Mean Corpuscular Hemoglobin


Concent 30.4 G/DL


(32.0-36.0)  L


 


Red Cell Distribution Width


  16.7 %


(11.6-14.8)  H


 


Platelet Count


  509 K/UL


(150-450)  H


 


Mean Platelet Volume


  6.6 FL


(6.5-10.1)


 


Neutrophils (%) (Auto)


  75.5 %


(45.0-75.0)  H


 


Lymphocytes (%) (Auto)


  14.9 %


(20.0-45.0)  L


 


Monocytes (%) (Auto)


  8.9 %


(1.0-10.0)


 


Eosinophils (%) (Auto)


  0.4 %


(0.0-3.0)


 


Basophils (%) (Auto)


  0.3 %


(0.0-2.0)


 


Sodium Level


  139 MMOL/L


(136-145)


 


Potassium Level


  3.0 MMOL/L


(3.5-5.1)  L


 


Chloride Level


  105 MMOL/L


()


 


Carbon Dioxide Level


  24 MMOL/L


(21-32)


 


Anion Gap


  10 mmol/L


(5-15)


 


Blood Urea Nitrogen


  13 mg/dL


(7-18)


 


Creatinine


  0.7 MG/DL


(0.55-1.30)


 


Estimat Glomerular Filtration


Rate  mL/min (>60)  


 


 


Glucose Level


  111 MG/DL


()  H


 


Calcium Level


  8.2 MG/DL


(8.5-10.1)  L








Height (Feet):  5


Height (Inches):  0.00


Weight (Pounds):  100


Medications





Current Medications








 Medications


  (Trade)  Dose


 Ordered  Sig/Violette


 Route


 PRN Reason  Start Time


 Stop Time Status Last Admin


Dose Admin


 


 Acetaminophen


  (Tylenol)  650 mg  Q6H  PRN


 ORAL


 Mild Pain/Temp > 100.5  11/16/18 17:15


 12/16/18 17:14   


 


 


 Acetaminophen/


 Hydrocodone Bitart


  (Norco 5/325)  1 tab  Q6H  PRN


 ORAL


 Severe Pain (Pain Scale 7-10)  11/16/18 20:30


 11/23/18 20:29   


 


 


 Atenolol


  (Tenormin)  50 mg  Q12HR


 ORAL


   11/16/18 21:00


 12/16/18 20:59  11/18/18 08:17


 


 


 Atorvastatin


 Calcium


  (Lipitor)  20 mg  BEDTIME


 ORAL


   11/17/18 21:00


 12/17/18 20:59  11/17/18 20:31


 


 


 Clonazepam


  (KlonoPIN)  0.5 mg  Q6H  PRN


 ORAL


 For Anxiety  11/16/18 17:30


 11/23/18 17:29  11/18/18 08:18


 


 


 Clonidine HCl


  (Catapres Tab)  0.1 mg  EVERY 8  HOURS


 ORAL


   11/16/18 22:00


 12/16/18 21:59  11/18/18 13:20


 


 


 Dicyclomine HCl


  (Bentyl)  10 mg  QID


 ORAL


   11/16/18 18:00


 12/16/18 17:59  11/18/18 13:19


 


 


 Haloperidol


 Lactate


  (Haldol)  5 mg  Q4H  PRN


 IM


 Agitation  11/18/18 13:00


 12/18/18 12:59   


 


 


 Losartan Potassium


  (Cozaar)  50 mg  DAILY


 ORAL


   11/18/18 09:00


 12/17/18 08:59  11/18/18 08:18


 


 


 Metformin HCl


  (Glucophage)  500 mg  BID


 ORAL


   11/17/18 09:00


 12/17/18 08:59  11/18/18 08:17


 


 


 Potassium Chloride


  (K-Dur)  10 meq  DAILY


 ORAL


   11/17/18 09:00


 12/17/18 08:59  11/18/18 08:17


 


 


 Sitagliptin


 Phosphate


  (Januvia)  50 mg  ACBREAKFAST


 ORAL


   11/17/18 06:30


 12/17/18 06:29  11/18/18 05:56


 


 


 Trazodone HCl


  (Desyrel)  50 mg  BEDTIME


 ORAL


   11/16/18 21:00


 12/16/18 20:59  11/17/18 20:31


 


 


 Vitamin D


  (Vitamin D)  5,000 intlu  DAILY


 ORAL


   11/17/18 09:00


 12/17/18 08:59  11/18/18 08:16


 











Assessment/Plan


Problem List:  


(1) Sacral decubitus ulcer, stage III


Assessment & Plan:  Full thickness pressure injury to sacral region.  multiple 

areas some which are chronic with some slow resolving areas noted.  2cm x3cm 

midline sacral full thickness wound with 100% sloth, just right and just left 

to this there are smaller areas approximately <2cm x <2cm with similar 

appearing ulcerations.  no odor.  no significant drainage.  no bone or muscle 

seem to be exposed.  100% sloth.  


wounds present upon admission and will be cared for during hospital stay





Plan:


wash wounds daily with NS; apply hydrogel or therahoney followed by foam 

dressing


turn q2h


heel protectors


air mattress


will likely need excisional vs non excisional debridement of sloth down to 

healthy viable tissue.  may consider during hospitalization 


nutritional support


ICD Codes:  L89.153 - Pressure ulcer of sacral region, stage 3


SNOMED:  997297440, 114110805


(2) Failure to thrive


Assessment & Plan:  elderly female with dementia


albumin 2


multiple decubitus ulcers





nutritional consult


increase protein and Caloric intake


will need optimization to help heal wounds


SNOMED:  80728012


Status:  stable











Tuan Patricia Nov 18, 2018 16:14

## 2018-11-18 NOTE — GENERAL PROGRESS NOTE
Assessment/Plan


Problem List:  


(1) encephalopathy due to metabolic diorder


(2) Psychosis


ICD Codes:  F29 - Unspecified psychosis not due to a substance or known 

physiological condition


SNOMED:  05499137


(3) Alzheimer's dementia


ICD Codes:  G30.9 - Alzheimer's disease, unspecified; F02.80 - Dementia in 

other diseases classified elsewhere without behavioral disturbance


SNOMED:  31593046


Assessment/Plan


trazadone 50mg qhs


klomopin .5mg q 6hr prn


the pt lacks capacity to make decisions.





Subjective


Neurologic/Psychiatric:  Reports: anxiety, depressed


Allergies:  


Coded Allergies:  


     No Known Allergies (Unverified , 3/26/14)





Objective





Last 24 Hour Vital Signs








  Date Time  Temp Pulse Resp B/P (MAP) Pulse Ox O2 Delivery O2 Flow Rate FiO2


 


11/18/18 21:11      Room Air  





      Room Air  


 


11/18/18 20:51    147/67    


 


11/18/18 20:24  70  147/67    


 


11/18/18 20:00 98.3 70 20 147/67 (93) 99   


 


11/18/18 15:52 98.3 63 16 127/69 (88) 99   


 


11/18/18 13:20    160/68    


 


11/18/18 12:00 98.1 72 20 160/68 (98) 99   


 


11/18/18 09:00      Room Air  





      Room Air  


 


11/18/18 08:18    143/78    


 


11/18/18 08:17  70  143/78    


 


11/18/18 08:00 99.0 70 20 143/68 (93) 99   


 


11/18/18 05:56    144/87    


 


11/18/18 04:00 97.9 99 20 144/87 (106) 99   


 


11/18/18 00:00 97.0 67 20 141/70 (93) 98   

















Intake and Output  


 


 11/17/18 11/18/18





 19:00 07:00


 


Intake Total 478 ml 


 


Balance 478 ml 


 


  


 


Intake Oral 478 ml 


 


# Voids 5 2








Laboratory Tests


11/18/18 05:15: 


White Blood Count 7.7, Red Blood Count 3.19L, Hemoglobin 8.2L, Hematocrit 26.9L

, Mean Corpuscular Volume 84, Mean Corpuscular Hemoglobin 25.7L, Mean 

Corpuscular Hemoglobin Concent 30.4L, Red Cell Distribution Width 16.7H, 

Platelet Count 509H, Mean Platelet Volume 6.6, Neutrophils (%) (Auto) 75.5H, 

Lymphocytes (%) (Auto) 14.9L, Monocytes (%) (Auto) 8.9, Eosinophils (%) (Auto) 

0.4, Basophils (%) (Auto) 0.3, Sodium Level 139, Potassium Level 3.0L, Chloride 

Level 105, Carbon Dioxide Level 24, Anion Gap 10, Blood Urea Nitrogen 13, 

Creatinine 0.7, Estimat Glomerular Filtration Rate , Glucose Level 111H, 

Calcium Level 8.2L


Height (Feet):  5


Height (Inches):  0.00


Weight (Pounds):  100


General Appearance:  alert, confused, moderate distress, agitated











Josh Nunez MD Nov 18, 2018 23:55

## 2018-11-18 NOTE — GENERAL PROGRESS NOTE
Assessment/Plan


Assessment/Plan


Assessment


- constipation, rectal loading


- hematochezia, likely hemorrhoidal


- Anemia


- agitation





Recommendations


- clear liquid diet


- GI laxative


- Will contact family re findings 


- Monitor H&H


- Check Fe panel





Subjective


Allergies:  


Coded Allergies:  


     No Known Allergies (Unverified , 3/26/14)





Objective





Last 24 Hour Vital Signs








  Date Time  Temp Pulse Resp B/P (MAP) Pulse Ox O2 Delivery O2 Flow Rate FiO2


 


11/18/18 20:00 98.3 70 20 147/67 (93) 99   


 


11/18/18 15:52 98.3 63 16 127/69 (88) 99   


 


11/18/18 13:20    160/68    


 


11/18/18 12:00 98.1 72 20 160/68 (98) 99   


 


11/18/18 09:00      Room Air  





      Room Air  


 


11/18/18 08:18    143/78    


 


11/18/18 08:17  70  143/78    


 


11/18/18 08:00 99.0 70 20 143/68 (93) 99   


 


11/18/18 05:56    144/87    


 


11/18/18 04:00 97.9 99 20 144/87 (106) 99   


 


11/18/18 00:00 97.0 67 20 141/70 (93) 98   


 


11/17/18 21:09    148/71    


 


11/17/18 21:00      Room Air  





      Room Air  


 


11/17/18 20:31  69  149/74    

















Intake and Output  


 


 11/17/18 11/18/18





 19:00 07:00


 


Intake Total 478 ml 


 


Balance 478 ml 


 


  


 


Intake Oral 478 ml 


 


# Voids 5 2








Laboratory Tests


11/18/18 05:15: 


White Blood Count 7.7, Red Blood Count 3.19L, Hemoglobin 8.2L, Hematocrit 26.9L

, Mean Corpuscular Volume 84, Mean Corpuscular Hemoglobin 25.7L, Mean 

Corpuscular Hemoglobin Concent 30.4L, Red Cell Distribution Width 16.7H, 

Platelet Count 509H, Mean Platelet Volume 6.6, Neutrophils (%) (Auto) 75.5H, 

Lymphocytes (%) (Auto) 14.9L, Monocytes (%) (Auto) 8.9, Eosinophils (%) (Auto) 

0.4, Basophils (%) (Auto) 0.3, Sodium Level 139, Potassium Level 3.0L, Chloride 

Level 105, Carbon Dioxide Level 24, Anion Gap 10, Blood Urea Nitrogen 13, 

Creatinine 0.7, Estimat Glomerular Filtration Rate , Glucose Level 111H, 

Calcium Level 8.2L


Height (Feet):  5


Height (Inches):  0.00


Weight (Pounds):  100











Florina Butler MD Nov 18, 2018 20:22

## 2018-11-19 VITALS — SYSTOLIC BLOOD PRESSURE: 143 MMHG | DIASTOLIC BLOOD PRESSURE: 84 MMHG

## 2018-11-19 VITALS — SYSTOLIC BLOOD PRESSURE: 163 MMHG | DIASTOLIC BLOOD PRESSURE: 85 MMHG

## 2018-11-19 VITALS — DIASTOLIC BLOOD PRESSURE: 67 MMHG | SYSTOLIC BLOOD PRESSURE: 115 MMHG

## 2018-11-19 VITALS — DIASTOLIC BLOOD PRESSURE: 87 MMHG | SYSTOLIC BLOOD PRESSURE: 144 MMHG

## 2018-11-19 VITALS — SYSTOLIC BLOOD PRESSURE: 134 MMHG | DIASTOLIC BLOOD PRESSURE: 64 MMHG

## 2018-11-19 VITALS — DIASTOLIC BLOOD PRESSURE: 80 MMHG | SYSTOLIC BLOOD PRESSURE: 130 MMHG

## 2018-11-19 LAB
% IRON SATURATION: 9 % (ref 15–50)
ADD MANUAL DIFF: NO
ANION GAP SERPL CALC-SCNC: 9 MMOL/L (ref 5–15)
BASOPHILS NFR BLD AUTO: 0.4 % (ref 0–2)
BUN SERPL-MCNC: 12 MG/DL (ref 7–18)
CALCIUM SERPL-MCNC: 8.8 MG/DL (ref 8.5–10.1)
CHLORIDE SERPL-SCNC: 105 MMOL/L (ref 98–107)
CO2 SERPL-SCNC: 27 MMOL/L (ref 21–32)
CREAT SERPL-MCNC: 0.7 MG/DL (ref 0.55–1.3)
EOSINOPHIL NFR BLD AUTO: 0.2 % (ref 0–3)
ERYTHROCYTE [DISTWIDTH] IN BLOOD BY AUTOMATED COUNT: 16.7 % (ref 11.6–14.8)
HCT VFR BLD CALC: 28.8 % (ref 37–47)
HGB BLD-MCNC: 8.8 G/DL (ref 12–16)
IRON SERPL-MCNC: 18 UG/DL (ref 50–175)
LYMPHOCYTES NFR BLD AUTO: 17.6 % (ref 20–45)
MCV RBC AUTO: 85 FL (ref 80–99)
MONOCYTES NFR BLD AUTO: 8.9 % (ref 1–10)
NEUTROPHILS NFR BLD AUTO: 73 % (ref 45–75)
PLATELET # BLD: 467 K/UL (ref 150–450)
POTASSIUM SERPL-SCNC: 3.5 MMOL/L (ref 3.5–5.1)
RBC # BLD AUTO: 3.39 M/UL (ref 4.2–5.4)
SODIUM SERPL-SCNC: 141 MMOL/L (ref 136–145)
TIBC SERPL-MCNC: 201 UG/DL (ref 250–450)
UNSATURATED IRON BINDING: 183 UG/DL (ref 112–346)
WBC # BLD AUTO: 7.8 K/UL (ref 4.8–10.8)

## 2018-11-19 RX ADMIN — VITAMIN D, TAB 1000IU (100/BT) SCH INTLU: 25 TAB at 09:04

## 2018-11-19 RX ADMIN — DICYCLOMINE HYDROCHLORIDE SCH MG: 10 CAPSULE ORAL at 21:00

## 2018-11-19 RX ADMIN — SITAGLIPTIN SCH MG: 50 TABLET, FILM COATED ORAL at 05:55

## 2018-11-19 RX ADMIN — DICYCLOMINE HYDROCHLORIDE SCH MG: 10 CAPSULE ORAL at 17:49

## 2018-11-19 RX ADMIN — ATORVASTATIN CALCIUM SCH MG: 20 TABLET, FILM COATED ORAL at 21:00

## 2018-11-19 RX ADMIN — HYDROCODONE BITARTRATE AND ACETAMINOPHEN PRN TAB: 5; 325 TABLET ORAL at 09:16

## 2018-11-19 RX ADMIN — METFORMIN HYDROCHLORIDE SCH MG: 500 TABLET ORAL at 17:49

## 2018-11-19 RX ADMIN — TRAZODONE HYDROCHLORIDE SCH MG: 50 TABLET ORAL at 21:00

## 2018-11-19 RX ADMIN — METFORMIN HYDROCHLORIDE SCH MG: 500 TABLET ORAL at 09:06

## 2018-11-19 RX ADMIN — DICYCLOMINE HYDROCHLORIDE SCH MG: 10 CAPSULE ORAL at 14:12

## 2018-11-19 RX ADMIN — DICYCLOMINE HYDROCHLORIDE SCH MG: 10 CAPSULE ORAL at 09:06

## 2018-11-19 RX ADMIN — LOSARTAN POTASSIUM SCH MG: 25 TABLET, FILM COATED ORAL at 09:05

## 2018-11-19 RX ADMIN — CLONAZEPAM PRN MG: 0.5 TABLET ORAL at 09:35

## 2018-11-19 NOTE — GENERAL PROGRESS NOTE
Assessment/Plan


Assessment/Plan


Assessment


- constipation, rectal loading


- hematochezia, likely hemorrhoidal


- Anemia


- agitation





Recommendations


- clear liquid diet


- GI laxative


- Colonoscopy in am


- WIll log-roll patient given recent hip surgery 


- Monitor H&H


- Check Fe panel





Subjective


Allergies:  


Coded Allergies:  


     No Known Allergies (Unverified , 3/26/14)


Subjective


still agitated this am


(+) BM


d/w daughter - wants to proceed with colonoscopy


patient refusing some of the GI prep





Objective





Last 24 Hour Vital Signs








  Date Time  Temp Pulse Resp B/P (MAP) Pulse Ox O2 Delivery O2 Flow Rate FiO2


 


11/19/18 21:00  59  115/67    


 


11/19/18 21:00      Room Air  





      Room Air  


 


11/19/18 20:00 97.3 59 17 115/67 (83) 93   


 


11/19/18 15:46 97.5 99 20 144/87 (106) 98   


 


11/19/18 14:12    130/80    


 


11/19/18 12:00 98.1 80 18 130/80 (97) 98   


 


11/19/18 09:05    143/84    


 


11/19/18 09:05  77  143/84    


 


11/19/18 08:16      Room Air  





      Room Air  


 


11/19/18 08:00 98.0 77 19 143/84 (103) 97   


 


11/19/18 05:52    134/64    


 


11/19/18 04:00 97.4 74 18 134/64 (87) 95   


 


11/19/18 00:12 98.0 80 20 163/85 (111) 96   

















Intake and Output  


 


 11/18/18 11/19/18





 19:00 07:00


 


Intake Total 354 ml 118 ml


 


Balance 354 ml 118 ml


 


  


 


Intake Oral 354 ml 118 ml


 


# Voids 4 6


 


# Bowel Movements 1 7








Laboratory Tests


11/19/18 07:16: 


White Blood Count 7.8, Red Blood Count 3.39L, Hemoglobin 8.8L, Hematocrit 28.8L

, Mean Corpuscular Volume 85, Mean Corpuscular Hemoglobin 26.0L, Mean 

Corpuscular Hemoglobin Concent 30.7L, Red Cell Distribution Width 16.7H, 

Platelet Count 467H, Mean Platelet Volume 6.8, Neutrophils (%) (Auto) 73.0, 

Lymphocytes (%) (Auto) 17.6L, Monocytes (%) (Auto) 8.9, Eosinophils (%) (Auto) 

0.2, Basophils (%) (Auto) 0.4, Sodium Level 141, Potassium Level 3.5, Chloride 

Level 105, Carbon Dioxide Level 27, Anion Gap 9, Blood Urea Nitrogen 12, 

Creatinine 0.7, Estimat Glomerular Filtration Rate , Glucose Level 107H, 

Calcium Level 8.8, Iron Level 18L, Total Iron Binding Capacity 201L, Percent 

Iron Saturation 9L, Unsaturated Iron Binding 183


Height (Feet):  5


Height (Inches):  0.00


Weight (Pounds):  100


Objective


Elderly WW


NCAT


CTA


RRR


Abd NT ND


ext no edema











Florina Butler MD Nov 19, 2018 23:41

## 2018-11-19 NOTE — GENERAL SURGERY PROGRESS NOTE
General Surgery-Progress Note


Subjective


Additional Comments


no acute events.  anemia.  iron panel noted.





Objective





Last 24 Hour Vital Signs








  Date Time  Temp Pulse Resp B/P (MAP) Pulse Ox O2 Delivery O2 Flow Rate FiO2


 


11/19/18 12:00 98.1 80 18 130/80 (97) 98   


 


11/19/18 09:05    143/84    


 


11/19/18 09:05  77  143/84    


 


11/19/18 08:16      Room Air  





      Room Air  


 


11/19/18 08:00 98.0 77 19 143/84 (103) 97   


 


11/19/18 05:52    134/64    


 


11/19/18 04:00 97.4 74 18 134/64 (87) 95   


 


11/19/18 00:12 98.0 80 20 163/85 (111) 96   


 


11/18/18 21:11      Room Air  





      Room Air  


 


11/18/18 20:51    147/67    


 


11/18/18 20:24  70  147/67    


 


11/18/18 20:00 98.3 70 20 147/67 (93) 99   


 


11/18/18 15:52 98.3 63 16 127/69 (88) 99   








I&O











Intake and Output  


 


 11/18/18 11/19/18





 19:00 07:00


 


Intake Total 354 ml 118 ml


 


Balance 354 ml 118 ml


 


  


 


Intake Oral 354 ml 118 ml


 


# Voids 4 6


 


# Bowel Movements 1 7








Dressing:  saturated


Wound:  other


Drains:  other


Cardiovascular:  RSR


Respiratory:  clear


Abdomen:  soft, flat, non-tender, present bowel sounds


Extremities:  other





Laboratory Tests








Test


  11/19/18


07:16


 


White Blood Count


  7.8 K/UL


(4.8-10.8)


 


Red Blood Count


  3.39 M/UL


(4.20-5.40)  L


 


Hemoglobin


  8.8 G/DL


(12.0-16.0)  L


 


Hematocrit


  28.8 %


(37.0-47.0)  L


 


Mean Corpuscular Volume 85 FL (80-99)  


 


Mean Corpuscular Hemoglobin


  26.0 PG


(27.0-31.0)  L


 


Mean Corpuscular Hemoglobin


Concent 30.7 G/DL


(32.0-36.0)  L


 


Red Cell Distribution Width


  16.7 %


(11.6-14.8)  H


 


Platelet Count


  467 K/UL


(150-450)  H


 


Mean Platelet Volume


  6.8 FL


(6.5-10.1)


 


Neutrophils (%) (Auto)


  73.0 %


(45.0-75.0)


 


Lymphocytes (%) (Auto)


  17.6 %


(20.0-45.0)  L


 


Monocytes (%) (Auto)


  8.9 %


(1.0-10.0)


 


Eosinophils (%) (Auto)


  0.2 %


(0.0-3.0)


 


Basophils (%) (Auto)


  0.4 %


(0.0-2.0)


 


Sodium Level


  141 MMOL/L


(136-145)


 


Potassium Level


  3.5 MMOL/L


(3.5-5.1)


 


Chloride Level


  105 MMOL/L


()


 


Carbon Dioxide Level


  27 MMOL/L


(21-32)


 


Anion Gap


  9 mmol/L


(5-15)


 


Blood Urea Nitrogen


  12 mg/dL


(7-18)


 


Creatinine


  0.7 MG/DL


(0.55-1.30)


 


Estimat Glomerular Filtration


Rate  mL/min (>60)  


 


 


Glucose Level


  107 MG/DL


()  H


 


Calcium Level


  8.8 MG/DL


(8.5-10.1)


 


Iron Level


  18 ug/dL


()  L


 


Total Iron Binding Capacity


  201 ug/dL


(250-450)  L


 


Percent Iron Saturation 9 % (15-50)  L


 


Unsaturated Iron Binding


  183 ug/dL


(112-346)











Plan


Problems:  


(1) Sacral decubitus ulcer, stage III


Assessment & Plan:  Full thickness pressure injury to sacral region.  multiple 

areas some which are chronic with some slow resolving areas noted.  2cm x3cm 

midline sacral full thickness wound with 100% sloth, just right and just left 

to this there are smaller areas approximately <2cm x <2cm with similar 

appearing ulcerations.  no odor.  no significant drainage.  no bone or muscle 

seem to be exposed.  100% sloth.  


wounds present upon admission and will be cared for during hospital stay





Plan:


wash wounds daily with NS; apply hydrogel or therahoney followed by foam 

dressing


turn q2h


heel protectors


air mattress


will likely need excisional vs non excisional debridement of sloth down to 

healthy viable tissue.  may consider during hospitalization 


nutritional support 





(2) Failure to thrive


Assessment & Plan:  elderly female with dementia


albumin 2


multiple decubitus ulcers





nutritional consult


increase protein and Caloric intake


will need optimization to help heal wounds 














Tuan Patricia Nov 19, 2018 14:13

## 2018-11-19 NOTE — CONSULTATION
DATE OF CONSULTATION:  11/18/2018

GASTROENTEROLOGY CONSULTATION



CONSULTING PHYSICIAN:  Florina Butler M.D.



REFERRING PHYSICIAN:  Juan Carlos Magana M.D.



CHIEF COMPLAINT:  I was asked to see this patient by Dr. Juan Carlos Magana for

evaluation of gastrointestinal bleeding.  The patient is an 78-year-old

[place] woman who is admitted to the hospital due to complaints of

generalized weakness.  She has been seen by multiple consultants.  At

times, she has been agitated.  She is status post open reduction and

internal fixation of right hip on October 15.  She was noted to have a

bowel movement which had just a little amount of blood in it.  Yesterday

her bowel movements were brown and normal.  There is no chart report of

any significant gastrointestinal history in the past.



PAST MEDICAL HISTORY:  History of right hip fracture, status post right hip

arthroplasty about a month ago.  She has a history of right hip

arthroplasty and subsequent reduction , type 2 diabetes,

hypertension, sick sinus syndrome, hypercholesterolemia, coronary artery

disease, status post myocardial infarction, aortic stenosis, pulmonary

hypertension, Alzheimer dementia, status post pacemaker placement in 2014

as well as pacemaker generator change in 2017.



MEDICATIONS:  Include calcium and Forteo.



ALLERGIES:  None.



FAMILY HISTORY:  Noncontributory.



SOCIAL HISTORY:  The patient is  and lives with her .  There

has been no history of smoking or drinking.



REVIEW OF SYSTEMS:  Otherwise negative.



PHYSICAL EXAMINATION:

GENERAL:  An elderly white woman, seen in her room with the nurses at

bedside.

HEENT:  Normocephalic and atraumatic.  Sclerae are anicteric.  Oropharynx

clear.

NECK:  Supple.

CHEST:  Clear to auscultation.

CARDIOVASCULAR:  Revealed a regular rate.

ABDOMEN:  Soft.  Good bowel sounds.  There is no organomegaly.

RECTAL:  Revealed solid stool in the rectum, which was brown

from just the anal area, which had some thrombosed hemorrhoids.



ASSESSMENT:  This patient has rectal bleeding, which is likely from

hemorrhoids.  The patient does have constipation and rectal stool loading.

She could have lower gastrointestinal workup for evaluation of the

bleeding, but I will first discuss with family regarding medication risks.

For the time being, the patient should be placed on clear liquid diet and

bowel regimen will be given.  The CBC should be monitored.



RECOMMENDATIONS:  Per above discussion and per orders written in the

chart.



Thank you for asking me to participate in the care of this patient.









  ______________________________________________

  Florina Butler M.D.





DR:  RYAN

D:  11/18/2018 21:12

T:  11/19/2018 00:38

JOB#:  [id number]

CC:



CECE

## 2018-11-19 NOTE — DIAGNOSTIC IMAGING REPORT
Indication: Right hip pain

 

Technique: 2 views of the hip

 

Comparison: none

 

Findings: There is superior dislocation of the right hip hemiarthroplasty prosthesis,

previously normally situated.. Request only a few bony fragments located medially,

could represent fracture fragments but acuity indeterminate although suspect not

acute. The acetabulum appears to be intact. The bones are osteoporotic

 

Impression: Positive for superior dislocation of right femoral hemiarthroplasty

prosthesis

 

Osteoporosis

 

Findings discussed by phone with patient's nurse at the time of interpretation

## 2018-11-19 NOTE — GENERAL PROGRESS NOTE
Assessment/Plan


Problem List:  


(1) encephalopathy due to metabolic diorder


(2) Psychosis


ICD Codes:  F29 - Unspecified psychosis not due to a substance or known 

physiological condition


SNOMED:  45959387


(3) Alzheimer's dementia


ICD Codes:  G30.9 - Alzheimer's disease, unspecified; F02.80 - Dementia in 

other diseases classified elsewhere without behavioral disturbance


SNOMED:  99906822


Status:  unchanged


Assessment/Plan


trazadone 50mg qhs


klomopin .5mg q 6hr prn


the pt lacks capacity to make decisions.





Subjective


Neurologic/Psychiatric:  Reports: anxiety, depressed, emotional problems


Allergies:  


Coded Allergies:  


     No Known Allergies (Unverified , 3/26/14)





Objective





Last 24 Hour Vital Signs








  Date Time  Temp Pulse Resp B/P (MAP) Pulse Ox O2 Delivery O2 Flow Rate FiO2


 


11/19/18 12:00 98.1 80 18 130/80 (97) 98   


 


11/19/18 09:05    143/84    


 


11/19/18 09:05  77  143/84    


 


11/19/18 08:16      Room Air  





      Room Air  


 


11/19/18 08:00 98.0 77 19 143/84 (103) 97   


 


11/19/18 05:52    134/64    


 


11/19/18 04:00 97.4 74 18 134/64 (87) 95   


 


11/19/18 00:12 98.0 80 20 163/85 (111) 96   


 


11/18/18 21:11      Room Air  





      Room Air  


 


11/18/18 20:51    147/67    


 


11/18/18 20:24  70  147/67    


 


11/18/18 20:00 98.3 70 20 147/67 (93) 99   


 


11/18/18 15:52 98.3 63 16 127/69 (88) 99   


 


11/18/18 13:20    160/68    

















Intake and Output  


 


 11/18/18 11/19/18





 19:00 07:00


 


Intake Total 354 ml 118 ml


 


Balance 354 ml 118 ml


 


  


 


Intake Oral 354 ml 118 ml


 


# Voids 4 6


 


# Bowel Movements 1 7








Laboratory Tests


11/19/18 07:16: 


White Blood Count 7.8, Red Blood Count 3.39L, Hemoglobin 8.8L, Hematocrit 28.8L

, Mean Corpuscular Volume 85, Mean Corpuscular Hemoglobin 26.0L, Mean 

Corpuscular Hemoglobin Concent 30.7L, Red Cell Distribution Width 16.7H, 

Platelet Count 467H, Mean Platelet Volume 6.8, Neutrophils (%) (Auto) 73.0, 

Lymphocytes (%) (Auto) 17.6L, Monocytes (%) (Auto) 8.9, Eosinophils (%) (Auto) 

0.2, Basophils (%) (Auto) 0.4, Sodium Level 141, Potassium Level 3.5, Chloride 

Level 105, Carbon Dioxide Level 27, Anion Gap 9, Blood Urea Nitrogen 12, 

Creatinine 0.7, Estimat Glomerular Filtration Rate , Glucose Level 107H, 

Calcium Level 8.8, Iron Level 18L, Total Iron Binding Capacity 201L, Percent 

Iron Saturation 9L, Unsaturated Iron Binding 183


Height (Feet):  5


Height (Inches):  0.00


Weight (Pounds):  100


General Appearance:  alert, confused, moderate distress, agitated











Josh Nunez MD Nov 19, 2018 12:51
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
## 2018-11-19 NOTE — INTERNAL MED PROGRESS NOTE
Subjective


Date of Service:  Nov 19, 2018


Physician Name


Ortiz,Cheo


Attending Physician


Juan Carlos Magana MD





Current Medications








 Medications


  (Trade)  Dose


 Ordered  Sig/Violette


 Route


 PRN Reason  Start Time


 Stop Time Status Last Admin


Dose Admin


 


 Acetaminophen


  (Tylenol)  650 mg  Q6H  PRN


 ORAL


 Mild Pain/Temp > 100.5  11/16/18 17:15


 12/16/18 17:14   


 


 


 Acetaminophen/


 Hydrocodone Bitart


  (Norco 5/325)  1 tab  Q6H  PRN


 ORAL


 Severe Pain (Pain Scale 7-10)  11/16/18 20:30


 11/23/18 20:29  11/19/18 09:16


 


 


 Atenolol


  (Tenormin)  50 mg  Q12HR


 ORAL


   11/16/18 21:00


 12/16/18 20:59  11/19/18 09:05


 


 


 Atorvastatin


 Calcium


  (Lipitor)  20 mg  BEDTIME


 ORAL


   11/17/18 21:00


 12/17/18 20:59  11/18/18 20:24


 


 


 Clonazepam


  (KlonoPIN)  0.5 mg  Q6H  PRN


 ORAL


 For Anxiety  11/16/18 17:30


 11/23/18 17:29  11/19/18 09:35


 


 


 Clonidine HCl


  (Catapres Tab)  0.1 mg  EVERY 8  HOURS


 ORAL


   11/16/18 22:00


 12/16/18 21:59  11/19/18 14:12


 


 


 Dicyclomine HCl


  (Bentyl)  10 mg  QID


 ORAL


   11/16/18 18:00


 12/16/18 17:59  11/19/18 17:49


 


 


 Haloperidol


 Lactate


  (Haldol)  5 mg  Q6H  PRN


 IM


 Agitation  11/19/18 10:45


 12/19/18 10:44   


 


 


 Lorazepam


  (Ativan 2mg/ml


 1ml)  1 mg  Q4H  PRN


 IM


 For Anxiety  11/19/18 10:45


 11/26/18 10:44   


 


 


 Losartan Potassium


  (Cozaar)  50 mg  DAILY


 ORAL


   11/20/18 09:00


 12/17/18 08:59   


 


 


 Metformin HCl


  (Glucophage)  500 mg  BID


 ORAL


   11/17/18 09:00


 12/17/18 08:59  11/19/18 17:49


 


 


 Potassium Chloride


  (K-Dur)  10 meq  DAILY


 ORAL


   11/17/18 09:00


 12/17/18 08:59  11/19/18 09:04


 


 


 Sitagliptin


 Phosphate


  (Januvia)  50 mg  ACBREAKFAST


 ORAL


   11/17/18 06:30


 12/17/18 06:29  11/19/18 05:55


 


 


 Trazodone HCl


  (Desyrel)  50 mg  BEDTIME


 ORAL


   11/16/18 21:00


 12/16/18 20:59  11/18/18 20:24


 


 


 Vitamin D


  (Vitamin D)  5,000 intlu  DAILY


 ORAL


   11/17/18 09:00


 12/17/18 08:59  11/19/18 09:04


 








Allergies:  


Coded Allergies:  


     No Known Allergies (Unverified , 3/26/14)


ROS Limited/Unobtainable:  No


Constitutional:  Reports: no symptoms


HEENT:  Reports: no symptoms


Cardiovascular:  Reports: no symptoms


Respiratory:  Reports: no symptoms


Gastrointestinal/Abdominal:  Reports: no symptoms


Genitourinary:  Reports: no symptoms


Neurologic/Psychiatric:  Reports: no symptoms


Subjective


79 YO F admitted with generalized weakness.  Cover for Int Med-Dr Magana.  Await 

post acute care placement





Objective





Last Vital Signs








  Date Time  Temp Pulse Resp B/P (MAP) Pulse Ox O2 Delivery O2 Flow Rate FiO2


 


11/19/18 15:46 97.5 99 20 144/87 (106) 98   


 


11/19/18 08:16      Room Air  





      Room Air  


 


11/16/18 18:13        98











Laboratory Tests








Test


  11/19/18


07:16


 


White Blood Count


  7.8 K/UL


(4.8-10.8)


 


Red Blood Count


  3.39 M/UL


(4.20-5.40)  L


 


Hemoglobin


  8.8 G/DL


(12.0-16.0)  L


 


Hematocrit


  28.8 %


(37.0-47.0)  L


 


Mean Corpuscular Volume 85 FL (80-99)  


 


Mean Corpuscular Hemoglobin


  26.0 PG


(27.0-31.0)  L


 


Mean Corpuscular Hemoglobin


Concent 30.7 G/DL


(32.0-36.0)  L


 


Red Cell Distribution Width


  16.7 %


(11.6-14.8)  H


 


Platelet Count


  467 K/UL


(150-450)  H


 


Mean Platelet Volume


  6.8 FL


(6.5-10.1)


 


Neutrophils (%) (Auto)


  73.0 %


(45.0-75.0)


 


Lymphocytes (%) (Auto)


  17.6 %


(20.0-45.0)  L


 


Monocytes (%) (Auto)


  8.9 %


(1.0-10.0)


 


Eosinophils (%) (Auto)


  0.2 %


(0.0-3.0)


 


Basophils (%) (Auto)


  0.4 %


(0.0-2.0)


 


Sodium Level


  141 MMOL/L


(136-145)


 


Potassium Level


  3.5 MMOL/L


(3.5-5.1)


 


Chloride Level


  105 MMOL/L


()


 


Carbon Dioxide Level


  27 MMOL/L


(21-32)


 


Anion Gap


  9 mmol/L


(5-15)


 


Blood Urea Nitrogen


  12 mg/dL


(7-18)


 


Creatinine


  0.7 MG/DL


(0.55-1.30)


 


Estimat Glomerular Filtration


Rate  mL/min (>60)  


 


 


Glucose Level


  107 MG/DL


()  H


 


Calcium Level


  8.8 MG/DL


(8.5-10.1)


 


Iron Level


  18 ug/dL


()  L


 


Total Iron Binding Capacity


  201 ug/dL


(250-450)  L


 


Percent Iron Saturation 9 % (15-50)  L


 


Unsaturated Iron Binding


  183 ug/dL


(112-346)

















Intake and Output  


 


 11/18/18 11/19/18





 19:00 07:00


 


Intake Total 354 ml 118 ml


 


Balance 354 ml 118 ml


 


  


 


Intake Oral 354 ml 118 ml


 


# Voids 4 6


 


# Bowel Movements 1 7








Objective


General Appearance:  WD/WN, mild distress, agitated


EENT:  PERRL/EOMI, normal ENT inspection


Neck:  non-tender, normal alignment, supple, normal inspection


Cardiovascular:  normal peripheral pulses, normal rate, regular rhythm, no 

gallop/murmur, no JVD


Respiratory/Chest:  chest wall non-tender, lungs clear, normal breath sounds, 

no accessory muscle use, respiratory distress


Abdomen:  normal bowel sounds, non tender, soft, no organomegaly, no mass


Extremities:  normal range of motion


Neurologic:  CNs II-XII grossly normal


Skin:  normal pigmentation, warm/dry





Assessment/Plan


Problem List:  


(1) Generalized weakness


Assessment & Plan:  ?deconditioning due to recent hip surgery?  Await PT/OT eval





(2) Diabetes mellitus, type II


Assessment & Plan:  Continue metformin and januvia





(3) HTN (hypertension)


Assessment & Plan:  continue atenolol and cozaar





(4) Sick sinus syndrome


Assessment & Plan:  S/P pacemaker





(5) Aortic stenosis


(6) Pulmonary hypertension


(7) Alzheimer's dementia


(8) Hypercholesterolemia


(9) Sacral decubitus ulcer, stage II


Assessment & Plan:  see wound care note.





Status:  not improved


Assessment/Plan


Discharge planning:  post acute care.











Cheo Ortiz MD Nov 19, 2018 18:02

## 2018-11-20 VITALS — DIASTOLIC BLOOD PRESSURE: 56 MMHG | SYSTOLIC BLOOD PRESSURE: 121 MMHG

## 2018-11-20 VITALS — DIASTOLIC BLOOD PRESSURE: 64 MMHG | SYSTOLIC BLOOD PRESSURE: 111 MMHG

## 2018-11-20 VITALS — SYSTOLIC BLOOD PRESSURE: 139 MMHG | DIASTOLIC BLOOD PRESSURE: 86 MMHG

## 2018-11-20 VITALS — DIASTOLIC BLOOD PRESSURE: 65 MMHG | SYSTOLIC BLOOD PRESSURE: 139 MMHG

## 2018-11-20 VITALS — DIASTOLIC BLOOD PRESSURE: 55 MMHG | SYSTOLIC BLOOD PRESSURE: 126 MMHG

## 2018-11-20 VITALS — DIASTOLIC BLOOD PRESSURE: 59 MMHG | SYSTOLIC BLOOD PRESSURE: 123 MMHG

## 2018-11-20 VITALS — DIASTOLIC BLOOD PRESSURE: 67 MMHG | SYSTOLIC BLOOD PRESSURE: 121 MMHG

## 2018-11-20 VITALS — SYSTOLIC BLOOD PRESSURE: 140 MMHG | DIASTOLIC BLOOD PRESSURE: 66 MMHG

## 2018-11-20 VITALS — SYSTOLIC BLOOD PRESSURE: 116 MMHG | DIASTOLIC BLOOD PRESSURE: 67 MMHG

## 2018-11-20 VITALS — SYSTOLIC BLOOD PRESSURE: 133 MMHG | DIASTOLIC BLOOD PRESSURE: 69 MMHG

## 2018-11-20 VITALS — DIASTOLIC BLOOD PRESSURE: 81 MMHG | SYSTOLIC BLOOD PRESSURE: 130 MMHG

## 2018-11-20 VITALS — DIASTOLIC BLOOD PRESSURE: 73 MMHG | SYSTOLIC BLOOD PRESSURE: 144 MMHG

## 2018-11-20 VITALS — DIASTOLIC BLOOD PRESSURE: 61 MMHG | SYSTOLIC BLOOD PRESSURE: 121 MMHG

## 2018-11-20 VITALS — DIASTOLIC BLOOD PRESSURE: 69 MMHG | SYSTOLIC BLOOD PRESSURE: 146 MMHG

## 2018-11-20 VITALS — SYSTOLIC BLOOD PRESSURE: 134 MMHG | DIASTOLIC BLOOD PRESSURE: 69 MMHG

## 2018-11-20 VITALS — SYSTOLIC BLOOD PRESSURE: 108 MMHG | DIASTOLIC BLOOD PRESSURE: 63 MMHG

## 2018-11-20 LAB
ADD MANUAL DIFF: NO
ALBUMIN SERPL-MCNC: 2.4 G/DL (ref 3.4–5)
ALBUMIN/GLOB SERPL: 0.6 {RATIO} (ref 1–2.7)
ALP SERPL-CCNC: 89 U/L (ref 46–116)
ALT SERPL-CCNC: 12 U/L (ref 12–78)
ANION GAP SERPL CALC-SCNC: 10 MMOL/L (ref 5–15)
ANION GAP SERPL CALC-SCNC: 7 MMOL/L (ref 5–15)
AST SERPL-CCNC: 20 U/L (ref 15–37)
BASOPHILS NFR BLD AUTO: 0.2 % (ref 0–2)
BILIRUB SERPL-MCNC: 0.5 MG/DL (ref 0.2–1)
BUN SERPL-MCNC: 12 MG/DL (ref 7–18)
BUN SERPL-MCNC: 13 MG/DL (ref 7–18)
CALCIUM SERPL-MCNC: 8.5 MG/DL (ref 8.5–10.1)
CALCIUM SERPL-MCNC: 8.6 MG/DL (ref 8.5–10.1)
CHLORIDE SERPL-SCNC: 108 MMOL/L (ref 98–107)
CHLORIDE SERPL-SCNC: 108 MMOL/L (ref 98–107)
CO2 SERPL-SCNC: 26 MMOL/L (ref 21–32)
CO2 SERPL-SCNC: 28 MMOL/L (ref 21–32)
CREAT SERPL-MCNC: 0.7 MG/DL (ref 0.55–1.3)
CREAT SERPL-MCNC: 0.7 MG/DL (ref 0.55–1.3)
EOSINOPHIL NFR BLD AUTO: 0.3 % (ref 0–3)
ERYTHROCYTE [DISTWIDTH] IN BLOOD BY AUTOMATED COUNT: 16.4 % (ref 11.6–14.8)
GLOBULIN SER-MCNC: 4 G/DL
HCT VFR BLD CALC: 28 % (ref 37–47)
HGB BLD-MCNC: 8.9 G/DL (ref 12–16)
LYMPHOCYTES NFR BLD AUTO: 18.3 % (ref 20–45)
MCV RBC AUTO: 84 FL (ref 80–99)
MONOCYTES NFR BLD AUTO: 9.1 % (ref 1–10)
NEUTROPHILS NFR BLD AUTO: 72.1 % (ref 45–75)
PHOSPHATE SERPL-MCNC: 3 MG/DL (ref 2.5–4.9)
PLATELET # BLD: 520 K/UL (ref 150–450)
POTASSIUM SERPL-SCNC: 3.1 MMOL/L (ref 3.5–5.1)
POTASSIUM SERPL-SCNC: 4.1 MMOL/L (ref 3.5–5.1)
RBC # BLD AUTO: 3.32 M/UL (ref 4.2–5.4)
SODIUM SERPL-SCNC: 143 MMOL/L (ref 136–145)
SODIUM SERPL-SCNC: 143 MMOL/L (ref 136–145)
WBC # BLD AUTO: 7.4 K/UL (ref 4.8–10.8)

## 2018-11-20 PROCEDURE — 0SR90JZ REPLACEMENT OF RIGHT HIP JOINT WITH SYNTHETIC SUBSTITUTE, OPEN APPROACH: ICD-10-PCS

## 2018-11-20 PROCEDURE — 0SP90JZ REMOVAL OF SYNTHETIC SUBSTITUTE FROM RIGHT HIP JOINT, OPEN APPROACH: ICD-10-PCS

## 2018-11-20 PROCEDURE — 0DJD8ZZ INSPECTION OF LOWER INTESTINAL TRACT, VIA NATURAL OR ARTIFICIAL OPENING ENDOSCOPIC: ICD-10-PCS

## 2018-11-20 PROCEDURE — 0SB90ZZ EXCISION OF RIGHT HIP JOINT, OPEN APPROACH: ICD-10-PCS

## 2018-11-20 RX ADMIN — DICYCLOMINE HYDROCHLORIDE SCH MG: 10 CAPSULE ORAL at 18:00

## 2018-11-20 RX ADMIN — SITAGLIPTIN SCH MG: 50 TABLET, FILM COATED ORAL at 06:15

## 2018-11-20 RX ADMIN — HYDROCODONE BITARTRATE AND ACETAMINOPHEN PRN TAB: 5; 325 TABLET ORAL at 11:21

## 2018-11-20 RX ADMIN — METFORMIN HYDROCHLORIDE SCH MG: 500 TABLET ORAL at 18:00

## 2018-11-20 RX ADMIN — LORAZEPAM PRN MG: 2 INJECTION, SOLUTION INTRAMUSCULAR; INTRAVENOUS at 15:14

## 2018-11-20 RX ADMIN — DICYCLOMINE HYDROCHLORIDE SCH MG: 10 CAPSULE ORAL at 13:00

## 2018-11-20 RX ADMIN — TRAZODONE HYDROCHLORIDE SCH MG: 50 TABLET ORAL at 21:11

## 2018-11-20 RX ADMIN — METFORMIN HYDROCHLORIDE SCH MG: 500 TABLET ORAL at 08:24

## 2018-11-20 RX ADMIN — DICYCLOMINE HYDROCHLORIDE SCH MG: 10 CAPSULE ORAL at 21:11

## 2018-11-20 RX ADMIN — LOSARTAN POTASSIUM SCH MG: 50 TABLET, FILM COATED ORAL at 08:23

## 2018-11-20 RX ADMIN — VITAMIN D, TAB 1000IU (100/BT) SCH INTLU: 25 TAB at 08:24

## 2018-11-20 RX ADMIN — DICYCLOMINE HYDROCHLORIDE SCH MG: 10 CAPSULE ORAL at 08:23

## 2018-11-20 RX ADMIN — DEXTROSE, SODIUM CHLORIDE, AND POTASSIUM CHLORIDE SCH MLS/HR: 5; .45; .15 INJECTION INTRAVENOUS at 21:06

## 2018-11-20 RX ADMIN — ATORVASTATIN CALCIUM SCH MG: 20 TABLET, FILM COATED ORAL at 21:10

## 2018-11-20 NOTE — ANETHESIA PREOPERATIVE EVAL
Anesthesia Pre-op PMH/ROS


General


Date of Evaluation:  Nov 20, 2018


Time of Evaluation:  06:30


Anesthesiologist:  luz


ASA Score:  ASA 4


Mallampati Score


Class I : Soft palate, uvula, fauces, pillars visible


Class II: Soft palate, uvula, fauces visible


Class III: Soft palate, base of uvula visible


Class IV: Only hard plate visible


Mallampati Classification:  Class II


Surgeon:  evi


Diagnosis:  hematochezia, constipation


Surgical Procedure:  colonoscopy


Anesthesia History:  none


Family History:  no anesthesia problems


Allergies:  


Coded Allergies:  


     No Known Allergies (Unverified , 3/26/14)


Medications:  see eMAR


Patient NPO?:  Yes





Past Medical History


Cardiovascular:  Reports: HTN, CAD, MI, arrhythmia - sick sinus syndrome, other 

- chf, sick sinus syndrome, aortic stenosis, cabgx3, pacemaker


Pulmonary:  Reports: asthma, other - pulmonary htn


Gastrointestinal/Genitourinary:  Reports: other - crohn's disease


Neurologic/Psychiatric:  Reports: other - alzheimer's disease,ftt, psychosis


Endocrine:  Reports: DM


Hematology/Immune:  Reports: anemia, other - contact isolation MRSA nares


Musculoskeletal/Integumentary:  Reports: other - pelvic fx, right hip fx, stage 

3 decubitus ulcer


PSxH Narrative:


right hip fracture repair,and revision





Anesthesia Pre-op Phys. Exam


Physician Exam





Last Vital Signs








  Date Time  Temp Pulse Resp B/P (MAP) Pulse Ox O2 Delivery O2 Flow Rate FiO2


 


11/20/18 04:00 97.0 66 17 140/66 (90) 92   


 


11/19/18 21:00      Room Air  





      Room Air  


 


11/16/18 18:13        98








Constitutional:  NAD


Neurologic:  CN 2-12 intact


Cardiovascular:  RRR


Respiratory:  CTA


Gastrointestinal:  S/NT/ND





Airway Exam


Mallampati Score:  Class II


MO:  limited


Neck:  flexible


TMD:  2fb


ROM:  limited


Teeth:  intact





Anesthesia Pre-op A/P


Labs





Hematology








Test


  11/19/18


07:16 11/20/18


05:15


 


White Blood Count


  7.8 K/UL


(4.8-10.8) 7.4 K/UL


(4.8-10.8)


 


Red Blood Count


  3.39 M/UL


(4.20-5.40)  L 3.32 M/UL


(4.20-5.40)  L


 


Hemoglobin


  8.8 G/DL


(12.0-16.0)  L 8.9 G/DL


(12.0-16.0)  L


 


Hematocrit


  28.8 %


(37.0-47.0)  L 28.0 %


(37.0-47.0)  L


 


Mean Corpuscular Volume 85 FL (80-99)   84 FL (80-99)  


 


Mean Corpuscular Hemoglobin


  26.0 PG


(27.0-31.0)  L 26.8 PG


(27.0-31.0)  L


 


Mean Corpuscular Hemoglobin


Concent 30.7 G/DL


(32.0-36.0)  L 31.9 G/DL


(32.0-36.0)  L


 


Red Cell Distribution Width


  16.7 %


(11.6-14.8)  H 16.4 %


(11.6-14.8)  H


 


Platelet Count


  467 K/UL


(150-450)  H 520 K/UL


(150-450)  H


 


Mean Platelet Volume


  6.8 FL


(6.5-10.1) 6.9 FL


(6.5-10.1)


 


Neutrophils (%) (Auto)


  73.0 %


(45.0-75.0) 72.1 %


(45.0-75.0)


 


Lymphocytes (%) (Auto)


  17.6 %


(20.0-45.0)  L 18.3 %


(20.0-45.0)  L


 


Monocytes (%) (Auto)


  8.9 %


(1.0-10.0) 9.1 %


(1.0-10.0)


 


Eosinophils (%) (Auto)


  0.2 %


(0.0-3.0) 0.3 %


(0.0-3.0)


 


Basophils (%) (Auto)


  0.4 %


(0.0-2.0) 0.2 %


(0.0-2.0)








Chemistry








Test


  11/19/18


07:16 11/20/18


05:15


 


Sodium Level


  141 MMOL/L


(136-145) Pending  


 


 


Potassium Level


  3.5 MMOL/L


(3.5-5.1) Pending  


 


 


Chloride Level


  105 MMOL/L


() Pending  


 


 


Carbon Dioxide Level


  27 MMOL/L


(21-32) Pending  


 


 


Anion Gap


  9 mmol/L


(5-15) 


 


 


Blood Urea Nitrogen


  12 mg/dL


(7-18) Pending  


 


 


Creatinine


  0.7 MG/DL


(0.55-1.30) Pending  


 


 


Estimat Glomerular Filtration


Rate  mL/min (>60)  


  Pending  


 


 


Glucose Level


  107 MG/DL


()  H Pending  


 


 


Calcium Level


  8.8 MG/DL


(8.5-10.1) Pending  


 


 


Iron Level


  18 ug/dL


()  L 


 


 


Total Iron Binding Capacity


  201 ug/dL


(250-450)  L 


 


 


Percent Iron Saturation 9 % (15-50)  L 


 


Unsaturated Iron Binding


  183 ug/dL


(112-346) 


 


 


Phosphorus Level  Pending  


 


Magnesium Level  Pending  


 


Total Bilirubin  Pending  


 


Aspartate Amino Transf


(AST/SGOT) 


  Pending  


 


 


Alanine Aminotransferase


(ALT/SGPT) 


  Pending  


 


 


Alkaline Phosphatase  Pending  


 


Total Protein  Pending  


 


Albumin  Pending  


 


Globulin  Pending  











Risk Assessment & Plan


Assessment:


asa4


Plan:


mac


Status Change Before Surgery:  No





Pre-Antibiotics


Drug:  Zoraida Hanna MD Nov 20, 2018 06:47

## 2018-11-20 NOTE — OPERATIVE NOTE - PDOC
Operative Note


Operative Note


Pre-op Diagnosis:


right dislocated hip hemiarthroplasty


Procedure:


see op report


Post-op Diagnosis:  same as pre-op plus


Operative Findings:  consistent w/pre-op dx studies


Anesthesia:  regional


Specimen:  none


Complications:  none


Condition:  stable


Estimated Blood Loss:  minimal


Implant(s) used?:  Yes











Juan Antonio Bella MD Nov 20, 2018 17:29

## 2018-11-20 NOTE — GENERAL PROGRESS NOTE
Assessment/Plan


Assessment/Plan


Assessment


- constipation, rectal loading


- hematochezia, likely hemorrhoidal, r/o other


- low K and Mg


- Anemia


- agitation/OBS


- dislocated hip





Recommendations


- NPO


- replace K and MG


- Colonoscopy today - will keep and do procedure on supine position


- Monitor H&H


- re check lytes





Subjective


Allergies:  


Coded Allergies:  


     No Known Allergies (Unverified , 3/26/14)


Subjective


discussed with IM last night and with ortho this am


discussed with daughter this am


patient did not accept all of her GI prep yesterday


(+) blood in BM this am


anemic but stable


scheduled for hip surgery tonight


K and Mg low - replacements ordered





Objective





Last 24 Hour Vital Signs








  Date Time  Temp Pulse Resp B/P (MAP) Pulse Ox O2 Delivery O2 Flow Rate FiO2


 


11/20/18 04:00 97.0 66 17 140/66 (90) 92   


 


11/20/18 00:00 97.0 60 19 121/67 (85) 98   


 


11/19/18 21:00  59  115/67    


 


11/19/18 21:00      Room Air  





      Room Air  


 


11/19/18 20:00 97.3 59 17 115/67 (83) 93   


 


11/19/18 15:46 97.5 99 20 144/87 (106) 98   


 


11/19/18 14:12    130/80    


 


11/19/18 12:00 98.1 80 18 130/80 (97) 98   


 


11/19/18 09:05    143/84    


 


11/19/18 09:05  77  143/84    


 


11/19/18 08:16      Room Air  





      Room Air  


 


11/19/18 08:00 98.0 77 19 143/84 (103) 97   

















Intake and Output  


 


 11/19/18 11/20/18





 19:00 07:00


 


Intake Total 500 ml 


 


Balance 500 ml 


 


  


 


Other 500 ml 


 


# Voids 4 6


 


# Bowel Movements 2 8








Laboratory Tests


11/20/18 05:15: 


White Blood Count 7.4, Red Blood Count 3.32L, Hemoglobin 8.9L, Hematocrit 28.0L

, Mean Corpuscular Volume 84, Mean Corpuscular Hemoglobin 26.8L, Mean 

Corpuscular Hemoglobin Concent 31.9L, Red Cell Distribution Width 16.4H, 

Platelet Count 520H, Mean Platelet Volume 6.9, Neutrophils (%) (Auto) 72.1, 

Lymphocytes (%) (Auto) 18.3L, Monocytes (%) (Auto) 9.1, Eosinophils (%) (Auto) 

0.3, Basophils (%) (Auto) 0.2, Sodium Level 143, Potassium Level 3.1L, Chloride 

Level 108H, Carbon Dioxide Level 28, Anion Gap 7, Blood Urea Nitrogen 12, 

Creatinine 0.7, Estimat Glomerular Filtration Rate , Glucose Level 106, Calcium 

Level 8.6, Phosphorus Level 3.0, Magnesium Level 1.4L, Total Bilirubin 0.5, 

Aspartate Amino Transf (AST/SGOT) 20, Alanine Aminotransferase (ALT/SGPT) 12, 

Alkaline Phosphatase 89, Total Protein 6.4, Albumin 2.4L, Globulin 4.0, Albumin/

Globulin Ratio 0.6L


Height (Feet):  5


Height (Inches):  5.00


Weight (Pounds):  121


Objective


Elderly WW


NCAT


CTA


RRR


Abd NT ND


ext no edema











Florina Butler MD Nov 20, 2018 07:58

## 2018-11-20 NOTE — OPERATIVE NOTE - DICTATED
DATE OF OPERATION:  11/20/2018

NOTE: "POOR AUDIO QUALITY"



PREOPERATIVE DIAGNOSIS:  Right dislocated hemiarthroplasty.



POSTOPERATIVE DIAGNOSIS:  Right dislocated hemiarthroplasty.



PROCEDURE:

1. Conversion of previous hip surgery to total hip arthroplasty,

complicated secondary to chronic dislocation.

2. Complete synovectomy, right hip.

3. Complex wound closure measuring 15 cm.



SURGEON:  Juan Antonio Bella M.D.



ANESTHESIA:  Spinal.



INDICATION FOR PROCEDURE:  The patient is a pleasant female who underwent

hip hemiarthroplasty, subsequently had dislocation, and underwent open

reduction.  She did well, was discharged home, then subsequently had

another fall at home, and dislocated right hip hemiarthroplasty.

Therefore, she was indicated for operative fixation and recommended total

hip arthroplasty _____ provide more stability.  Risks, limitations,

expectations, and complications of the procedure were discussed in detail

with the daughter.  All questions were addressed.



DESCRIPTION OF PROCEDURE:  After informed consent was obtained, the patient

was brought to the operating room.  The patient was placed under spinal

anesthesia.  The patient was then carefully placed in lateral decubitus

position.  Right hip was prepped and draped in sterile manner.  Previous

incision was marked out.  The skin was incised.  Fascia candelario was incised.

The previous femoral head was in the superior acetabular fossa.  Complete

synovectomy of the hip was performed.  Once complete synovectomy was

performed, the soft tissue in the acetabulum was removed.  Sequential

reaming up to 50 was performed.  A 52 Mj [employer]ident Tritanium cup was

placed along with a screw and neutral liner.  A 28 -3 head was selected

and the hip was reduced.  The soft tissue was very tight.  The abductors

seemed very tight.  Hip was stable to 90 degrees of flexion and internal

rotation to 45.  At this point, the capsule was reapproximated with two

drill holes to the greater trochanter.  Fascia candelario was approximated with

#1 Vicryl suture, 2-0 Vicryl suture, and 3-0 Monocryl suture, and covered

with Dermabond.  Dressing was applied.  The patient was awoken and taken

to recovery room with stable vital signs.



ESTIMATED BLOOD LOSS:  50 mL.



COMPLICATIONS:  None.



SPECIMENS:  None.



EXPLANTS:  Include femoral head with bipolar femoral head.



IMPLANTS:  Include 52 acetabular component with neutral liner, 35 mm screw,

and a 28 -3 _____ head-neck combo.









  ______________________________________________

  Juan Antonio Bella M.D.





DR:  Alysha

D:  11/20/2018 19:09

T:  11/20/2018 21:42

JOB#:  [id number]

CC:

## 2018-11-20 NOTE — PROCEDURE NOTE
DATE OF PROCEDURE:  11/20/2018

PROCEDURE:  Flexible sigmoidoscopy (original procedure plan was colonoscopy

but procedure changed to sigmoidoscopy due to poor colonic preparation)

.



SURGEON:  Florina Butler M.D.



ANESTHESIOLOGIST:  Dr. Toro.



PRE-ENDOSCOPIC DIAGNOSIS:  Rectal bleeding.



POSTOPERATIVE DIAGNOSIS:

1. Ulcerated internal hemorrhoids.

2. No evidence of active bleeding.



DESCRIPTION OF PROCEDURE:  The procedure, its risks, indications,

alternatives, and possible complications were explained to the patient's

family and informed consent was obtained.  Initially plans were made to

perform a full colonoscopy but the patient refused all of her medications

for preparation and therefore colon was felt to be unprepped and the

colonoscope could not be advanced beyond 30 cm.  The procedure was

therefore changed to a flexible sigmoidoscopy which was felt to be

adequate for the purpose of evaluation.  The colonoscope was introduced

into rectum and advanced to about 30 cm, at which point, the examination

had to be halted due to solid stool.  The colonoscope was then gradually

withdrawn and the mucosa examined carefully.  Examination of colonic

mucosa in the rectum on retroflexed view revealed internal hemorrhoids

that were ulcerated and was felt to be a source.  There is stool above

this area which was brown and there was no blood seen.  The colonoscope

was removed.  The patient left to the recovery in good condition.



COMPLICATIONS:  None.



RECOMMENDATIONS:

1. Bowel regimen.

2. Followup for further episodes of bleeding.

3. At the family desire, a complete colonoscopy can be offered a later

date.



Thank you for asking me to participate in the care of this patient.









  ______________________________________________

  Florina Butler M.D. DR:  Emiliano

D:  11/20/2018 19:04

T:  11/20/2018 19:55

JOB#:  [id number]

CC:

## 2018-11-20 NOTE — CARDIOLOGY REPORT
--------------- APPROVED REPORT --------------





EKG Measurement

Heart [id number]

TX 

[id number]

[id number]

[id number]

[id number]





Normal sinus rhythm

Left axis deviation

Septal infarct, age undetermined

Abnormal ECG

## 2018-11-20 NOTE — GENERAL PROGRESS NOTE
Assessment/Plan


Assessment/Plan


POST PROCEDURE ADDENDUM


- Procedure performed in supine position


- poor colonic preparation - only able to do a flexible sig to 30 cm


- retroflex view of rectum showed ulcerated hemorrhoids, presumed site of 

rectal bleeding


- solid stool above rectum is green, therefore do not suspect any other source 

for the blood


- Procedure time appox 5 minutes





Subjective


Allergies:  


Coded Allergies:  


     No Known Allergies (Unverified , 3/26/14)





Objective





Last 24 Hour Vital Signs








  Date Time  Temp Pulse Resp B/P (MAP) Pulse Ox O2 Delivery O2 Flow Rate FiO2


 


11/20/18 04:00 97.0 66 17 140/66 (90) 92   


 


11/20/18 00:00 97.0 60 19 121/67 (85) 98   


 


11/19/18 21:00  59  115/67    


 


11/19/18 21:00      Room Air  





      Room Air  


 


11/19/18 20:00 97.3 59 17 115/67 (83) 93   


 


11/19/18 15:46 97.5 99 20 144/87 (106) 98   


 


11/19/18 14:12    130/80    


 


11/19/18 12:00 98.1 80 18 130/80 (97) 98   


 


11/19/18 09:05    143/84    


 


11/19/18 09:05  77  143/84    

















Intake and Output  


 


 11/19/18 11/20/18





 18:59 06:59


 


Intake Total 500 ml 


 


Balance 500 ml 


 


  


 


Other 500 ml 


 


# Voids 4 6


 


# Bowel Movements 2 8








Laboratory Tests


11/20/18 05:15: 


White Blood Count 7.4, Red Blood Count 3.32L, Hemoglobin 8.9L, Hematocrit 28.0L

, Mean Corpuscular Volume 84, Mean Corpuscular Hemoglobin 26.8L, Mean 

Corpuscular Hemoglobin Concent 31.9L, Red Cell Distribution Width 16.4H, 

Platelet Count 520H, Mean Platelet Volume 6.9, Neutrophils (%) (Auto) 72.1, 

Lymphocytes (%) (Auto) 18.3L, Monocytes (%) (Auto) 9.1, Eosinophils (%) (Auto) 

0.3, Basophils (%) (Auto) 0.2, Sodium Level 143, Potassium Level 3.1L, Chloride 

Level 108H, Carbon Dioxide Level 28, Anion Gap 7, Blood Urea Nitrogen 12, 

Creatinine 0.7, Estimat Glomerular Filtration Rate , Glucose Level 106, Calcium 

Level 8.6, Phosphorus Level 3.0, Magnesium Level 1.4L, Total Bilirubin 0.5, 

Aspartate Amino Transf (AST/SGOT) 20, Alanine Aminotransferase (ALT/SGPT) 12, 

Alkaline Phosphatase 89, Total Protein 6.4, Albumin 2.4L, Globulin 4.0, Albumin/

Globulin Ratio 0.6L


Height (Feet):  5


Height (Inches):  5.00


Weight (Pounds):  121











Florina Butler MD Nov 20, 2018 08:20

## 2018-11-20 NOTE — IMMEDIATE POST-OP EVALUATION
Immediate Post-Op Evalulation


Immediate Post-Op Evalulation


Procedure:  R Hip Revision Hemiarthroplasty


Date of Evaluation:  2018


Time of Evaluation:  19:37


IV Fluids:  600 LR


Blood Products:  0


Estimated Blood Loss:  50


Urinary Output:  100


Blood Pressure Systolic:  111


Blood Pressure Diastolic:  64


Pulse Rate:  74


Respiratory Rate:  16


O2 Sat by Pulse Oximetry:  100


Temperature (Fahrenheit):  97.4


Pain Score (1-10):  1


Nausea:  No


Vomiting:  No


Complications


0


Patient Status:  awake, reacts, patent, none


Hydration Status:  adequate


Dru Gram Ancef IV


Given Within 1 Hr of Incision:  Yes


Time Given:  17:28











Darrell Herndon MD 2018 18:06

## 2018-11-20 NOTE — PULMONOLOGY PROGRESS NOTE
Assessment/Plan


Problems:  


(1) Hip dislocation, right


(2) Sacral decubitus ulcer, stage III


(3) Protein-calorie malnutrition, severe


(4) Psychosis


(5) CAD (coronary artery disease)


(6) Pulmonary hypertension


(7) Aortic stenosis


(8) Pacemaker


(9) Diabetes mellitus, type II


(10) HTN (hypertension)


(11) Alzheimer's dementia


Assessment/Plan


symptomatic treatment


f/u ortho recommendations


wound care consult


nutrition evaluation


social service consult


check electrolytes


dvt prophylaxis





Subjective


ROS Limited/Unobtainable:  No


Constitutional:  Reports: no symptoms


Respiratory:  Reports: no symptoms


Allergies:  


Coded Allergies:  


     No Known Allergies (Unverified , 3/26/14)





Objective





Last 24 Hour Vital Signs








  Date Time  Temp Pulse Resp B/P (MAP) Pulse Ox O2 Delivery O2 Flow Rate FiO2


 


11/20/18 09:50 97.7 70 16 139/65 (89) 99   


 


11/20/18 09:32  66 18  100   


 


11/20/18 09:30  57 18  100   


 


11/20/18 09:13      Room Air  





      Room Air  


 


11/20/18 08:47  62 18 133/69 100 Simple Mask 8 


 


11/20/18 08:35  66 18 144/73 100 Simple Mask 8 


 


11/20/18 08:30  63 18 134/69 100 Simple Mask 8 


 


11/20/18 08:25 97.7 57 18 146/69 100 Simple Mask 8 


 


11/20/18 04:00 97.0 66 17 140/66 (90) 92   


 


11/20/18 00:00 97.0 60 19 121/67 (85) 98   


 


11/19/18 21:00  59  115/67    


 


11/19/18 21:00      Room Air  





      Room Air  


 


11/19/18 20:00 97.3 59 17 115/67 (83) 93   


 


11/19/18 15:46 97.5 99 20 144/87 (106) 98   


 


11/19/18 14:12    130/80    


 


11/19/18 12:00 98.1 80 18 130/80 (97) 98   

















Intake and Output  


 


 11/19/18 11/20/18





 18:59 06:59


 


Intake Total 500 ml 


 


Balance 500 ml 


 


  


 


Other 500 ml 


 


# Voids 4 6


 


# Bowel Movements 2 8








General Appearance:  cachetic


HEENT:  normocephalic, atraumatic


Respiratory/Chest:  chest wall non-tender, normal breath sounds, chest wall 

tender


Breasts:  no masses


Cardiovascular:  normal rate


Abdomen:  normal bowel sounds, no organomegaly


Genitourinary:  normal external genitalia


Skin:  no rash


Laboratory Tests


11/20/18 05:15: 


White Blood Count 7.4, Red Blood Count 3.32L, Hemoglobin 8.9L, Hematocrit 28.0L

, Mean Corpuscular Volume 84, Mean Corpuscular Hemoglobin 26.8L, Mean 

Corpuscular Hemoglobin Concent 31.9L, Red Cell Distribution Width 16.4H, 

Platelet Count 520H, Mean Platelet Volume 6.9, Neutrophils (%) (Auto) 72.1, 

Lymphocytes (%) (Auto) 18.3L, Monocytes (%) (Auto) 9.1, Eosinophils (%) (Auto) 

0.3, Basophils (%) (Auto) 0.2, Sodium Level 143, Potassium Level 3.1L, Chloride 

Level 108H, Carbon Dioxide Level 28, Anion Gap 7, Blood Urea Nitrogen 12, 

Creatinine 0.7, Estimat Glomerular Filtration Rate , Glucose Level 106, Calcium 

Level 8.6, Phosphorus Level 3.0, Magnesium Level 1.4L, Total Bilirubin 0.5, 

Aspartate Amino Transf (AST/SGOT) 20, Alanine Aminotransferase (ALT/SGPT) 12, 

Alkaline Phosphatase 89, Total Protein 6.4, Albumin 2.4L, Globulin 4.0, Albumin/

Globulin Ratio 0.6L





Current Medications








 Medications


  (Trade)  Dose


 Ordered  Sig/Violette


 Route


 PRN Reason  Start Time


 Stop Time Status Last Admin


Dose Admin


 


 Acetaminophen


  (Tylenol)  650 mg  Q6H  PRN


 ORAL


 Mild Pain/Temp > 100.5  11/16/18 17:15


 12/16/18 17:14   


 


 


 Acetaminophen/


 Hydrocodone Bitart


  (Norco 5/325)  1 tab  Q6H  PRN


 ORAL


 Severe Pain (Pain Scale 7-10)  11/16/18 20:30


 11/23/18 20:29  11/19/18 09:16


 


 


 Al Hydroxide/Mg


 Hydroxide


  (Mylanta)  15 ml  Q1H  PRN


 ORAL


 gi upset  11/20/18 06:30


 11/20/18 15:00   


 


 


 Atenolol


  (Tenormin)  50 mg  Q12HR


 ORAL


   11/16/18 21:00


 12/16/18 20:59  11/19/18 09:05


 


 


 Atorvastatin


 Calcium


  (Lipitor)  20 mg  BEDTIME


 ORAL


   11/17/18 21:00


 12/17/18 20:59  11/18/18 20:24


 


 


 Atropine Sulfate


  (Atropine)  0.5 mg  Q5M  PRN


 IV


 bpm less than 45  11/20/18 06:30


 11/20/18 15:00   


 


 


 Clonazepam


  (KlonoPIN)  0.5 mg  Q6H  PRN


 ORAL


 For Anxiety  11/16/18 17:30


 11/23/18 17:29  11/19/18 09:35


 


 


 Clonidine HCl


  (Catapres Tab)  0.1 mg  EVERY 8  HOURS


 ORAL


   11/16/18 22:00


 12/16/18 21:59  11/19/18 14:12


 


 


 Dicyclomine HCl


  (Bentyl)  10 mg  QID


 ORAL


   11/16/18 18:00


 12/16/18 17:59  11/19/18 17:49


 


 


 Diphenhydramine


 HCl


  (Benadryl)  25 mg  Q15M  PRN


 IVP


 Itching  11/20/18 06:30


 11/20/18 15:00   


 


 


 Fentanyl Citrate


  (Sublimaze 100


 mcg/2 mL)  25 mcg  Q10M  PRN


 IV


 Moderate Pain (Pain Scale 4-6)  11/20/18 06:30


 11/20/18 15:00   


 


 


 Haloperidol


 Lactate


  (Haldol)  5 mg  Q6H  PRN


 IM


 Agitation  11/19/18 10:45


 12/19/18 10:44   


 


 


 Hydralazine HCl


  (Apresoline)  5 mg  Q30M  PRN


 IV


 SBP>160 OR___/DBP>90 OR___  11/20/18 06:30


 11/20/18 15:00   


 


 


 Lorazepam


  (Ativan 2mg/ml


 1ml)  1 mg  Q4H  PRN


 IM


 For Anxiety  11/19/18 10:45


 11/26/18 10:44   


 


 


 Losartan Potassium


  (Cozaar)  50 mg  DAILY


 ORAL


   11/20/18 09:00


 12/17/18 08:59   


 


 


 Metformin HCl


  (Glucophage)  500 mg  BID


 ORAL


   11/17/18 09:00


 12/17/18 08:59  11/19/18 17:49


 


 


 Midazolam HCl


  (Versed 2mg/2ml


 vial)  1 mg  Q15M  PRN


 IVP


 For Anxiety  11/20/18 06:30


 11/20/18 15:00   


 


 


 Ondansetron HCl


  (Zofran)  4 mg  Q1H  PRN


 IVP


 Nausea & Vomiting  11/20/18 06:30


 11/20/18 15:00   


 


 


 Potassium


 Chloride 10 meq/


 Dextrose/Sodium


 Chloride  1,005 ml @ 


 75 mls/hr  [id number]


 IV


   11/20/18 09:00


 12/20/18 08:59  11/20/18 09:49


 


 


 Potassium Chloride


  (K-Dur)  10 meq  DAILY


 ORAL


   11/17/18 09:00


 12/17/18 08:59  11/19/18 09:04


 


 


 Sitagliptin


 Phosphate


  (Januvia)  50 mg  ACBREAKFAST


 ORAL


   11/17/18 06:30


 12/17/18 06:29  11/19/18 05:55


 


 


 Sodium Chloride  1,000 ml @ 


 0 mls/hr  Q0M


 IV


   11/20/18 07:30


 12/20/18 07:29   


 


 


 Sodium Chloride  1,000 ml @ 


 10 mls/hr  Q24H


 IVLG


   11/20/18 06:28


 11/20/18 15:00  11/20/18 06:28


 


 


 Trazodone HCl


  (Desyrel)  50 mg  BEDTIME


 ORAL


   11/16/18 21:00


 12/16/18 20:59  11/18/18 20:24


 


 


 Vitamin D


  (Vitamin D)  5,000 intlu  DAILY


 ORAL


   11/17/18 09:00


 12/17/18 08:59  11/19/18 09:04


 

















Korin Junior MD Nov 20, 2018 11:21

## 2018-11-20 NOTE — PRE-PROCEDURE NOTE/ATTESTATION
Pre-Procedure Note/Attestation


Complete Prior to Procedure


Planned Procedure:  not applicable


Procedure Narrative:


colonoscopy





Indications for Procedure


Pre-Operative Diagnosis:


GI Bleed





Attestation


I attest that I discussed the nature of the procedure; its benefits; risks and 

complications; and alternatives (and the risks and benefits of such alternatives

), prior to the procedure, with the patient (or the patient's legal 

representative).





I attest that, if there was a reasonable possibility of needing a blood 

transfusion, the patient (or the patient's legal representative) was given the 

[facility] standardized written summary, pursuant 

to the [place] Blood Safety Act ([place] Health and Safety Code # 1645, as 

amended).





I attest that I re-evaluated the patient just prior to the surgery and that 

there has been no change in the patient's H&P, except as documented below:











Florina Butler MD Nov 20, 2018 07:58

## 2018-11-20 NOTE — IMMEDIATE POST-OP EVALUATION
Immediate Post-Op Evalulation


Immediate Post-Op Evalulation


Procedure:  flexible sigmoidoscopy


Date of Evaluation:  Nov 20, 2018


Time of Evaluation:  08:37


IV Fluids:  250ml 0.9ns


Blood Products:  none


Estimated Blood Loss:  negligible


Blood Pressure Systolic:  146


Blood Pressure Diastolic:  69


Pulse Rate:  57


Respiratory Rate:  18


O2 Sat by Pulse Oximetry:  100


Temperature (Fahrenheit):  97.7


Pain Score (1-10):  0


Nausea:  No


Vomiting:  No


Complications


none


Patient Status:  awake, reacts, patent


Hydration Status:  adequate


Drug:  Zoraida Hanna MD Nov 20, 2018 09:30

## 2018-11-20 NOTE — BRIEF OPERATIVE NOTE
Immediate Post Operative Note


Operative Note


Chief Complaint:  BRBPR


Pre-op Diagnosis:


GI Bleed


Procedure:


Flex sig


Post-op Diagnosis:


Ulcerated hemorrhoid


Surgeon:  evi


Anesthesiologist:  Parish martinez


Specimen:  none


Complications:  none


Condition:  stable


Fluids:  recorded


Estimated Blood Loss:  none


Drains:  none


Implant(s) used?:  No











Florina Butler MD Nov 20, 2018 18:59

## 2018-11-20 NOTE — ENDOSCOPY PROCEDURE NOTE
Endoscopy Procedure Note


General


Indication for Procedure:  rectal bleed


Operative Findings/Diagnosis:  ulcerated hemorrhoid


Specimen:  none


Pt Tolerated Procedure Well:  Yes


Estimated Blood Loss:  none





Anesthesia


Anesthesiologist:  Parish martinez


Anesthesia:  MAC





Medications


Medication Given:  fentanyl, see anesthesia record





Inserted Devices


Implant(s) used?:  No





GI Core Measures


50 yrs or older w/o bx or poly:  Not Applicable


10yrs. F/U not recommended:  Not Applicable


If not recommended, why?:  











Florina Butler MD Nov 20, 2018 18:58

## 2018-11-20 NOTE — CONSULTATION
DATE OF CONSULTATION:  11/19/2018

CONSULTING PHYSICIAN:  Juan Antonio Bella M.D.



CHIEF COMPLAINT:  Right hip pain.



HISTORY OF PRESENT ILLNESS:  The patient is a pleasant female who is well

known to me.  She underwent right hip hemiarthroplasty.  Subsequently

underwent open reduction of the dislocated hemiarthroplasty.  Subsequently

_____ and transferred home where she was doing well.  She subsequently had

another fall now presents for further care and recommendation.



PAST MEDICAL HISTORY:  Reviewed per intake chart.



SURGICAL HISTORY:  Reviewed per intake chart.



MEDICATIONS:  Reviewed per intake chart.



PHYSICAL EXAMINATION:  Showed an internally rotated right hip.  Posterior

calf is soft.  Incisions clean, dry, and intact.



ASSESSMENT:  Right hip dislocated hemiarthroplasty.



DISCUSSION:  At this point, she underwent an uneventful right hip

hemiarthroplasty.  She had to undergo an open reduction.  Clearly, she

dislocated her hip given that she has been noncompliant.  Therefore, I

recommend at this point as to proceed with a conversion to a more

constrained implant like _____ total hip arthroplasty.  Risks,

limitations, expectations, and complications of procedure were discussed

in detail with her daughter, Kateirn.  All questions were addressed.  We

will proceed with surgery tomorrow.  She is medically cleared for

surgery.









  ______________________________________________

  Juan Antonio Bella M.D.





DR:  MAGDALENO

D:  11/19/2018 21:55

T:  11/20/2018 00:14

JOB#:  [id number]

CC:

## 2018-11-20 NOTE — GENERAL PROGRESS NOTE
Assessment/Plan


Problem List:  


(1) Psychosis


ICD Codes:  F29 - Unspecified psychosis not due to a substance or known 

physiological condition


SNOMED:  55453499


(2) Alzheimer's dementia


ICD Codes:  G30.9 - Alzheimer's disease, unspecified; F02.80 - Dementia in 

other diseases classified elsewhere without behavioral disturbance


SNOMED:  50656621


Assessment/Plan


trazadone 50mg qhs


klomopin .5mg q 6hr prn


the pt lacks capacity to make decisions.





Subjective


Date patient seen:  Nov 20, 2018


Neurologic/Psychiatric:  Reports: anxiety, depressed, emotional problems


Allergies:  


Coded Allergies:  


     No Known Allergies (Unverified , 3/26/14)





Objective





Last 24 Hour Vital Signs








  Date Time  Temp Pulse Resp B/P (MAP) Pulse Ox O2 Delivery O2 Flow Rate FiO2


 


11/20/18 09:50 97.7 70 16 139/65 (89) 99   


 


11/20/18 09:32  66 18  100   


 


11/20/18 09:30  57 18  100   


 


11/20/18 09:13      Room Air  





      Room Air  


 


11/20/18 08:47  62 18 133/69 100 Simple Mask 8 


 


11/20/18 08:35  66 18 144/73 100 Simple Mask 8 


 


11/20/18 08:30  63 18 134/69 100 Simple Mask 8 


 


11/20/18 08:25 97.7 57 18 146/69 100 Simple Mask 8 


 


11/20/18 04:00 97.0 66 17 140/66 (90) 92   


 


11/20/18 00:00 97.0 60 19 121/67 (85) 98   


 


11/19/18 21:00  59  115/67    


 


11/19/18 21:00      Room Air  





      Room Air  


 


11/19/18 20:00 97.3 59 17 115/67 (83) 93   


 


11/19/18 15:46 97.5 99 20 144/87 (106) 98   


 


11/19/18 14:12    130/80    

















Intake and Output  


 


 11/19/18 11/20/18





 18:59 06:59


 


Intake Total 500 ml 


 


Balance 500 ml 


 


  


 


Other 500 ml 


 


# Voids 4 6


 


# Bowel Movements 2 8








Laboratory Tests


11/20/18 05:15: 


White Blood Count 7.4, Red Blood Count 3.32L, Hemoglobin 8.9L, Hematocrit 28.0L

, Mean Corpuscular Volume 84, Mean Corpuscular Hemoglobin 26.8L, Mean 

Corpuscular Hemoglobin Concent 31.9L, Red Cell Distribution Width 16.4H, 

Platelet Count 520H, Mean Platelet Volume 6.9, Neutrophils (%) (Auto) 72.1, 

Lymphocytes (%) (Auto) 18.3L, Monocytes (%) (Auto) 9.1, Eosinophils (%) (Auto) 

0.3, Basophils (%) (Auto) 0.2, Sodium Level 143, Potassium Level 3.1L, Chloride 

Level 108H, Carbon Dioxide Level 28, Anion Gap 7, Blood Urea Nitrogen 12, 

Creatinine 0.7, Estimat Glomerular Filtration Rate , Glucose Level 106, Calcium 

Level 8.6, Phosphorus Level 3.0, Magnesium Level 1.4L, Total Bilirubin 0.5, 

Aspartate Amino Transf (AST/SGOT) 20, Alanine Aminotransferase (ALT/SGPT) 12, 

Alkaline Phosphatase 89, Total Protein 6.4, Albumin 2.4L, Globulin 4.0, Albumin/

Globulin Ratio 0.6L


Height (Feet):  5


Height (Inches):  5.00


Weight (Pounds):  121


General Appearance:  no apparent distress, alert, confused, agitated











Josh Nunez MD Nov 20, 2018 12:16

## 2018-11-20 NOTE — INTERNAL MED PROGRESS NOTE
Subjective


Date of Service:  Nov 20, 2018


Physician Name


Ortiz,Cheo


Attending Physician


Juan Carlos Magana MD





Current Medications








 Medications


  (Trade)  Dose


 Ordered  Sig/Violette


 Route


 PRN Reason  Start Time


 Stop Time Status Last Admin


Dose Admin


 


 Acetaminophen


  (Tylenol)  650 mg  Q6H  PRN


 ORAL


 Mild Pain/Temp > 100.5  11/16/18 17:15


 12/16/18 17:14   


 


 


 Acetaminophen/


 Hydrocodone Bitart


  (Norco 5/325)  1 tab  Q6H  PRN


 ORAL


 Severe Pain (Pain Scale 7-10)  11/16/18 20:30


 11/23/18 20:29  11/20/18 11:21


 


 


 Atenolol


  (Tenormin)  50 mg  Q12HR


 ORAL


   11/16/18 21:00


 12/16/18 20:59  11/19/18 09:05


 


 


 Atorvastatin


 Calcium


  (Lipitor)  20 mg  BEDTIME


 ORAL


   11/17/18 21:00


 12/17/18 20:59  11/18/18 20:24


 


 


 Clonazepam


  (KlonoPIN)  0.5 mg  Q6H  PRN


 ORAL


 For Anxiety  11/16/18 17:30


 11/23/18 17:29  11/19/18 09:35


 


 


 Clonidine HCl


  (Catapres Tab)  0.1 mg  EVERY 8  HOURS


 ORAL


   11/16/18 22:00


 12/16/18 21:59  11/19/18 14:12


 


 


 Dextrose


  (Dextrose 50%)  25 ml  Q30M  PRN


 IV


 bs 60-69  11/20/18 15:30


 12/20/18 15:29   


 


 


 Dextrose/


 Electrolytes  1,000 ml @ 


 75 mls/hr  [id number]


 IV


   11/20/18 18:30


 12/20/18 18:29   


 


 


 Dicyclomine HCl


  (Bentyl)  10 mg  QID


 ORAL


   11/16/18 18:00


 12/16/18 17:59  11/19/18 17:49


 


 


 Haloperidol


 Lactate


  (Haldol)  5 mg  Q6H  PRN


 IM


 Agitation  11/19/18 10:45


 12/19/18 10:44   


 


 


 Lorazepam


  (Ativan 2mg/ml


 1ml)  1 mg  Q4H  PRN


 IM


 For Anxiety  11/19/18 10:45


 11/26/18 10:44  11/20/18 15:14


 


 


 Losartan Potassium


  (Cozaar)  50 mg  DAILY


 ORAL


   11/20/18 09:00


 12/17/18 08:59   


 


 


 Metformin HCl


  (Glucophage)  500 mg  BID


 ORAL


   11/17/18 09:00


 12/17/18 08:59  11/19/18 17:49


 


 


 Potassium Chloride


  (K-Dur)  10 meq  DAILY


 ORAL


   11/17/18 09:00


 12/17/18 08:59  11/19/18 09:04


 


 


 Sitagliptin


 Phosphate


  (Januvia)  50 mg  ACBREAKFAST


 ORAL


   11/17/18 06:30


 12/17/18 06:29  11/19/18 05:55


 


 


 Sodium Chloride  1,000 ml @ 


 0 mls/hr  Q0M


 IV


   11/20/18 07:30


 12/20/18 07:29   


 


 


 Trazodone HCl


  (Desyrel)  50 mg  BEDTIME


 ORAL


   11/16/18 21:00


 12/16/18 20:59  11/18/18 20:24


 


 


 Vitamin D


  (Vitamin D)  5,000 intlu  DAILY


 ORAL


   11/17/18 09:00


 12/17/18 08:59  11/19/18 09:04


 








Allergies:  


Coded Allergies:  


     No Known Allergies (Unverified , 3/26/14)


ROS Limited/Unobtainable:  Yes


Subjective


77 YO F admitted with generalized weakness.  Cover for Int Med-Dr Magana.  S/P 

revision of dislocated right femoral hemiarthroplasty prosthesis 11/20/18





Objective





Last Vital Signs








  Date Time  Temp Pulse Resp B/P (MAP) Pulse Ox O2 Delivery O2 Flow Rate FiO2


 


11/20/18 16:10 97.0 76 16 130/81 (97) 99   


 


11/20/18 09:13      Room Air  





      Room Air  


 


11/20/18 08:47       8 


 


11/16/18 18:13        98











Laboratory Tests








Test


  11/20/18


05:15 11/20/18


13:45


 


White Blood Count


  7.4 K/UL


(4.8-10.8) 


 


 


Red Blood Count


  3.32 M/UL


(4.20-5.40)  L 


 


 


Hemoglobin


  8.9 G/DL


(12.0-16.0)  L 


 


 


Hematocrit


  28.0 %


(37.0-47.0)  L 


 


 


Mean Corpuscular Volume 84 FL (80-99)   


 


Mean Corpuscular Hemoglobin


  26.8 PG


(27.0-31.0)  L 


 


 


Mean Corpuscular Hemoglobin


Concent 31.9 G/DL


(32.0-36.0)  L 


 


 


Red Cell Distribution Width


  16.4 %


(11.6-14.8)  H 


 


 


Platelet Count


  520 K/UL


(150-450)  H 


 


 


Mean Platelet Volume


  6.9 FL


(6.5-10.1) 


 


 


Neutrophils (%) (Auto)


  72.1 %


(45.0-75.0) 


 


 


Lymphocytes (%) (Auto)


  18.3 %


(20.0-45.0)  L 


 


 


Monocytes (%) (Auto)


  9.1 %


(1.0-10.0) 


 


 


Eosinophils (%) (Auto)


  0.3 %


(0.0-3.0) 


 


 


Basophils (%) (Auto)


  0.2 %


(0.0-2.0) 


 


 


Sodium Level


  143 MMOL/L


(136-145) 143 MMOL/L


(136-145)


 


Potassium Level


  3.1 MMOL/L


(3.5-5.1)  L 4.1 MMOL/L


(3.5-5.1)


 


Chloride Level


  108 MMOL/L


()  H 108 MMOL/L


()  H


 


Carbon Dioxide Level


  28 MMOL/L


(21-32) 26 MMOL/L


(21-32)


 


Anion Gap


  7 mmol/L


(5-15) 10 mmol/L


(5-15)


 


Blood Urea Nitrogen


  12 mg/dL


(7-18) 13 mg/dL


(7-18)


 


Creatinine


  0.7 MG/DL


(0.55-1.30) 0.7 MG/DL


(0.55-1.30)


 


Estimat Glomerular Filtration


Rate  mL/min (>60)  


   mL/min (>60)  


 


 


Glucose Level


  106 MG/DL


() 118 MG/DL


()  H


 


Calcium Level


  8.6 MG/DL


(8.5-10.1) 8.5 MG/DL


(8.5-10.1)


 


Phosphorus Level


  3.0 MG/DL


(2.5-4.9) 


 


 


Magnesium Level


  1.4 MG/DL


(1.8-2.4)  L 1.6 MG/DL


(1.8-2.4)  L


 


Total Bilirubin


  0.5 MG/DL


(0.2-1.0) 


 


 


Aspartate Amino Transf


(AST/SGOT) 20 U/L (15-37)


  


 


 


Alanine Aminotransferase


(ALT/SGPT) 12 U/L (12-78)


  


 


 


Alkaline Phosphatase


  89 U/L


() 


 


 


Total Protein


  6.4 G/DL


(6.4-8.2) 


 


 


Albumin


  2.4 G/DL


(3.4-5.0)  L 


 


 


Globulin 4.0 g/dL   


 


Albumin/Globulin Ratio


  0.6 (1.0-2.7)


L 


 

















Intake and Output  


 


 11/19/18 11/20/18





 19:00 07:00


 


Intake Total 500 ml 


 


Balance 500 ml 


 


  


 


Other 500 ml 


 


# Voids 4 6


 


# Bowel Movements 2 8








Objective


General Appearance:  WD/WN, mild distress, agitated


EENT:  PERRL/EOMI, normal ENT inspection


Neck:  non-tender, normal alignment, supple, normal inspection


Cardiovascular:  normal peripheral pulses, normal rate, regular rhythm, no 

gallop/murmur, no JVD


Respiratory/Chest:  chest wall non-tender, lungs clear, normal breath sounds, 

no accessory muscle use, respiratory distress


Abdomen:  normal bowel sounds, non tender, soft, no organomegaly, no mass


Extremities:  normal range of motion


Neurologic:  CNs II-XII grossly normal


Skin:  normal pigmentation, warm/dry





Assessment/Plan


Problem List:  


(1) Diabetes mellitus, type II


Assessment & Plan:  Continue metformin and januvia





(2) HTN (hypertension)


Assessment & Plan:  continue atenolol and cozaar





(3) Sick sinus syndrome


Assessment & Plan:  S/P pacemaker





(4) Aortic stenosis


(5) Pulmonary hypertension


(6) Alzheimer's dementia


(7) Dislocation, hip closed


Assessment & Plan:  S/P revision dislocated right femoral hemiarthroplasty 

prosthesis 11/20/18-see ortho note.





Status:  unchanged


Assessment/Plan


Discharge planning:  post acute care.











Cheo Ortiz MD Nov 20, 2018 18:03

## 2018-11-20 NOTE — 48 HOUR POST ANESTHESIA EVAL
Post Anesthesia Evaluation


Procedure:  flexible sigmoidoscopy


Date of Evaluation:  Nov 20, 2018


Time of Evaluation:  08:39


Blood Pressure Systolic:  144


0:  69


Pulse Rate:  66


Respiratory Rate:  18


Temperature (Fahrenheit):  97.7


O2 Sat by Pulse Oximetry:  100


Airway:  patent


Nausea:  No


Vomiting:  No


Pain Intensity:  0


Hydration Status:  adequate


Cardiopulmonary Status:


stable


Mental Status/LOC:  patient returned to baseline


Post-Anesthesia Complications:


none


Follow-up care needed:  N/A











Zoraida Bragg MD Nov 20, 2018 09:32

## 2018-11-20 NOTE — GENERAL SURGERY PROGRESS NOTE
General Surgery-Progress Note


Subjective


Additional Comments


no acute events.  looks more comfortable today





Objective





Last 24 Hour Vital Signs








  Date Time  Temp Pulse Resp B/P (MAP) Pulse Ox O2 Delivery O2 Flow Rate FiO2


 


11/20/18 09:50 97.7 70 16 139/65 (89) 99   


 


11/20/18 09:32  66 18  100   


 


11/20/18 09:30  57 18  100   


 


11/20/18 09:13      Room Air  





      Room Air  


 


11/20/18 08:47  62 18 133/69 100 Simple Mask 8 


 


11/20/18 08:35  66 18 144/73 100 Simple Mask 8 


 


11/20/18 08:30  63 18 134/69 100 Simple Mask 8 


 


11/20/18 08:25 97.7 57 18 146/69 100 Simple Mask 8 


 


11/20/18 04:00 97.0 66 17 140/66 (90) 92   


 


11/20/18 00:00 97.0 60 19 121/67 (85) 98   


 


11/19/18 21:00  59  115/67    


 


11/19/18 21:00      Room Air  





      Room Air  


 


11/19/18 20:00 97.3 59 17 115/67 (83) 93   


 


11/19/18 15:46 97.5 99 20 144/87 (106) 98   


 


11/19/18 14:12    130/80    


 


11/19/18 12:00 98.1 80 18 130/80 (97) 98   








I&O











Intake and Output  


 


 11/19/18 11/20/18





 18:59 06:59


 


Intake Total 500 ml 


 


Balance 500 ml 


 


  


 


Other 500 ml 


 


# Voids 4 6


 


# Bowel Movements 2 8








Dressing:  saturated


Wound:  other


Drains:  other


Cardiovascular:  RSR


Respiratory:  clear


Abdomen:  soft, flat, non-tender, present bowel sounds


Extremities:  other





Laboratory Tests








Test


  11/20/18


05:15


 


White Blood Count


  7.4 K/UL


(4.8-10.8)


 


Red Blood Count


  3.32 M/UL


(4.20-5.40)  L


 


Hemoglobin


  8.9 G/DL


(12.0-16.0)  L


 


Hematocrit


  28.0 %


(37.0-47.0)  L


 


Mean Corpuscular Volume 84 FL (80-99)  


 


Mean Corpuscular Hemoglobin


  26.8 PG


(27.0-31.0)  L


 


Mean Corpuscular Hemoglobin


Concent 31.9 G/DL


(32.0-36.0)  L


 


Red Cell Distribution Width


  16.4 %


(11.6-14.8)  H


 


Platelet Count


  520 K/UL


(150-450)  H


 


Mean Platelet Volume


  6.9 FL


(6.5-10.1)


 


Neutrophils (%) (Auto)


  72.1 %


(45.0-75.0)


 


Lymphocytes (%) (Auto)


  18.3 %


(20.0-45.0)  L


 


Monocytes (%) (Auto)


  9.1 %


(1.0-10.0)


 


Eosinophils (%) (Auto)


  0.3 %


(0.0-3.0)


 


Basophils (%) (Auto)


  0.2 %


(0.0-2.0)


 


Sodium Level


  143 MMOL/L


(136-145)


 


Potassium Level


  3.1 MMOL/L


(3.5-5.1)  L


 


Chloride Level


  108 MMOL/L


()  H


 


Carbon Dioxide Level


  28 MMOL/L


(21-32)


 


Anion Gap


  7 mmol/L


(5-15)


 


Blood Urea Nitrogen


  12 mg/dL


(7-18)


 


Creatinine


  0.7 MG/DL


(0.55-1.30)


 


Estimat Glomerular Filtration


Rate  mL/min (>60)  


 


 


Glucose Level


  106 MG/DL


()


 


Calcium Level


  8.6 MG/DL


(8.5-10.1)


 


Phosphorus Level


  3.0 MG/DL


(2.5-4.9)


 


Magnesium Level


  1.4 MG/DL


(1.8-2.4)  L


 


Total Bilirubin


  0.5 MG/DL


(0.2-1.0)


 


Aspartate Amino Transf


(AST/SGOT) 20 U/L (15-37)


 


 


Alanine Aminotransferase


(ALT/SGPT) 12 U/L (12-78)


 


 


Alkaline Phosphatase


  89 U/L


()


 


Total Protein


  6.4 G/DL


(6.4-8.2)


 


Albumin


  2.4 G/DL


(3.4-5.0)  L


 


Globulin 4.0 g/dL  


 


Albumin/Globulin Ratio


  0.6 (1.0-2.7)


L











Plan


Problems:  


(1) Sacral decubitus ulcer, stage III


Assessment & Plan:  Full thickness pressure injury to sacral region.  multiple 

areas some which are chronic with some slow resolving areas noted.  2cm x3cm 

midline sacral full thickness wound with 100% sloth, just right and just left 

to this there are smaller areas approximately <2cm x <2cm with similar 

appearing ulcerations.  no odor.  no significant drainage.  no bone or muscle 

seem to be exposed.  100% sloth.  


wounds present upon admission and will be cared for during hospital stay





Plan:


wash wounds daily with NS; apply hydrogel or therahoney followed by foam 

dressing


turn q2h


heel protectors


air mattress


will likely need excisional vs non excisional debridement of sloth down to 

healthy viable tissue.  may consider during hospitalization 


nutritional support 





(2) Failure to thrive


Assessment & Plan:  elderly female with dementia


albumin 2


multiple decubitus ulcers





nutritional consult


increase protein and Caloric intake


will need optimization to help heal wounds 














Tuan Patricia Nov 20, 2018 10:48

## 2018-11-21 VITALS — SYSTOLIC BLOOD PRESSURE: 121 MMHG | DIASTOLIC BLOOD PRESSURE: 63 MMHG

## 2018-11-21 VITALS — SYSTOLIC BLOOD PRESSURE: 131 MMHG | DIASTOLIC BLOOD PRESSURE: 62 MMHG

## 2018-11-21 VITALS — DIASTOLIC BLOOD PRESSURE: 54 MMHG | SYSTOLIC BLOOD PRESSURE: 119 MMHG

## 2018-11-21 VITALS — SYSTOLIC BLOOD PRESSURE: 123 MMHG | DIASTOLIC BLOOD PRESSURE: 65 MMHG

## 2018-11-21 VITALS — SYSTOLIC BLOOD PRESSURE: 128 MMHG | DIASTOLIC BLOOD PRESSURE: 65 MMHG

## 2018-11-21 VITALS — SYSTOLIC BLOOD PRESSURE: 90 MMHG | DIASTOLIC BLOOD PRESSURE: 50 MMHG

## 2018-11-21 VITALS — SYSTOLIC BLOOD PRESSURE: 130 MMHG | DIASTOLIC BLOOD PRESSURE: 62 MMHG

## 2018-11-21 VITALS — DIASTOLIC BLOOD PRESSURE: 59 MMHG | SYSTOLIC BLOOD PRESSURE: 112 MMHG

## 2018-11-21 LAB
ADD MANUAL DIFF: YES
ADD MANUAL DIFF: YES
ALBUMIN SERPL-MCNC: 2.1 G/DL (ref 3.4–5)
ALBUMIN/GLOB SERPL: 0.6 {RATIO} (ref 1–2.7)
ALP SERPL-CCNC: 74 U/L (ref 46–116)
ALT SERPL-CCNC: 14 U/L (ref 12–78)
ANION GAP SERPL CALC-SCNC: 9 MMOL/L (ref 5–15)
AST SERPL-CCNC: 19 U/L (ref 15–37)
BILIRUB SERPL-MCNC: 0.4 MG/DL (ref 0.2–1)
BUN SERPL-MCNC: 15 MG/DL (ref 7–18)
CALCIUM SERPL-MCNC: 7.8 MG/DL (ref 8.5–10.1)
CHLORIDE SERPL-SCNC: 104 MMOL/L (ref 98–107)
CO2 SERPL-SCNC: 23 MMOL/L (ref 21–32)
CREAT SERPL-MCNC: 0.8 MG/DL (ref 0.55–1.3)
ERYTHROCYTE [DISTWIDTH] IN BLOOD BY AUTOMATED COUNT: 15.9 % (ref 11.6–14.8)
ERYTHROCYTE [DISTWIDTH] IN BLOOD BY AUTOMATED COUNT: 16.6 % (ref 11.6–14.8)
GLOBULIN SER-MCNC: 3.4 G/DL
HCT VFR BLD CALC: 21.3 % (ref 37–47)
HCT VFR BLD CALC: 21.4 % (ref 37–47)
HGB BLD-MCNC: 6.6 G/DL (ref 12–16)
HGB BLD-MCNC: 6.9 G/DL (ref 12–16)
MCV RBC AUTO: 83 FL (ref 80–99)
MCV RBC AUTO: 85 FL (ref 80–99)
PHOSPHATE SERPL-MCNC: 2.7 MG/DL (ref 2.5–4.9)
PLATELET # BLD: 361 K/UL (ref 150–450)
PLATELET # BLD: 393 K/UL (ref 150–450)
POTASSIUM SERPL-SCNC: 3.7 MMOL/L (ref 3.5–5.1)
RBC # BLD AUTO: 2.52 M/UL (ref 4.2–5.4)
RBC # BLD AUTO: 2.57 M/UL (ref 4.2–5.4)
SODIUM SERPL-SCNC: 136 MMOL/L (ref 136–145)
WBC # BLD AUTO: 10.3 K/UL (ref 4.8–10.8)
WBC # BLD AUTO: 12.1 K/UL (ref 4.8–10.8)

## 2018-11-21 RX ADMIN — HYDROCODONE BITARTRATE AND ACETAMINOPHEN PRN TAB: 5; 325 TABLET ORAL at 00:43

## 2018-11-21 RX ADMIN — CLONAZEPAM PRN MG: 0.5 TABLET ORAL at 03:07

## 2018-11-21 RX ADMIN — METFORMIN HYDROCHLORIDE SCH MG: 500 TABLET ORAL at 08:55

## 2018-11-21 RX ADMIN — TRAZODONE HYDROCHLORIDE SCH MG: 50 TABLET ORAL at 21:36

## 2018-11-21 RX ADMIN — DEXTROSE, SODIUM CHLORIDE, AND POTASSIUM CHLORIDE SCH MLS/HR: 5; .45; .15 INJECTION INTRAVENOUS at 07:50

## 2018-11-21 RX ADMIN — DEXTROSE, SODIUM CHLORIDE, AND POTASSIUM CHLORIDE SCH MLS/HR: 5; .45; .15 INJECTION INTRAVENOUS at 21:12

## 2018-11-21 RX ADMIN — LOSARTAN POTASSIUM SCH MG: 50 TABLET, FILM COATED ORAL at 08:59

## 2018-11-21 RX ADMIN — DICYCLOMINE HYDROCHLORIDE SCH MG: 10 CAPSULE ORAL at 13:58

## 2018-11-21 RX ADMIN — LORAZEPAM PRN MG: 2 INJECTION, SOLUTION INTRAMUSCULAR; INTRAVENOUS at 06:28

## 2018-11-21 RX ADMIN — CLONAZEPAM PRN MG: 0.5 TABLET ORAL at 13:09

## 2018-11-21 RX ADMIN — SITAGLIPTIN SCH MG: 50 TABLET, FILM COATED ORAL at 06:25

## 2018-11-21 RX ADMIN — HYDROCODONE BITARTRATE AND ACETAMINOPHEN PRN TAB: 5; 325 TABLET ORAL at 11:15

## 2018-11-21 RX ADMIN — DICYCLOMINE HYDROCHLORIDE SCH MG: 10 CAPSULE ORAL at 17:07

## 2018-11-21 RX ADMIN — VITAMIN D, TAB 1000IU (100/BT) SCH INTLU: 25 TAB at 08:57

## 2018-11-21 RX ADMIN — METFORMIN HYDROCHLORIDE SCH MG: 500 TABLET ORAL at 17:07

## 2018-11-21 RX ADMIN — CLONAZEPAM PRN MG: 0.5 TABLET ORAL at 23:55

## 2018-11-21 RX ADMIN — ATORVASTATIN CALCIUM SCH MG: 20 TABLET, FILM COATED ORAL at 21:37

## 2018-11-21 RX ADMIN — DICYCLOMINE HYDROCHLORIDE SCH MG: 10 CAPSULE ORAL at 08:56

## 2018-11-21 RX ADMIN — HYDROCODONE BITARTRATE AND ACETAMINOPHEN PRN TAB: 5; 325 TABLET ORAL at 23:55

## 2018-11-21 RX ADMIN — DICYCLOMINE HYDROCHLORIDE SCH MG: 10 CAPSULE ORAL at 21:37

## 2018-11-21 NOTE — GENERAL PROGRESS NOTE
Assessment/Plan


Assessment/Plan


Assessment


- constipation


- rectal loading due to ulcerated hemorrhoids


- low K and Mg


- Anemia - worse post op


- agitation/OBS


- dislocated hip - s/p arthroplasty conversion





Recommendations


- push po


- replace K and MG PRN


- Monitor H&H


- transfuse PRN


- Ortho f/u





Subjective


Allergies:  


Coded Allergies:  


     No Known Allergies (Unverified , 3/26/14)


Subjective


s/p (R) hip hemiarthroplasty conversion


calm NAD


d/w RN





Objective





Last 24 Hour Vital Signs








  Date Time  Temp Pulse Resp B/P (MAP) Pulse Ox O2 Delivery O2 Flow Rate FiO2


 


11/21/18 16:40 98.8       


 


11/21/18 15:43 98.8 65 20 128/65 (86) 95   


 


11/21/18 13:58    130/62    


 


11/21/18 13:54 98.6   130/62 (84)    


 


11/21/18 13:09 98.6       


 


11/21/18 12:15 99.9 63  123/65 (84) 96   


 


11/21/18 11:56 98.5 65 20 131/62 (85) 99   


 


11/21/18 11:45 98.5       


 


11/21/18 09:00      Room Air  





      Room Air  


 


11/21/18 08:59    119/54    


 


11/21/18 08:58  86  119/54    


 


11/21/18 08:16 97.7 86 20 119/54 (75) 95   


 


11/21/18 04:00 97.0  20 112/59 (76) 94   


 


11/21/18 00:04 97.4  20 90/50 (63) 100   


 


11/20/18 21:00      Room Air  





      Room Air  


 


11/20/18 20:52 96.3  20 108/63 (78) 94   

















Intake and Output  


 


 11/20/18 11/21/18





 19:00 07:00


 


Intake Total 725 ml 1070 ml


 


Output Total  450 ml


 


Balance 725 ml 620 ml


 


  


 


Intake Oral 200 ml 120 ml


 


IV Total 525 ml 950 ml


 


Output Urine Total  400 ml


 


Estimated Blood Loss  50 ml


 


# Voids 3 








Laboratory Tests


11/21/18 06:00: 


White Blood Count 12.1#H, Red Blood Count 2.52L, Hemoglobin 6.6*L, Hematocrit 

21.3L, Mean Corpuscular Volume 85, Mean Corpuscular Hemoglobin 26.1L, Mean 

Corpuscular Hemoglobin Concent 30.9L, Red Cell Distribution Width 16.6H, 

Platelet Count 393, Mean Platelet Volume 6.9, Neutrophils (%) (Auto) , 

Lymphocytes (%) (Auto) , Monocytes (%) (Auto) , Eosinophils (%) (Auto) , 

Basophils (%) (Auto) , Differential Total Cells Counted 100, Neutrophils % (

Manual) 88H, Lymphocytes % (Manual) 7L, Monocytes % (Manual) 5, Eosinophils % (

Manual) 0, Basophils % (Manual) 0, Band Neutrophils 0, Platelet Estimate 

Adequate, Platelet Morphology Normal, Hypochromasia 3+, Anisocytosis 2+, 

Erythrocyte Sedimentation Rate 18, Sodium Level 136, Potassium Level 3.7, 

Chloride Level 104, Carbon Dioxide Level 23, Anion Gap 9, Blood Urea Nitrogen 15

, Creatinine 0.8, Estimat Glomerular Filtration Rate , Glucose Level 180H, 

Calcium Level 7.8L, Phosphorus Level 2.7, Magnesium Level 1.3L, Total Bilirubin 

0.4, Aspartate Amino Transf (AST/SGOT) 19, Alanine Aminotransferase (ALT/SGPT) 

14, Alkaline Phosphatase 74, C-Reactive Protein, Quantitative 5.9H, Total 

Protein 5.5L, Albumin 2.1L, Globulin 3.4, Albumin/Globulin Ratio 0.6L


11/21/18 15:00: 


White Blood Count 10.3, Red Blood Count 2.57L, Hemoglobin 6.9*L, Hematocrit 

21.4L, Mean Corpuscular Volume 83, Mean Corpuscular Hemoglobin 27.0, Mean 

Corpuscular Hemoglobin Concent 32.4, Red Cell Distribution Width 15.9H, 

Platelet Count 361, Mean Platelet Volume 6.9, Neutrophils (%) (Auto) , 

Lymphocytes (%) (Auto) , Monocytes (%) (Auto) , Eosinophils (%) (Auto) , 

Basophils (%) (Auto) , Differential Total Cells Counted 100, Neutrophils % (

Manual) 88H, Lymphocytes % (Manual) 5L, Monocytes % (Manual) 7, Eosinophils % (

Manual) 0, Basophils % (Manual) 0, Band Neutrophils 0, Platelet Estimate 

Adequate, Platelet Morphology Normal, Hypochromasia 3+, Anisocytosis 1+, 

Polychromasia 1+


Height (Feet):  5


Height (Inches):  5.00


Weight (Pounds):  136


Objective


WDWN


NCAT


supple


CTA


RRR


abd soft


no edema











Khorrami,Payman MD Nov 21, 2018 20:50

## 2018-11-21 NOTE — DIAGNOSTIC IMAGING REPORT
Indication: Postoperative, status post right total hip replacement for dislocation,

hip pain

 

Technique: One view of the pelvis

 

Comparison: 10/15/2018 pelvic radiograph, right hip radiograph dated 11/19/2018

 

Findings: Interim revision of previously demonstrated dislocated right hip

hemiarthroplasty, with a total hip acetabular cup now in place. The femoral stem

appears to remain the same. Retained air from the surgical exposure is seen in the

soft tissues. Prosthesis appears well aligned. The bones are osteoporotic. Old healed

fracture deformity of the left superior and inferior pubic rami are again

demonstrated

 

Impression: Postoperative right hip, as described

## 2018-11-21 NOTE — INTERNAL MED PROGRESS NOTE
Subjective


Date of Service:  Nov 21, 2018


Physician Name


Cheo Ortiz


Attending Physician


Juan Carlos Magana MD





Current Medications








 Medications


  (Trade)  Dose


 Ordered  Sig/Violette


 Route


 PRN Reason  Start Time


 Stop Time Status Last Admin


Dose Admin


 


 Acetaminophen


  (Tylenol)  650 mg  Q6H  PRN


 ORAL


 Mild Pain/Temp > 100.5  11/16/18 17:15


 12/16/18 17:14   


 


 


 Acetaminophen/


 Hydrocodone Bitart


  (Norco 5/325)  1 tab  Q6H  PRN


 ORAL


 Severe Pain (Pain Scale 7-10)  11/16/18 20:30


 11/23/18 20:29  11/21/18 00:43


 


 


 Atenolol


  (Tenormin)  50 mg  Q12HR


 ORAL


   11/16/18 21:00


 12/16/18 20:59  11/21/18 08:58


 


 


 Atorvastatin


 Calcium


  (Lipitor)  20 mg  BEDTIME


 ORAL


   11/17/18 21:00


 12/17/18 20:59  11/20/18 21:10


 


 


 Clonazepam


  (KlonoPIN)  0.5 mg  Q6H  PRN


 ORAL


 For Anxiety  11/16/18 17:30


 11/23/18 17:29  11/21/18 03:07


 


 


 Clonidine HCl


  (Catapres Tab)  0.1 mg  EVERY 8  HOURS


 ORAL


   11/16/18 22:00


 12/16/18 21:59  11/19/18 14:12


 


 


 Dextrose


  (Dextrose 50%)  25 ml  Q30M  PRN


 IV


 bs 60-69  11/20/18 15:30


 12/20/18 15:29   


 


 


 Dextrose/


 Electrolytes  1,000 ml @ 


 75 mls/hr  [id number]


 IV


   11/20/18 18:30


 12/20/18 18:29  11/20/18 21:06


 


 


 Dicyclomine HCl


  (Bentyl)  10 mg  QID


 ORAL


   11/16/18 18:00


 12/16/18 17:59  11/21/18 08:56


 


 


 Furosemide


  (Lasix)  20 mg  BID


 IV


   11/21/18 09:00


 12/21/18 08:59   


 


 


 Haloperidol


 Lactate


  (Haldol)  5 mg  Q6H  PRN


 IM


 Agitation  11/19/18 10:45


 12/19/18 10:44   


 


 


 Lorazepam


  (Ativan 2mg/ml


 1ml)  1 mg  Q4H  PRN


 IM


 For Anxiety  11/19/18 10:45


 11/26/18 10:44  11/21/18 06:28


 


 


 Losartan Potassium


  (Cozaar)  50 mg  DAILY


 ORAL


   11/20/18 09:00


 12/17/18 08:59  11/21/18 08:59


 


 


 Metformin HCl


  (Glucophage)  500 mg  BID


 ORAL


   11/17/18 09:00


 12/17/18 08:59  11/21/18 08:55


 


 


 Potassium Chloride


  (K-Dur)  10 meq  DAILY


 ORAL


   11/17/18 09:00


 12/17/18 08:59  11/21/18 08:55


 


 


 Sitagliptin


 Phosphate


  (Januvia)  50 mg  ACBREAKFAST


 ORAL


   11/17/18 06:30


 12/17/18 06:29  11/21/18 06:25


 


 


 Trazodone HCl


  (Desyrel)  50 mg  BEDTIME


 ORAL


   11/16/18 21:00


 12/16/18 20:59  11/20/18 21:11


 


 


 Vitamin D


  (Vitamin D)  5,000 intlu  DAILY


 ORAL


   11/17/18 09:00


 12/17/18 08:59  11/21/18 08:57


 








Allergies:  


Coded Allergies:  


     No Known Allergies (Unverified , 3/26/14)


ROS Limited/Unobtainable:  Yes


Subjective


77 YO F admitted with generalized weakness.  Cover for Int Med-Dr Magana.  S/P  

right hip total arthroplasty 11/20/18.  S/P colonoscopy 11/20/18





Objective





Last Vital Signs








  Date Time  Temp Pulse Resp B/P (MAP) Pulse Ox O2 Delivery O2 Flow Rate FiO2


 


11/21/18 09:00      Room Air  





      Room Air  


 


11/21/18 08:59    119/54    


 


11/21/18 08:58  86      


 


11/21/18 08:16 97.7  20  95   


 


11/20/18 20:10       3 


 


11/16/18 18:13        98











Laboratory Tests








Test


  11/20/18


13:45 11/21/18


06:00


 


Sodium Level


  143 MMOL/L


(136-145) 136 MMOL/L


(136-145)


 


Potassium Level


  4.1 MMOL/L


(3.5-5.1) 3.7 MMOL/L


(3.5-5.1)


 


Chloride Level


  108 MMOL/L


()  H 104 MMOL/L


()


 


Carbon Dioxide Level


  26 MMOL/L


(21-32) 23 MMOL/L


(21-32)


 


Anion Gap


  10 mmol/L


(5-15) 9 mmol/L


(5-15)


 


Blood Urea Nitrogen


  13 mg/dL


(7-18) 15 mg/dL


(7-18)


 


Creatinine


  0.7 MG/DL


(0.55-1.30) 0.8 MG/DL


(0.55-1.30)


 


Estimat Glomerular Filtration


Rate  mL/min (>60)  


   mL/min (>60)  


 


 


Glucose Level


  118 MG/DL


()  H 180 MG/DL


()  H


 


Calcium Level


  8.5 MG/DL


(8.5-10.1) 7.8 MG/DL


(8.5-10.1)  L


 


Magnesium Level


  1.6 MG/DL


(1.8-2.4)  L 1.3 MG/DL


(1.8-2.4)  L


 


White Blood Count


  


  12.1 K/UL


(4.8-10.8)  #H


 


Red Blood Count


  


  2.52 M/UL


(4.20-5.40)  L


 


Hemoglobin


  


  6.6 G/DL


(12.0-16.0)  *L


 


Hematocrit


  


  21.3 %


(37.0-47.0)  L


 


Mean Corpuscular Volume  85 FL (80-99)  


 


Mean Corpuscular Hemoglobin


  


  26.1 PG


(27.0-31.0)  L


 


Mean Corpuscular Hemoglobin


Concent 


  30.9 G/DL


(32.0-36.0)  L


 


Red Cell Distribution Width


  


  16.6 %


(11.6-14.8)  H


 


Platelet Count


  


  393 K/UL


(150-450)


 


Mean Platelet Volume


  


  6.9 FL


(6.5-10.1)


 


Neutrophils (%) (Auto)


  


  % (45.0-75.0)


 


 


Lymphocytes (%) (Auto)


  


  % (20.0-45.0)


 


 


Monocytes (%) (Auto)   % (1.0-10.0)  


 


Eosinophils (%) (Auto)   % (0.0-3.0)  


 


Basophils (%) (Auto)   % (0.0-2.0)  


 


Differential Total Cells


Counted 


  100  


 


 


Neutrophils % (Manual)  88 % (45-75)  H


 


Lymphocytes % (Manual)  7 % (20-45)  L


 


Monocytes % (Manual)  5 % (1-10)  


 


Eosinophils % (Manual)  0 % (0-3)  


 


Basophils % (Manual)  0 % (0-2)  


 


Band Neutrophils  0 % (0-8)  


 


Platelet Estimate  Adequate  


 


Platelet Morphology  Normal  


 


Hypochromasia  3+  


 


Anisocytosis  2+  


 


Erythrocyte Sedimentation Rate


  


  18 MM/HR


(0-30)


 


Phosphorus Level


  


  2.7 MG/DL


(2.5-4.9)


 


Total Bilirubin


  


  0.4 MG/DL


(0.2-1.0)


 


Aspartate Amino Transf


(AST/SGOT) 


  19 U/L (15-37)


 


 


Alanine Aminotransferase


(ALT/SGPT) 


  14 U/L (12-78)


 


 


Alkaline Phosphatase


  


  74 U/L


()


 


C-Reactive Protein,


Quantitative 


  5.9 mg/dL


(0.00-0.90)  H


 


Total Protein


  


  5.5 G/DL


(6.4-8.2)  L


 


Albumin


  


  2.1 G/DL


(3.4-5.0)  L


 


Globulin  3.4 g/dL  


 


Albumin/Globulin Ratio


  


  0.6 (1.0-2.7)


L

















Intake and Output  


 


 11/20/18 11/21/18





 18:59 06:59


 


Intake Total 725 ml 1070 ml


 


Output Total  450 ml


 


Balance 725 ml 620 ml


 


  


 


Intake Oral 200 ml 120 ml


 


IV Total 525 ml 950 ml


 


Output Urine Total  400 ml


 


Estimated Blood Loss  50 ml


 


# Voids 3 








Objective


General Appearance:  WD/WN, mild distress, agitated


EENT:  PERRL/EOMI, normal ENT inspection


Neck:  non-tender, normal alignment, supple, normal inspection


Cardiovascular:  normal peripheral pulses, normal rate, regular rhythm, no 

gallop/murmur, no JVD


Respiratory/Chest:  chest wall non-tender, lungs clear, normal breath sounds, 

no accessory muscle use, respiratory distress


Abdomen:  normal bowel sounds, non tender, soft, no organomegaly, no mass


Extremities:  normal range of motion


Neurologic:  CNs II-XII grossly normal


Skin:  normal pigmentation, warm/dry





Assessment/Plan


Problem List:  


(1) Diabetes mellitus, type II


Assessment & Plan:  Continue metformin and januvia





(2) HTN (hypertension)


Assessment & Plan:  continue atenolol and cozaar





(3) Sick sinus syndrome


Assessment & Plan:  S/P pacemaker





(4) Aortic stenosis


(5) Pulmonary hypertension


(6) Alzheimer's dementia


(7) Dislocation, hip closed


Assessment & Plan:  S/P right total hip arthroplasty 11/20/18-see ortho note.





(8) Rectal bleed


Assessment & Plan:  S/P colonoscopy 11/20/18=ulcerated hemorrhoid.  See GI note.





Status:  progressing


Assessment/Plan


Discharge planning:  post acute care.











Cheo Ortiz MD Nov 21, 2018 10:48

## 2018-11-21 NOTE — PULMONOLOGY PROGRESS NOTE
Assessment/Plan


Problems:  


(1) Hip dislocation, right


(2) Sacral decubitus ulcer, stage III


(3) Protein-calorie malnutrition, severe


(4) Psychosis


(5) CAD (coronary artery disease)


(6) Pulmonary hypertension


(7) Aortic stenosis


(8) Pacemaker


(9) Diabetes mellitus, type II


(10) HTN (hypertension)


(11) Alzheimer's dementia


Assessment/Plan


prbc today


check h/h and Pt, ptt in  


symptomatic treatment


f/u ortho recommendations


wound care consult


nutrition evaluation


social service consult


check electrolytes


dvt prophylaxis





Subjective


Constitutional:  Reports: no symptoms


HEENT:  Repors: no symptoms


Respiratory:  Reports: no symptoms


Allergies:  


Coded Allergies:  


     No Known Allergies (Unverified , 3/26/14)





Objective





Last 24 Hour Vital Signs








  Date Time  Temp Pulse Resp B/P (MAP) Pulse Ox O2 Delivery O2 Flow Rate FiO2


 


11/21/18 09:00      Room Air  





      Room Air  


 


11/21/18 08:59    119/54    


 


11/21/18 08:58  86  119/54    


 


11/21/18 08:16 97.7 86 20 119/54 (75) 95   


 


11/21/18 04:00 97.0  20 112/59 (76) 94   


 


11/21/18 00:04 97.4  20 90/50 (63) 100   


 


11/20/18 21:00      Room Air  





      Room Air  


 


11/20/18 20:52 96.3  20 108/63 (78) 94   


 


11/20/18 20:10 98.0 69 15 116/67 96 Nasal Cannula 3 


 


11/20/18 19:55  75 16 121/56 98 Nasal Cannula 3 


 


11/20/18 19:45  78 16 121/61 100 Nasal Cannula 3 


 


11/20/18 19:36  84 15 126/55 100 Simple Mask 6 


 


11/20/18 19:31  71 14 123/59 100 Simple Mask 6 


 


11/20/18 19:26 97.4 77 29 111/64 100 Simple Mask 6 


 


11/20/18 19:25  74 16  100   


 


11/20/18 16:10 97.0 76 16 130/81 (97) 99   


 


11/20/18 12:29 97.0 72 16 139/86 (103) 99   


 


11/20/18 11:51 97.7       

















Intake and Output  


 


 11/20/18 11/21/18





 18:59 06:59


 


Intake Total 725 ml 1070 ml


 


Output Total  450 ml


 


Balance 725 ml 620 ml


 


  


 


Intake Oral 200 ml 120 ml


 


IV Total 525 ml 950 ml


 


Output Urine Total  400 ml


 


Estimated Blood Loss  50 ml


 


# Voids 3 








General Appearance:  WD/WN


HEENT:  normocephalic


Respiratory/Chest:  chest wall non-tender, normal breath sounds


Breasts:  no masses


Cardiovascular:  normal peripheral pulses, normal rate


Abdomen:  normal bowel sounds, no organomegaly


Genitourinary:  normal external genitalia


Skin:  no rash





Microbiology








 Date/Time


Source Procedure


Growth Status


 


 


 11/20/18 18:02


Hip Right Gram Stain - Final Resulted





 11/20/18 18:02


Hip Right Aerobic Culture


Pending Resulted


 


 11/20/18 18:02


Hip Right Anaerobic Culture


Pending Resulted








Laboratory Tests


11/20/18 13:45: 


Sodium Level 143, Potassium Level 4.1, Chloride Level 108H, Carbon Dioxide 

Level 26, Anion Gap 10, Blood Urea Nitrogen 13, Creatinine 0.7, Estimat 

Glomerular Filtration Rate , Glucose Level 118H, Calcium Level 8.5, Magnesium 

Level 1.6L


11/21/18 06:00: 


Sodium Level 136, Potassium Level 3.7, Chloride Level 104, Carbon Dioxide Level 

23, Anion Gap 9, Blood Urea Nitrogen 15, Creatinine 0.8, Estimat Glomerular 

Filtration Rate , Glucose Level 180H, Calcium Level 7.8L, Magnesium Level 1.3L, 

White Blood Count 12.1#H, Red Blood Count 2.52L, Hemoglobin 6.6*L, Hematocrit 

21.3L, Mean Corpuscular Volume 85, Mean Corpuscular Hemoglobin 26.1L, Mean 

Corpuscular Hemoglobin Concent 30.9L, Red Cell Distribution Width 16.6H, 

Platelet Count 393, Mean Platelet Volume 6.9, Neutrophils (%) (Auto) , 

Lymphocytes (%) (Auto) , Monocytes (%) (Auto) , Eosinophils (%) (Auto) , 

Basophils (%) (Auto) , Differential Total Cells Counted 100, Neutrophils % (

Manual) 88H, Lymphocytes % (Manual) 7L, Monocytes % (Manual) 5, Eosinophils % (

Manual) 0, Basophils % (Manual) 0, Band Neutrophils 0, Platelet Estimate 

Adequate, Platelet Morphology Normal, Hypochromasia 3+, Anisocytosis 2+, 

Erythrocyte Sedimentation Rate 18, Phosphorus Level 2.7, Total Bilirubin 0.4, 

Aspartate Amino Transf (AST/SGOT) 19, Alanine Aminotransferase (ALT/SGPT) 14, 

Alkaline Phosphatase 74, C-Reactive Protein, Quantitative 5.9H, Total Protein 

5.5L, Albumin 2.1L, Globulin 3.4, Albumin/Globulin Ratio 0.6L





Current Medications








 Medications


  (Trade)  Dose


 Ordered  Sig/Violette


 Route


 PRN Reason  Start Time


 Stop Time Status Last Admin


Dose Admin


 


 Acetaminophen


  (Tylenol)  650 mg  Q6H  PRN


 ORAL


 Mild Pain/Temp > 100.5  11/16/18 17:15


 12/16/18 17:14   


 


 


 Acetaminophen/


 Hydrocodone Bitart


  (Norco 5/325)  1 tab  Q6H  PRN


 ORAL


 Severe Pain (Pain Scale 7-10)  11/16/18 20:30


 11/23/18 20:29  11/21/18 00:43


 


 


 Atenolol


  (Tenormin)  50 mg  Q12HR


 ORAL


   11/16/18 21:00


 12/16/18 20:59  11/21/18 08:58


 


 


 Atorvastatin


 Calcium


  (Lipitor)  20 mg  BEDTIME


 ORAL


   11/17/18 21:00


 12/17/18 20:59  11/20/18 21:10


 


 


 Clonazepam


  (KlonoPIN)  0.5 mg  Q6H  PRN


 ORAL


 For Anxiety  11/16/18 17:30


 11/23/18 17:29  11/21/18 03:07


 


 


 Clonidine HCl


  (Catapres Tab)  0.1 mg  EVERY 8  HOURS


 ORAL


   11/16/18 22:00


 12/16/18 21:59  11/19/18 14:12


 


 


 Dextrose


  (Dextrose 50%)  25 ml  Q30M  PRN


 IV


 bs 60-69  11/20/18 15:30


 12/20/18 15:29   


 


 


 Dextrose/


 Electrolytes  1,000 ml @ 


 75 mls/hr  [id number]


 IV


   11/20/18 18:30


 12/20/18 18:29  11/20/18 21:06


 


 


 Dicyclomine HCl


  (Bentyl)  10 mg  QID


 ORAL


   11/16/18 18:00


 12/16/18 17:59  11/21/18 08:56


 


 


 Furosemide


  (Lasix)  20 mg  BID


 IV


   11/21/18 09:00


 12/21/18 08:59   


 


 


 Haloperidol


 Lactate


  (Haldol)  5 mg  Q6H  PRN


 IM


 Agitation  11/19/18 10:45


 12/19/18 10:44   


 


 


 Lorazepam


  (Ativan 2mg/ml


 1ml)  1 mg  Q4H  PRN


 IM


 For Anxiety  11/19/18 10:45


 11/26/18 10:44  11/21/18 06:28


 


 


 Losartan Potassium


  (Cozaar)  50 mg  DAILY


 ORAL


   11/20/18 09:00


 12/17/18 08:59  11/21/18 08:59


 


 


 Metformin HCl


  (Glucophage)  500 mg  BID


 ORAL


   11/17/18 09:00


 12/17/18 08:59  11/21/18 08:55


 


 


 Potassium Chloride


  (K-Dur)  10 meq  DAILY


 ORAL


   11/17/18 09:00


 12/17/18 08:59  11/21/18 08:55


 


 


 Sitagliptin


 Phosphate


  (Januvia)  50 mg  ACBREAKFAST


 ORAL


   11/17/18 06:30


 12/17/18 06:29  11/21/18 06:25


 


 


 Trazodone HCl


  (Desyrel)  50 mg  BEDTIME


 ORAL


   11/16/18 21:00


 12/16/18 20:59  11/20/18 21:11


 


 


 Vitamin D


  (Vitamin D)  5,000 intlu  DAILY


 ORAL


   11/17/18 09:00


 12/17/18 08:59  11/21/18 08:57


 

















Korin Junior MD Nov 21, 2018 10:49

## 2018-11-21 NOTE — GENERAL PROGRESS NOTE
Assessment/Plan


Problem List:  


(1) Psychosis


ICD Codes:  F29 - Unspecified psychosis not due to a substance or known 

physiological condition


SNOMED:  40375356


(2) Alzheimer's dementia


ICD Codes:  G30.9 - Alzheimer's disease, unspecified; F02.80 - Dementia in 

other diseases classified elsewhere without behavioral disturbance


SNOMED:  02923892


Status:  stable


Assessment/Plan


trazadone 50mg qhs


klomopin .5mg q 6hr prn


the pt lacks capacity to make decisions.





Subjective


Neurologic/Psychiatric:  Reports: anxiety, depressed, emotional problems


Allergies:  


Coded Allergies:  


     No Known Allergies (Unverified , 3/26/14)





Objective





Last 24 Hour Vital Signs








  Date Time  Temp Pulse Resp B/P (MAP) Pulse Ox O2 Delivery O2 Flow Rate FiO2


 


11/21/18 16:40 98.8       


 


11/21/18 15:43 98.8 65 20 128/65 (86) 95   


 


11/21/18 13:58    130/62    


 


11/21/18 13:54 98.6   130/62 (84)    


 


11/21/18 13:09 98.6       


 


11/21/18 12:15 99.9 63  123/65 (84) 96   


 


11/21/18 11:56 98.5 65 20 131/62 (85) 99   


 


11/21/18 11:45 98.5       


 


11/21/18 09:00      Room Air  





      Room Air  


 


11/21/18 08:59    119/54    


 


11/21/18 08:58  86  119/54    


 


11/21/18 08:16 97.7 86 20 119/54 (75) 95   


 


11/21/18 04:00 97.0  20 112/59 (76) 94   


 


11/21/18 00:04 97.4  20 90/50 (63) 100   


 


11/20/18 21:00      Room Air  





      Room Air  


 


11/20/18 20:52 96.3  20 108/63 (78) 94   

















Intake and Output  


 


 11/20/18 11/21/18





 19:00 07:00


 


Intake Total 725 ml 1070 ml


 


Output Total  450 ml


 


Balance 725 ml 620 ml


 


  


 


Intake Oral 200 ml 120 ml


 


IV Total 525 ml 950 ml


 


Output Urine Total  400 ml


 


Estimated Blood Loss  50 ml


 


# Voids 3 








Laboratory Tests


11/21/18 06:00: 


White Blood Count 12.1#H, Red Blood Count 2.52L, Hemoglobin 6.6*L, Hematocrit 

21.3L, Mean Corpuscular Volume 85, Mean Corpuscular Hemoglobin 26.1L, Mean 

Corpuscular Hemoglobin Concent 30.9L, Red Cell Distribution Width 16.6H, 

Platelet Count 393, Mean Platelet Volume 6.9, Neutrophils (%) (Auto) , 

Lymphocytes (%) (Auto) , Monocytes (%) (Auto) , Eosinophils (%) (Auto) , 

Basophils (%) (Auto) , Differential Total Cells Counted 100, Neutrophils % (

Manual) 88H, Lymphocytes % (Manual) 7L, Monocytes % (Manual) 5, Eosinophils % (

Manual) 0, Basophils % (Manual) 0, Band Neutrophils 0, Platelet Estimate 

Adequate, Platelet Morphology Normal, Hypochromasia 3+, Anisocytosis 2+, 

Erythrocyte Sedimentation Rate 18, Sodium Level 136, Potassium Level 3.7, 

Chloride Level 104, Carbon Dioxide Level 23, Anion Gap 9, Blood Urea Nitrogen 15

, Creatinine 0.8, Estimat Glomerular Filtration Rate , Glucose Level 180H, 

Calcium Level 7.8L, Phosphorus Level 2.7, Magnesium Level 1.3L, Total Bilirubin 

0.4, Aspartate Amino Transf (AST/SGOT) 19, Alanine Aminotransferase (ALT/SGPT) 

14, Alkaline Phosphatase 74, C-Reactive Protein, Quantitative 5.9H, Total 

Protein 5.5L, Albumin 2.1L, Globulin 3.4, Albumin/Globulin Ratio 0.6L


11/21/18 15:00: 


White Blood Count 10.3, Red Blood Count 2.57L, Hemoglobin 6.9*L, Hematocrit 

21.4L, Mean Corpuscular Volume 83, Mean Corpuscular Hemoglobin 27.0, Mean 

Corpuscular Hemoglobin Concent 32.4, Red Cell Distribution Width 15.9H, 

Platelet Count 361, Mean Platelet Volume 6.9, Neutrophils (%) (Auto) , 

Lymphocytes (%) (Auto) , Monocytes (%) (Auto) , Eosinophils (%) (Auto) , 

Basophils (%) (Auto) , Differential Total Cells Counted 100, Neutrophils % (

Manual) 88H, Lymphocytes % (Manual) 5L, Monocytes % (Manual) 7, Eosinophils % (

Manual) 0, Basophils % (Manual) 0, Band Neutrophils 0, Platelet Estimate 

Adequate, Platelet Morphology Normal, Hypochromasia 3+, Anisocytosis 1+, 

Polychromasia 1+


Height (Feet):  5


Height (Inches):  5.00


Weight (Pounds):  136


General Appearance:  no apparent distress, alert, confused, agitated











Josh Nunez MD Nov 21, 2018 20:35

## 2018-11-21 NOTE — GENERAL SURGERY PROGRESS NOTE
General Surgery-Progress Note


Subjective


Additional Comments


doing okay.  comfortable.  awake, responsive, eating with assistance of nursing 

staff.  pain controlled.  labs noted





Objective





Last 24 Hour Vital Signs








  Date Time  Temp Pulse Resp B/P (MAP) Pulse Ox O2 Delivery O2 Flow Rate FiO2


 


11/21/18 08:59    119/54    


 


11/21/18 08:58  86  119/54    


 


11/21/18 08:16 97.7 86 20 119/54 (75) 95   


 


11/21/18 04:00 97.0  20 112/59 (76) 94   


 


11/21/18 00:04 97.4  20 90/50 (63) 100   


 


11/20/18 21:00      Room Air  





      Room Air  


 


11/20/18 20:52 96.3  20 108/63 (78) 94   


 


11/20/18 20:10 98.0 69 15 116/67 96 Nasal Cannula 3 


 


11/20/18 19:55  75 16 121/56 98 Nasal Cannula 3 


 


11/20/18 19:45  78 16 121/61 100 Nasal Cannula 3 


 


11/20/18 19:36  84 15 126/55 100 Simple Mask 6 


 


11/20/18 19:31  71 14 123/59 100 Simple Mask 6 


 


11/20/18 19:26 97.4 77 29 111/64 100 Simple Mask 6 


 


11/20/18 19:25  74 16  100   


 


11/20/18 16:10 97.0 76 16 130/81 (97) 99   


 


11/20/18 12:29 97.0 72 16 139/86 (103) 99   


 


11/20/18 11:51 97.7       


 


11/20/18 09:50 97.7 70 16 139/65 (89) 99   








I&O











Intake and Output  


 


 11/20/18 11/21/18





 19:00 07:00


 


Intake Total 725 ml 1070 ml


 


Output Total  450 ml


 


Balance 725 ml 620 ml


 


  


 


Intake Oral 200 ml 120 ml


 


IV Total 525 ml 950 ml


 


Output Urine Total  400 ml


 


Estimated Blood Loss  50 ml


 


# Voids 3 








Dressing:  other


Wound:  other


Drains:  other


Cardiovascular:  RSR


Respiratory:  clear


Abdomen:  soft, flat, non-tender, present bowel sounds


Extremities:  other





Laboratory Tests








Test


  11/20/18


13:45 11/21/18


06:00


 


Sodium Level


  143 MMOL/L


(136-145) 136 MMOL/L


(136-145)


 


Potassium Level


  4.1 MMOL/L


(3.5-5.1) 3.7 MMOL/L


(3.5-5.1)


 


Chloride Level


  108 MMOL/L


()  H 104 MMOL/L


()


 


Carbon Dioxide Level


  26 MMOL/L


(21-32) 23 MMOL/L


(21-32)


 


Anion Gap


  10 mmol/L


(5-15) 9 mmol/L


(5-15)


 


Blood Urea Nitrogen


  13 mg/dL


(7-18) 15 mg/dL


(7-18)


 


Creatinine


  0.7 MG/DL


(0.55-1.30) 0.8 MG/DL


(0.55-1.30)


 


Estimat Glomerular Filtration


Rate  mL/min (>60)  


   mL/min (>60)  


 


 


Glucose Level


  118 MG/DL


()  H 180 MG/DL


()  H


 


Calcium Level


  8.5 MG/DL


(8.5-10.1) 7.8 MG/DL


(8.5-10.1)  L


 


Magnesium Level


  1.6 MG/DL


(1.8-2.4)  L 1.3 MG/DL


(1.8-2.4)  L


 


White Blood Count


  


  12.1 K/UL


(4.8-10.8)  #H


 


Red Blood Count


  


  2.52 M/UL


(4.20-5.40)  L


 


Hemoglobin


  


  6.6 G/DL


(12.0-16.0)  *L


 


Hematocrit


  


  21.3 %


(37.0-47.0)  L


 


Mean Corpuscular Volume  85 FL (80-99)  


 


Mean Corpuscular Hemoglobin


  


  26.1 PG


(27.0-31.0)  L


 


Mean Corpuscular Hemoglobin


Concent 


  30.9 G/DL


(32.0-36.0)  L


 


Red Cell Distribution Width


  


  16.6 %


(11.6-14.8)  H


 


Platelet Count


  


  393 K/UL


(150-450)


 


Mean Platelet Volume


  


  6.9 FL


(6.5-10.1)


 


Neutrophils (%) (Auto)


  


  % (45.0-75.0)


 


 


Lymphocytes (%) (Auto)


  


  % (20.0-45.0)


 


 


Monocytes (%) (Auto)   % (1.0-10.0)  


 


Eosinophils (%) (Auto)   % (0.0-3.0)  


 


Basophils (%) (Auto)   % (0.0-2.0)  


 


Differential Total Cells


Counted 


  100  


 


 


Neutrophils % (Manual)  88 % (45-75)  H


 


Lymphocytes % (Manual)  7 % (20-45)  L


 


Monocytes % (Manual)  5 % (1-10)  


 


Eosinophils % (Manual)  0 % (0-3)  


 


Basophils % (Manual)  0 % (0-2)  


 


Band Neutrophils  0 % (0-8)  


 


Platelet Estimate  Adequate  


 


Platelet Morphology  Normal  


 


Hypochromasia  3+  


 


Anisocytosis  2+  


 


Erythrocyte Sedimentation Rate


  


  18 MM/HR


(0-30)


 


Phosphorus Level


  


  2.7 MG/DL


(2.5-4.9)


 


Total Bilirubin


  


  0.4 MG/DL


(0.2-1.0)


 


Aspartate Amino Transf


(AST/SGOT) 


  19 U/L (15-37)


 


 


Alanine Aminotransferase


(ALT/SGPT) 


  14 U/L (12-78)


 


 


Alkaline Phosphatase


  


  74 U/L


()


 


C-Reactive Protein,


Quantitative 


  5.9 mg/dL


(0.00-0.90)  H


 


Total Protein


  


  5.5 G/DL


(6.4-8.2)  L


 


Albumin


  


  2.1 G/DL


(3.4-5.0)  L


 


Globulin  3.4 g/dL  


 


Albumin/Globulin Ratio


  


  0.6 (1.0-2.7)


L











Plan


Problems:  


(1) Sacral decubitus ulcer, stage III


Assessment & Plan:  Full thickness pressure injury to sacral region.  multiple 

areas some which are chronic with some slow resolving areas noted.  2cm x3cm 

midline sacral full thickness wound with 100% sloth, just right and just left 

to this there are smaller areas approximately <2cm x <2cm with similar 

appearing ulcerations.  no odor.  no significant drainage.  no bone or muscle 

seem to be exposed.  100% sloth.  


wounds present upon admission and will be cared for during hospital stay





Plan:


wash wounds daily with NS; apply hydrogel or therahoney followed by foam 

dressing


turn q2h


heel protectors


air mattress


will likely need excisional vs non excisional debridement of sloth down to 

healthy viable tissue.  may consider during hospitalization once ortho and GI 

care completed 


nutritional support 





(2) Failure to thrive


Assessment & Plan:  elderly female with dementia


albumin 2


multiple decubitus ulcers





nutritional consult


increase protein and Caloric intake


will need optimization to help heal wounds 














Tuan Patricia Nov 21, 2018 09:38

## 2018-11-22 VITALS — SYSTOLIC BLOOD PRESSURE: 119 MMHG | DIASTOLIC BLOOD PRESSURE: 61 MMHG

## 2018-11-22 VITALS — DIASTOLIC BLOOD PRESSURE: 70 MMHG | SYSTOLIC BLOOD PRESSURE: 121 MMHG

## 2018-11-22 VITALS — SYSTOLIC BLOOD PRESSURE: 120 MMHG | DIASTOLIC BLOOD PRESSURE: 70 MMHG

## 2018-11-22 VITALS — DIASTOLIC BLOOD PRESSURE: 71 MMHG | SYSTOLIC BLOOD PRESSURE: 124 MMHG

## 2018-11-22 VITALS — SYSTOLIC BLOOD PRESSURE: 107 MMHG | DIASTOLIC BLOOD PRESSURE: 63 MMHG

## 2018-11-22 VITALS — DIASTOLIC BLOOD PRESSURE: 70 MMHG | SYSTOLIC BLOOD PRESSURE: 115 MMHG

## 2018-11-22 LAB
ADD MANUAL DIFF: NO
ALBUMIN SERPL-MCNC: 2.1 G/DL (ref 3.4–5)
ALBUMIN/GLOB SERPL: 0.6 {RATIO} (ref 1–2.7)
ALP SERPL-CCNC: 81 U/L (ref 46–116)
ALT SERPL-CCNC: 14 U/L (ref 12–78)
ANION GAP SERPL CALC-SCNC: 7 MMOL/L (ref 5–15)
APTT BLD: 36 SEC (ref 23–33)
AST SERPL-CCNC: 19 U/L (ref 15–37)
BASOPHILS NFR BLD AUTO: 0.3 % (ref 0–2)
BILIRUB SERPL-MCNC: 0.7 MG/DL (ref 0.2–1)
BUN SERPL-MCNC: 12 MG/DL (ref 7–18)
CALCIUM SERPL-MCNC: 8.4 MG/DL (ref 8.5–10.1)
CHLORIDE SERPL-SCNC: 107 MMOL/L (ref 98–107)
CO2 SERPL-SCNC: 25 MMOL/L (ref 21–32)
CREAT SERPL-MCNC: 0.8 MG/DL (ref 0.55–1.3)
EOSINOPHIL NFR BLD AUTO: 0.6 % (ref 0–3)
ERYTHROCYTE [DISTWIDTH] IN BLOOD BY AUTOMATED COUNT: 15.2 % (ref 11.6–14.8)
GLOBULIN SER-MCNC: 3.7 G/DL
HCT VFR BLD CALC: 26.5 % (ref 37–47)
HGB BLD-MCNC: 8.4 G/DL (ref 12–16)
INR PPP: 1.2 (ref 0.9–1.1)
LYMPHOCYTES NFR BLD AUTO: 11.1 % (ref 20–45)
MCV RBC AUTO: 85 FL (ref 80–99)
MONOCYTES NFR BLD AUTO: 7.7 % (ref 1–10)
NEUTROPHILS NFR BLD AUTO: 80.4 % (ref 45–75)
PHOSPHATE SERPL-MCNC: 2.7 MG/DL (ref 2.5–4.9)
PLATELET # BLD: 342 K/UL (ref 150–450)
POTASSIUM SERPL-SCNC: 3.9 MMOL/L (ref 3.5–5.1)
RBC # BLD AUTO: 3.13 M/UL (ref 4.2–5.4)
SODIUM SERPL-SCNC: 139 MMOL/L (ref 136–145)
WBC # BLD AUTO: 11.1 K/UL (ref 4.8–10.8)

## 2018-11-22 RX ADMIN — CLONAZEPAM PRN MG: 0.5 TABLET ORAL at 13:37

## 2018-11-22 RX ADMIN — DICYCLOMINE HYDROCHLORIDE SCH MG: 10 CAPSULE ORAL at 08:40

## 2018-11-22 RX ADMIN — METFORMIN HYDROCHLORIDE SCH MG: 500 TABLET ORAL at 08:40

## 2018-11-22 RX ADMIN — ATORVASTATIN CALCIUM SCH MG: 20 TABLET, FILM COATED ORAL at 20:34

## 2018-11-22 RX ADMIN — SITAGLIPTIN SCH MG: 50 TABLET, FILM COATED ORAL at 06:30

## 2018-11-22 RX ADMIN — VITAMIN D, TAB 1000IU (100/BT) SCH INTLU: 25 TAB at 08:40

## 2018-11-22 RX ADMIN — METFORMIN HYDROCHLORIDE SCH MG: 500 TABLET ORAL at 17:06

## 2018-11-22 RX ADMIN — SORBITOL SOLUTION (BULK) SCH ML: 70 SOLUTION at 20:35

## 2018-11-22 RX ADMIN — DICYCLOMINE HYDROCHLORIDE SCH MG: 10 CAPSULE ORAL at 13:35

## 2018-11-22 RX ADMIN — LOSARTAN POTASSIUM SCH MG: 50 TABLET, FILM COATED ORAL at 08:40

## 2018-11-22 RX ADMIN — HYDROCODONE BITARTRATE AND ACETAMINOPHEN PRN TAB: 5; 325 TABLET ORAL at 08:41

## 2018-11-22 RX ADMIN — DICYCLOMINE HYDROCHLORIDE SCH MG: 10 CAPSULE ORAL at 17:06

## 2018-11-22 RX ADMIN — TRAZODONE HYDROCHLORIDE SCH MG: 50 TABLET ORAL at 20:34

## 2018-11-22 RX ADMIN — DICYCLOMINE HYDROCHLORIDE SCH MG: 10 CAPSULE ORAL at 20:35

## 2018-11-22 RX ADMIN — DEXTROSE, SODIUM CHLORIDE, AND POTASSIUM CHLORIDE SCH MLS/HR: 5; .45; .15 INJECTION INTRAVENOUS at 10:20

## 2018-11-22 NOTE — GENERAL SURGERY PROGRESS NOTE
General Surgery-Progress Note


Subjective


Additional Comments


no acute events.  comfortable.  pain controlled.  resting





Objective





Last 24 Hour Vital Signs








  Date Time  Temp Pulse Resp B/P (MAP) Pulse Ox O2 Delivery O2 Flow Rate FiO2


 


11/22/18 09:00      Room Air  





      Room Air  


 


11/22/18 08:40    121/70    


 


11/22/18 08:40  70  121/70    


 


11/22/18 08:00 96.0 70 16 121/70 (87) 93   


 


11/22/18 06:30    122/62    


 


11/22/18 04:00 97.5 67 16 119/61 (80) 93   


 


11/22/18 00:00 97.7 72 16 124/71 (88) 95   


 


11/21/18 22:30    115/69    


 


11/21/18 21:37  65  125/71    


 


11/21/18 21:00      Room Air  





      Room Air  


 


11/21/18 20:00 97.8 76 16 121/63 (82) 95   


 


11/21/18 16:40 98.8       


 


11/21/18 15:43 98.8 65 20 128/65 (86) 95   


 


11/21/18 13:58    130/62    


 


11/21/18 13:54 98.6   130/62 (84)    


 


11/21/18 13:09 98.6       


 


11/21/18 12:15 99.9 63  123/65 (84) 96   


 


11/21/18 11:56 98.5 65 20 131/62 (85) 99   


 


11/21/18 11:45 98.5       








I&O











Intake and Output  


 


 11/21/18 11/22/18





 19:00 07:00


 


Intake Total 315 ml 525 ml


 


Balance 315 ml 525 ml


 


  


 


Intake Oral 315 ml 


 


IV Total  525 ml


 


# Voids 3 3








Dressing:  saturated


Wound:  other


Drains:  other


Cardiovascular:  RSR


Respiratory:  clear


Abdomen:  soft, flat, non-tender, present bowel sounds


Extremities:  no tenderness, no cyanosis, other





Laboratory Tests








Test


  11/21/18


15:00 11/22/18


06:30


 


White Blood Count


  10.3 K/UL


(4.8-10.8) 11.1 K/UL


(4.8-10.8)  H


 


Red Blood Count


  2.57 M/UL


(4.20-5.40)  L 3.13 M/UL


(4.20-5.40)  L


 


Hemoglobin


  6.9 G/DL


(12.0-16.0)  *L 8.4 G/DL


(12.0-16.0)  L


 


Hematocrit


  21.4 %


(37.0-47.0)  L 26.5 %


(37.0-47.0)  L


 


Mean Corpuscular Volume 83 FL (80-99)   85 FL (80-99)  


 


Mean Corpuscular Hemoglobin


  27.0 PG


(27.0-31.0) 26.9 PG


(27.0-31.0)  L


 


Mean Corpuscular Hemoglobin


Concent 32.4 G/DL


(32.0-36.0) 31.8 G/DL


(32.0-36.0)  L


 


Red Cell Distribution Width


  15.9 %


(11.6-14.8)  H 15.2 %


(11.6-14.8)  H


 


Platelet Count


  361 K/UL


(150-450) 342 K/UL


(150-450)


 


Mean Platelet Volume


  6.9 FL


(6.5-10.1) 7.4 FL


(6.5-10.1)


 


Neutrophils (%) (Auto)


  % (45.0-75.0)


  80.4 %


(45.0-75.0)  H


 


Lymphocytes (%) (Auto)


  % (20.0-45.0)


  11.1 %


(20.0-45.0)  L


 


Monocytes (%) (Auto)


   % (1.0-10.0)  


  7.7 %


(1.0-10.0)


 


Eosinophils (%) (Auto)


   % (0.0-3.0)  


  0.6 %


(0.0-3.0)


 


Basophils (%) (Auto)


   % (0.0-2.0)  


  0.3 %


(0.0-2.0)


 


Differential Total Cells


Counted 100  


  


 


 


Neutrophils % (Manual) 88 % (45-75)  H 


 


Lymphocytes % (Manual) 5 % (20-45)  L 


 


Monocytes % (Manual) 7 % (1-10)   


 


Eosinophils % (Manual) 0 % (0-3)   


 


Basophils % (Manual) 0 % (0-2)   


 


Band Neutrophils 0 % (0-8)   


 


Platelet Estimate Adequate   


 


Platelet Morphology Normal   


 


Polychromasia 1+   


 


Hypochromasia 3+   


 


Anisocytosis 1+   


 


Prothrombin Time


  


  13.0 SEC


(9.30-11.50)  H


 


Prothromb Time International


Ratio 


  1.2 (0.9-1.1)


H


 


Activated Partial


Thromboplast Time 


  36 SEC (23-33)


H


 


Sodium Level


  


  139 MMOL/L


(136-145)


 


Potassium Level


  


  3.9 MMOL/L


(3.5-5.1)


 


Chloride Level


  


  107 MMOL/L


()


 


Carbon Dioxide Level


  


  25 MMOL/L


(21-32)


 


Anion Gap


  


  7 mmol/L


(5-15)


 


Blood Urea Nitrogen


  


  12 mg/dL


(7-18)


 


Creatinine


  


  0.8 MG/DL


(0.55-1.30)


 


Estimat Glomerular Filtration


Rate 


   mL/min (>60)  


 


 


Glucose Level


  


  94 MG/DL


()


 


Calcium Level


  


  8.4 MG/DL


(8.5-10.1)  L


 


Phosphorus Level


  


  2.7 MG/DL


(2.5-4.9)


 


Magnesium Level


  


  1.4 MG/DL


(1.8-2.4)  L


 


Total Bilirubin


  


  0.7 MG/DL


(0.2-1.0)


 


Aspartate Amino Transf


(AST/SGOT) 


  19 U/L (15-37)


 


 


Alanine Aminotransferase


(ALT/SGPT) 


  14 U/L (12-78)


 


 


Alkaline Phosphatase


  


  81 U/L


()


 


Total Protein


  


  5.8 G/DL


(6.4-8.2)  L


 


Albumin


  


  2.1 G/DL


(3.4-5.0)  L


 


Globulin  3.7 g/dL  


 


Albumin/Globulin Ratio


  


  0.6 (1.0-2.7)


L











Plan


Problems:  


(1) Sacral decubitus ulcer, stage III


Assessment & Plan:  Multiple pressure injuries to sacrum and R hip. Four 

individual pressure injuries in close proximity noted  to sacrum,Total area 

measuring (L)2cm x (W)7 cm., each wound with 100% yellow slough with red 

margins. Non-blanchable erythema periwound. 


Small pressure injury noted to L buttocks (L)0.4cm x(W)0.5cm with 100% slough. 


Full thickness pressure injury to R hip at historical surgical incision (L)

1.1cm x(W)0.9cm ,with 90%  yellow slough noted. Non-blanchable erythema noted 

periwound ,an area of (L)7cm x (W)6.6cm . 


Stable dry eschar medial R heel. Periwound without erythema or induration. 


Wounds present upon admission and will be cared for during hospital stay





Tx.Plan:


Reposition at least every 2hours or as tolerated.


           


Air fluidized mattress.


           


Apply Cavilon wipes to heels and off-load heels with pillow.


           


Cleanse wounds Sacrum  with Saline. Apply Therahoney to wounds .Cavilon to 

borders and cover with Optifoam Drsg.


           


Cleanse R hip wound with Saline.Apply Therahoney.Apply Cavilon wipe to red area 

periwound and cover with 


           


Optifoam drsg Daily and prn.





Will likely need excisional vs non excisional debridement of sloth down to 

healthy viable tissue.  may consider during hospitalization once ortho and GI 

care completed 





Nutritional support 





(2) Failure to thrive


Assessment & Plan:  elderly female with dementia


albumin 2


multiple decubitus ulcers





nutritional consult


increase protein and Caloric intake


will need optimization to help heal wounds 





DAILY ESTIMATED NEEDS:


Needs based on Wound, DM, wasting / 51kg 


30-35  kcals/kg 


7120-2470  total kcals


1.25-1.5  g protein/kg


64-77  g total protein 





NUTRITION DIAGNOSIS:


* Increased kcal/prot needs R/T wound healing as evidenced by pt w/ stage 3 

sacral and R hip wounds, refer to WC eval.





PO DIET RECOMMENDATIONS:


[place]/ Regular diet w/ <50% po intake (texture per SLP) 





ADDITIONAL RECOMMENDATIONS:


* Calibrated bedscale wt for accurate CBW 


* Wound healing: add MVI w/ min 1 tab QD, Vit C 500mg BID, Harjeet 1pkt BID 


* Add Glucerna 1 tetra sana TID 


* Lytes daily, replete as needed ( Mg 1.6) 





. 














Tuan Patricia Nov 22, 2018 10:16

## 2018-11-22 NOTE — INTERNAL MED PROGRESS NOTE
Subjective


Date of Service:  Nov 22, 2018


Physician Name


DianaCheo


Attending Physician


Juan Carlos Magana MD





Current Medications








 Medications


  (Trade)  Dose


 Ordered  Sig/Violette


 Route


 PRN Reason  Start Time


 Stop Time Status Last Admin


Dose Admin


 


 Acetaminophen


  (Tylenol)  650 mg  Q6H  PRN


 ORAL


 Mild Pain/Temp > 100.5  11/16/18 17:15


 12/16/18 17:14  11/21/18 12:39


 


 


 Acetaminophen/


 Hydrocodone Bitart


  (Norco 5/325)  1 tab  Q6H  PRN


 ORAL


 Severe Pain (Pain Scale 7-10)  11/16/18 20:30


 11/23/18 20:29  11/22/18 08:41


 


 


 Atenolol


  (Tenormin)  50 mg  Q12HR


 ORAL


   11/16/18 21:00


 12/16/18 20:59  11/22/18 08:40


 


 


 Atorvastatin


 Calcium


  (Lipitor)  20 mg  BEDTIME


 ORAL


   11/17/18 21:00


 12/17/18 20:59  11/21/18 21:37


 


 


 Clonazepam


  (KlonoPIN)  0.5 mg  Q6H  PRN


 ORAL


 For Anxiety  11/16/18 17:30


 11/23/18 17:29  11/21/18 23:55


 


 


 Clonidine HCl


  (Catapres Tab)  0.1 mg  EVERY 8  HOURS


 ORAL


   11/16/18 22:00


 12/16/18 21:59  11/22/18 06:30


 


 


 Dextrose


  (Dextrose 50%)  25 ml  Q30M  PRN


 IV


 bs 60-69  11/20/18 15:30


 12/20/18 15:29   


 


 


 Dextrose/


 Electrolytes  1,000 ml @ 


 75 mls/hr  [id number]


 IV


   11/20/18 18:30


 12/20/18 18:29  11/22/18 10:20


 


 


 Dicyclomine HCl


  (Bentyl)  10 mg  QID


 ORAL


   11/16/18 18:00


 12/16/18 17:59  11/22/18 08:40


 


 


 Haloperidol


 Lactate


  (Haldol)  5 mg  Q6H  PRN


 IM


 Agitation  11/19/18 10:45


 12/19/18 10:44   


 


 


 Lorazepam


  (Ativan 2mg/ml


 1ml)  1 mg  Q4H  PRN


 IM


 For Anxiety  11/19/18 10:45


 11/26/18 10:44  11/21/18 06:28


 


 


 Losartan Potassium


  (Cozaar)  50 mg  DAILY


 ORAL


   11/20/18 09:00


 12/17/18 08:59  11/22/18 08:40


 


 


 Metformin HCl


  (Glucophage)  500 mg  BID


 ORAL


   11/17/18 09:00


 12/17/18 08:59  11/22/18 08:40


 


 


 Potassium Chloride


  (K-Dur)  10 meq  DAILY


 ORAL


   11/17/18 09:00


 12/17/18 08:59  11/22/18 08:40


 


 


 Sitagliptin


 Phosphate


  (Januvia)  50 mg  ACBREAKFAST


 ORAL


   11/17/18 06:30


 12/17/18 06:29  11/22/18 06:30


 


 


 Trazodone HCl


  (Desyrel)  50 mg  BEDTIME


 ORAL


   11/16/18 21:00


 12/16/18 20:59  11/21/18 21:36


 


 


 Vitamin D


  (Vitamin D)  5,000 intlu  DAILY


 ORAL


   11/17/18 09:00


 12/17/18 08:59  11/22/18 08:40


 








Allergies:  


Coded Allergies:  


     No Known Allergies (Unverified , 3/26/14)


ROS Limited/Unobtainable:  No


Constitutional:  Reports: no symptoms


HEENT:  Reports: no symptoms


Cardiovascular:  Reports: no symptoms


Respiratory:  Reports: no symptoms


Gastrointestinal/Abdominal:  Reports: no symptoms


Genitourinary:  Reports: no symptoms


Subjective


79 YO F admitted with generalized weakness.  Cover for Int Med-Dr Magana.  S/P  

right hip total arthroplasty 11/20/18.  S/P colonoscopy 11/20/18





Objective





Last Vital Signs








  Date Time  Temp Pulse Resp B/P (MAP) Pulse Ox O2 Delivery O2 Flow Rate FiO2


 


11/22/18 09:00      Room Air  





      Room Air  


 


11/22/18 08:40    121/70    


 


11/22/18 08:40  70      


 


11/22/18 08:00 96.0  16  93   


 


11/20/18 20:10       3 


 


11/16/18 18:13        98











Laboratory Tests








Test


  11/21/18


15:00 11/22/18


06:30


 


White Blood Count


  10.3 K/UL


(4.8-10.8) 11.1 K/UL


(4.8-10.8)  H


 


Red Blood Count


  2.57 M/UL


(4.20-5.40)  L 3.13 M/UL


(4.20-5.40)  L


 


Hemoglobin


  6.9 G/DL


(12.0-16.0)  *L 8.4 G/DL


(12.0-16.0)  L


 


Hematocrit


  21.4 %


(37.0-47.0)  L 26.5 %


(37.0-47.0)  L


 


Mean Corpuscular Volume 83 FL (80-99)   85 FL (80-99)  


 


Mean Corpuscular Hemoglobin


  27.0 PG


(27.0-31.0) 26.9 PG


(27.0-31.0)  L


 


Mean Corpuscular Hemoglobin


Concent 32.4 G/DL


(32.0-36.0) 31.8 G/DL


(32.0-36.0)  L


 


Red Cell Distribution Width


  15.9 %


(11.6-14.8)  H 15.2 %


(11.6-14.8)  H


 


Platelet Count


  361 K/UL


(150-450) 342 K/UL


(150-450)


 


Mean Platelet Volume


  6.9 FL


(6.5-10.1) 7.4 FL


(6.5-10.1)


 


Neutrophils (%) (Auto)


  % (45.0-75.0)


  80.4 %


(45.0-75.0)  H


 


Lymphocytes (%) (Auto)


  % (20.0-45.0)


  11.1 %


(20.0-45.0)  L


 


Monocytes (%) (Auto)


   % (1.0-10.0)  


  7.7 %


(1.0-10.0)


 


Eosinophils (%) (Auto)


   % (0.0-3.0)  


  0.6 %


(0.0-3.0)


 


Basophils (%) (Auto)


   % (0.0-2.0)  


  0.3 %


(0.0-2.0)


 


Differential Total Cells


Counted 100  


  


 


 


Neutrophils % (Manual) 88 % (45-75)  H 


 


Lymphocytes % (Manual) 5 % (20-45)  L 


 


Monocytes % (Manual) 7 % (1-10)   


 


Eosinophils % (Manual) 0 % (0-3)   


 


Basophils % (Manual) 0 % (0-2)   


 


Band Neutrophils 0 % (0-8)   


 


Platelet Estimate Adequate   


 


Platelet Morphology Normal   


 


Polychromasia 1+   


 


Hypochromasia 3+   


 


Anisocytosis 1+   


 


Prothrombin Time


  


  13.0 SEC


(9.30-11.50)  H


 


Prothromb Time International


Ratio 


  1.2 (0.9-1.1)


H


 


Activated Partial


Thromboplast Time 


  36 SEC (23-33)


H


 


Sodium Level


  


  139 MMOL/L


(136-145)


 


Potassium Level


  


  3.9 MMOL/L


(3.5-5.1)


 


Chloride Level


  


  107 MMOL/L


()


 


Carbon Dioxide Level


  


  25 MMOL/L


(21-32)


 


Anion Gap


  


  7 mmol/L


(5-15)


 


Blood Urea Nitrogen


  


  12 mg/dL


(7-18)


 


Creatinine


  


  0.8 MG/DL


(0.55-1.30)


 


Estimat Glomerular Filtration


Rate 


   mL/min (>60)  


 


 


Glucose Level


  


  94 MG/DL


()


 


Calcium Level


  


  8.4 MG/DL


(8.5-10.1)  L


 


Phosphorus Level


  


  2.7 MG/DL


(2.5-4.9)


 


Magnesium Level


  


  1.4 MG/DL


(1.8-2.4)  L


 


Total Bilirubin


  


  0.7 MG/DL


(0.2-1.0)


 


Aspartate Amino Transf


(AST/SGOT) 


  19 U/L (15-37)


 


 


Alanine Aminotransferase


(ALT/SGPT) 


  14 U/L (12-78)


 


 


Alkaline Phosphatase


  


  81 U/L


()


 


Total Protein


  


  5.8 G/DL


(6.4-8.2)  L


 


Albumin


  


  2.1 G/DL


(3.4-5.0)  L


 


Globulin  3.7 g/dL  


 


Albumin/Globulin Ratio


  


  0.6 (1.0-2.7)


L











Microbiology








 Date/Time


Source Procedure


Growth Status


 


 


 11/20/18 18:02


Hip Right Gram Stain - Final Resulted





 11/20/18 18:02


Hip Right Aerobic Culture - Preliminary Resulted


 


 11/20/18 18:02


Hip Right Anaerobic Culture - Preliminary Resulted

















Intake and Output  


 


 11/21/18 11/22/18





 19:00 07:00


 


Intake Total 315 ml 525 ml


 


Balance 315 ml 525 ml


 


  


 


Intake Oral 315 ml 


 


IV Total  525 ml


 


# Voids 3 3








Objective


General Appearance:  WD/WN, mild distress, agitated


EENT:  PERRL/EOMI, normal ENT inspection


Neck:  non-tender, normal alignment, supple, normal inspection


Cardiovascular:  normal peripheral pulses, normal rate, regular rhythm, no 

gallop/murmur, no JVD


Respiratory/Chest:  chest wall non-tender, lungs clear, normal breath sounds, 

no accessory muscle use, respiratory distress


Abdomen:  normal bowel sounds, non tender, soft, no organomegaly, no mass


Extremities:  normal range of motion


Neurologic:  CNs II-XII grossly normal


Skin:  normal pigmentation, warm/dry





Assessment/Plan


Problem List:  


(1) Diabetes mellitus, type II


Assessment & Plan:  Continue metformin and januvia





(2) HTN (hypertension)


Assessment & Plan:  continue atenolol and cozaar





(3) Sick sinus syndrome


Assessment & Plan:  S/P pacemaker





(4) Aortic stenosis


(5) Pulmonary hypertension


(6) Alzheimer's dementia


(7) Dislocation, hip closed


Assessment & Plan:  S/P right total hip arthroplasty 11/20/18-see ortho note.





(8) Rectal bleed


Assessment & Plan:  S/P colonoscopy 11/20/18=ulcerated hemorrhoid.  See GI note.





(9) Severe anemia


Assessment & Plan:  Due to rectal bleed.  S/P transfusion 2 units packed RBC 11/ 21/18





Status:  progressing


Assessment/Plan


Discharge planning:  post acute care.











Cheo Ortiz MD Nov 22, 2018 13:02

## 2018-11-22 NOTE — PULMONOLOGY PROGRESS NOTE
Assessment/Plan


Assessment/Plan


ASSESSMENT 


recurrent dislocation right hip hemiarthroplasty 


s/p 11/20 conversion to total hip arthroplasty 


ulcerated internal hemorrhoids 


s/p flexible sigmoidoscopy 


anemia , s/p  blood transfusion 


severe protein calorie malnutrition 


electrolytes imbalance 


sacral decub stage III,  present on admission 


diabetes mellitus type 2 


hypertension  


CAD with hx of NSTEMI  


aortic stenosis 


pulmonary hypertension 


Alzheimer dementia 


sick sinus syndrome , status post pacemaker implantation 





PLAN OF  CARE


Med Surg floor 


s/p  R NEGRA 


hip precautions 


PT/OT 


pain management


bowel regimen  s/p flexible sigmoidoscopy , no evidence of active bleeding 


colonoscopy later  with consent of family  


fup with Gi recs


anemia workup noted , s/p 2 u PRBC 


check ferritin level , may benefit from Venofer


IV fluids 


monitor renal parameters ,electrolytes corrected electrolytes prn


BP  management with BB and ARB


continue statin


BS management with metformin and Januvia 


nutritional recommendations implemented in plan of care 


psychiatrist follow 


on trazodone 


according to psychiatrist,  patient lacks capacity to make decisions 


supportive care 


wound care as per surgeon recommendations


  


case discussed and evaluated by supervising physician





Subjective


Allergies:  


Coded Allergies:  


     No Known Allergies (Unverified , 3/26/14)


Subjective


mild leukocytosis, no fever


HH better after blood tranfusion





Objective





Last 24 Hour Vital Signs








  Date Time  Temp Pulse Resp B/P (MAP) Pulse Ox O2 Delivery O2 Flow Rate FiO2


 


11/22/18 06:30    122/62    


 


11/22/18 04:00 97.5 67 16 119/61 (80) 93   


 


11/22/18 00:00 97.7 72 16 124/71 (88) 95   


 


11/21/18 22:30    115/69    


 


11/21/18 21:37  65  125/71    


 


11/21/18 21:00      Room Air  





      Room Air  


 


11/21/18 20:00 97.8 76 16 121/63 (82) 95   


 


11/21/18 16:40 98.8       


 


11/21/18 15:43 98.8 65 20 128/65 (86) 95   


 


11/21/18 13:58    130/62    


 


11/21/18 13:54 98.6   130/62 (84)    


 


11/21/18 13:09 98.6       


 


11/21/18 12:15 99.9 63  123/65 (84) 96   


 


11/21/18 11:56 98.5 65 20 131/62 (85) 99   


 


11/21/18 11:45 98.5       


 


11/21/18 09:00      Room Air  





      Room Air  


 


11/21/18 08:59    119/54    


 


11/21/18 08:58  86  119/54    


 


11/21/18 08:16 97.7 86 20 119/54 (75) 95   

















Intake and Output  


 


 11/21/18 11/22/18





 19:00 07:00


 


Intake Total 315 ml 525 ml


 


Balance 315 ml 525 ml


 


  


 


Intake Oral 315 ml 


 


IV Total  525 ml


 


# Voids 3 3








General Appearance:  no acute distress, other - awake, confused elderly [place] 

speking female in NAD


HEENT:  normocephalic, atraumatic, anicteric


Respiratory/Chest:  lungs clear - with moderate air exchange


Cardiovascular:  normal rate


Abdomen:  soft, non tender, non distended


Extremities:  no edema


Skin:  other - R hip dressing intact


Neurologic/Psychiatric:  abnormal gait - bedridden , other - awake, but very 

confused


Musculoskeletal:  atrophy - BLE





Microbiology








 Date/Time


Source Procedure


Growth Status


 


 


 11/20/18 18:02


Hip Right Gram Stain - Final Resulted





 11/20/18 18:02


Hip Right Aerobic Culture


Pending Resulted


 


 11/20/18 18:02


Hip Right Anaerobic Culture


Pending Resulted








Laboratory Tests


11/21/18 15:00: 


White Blood Count 10.3, Red Blood Count 2.57L, Hemoglobin 6.9*L, Hematocrit 

21.4L, Mean Corpuscular Volume 83, Mean Corpuscular Hemoglobin 27.0, Mean 

Corpuscular Hemoglobin Concent 32.4, Red Cell Distribution Width 15.9H, 

Platelet Count 361, Mean Platelet Volume 6.9, Neutrophils (%) (Auto) , 

Lymphocytes (%) (Auto) , Monocytes (%) (Auto) , Eosinophils (%) (Auto) , 

Basophils (%) (Auto) , Differential Total Cells Counted 100, Neutrophils % (

Manual) 88H, Lymphocytes % (Manual) 5L, Monocytes % (Manual) 7, Eosinophils % (

Manual) 0, Basophils % (Manual) 0, Band Neutrophils 0, Platelet Estimate 

Adequate, Platelet Morphology Normal, Polychromasia 1+, Hypochromasia 3+, 

Anisocytosis 1+


11/22/18 06:30: 


White Blood Count 11.1H, Red Blood Count 3.13L, Hemoglobin 8.4L, Hematocrit 

26.5L, Mean Corpuscular Volume 85, Mean Corpuscular Hemoglobin 26.9L, Mean 

Corpuscular Hemoglobin Concent 31.8L, Red Cell Distribution Width 15.2H, 

Platelet Count 342, Mean Platelet Volume 7.4, Neutrophils (%) (Auto) 80.4H, 

Lymphocytes (%) (Auto) 11.1L, Monocytes (%) (Auto) 7.7, Eosinophils (%) (Auto) 

0.6, Basophils (%) (Auto) 0.3, Prothrombin Time 13.0H, Prothromb Time 

International Ratio 1.2H, Activated Partial Thromboplast Time 36H, Sodium Level 

139, Potassium Level 3.9, Chloride Level 107, Carbon Dioxide Level 25, Anion 

Gap 7, Blood Urea Nitrogen 12, Creatinine 0.8, Estimat Glomerular Filtration 

Rate , Glucose Level 94, Calcium Level 8.4L, Phosphorus Level 2.7, Magnesium 

Level 1.4L, Total Bilirubin 0.7, Aspartate Amino Transf (AST/SGOT) 19, Alanine 

Aminotransferase (ALT/SGPT) 14, Alkaline Phosphatase 81, Total Protein 5.8L, 

Albumin 2.1L, Globulin 3.7, Albumin/Globulin Ratio 0.6L





Current Medications








 Medications


  (Trade)  Dose


 Ordered  Sig/Violette


 Route


 PRN Reason  Start Time


 Stop Time Status Last Admin


Dose Admin


 


 Acetaminophen


  (Tylenol)  650 mg  Q6H  PRN


 ORAL


 Mild Pain/Temp > 100.5  11/16/18 17:15


 12/16/18 17:14  11/21/18 12:39


 


 


 Acetaminophen/


 Hydrocodone Bitart


  (Norco 5/325)  1 tab  Q6H  PRN


 ORAL


 Severe Pain (Pain Scale 7-10)  11/16/18 20:30


 11/23/18 20:29  11/21/18 23:55


 


 


 Atenolol


  (Tenormin)  50 mg  Q12HR


 ORAL


   11/16/18 21:00


 12/16/18 20:59  11/21/18 21:37


 


 


 Atorvastatin


 Calcium


  (Lipitor)  20 mg  BEDTIME


 ORAL


   11/17/18 21:00


 12/17/18 20:59  11/21/18 21:37


 


 


 Clonazepam


  (KlonoPIN)  0.5 mg  Q6H  PRN


 ORAL


 For Anxiety  11/16/18 17:30


 11/23/18 17:29  11/21/18 23:55


 


 


 Clonidine HCl


  (Catapres Tab)  0.1 mg  EVERY 8  HOURS


 ORAL


   11/16/18 22:00


 12/16/18 21:59  11/22/18 06:30


 


 


 Dextrose


  (Dextrose 50%)  25 ml  Q30M  PRN


 IV


 bs 60-69  11/20/18 15:30


 12/20/18 15:29   


 


 


 Dextrose/


 Electrolytes  1,000 ml @ 


 75 mls/hr  [id number]


 IV


   11/20/18 18:30


 12/20/18 18:29  11/21/18 21:12


 


 


 Dicyclomine HCl


  (Bentyl)  10 mg  QID


 ORAL


   11/16/18 18:00


 12/16/18 17:59  11/21/18 21:37


 


 


 Haloperidol


 Lactate


  (Haldol)  5 mg  Q6H  PRN


 IM


 Agitation  11/19/18 10:45


 12/19/18 10:44   


 


 


 Lorazepam


  (Ativan 2mg/ml


 1ml)  1 mg  Q4H  PRN


 IM


 For Anxiety  11/19/18 10:45


 11/26/18 10:44  11/21/18 06:28


 


 


 Losartan Potassium


  (Cozaar)  50 mg  DAILY


 ORAL


   11/20/18 09:00


 12/17/18 08:59  11/21/18 08:59


 


 


 Metformin HCl


  (Glucophage)  500 mg  BID


 ORAL


   11/17/18 09:00


 12/17/18 08:59  11/21/18 17:07


 


 


 Potassium Chloride


  (K-Dur)  10 meq  DAILY


 ORAL


   11/17/18 09:00


 12/17/18 08:59  11/21/18 08:55


 


 


 Sitagliptin


 Phosphate


  (Januvia)  50 mg  ACBREAKFAST


 ORAL


   11/17/18 06:30


 12/17/18 06:29  11/22/18 06:30


 


 


 Trazodone HCl


  (Desyrel)  50 mg  BEDTIME


 ORAL


   11/16/18 21:00


 12/16/18 20:59  11/21/18 21:36


 


 


 Vitamin D


  (Vitamin D)  5,000 intlu  DAILY


 ORAL


   11/17/18 09:00


 12/17/18 08:59  11/21/18 08:57


 

















Kavitha Carrero NP Nov 22, 2018 08:12

## 2018-11-22 NOTE — GENERAL PROGRESS NOTE
Assessment/Plan


Assessment/Plan


Assessment


- constipation


- rectal loading due to ulcerated hemorrhoids


- Anemia - worse post op


- agitation/OBS


- dislocated hip - s/p arthroplasty conversion





Recommendations


- push po


- Monitor H&H


- transfuse PRN


- Ortho f/u





Subjective


Allergies:  


Coded Allergies:  


     No Known Allergies (Unverified , 3/26/14)


Subjective


s/p (R) hip hemiarthroplasty conversion


calm NAD


d/w RN





Objective





Last 24 Hour Vital Signs








  Date Time  Temp Pulse Resp B/P (MAP) Pulse Ox O2 Delivery O2 Flow Rate FiO2


 


11/22/18 16:00 98.0 67 16 120/70 (87) 93   


 


11/22/18 13:35    121/70    


 


11/22/18 12:00 97.9 63 16 115/70 (85) 93   


 


11/22/18 09:00      Room Air  





      Room Air  


 


11/22/18 08:40    121/70    


 


11/22/18 08:40  70  121/70    


 


11/22/18 08:00 96.0 70 16 121/70 (87) 93   


 


11/22/18 06:30    122/62    


 


11/22/18 04:00 97.5 67 16 119/61 (80) 93   


 


11/22/18 00:00 97.7 72 16 124/71 (88) 95   


 


11/21/18 22:30    115/69    


 


11/21/18 21:37  65  125/71    


 


11/21/18 21:00      Room Air  





      Room Air  


 


11/21/18 20:00 97.8 76 16 121/63 (82) 95   

















Intake and Output  


 


 11/21/18 11/22/18





 18:59 06:59


 


Intake Total 315 ml 525 ml


 


Balance 315 ml 525 ml


 


  


 


Intake Oral 315 ml 


 


IV Total  525 ml


 


# Voids 3 3








Laboratory Tests


11/22/18 06:30: 


White Blood Count 11.1H, Red Blood Count 3.13L, Hemoglobin 8.4L, Hematocrit 

26.5L, Mean Corpuscular Volume 85, Mean Corpuscular Hemoglobin 26.9L, Mean 

Corpuscular Hemoglobin Concent 31.8L, Red Cell Distribution Width 15.2H, 

Platelet Count 342, Mean Platelet Volume 7.4, Neutrophils (%) (Auto) 80.4H, 

Lymphocytes (%) (Auto) 11.1L, Monocytes (%) (Auto) 7.7, Eosinophils (%) (Auto) 

0.6, Basophils (%) (Auto) 0.3, Prothrombin Time 13.0H, Prothromb Time 

International Ratio 1.2H, Activated Partial Thromboplast Time 36H, Sodium Level 

139, Potassium Level 3.9, Chloride Level 107, Carbon Dioxide Level 25, Anion 

Gap 7, Blood Urea Nitrogen 12, Creatinine 0.8, Estimat Glomerular Filtration 

Rate , Glucose Level 94, Calcium Level 8.4L, Phosphorus Level 2.7, Magnesium 

Level 1.4L, Total Bilirubin 0.7, Aspartate Amino Transf (AST/SGOT) 19, Alanine 

Aminotransferase (ALT/SGPT) 14, Alkaline Phosphatase 81, Total Protein 5.8L, 

Albumin 2.1L, Globulin 3.7, Albumin/Globulin Ratio 0.6L


Height (Feet):  5


Height (Inches):  5.00


Weight (Pounds):  136


Objective


WDWN


NCAT


supple


CTA


RRR


abd soft


no edema











Florina Butler MD Nov 22, 2018 18:49

## 2018-11-23 VITALS — SYSTOLIC BLOOD PRESSURE: 127 MMHG | DIASTOLIC BLOOD PRESSURE: 74 MMHG

## 2018-11-23 VITALS — SYSTOLIC BLOOD PRESSURE: 95 MMHG | DIASTOLIC BLOOD PRESSURE: 59 MMHG

## 2018-11-23 VITALS — DIASTOLIC BLOOD PRESSURE: 65 MMHG | SYSTOLIC BLOOD PRESSURE: 141 MMHG

## 2018-11-23 VITALS — DIASTOLIC BLOOD PRESSURE: 53 MMHG | SYSTOLIC BLOOD PRESSURE: 122 MMHG

## 2018-11-23 VITALS — SYSTOLIC BLOOD PRESSURE: 110 MMHG | DIASTOLIC BLOOD PRESSURE: 58 MMHG

## 2018-11-23 VITALS — SYSTOLIC BLOOD PRESSURE: 135 MMHG | DIASTOLIC BLOOD PRESSURE: 70 MMHG

## 2018-11-23 LAB
ADD MANUAL DIFF: NO
ADD MANUAL DIFF: YES
ANION GAP SERPL CALC-SCNC: 6 MMOL/L (ref 5–15)
ANION GAP SERPL CALC-SCNC: 9 MMOL/L (ref 5–15)
APTT BLD: 22 SEC (ref 23–33)
BUN SERPL-MCNC: 11 MG/DL (ref 7–18)
BUN SERPL-MCNC: 13 MG/DL (ref 7–18)
CALCIUM SERPL-MCNC: 7.8 MG/DL (ref 8.5–10.1)
CALCIUM SERPL-MCNC: 7.9 MG/DL (ref 8.5–10.1)
CHLORIDE SERPL-SCNC: 107 MMOL/L (ref 98–107)
CHLORIDE SERPL-SCNC: 109 MMOL/L (ref 98–107)
CO2 SERPL-SCNC: 21 MMOL/L (ref 21–32)
CO2 SERPL-SCNC: 24 MMOL/L (ref 21–32)
CREAT SERPL-MCNC: 0.7 MG/DL (ref 0.55–1.3)
CREAT SERPL-MCNC: 0.7 MG/DL (ref 0.55–1.3)
ERYTHROCYTE [DISTWIDTH] IN BLOOD BY AUTOMATED COUNT: 15.2 % (ref 11.6–14.8)
ERYTHROCYTE [DISTWIDTH] IN BLOOD BY AUTOMATED COUNT: 15.3 % (ref 11.6–14.8)
HCT VFR BLD CALC: 24.2 % (ref 37–47)
HCT VFR BLD CALC: 30.7 % (ref 37–47)
HGB BLD-MCNC: 7.7 G/DL (ref 12–16)
HGB BLD-MCNC: 9.7 G/DL (ref 12–16)
INR PPP: 1.2 (ref 0.9–1.1)
MCV RBC AUTO: 84 FL (ref 80–99)
MCV RBC AUTO: 85 FL (ref 80–99)
PLATELET # BLD: 289 K/UL (ref 150–450)
PLATELET # BLD: 304 K/UL (ref 150–450)
POTASSIUM SERPL-SCNC: 3.8 MMOL/L (ref 3.5–5.1)
POTASSIUM SERPL-SCNC: 4.4 MMOL/L (ref 3.5–5.1)
RBC # BLD AUTO: 2.86 M/UL (ref 4.2–5.4)
RBC # BLD AUTO: 3.63 M/UL (ref 4.2–5.4)
SODIUM SERPL-SCNC: 137 MMOL/L (ref 136–145)
SODIUM SERPL-SCNC: 139 MMOL/L (ref 136–145)
WBC # BLD AUTO: 13.9 K/UL (ref 4.8–10.8)
WBC # BLD AUTO: 9.2 K/UL (ref 4.8–10.8)

## 2018-11-23 RX ADMIN — DICYCLOMINE HYDROCHLORIDE SCH MG: 10 CAPSULE ORAL at 18:06

## 2018-11-23 RX ADMIN — DICYCLOMINE HYDROCHLORIDE SCH MG: 10 CAPSULE ORAL at 21:02

## 2018-11-23 RX ADMIN — DEXTROSE, SODIUM CHLORIDE, AND POTASSIUM CHLORIDE SCH MLS/HR: 5; .45; .15 INJECTION INTRAVENOUS at 13:10

## 2018-11-23 RX ADMIN — HYDROCODONE BITARTRATE AND ACETAMINOPHEN PRN TAB: 5; 325 TABLET ORAL at 09:39

## 2018-11-23 RX ADMIN — METFORMIN HYDROCHLORIDE SCH MG: 500 TABLET ORAL at 09:03

## 2018-11-23 RX ADMIN — METFORMIN HYDROCHLORIDE SCH MG: 500 TABLET ORAL at 18:06

## 2018-11-23 RX ADMIN — DICYCLOMINE HYDROCHLORIDE SCH MG: 10 CAPSULE ORAL at 13:16

## 2018-11-23 RX ADMIN — CLONAZEPAM PRN MG: 0.5 TABLET ORAL at 11:49

## 2018-11-23 RX ADMIN — VITAMIN D, TAB 1000IU (100/BT) SCH INTLU: 25 TAB at 09:02

## 2018-11-23 RX ADMIN — ATORVASTATIN CALCIUM SCH MG: 20 TABLET, FILM COATED ORAL at 21:01

## 2018-11-23 RX ADMIN — SITAGLIPTIN SCH MG: 50 TABLET, FILM COATED ORAL at 05:20

## 2018-11-23 RX ADMIN — LORAZEPAM PRN MG: 2 INJECTION, SOLUTION INTRAMUSCULAR; INTRAVENOUS at 23:30

## 2018-11-23 RX ADMIN — DEXTROSE, SODIUM CHLORIDE, AND POTASSIUM CHLORIDE SCH MLS/HR: 5; .45; .15 INJECTION INTRAVENOUS at 00:41

## 2018-11-23 RX ADMIN — TRAZODONE HYDROCHLORIDE SCH MG: 50 TABLET ORAL at 21:01

## 2018-11-23 RX ADMIN — LOSARTAN POTASSIUM SCH MG: 50 TABLET, FILM COATED ORAL at 09:02

## 2018-11-23 RX ADMIN — SORBITOL SOLUTION (BULK) SCH ML: 70 SOLUTION at 21:00

## 2018-11-23 RX ADMIN — DICYCLOMINE HYDROCHLORIDE SCH MG: 10 CAPSULE ORAL at 09:02

## 2018-11-23 RX ADMIN — LORAZEPAM PRN MG: 2 INJECTION, SOLUTION INTRAMUSCULAR; INTRAVENOUS at 15:09

## 2018-11-23 RX ADMIN — SORBITOL SOLUTION (BULK) SCH ML: 70 SOLUTION at 21:02

## 2018-11-23 RX ADMIN — DEXTROSE, SODIUM CHLORIDE, AND POTASSIUM CHLORIDE SCH MLS/HR: 5; .45; .15 INJECTION INTRAVENOUS at 18:07

## 2018-11-23 NOTE — GENERAL PROGRESS NOTE
Assessment/Plan


Assessment/Plan


Assessment


- constipation


- rectal loading due to ulcerated hemorrhoids


- Anemia - worse post op


- agitation/OBS


- dislocated hip - s/p arthroplasty conversion





Recommendations


- push po


- Monitor H&H


- transfuse PRN


- Ortho f/u





Subjective


Allergies:  


Coded Allergies:  


     No Known Allergies (Unverified , 3/26/14)


Subjective


s/p (R) hip hemiarthroplasty conversion


calm NAD


d/w RN


no further rectal bleeding





Objective





Last 24 Hour Vital Signs








  Date Time  Temp Pulse Resp B/P (MAP) Pulse Ox O2 Delivery O2 Flow Rate FiO2


 


11/23/18 16:00 98.1 74 18 127/74 (91) 100   


 


11/23/18 13:16    141/65    


 


11/23/18 12:00 98.1 71 18 141/65 (90) 93   


 


11/23/18 11:53 98.2       


 


11/23/18 10:09 98.2       


 


11/23/18 09:02    110/58    


 


11/23/18 09:01  59  110/58    


 


11/23/18 09:00      Room Air  





      Room Air  


 


11/23/18 09:00  59  110/58    


 


11/23/18 08:00 98.2 59 18 110/58 (75) 94   


 


11/23/18 05:20    122/53    


 


11/23/18 04:00 97.4 64 16 122/53 (76) 94   


 


11/23/18 00:00 98.7 60 16 95/59 (71) 100   


 


11/22/18 22:08    120/61    


 


11/22/18 21:00      Room Air  





      Room Air  


 


11/22/18 20:35  66  107/63    

















Intake and Output  


 


 11/22/18 11/23/18





 18:59 06:59


 


Intake Total 1900 ml 750 ml


 


Balance 1900 ml 750 ml


 


  


 


Intake Oral 1000 ml 


 


IV Total 900 ml 750 ml


 


# Voids 3 3


 


# Bowel Movements  1








Laboratory Tests


11/23/18 05:25: 


White Blood Count 9.2, Red Blood Count 2.86L, Hemoglobin 7.7L, Hematocrit 24.2L

, Mean Corpuscular Volume 85, Mean Corpuscular Hemoglobin 27.1, Mean 

Corpuscular Hemoglobin Concent 32.0, Red Cell Distribution Width 15.2H, 

Platelet Count 289, Mean Platelet Volume 7.5, Neutrophils (%) (Auto) , 

Lymphocytes (%) (Auto) , Monocytes (%) (Auto) , Eosinophils (%) (Auto) , 

Basophils (%) (Auto) , Sodium Level 139, Potassium Level 3.8, Chloride Level 

109H, Carbon Dioxide Level 24, Anion Gap 6, Blood Urea Nitrogen 11, Creatinine 

0.7, Estimat Glomerular Filtration Rate , Glucose Level 105, Calcium Level 7.8L


11/23/18 14:40: 


White Blood Count 13.9#H, Red Blood Count 3.63L, Hemoglobin 9.7L, Hematocrit 

30.7L, Mean Corpuscular Volume 84, Mean Corpuscular Hemoglobin 26.8L, Mean 

Corpuscular Hemoglobin Concent 31.7L, Red Cell Distribution Width 15.3H, 

Platelet Count 304, Mean Platelet Volume 7.6, Neutrophils (%) (Auto) , 

Lymphocytes (%) (Auto) , Monocytes (%) (Auto) , Eosinophils (%) (Auto) , 

Basophils (%) (Auto) , Sodium Level 137, Potassium Level 4.4, Chloride Level 107

, Carbon Dioxide Level 21, Anion Gap 9, Blood Urea Nitrogen 13, Creatinine 0.7, 

Estimat Glomerular Filtration Rate , Glucose Level 118H, Calcium Level 7.9L, 

Differential Total Cells Counted 100, Neutrophils % (Manual) 93H, Lymphocytes % 

(Manual) 5L, Monocytes % (Manual) 2, Eosinophils % (Manual) 0, Basophils % (

Manual) 0, Band Neutrophils 0, Platelet Estimate Adequate, Platelet Morphology 

Normal, Polychromasia 1+, Hypochromasia 2+, Anisocytosis 1+, Prothrombin Time 

12.4H, Prothromb Time International Ratio 1.2H, Activated Partial Thromboplast 

Time 22L


Height (Feet):  5


Height (Inches):  5.00


Weight (Pounds):  136


Objective


WDWN


NCAT


supple


CTA


RRR


abd soft


no edema











Florina Butler MD Nov 23, 2018 20:07

## 2018-11-23 NOTE — GENERAL SURGERY PROGRESS NOTE
General Surgery-Progress Note


Subjective


Additional Comments


no acute events.  resting comfortable.  no n/v/f/c.





Objective





Last 24 Hour Vital Signs








  Date Time  Temp Pulse Resp B/P (MAP) Pulse Ox O2 Delivery O2 Flow Rate FiO2


 


11/23/18 10:09 98.2       


 


11/23/18 09:02    110/58    


 


11/23/18 09:01  59  110/58    


 


11/23/18 09:00      Room Air  





      Room Air  


 


11/23/18 09:00  59  110/58    


 


11/23/18 08:00 98.2 59 18 110/58 (75) 94   


 


11/23/18 05:20    122/53    


 


11/23/18 04:00 97.4 64 16 122/53 (76) 94   


 


11/23/18 00:00 98.7 60 16 95/59 (71) 100   


 


11/22/18 22:08    120/61    


 


11/22/18 21:00      Room Air  





      Room Air  


 


11/22/18 20:35  66  107/63    


 


11/22/18 20:00 99.1 66 16 107/63 (78) 92   


 


11/22/18 16:00 98.0 67 16 120/70 (87) 93   


 


11/22/18 13:35    121/70    


 


11/22/18 12:00 97.9 63 16 115/70 (85) 93   








I&O











Intake and Output  


 


 11/22/18 11/23/18





 19:00 07:00


 


Intake Total 1975 ml 675 ml


 


Balance 1975 ml 675 ml


 


  


 


Intake Oral 1000 ml 


 


IV Total 975 ml 675 ml


 


# Voids 3 3


 


# Bowel Movements  1








Dressing:  saturated, other


Wound:  other


Drains:  other


Cardiovascular:  RSR


Respiratory:  clear


Abdomen:  soft, flat, non-tender, present bowel sounds


Extremities:  no cyanosis, other





Laboratory Tests








Test


  11/23/18


05:25


 


White Blood Count


  9.2 K/UL


(4.8-10.8)


 


Red Blood Count


  2.86 M/UL


(4.20-5.40)  L


 


Hemoglobin


  7.7 G/DL


(12.0-16.0)  L


 


Hematocrit


  24.2 %


(37.0-47.0)  L


 


Mean Corpuscular Volume 85 FL (80-99)  


 


Mean Corpuscular Hemoglobin


  27.1 PG


(27.0-31.0)


 


Mean Corpuscular Hemoglobin


Concent 32.0 G/DL


(32.0-36.0)


 


Red Cell Distribution Width


  15.2 %


(11.6-14.8)  H


 


Platelet Count


  289 K/UL


(150-450)


 


Mean Platelet Volume


  7.5 FL


(6.5-10.1)


 


Neutrophils (%) (Auto)


  % (45.0-75.0)


 


 


Lymphocytes (%) (Auto)


  % (20.0-45.0)


 


 


Monocytes (%) (Auto)  % (1.0-10.0)  


 


Eosinophils (%) (Auto)  % (0.0-3.0)  


 


Basophils (%) (Auto)  % (0.0-2.0)  


 


Sodium Level


  139 MMOL/L


(136-145)


 


Potassium Level


  3.8 MMOL/L


(3.5-5.1)


 


Chloride Level


  109 MMOL/L


()  H


 


Carbon Dioxide Level


  24 MMOL/L


(21-32)


 


Anion Gap


  6 mmol/L


(5-15)


 


Blood Urea Nitrogen


  11 mg/dL


(7-18)


 


Creatinine


  0.7 MG/DL


(0.55-1.30)


 


Estimat Glomerular Filtration


Rate  mL/min (>60)  


 


 


Glucose Level


  105 MG/DL


()


 


Calcium Level


  7.8 MG/DL


(8.5-10.1)  L











Plan


Problems:  


(1) Sacral decubitus ulcer, stage III


Assessment & Plan:  Multiple pressure injuries to sacrum and R hip. Four 

individual pressure injuries in close proximity noted  to sacrum,Total area 

measuring (L)2cm x (W)7 cm., each wound with 100% yellow slough with red 

margins. Non-blanchable erythema periwound. 


Small pressure injury noted to L buttocks (L)0.4cm x(W)0.5cm with 100% slough. 


Full thickness pressure injury to R hip at historical surgical incision (L)

1.1cm x(W)0.9cm ,with 90%  yellow slough noted. Non-blanchable erythema noted 

periwound ,an area of (L)7cm x (W)6.6cm . 


Stable dry eschar medial R heel. Periwound without erythema or induration. 


Wounds present upon admission and will be cared for during hospital stay





Tx.Plan:


Reposition at least every 2hours or as tolerated.


           


Air fluidized mattress.


           


Apply Cavilon wipes to heels and off-load heels with pillow.


           


Cleanse wounds Sacrum  with Saline. Apply Therahoney to wounds .Cavilon to 

borders and cover with Optifoam Drsg.


           


Cleanse R hip wound with Saline.Apply Therahoney.Apply Cavilon wipe to red area 

periwound and cover with 


           


Optifoam drsg Daily and prn.





Will likely need excisional vs non excisional debridement of sloth down to 

healthy viable tissue.  may consider during hospitalization once ortho and GI 

care completed. can be done as outpatient





Nutritional support 





Okay to d/c from surgical standpoint with outpatient follow up 





(2) Failure to thrive


Assessment & Plan:  elderly female with dementia


albumin 2


multiple decubitus ulcers





nutritional consult


increase protein and Caloric intake


will need optimization to help heal wounds 





DAILY ESTIMATED NEEDS:


Needs based on Wound, DM, wasting / 51kg 


30-35  kcals/kg 


3063-8305  total kcals


1.25-1.5  g protein/kg


64-77  g total protein 





NUTRITION DIAGNOSIS:


* Increased kcal/prot needs R/T wound healing as evidenced by pt w/ stage 3 

sacral and R hip wounds, refer to WC eval.





PO DIET RECOMMENDATIONS:


[place]/ Regular diet w/ <50% po intake (texture per SLP) 





ADDITIONAL RECOMMENDATIONS:


* Calibrated bedscale wt for accurate CBW 


* Wound healing: add MVI w/ min 1 tab QD, Vit C 500mg BID, Harjeet 1pkt BID 


* Add Glucerna 1 tetra sana TID 


* Lytes daily, replete as needed ( Mg 1.6) 





. 














Tuan Patricia Nov 23, 2018 11:33

## 2018-11-23 NOTE — PULMONOLOGY PROGRESS NOTE
Assessment/Plan


Problems:  


(1) Hip dislocation, right


(2) Sacral decubitus ulcer, stage III


(3) Protein-calorie malnutrition, severe


(4) Psychosis


(5) CAD (coronary artery disease)


(6) Pulmonary hypertension


(7) Aortic stenosis


(8) Pacemaker


(9) Diabetes mellitus, type II


(10) HTN (hypertension)


(11) Alzheimer's dementia


Assessment/Plan


prbc today again 


check h/h and Pt, ptt in  


symptomatic treatment


f/u ortho recommendations


wound care consult


nutrition evaluation


social service consult


check electrolytes


dvt prophylaxis





Subjective


ROS Limited/Unobtainable:  No


Constitutional:  Reports: no symptoms


HEENT:  Repors: no symptoms


Allergies:  


Coded Allergies:  


     No Known Allergies (Unverified , 3/26/14)





Objective





Last 24 Hour Vital Signs








  Date Time  Temp Pulse Resp B/P (MAP) Pulse Ox O2 Delivery O2 Flow Rate FiO2


 


11/23/18 12:00 98.1 71 18 141/65 (90) 93   


 


11/23/18 11:53 98.2       


 


11/23/18 10:09 98.2       


 


11/23/18 09:02    110/58    


 


11/23/18 09:01  59  110/58    


 


11/23/18 09:00      Room Air  





      Room Air  


 


11/23/18 09:00  59  110/58    


 


11/23/18 08:00 98.2 59 18 110/58 (75) 94   


 


11/23/18 05:20    122/53    


 


11/23/18 04:00 97.4 64 16 122/53 (76) 94   


 


11/23/18 00:00 98.7 60 16 95/59 (71) 100   


 


11/22/18 22:08    120/61    


 


11/22/18 21:00      Room Air  





      Room Air  


 


11/22/18 20:35  66  107/63    


 


11/22/18 20:00 99.1 66 16 107/63 (78) 92   


 


11/22/18 16:00 98.0 67 16 120/70 (87) 93   


 


11/22/18 13:35    121/70    

















Intake and Output  


 


 11/22/18 11/23/18





 19:00 07:00


 


Intake Total 1975 ml 675 ml


 


Balance 1975 ml 675 ml


 


  


 


Intake Oral 1000 ml 


 


IV Total 975 ml 675 ml


 


# Voids 3 3


 


# Bowel Movements  1








General Appearance:  WD/WN


HEENT:  normocephalic, atraumatic


Respiratory/Chest:  chest wall non-tender, lungs clear


Cardiovascular:  normal peripheral pulses, normal rate


Abdomen:  normal bowel sounds, soft, non tender


Genitourinary:  normal external genitalia


Extremities:  no cyanosis


Neurologic/Psychiatric:  CNs II-XII grossly normal


Lymphatic:  no neck adenopathy





Microbiology








 Date/Time


Source Procedure


Growth Status


 


 


 11/20/18 18:02


Hip Right Gram Stain - Final Resulted





 11/20/18 18:02 Aerobic Culture - Preliminary


Staphylococcus Aureus


Staphylococcus Sp Coag Neg Resulted


 


 11/20/18 18:02


Hip Right Anaerobic Culture - Preliminary Resulted








Laboratory Tests


11/23/18 05:25: 


White Blood Count 9.2, Red Blood Count 2.86L, Hemoglobin 7.7L, Hematocrit 24.2L

, Mean Corpuscular Volume 85, Mean Corpuscular Hemoglobin 27.1, Mean 

Corpuscular Hemoglobin Concent 32.0, Red Cell Distribution Width 15.2H, 

Platelet Count 289, Mean Platelet Volume 7.5, Neutrophils (%) (Auto) , 

Lymphocytes (%) (Auto) , Monocytes (%) (Auto) , Eosinophils (%) (Auto) , 

Basophils (%) (Auto) , Sodium Level 139, Potassium Level 3.8, Chloride Level 

109H, Carbon Dioxide Level 24, Anion Gap 6, Blood Urea Nitrogen 11, Creatinine 

0.7, Estimat Glomerular Filtration Rate , Glucose Level 105, Calcium Level 7.8L





Current Medications








 Medications


  (Trade)  Dose


 Ordered  Sig/Violette


 Route


 PRN Reason  Start Time


 Stop Time Status Last Admin


Dose Admin


 


 Acetaminophen


  (Tylenol)  650 mg  ONCE


 ORAL


   11/23/18 11:00


 11/23/18 15:00  11/23/18 11:23


 


 


 Acetaminophen


  (Tylenol)  650 mg  Q6H  PRN


 ORAL


 Mild Pain/Temp > 100.5  11/16/18 17:15


 12/16/18 17:14  11/21/18 12:39


 


 


 Acetaminophen/


 Hydrocodone Bitart


  (Norco 5/325)  1 tab  Q6H  PRN


 ORAL


 Severe Pain (Pain Scale 7-10)  11/16/18 20:30


 11/23/18 20:29  11/23/18 09:39


 


 


 Atenolol


  (Tenormin)  50 mg  Q12HR


 ORAL


   11/16/18 21:00


 12/16/18 20:59  11/23/18 09:01


 


 


 Atorvastatin


 Calcium


  (Lipitor)  20 mg  BEDTIME


 ORAL


   11/17/18 21:00


 12/17/18 20:59  11/22/18 20:34


 


 


 Clonazepam


  (KlonoPIN)  0.5 mg  Q6H  PRN


 ORAL


 For Anxiety  11/16/18 17:30


 11/23/18 17:29  11/23/18 11:49


 


 


 Clonidine HCl


  (Catapres Tab)  0.1 mg  EVERY 8  HOURS


 ORAL


   11/16/18 22:00


 12/16/18 21:59  11/23/18 05:20


 


 


 Dextrose


  (Dextrose 50%)  25 ml  Q30M  PRN


 IV


 bs 60-69  11/20/18 15:30


 12/20/18 15:29   


 


 


 Dextrose/


 Electrolytes  1,000 ml @ 


 75 mls/hr  [id number]


 IV


   11/20/18 18:30


 12/20/18 18:29  11/23/18 00:41


 


 


 Dicyclomine HCl


  (Bentyl)  10 mg  QID


 ORAL


   11/16/18 18:00


 12/16/18 17:59  11/23/18 09:02


 


 


 Diphenhydramine


 HCl


  (Benadryl)  25 mg  ONCE


 ORAL


   11/23/18 11:00


 11/23/18 15:00  11/23/18 11:22


 


 


 Haloperidol


 Lactate


  (Haldol)  5 mg  Q6H  PRN


 IM


 Agitation  11/19/18 10:45


 12/19/18 10:44   


 


 


 Lorazepam


  (Ativan 2mg/ml


 1ml)  1 mg  Q4H  PRN


 IM


 For Anxiety  11/19/18 10:45


 11/26/18 10:44  11/21/18 06:28


 


 


 Losartan Potassium


  (Cozaar)  50 mg  DAILY


 ORAL


   11/20/18 09:00


 12/17/18 08:59  11/23/18 09:02


 


 


 Metformin HCl


  (Glucophage)  500 mg  BID


 ORAL


   11/17/18 09:00


 12/17/18 08:59  11/23/18 09:03


 


 


 Potassium Chloride


  (K-Dur)  10 meq  DAILY


 ORAL


   11/17/18 09:00


 12/17/18 08:59  11/23/18 09:02


 


 


 Sitagliptin


 Phosphate


  (Januvia)  50 mg  ACBREAKFAST


 ORAL


   11/17/18 06:30


 12/17/18 06:29  11/23/18 05:20


 


 


 Sorbitol


  (Sorbitol)  45 ml  QHS


 ORAL


   11/22/18 21:00


 12/22/18 20:59  11/22/18 20:35


 


 


 Trazodone HCl


  (Desyrel)  50 mg  BEDTIME


 ORAL


   11/16/18 21:00


 12/16/18 20:59  11/22/18 20:34


 


 


 Vitamin D


  (Vitamin D)  5,000 intlu  DAILY


 ORAL


   11/17/18 09:00


 12/17/18 08:59  11/23/18 09:02


 

















Korin Junior MD Nov 23, 2018 12:55

## 2018-11-23 NOTE — INTERNAL MED PROGRESS NOTE
Subjective


Date of Service:  Nov 23, 2018


Physician Name


DianaCheo


Attending Physician


Juan Carlos Magana MD





Current Medications








 Medications


  (Trade)  Dose


 Ordered  Sig/Violette


 Route


 PRN Reason  Start Time


 Stop Time Status Last Admin


Dose Admin


 


 Acetaminophen


  (Tylenol)  650 mg  ONCE


 ORAL


   11/23/18 11:00


 11/23/18 15:00  11/23/18 11:23


 


 


 Acetaminophen


  (Tylenol)  650 mg  Q6H  PRN


 ORAL


 Mild Pain/Temp > 100.5  11/16/18 17:15


 12/16/18 17:14  11/21/18 12:39


 


 


 Acetaminophen/


 Hydrocodone Bitart


  (Norco 5/325)  1 tab  Q6H  PRN


 ORAL


 Severe Pain (Pain Scale 7-10)  11/16/18 20:30


 11/23/18 20:29  11/23/18 09:39


 


 


 Atenolol


  (Tenormin)  50 mg  Q12HR


 ORAL


   11/16/18 21:00


 12/16/18 20:59  11/23/18 09:01


 


 


 Atorvastatin


 Calcium


  (Lipitor)  20 mg  BEDTIME


 ORAL


   11/17/18 21:00


 12/17/18 20:59  11/22/18 20:34


 


 


 Clonazepam


  (KlonoPIN)  0.5 mg  Q6H  PRN


 ORAL


 For Anxiety  11/16/18 17:30


 11/23/18 17:29  11/23/18 11:49


 


 


 Clonidine HCl


  (Catapres Tab)  0.1 mg  EVERY 8  HOURS


 ORAL


   11/16/18 22:00


 12/16/18 21:59  11/23/18 05:20


 


 


 Dextrose


  (Dextrose 50%)  25 ml  Q30M  PRN


 IV


 bs 60-69  11/20/18 15:30


 12/20/18 15:29   


 


 


 Dextrose/


 Electrolytes  1,000 ml @ 


 75 mls/hr  [id number]


 IV


   11/20/18 18:30


 12/20/18 18:29  11/23/18 00:41


 


 


 Dicyclomine HCl


  (Bentyl)  10 mg  QID


 ORAL


   11/16/18 18:00


 12/16/18 17:59  11/23/18 09:02


 


 


 Diphenhydramine


 HCl


  (Benadryl)  25 mg  ONCE


 ORAL


   11/23/18 11:00


 11/23/18 15:00  11/23/18 11:22


 


 


 Haloperidol


 Lactate


  (Haldol)  5 mg  Q6H  PRN


 IM


 Agitation  11/19/18 10:45


 12/19/18 10:44   


 


 


 Lorazepam


  (Ativan 2mg/ml


 1ml)  1 mg  Q4H  PRN


 IM


 For Anxiety  11/19/18 10:45


 11/26/18 10:44  11/21/18 06:28


 


 


 Losartan Potassium


  (Cozaar)  50 mg  DAILY


 ORAL


   11/20/18 09:00


 12/17/18 08:59  11/23/18 09:02


 


 


 Metformin HCl


  (Glucophage)  500 mg  BID


 ORAL


   11/17/18 09:00


 12/17/18 08:59  11/23/18 09:03


 


 


 Potassium Chloride


  (K-Dur)  10 meq  DAILY


 ORAL


   11/17/18 09:00


 12/17/18 08:59  11/23/18 09:02


 


 


 Sitagliptin


 Phosphate


  (Januvia)  50 mg  ACBREAKFAST


 ORAL


   11/17/18 06:30


 12/17/18 06:29  11/23/18 05:20


 


 


 Sorbitol


  (Sorbitol)  45 ml  QHS


 ORAL


   11/22/18 21:00


 12/22/18 20:59  11/22/18 20:35


 


 


 Trazodone HCl


  (Desyrel)  50 mg  BEDTIME


 ORAL


   11/16/18 21:00


 12/16/18 20:59  11/22/18 20:34


 


 


 Vitamin D


  (Vitamin D)  5,000 intlu  DAILY


 ORAL


   11/17/18 09:00


 12/17/18 08:59  11/23/18 09:02


 








Allergies:  


Coded Allergies:  


     No Known Allergies (Unverified , 3/26/14)


ROS Limited/Unobtainable:  No


Constitutional:  Reports: no symptoms


HEENT:  Reports: no symptoms


Cardiovascular:  Reports: no symptoms


Respiratory:  Reports: no symptoms


Gastrointestinal/Abdominal:  Reports: no symptoms


Genitourinary:  Reports: no symptoms


Neurologic/Psychiatric:  Reports: no symptoms


Subjective


79 YO F admitted with generalized weakness.  Cover for Int [facility]-Dr Magana.  S/P  

right hip total arthroplasty 11/20/18.  S/P colonoscopy 11/20/18





Objective





Last Vital Signs








  Date Time  Temp Pulse Resp B/P (MAP) Pulse Ox O2 Delivery O2 Flow Rate FiO2


 


11/23/18 12:00 98.1 71 18 141/65 (90) 93   


 


11/23/18 09:00      Room Air  





      Room Air  


 


11/20/18 20:10       3 


 


11/16/18 18:13        98











Laboratory Tests








Test


  11/23/18


05:25


 


White Blood Count


  9.2 K/UL


(4.8-10.8)


 


Red Blood Count


  2.86 M/UL


(4.20-5.40)  L


 


Hemoglobin


  7.7 G/DL


(12.0-16.0)  L


 


Hematocrit


  24.2 %


(37.0-47.0)  L


 


Mean Corpuscular Volume 85 FL (80-99)  


 


Mean Corpuscular Hemoglobin


  27.1 PG


(27.0-31.0)


 


Mean Corpuscular Hemoglobin


Concent 32.0 G/DL


(32.0-36.0)


 


Red Cell Distribution Width


  15.2 %


(11.6-14.8)  H


 


Platelet Count


  289 K/UL


(150-450)


 


Mean Platelet Volume


  7.5 FL


(6.5-10.1)


 


Neutrophils (%) (Auto)


  % (45.0-75.0)


 


 


Lymphocytes (%) (Auto)


  % (20.0-45.0)


 


 


Monocytes (%) (Auto)  % (1.0-10.0)  


 


Eosinophils (%) (Auto)  % (0.0-3.0)  


 


Basophils (%) (Auto)  % (0.0-2.0)  


 


Sodium Level


  139 MMOL/L


(136-145)


 


Potassium Level


  3.8 MMOL/L


(3.5-5.1)


 


Chloride Level


  109 MMOL/L


()  H


 


Carbon Dioxide Level


  24 MMOL/L


(21-32)


 


Anion Gap


  6 mmol/L


(5-15)


 


Blood Urea Nitrogen


  11 mg/dL


(7-18)


 


Creatinine


  0.7 MG/DL


(0.55-1.30)


 


Estimat Glomerular Filtration


Rate  mL/min (>60)  


 


 


Glucose Level


  105 MG/DL


()


 


Calcium Level


  7.8 MG/DL


(8.5-10.1)  L











Microbiology








 Date/Time


Source Procedure


Growth Status


 


 


 11/20/18 18:02


Hip Right Gram Stain - Final Resulted





 11/20/18 18:02 Aerobic Culture - Preliminary


Staphylococcus Aureus


Staphylococcus Sp Coag Neg Resulted


 


 11/20/18 18:02


Hip Right Anaerobic Culture - Preliminary Resulted

















Intake and Output  


 


 11/22/18 11/23/18





 19:00 07:00


 


Intake Total 1975 ml 675 ml


 


Balance 1975 ml 675 ml


 


  


 


Intake Oral 1000 ml 


 


IV Total 975 ml 675 ml


 


# Voids 3 3


 


# Bowel Movements  1








Objective


General Appearance:  WD/WN, mild distress, agitated


EENT:  PERRL/EOMI, normal ENT inspection


Neck:  non-tender, normal alignment, supple, normal inspection


Cardiovascular:  normal peripheral pulses, normal rate, regular rhythm, no 

gallop/murmur, no JVD


Respiratory/Chest:  chest wall non-tender, lungs clear, normal breath sounds, 

no accessory muscle use, respiratory distress


Abdomen:  normal bowel sounds, non tender, soft, no organomegaly, no mass


Extremities:  normal range of motion


Neurologic:  CNs II-XII grossly normal


Skin:  normal pigmentation, warm/dry





Assessment/Plan


Problem List:  


(1) Diabetes mellitus, type II


Assessment & Plan:  Continue metformin and januvia





(2) HTN (hypertension)


Assessment & Plan:  continue atenolol and cozaar





(3) Sick sinus syndrome


Assessment & Plan:  S/P pacemaker





(4) Aortic stenosis


(5) Pulmonary hypertension


(6) Alzheimer's dementia


(7) Dislocation, hip closed


Assessment & Plan:  S/P right total hip arthroplasty 11/20/18-see ortho note.





(8) Rectal bleed


Assessment & Plan:  S/P colonoscopy 11/20/18=ulcerated hemorrhoid.  See GI note.





(9) Severe anemia


Assessment & Plan:  Due to rectal bleed.  S/P transfusion 2 units packed RBC 11/ 21/18.  Transfuse 1 unit PRBC today





Status:  progressing


Assessment/Plan


Discharge planning:  post acute care.











Cheo Ortiz MD Nov 23, 2018 12:17

## 2018-11-24 VITALS — SYSTOLIC BLOOD PRESSURE: 127 MMHG | DIASTOLIC BLOOD PRESSURE: 81 MMHG

## 2018-11-24 VITALS — DIASTOLIC BLOOD PRESSURE: 60 MMHG | SYSTOLIC BLOOD PRESSURE: 127 MMHG

## 2018-11-24 VITALS — SYSTOLIC BLOOD PRESSURE: 100 MMHG | DIASTOLIC BLOOD PRESSURE: 52 MMHG

## 2018-11-24 VITALS — SYSTOLIC BLOOD PRESSURE: 132 MMHG | DIASTOLIC BLOOD PRESSURE: 62 MMHG

## 2018-11-24 VITALS — DIASTOLIC BLOOD PRESSURE: 81 MMHG | SYSTOLIC BLOOD PRESSURE: 131 MMHG

## 2018-11-24 VITALS — SYSTOLIC BLOOD PRESSURE: 129 MMHG | DIASTOLIC BLOOD PRESSURE: 71 MMHG

## 2018-11-24 LAB
ADD MANUAL DIFF: NO
ALBUMIN SERPL-MCNC: 1.8 G/DL (ref 3.4–5)
ALBUMIN/GLOB SERPL: 0.5 {RATIO} (ref 1–2.7)
ALP SERPL-CCNC: 88 U/L (ref 46–116)
ALT SERPL-CCNC: 11 U/L (ref 12–78)
ANION GAP SERPL CALC-SCNC: 8 MMOL/L (ref 5–15)
AST SERPL-CCNC: 24 U/L (ref 15–37)
BASOPHILS NFR BLD AUTO: 0.3 % (ref 0–2)
BILIRUB SERPL-MCNC: 0.9 MG/DL (ref 0.2–1)
BUN SERPL-MCNC: 13 MG/DL (ref 7–18)
CALCIUM SERPL-MCNC: 8.1 MG/DL (ref 8.5–10.1)
CHLORIDE SERPL-SCNC: 109 MMOL/L (ref 98–107)
CO2 SERPL-SCNC: 22 MMOL/L (ref 21–32)
CREAT SERPL-MCNC: 0.6 MG/DL (ref 0.55–1.3)
EOSINOPHIL NFR BLD AUTO: 0.8 % (ref 0–3)
ERYTHROCYTE [DISTWIDTH] IN BLOOD BY AUTOMATED COUNT: 15.1 % (ref 11.6–14.8)
GLOBULIN SER-MCNC: 3.8 G/DL
HCT VFR BLD CALC: 29.7 % (ref 37–47)
HGB BLD-MCNC: 10.1 G/DL (ref 12–16)
LYMPHOCYTES NFR BLD AUTO: 15.3 % (ref 20–45)
MCV RBC AUTO: 84 FL (ref 80–99)
MONOCYTES NFR BLD AUTO: 6.8 % (ref 1–10)
NEUTROPHILS NFR BLD AUTO: 76.8 % (ref 45–75)
PHOSPHATE SERPL-MCNC: 2.2 MG/DL (ref 2.5–4.9)
PLATELET # BLD: 295 K/UL (ref 150–450)
POTASSIUM SERPL-SCNC: 4.2 MMOL/L (ref 3.5–5.1)
RBC # BLD AUTO: 3.53 M/UL (ref 4.2–5.4)
SODIUM SERPL-SCNC: 139 MMOL/L (ref 136–145)
WBC # BLD AUTO: 9.9 K/UL (ref 4.8–10.8)

## 2018-11-24 RX ADMIN — DIBASIC SODIUM PHOSPHATE, MONOBASIC POTASSIUM PHOSPHATE AND MONOBASIC SODIUM PHOSPHATE SCH MG: 852; 155; 130 TABLET ORAL at 17:07

## 2018-11-24 RX ADMIN — SITAGLIPTIN SCH MG: 50 TABLET, FILM COATED ORAL at 06:21

## 2018-11-24 RX ADMIN — METFORMIN HYDROCHLORIDE SCH MG: 500 TABLET ORAL at 17:07

## 2018-11-24 RX ADMIN — ATORVASTATIN CALCIUM SCH MG: 20 TABLET, FILM COATED ORAL at 20:40

## 2018-11-24 RX ADMIN — DIBASIC SODIUM PHOSPHATE, MONOBASIC POTASSIUM PHOSPHATE AND MONOBASIC SODIUM PHOSPHATE SCH MG: 852; 155; 130 TABLET ORAL at 09:20

## 2018-11-24 RX ADMIN — HYDROCODONE BITARTRATE AND ACETAMINOPHEN PRN TAB: 5; 325 TABLET ORAL at 11:35

## 2018-11-24 RX ADMIN — LOSARTAN POTASSIUM SCH MG: 50 TABLET, FILM COATED ORAL at 08:35

## 2018-11-24 RX ADMIN — DICYCLOMINE HYDROCHLORIDE SCH MG: 10 CAPSULE ORAL at 20:39

## 2018-11-24 RX ADMIN — DEXTROSE, SODIUM CHLORIDE, AND POTASSIUM CHLORIDE SCH MLS/HR: 5; .45; .15 INJECTION INTRAVENOUS at 15:23

## 2018-11-24 RX ADMIN — HYDROCODONE BITARTRATE AND ACETAMINOPHEN PRN TAB: 5; 325 TABLET ORAL at 22:03

## 2018-11-24 RX ADMIN — METFORMIN HYDROCHLORIDE SCH MG: 500 TABLET ORAL at 08:35

## 2018-11-24 RX ADMIN — DICYCLOMINE HYDROCHLORIDE SCH MG: 10 CAPSULE ORAL at 08:35

## 2018-11-24 RX ADMIN — SORBITOL SOLUTION (BULK) SCH ML: 70 SOLUTION at 20:48

## 2018-11-24 RX ADMIN — TRAZODONE HYDROCHLORIDE SCH MG: 50 TABLET ORAL at 20:39

## 2018-11-24 RX ADMIN — DICYCLOMINE HYDROCHLORIDE SCH MG: 10 CAPSULE ORAL at 17:07

## 2018-11-24 RX ADMIN — DICYCLOMINE HYDROCHLORIDE SCH MG: 10 CAPSULE ORAL at 12:24

## 2018-11-24 RX ADMIN — VITAMIN D, TAB 1000IU (100/BT) SCH INTLU: 25 TAB at 08:34

## 2018-11-24 RX ADMIN — SORBITOL SOLUTION (BULK) SCH ML: 70 SOLUTION at 20:41

## 2018-11-24 RX ADMIN — DIBASIC SODIUM PHOSPHATE, MONOBASIC POTASSIUM PHOSPHATE AND MONOBASIC SODIUM PHOSPHATE SCH MG: 852; 155; 130 TABLET ORAL at 12:24

## 2018-11-24 NOTE — INTERNAL MED PROGRESS NOTE
Subjective


Date of Service:  Nov 24, 2018


Physician Name


DianaCheo


Attending Physician


Juan Carlos Magana MD





Current Medications








 Medications


  (Trade)  Dose


 Ordered  Sig/Violette


 Route


 PRN Reason  Start Time


 Stop Time Status Last Admin


Dose Admin


 


 Acetaminophen


  (Tylenol)  650 mg  Q6H  PRN


 ORAL


 Mild Pain/Temp > 100.5  11/16/18 17:15


 12/16/18 17:14  11/21/18 12:39


 


 


 Acetaminophen/


 Hydrocodone Bitart


  (Norco 5/325)  1 tab  Q6H  PRN


 ORAL


 PAIN 4-10  11/24/18 11:30


 12/1/18 11:29  11/24/18 11:35


 


 


 Albuterol/


 Ipratropium


  (Albuterol/


 Ipratropium)  3 ml  Q4H  PRN


 HHN


 Shortness of Breath  11/24/18 09:00


 11/29/18 08:59   


 


 


 Atenolol


  (Tenormin)  50 mg  Q12HR


 ORAL


   11/16/18 21:00


 12/16/18 20:59  11/23/18 09:01


 


 


 Atorvastatin


 Calcium


  (Lipitor)  20 mg  BEDTIME


 ORAL


   11/17/18 21:00


 12/17/18 20:59  11/23/18 21:01


 


 


 Clonidine HCl


  (Catapres Tab)  0.1 mg  EVERY 8  HOURS


 ORAL


   11/16/18 22:00


 12/16/18 21:59  11/24/18 13:17


 


 


 Dextrose


  (Dextrose 50%)  25 ml  Q30M  PRN


 IV


 bs 60-69  11/20/18 15:30


 12/20/18 15:29   


 


 


 Dextrose/


 Electrolytes  1,000 ml @ 


 75 mls/hr  [id number]


 IV


   11/20/18 18:30


 12/20/18 18:29  11/23/18 18:07


 


 


 Dicyclomine HCl


  (Bentyl)  10 mg  QID


 ORAL


   11/16/18 18:00


 12/16/18 17:59  11/24/18 12:24


 


 


 Haloperidol


 Lactate


  (Haldol)  5 mg  Q6H  PRN


 IM


 Agitation  11/19/18 10:45


 12/19/18 10:44   


 


 


 Lorazepam


  (Ativan 2mg/ml


 1ml)  1 mg  Q4H  PRN


 IM


 For Anxiety  11/24/18 09:00


 12/1/18 08:59   


 


 


 Losartan Potassium


  (Cozaar)  50 mg  DAILY


 ORAL


   11/20/18 09:00


 12/17/18 08:59  11/24/18 08:35


 


 


 Metformin HCl


  (Glucophage)  500 mg  BID


 ORAL


   11/17/18 09:00


 12/17/18 08:59  11/24/18 08:35


 


 


 Phosphorus


  (Phospha 250


 Neutral)  250 mg  THREE TIMES A  DAY


 ORAL


   11/24/18 09:00


 12/24/18 08:59  11/24/18 12:24


 


 


 Potassium Chloride


  (K-Dur)  10 meq  DAILY


 ORAL


   11/17/18 09:00


 12/17/18 08:59  11/24/18 08:35


 


 


 Sitagliptin


 Phosphate


  (Januvia)  50 mg  ACBREAKFAST


 ORAL


   11/17/18 06:30


 12/17/18 06:29  11/24/18 06:21


 


 


 Sorbitol


  (Sorbitol)  45 ml  QHS


 ORAL


   11/22/18 21:00


 12/22/18 20:59  11/22/18 20:35


 


 


 Trazodone HCl


  (Desyrel)  50 mg  BEDTIME


 ORAL


   11/16/18 21:00


 12/16/18 20:59  11/23/18 21:01


 


 


 Vitamin D


  (Vitamin D)  5,000 intlu  DAILY


 ORAL


   11/17/18 09:00


 12/17/18 08:59  11/24/18 08:34


 








Allergies:  


Coded Allergies:  


     No Known Allergies (Unverified , 3/26/14)


ROS Limited/Unobtainable:  No


Constitutional:  Reports: no symptoms


HEENT:  Reports: no symptoms


Cardiovascular:  Reports: no symptoms


Respiratory:  Reports: no symptoms


Gastrointestinal/Abdominal:  Reports: no symptoms


Genitourinary:  Reports: no symptoms


Neurologic/Psychiatric:  Reports: no symptoms


Subjective


77 YO F admitted with generalized weakness.  Cover for Int Med-Dr Magana.  S/P  

right hip total arthroplasty 11/20/18.  S/P colonoscopy 11/20/18





Objective





Last Vital Signs








  Date Time  Temp Pulse Resp B/P (MAP) Pulse Ox O2 Delivery O2 Flow Rate FiO2


 


11/24/18 13:17    131/81    


 


11/24/18 12:05 98.8       


 


11/24/18 12:00  64 19  98   


 


11/24/18 09:00      Room Air  





      Room Air  


 


11/20/18 20:10       3 


 


11/16/18 18:13        98











Laboratory Tests








Test


  11/24/18


05:10


 


White Blood Count


  9.9 K/UL


(4.8-10.8)


 


Red Blood Count


  3.53 M/UL


(4.20-5.40)  L


 


Hemoglobin


  10.1 G/DL


(12.0-16.0)  L


 


Hematocrit


  29.7 %


(37.0-47.0)  L


 


Mean Corpuscular Volume 84 FL (80-99)  


 


Mean Corpuscular Hemoglobin


  28.6 PG


(27.0-31.0)


 


Mean Corpuscular Hemoglobin


Concent 33.9 G/DL


(32.0-36.0)


 


Red Cell Distribution Width


  15.1 %


(11.6-14.8)  H


 


Platelet Count


  295 K/UL


(150-450)


 


Mean Platelet Volume


  6.9 FL


(6.5-10.1)


 


Neutrophils (%) (Auto)


  76.8 %


(45.0-75.0)  H


 


Lymphocytes (%) (Auto)


  15.3 %


(20.0-45.0)  L


 


Monocytes (%) (Auto)


  6.8 %


(1.0-10.0)


 


Eosinophils (%) (Auto)


  0.8 %


(0.0-3.0)


 


Basophils (%) (Auto)


  0.3 %


(0.0-2.0)


 


Erythrocyte Sedimentation Rate


  10 MM/HR


(0-30)


 


Sodium Level


  139 MMOL/L


(136-145)


 


Potassium Level


  4.2 MMOL/L


(3.5-5.1)


 


Chloride Level


  109 MMOL/L


()  H


 


Carbon Dioxide Level


  22 MMOL/L


(21-32)


 


Anion Gap


  8 mmol/L


(5-15)


 


Blood Urea Nitrogen


  13 mg/dL


(7-18)


 


Creatinine


  0.6 MG/DL


(0.55-1.30)


 


Estimat Glomerular Filtration


Rate  mL/min (>60)  


 


 


Glucose Level


  102 MG/DL


()


 


Calcium Level


  8.1 MG/DL


(8.5-10.1)  L


 


Phosphorus Level


  2.2 MG/DL


(2.5-4.9)  L


 


Magnesium Level


  1.4 MG/DL


(1.8-2.4)  L


 


Total Bilirubin


  0.9 MG/DL


(0.2-1.0)


 


Aspartate Amino Transf


(AST/SGOT) 24 U/L (15-37)


 


 


Alanine Aminotransferase


(ALT/SGPT) 11 U/L (12-78)


L


 


Alkaline Phosphatase


  88 U/L


()


 


C-Reactive Protein,


Quantitative 11.7 mg/dL


(0.00-0.90)  H


 


Total Protein


  5.6 G/DL


(6.4-8.2)  L


 


Albumin


  1.8 G/DL


(3.4-5.0)  L


 


Globulin 3.8 g/dL  


 


Albumin/Globulin Ratio


  0.5 (1.0-2.7)


L

















Intake and Output  


 


 11/23/18 11/24/18





 19:00 07:00


 


Intake Total 713 ml 


 


Balance 713 ml 


 


  


 


Intake Oral 118 ml 


 


IV Total 595 ml 


 


# Voids 1 2


 


# Bowel Movements 1 








Objective


General Appearance:  WD/WN, mild distress, agitated


EENT:  PERRL/EOMI, normal ENT inspection


Neck:  non-tender, normal alignment, supple, normal inspection


Cardiovascular:  normal peripheral pulses, normal rate, regular rhythm, no 

gallop/murmur, no JVD


Respiratory/Chest:  chest wall non-tender, lungs clear, normal breath sounds, 

no accessory muscle use, respiratory distress


Abdomen:  normal bowel sounds, non tender, soft, no organomegaly, no mass


Extremities:  normal range of motion


Neurologic:  CNs II-XII grossly normal


Skin:  normal pigmentation, warm/dry





Assessment/Plan


Problem List:  


(1) Diabetes mellitus, type II


Assessment & Plan:  Continue metformin and januvia





(2) HTN (hypertension)


Assessment & Plan:  continue atenolol and cozaar





(3) Sick sinus syndrome


Assessment & Plan:  S/P pacemaker





(4) Aortic stenosis


(5) Pulmonary hypertension


(6) Alzheimer's dementia


(7) Dislocation, hip closed


Assessment & Plan:  S/P right total hip arthroplasty 11/20/18-see ortho note.





(8) Rectal bleed


Assessment & Plan:  S/P colonoscopy 11/20/18=ulcerated hemorrhoid.  See GI note.





(9) Severe anemia


Assessment & Plan:  Due to rectal bleed.  S/P transfusion 2 units packed RBC 11/ 21/18 and 1 unit PRBC 11/23/18.





Status:  progressing


Assessment/Plan


Discharge planning:  post acute care.











Cheo Ortzi MD Nov 24, 2018 15:00

## 2018-11-24 NOTE — GENERAL PROGRESS NOTE
Assessment/Plan


Assessment/Plan


Assessment


- constipation - on sorbitol


- rectal bleeding due to ulcerated hemorrhoids


- Anemia  


- agitation/OBS


- dislocated hip - s/p arthroplasty conversion





Recommendations


- push po


- Monitor H&H


- transfuse PRN


- Ortho f/u





Subjective


Allergies:  


Coded Allergies:  


     No Known Allergies (Unverified , 3/26/14)


Subjective


s/p (R) hip hemiarthroplasty conversion


calm NAD


d/w RN





Objective





Last 24 Hour Vital Signs








  Date Time  Temp Pulse Resp B/P (MAP) Pulse Ox O2 Delivery O2 Flow Rate FiO2


 


11/24/18 16:00 98.8 69 17 127/81 (96) 96   


 


11/24/18 13:17    131/81    


 


11/24/18 12:05 98.8       


 


11/24/18 12:00 97.7 64 19 131/81 (98) 98   


 


11/24/18 09:00      Room Air  





      Room Air  


 


11/24/18 09:00  55  127/60    


 


11/24/18 08:35    127/60    


 


11/24/18 08:00 98.8 55 19 127/60 (82) 96   


 


11/24/18 06:00    100/52    


 


11/24/18 04:00 98.2 67 20 100/52 (68) 94   


 


11/24/18 00:00 97.4 69 19 129/71 (90) 99   


 


11/23/18 21:00      Room Air  





      Room Air  


 


11/23/18 20:00 97.0 80 18 135/70 (91) 98   

















Intake and Output  


 


 11/23/18 11/24/18





 19:00 07:00


 


Intake Total 713 ml 75 ml


 


Balance 713 ml 75 ml


 


  


 


Intake Oral 118 ml 


 


IV Total 595 ml 75 ml


 


# Voids 1 2


 


# Bowel Movements 1 








Laboratory Tests


11/24/18 05:10: 


White Blood Count 9.9, Red Blood Count 3.53L, Hemoglobin 10.1L, Hematocrit 29.7L

, Mean Corpuscular Volume 84, Mean Corpuscular Hemoglobin 28.6, Mean 

Corpuscular Hemoglobin Concent 33.9, Red Cell Distribution Width 15.1H, 

Platelet Count 295, Mean Platelet Volume 6.9, Neutrophils (%) (Auto) 76.8H, 

Lymphocytes (%) (Auto) 15.3L, Monocytes (%) (Auto) 6.8, Eosinophils (%) (Auto) 

0.8, Basophils (%) (Auto) 0.3, Erythrocyte Sedimentation Rate 10, Sodium Level 

139, Potassium Level 4.2, Chloride Level 109H, Carbon Dioxide Level 22, Anion 

Gap 8, Blood Urea Nitrogen 13, Creatinine 0.6, Estimat Glomerular Filtration 

Rate , Glucose Level 102, Calcium Level 8.1L, Phosphorus Level 2.2L, Magnesium 

Level 1.4L, Total Bilirubin 0.9, Aspartate Amino Transf (AST/SGOT) 24, Alanine 

Aminotransferase (ALT/SGPT) 11L, Alkaline Phosphatase 88, C-Reactive Protein, 

Quantitative 11.7H, Total Protein 5.6L, Albumin 1.8L, Globulin 3.8, Albumin/

Globulin Ratio 0.5L


Height (Feet):  5


Height (Inches):  5.00


Weight (Pounds):  136


Objective


WDWN


NCAT


supple


CTA


RRR


abd soft


no edema











Florina Butler MD Nov 24, 2018 17:40

## 2018-11-24 NOTE — PULMONOLOGY PROGRESS NOTE
Assessment/Plan


Assessment/Plan


ASSESSMENT 


recurrent dislocation right hip hemiarthroplasty 


s/p 11/20 conversion to total hip arthroplasty 


ulcerated internal hemorrhoids 


s/p flexible sigmoidoscopy 


anemia , s/p  blood transfusion 


severe protein calorie malnutrition 


electrolytes imbalance 


sacral decub stage III,  present on admission 


diabetes mellitus type 2 


hypertension  


CAD with hx of NSTEMI  


aortic stenosis 


pulmonary hypertension 


Alzheimer dementia 


sick sinus syndrome , status post pacemaker implantation 


e/lyte imbalance ( low Mg and P) 








PLAN OF  CARE


Med Surg floor 


s/p  R NEGRA 


hip precautions 


PT/OT 


pain management


bowel regimen  s/p flexible sigmoidoscopy , no evidence of active bleeding 


colonoscopy later  with consent of family  


fup with Gi recs


anemia workup noted , s/p 2 u PRBC 


check ferritin level , may benefit from Venofer


IV fluids 


monitor renal parameters ,electrolytes correct electrolytes prn, 


today correct Mg and P 





BP  management with BB and ARB


continue statin


BS management with metformin and Januvia 


nutritional recommendations implemented in plan of care 


psychiatrist follow 


on trazodone 


according to psychiatrist,  patient lacks capacity to make decisions 


supportive care 


wound care as per surgeon recommendations


  


case discussed and evaluated by supervising physician





Subjective


Allergies:  


Coded Allergies:  


     No Known Allergies (Unverified , 3/26/14)


Subjective


no fever , leukocytosis resolved


elevated CRP, ESR WNL


no signs of distress 


HH stable


low P and Mg





Objective





Last 24 Hour Vital Signs








  Date Time  Temp Pulse Resp B/P (MAP) Pulse Ox O2 Delivery O2 Flow Rate FiO2


 


11/24/18 06:00    100/52    


 


11/24/18 04:00 98.2 67 20 100/52 (68) 94   


 


11/24/18 00:00 97.4 69 19 129/71 (90) 99   


 


11/23/18 21:00      Room Air  





      Room Air  


 


11/23/18 20:00 97.0 80 18 135/70 (91) 98   


 


11/23/18 16:00 98.1 74 18 127/74 (91) 100   


 


11/23/18 13:16    141/65    


 


11/23/18 12:00 98.1 71 18 141/65 (90) 93   


 


11/23/18 11:53 98.2       


 


11/23/18 10:09 98.2       


 


11/23/18 09:02    110/58    


 


11/23/18 09:01  59  110/58    


 


11/23/18 09:00      Room Air  





      Room Air  


 


11/23/18 09:00  59  110/58    

















Intake and Output  


 


 11/23/18 11/24/18





 19:00 07:00


 


Intake Total 713 ml 


 


Balance 713 ml 


 


  


 


Intake Oral 118 ml 


 


IV Total 595 ml 


 


# Voids 1 2


 


# Bowel Movements 1 








Objective


General Appearance:  no acute distress,  awake, confused elderly [place] 

speaking female in NAD


HEENT:  normocephalic, atraumatic, anicteric


Respiratory/Chest:  lungs clear - with moderate air exchange


Cardiovascular:  normal rate


Abdomen:  soft, non tender, non distended


Extremities:  no edema


Skin:   R hip dressing intact


Neurologic/Psychiatric:  abnormal gait - bedridden ,   awake, but very confused


Musculoskeletal:  atrophy - BLE


Laboratory Tests


11/23/18 14:40: 


White Blood Count 13.9#H, Red Blood Count 3.63L, Hemoglobin 9.7L, Hematocrit 

30.7L, Mean Corpuscular Volume 84, Mean Corpuscular Hemoglobin 26.8L, Mean 

Corpuscular Hemoglobin Concent 31.7L, Red Cell Distribution Width 15.3H, 

Platelet Count 304, Mean Platelet Volume 7.6, Neutrophils (%) (Auto) , 

Lymphocytes (%) (Auto) , Monocytes (%) (Auto) , Eosinophils (%) (Auto) , 

Basophils (%) (Auto) , Differential Total Cells Counted 100, Neutrophils % (

Manual) 93H, Lymphocytes % (Manual) 5L, Monocytes % (Manual) 2, Eosinophils % (

Manual) 0, Basophils % (Manual) 0, Band Neutrophils 0, Platelet Estimate 

Adequate, Platelet Morphology Normal, Polychromasia 1+, Hypochromasia 2+, 

Anisocytosis 1+, Prothrombin Time 12.4H, Prothromb Time International Ratio 1.2H

, Activated Partial Thromboplast Time 22L, Sodium Level 137, Potassium Level 4.4

, Chloride Level 107, Carbon Dioxide Level 21, Anion Gap 9, Blood Urea Nitrogen 

13, Creatinine 0.7, Estimat Glomerular Filtration Rate , Glucose Level 118H, 

Calcium Level 7.9L


11/24/18 05:10: 


White Blood Count 9.9, Red Blood Count 3.53L, Hemoglobin 10.1L, Hematocrit 29.7L

, Mean Corpuscular Volume 84, Mean Corpuscular Hemoglobin 28.6, Mean 

Corpuscular Hemoglobin Concent 33.9, Red Cell Distribution Width 15.1H, 

Platelet Count 295, Mean Platelet Volume 6.9, Neutrophils (%) (Auto) 76.8H, 

Lymphocytes (%) (Auto) 15.3L, Monocytes (%) (Auto) 6.8, Eosinophils (%) (Auto) 

0.8, Basophils (%) (Auto) 0.3, Sodium Level 139, Potassium Level 4.2, Chloride 

Level 109H, Carbon Dioxide Level 22, Anion Gap 8, Blood Urea Nitrogen 13, 

Creatinine 0.6, Estimat Glomerular Filtration Rate , Glucose Level 102, Calcium 

Level 8.1L, Erythrocyte Sedimentation Rate [Pending], Phosphorus Level 2.2L, 

Magnesium Level 1.4L, Total Bilirubin 0.9, Aspartate Amino Transf (AST/SGOT) 24

, Alanine Aminotransferase (ALT/SGPT) 11L, Alkaline Phosphatase 88, C-Reactive 

Protein, Quantitative 11.7H, Total Protein 5.6L, Albumin 1.8L, Globulin 3.8, 

Albumin/Globulin Ratio 0.5L





Current Medications








 Medications


  (Trade)  Dose


 Ordered  Sig/Violette


 Route


 PRN Reason  Start Time


 Stop Time Status Last Admin


Dose Admin


 


 Acetaminophen


  (Tylenol)  650 mg  Q6H  PRN


 ORAL


 Mild Pain/Temp > 100.5  11/16/18 17:15


 12/16/18 17:14  11/21/18 12:39


 


 


 Atenolol


  (Tenormin)  50 mg  Q12HR


 ORAL


   11/16/18 21:00


 12/16/18 20:59  11/23/18 09:01


 


 


 Atorvastatin


 Calcium


  (Lipitor)  20 mg  BEDTIME


 ORAL


   11/17/18 21:00


 12/17/18 20:59  11/23/18 21:01


 


 


 Clonidine HCl


  (Catapres Tab)  0.1 mg  EVERY 8  HOURS


 ORAL


   11/16/18 22:00


 12/16/18 21:59  11/23/18 13:16


 


 


 Dextrose


  (Dextrose 50%)  25 ml  Q30M  PRN


 IV


 bs 60-69  11/20/18 15:30


 12/20/18 15:29   


 


 


 Dextrose/


 Electrolytes  1,000 ml @ 


 75 mls/hr  [id number]


 IV


   11/20/18 18:30


 12/20/18 18:29  11/23/18 18:07


 


 


 Dicyclomine HCl


  (Bentyl)  10 mg  QID


 ORAL


   11/16/18 18:00


 12/16/18 17:59  11/23/18 21:02


 


 


 Haloperidol


 Lactate


  (Haldol)  5 mg  Q6H  PRN


 IM


 Agitation  11/19/18 10:45


 12/19/18 10:44   


 


 


 Lorazepam


  (Ativan 2mg/ml


 1ml)  1 mg  Q4H  PRN


 IM


 For Anxiety  11/19/18 10:45


 11/26/18 10:44  11/23/18 23:30


 


 


 Losartan Potassium


  (Cozaar)  50 mg  DAILY


 ORAL


   11/20/18 09:00


 12/17/18 08:59  11/23/18 09:02


 


 


 Metformin HCl


  (Glucophage)  500 mg  BID


 ORAL


   11/17/18 09:00


 12/17/18 08:59  11/23/18 18:06


 


 


 Potassium Chloride


  (K-Dur)  10 meq  DAILY


 ORAL


   11/17/18 09:00


 12/17/18 08:59  11/23/18 09:02


 


 


 Sitagliptin


 Phosphate


  (Januvia)  50 mg  ACBREAKFAST


 ORAL


   11/17/18 06:30


 12/17/18 06:29  11/24/18 06:21


 


 


 Sorbitol


  (Sorbitol)  45 ml  QHS


 ORAL


   11/22/18 21:00


 12/22/18 20:59  11/22/18 20:35


 


 


 Trazodone HCl


  (Desyrel)  50 mg  BEDTIME


 ORAL


   11/16/18 21:00


 12/16/18 20:59  11/23/18 21:01


 


 


 Vitamin D


  (Vitamin D)  5,000 intlu  DAILY


 ORAL


   11/17/18 09:00


 12/17/18 08:59  11/23/18 09:02


 

















Kavitha Carrero NP Nov 24, 2018 08:05

## 2018-11-24 NOTE — GENERAL SURGERY PROGRESS NOTE
General Surgery-Progress Note


Subjective


Additional Comments


bleeding from ortho surgical site.  transfused prbc





Objective





Last 24 Hour Vital Signs








  Date Time  Temp Pulse Resp B/P (MAP) Pulse Ox O2 Delivery O2 Flow Rate FiO2


 


11/24/18 13:17    131/81    


 


11/24/18 12:05 98.8       


 


11/24/18 12:00 97.7 64 19 131/81 (98) 98   


 


11/24/18 09:00      Room Air  





      Room Air  


 


11/24/18 09:00  55  127/60    


 


11/24/18 08:35    127/60    


 


11/24/18 08:00 98.8 55 19 127/60 (82) 96   


 


11/24/18 06:00    100/52    


 


11/24/18 04:00 98.2 67 20 100/52 (68) 94   


 


11/24/18 00:00 97.4 69 19 129/71 (90) 99   


 


11/23/18 21:00      Room Air  





      Room Air  


 


11/23/18 20:00 97.0 80 18 135/70 (91) 98   


 


11/23/18 16:00 98.1 74 18 127/74 (91) 100   








I&O











Intake and Output  


 


 11/23/18 11/24/18





 19:00 07:00


 


Intake Total 713 ml 


 


Balance 713 ml 


 


  


 


Intake Oral 118 ml 


 


IV Total 595 ml 


 


# Voids 1 2


 


# Bowel Movements 1 








Dressing:  saturated


Wound:  other


Drains:  other


Cardiovascular:  RSR


Respiratory:  clear


Abdomen:  soft, flat, non-tender, present bowel sounds


Extremities:  other





Laboratory Tests








Test


  11/24/18


05:10


 


White Blood Count


  9.9 K/UL


(4.8-10.8)


 


Red Blood Count


  3.53 M/UL


(4.20-5.40)  L


 


Hemoglobin


  10.1 G/DL


(12.0-16.0)  L


 


Hematocrit


  29.7 %


(37.0-47.0)  L


 


Mean Corpuscular Volume 84 FL (80-99)  


 


Mean Corpuscular Hemoglobin


  28.6 PG


(27.0-31.0)


 


Mean Corpuscular Hemoglobin


Concent 33.9 G/DL


(32.0-36.0)


 


Red Cell Distribution Width


  15.1 %


(11.6-14.8)  H


 


Platelet Count


  295 K/UL


(150-450)


 


Mean Platelet Volume


  6.9 FL


(6.5-10.1)


 


Neutrophils (%) (Auto)


  76.8 %


(45.0-75.0)  H


 


Lymphocytes (%) (Auto)


  15.3 %


(20.0-45.0)  L


 


Monocytes (%) (Auto)


  6.8 %


(1.0-10.0)


 


Eosinophils (%) (Auto)


  0.8 %


(0.0-3.0)


 


Basophils (%) (Auto)


  0.3 %


(0.0-2.0)


 


Erythrocyte Sedimentation Rate


  10 MM/HR


(0-30)


 


Sodium Level


  139 MMOL/L


(136-145)


 


Potassium Level


  4.2 MMOL/L


(3.5-5.1)


 


Chloride Level


  109 MMOL/L


()  H


 


Carbon Dioxide Level


  22 MMOL/L


(21-32)


 


Anion Gap


  8 mmol/L


(5-15)


 


Blood Urea Nitrogen


  13 mg/dL


(7-18)


 


Creatinine


  0.6 MG/DL


(0.55-1.30)


 


Estimat Glomerular Filtration


Rate  mL/min (>60)  


 


 


Glucose Level


  102 MG/DL


()


 


Calcium Level


  8.1 MG/DL


(8.5-10.1)  L


 


Phosphorus Level


  2.2 MG/DL


(2.5-4.9)  L


 


Magnesium Level


  1.4 MG/DL


(1.8-2.4)  L


 


Total Bilirubin


  0.9 MG/DL


(0.2-1.0)


 


Aspartate Amino Transf


(AST/SGOT) 24 U/L (15-37)


 


 


Alanine Aminotransferase


(ALT/SGPT) 11 U/L (12-78)


L


 


Alkaline Phosphatase


  88 U/L


()


 


C-Reactive Protein,


Quantitative 11.7 mg/dL


(0.00-0.90)  H


 


Total Protein


  5.6 G/DL


(6.4-8.2)  L


 


Albumin


  1.8 G/DL


(3.4-5.0)  L


 


Globulin 3.8 g/dL  


 


Albumin/Globulin Ratio


  0.5 (1.0-2.7)


L











Plan


Problems:  


(1) Sacral decubitus ulcer, stage III


Assessment & Plan:  Multiple pressure injuries to sacrum and R hip. Four 

individual pressure injuries in close proximity noted  to sacrum,Total area 

measuring (L)2cm x (W)7 cm., each wound with 100% yellow slough with red 

margins. Non-blanchable erythema periwound. 


Small pressure injury noted to L buttocks (L)0.4cm x(W)0.5cm with 100% slough. 


Full thickness pressure injury to R hip at historical surgical incision (L)

1.1cm x(W)0.9cm ,with 90%  yellow slough noted. Non-blanchable erythema noted 

periwound ,an area of (L)7cm x (W)6.6cm . 


Stable dry eschar medial R heel. Periwound without erythema or induration. 


Wounds present upon admission and will be cared for during hospital stay





Tx.Plan:


Reposition at least every 2hours or as tolerated.


           


Air fluidized mattress.


           


Apply Cavilon wipes to heels and off-load heels with pillow.


           


Cleanse wounds Sacrum  with Saline. Apply Therahoney to wounds .Cavilon to 

borders and cover with Optifoam Drsg.


           


Cleanse R hip wound with Saline.Apply Therahoney.Apply Cavilon wipe to red area 

periwound and cover with 


           


Optifoam drsg Daily and prn.





Will likely need excisional vs non excisional debridement of sloth down to 

healthy viable tissue.  may consider during hospitalization once ortho and GI 

care completed. can be done as outpatient





Nutritional support 





Okay to d/c from surgical standpoint with outpatient follow up 





(2) Failure to thrive


Assessment & Plan:  elderly female with dementia


albumin 2


multiple decubitus ulcers





nutritional consult


increase protein and Caloric intake


will need optimization to help heal wounds 





DAILY ESTIMATED NEEDS:


Needs based on Wound, DM, wasting / 51kg 


30-35  kcals/kg 


[id number]  total kcals


1.25-1.5  g protein/kg


64-77  g total protein 





NUTRITION DIAGNOSIS:


* Increased kcal/prot needs R/T wound healing as evidenced by pt w/ stage 3 

sacral and R hip wounds, refer to WC eval.





PO DIET RECOMMENDATIONS:


[place]/ Regular diet w/ <50% po intake (texture per SLP) 





ADDITIONAL RECOMMENDATIONS:


* Calibrated bedscale wt for accurate CBW 


* Wound healing: add MVI w/ min 1 tab QD, Vit C 500mg BID, Harjeet 1pkt BID 


* Add Glucerna 1 tetra sana TID 


* Lytes daily, replete as needed ( Mg 1.6) 





. 














Tuan Patricia Nov 24, 2018 15:43

## 2018-11-25 VITALS — DIASTOLIC BLOOD PRESSURE: 57 MMHG | SYSTOLIC BLOOD PRESSURE: 108 MMHG

## 2018-11-25 VITALS — DIASTOLIC BLOOD PRESSURE: 67 MMHG | SYSTOLIC BLOOD PRESSURE: 141 MMHG

## 2018-11-25 VITALS — DIASTOLIC BLOOD PRESSURE: 63 MMHG | SYSTOLIC BLOOD PRESSURE: 117 MMHG

## 2018-11-25 VITALS — DIASTOLIC BLOOD PRESSURE: 69 MMHG | SYSTOLIC BLOOD PRESSURE: 129 MMHG

## 2018-11-25 VITALS — SYSTOLIC BLOOD PRESSURE: 103 MMHG | DIASTOLIC BLOOD PRESSURE: 72 MMHG

## 2018-11-25 VITALS — DIASTOLIC BLOOD PRESSURE: 68 MMHG | SYSTOLIC BLOOD PRESSURE: 132 MMHG

## 2018-11-25 LAB
ADD MANUAL DIFF: NO
ANION GAP SERPL CALC-SCNC: 5 MMOL/L (ref 5–15)
BASOPHILS NFR BLD AUTO: 0.3 % (ref 0–2)
BUN SERPL-MCNC: 8 MG/DL (ref 7–18)
CALCIUM SERPL-MCNC: 7.7 MG/DL (ref 8.5–10.1)
CHLORIDE SERPL-SCNC: 110 MMOL/L (ref 98–107)
CO2 SERPL-SCNC: 24 MMOL/L (ref 21–32)
CREAT SERPL-MCNC: 0.6 MG/DL (ref 0.55–1.3)
EOSINOPHIL NFR BLD AUTO: 1 % (ref 0–3)
ERYTHROCYTE [DISTWIDTH] IN BLOOD BY AUTOMATED COUNT: 15.7 % (ref 11.6–14.8)
FERRITIN SERPL-MCNC: 134 NG/ML (ref 8–388)
HCT VFR BLD CALC: 26.2 % (ref 37–47)
HGB BLD-MCNC: 8.4 G/DL (ref 12–16)
LYMPHOCYTES NFR BLD AUTO: 9.1 % (ref 20–45)
MCV RBC AUTO: 84 FL (ref 80–99)
MONOCYTES NFR BLD AUTO: 8.5 % (ref 1–10)
NEUTROPHILS NFR BLD AUTO: 81.1 % (ref 45–75)
PHOSPHATE SERPL-MCNC: 3 MG/DL (ref 2.5–4.9)
PLATELET # BLD: 305 K/UL (ref 150–450)
POTASSIUM SERPL-SCNC: 4.2 MMOL/L (ref 3.5–5.1)
RBC # BLD AUTO: 3.11 M/UL (ref 4.2–5.4)
SODIUM SERPL-SCNC: 139 MMOL/L (ref 136–145)
WBC # BLD AUTO: 7.3 K/UL (ref 4.8–10.8)

## 2018-11-25 RX ADMIN — DEXTROSE, SODIUM CHLORIDE, AND POTASSIUM CHLORIDE SCH MLS/HR: 5; .45; .15 INJECTION INTRAVENOUS at 17:13

## 2018-11-25 RX ADMIN — DICYCLOMINE HYDROCHLORIDE SCH MG: 10 CAPSULE ORAL at 08:30

## 2018-11-25 RX ADMIN — LOSARTAN POTASSIUM SCH MG: 50 TABLET, FILM COATED ORAL at 08:31

## 2018-11-25 RX ADMIN — DEXTROSE, SODIUM CHLORIDE, AND POTASSIUM CHLORIDE SCH MLS/HR: 5; .45; .15 INJECTION INTRAVENOUS at 04:51

## 2018-11-25 RX ADMIN — METFORMIN HYDROCHLORIDE SCH MG: 500 TABLET ORAL at 08:30

## 2018-11-25 RX ADMIN — SITAGLIPTIN SCH MG: 50 TABLET, FILM COATED ORAL at 06:32

## 2018-11-25 RX ADMIN — HYDROCODONE BITARTRATE AND ACETAMINOPHEN PRN TAB: 5; 325 TABLET ORAL at 20:16

## 2018-11-25 RX ADMIN — HYDROCODONE BITARTRATE AND ACETAMINOPHEN PRN TAB: 5; 325 TABLET ORAL at 04:51

## 2018-11-25 RX ADMIN — SORBITOL SOLUTION (BULK) SCH ML: 70 SOLUTION at 20:15

## 2018-11-25 RX ADMIN — DIBASIC SODIUM PHOSPHATE, MONOBASIC POTASSIUM PHOSPHATE AND MONOBASIC SODIUM PHOSPHATE SCH MG: 852; 155; 130 TABLET ORAL at 17:13

## 2018-11-25 RX ADMIN — METFORMIN HYDROCHLORIDE SCH MG: 500 TABLET ORAL at 17:13

## 2018-11-25 RX ADMIN — DICYCLOMINE HYDROCHLORIDE SCH MG: 10 CAPSULE ORAL at 17:13

## 2018-11-25 RX ADMIN — DIBASIC SODIUM PHOSPHATE, MONOBASIC POTASSIUM PHOSPHATE AND MONOBASIC SODIUM PHOSPHATE SCH MG: 852; 155; 130 TABLET ORAL at 08:31

## 2018-11-25 RX ADMIN — HYDROCODONE BITARTRATE AND ACETAMINOPHEN PRN TAB: 5; 325 TABLET ORAL at 11:05

## 2018-11-25 RX ADMIN — SODIUM CHLORIDE SCH MLS/HR: 0.9 INJECTION INTRAVENOUS at 20:23

## 2018-11-25 RX ADMIN — TRAZODONE HYDROCHLORIDE SCH MG: 50 TABLET ORAL at 20:14

## 2018-11-25 RX ADMIN — LORAZEPAM PRN MG: 2 INJECTION, SOLUTION INTRAMUSCULAR; INTRAVENOUS at 23:54

## 2018-11-25 RX ADMIN — DICYCLOMINE HYDROCHLORIDE SCH MG: 10 CAPSULE ORAL at 20:14

## 2018-11-25 RX ADMIN — ATORVASTATIN CALCIUM SCH MG: 20 TABLET, FILM COATED ORAL at 20:15

## 2018-11-25 RX ADMIN — DICYCLOMINE HYDROCHLORIDE SCH MG: 10 CAPSULE ORAL at 13:11

## 2018-11-25 RX ADMIN — VITAMIN D, TAB 1000IU (100/BT) SCH INTLU: 25 TAB at 08:30

## 2018-11-25 RX ADMIN — DIBASIC SODIUM PHOSPHATE, MONOBASIC POTASSIUM PHOSPHATE AND MONOBASIC SODIUM PHOSPHATE SCH MG: 852; 155; 130 TABLET ORAL at 13:11

## 2018-11-25 NOTE — PULMONOLOGY PROGRESS NOTE
Assessment/Plan


Assessment/Plan


ASSESSMENT 


recurrent dislocation right hip hemiarthroplasty 


s/p 11/20 conversion to total hip arthroplasty 


ulcerated internal hemorrhoids 


s/p flexible sigmoidoscopy 


anemia  of chronic disease and iron deficiency ,  s/p  blood transfusion 


severe protein calorie malnutrition 


electrolytes imbalance 


sacral decub stage III,  present on admission 


diabetes mellitus type 2 


hypertension  


CAD with hx of NSTEMI  


aortic stenosis 


pulmonary hypertension 


Alzheimer dementia 


sick sinus syndrome , status post pacemaker implantation 


e/lyte imbalance ( low Mg and P) 








PLAN OF  CARE


Med Surg floor 


s/p  R NEGRA 


hip precautions 


PT/OT 


pain management


bowel regimen  s/p flexible sigmoidoscopy , no evidence of active bleeding 


colonoscopy later  with consent of family  


fup with Gi recs


anemia workup noted , s/p 2 u PRBC 


check ferritin level -WNL, low Fe and sat,  


HH trending down,


give Venofer x 3 doses, check stool OB 


IV fluids 


monitor renal parameters ,electrolytes correct electrolytes prn, 


Mg and P stable after 11/23 replacement 





BP  management with BB and ARB


continue statin


BS management with metformin and Januvia 


nutritional recommendations implemented in plan of care 


psychiatrist follow 


on trazodone 


according to psychiatrist,  patient lacks capacity to make decisions 


supportive care 


wound care as per surgeon recommendations


  


case discussed and evaluated by supervising physician





Subjective


Allergies:  


Coded Allergies:  


     No Known Allergies (Unverified , 3/26/14)


Subjective


no fever , leukocytosis resolved


elevated CRP, ESR WNL


no signs of distress 


HH stable


P and Mg stable after 11/24 replacement  


HH with trend down





Objective





Last 24 Hour Vital Signs








  Date Time  Temp Pulse Resp B/P (MAP) Pulse Ox O2 Delivery O2 Flow Rate FiO2


 


11/25/18 05:31    112/52    


 


11/25/18 04:00 98.7 64 20 117/63 (81) 97   


 


11/25/18 00:00 97.9 66 19 103/72 (82) 98   


 


11/24/18 22:00    110/57    


 


11/24/18 21:00      Nasal Cannula 2.0 





      Nasal Cannula 2.0 


 


11/24/18 20:46  70 18   Room Air  98


 


11/24/18 20:40  71  132/62    


 


11/24/18 20:00 98.0 71 20 132/62 (85) 98   


 


11/24/18 16:00 98.8 69 17 127/81 (96) 96   


 


11/24/18 13:17    131/81    


 


11/24/18 12:05 98.8       


 


11/24/18 12:00 97.7 64 19 131/81 (98) 98   


 


11/24/18 09:00      Room Air  





      Room Air  


 


11/24/18 09:00  55  127/60    


 


11/24/18 08:35    127/60    

















Intake and Output  


 


 11/24/18 11/25/18





 19:00 07:00


 


Intake Total 1230 ml 905 ml


 


Balance 1230 ml 905 ml


 


  


 


Intake Oral 180 ml 80 ml


 


IV Total 1050 ml 825 ml


 


# Voids 3 2








Objective


General Appearance:  no acute distress,  awake, confused elderly [place] 

speaking female in NAD


HEENT:  normocephalic, atraumatic, anicteric


Respiratory/Chest:  lungs clear - with moderate air exchange


Cardiovascular:  normal rate


Abdomen:  soft, non tender, non distended


Extremities:  no edema


Skin:   R hip  incision clean


Neurologic/Psychiatric:  abnormal gait - bedridden ,   awake, but very confused


Musculoskeletal:  atrophy - BLE


Laboratory Tests


11/25/18 06:25: 


White Blood Count 7.3, Red Blood Count 3.11L, Hemoglobin 8.4L, Hematocrit 26.2L

, Mean Corpuscular Volume 84, Mean Corpuscular Hemoglobin 27.1, Mean 

Corpuscular Hemoglobin Concent 32.2, Red Cell Distribution Width 15.7H, 

Platelet Count 305, Mean Platelet Volume 7.2, Neutrophils (%) (Auto) 81.1H, 

Lymphocytes (%) (Auto) 9.1L, Monocytes (%) (Auto) 8.5, Eosinophils (%) (Auto) 

1.0, Basophils (%) (Auto) 0.3, Sodium Level 139, Potassium Level 4.2, Chloride 

Level 110H, Carbon Dioxide Level 24, Anion Gap 5, Blood Urea Nitrogen 8, 

Creatinine 0.6, Estimat Glomerular Filtration Rate , Glucose Level 113H, 

Calcium Level 7.7L, Phosphorus Level 3.0, Magnesium Level 1.8, Ferritin 134





Current Medications








 Medications


  (Trade)  Dose


 Ordered  Sig/Violette


 Route


 PRN Reason  Start Time


 Stop Time Status Last Admin


Dose Admin


 


 Acetaminophen


  (Tylenol)  650 mg  Q6H  PRN


 ORAL


 Mild Pain/Temp > 100.5  11/16/18 17:15


 12/16/18 17:14  11/21/18 12:39


 


 


 Acetaminophen/


 Hydrocodone Bitart


  (Norco 5/325)  1 tab  Q6H  PRN


 ORAL


 PAIN 4-10  11/24/18 11:30


 12/1/18 11:29  11/25/18 04:51


 


 


 Albuterol/


 Ipratropium


  (Albuterol/


 Ipratropium)  3 ml  Q4H  PRN


 HHN


 Shortness of Breath  11/24/18 09:00


 11/29/18 08:59   


 


 


 Atenolol


  (Tenormin)  50 mg  Q12HR


 ORAL


   11/16/18 21:00


 12/16/18 20:59  11/24/18 20:40


 


 


 Atorvastatin


 Calcium


  (Lipitor)  20 mg  BEDTIME


 ORAL


   11/17/18 21:00


 12/17/18 20:59  11/24/18 20:40


 


 


 Clonidine HCl


  (Catapres Tab)  0.1 mg  EVERY 8  HOURS


 ORAL


   11/16/18 22:00


 12/16/18 21:59  11/24/18 13:17


 


 


 Dextrose


  (Dextrose 50%)  25 ml  Q30M  PRN


 IV


 bs 60-69  11/20/18 15:30


 12/20/18 15:29   


 


 


 Dextrose/


 Electrolytes  1,000 ml @ 


 75 mls/hr  [id number]


 IV


   11/20/18 18:30


 12/20/18 18:29  11/25/18 04:51


 


 


 Dicyclomine HCl


  (Bentyl)  10 mg  QID


 ORAL


   11/16/18 18:00


 12/16/18 17:59  11/24/18 20:39


 


 


 Haloperidol


 Lactate


  (Haldol)  5 mg  Q6H  PRN


 IM


 Agitation  11/19/18 10:45


 12/19/18 10:44   


 


 


 Lorazepam


  (Ativan 2mg/ml


 1ml)  1 mg  Q4H  PRN


 IM


 For Anxiety  11/24/18 09:00


 12/1/18 08:59   


 


 


 Losartan Potassium


  (Cozaar)  50 mg  DAILY


 ORAL


   11/20/18 09:00


 12/17/18 08:59  11/24/18 08:35


 


 


 Metformin HCl


  (Glucophage)  500 mg  BID


 ORAL


   11/17/18 09:00


 12/17/18 08:59  11/24/18 17:07


 


 


 Phosphorus


  (Phospha 250


 Neutral)  250 mg  THREE TIMES A  DAY


 ORAL


   11/24/18 09:00


 12/24/18 08:59  11/24/18 17:07


 


 


 Potassium Chloride


  (K-Dur)  10 meq  DAILY


 ORAL


   11/17/18 09:00


 12/17/18 08:59  11/24/18 08:35


 


 


 Sitagliptin


 Phosphate


  (Januvia)  50 mg  ACBREAKFAST


 ORAL


   11/17/18 06:30


 12/17/18 06:29  11/25/18 06:32


 


 


 Sorbitol


  (Sorbitol)  45 ml  QHS


 ORAL


   11/22/18 21:00


 12/22/18 20:59  11/22/18 20:35


 


 


 Trazodone HCl


  (Desyrel)  50 mg  BEDTIME


 ORAL


   11/16/18 21:00


 12/16/18 20:59  11/24/18 20:39


 


 


 Vitamin D


  (Vitamin D)  5,000 intlu  DAILY


 ORAL


   11/17/18 09:00


 12/17/18 08:59  11/24/18 08:34


 

















Kavitha Carrero NP Nov 25, 2018 08:04

## 2018-11-25 NOTE — INTERNAL MED PROGRESS NOTE
Subjective


Date of Service:  Nov 25, 2018


Physician Name


Ortiz,Cheo


Attending Physician


Juan Carlos Magana MD





Current Medications








 Medications


  (Trade)  Dose


 Ordered  Sig/Violette


 Route


 PRN Reason  Start Time


 Stop Time Status Last Admin


Dose Admin


 


 Acetaminophen


  (Tylenol)  650 mg  Q6H  PRN


 ORAL


 Mild Pain/Temp > 100.5  11/16/18 17:15


 12/16/18 17:14  11/25/18 08:31


 


 


 Acetaminophen/


 Hydrocodone Bitart


  (Norco 5/325)  1 tab  Q6H  PRN


 ORAL


 PAIN 4-10  11/24/18 11:30


 12/1/18 11:29  11/25/18 11:05


 


 


 Albuterol/


 Ipratropium


  (Albuterol/


 Ipratropium)  3 ml  Q4H  PRN


 HHN


 Shortness of Breath  11/24/18 09:00


 11/29/18 08:59   


 


 


 Atenolol


  (Tenormin)  50 mg  Q12HR


 ORAL


   11/16/18 21:00


 12/16/18 20:59  11/25/18 08:30


 


 


 Atorvastatin


 Calcium


  (Lipitor)  20 mg  BEDTIME


 ORAL


   11/17/18 21:00


 12/17/18 20:59  11/24/18 20:40


 


 


 Clonidine HCl


  (Catapres Tab)  0.1 mg  EVERY 8  HOURS


 ORAL


   11/16/18 22:00


 12/16/18 21:59  11/25/18 13:11


 


 


 Dextrose


  (Dextrose 50%)  25 ml  Q30M  PRN


 IV


 bs 60-69  11/20/18 15:30


 12/20/18 15:29   


 


 


 Dextrose/


 Electrolytes  1,000 ml @ 


 75 mls/hr  [id number]


 IV


   11/20/18 18:30


 12/20/18 18:29  11/25/18 04:51


 


 


 Dicyclomine HCl


  (Bentyl)  10 mg  QID


 ORAL


   11/16/18 18:00


 12/16/18 17:59  11/25/18 13:11


 


 


 Haloperidol


 Lactate


  (Haldol)  5 mg  Q6H  PRN


 IM


 Agitation  11/19/18 10:45


 12/19/18 10:44   


 


 


 Iron Sucrose 100


 mg/Sodium Chloride  60 ml @ 


 240 mls/hr  BEDTIME


 IV


   11/25/18 21:00


 11/27/18 21:14   


 


 


 Lorazepam


  (Ativan 2mg/ml


 1ml)  1 mg  Q4H  PRN


 IM


 For Anxiety  11/24/18 09:00


 12/1/18 08:59   


 


 


 Losartan Potassium


  (Cozaar)  50 mg  DAILY


 ORAL


   11/20/18 09:00


 12/17/18 08:59  11/25/18 08:31


 


 


 Metformin HCl


  (Glucophage)  500 mg  BID


 ORAL


   11/17/18 09:00


 12/17/18 08:59  11/25/18 08:30


 


 


 Phosphorus


  (Phospha 250


 Neutral)  250 mg  THREE TIMES A  DAY


 ORAL


   11/24/18 09:00


 12/24/18 08:59  11/25/18 13:11


 


 


 Potassium Chloride


  (K-Dur)  10 meq  DAILY


 ORAL


   11/17/18 09:00


 12/17/18 08:59  11/25/18 08:31


 


 


 Sitagliptin


 Phosphate


  (Januvia)  50 mg  ACBREAKFAST


 ORAL


   11/17/18 06:30


 12/17/18 06:29  11/25/18 06:32


 


 


 Sorbitol


  (Sorbitol)  45 ml  QHS


 ORAL


   11/22/18 21:00


 12/22/18 20:59  11/22/18 20:35


 


 


 Trazodone HCl


  (Desyrel)  50 mg  BEDTIME


 ORAL


   11/16/18 21:00


 12/16/18 20:59  11/24/18 20:39


 


 


 Vitamin D


  (Vitamin D)  5,000 intlu  DAILY


 ORAL


   11/17/18 09:00


 12/17/18 08:59  11/25/18 08:30


 








Allergies:  


Coded Allergies:  


     No Known Allergies (Unverified , 3/26/14)


ROS Limited/Unobtainable:  No


Constitutional:  Reports: no symptoms


HEENT:  Reports: no symptoms


Cardiovascular:  Reports: no symptoms


Respiratory:  Reports: no symptoms


Gastrointestinal/Abdominal:  Reports: no symptoms


Genitourinary:  Reports: no symptoms


Neurologic/Psychiatric:  Reports: no symptoms


Subjective


79 YO F admitted with generalized weakness.  Cover for Int Yovanny-Dr Magana.  S/P  

right hip total arthroplasty 11/20/18.  S/P colonoscopy 11/20/18





Objective





Last Vital Signs








  Date Time  Temp Pulse Resp B/P (MAP) Pulse Ox O2 Delivery O2 Flow Rate FiO2


 


11/25/18 13:11    129/69    


 


11/25/18 11:51 98.4 68 18  97   


 


11/25/18 09:00      Room Air  





      Room Air  


 


11/24/18 21:00       2.0 





       2.0 


 


11/24/18 20:46        98











Laboratory Tests








Test


  11/25/18


06:25


 


White Blood Count


  7.3 K/UL


(4.8-10.8)


 


Red Blood Count


  3.11 M/UL


(4.20-5.40)  L


 


Hemoglobin


  8.4 G/DL


(12.0-16.0)  L


 


Hematocrit


  26.2 %


(37.0-47.0)  L


 


Mean Corpuscular Volume 84 FL (80-99)  


 


Mean Corpuscular Hemoglobin


  27.1 PG


(27.0-31.0)


 


Mean Corpuscular Hemoglobin


Concent 32.2 G/DL


(32.0-36.0)


 


Red Cell Distribution Width


  15.7 %


(11.6-14.8)  H


 


Platelet Count


  305 K/UL


(150-450)


 


Mean Platelet Volume


  7.2 FL


(6.5-10.1)


 


Neutrophils (%) (Auto)


  81.1 %


(45.0-75.0)  H


 


Lymphocytes (%) (Auto)


  9.1 %


(20.0-45.0)  L


 


Monocytes (%) (Auto)


  8.5 %


(1.0-10.0)


 


Eosinophils (%) (Auto)


  1.0 %


(0.0-3.0)


 


Basophils (%) (Auto)


  0.3 %


(0.0-2.0)


 


Sodium Level


  139 MMOL/L


(136-145)


 


Potassium Level


  4.2 MMOL/L


(3.5-5.1)


 


Chloride Level


  110 MMOL/L


()  H


 


Carbon Dioxide Level


  24 MMOL/L


(21-32)


 


Anion Gap


  5 mmol/L


(5-15)


 


Blood Urea Nitrogen


  8 mg/dL (7-18)


 


 


Creatinine


  0.6 MG/DL


(0.55-1.30)


 


Estimat Glomerular Filtration


Rate  mL/min (>60)  


 


 


Glucose Level


  113 MG/DL


()  H


 


Calcium Level


  7.7 MG/DL


(8.5-10.1)  L


 


Phosphorus Level


  3.0 MG/DL


(2.5-4.9)


 


Magnesium Level


  1.8 MG/DL


(1.8-2.4)


 


Ferritin


  134 NG/ML


(8-388)


 


Carcinoembryonic Antigen Pending  

















Intake and Output  


 


 11/24/18 11/25/18





 19:00 07:00


 


Intake Total 1230 ml 905 ml


 


Balance 1230 ml 905 ml


 


  


 


Intake Oral 180 ml 80 ml


 


IV Total 1050 ml 825 ml


 


# Voids 3 2








Objective


General Appearance:  WD/WN, mild distress, agitated


EENT:  PERRL/EOMI, normal ENT inspection


Neck:  non-tender, normal alignment, supple, normal inspection


Cardiovascular:  normal peripheral pulses, normal rate, regular rhythm, no 

gallop/murmur, no JVD


Respiratory/Chest:  chest wall non-tender, lungs clear, normal breath sounds, 

no accessory muscle use, respiratory distress


Abdomen:  normal bowel sounds, non tender, soft, no organomegaly, no mass


Extremities:  normal range of motion


Neurologic:  CNs II-XII grossly normal


Skin:  normal pigmentation, warm/dry





Assessment/Plan


Problem List:  


(1) Diabetes mellitus, type II


Assessment & Plan:  Continue metformin and januvia





(2) HTN (hypertension)


Assessment & Plan:  continue atenolol and cozaar





(3) Sick sinus syndrome


Assessment & Plan:  S/P pacemaker





(4) Aortic stenosis


(5) Pulmonary hypertension


(6) Alzheimer's dementia


(7) Dislocation, hip closed


Assessment & Plan:  S/P right total hip arthroplasty 11/20/18-see ortho note.





(8) Rectal bleed


Assessment & Plan:  S/P colonoscopy 11/20/18=ulcerated hemorrhoid.  See GI note.





(9) Severe anemia


Assessment & Plan:  Due to rectal bleed.  S/P transfusion 2 units packed RBC 11/ 21/18 and 1 unit PRBC 11/23/18.





Status:  progressing


Assessment/Plan


Discharge planning:  post acute care.











Cheo Ortiz MD Nov 25, 2018 13:38

## 2018-11-25 NOTE — GENERAL SURGERY PROGRESS NOTE
General Surgery-Progress Note


Subjective


Additional Comments


anemia.  labs noted.  no n/v/f/c.





Objective





Last 24 Hour Vital Signs








  Date Time  Temp Pulse Resp B/P (MAP) Pulse Ox O2 Delivery O2 Flow Rate FiO2


 


11/25/18 16:00 98.8 68 19 108/57 (74) 100   


 


11/25/18 13:11    129/69    


 


11/25/18 11:51 98.4 68 18 129/69 (89) 97   


 


11/25/18 09:00      Room Air  





      Room Air  


 


11/25/18 08:31    141/67    


 


11/25/18 08:30  75  141/67    


 


11/25/18 08:00 98.4 75 18 141/67 (91) 97   


 


11/25/18 05:31    112/52    


 


11/25/18 04:00 98.7 64 20 117/63 (81) 97   


 


11/25/18 00:00 97.9 66 19 103/72 (82) 98   


 


11/24/18 22:00    110/57    


 


11/24/18 21:00      Nasal Cannula 2.0 





      Nasal Cannula 2.0 


 


11/24/18 20:46  70 18   Room Air  98


 


11/24/18 20:40  71  132/62    


 


11/24/18 20:00 98.0 71 20 132/62 (85) 98   








I&O











Intake and Output  


 


 11/24/18 11/25/18





 19:00 07:00


 


Intake Total 1230 ml 905 ml


 


Balance 1230 ml 905 ml


 


  


 


Intake Oral 180 ml 80 ml


 


IV Total 1050 ml 825 ml


 


# Voids 3 2








Dressing:  saturated


Wound:  other


Drains:  other


Cardiovascular:  RSR


Respiratory:  decreased breath sounds


Abdomen:  soft, non-tender, present bowel sounds


Extremities:  other





Laboratory Tests








Test


  11/25/18


06:25


 


White Blood Count


  7.3 K/UL


(4.8-10.8)


 


Red Blood Count


  3.11 M/UL


(4.20-5.40)  L


 


Hemoglobin


  8.4 G/DL


(12.0-16.0)  L


 


Hematocrit


  26.2 %


(37.0-47.0)  L


 


Mean Corpuscular Volume 84 FL (80-99)  


 


Mean Corpuscular Hemoglobin


  27.1 PG


(27.0-31.0)


 


Mean Corpuscular Hemoglobin


Concent 32.2 G/DL


(32.0-36.0)


 


Red Cell Distribution Width


  15.7 %


(11.6-14.8)  H


 


Platelet Count


  305 K/UL


(150-450)


 


Mean Platelet Volume


  7.2 FL


(6.5-10.1)


 


Neutrophils (%) (Auto)


  81.1 %


(45.0-75.0)  H


 


Lymphocytes (%) (Auto)


  9.1 %


(20.0-45.0)  L


 


Monocytes (%) (Auto)


  8.5 %


(1.0-10.0)


 


Eosinophils (%) (Auto)


  1.0 %


(0.0-3.0)


 


Basophils (%) (Auto)


  0.3 %


(0.0-2.0)


 


Sodium Level


  139 MMOL/L


(136-145)


 


Potassium Level


  4.2 MMOL/L


(3.5-5.1)


 


Chloride Level


  110 MMOL/L


()  H


 


Carbon Dioxide Level


  24 MMOL/L


(21-32)


 


Anion Gap


  5 mmol/L


(5-15)


 


Blood Urea Nitrogen


  8 mg/dL (7-18)


 


 


Creatinine


  0.6 MG/DL


(0.55-1.30)


 


Estimat Glomerular Filtration


Rate  mL/min (>60)  


 


 


Glucose Level


  113 MG/DL


()  H


 


Calcium Level


  7.7 MG/DL


(8.5-10.1)  L


 


Phosphorus Level


  3.0 MG/DL


(2.5-4.9)


 


Magnesium Level


  1.8 MG/DL


(1.8-2.4)


 


Ferritin


  134 NG/ML


(8-388)


 


Carcinoembryonic Antigen Pending  











Plan


Problems:  


(1) Sacral decubitus ulcer, stage III


Assessment & Plan:  Multiple pressure injuries to sacrum and R hip. Four 

individual pressure injuries in close proximity noted  to sacrum,Total area 

measuring (L)2cm x (W)7 cm., each wound with 100% yellow slough with red 

margins. Non-blanchable erythema periwound. 


Small pressure injury noted to L buttocks (L)0.4cm x(W)0.5cm with 100% slough. 


Full thickness pressure injury to R hip at historical surgical incision (L)

1.1cm x(W)0.9cm ,with 90%  yellow slough noted. Non-blanchable erythema noted 

periwound ,an area of (L)7cm x (W)6.6cm . 


Stable dry eschar medial R heel. Periwound without erythema or induration. 


Wounds present upon admission and will be cared for during hospital stay





Tx.Plan:


Reposition at least every 2hours or as tolerated.


           


Air fluidized mattress.


           


Apply Cavilon wipes to heels and off-load heels with pillow.


           


Cleanse wounds Sacrum  with Saline. Apply Therahoney to wounds .Cavilon to 

borders and cover with Optifoam Drsg.


           


Cleanse R hip wound with Saline.Apply Therahoney.Apply Cavilon wipe to red area 

periwound and cover with 


           


Optifoam drsg Daily and prn.





Will likely need excisional vs non excisional debridement of sloth down to 

healthy viable tissue.  may consider during hospitalization once ortho and GI 

care completed. can be done as outpatient





Nutritional support 





Ortho f/u of wound 





(2) Failure to thrive


Assessment & Plan:  elderly female with dementia


albumin 2


multiple decubitus ulcers





nutritional consult


increase protein and Caloric intake


will need optimization to help heal wounds 





DAILY ESTIMATED NEEDS:


Needs based on Wound, DM, wasting / 51kg 


30-35  kcals/kg 


2182-0008  total kcals


1.25-1.5  g protein/kg


64-77  g total protein 





NUTRITION DIAGNOSIS:


* Increased kcal/prot needs R/T wound healing as evidenced by pt w/ stage 3 

sacral and R hip wounds, refer to WC eval.





PO DIET RECOMMENDATIONS:


[place]/ Regular diet w/ <50% po intake (texture per SLP) 





ADDITIONAL RECOMMENDATIONS:


* Calibrated bedscale wt for accurate CBW 


* Wound healing: add MVI w/ min 1 tab QD, Vit C 500mg BID, Harjeet 1pkt BID 


* Add Glucerna 1 tetra sana TID 


* Lytes daily, replete as needed ( Mg 1.6) 





. 














Tuan Patricia Nov 25, 2018 17:48

## 2018-11-25 NOTE — GENERAL PROGRESS NOTE
Assessment/Plan


Assessment/Plan


Assessment


- constipation - on sorbitol


- rectal bleeding due to ulcerated hemorrhoids


- Anemia  


- agitation/OBS


- dislocated hip - s/p arthroplasty conversion





Recommendations


- push po


- Monitor H&H


- transfuse PRN


- Ortho f/u





Subjective


Allergies:  


Coded Allergies:  


     No Known Allergies (Unverified , 3/26/14)


Subjective


no events


calm NAD


d/w RN





Objective





Last 24 Hour Vital Signs








  Date Time  Temp Pulse Resp B/P (MAP) Pulse Ox O2 Delivery O2 Flow Rate FiO2


 


11/25/18 13:11    129/69    


 


11/25/18 11:51 98.4 68 18 129/69 (89) 97   


 


11/25/18 09:00      Room Air  





      Room Air  


 


11/25/18 08:31    141/67    


 


11/25/18 08:30  75  141/67    


 


11/25/18 08:00 98.4 75 18 141/67 (91) 97   


 


11/25/18 05:31    112/52    


 


11/25/18 04:00 98.7 64 20 117/63 (81) 97   


 


11/25/18 00:00 97.9 66 19 103/72 (82) 98   


 


11/24/18 22:00    110/57    


 


11/24/18 21:00      Nasal Cannula 2.0 





      Nasal Cannula 2.0 


 


11/24/18 20:46  70 18   Room Air  98


 


11/24/18 20:40  71  132/62    


 


11/24/18 20:00 98.0 71 20 132/62 (85) 98   


 


11/24/18 16:00 98.8 69 17 127/81 (96) 96   

















Intake and Output  


 


 11/24/18 11/25/18





 19:00 07:00


 


Intake Total 1230 ml 905 ml


 


Balance 1230 ml 905 ml


 


  


 


Intake Oral 180 ml 80 ml


 


IV Total 1050 ml 825 ml


 


# Voids 3 2








Laboratory Tests


11/25/18 06:25: 


White Blood Count 7.3, Red Blood Count 3.11L, Hemoglobin 8.4L, Hematocrit 26.2L

, Mean Corpuscular Volume 84, Mean Corpuscular Hemoglobin 27.1, Mean 

Corpuscular Hemoglobin Concent 32.2, Red Cell Distribution Width 15.7H, 

Platelet Count 305, Mean Platelet Volume 7.2, Neutrophils (%) (Auto) 81.1H, 

Lymphocytes (%) (Auto) 9.1L, Monocytes (%) (Auto) 8.5, Eosinophils (%) (Auto) 

1.0, Basophils (%) (Auto) 0.3, Sodium Level 139, Potassium Level 4.2, Chloride 

Level 110H, Carbon Dioxide Level 24, Anion Gap 5, Blood Urea Nitrogen 8, 

Creatinine 0.6, Estimat Glomerular Filtration Rate , Glucose Level 113H, 

Calcium Level 7.7L, Phosphorus Level 3.0, Magnesium Level 1.8, Ferritin 134, 

Carcinoembryonic Antigen [Pending]


Height (Feet):  5


Height (Inches):  5.00


Weight (Pounds):  136


Objective


WDWN


NCAT


supple


CTA


RRR


abd soft


no edema











Florina Butler MD Nov 25, 2018 15:10

## 2018-11-26 VITALS — DIASTOLIC BLOOD PRESSURE: 56 MMHG | SYSTOLIC BLOOD PRESSURE: 117 MMHG

## 2018-11-26 VITALS — DIASTOLIC BLOOD PRESSURE: 71 MMHG | SYSTOLIC BLOOD PRESSURE: 115 MMHG

## 2018-11-26 VITALS — DIASTOLIC BLOOD PRESSURE: 69 MMHG | SYSTOLIC BLOOD PRESSURE: 138 MMHG

## 2018-11-26 VITALS — SYSTOLIC BLOOD PRESSURE: 138 MMHG | DIASTOLIC BLOOD PRESSURE: 76 MMHG

## 2018-11-26 VITALS — DIASTOLIC BLOOD PRESSURE: 90 MMHG | SYSTOLIC BLOOD PRESSURE: 148 MMHG

## 2018-11-26 VITALS — DIASTOLIC BLOOD PRESSURE: 93 MMHG | SYSTOLIC BLOOD PRESSURE: 145 MMHG

## 2018-11-26 LAB
ADD MANUAL DIFF: NO
ANION GAP SERPL CALC-SCNC: 7 MMOL/L (ref 5–15)
BASOPHILS NFR BLD AUTO: 0.2 % (ref 0–2)
BUN SERPL-MCNC: 11 MG/DL (ref 7–18)
CALCIUM SERPL-MCNC: 7.8 MG/DL (ref 8.5–10.1)
CHLORIDE SERPL-SCNC: 107 MMOL/L (ref 98–107)
CO2 SERPL-SCNC: 25 MMOL/L (ref 21–32)
CREAT SERPL-MCNC: 0.7 MG/DL (ref 0.55–1.3)
EOSINOPHIL NFR BLD AUTO: 0.5 % (ref 0–3)
ERYTHROCYTE [DISTWIDTH] IN BLOOD BY AUTOMATED COUNT: 15.8 % (ref 11.6–14.8)
HCT VFR BLD CALC: 28 % (ref 37–47)
HGB BLD-MCNC: 8.6 G/DL (ref 12–16)
LYMPHOCYTES NFR BLD AUTO: 12.5 % (ref 20–45)
MCV RBC AUTO: 85 FL (ref 80–99)
MONOCYTES NFR BLD AUTO: 7.5 % (ref 1–10)
NEUTROPHILS NFR BLD AUTO: 79.3 % (ref 45–75)
PLATELET # BLD: 301 K/UL (ref 150–450)
POTASSIUM SERPL-SCNC: 4.2 MMOL/L (ref 3.5–5.1)
RBC # BLD AUTO: 3.29 M/UL (ref 4.2–5.4)
SODIUM SERPL-SCNC: 139 MMOL/L (ref 136–145)
WBC # BLD AUTO: 8 K/UL (ref 4.8–10.8)

## 2018-11-26 RX ADMIN — DIBASIC SODIUM PHOSPHATE, MONOBASIC POTASSIUM PHOSPHATE AND MONOBASIC SODIUM PHOSPHATE SCH MG: 852; 155; 130 TABLET ORAL at 09:32

## 2018-11-26 RX ADMIN — LORAZEPAM PRN MG: 2 INJECTION, SOLUTION INTRAMUSCULAR; INTRAVENOUS at 17:01

## 2018-11-26 RX ADMIN — DICYCLOMINE HYDROCHLORIDE SCH MG: 10 CAPSULE ORAL at 09:32

## 2018-11-26 RX ADMIN — DICYCLOMINE HYDROCHLORIDE SCH MG: 10 CAPSULE ORAL at 21:20

## 2018-11-26 RX ADMIN — ATORVASTATIN CALCIUM SCH MG: 20 TABLET, FILM COATED ORAL at 21:00

## 2018-11-26 RX ADMIN — TRAZODONE HYDROCHLORIDE SCH MG: 50 TABLET ORAL at 21:00

## 2018-11-26 RX ADMIN — SODIUM CHLORIDE SCH MLS/HR: 0.9 INJECTION INTRAVENOUS at 22:05

## 2018-11-26 RX ADMIN — VITAMIN D, TAB 1000IU (100/BT) SCH INTLU: 25 TAB at 09:31

## 2018-11-26 RX ADMIN — METFORMIN HYDROCHLORIDE SCH MG: 500 TABLET ORAL at 18:00

## 2018-11-26 RX ADMIN — DIBASIC SODIUM PHOSPHATE, MONOBASIC POTASSIUM PHOSPHATE AND MONOBASIC SODIUM PHOSPHATE SCH MG: 852; 155; 130 TABLET ORAL at 13:52

## 2018-11-26 RX ADMIN — DICYCLOMINE HYDROCHLORIDE SCH MG: 10 CAPSULE ORAL at 21:00

## 2018-11-26 RX ADMIN — SORBITOL SOLUTION (BULK) SCH ML: 70 SOLUTION at 21:00

## 2018-11-26 RX ADMIN — DICYCLOMINE HYDROCHLORIDE SCH MG: 10 CAPSULE ORAL at 13:52

## 2018-11-26 RX ADMIN — SITAGLIPTIN SCH MG: 50 TABLET, FILM COATED ORAL at 05:40

## 2018-11-26 RX ADMIN — METFORMIN HYDROCHLORIDE SCH MG: 500 TABLET ORAL at 09:32

## 2018-11-26 RX ADMIN — DEXTROSE, SODIUM CHLORIDE, AND POTASSIUM CHLORIDE SCH MLS/HR: 5; .45; .15 INJECTION INTRAVENOUS at 21:10

## 2018-11-26 RX ADMIN — DEXTROSE, SODIUM CHLORIDE, AND POTASSIUM CHLORIDE SCH MLS/HR: 5; .45; .15 INJECTION INTRAVENOUS at 09:31

## 2018-11-26 RX ADMIN — TRAZODONE HYDROCHLORIDE SCH MG: 50 TABLET ORAL at 21:20

## 2018-11-26 RX ADMIN — DIBASIC SODIUM PHOSPHATE, MONOBASIC POTASSIUM PHOSPHATE AND MONOBASIC SODIUM PHOSPHATE SCH MG: 852; 155; 130 TABLET ORAL at 18:00

## 2018-11-26 RX ADMIN — HYDROCODONE BITARTRATE AND ACETAMINOPHEN PRN TAB: 5; 325 TABLET ORAL at 13:52

## 2018-11-26 RX ADMIN — LOSARTAN POTASSIUM SCH MG: 50 TABLET, FILM COATED ORAL at 09:31

## 2018-11-26 RX ADMIN — SORBITOL SOLUTION (BULK) SCH ML: 70 SOLUTION at 21:20

## 2018-11-26 RX ADMIN — DICYCLOMINE HYDROCHLORIDE SCH MG: 10 CAPSULE ORAL at 18:00

## 2018-11-26 RX ADMIN — ATORVASTATIN CALCIUM SCH MG: 20 TABLET, FILM COATED ORAL at 21:19

## 2018-11-26 NOTE — GENERAL PROGRESS NOTE
Assessment/Plan


Problem List:  


(1) Psychosis


ICD Codes:  F29 - Unspecified psychosis not due to a substance or known 

physiological condition


SNOMED:  65868323


(2) Alzheimer's dementia


ICD Codes:  G30.9 - Alzheimer's disease, unspecified; F02.80 - Dementia in 

other diseases classified elsewhere without behavioral disturbance


SNOMED:  65321271


Status:  stable


Assessment/Plan


trazadone 50mg qhs


klomopin .5mg q 6hr prn


the pt lacks capacity to make decisions.





Subjective


Date patient seen:  Nov 26, 2018


Neurologic/Psychiatric:  Reports: anxiety, depressed, emotional problems


Allergies:  


Coded Allergies:  


     No Known Allergies (Unverified , 3/26/14)





Objective





Last 24 Hour Vital Signs








  Date Time  Temp Pulse Resp B/P (MAP) Pulse Ox O2 Delivery O2 Flow Rate FiO2


 


11/26/18 20:00  70 18   Room Air  21


 


11/26/18 16:00 97.3 70 20 115/71 (86) 98   


 


11/26/18 13:53    117/56    


 


11/26/18 12:00 97.2 66 20 117/56 (76) 98   


 


11/26/18 09:32  84  145/93    


 


11/26/18 09:31    145/93    


 


11/26/18 09:00      Room Air  





      Room Air  


 


11/26/18 08:01  75 18   Room Air  98


 


11/26/18 08:01     94 Room Air  21


 


11/26/18 08:00 97.7 84 21 145/93 (110) 100   


 


11/26/18 04:00 97.7 76 18 138/76 (96) 98   


 


11/26/18 00:00 97.9 77 19 138/69 (92) 98   

















Intake and Output  


 


 11/25/18 11/26/18





 19:00 07:00


 


Intake Total 1600 ml 810 ml


 


Balance 1600 ml 810 ml


 


  


 


IV Total 900 ml 810 ml


 


Other 700 ml 


 


# Voids 4 2


 


# Bowel Movements  1








Laboratory Tests


11/26/18 06:00: 


White Blood Count 8.0, Red Blood Count 3.29L, Hemoglobin 8.6L, Hematocrit 28.0L

, Mean Corpuscular Volume 85, Mean Corpuscular Hemoglobin 26.2L, Mean 

Corpuscular Hemoglobin Concent 30.8L, Red Cell Distribution Width 15.8H, 

Platelet Count 301, Mean Platelet Volume 7.1, Neutrophils (%) (Auto) 79.3H, 

Lymphocytes (%) (Auto) 12.5L, Monocytes (%) (Auto) 7.5, Eosinophils (%) (Auto) 

0.5, Basophils (%) (Auto) 0.2, Sodium Level 139, Potassium Level 4.2, Chloride 

Level 107, Carbon Dioxide Level 25, Anion Gap 7, Blood Urea Nitrogen 11, 

Creatinine 0.7, Estimat Glomerular Filtration Rate , Glucose Level 110H, 

Calcium Level 7.8L


Height (Feet):  5


Height (Inches):  5.00


Weight (Pounds):  136


General Appearance:  alert


Neurologic:  oriented x 3, responsive, depressed affect











Josh Nunez MD Nov 26, 2018 23:18

## 2018-11-26 NOTE — PULMONOLOGY PROGRESS NOTE
Assessment/Plan


Problems:  


(1) Hip dislocation, right


(2) Sacral decubitus ulcer, stage III


(3) Protein-calorie malnutrition, severe


(4) Psychosis


(5) CAD (coronary artery disease)


(6) Pulmonary hypertension


(7) Aortic stenosis


(8) Pacemaker


(9) Diabetes mellitus, type II


(10) HTN (hypertension)


(11) Alzheimer's dementia


Assessment/Plan


no new complains


check h/h and Pt, ptt in  


symptomatic treatment


f/u ortho recommendations


wound care consult


nutrition evaluation


social service consult


check electrolytes


dvt prophylaxis





Subjective


ROS Limited/Unobtainable:  No


Allergies:  


Coded Allergies:  


     No Known Allergies (Unverified , 3/26/14)





Objective





Last 24 Hour Vital Signs








  Date Time  Temp Pulse Resp B/P (MAP) Pulse Ox O2 Delivery O2 Flow Rate FiO2


 


11/26/18 09:32  84  145/93    


 


11/26/18 09:31    145/93    


 


11/26/18 09:00      Room Air  





      Room Air  


 


11/26/18 08:01  75 18   Room Air  98


 


11/26/18 08:01     94 Room Air  21


 


11/26/18 08:00 97.7 84 21 145/93 (110) 100   


 


11/26/18 04:00 97.7 76 18 138/76 (96) 98   


 


11/26/18 00:00 97.9 77 19 138/69 (92) 98   


 


11/25/18 21:00      Room Air  





      Room Air  


 


11/25/18 20:15  73  132/68    


 


11/25/18 20:00 98.1 73 19 132/68 (89) 98   


 


11/25/18 19:52     93 Room Air  21


 


11/25/18 16:00 98.8 68 19 108/57 (74) 100   


 


11/25/18 13:11    129/69    

















Intake and Output  


 


 11/25/18 11/26/18





 19:00 07:00


 


Intake Total 1600 ml 810 ml


 


Balance 1600 ml 810 ml


 


  


 


IV Total 900 ml 810 ml


 


Other 700 ml 


 


# Voids 4 2


 


# Bowel Movements  1








General Appearance:  WD/WN


HEENT:  normocephalic


Respiratory/Chest:  chest wall non-tender, lungs clear


Breasts:  no masses


Cardiovascular:  normal peripheral pulses


Abdomen:  normal bowel sounds, no mass


Genitourinary:  normal external genitalia


Skin:  no rash


Laboratory Tests


11/26/18 06:00: 


White Blood Count 8.0, Red Blood Count 3.29L, Hemoglobin 8.6L, Hematocrit 28.0L

, Mean Corpuscular Volume 85, Mean Corpuscular Hemoglobin 26.2L, Mean 

Corpuscular Hemoglobin Concent 30.8L, Red Cell Distribution Width 15.8H, 

Platelet Count 301, Mean Platelet Volume 7.1, Neutrophils (%) (Auto) 79.3H, 

Lymphocytes (%) (Auto) 12.5L, Monocytes (%) (Auto) 7.5, Eosinophils (%) (Auto) 

0.5, Basophils (%) (Auto) 0.2, Sodium Level 139, Potassium Level 4.2, Chloride 

Level 107, Carbon Dioxide Level 25, Anion Gap 7, Blood Urea Nitrogen 11, 

Creatinine 0.7, Estimat Glomerular Filtration Rate , Glucose Level 110H, 

Calcium Level 7.8L





Current Medications








 Medications


  (Trade)  Dose


 Ordered  Sig/Violette


 Route


 PRN Reason  Start Time


 Stop Time Status Last Admin


Dose Admin


 


 Acetaminophen


  (Tylenol)  650 mg  Q6H  PRN


 ORAL


 Mild Pain/Temp > 100.5  11/16/18 17:15


 12/16/18 17:14  11/25/18 08:31


 


 


 Acetaminophen/


 Hydrocodone Bitart


  (Norco 5/325)  1 tab  Q6H  PRN


 ORAL


 PAIN 4-10  11/24/18 11:30


 12/1/18 11:29  11/25/18 20:16


 


 


 Albuterol/


 Ipratropium


  (Albuterol/


 Ipratropium)  3 ml  Q4H  PRN


 HHN


 Shortness of Breath  11/24/18 09:00


 11/29/18 08:59   


 


 


 Atenolol


  (Tenormin)  50 mg  Q12HR


 ORAL


   11/16/18 21:00


 12/16/18 20:59  11/26/18 09:32


 


 


 Atorvastatin


 Calcium


  (Lipitor)  20 mg  BEDTIME


 ORAL


   11/17/18 21:00


 12/17/18 20:59  11/25/18 20:15


 


 


 Clonidine HCl


  (Catapres Tab)  0.1 mg  EVERY 8  HOURS


 ORAL


   11/16/18 22:00


 12/16/18 21:59  11/25/18 13:11


 


 


 Dextrose


  (Dextrose 50%)  25 ml  Q30M  PRN


 IV


 bs 60-69  11/20/18 15:30


 12/20/18 15:29   


 


 


 Dextrose/


 Electrolytes  1,000 ml @ 


 75 mls/hr  [id number]


 IV


   11/20/18 18:30


 12/20/18 18:29  11/26/18 09:31


 


 


 Dicyclomine HCl


  (Bentyl)  10 mg  QID


 ORAL


   11/16/18 18:00


 12/16/18 17:59  11/26/18 09:32


 


 


 Haloperidol


 Lactate


  (Haldol)  5 mg  Q6H  PRN


 IM


 Agitation  11/19/18 10:45


 12/19/18 10:44   


 


 


 Iron Sucrose 100


 mg/Sodium Chloride  60 ml @ 


 240 mls/hr  BEDTIME


 IV


   11/25/18 21:00


 11/27/18 21:14  11/25/18 20:23


 


 


 Lorazepam


  (Ativan 2mg/ml


 1ml)  1 mg  Q4H  PRN


 IM


 For Anxiety  11/24/18 09:00


 12/1/18 08:59  11/25/18 23:54


 


 


 Losartan Potassium


  (Cozaar)  50 mg  DAILY


 ORAL


   11/20/18 09:00


 12/17/18 08:59  11/26/18 09:31


 


 


 Metformin HCl


  (Glucophage)  500 mg  BID


 ORAL


   11/17/18 09:00


 12/17/18 08:59  11/26/18 09:32


 


 


 Phosphorus


  (Phospha 250


 Neutral)  250 mg  THREE TIMES A  DAY


 ORAL


   11/24/18 09:00


 12/24/18 08:59  11/26/18 09:32


 


 


 Potassium Chloride


  (K-Dur)  10 meq  DAILY


 ORAL


   11/17/18 09:00


 12/17/18 08:59  11/26/18 09:32


 


 


 Sitagliptin


 Phosphate


  (Januvia)  50 mg  ACBREAKFAST


 ORAL


   11/17/18 06:30


 12/17/18 06:29  11/25/18 06:32


 


 


 Sorbitol


  (Sorbitol)  45 ml  QHS


 ORAL


   11/22/18 21:00


 12/22/18 20:59  11/25/18 20:15


 


 


 Trazodone HCl


  (Desyrel)  50 mg  BEDTIME


 ORAL


   11/16/18 21:00


 12/16/18 20:59  11/25/18 20:14


 


 


 Vitamin D


  (Vitamin D)  5,000 intlu  DAILY


 ORAL


   11/17/18 09:00


 12/17/18 08:59  11/26/18 09:31


 

















Korin Junior MD Nov 26, 2018 12:51

## 2018-11-26 NOTE — INTERNAL MED PROGRESS NOTE
Subjective


Date of Service:  Nov 26, 2018


Physician Name


Ortiz,Cheo


Attending Physician


Juan Carlos Magana MD





Current Medications








 Medications


  (Trade)  Dose


 Ordered  Sig/Violette


 Route


 PRN Reason  Start Time


 Stop Time Status Last Admin


Dose Admin


 


 Acetaminophen


  (Tylenol)  650 mg  Q6H  PRN


 ORAL


 Mild Pain/Temp > 100.5  11/16/18 17:15


 12/16/18 17:14  11/25/18 08:31


 


 


 Acetaminophen/


 Hydrocodone Bitart


  (Norco 5/325)  1 tab  Q6H  PRN


 ORAL


 PAIN 4-10  11/24/18 11:30


 12/1/18 11:29  11/26/18 13:52


 


 


 Albuterol/


 Ipratropium


  (Albuterol/


 Ipratropium)  3 ml  Q4H  PRN


 HHN


 Shortness of Breath  11/24/18 09:00


 11/29/18 08:59   


 


 


 Atenolol


  (Tenormin)  50 mg  Q12HR


 ORAL


   11/16/18 21:00


 12/16/18 20:59  11/26/18 09:32


 


 


 Atorvastatin


 Calcium


  (Lipitor)  20 mg  BEDTIME


 ORAL


   11/17/18 21:00


 12/17/18 20:59  11/25/18 20:15


 


 


 Clonidine HCl


  (Catapres Tab)  0.1 mg  EVERY 8  HOURS


 ORAL


   11/16/18 22:00


 12/16/18 21:59  11/25/18 13:11


 


 


 Dextrose


  (Dextrose 50%)  25 ml  Q30M  PRN


 IV


 bs 60-69  11/20/18 15:30


 12/20/18 15:29   


 


 


 Dextrose/


 Electrolytes  1,000 ml @ 


 75 mls/hr  [id number]


 IV


   11/20/18 18:30


 12/20/18 18:29  11/26/18 09:31


 


 


 Dicyclomine HCl


  (Bentyl)  10 mg  QID


 ORAL


   11/16/18 18:00


 12/16/18 17:59  11/26/18 18:00


 


 


 Haloperidol


 Lactate


  (Haldol)  5 mg  Q6H  PRN


 IM


 Agitation  11/19/18 10:45


 12/19/18 10:44   


 


 


 Iron Sucrose 100


 mg/Sodium Chloride  60 ml @ 


 240 mls/hr  BEDTIME


 IV


   11/25/18 21:00


 11/27/18 21:14  11/25/18 20:23


 


 


 Lorazepam


  (Ativan 2mg/ml


 1ml)  1 mg  Q4H  PRN


 IM


 For Anxiety  11/24/18 09:00


 12/1/18 08:59  11/26/18 17:01


 


 


 Losartan Potassium


  (Cozaar)  50 mg  DAILY


 ORAL


   11/20/18 09:00


 12/17/18 08:59  11/26/18 09:31


 


 


 Metformin HCl


  (Glucophage)  500 mg  BID


 ORAL


   11/17/18 09:00


 12/17/18 08:59  11/26/18 18:00


 


 


 Phosphorus


  (Phospha 250


 Neutral)  250 mg  THREE TIMES A  DAY


 ORAL


   11/24/18 09:00


 12/24/18 08:59  11/26/18 18:00


 


 


 Potassium Chloride


  (K-Dur)  10 meq  DAILY


 ORAL


   11/17/18 09:00


 12/17/18 08:59  11/26/18 09:32


 


 


 Sitagliptin


 Phosphate


  (Januvia)  50 mg  ACBREAKFAST


 ORAL


   11/17/18 06:30


 12/17/18 06:29  11/25/18 06:32


 


 


 Sorbitol


  (Sorbitol)  45 ml  QHS


 ORAL


   11/22/18 21:00


 12/22/18 20:59  11/25/18 20:15


 


 


 Trazodone HCl


  (Desyrel)  50 mg  BEDTIME


 ORAL


   11/16/18 21:00


 12/16/18 20:59  11/25/18 20:14


 


 


 Vitamin D


  (Vitamin D)  5,000 intlu  DAILY


 ORAL


   11/17/18 09:00


 12/17/18 08:59  11/26/18 09:31


 








Allergies:  


Coded Allergies:  


     No Known Allergies (Unverified , 3/26/14)


ROS Limited/Unobtainable:  No


Constitutional:  Reports: no symptoms


HEENT:  Reports: no symptoms


Cardiovascular:  Reports: no symptoms


Respiratory:  Reports: no symptoms


Gastrointestinal/Abdominal:  Reports: no symptoms


Genitourinary:  Reports: no symptoms


Neurologic/Psychiatric:  Reports: no symptoms


Subjective


79 YO F admitted with generalized weakness.  Cover for Int Yovanny-Dr Magana.  S/P  

right hip total arthroplasty 11/20/18.  S/P colonoscopy 11/20/18





Objective





Last Vital Signs








  Date Time  Temp Pulse Resp B/P (MAP) Pulse Ox O2 Delivery O2 Flow Rate FiO2


 


11/26/18 16:00 97.3 70 20 115/71 (86) 98   


 


11/26/18 09:00      Room Air  





      Room Air  


 


11/26/18 08:01        98


 


11/24/18 21:00       2.0 





       2.0 











Laboratory Tests








Test


  11/26/18


06:00


 


White Blood Count


  8.0 K/UL


(4.8-10.8)


 


Red Blood Count


  3.29 M/UL


(4.20-5.40)  L


 


Hemoglobin


  8.6 G/DL


(12.0-16.0)  L


 


Hematocrit


  28.0 %


(37.0-47.0)  L


 


Mean Corpuscular Volume 85 FL (80-99)  


 


Mean Corpuscular Hemoglobin


  26.2 PG


(27.0-31.0)  L


 


Mean Corpuscular Hemoglobin


Concent 30.8 G/DL


(32.0-36.0)  L


 


Red Cell Distribution Width


  15.8 %


(11.6-14.8)  H


 


Platelet Count


  301 K/UL


(150-450)


 


Mean Platelet Volume


  7.1 FL


(6.5-10.1)


 


Neutrophils (%) (Auto)


  79.3 %


(45.0-75.0)  H


 


Lymphocytes (%) (Auto)


  12.5 %


(20.0-45.0)  L


 


Monocytes (%) (Auto)


  7.5 %


(1.0-10.0)


 


Eosinophils (%) (Auto)


  0.5 %


(0.0-3.0)


 


Basophils (%) (Auto)


  0.2 %


(0.0-2.0)


 


Sodium Level


  139 MMOL/L


(136-145)


 


Potassium Level


  4.2 MMOL/L


(3.5-5.1)


 


Chloride Level


  107 MMOL/L


()


 


Carbon Dioxide Level


  25 MMOL/L


(21-32)


 


Anion Gap


  7 mmol/L


(5-15)


 


Blood Urea Nitrogen


  11 mg/dL


(7-18)


 


Creatinine


  0.7 MG/DL


(0.55-1.30)


 


Estimat Glomerular Filtration


Rate  mL/min (>60)  


 


 


Glucose Level


  110 MG/DL


()  H


 


Calcium Level


  7.8 MG/DL


(8.5-10.1)  L

















Intake and Output  


 


 11/25/18 11/26/18





 19:00 07:00


 


Intake Total 1600 ml 810 ml


 


Balance 1600 ml 810 ml


 


  


 


IV Total 900 ml 810 ml


 


Other 700 ml 


 


# Voids 4 2


 


# Bowel Movements  1








Objective


General Appearance:  WD/WN, mild distress, agitated


EENT:  PERRL/EOMI, normal ENT inspection


Neck:  non-tender, normal alignment, supple, normal inspection


Cardiovascular:  normal peripheral pulses, normal rate, regular rhythm, no 

gallop/murmur, no JVD


Respiratory/Chest:  chest wall non-tender, lungs clear, normal breath sounds, 

no accessory muscle use, respiratory distress


Abdomen:  normal bowel sounds, non tender, soft, no organomegaly, no mass


Extremities:  normal range of motion


Neurologic:  CNs II-XII grossly normal


Skin:  normal pigmentation, warm/dry





Assessment/Plan


Problem List:  


(1) Diabetes mellitus, type II


Assessment & Plan:  Continue metformin and januvia





(2) HTN (hypertension)


Assessment & Plan:  continue atenolol and cozaar





(3) Sick sinus syndrome


Assessment & Plan:  S/P pacemaker





(4) Aortic stenosis


(5) Pulmonary hypertension


(6) Alzheimer's dementia


(7) Dislocation, hip closed


Assessment & Plan:  S/P right total hip arthroplasty 11/20/18-see ortho note.





(8) Rectal bleed


Assessment & Plan:  S/P colonoscopy 11/20/18=ulcerated hemorrhoid.  See GI note.





(9) Severe anemia


Assessment & Plan:  Due to rectal bleed.  S/P transfusion 2 units packed RBC 11/ 21/18 and 1 unit PRBC 11/23/18.





Status:  progressing


Assessment/Plan


Discharge planning:  post acute care vs SNF vs acute rehab











Cheo Ortiz MD Nov 26, 2018 18:30

## 2018-11-26 NOTE — GENERAL SURGERY PROGRESS NOTE
General Surgery-Progress Note


Subjective


Additional Comments


H/H stable.  alert.  awake.  seems comfortable





Objective





Last 24 Hour Vital Signs








  Date Time  Temp Pulse Resp B/P (MAP) Pulse Ox O2 Delivery O2 Flow Rate FiO2


 


11/26/18 09:32  84  145/93    


 


11/26/18 09:31    145/93    


 


11/26/18 09:00      Room Air  





      Room Air  


 


11/26/18 08:01  75 18   Room Air  98


 


11/26/18 08:01     94 Room Air  21


 


11/26/18 08:00 97.7 84 21 145/93 (110) 100   


 


11/26/18 04:00 97.7 76 18 138/76 (96) 98   


 


11/26/18 00:00 97.9 77 19 138/69 (92) 98   


 


11/25/18 21:00      Room Air  





      Room Air  


 


11/25/18 20:15  73  132/68    


 


11/25/18 20:00 98.1 73 19 132/68 (89) 98   


 


11/25/18 19:52     93 Room Air  21


 


11/25/18 16:00 98.8 68 19 108/57 (74) 100   


 


11/25/18 13:11    129/69    


 


11/25/18 11:51 98.4 68 18 129/69 (89) 97   








I&O











Intake and Output  


 


 11/25/18 11/26/18





 19:00 07:00


 


Intake Total 1600 ml 810 ml


 


Balance 1600 ml 810 ml


 


  


 


IV Total 900 ml 810 ml


 


Other 700 ml 


 


# Voids 4 2


 


# Bowel Movements  1








Dressing:  saturated


Wound:  other


Drains:  other


Cardiovascular:  RSR


Respiratory:  clear


Abdomen:  soft, flat, non-tender, present bowel sounds


Extremities:  other





Laboratory Tests








Test


  11/26/18


06:00


 


White Blood Count


  8.0 K/UL


(4.8-10.8)


 


Red Blood Count


  3.29 M/UL


(4.20-5.40)  L


 


Hemoglobin


  8.6 G/DL


(12.0-16.0)  L


 


Hematocrit


  28.0 %


(37.0-47.0)  L


 


Mean Corpuscular Volume 85 FL (80-99)  


 


Mean Corpuscular Hemoglobin


  26.2 PG


(27.0-31.0)  L


 


Mean Corpuscular Hemoglobin


Concent 30.8 G/DL


(32.0-36.0)  L


 


Red Cell Distribution Width


  15.8 %


(11.6-14.8)  H


 


Platelet Count


  301 K/UL


(150-450)


 


Mean Platelet Volume


  7.1 FL


(6.5-10.1)


 


Neutrophils (%) (Auto)


  79.3 %


(45.0-75.0)  H


 


Lymphocytes (%) (Auto)


  12.5 %


(20.0-45.0)  L


 


Monocytes (%) (Auto)


  7.5 %


(1.0-10.0)


 


Eosinophils (%) (Auto)


  0.5 %


(0.0-3.0)


 


Basophils (%) (Auto)


  0.2 %


(0.0-2.0)


 


Sodium Level


  139 MMOL/L


(136-145)


 


Potassium Level


  4.2 MMOL/L


(3.5-5.1)


 


Chloride Level


  107 MMOL/L


()


 


Carbon Dioxide Level


  25 MMOL/L


(21-32)


 


Anion Gap


  7 mmol/L


(5-15)


 


Blood Urea Nitrogen


  11 mg/dL


(7-18)


 


Creatinine


  0.7 MG/DL


(0.55-1.30)


 


Estimat Glomerular Filtration


Rate  mL/min (>60)  


 


 


Glucose Level


  110 MG/DL


()  H


 


Calcium Level


  7.8 MG/DL


(8.5-10.1)  L











Plan


Problems:  


(1) Sacral decubitus ulcer, stage III


Assessment & Plan:  Multiple pressure injuries to sacrum and R hip. Four 

individual pressure injuries in close proximity noted  to sacrum,Total area 

measuring (L)2cm x (W)7 cm., each wound with 100% yellow slough with red 

margins. Non-blanchable erythema periwound. 


Small pressure injury noted to L buttocks (L)0.4cm x(W)0.5cm with 100% slough. 


Full thickness pressure injury to R hip at historical surgical incision (L)

1.1cm x(W)0.9cm ,with 90%  yellow slough noted. Non-blanchable erythema noted 

periwound ,an area of (L)7cm x (W)6.6cm . 


Stable dry eschar medial R heel. Periwound without erythema or induration. 


Wounds present upon admission and will be cared for during hospital stay





Tx.Plan:


Reposition at least every 2hours or as tolerated.


           


Air fluidized mattress.


           


Apply Cavilon wipes to heels and off-load heels with pillow.


           


Cleanse wounds Sacrum  with Saline. Apply Therahoney to wounds .Cavilon to 

borders and cover with Optifoam Drsg.


           


Cleanse R hip wound with Saline.Apply Therahoney.Apply Cavilon wipe to red area 

periwound and cover with 


           


Optifoam drsg Daily and prn.





Will likely need excisional vs non excisional debridement of sloth down to 

healthy viable tissue.  may consider during hospitalization once ortho and GI 

care completed. Plan for outpatient care. 





Nutritional support 





Ortho f/u of wound 





okay to d/c from surgical standpoint 





(2) Failure to thrive


Assessment & Plan:  elderly female with dementia


albumin 2


multiple decubitus ulcers





nutritional consult


increase protein and Caloric intake


will need optimization to help heal wounds 





DAILY ESTIMATED NEEDS:


Needs based on Wound, DM, wasting / 51kg 


30-35  kcals/kg 


[id number]  total kcals


1.25-1.5  g protein/kg


64-77  g total protein 





NUTRITION DIAGNOSIS:


* Increased kcal/prot needs R/T wound healing as evidenced by pt w/ stage 3 

sacral and R hip wounds, refer to WC eval.





PO DIET RECOMMENDATIONS:


Wells/ Regular diet w/ <50% po intake (texture per SLP) 





ADDITIONAL RECOMMENDATIONS:


* Calibrated bedscale wt for accurate CBW 


* Wound healing: add MVI w/ min 1 tab QD, Vit C 500mg BID, Harjeet 1pkt BID 


* Add Glucerna 1 tetra sana TID 


* Lytes daily, replete as needed ( Mg 1.6) 





. 














Tuan Patricia Nov 26, 2018 10:59

## 2018-11-26 NOTE — GENERAL PROGRESS NOTE
Assessment/Plan


Assessment/Plan


Assessment


- constipation - on sorbitol


- rectal bleeding due to ulcerated hemorrhoids


- Anemia  


- agitation/OBS


- dislocated hip - s/p arthroplasty conversion





Recommendations


- push po


- Monitor H&H


- transfuse PRN


- Ortho f/u





Subjective


Allergies:  


Coded Allergies:  


     No Known Allergies (Unverified , 3/26/14)


Subjective


no events


calm NAD


no abdominal issues





Objective





Last 24 Hour Vital Signs








  Date Time  Temp Pulse Resp B/P (MAP) Pulse Ox O2 Delivery O2 Flow Rate FiO2


 


11/26/18 20:00  70 18   Room Air  21


 


11/26/18 16:00 97.3 70 20 115/71 (86) 98   


 


11/26/18 13:53    117/56    


 


11/26/18 12:00 97.2 66 20 117/56 (76) 98   


 


11/26/18 09:32  84  145/93    


 


11/26/18 09:31    145/93    


 


11/26/18 09:00      Room Air  





      Room Air  


 


11/26/18 08:01  75 18   Room Air  98


 


11/26/18 08:01     94 Room Air  21


 


11/26/18 08:00 97.7 84 21 145/93 (110) 100   


 


11/26/18 04:00 97.7 76 18 138/76 (96) 98   


 


11/26/18 00:00 97.9 77 19 138/69 (92) 98   

















Intake and Output  


 


 11/25/18 11/26/18





 19:00 07:00


 


Intake Total 1600 ml 810 ml


 


Balance 1600 ml 810 ml


 


  


 


IV Total 900 ml 810 ml


 


Other 700 ml 


 


# Voids 4 2


 


# Bowel Movements  1








Laboratory Tests


11/26/18 06:00: 


White Blood Count 8.0, Red Blood Count 3.29L, Hemoglobin 8.6L, Hematocrit 28.0L

, Mean Corpuscular Volume 85, Mean Corpuscular Hemoglobin 26.2L, Mean 

Corpuscular Hemoglobin Concent 30.8L, Red Cell Distribution Width 15.8H, 

Platelet Count 301, Mean Platelet Volume 7.1, Neutrophils (%) (Auto) 79.3H, 

Lymphocytes (%) (Auto) 12.5L, Monocytes (%) (Auto) 7.5, Eosinophils (%) (Auto) 

0.5, Basophils (%) (Auto) 0.2, Sodium Level 139, Potassium Level 4.2, Chloride 

Level 107, Carbon Dioxide Level 25, Anion Gap 7, Blood Urea Nitrogen 11, 

Creatinine 0.7, Estimat Glomerular Filtration Rate , Glucose Level 110H, 

Calcium Level 7.8L


Height (Feet):  5


Height (Inches):  5.00


Weight (Pounds):  136


Objective


WDWN


NCAT


supple


CTA


RRR


abd soft


no edema











Florina Butler MD Nov 26, 2018 22:41

## 2018-11-27 VITALS — DIASTOLIC BLOOD PRESSURE: 60 MMHG | SYSTOLIC BLOOD PRESSURE: 116 MMHG

## 2018-11-27 VITALS — SYSTOLIC BLOOD PRESSURE: 157 MMHG | DIASTOLIC BLOOD PRESSURE: 78 MMHG

## 2018-11-27 VITALS — DIASTOLIC BLOOD PRESSURE: 73 MMHG | SYSTOLIC BLOOD PRESSURE: 147 MMHG

## 2018-11-27 VITALS — SYSTOLIC BLOOD PRESSURE: 128 MMHG | DIASTOLIC BLOOD PRESSURE: 59 MMHG

## 2018-11-27 VITALS — DIASTOLIC BLOOD PRESSURE: 57 MMHG | SYSTOLIC BLOOD PRESSURE: 117 MMHG

## 2018-11-27 VITALS — SYSTOLIC BLOOD PRESSURE: 136 MMHG | DIASTOLIC BLOOD PRESSURE: 77 MMHG

## 2018-11-27 LAB
ADD MANUAL DIFF: NO
ANION GAP SERPL CALC-SCNC: 7 MMOL/L (ref 5–15)
BASOPHILS NFR BLD AUTO: 0.6 % (ref 0–2)
BUN SERPL-MCNC: 10 MG/DL (ref 7–18)
CALCIUM SERPL-MCNC: 7.8 MG/DL (ref 8.5–10.1)
CHLORIDE SERPL-SCNC: 106 MMOL/L (ref 98–107)
CO2 SERPL-SCNC: 24 MMOL/L (ref 21–32)
CREAT SERPL-MCNC: 0.7 MG/DL (ref 0.55–1.3)
EOSINOPHIL NFR BLD AUTO: 0.5 % (ref 0–3)
ERYTHROCYTE [DISTWIDTH] IN BLOOD BY AUTOMATED COUNT: 15.9 % (ref 11.6–14.8)
HCT VFR BLD CALC: 29.4 % (ref 37–47)
HGB BLD-MCNC: 9.2 G/DL (ref 12–16)
LYMPHOCYTES NFR BLD AUTO: 18.9 % (ref 20–45)
MCV RBC AUTO: 85 FL (ref 80–99)
MONOCYTES NFR BLD AUTO: 7.4 % (ref 1–10)
NEUTROPHILS NFR BLD AUTO: 72.6 % (ref 45–75)
PLATELET # BLD: 318 K/UL (ref 150–450)
POTASSIUM SERPL-SCNC: 4.8 MMOL/L (ref 3.5–5.1)
RBC # BLD AUTO: 3.45 M/UL (ref 4.2–5.4)
SODIUM SERPL-SCNC: 137 MMOL/L (ref 136–145)
WBC # BLD AUTO: 8.3 K/UL (ref 4.8–10.8)

## 2018-11-27 RX ADMIN — VITAMIN D, TAB 1000IU (100/BT) SCH INTLU: 25 TAB at 08:29

## 2018-11-27 RX ADMIN — DICYCLOMINE HYDROCHLORIDE SCH MG: 10 CAPSULE ORAL at 08:28

## 2018-11-27 RX ADMIN — SORBITOL SOLUTION (BULK) SCH ML: 70 SOLUTION at 21:00

## 2018-11-27 RX ADMIN — DICYCLOMINE HYDROCHLORIDE SCH MG: 10 CAPSULE ORAL at 21:48

## 2018-11-27 RX ADMIN — HYDROCODONE BITARTRATE AND ACETAMINOPHEN PRN TAB: 5; 325 TABLET ORAL at 17:18

## 2018-11-27 RX ADMIN — LOSARTAN POTASSIUM SCH MG: 50 TABLET, FILM COATED ORAL at 08:28

## 2018-11-27 RX ADMIN — DIBASIC SODIUM PHOSPHATE, MONOBASIC POTASSIUM PHOSPHATE AND MONOBASIC SODIUM PHOSPHATE SCH MG: 852; 155; 130 TABLET ORAL at 17:18

## 2018-11-27 RX ADMIN — DICYCLOMINE HYDROCHLORIDE SCH MG: 10 CAPSULE ORAL at 18:07

## 2018-11-27 RX ADMIN — HYDROCODONE BITARTRATE AND ACETAMINOPHEN PRN TAB: 5; 325 TABLET ORAL at 08:31

## 2018-11-27 RX ADMIN — DIBASIC SODIUM PHOSPHATE, MONOBASIC POTASSIUM PHOSPHATE AND MONOBASIC SODIUM PHOSPHATE SCH MG: 852; 155; 130 TABLET ORAL at 08:28

## 2018-11-27 RX ADMIN — OLANZAPINE SCH MG: 2.5 TABLET, FILM COATED ORAL at 13:02

## 2018-11-27 RX ADMIN — SITAGLIPTIN SCH MG: 50 TABLET, FILM COATED ORAL at 06:00

## 2018-11-27 RX ADMIN — ATORVASTATIN CALCIUM SCH MG: 20 TABLET, FILM COATED ORAL at 21:48

## 2018-11-27 RX ADMIN — DIBASIC SODIUM PHOSPHATE, MONOBASIC POTASSIUM PHOSPHATE AND MONOBASIC SODIUM PHOSPHATE SCH MG: 852; 155; 130 TABLET ORAL at 13:01

## 2018-11-27 RX ADMIN — METFORMIN HYDROCHLORIDE SCH MG: 500 TABLET ORAL at 08:28

## 2018-11-27 RX ADMIN — HYDROCODONE BITARTRATE AND ACETAMINOPHEN PRN TAB: 5; 325 TABLET ORAL at 01:45

## 2018-11-27 RX ADMIN — METFORMIN HYDROCHLORIDE SCH MG: 500 TABLET ORAL at 17:18

## 2018-11-27 RX ADMIN — DICYCLOMINE HYDROCHLORIDE SCH MG: 10 CAPSULE ORAL at 13:02

## 2018-11-27 RX ADMIN — DEXTROSE, SODIUM CHLORIDE, AND POTASSIUM CHLORIDE SCH MLS/HR: 5; .45; .15 INJECTION INTRAVENOUS at 11:32

## 2018-11-27 NOTE — GENERAL PROGRESS NOTE
Assessment/Plan


Assessment/Plan


Assessment


- constipation - on sorbitol


- rectal bleeding due to ulcerated hemorrhoids


- Anemia  


- agitation/OBS


- dislocated hip - s/p arthroplasty conversion





Recommendations


- push po


- Monitor H&H


- transfuse PRN


- Ortho f/u





Subjective


Allergies:  


Coded Allergies:  


     No Known Allergies (Unverified , 3/26/14)


Subjective


no events


no abdominal issues


(+) BM


agitated at times





Objective





Last 24 Hour Vital Signs








  Date Time  Temp Pulse Resp B/P (MAP) Pulse Ox O2 Delivery O2 Flow Rate FiO2


 


11/27/18 17:48 96.1       


 


11/27/18 16:00 97.9 66 16 117/57 (77) 98   


 


11/27/18 13:01    116/60    


 


11/27/18 12:00 96.1 64 16 116/60 (78) 100   


 


11/27/18 09:30  71 18   Room Air  21


 


11/27/18 09:00      Room Air  





      Room Air  


 


11/27/18 08:28    136/77    


 


11/27/18 08:28  71  136/77    


 


11/27/18 08:00 97.3 71 20 136/77 (96) 98   


 


11/27/18 06:00    147/73    


 


11/27/18 04:00 98.0 69 16 147/73 (97) 93   


 


11/27/18 00:00 99.6 80 16 157/78 (104) 97   


 


11/26/18 21:00      Room Air  





      Room Air  


 


11/26/18 20:00  70 18   Room Air  21


 


11/26/18 20:00 97.4 93 16 148/90 (109) 95   

















Intake and Output  


 


 11/26/18 11/27/18





 19:00 07:00


 


Intake Total 1275 ml 


 


Balance 1275 ml 


 


  


 


Intake Oral 600 ml 


 


IV Total 675 ml 


 


# Voids 2 3








Laboratory Tests


11/27/18 06:10: 


White Blood Count 8.3, Red Blood Count 3.45L, Hemoglobin 9.2L, Hematocrit 29.4L

, Mean Corpuscular Volume 85, Mean Corpuscular Hemoglobin 26.5L, Mean 

Corpuscular Hemoglobin Concent 31.2L, Red Cell Distribution Width 15.9H, 

Platelet Count 318, Mean Platelet Volume 6.6, Neutrophils (%) (Auto) 72.6, 

Lymphocytes (%) (Auto) 18.9L, Monocytes (%) (Auto) 7.4, Eosinophils (%) (Auto) 

0.5, Basophils (%) (Auto) 0.6, Sodium Level 137, Potassium Level 4.8, Chloride 

Level 106, Carbon Dioxide Level 24, Anion Gap 7, Blood Urea Nitrogen 10, 

Creatinine 0.7, Estimat Glomerular Filtration Rate , Glucose Level 173H, 

Calcium Level 7.8L


Height (Feet):  5


Height (Inches):  5.00


Weight (Pounds):  136


Objective


WDWN


NCAT


supple


CTA


RRR


abd soft


no edema











Florina Butler MD Nov 27, 2018 19:20

## 2018-11-27 NOTE — GENERAL PROGRESS NOTE
Assessment/Plan


Problem List:  


(1) Psychosis


ICD Codes:  F29 - Unspecified psychosis not due to a substance or known 

physiological condition


SNOMED:  04011973


(2) Alzheimer's dementia


ICD Codes:  G30.9 - Alzheimer's disease, unspecified; F02.80 - Dementia in 

other diseases classified elsewhere without behavioral disturbance


SNOMED:  49031886


Assessment/Plan


remeron 15mg


ativan prn


the pt lacks capacity to make decisions.





Subjective


Neurologic/Psychiatric:  Reports: anxiety, depressed, emotional problems


Allergies:  


Coded Allergies:  


     No Known Allergies (Unverified , 3/26/14)





Objective





Last 24 Hour Vital Signs








  Date Time  Temp Pulse Resp B/P (MAP) Pulse Ox O2 Delivery O2 Flow Rate FiO2


 


11/27/18 12:00 96.1 64 16 116/60 (78) 100   


 


11/27/18 09:30  71 18   Room Air  21


 


11/27/18 09:01 97.3       


 


11/27/18 09:00      Room Air  





      Room Air  


 


11/27/18 08:28    136/77    


 


11/27/18 08:28  71  136/77    


 


11/27/18 08:00 97.3 71 20 136/77 (96) 98   


 


11/27/18 06:00    147/73    


 


11/27/18 04:00 98.0 69 16 147/73 (97) 93   


 


11/27/18 00:00 99.6 80 16 157/78 (104) 97   


 


11/26/18 21:00      Room Air  





      Room Air  


 


11/26/18 20:00  70 18   Room Air  21


 


11/26/18 20:00 97.4 93 16 148/90 (109) 95   


 


11/26/18 16:00 97.3 70 20 115/71 (86) 98   


 


11/26/18 13:53    117/56    

















Intake and Output  


 


 11/26/18 11/27/18





 19:00 07:00


 


Intake Total 1275 ml 


 


Balance 1275 ml 


 


  


 


Intake Oral 600 ml 


 


IV Total 675 ml 


 


# Voids 2 3








Laboratory Tests


11/27/18 06:10: 


White Blood Count 8.3, Red Blood Count 3.45L, Hemoglobin 9.2L, Hematocrit 29.4L

, Mean Corpuscular Volume 85, Mean Corpuscular Hemoglobin 26.5L, Mean 

Corpuscular Hemoglobin Concent 31.2L, Red Cell Distribution Width 15.9H, 

Platelet Count 318, Mean Platelet Volume 6.6, Neutrophils (%) (Auto) 72.6, 

Lymphocytes (%) (Auto) 18.9L, Monocytes (%) (Auto) 7.4, Eosinophils (%) (Auto) 

0.5, Basophils (%) (Auto) 0.6, Sodium Level 137, Potassium Level 4.8, Chloride 

Level 106, Carbon Dioxide Level 24, Anion Gap 7, Blood Urea Nitrogen 10, 

Creatinine 0.7, Estimat Glomerular Filtration Rate , Glucose Level 173H, 

Calcium Level 7.8L


Height (Feet):  5


Height (Inches):  5.00


Weight (Pounds):  136


General Appearance:  alert, confused, agitated


Neurologic:  alert, responsive, disoriented, depressed affect











Josh Nunez MD Nov 27, 2018 12:51

## 2018-11-27 NOTE — PULMONOLOGY PROGRESS NOTE
Assessment/Plan


Problems:  


(1) Hip dislocation, right


(2) Sacral decubitus ulcer, stage III


(3) Protein-calorie malnutrition, severe


(4) Psychosis


(5) CAD (coronary artery disease)


(6) Pulmonary hypertension


(7) Aortic stenosis


(8) Pacemaker


(9) Diabetes mellitus, type II


(10) HTN (hypertension)


(11) Alzheimer's dementia


Assessment/Plan


no new complains


check h/h and Pt, ptt in  


symptomatic treatment


f/u ortho recommendations


wound care consult


nutrition evaluation


social service consult


check electrolytes


dvt prophylaxis





Subjective


ROS Limited/Unobtainable:  No


Constitutional:  Reports: no symptoms


HEENT:  Repors: no symptoms


Allergies:  


Coded Allergies:  


     No Known Allergies (Unverified , 3/26/14)





Objective





Last 24 Hour Vital Signs








  Date Time  Temp Pulse Resp B/P (MAP) Pulse Ox O2 Delivery O2 Flow Rate FiO2


 


11/27/18 09:30  71 18   Room Air  21


 


11/27/18 09:01 97.3       


 


11/27/18 09:00      Room Air  





      Room Air  


 


11/27/18 08:28    136/77    


 


11/27/18 08:28  71  136/77    


 


11/27/18 08:00 97.3 71 20 136/77 (96) 98   


 


11/27/18 06:00    147/73    


 


11/27/18 04:00 98.0 69 16 147/73 (97) 93   


 


11/27/18 00:00 99.6 80 16 157/78 (104) 97   


 


11/26/18 21:00      Room Air  





      Room Air  


 


11/26/18 20:00  70 18   Room Air  21


 


11/26/18 20:00 97.4 93 16 148/90 (109) 95   


 


11/26/18 16:00 97.3 70 20 115/71 (86) 98   


 


11/26/18 13:53    117/56    

















Intake and Output  


 


 11/26/18 11/27/18





 19:00 07:00


 


Intake Total 1275 ml 


 


Balance 1275 ml 


 


  


 


Intake Oral 600 ml 


 


IV Total 675 ml 


 


# Voids 2 3








General Appearance:  cachetic


HEENT:  normocephalic, atraumatic


Respiratory/Chest:  chest wall non-tender, lungs clear


Breasts:  no masses


Cardiovascular:  normal peripheral pulses


Abdomen:  normal bowel sounds, soft, non tender


Extremities:  no cyanosis, no clubbing


Skin:  no lesions


Neurologic/Psychiatric:  CNs II-XII grossly normal


Laboratory Tests


11/27/18 06:10: 


White Blood Count 8.3, Red Blood Count 3.45L, Hemoglobin 9.2L, Hematocrit 29.4L

, Mean Corpuscular Volume 85, Mean Corpuscular Hemoglobin 26.5L, Mean 

Corpuscular Hemoglobin Concent 31.2L, Red Cell Distribution Width 15.9H, 

Platelet Count 318, Mean Platelet Volume 6.6, Neutrophils (%) (Auto) 72.6, 

Lymphocytes (%) (Auto) 18.9L, Monocytes (%) (Auto) 7.4, Eosinophils (%) (Auto) 

0.5, Basophils (%) (Auto) 0.6, Sodium Level 137, Potassium Level 4.8, Chloride 

Level 106, Carbon Dioxide Level 24, Anion Gap 7, Blood Urea Nitrogen 10, 

Creatinine 0.7, Estimat Glomerular Filtration Rate , Glucose Level 173H, 

Calcium Level 7.8L





Current Medications








 Medications


  (Trade)  Dose


 Ordered  Sig/Violette


 Route


 PRN Reason  Start Time


 Stop Time Status Last Admin


Dose Admin


 


 Acetaminophen


  (Tylenol)  650 mg  Q6H  PRN


 ORAL


 Mild Pain/Temp > 100.5  11/16/18 17:15


 12/16/18 17:14  11/25/18 08:31


 


 


 Acetaminophen/


 Hydrocodone Bitart


  (Norco 5/325)  1 tab  Q6H  PRN


 ORAL


 PAIN 4-10  11/24/18 11:30


 12/1/18 11:29  11/27/18 08:31


 


 


 Albuterol/


 Ipratropium


  (Albuterol/


 Ipratropium)  3 ml  Q4H  PRN


 HHN


 Shortness of Breath  11/24/18 09:00


 11/29/18 08:59   


 


 


 Atenolol


  (Tenormin)  50 mg  Q12HR


 ORAL


   11/16/18 21:00


 12/16/18 20:59  11/27/18 08:28


 


 


 Atorvastatin


 Calcium


  (Lipitor)  20 mg  BEDTIME


 ORAL


   11/17/18 21:00


 12/17/18 20:59  11/25/18 20:15


 


 


 Clonidine HCl


  (Catapres Tab)  0.1 mg  EVERY 8  HOURS


 ORAL


   11/16/18 22:00


 12/16/18 21:59  11/27/18 06:00


 


 


 Dextrose


  (Dextrose 50%)  25 ml  Q30M  PRN


 IV


 bs 60-69  11/20/18 15:30


 12/20/18 15:29   


 


 


 Dextrose/


 Electrolytes  1,000 ml @ 


 75 mls/hr  [id number]


 IV


   11/20/18 18:30


 12/20/18 18:29  11/27/18 11:32


 


 


 Dicyclomine HCl


  (Bentyl)  10 mg  QID


 ORAL


   11/16/18 18:00


 12/16/18 17:59  11/27/18 08:28


 


 


 Haloperidol


 Lactate


  (Haldol)  5 mg  Q6H  PRN


 IM


 Agitation  11/19/18 10:45


 12/19/18 10:44   


 


 


 Iron Sucrose 100


 mg/Sodium Chloride  60 ml @ 


 240 mls/hr  BEDTIME


 IV


   11/25/18 21:00


 11/27/18 21:14  11/26/18 22:05


 


 


 Lorazepam


  (Ativan 2mg/ml


 1ml)  1 mg  Q4H  PRN


 IM


 For Anxiety  11/24/18 09:00


 12/1/18 08:59  11/26/18 17:01


 


 


 Losartan Potassium


  (Cozaar)  50 mg  DAILY


 ORAL


   11/20/18 09:00


 12/17/18 08:59  11/27/18 08:28


 


 


 Metformin HCl


  (Glucophage)  500 mg  BID


 ORAL


   11/17/18 09:00


 12/17/18 08:59  11/27/18 08:28


 


 


 Phosphorus


  (Phospha 250


 Neutral)  250 mg  THREE TIMES A  DAY


 ORAL


   11/24/18 09:00


 12/24/18 08:59  11/27/18 08:28


 


 


 Potassium Chloride


  (K-Dur)  10 meq  DAILY


 ORAL


   11/17/18 09:00


 12/17/18 08:59  11/27/18 08:29


 


 


 Sitagliptin


 Phosphate


  (Januvia)  50 mg  ACBREAKFAST


 ORAL


   11/17/18 06:30


 12/17/18 06:29  11/27/18 06:00


 


 


 Sorbitol


  (Sorbitol)  45 ml  QHS


 ORAL


   11/22/18 21:00


 12/22/18 20:59  11/25/18 20:15


 


 


 Trazodone HCl


  (Desyrel)  50 mg  BEDTIME


 ORAL


   11/16/18 21:00


 12/16/18 20:59  11/25/18 20:14


 


 


 Vitamin D


  (Vitamin D)  5,000 intlu  DAILY


 ORAL


   11/17/18 09:00


 12/17/18 08:59  11/27/18 08:29


 

















Korin Junior MD Nov 27, 2018 12:38

## 2018-11-27 NOTE — INTERNAL MED PROGRESS NOTE
Subjective


Date of Service:  Nov 27, 2018


Physician Name


Ortiz,Cheo


Attending Physician


Juan Carlos Magana MD





Current Medications








 Medications


  (Trade)  Dose


 Ordered  Sig/Violette


 Route


 PRN Reason  Start Time


 Stop Time Status Last Admin


Dose Admin


 


 Acetaminophen


  (Tylenol)  650 mg  Q6H  PRN


 ORAL


 Mild Pain/Temp > 100.5  11/16/18 17:15


 12/16/18 17:14  11/25/18 08:31


 


 


 Acetaminophen/


 Hydrocodone Bitart


  (Norco 5/325)  1 tab  Q6H  PRN


 ORAL


 PAIN 4-10  11/24/18 11:30


 12/1/18 11:29  11/27/18 17:18


 


 


 Albuterol/


 Ipratropium


  (Albuterol/


 Ipratropium)  3 ml  Q4H  PRN


 HHN


 Shortness of Breath  11/24/18 09:00


 11/29/18 08:59   


 


 


 Atenolol


  (Tenormin)  50 mg  Q12HR


 ORAL


   11/16/18 21:00


 12/16/18 20:59  11/27/18 08:28


 


 


 Atorvastatin


 Calcium


  (Lipitor)  20 mg  BEDTIME


 ORAL


   11/17/18 21:00


 12/17/18 20:59  11/25/18 20:15


 


 


 Clonidine HCl


  (Catapres Tab)  0.1 mg  EVERY 8  HOURS


 ORAL


   11/16/18 22:00


 12/16/18 21:59  11/27/18 13:01


 


 


 Dextrose


  (Dextrose 50%)  25 ml  Q30M  PRN


 IV


 bs 60-69  11/20/18 15:30


 12/20/18 15:29   


 


 


 Dextrose/


 Electrolytes  1,000 ml @ 


 75 mls/hr  [id number]


 IV


   11/20/18 18:30


 12/20/18 18:29  11/27/18 11:32


 


 


 Dicyclomine HCl


  (Bentyl)  10 mg  QID


 ORAL


   11/16/18 18:00


 12/16/18 17:59  11/27/18 13:02


 


 


 Haloperidol


 Lactate


  (Haldol)  5 mg  Q6H  PRN


 IM


 Agitation  11/19/18 10:45


 12/19/18 10:44   


 


 


 Iron Sucrose 100


 mg/Sodium Chloride  60 ml @ 


 240 mls/hr  BEDTIME


 IV


   11/25/18 21:00


 11/27/18 21:14  11/26/18 22:05


 


 


 Lorazepam


  (Ativan 2mg/ml


 1ml)  1 mg  Q4H  PRN


 IM


 For Anxiety  11/24/18 09:00


 12/1/18 08:59  11/26/18 17:01


 


 


 Losartan Potassium


  (Cozaar)  50 mg  DAILY


 ORAL


   11/20/18 09:00


 12/17/18 08:59  11/27/18 08:28


 


 


 Metformin HCl


  (Glucophage)  500 mg  BID


 ORAL


   11/17/18 09:00


 12/17/18 08:59  11/27/18 17:18


 


 


 Mirtazapine


  (Remeron)  15 mg  BEDTIME


 ORAL


   11/27/18 21:00


 12/27/18 20:59   


 


 


 Olanzapine


  (ZyPREXA)  2.5 mg  DAILY


 ORAL


   11/27/18 12:52


 12/27/18 12:51  11/27/18 13:02


 


 


 Phosphorus


  (Phospha 250


 Neutral)  250 mg  THREE TIMES A  DAY


 ORAL


   11/24/18 09:00


 12/24/18 08:59  11/27/18 17:18


 


 


 Potassium Chloride


  (K-Dur)  10 meq  DAILY


 ORAL


   11/17/18 09:00


 12/17/18 08:59  11/27/18 08:29


 


 


 Sitagliptin


 Phosphate


  (Januvia)  50 mg  ACBREAKFAST


 ORAL


   11/17/18 06:30


 12/17/18 06:29  11/27/18 06:00


 


 


 Sorbitol


  (Sorbitol)  45 ml  QHS


 ORAL


   11/22/18 21:00


 12/22/18 20:59  11/25/18 20:15


 


 


 Vitamin D


  (Vitamin D)  5,000 intlu  DAILY


 ORAL


   11/17/18 09:00


 12/17/18 08:59  11/27/18 08:29


 








Allergies:  


Coded Allergies:  


     No Known Allergies (Unverified , 3/26/14)


ROS Limited/Unobtainable:  No


Constitutional:  Reports: no symptoms


HEENT:  Reports: no symptoms


Cardiovascular:  Reports: no symptoms


Respiratory:  Reports: no symptoms


Gastrointestinal/Abdominal:  Reports: no symptoms


Genitourinary:  Reports: no symptoms


Neurologic/Psychiatric:  Reports: no symptoms


Subjective


77 YO F admitted with generalized weakness.  Cover for Int [facility]-Dr Magana.  S/P  

right hip total arthroplasty 11/20/18.  S/P colonoscopy 11/20/18.  Patient 

pulled out IV





Objective





Last Vital Signs








  Date Time  Temp Pulse Resp B/P (MAP) Pulse Ox O2 Delivery O2 Flow Rate FiO2


 


11/27/18 17:48 96.1       


 


11/27/18 16:00  66 16 117/57 (77) 98   


 


11/27/18 09:30      Room Air  21


 


11/24/18 21:00       2.0 





       2.0 











Laboratory Tests








Test


  11/27/18


06:10


 


White Blood Count


  8.3 K/UL


(4.8-10.8)


 


Red Blood Count


  3.45 M/UL


(4.20-5.40)  L


 


Hemoglobin


  9.2 G/DL


(12.0-16.0)  L


 


Hematocrit


  29.4 %


(37.0-47.0)  L


 


Mean Corpuscular Volume 85 FL (80-99)  


 


Mean Corpuscular Hemoglobin


  26.5 PG


(27.0-31.0)  L


 


Mean Corpuscular Hemoglobin


Concent 31.2 G/DL


(32.0-36.0)  L


 


Red Cell Distribution Width


  15.9 %


(11.6-14.8)  H


 


Platelet Count


  318 K/UL


(150-450)


 


Mean Platelet Volume


  6.6 FL


(6.5-10.1)


 


Neutrophils (%) (Auto)


  72.6 %


(45.0-75.0)


 


Lymphocytes (%) (Auto)


  18.9 %


(20.0-45.0)  L


 


Monocytes (%) (Auto)


  7.4 %


(1.0-10.0)


 


Eosinophils (%) (Auto)


  0.5 %


(0.0-3.0)


 


Basophils (%) (Auto)


  0.6 %


(0.0-2.0)


 


Sodium Level


  137 MMOL/L


(136-145)


 


Potassium Level


  4.8 MMOL/L


(3.5-5.1)


 


Chloride Level


  106 MMOL/L


()


 


Carbon Dioxide Level


  24 MMOL/L


(21-32)


 


Anion Gap


  7 mmol/L


(5-15)


 


Blood Urea Nitrogen


  10 mg/dL


(7-18)


 


Creatinine


  0.7 MG/DL


(0.55-1.30)


 


Estimat Glomerular Filtration


Rate  mL/min (>60)  


 


 


Glucose Level


  173 MG/DL


()  H


 


Calcium Level


  7.8 MG/DL


(8.5-10.1)  L

















Intake and Output  


 


 11/26/18 11/27/18





 19:00 07:00


 


Intake Total 1275 ml 


 


Balance 1275 ml 


 


  


 


Intake Oral 600 ml 


 


IV Total 675 ml 


 


# Voids 2 3








Objective


General Appearance:  WD/WN, mild distress, agitated


EENT:  PERRL/EOMI, normal ENT inspection


Neck:  non-tender, normal alignment, supple, normal inspection


Cardiovascular:  normal peripheral pulses, normal rate, regular rhythm, no 

gallop/murmur, no JVD


Respiratory/Chest:  chest wall non-tender, lungs clear, normal breath sounds, 

no accessory muscle use, respiratory distress


Abdomen:  normal bowel sounds, non tender, soft, no organomegaly, no mass


Extremities:  normal range of motion


Neurologic:  CNs II-XII grossly normal


Skin:  normal pigmentation, warm/dry





Assessment/Plan


Problem List:  


(1) Diabetes mellitus, type II


Assessment & Plan:  Continue metformin and januvia





(2) HTN (hypertension)


Assessment & Plan:  continue atenolol and cozaar





(3) Sick sinus syndrome


Assessment & Plan:  S/P pacemaker





(4) Aortic stenosis


(5) Pulmonary hypertension


(6) Alzheimer's dementia


(7) Dislocation, hip closed


Assessment & Plan:  S/P right total hip arthroplasty 11/20/18-see ortho note.





(8) Rectal bleed


Assessment & Plan:  S/P colonoscopy 11/20/18=ulcerated hemorrhoid.  See GI note.





(9) Severe anemia


Assessment & Plan:  Due to rectal bleed.  S/P transfusion 2 units packed RBC 11/ 21/18 and 1 unit PRBC 11/23/18.





Assessment/Plan


Discharge planning:  post acute care vs SNF vs acute rehab











Cheo Ortiz MD Nov 27, 2018 18:00

## 2018-11-27 NOTE — GENERAL SURGERY PROGRESS NOTE
General Surgery-Progress Note


Subjective


Additional Comments


no acute events. h/h stable.  wounds okay





Objective





Last 24 Hour Vital Signs








  Date Time  Temp Pulse Resp B/P (MAP) Pulse Ox O2 Delivery O2 Flow Rate FiO2


 


11/27/18 09:30  71 18   Room Air  21


 


11/27/18 09:01 97.3       


 


11/27/18 08:28    136/77    


 


11/27/18 08:28  71  136/77    


 


11/27/18 08:00 97.3 71 20 136/77 (96) 98   


 


11/27/18 06:00    147/73    


 


11/27/18 04:00 98.0 69 16 147/73 (97) 93   


 


11/27/18 00:00 99.6 80 16 157/78 (104) 97   


 


11/26/18 21:00      Room Air  





      Room Air  


 


11/26/18 20:00  70 18   Room Air  21


 


11/26/18 20:00 97.4 93 16 148/90 (109) 95   


 


11/26/18 16:00 97.3 70 20 115/71 (86) 98   


 


11/26/18 13:53    117/56    


 


11/26/18 12:00 97.2 66 20 117/56 (76) 98   








I&O











Intake and Output  


 


 11/26/18 11/27/18





 19:00 07:00


 


Intake Total 1275 ml 


 


Balance 1275 ml 


 


  


 


Intake Oral 600 ml 


 


IV Total 675 ml 


 


# Voids 2 3








Dressing:  saturated


Wound:  other


Drains:  other


Cardiovascular:  RSR


Respiratory:  clear


Abdomen:  soft, flat, non-tender, present bowel sounds


Extremities:  other





Laboratory Tests








Test


  11/27/18


06:10


 


White Blood Count


  8.3 K/UL


(4.8-10.8)


 


Red Blood Count


  3.45 M/UL


(4.20-5.40)  L


 


Hemoglobin


  9.2 G/DL


(12.0-16.0)  L


 


Hematocrit


  29.4 %


(37.0-47.0)  L


 


Mean Corpuscular Volume 85 FL (80-99)  


 


Mean Corpuscular Hemoglobin


  26.5 PG


(27.0-31.0)  L


 


Mean Corpuscular Hemoglobin


Concent 31.2 G/DL


(32.0-36.0)  L


 


Red Cell Distribution Width


  15.9 %


(11.6-14.8)  H


 


Platelet Count


  318 K/UL


(150-450)


 


Mean Platelet Volume


  6.6 FL


(6.5-10.1)


 


Neutrophils (%) (Auto)


  72.6 %


(45.0-75.0)


 


Lymphocytes (%) (Auto)


  18.9 %


(20.0-45.0)  L


 


Monocytes (%) (Auto)


  7.4 %


(1.0-10.0)


 


Eosinophils (%) (Auto)


  0.5 %


(0.0-3.0)


 


Basophils (%) (Auto)


  0.6 %


(0.0-2.0)


 


Sodium Level


  137 MMOL/L


(136-145)


 


Potassium Level


  4.8 MMOL/L


(3.5-5.1)


 


Chloride Level


  106 MMOL/L


()


 


Carbon Dioxide Level


  24 MMOL/L


(21-32)


 


Anion Gap


  7 mmol/L


(5-15)


 


Blood Urea Nitrogen


  10 mg/dL


(7-18)


 


Creatinine


  0.7 MG/DL


(0.55-1.30)


 


Estimat Glomerular Filtration


Rate  mL/min (>60)  


 


 


Glucose Level


  173 MG/DL


()  H


 


Calcium Level


  7.8 MG/DL


(8.5-10.1)  L











Plan


Problems:  


(1) Sacral decubitus ulcer, stage III


Assessment & Plan:  Multiple pressure injuries to sacrum and R hip. Four 

individual pressure injuries in close proximity noted  to sacrum,Total area 

measuring (L)2cm x (W)7 cm., each wound with 100% yellow slough with red 

margins. Non-blanchable erythema periwound. 


Small pressure injury noted to L buttocks (L)0.4cm x(W)0.5cm with 100% slough. 


Full thickness pressure injury to R hip at historical surgical incision (L)

1.1cm x(W)0.9cm ,with 90%  yellow slough noted. Non-blanchable erythema noted 

periwound ,an area of (L)7cm x (W)6.6cm . 


Stable dry eschar medial R heel. Periwound without erythema or induration. 


Wounds present upon admission and will be cared for during hospital stay





Tx.Plan:


Reposition at least every 2hours or as tolerated.


           


Air fluidized mattress.


           


Apply Cavilon wipes to heels and off-load heels with pillow.


           


Cleanse wounds Sacrum  with Saline. Apply Therahoney to wounds .Cavilon to 

borders and cover with Optifoam Drsg.


           


Cleanse R hip wound with Saline.Apply Therahoney.Apply Cavilon wipe to red area 

periwound and cover with 


           


Optifoam drsg Daily and prn.





Will likely need excisional vs non excisional debridement of sloth down to 

healthy viable tissue.  may consider during hospitalization once ortho and GI 

care completed. Plan for outpatient care. 





Nutritional support 





Ortho f/u of wound 





okay to d/c from surgical standpoint 





(2) Failure to thrive


Assessment & Plan:  elderly female with dementia


albumin 2


multiple decubitus ulcers





nutritional consult


increase protein and Caloric intake


will need optimization to help heal wounds 





DAILY ESTIMATED NEEDS:


Needs based on Wound, DM, wasting / 51kg 


30-35  kcals/kg 


[id number]  total kcals


1.25-1.5  g protein/kg


64-77  g total protein 





NUTRITION DIAGNOSIS:


* Increased kcal/prot needs R/T wound healing as evidenced by pt w/ stage 3 

sacral and R hip wounds, refer to WC eval.





PO DIET RECOMMENDATIONS:


Jonancy/ Regular diet w/ <50% po intake (texture per SLP) 





ADDITIONAL RECOMMENDATIONS:


* Calibrated bedscale wt for accurate CBW 


* Wound healing: add MVI w/ min 1 tab QD, Vit C 500mg BID, Harjeet 1pkt BID 


* Add Glucerna 1 tetra sana TID 


* Lytes daily, replete as needed ( Mg 1.6) 





. 














Tuan Patricia Nov 27, 2018 10:53

## 2018-11-27 NOTE — PROGRESS NOTE
DATE:  11/27/2018

SUBJECTIVE:  The patient continues to be having cognitive impairment, has

episodes of agitation.  He is having cognitive impairment and is able to

respond to simple questions.



MENTAL STATUS EXAMINATION:  The patient is alert and oriented times self.

Mood is anxious.  Affect is constricted.  Congruent with mood.  Thought

process is concrete.  Thought content, no suicidal or homicidal ideation.

Cognition is impaired.



ASSESSMENT:

1. Encephalopathy due to general medical condition.

2. Agitation



PLAN:

1. We will continue the current medication.

2. Provide the patient with reality orientation and supportive

therapy.









  ______________________________________________

  Josh Nunez M.D.





DR:  Lina

D:  11/27/2018 11:01

T:  11/27/2018 16:05

JOB#:  [id number]

CC:

## 2018-11-28 VITALS — DIASTOLIC BLOOD PRESSURE: 64 MMHG | SYSTOLIC BLOOD PRESSURE: 143 MMHG

## 2018-11-28 VITALS — SYSTOLIC BLOOD PRESSURE: 138 MMHG | DIASTOLIC BLOOD PRESSURE: 67 MMHG

## 2018-11-28 VITALS — SYSTOLIC BLOOD PRESSURE: 116 MMHG | DIASTOLIC BLOOD PRESSURE: 60 MMHG

## 2018-11-28 VITALS — SYSTOLIC BLOOD PRESSURE: 141 MMHG | DIASTOLIC BLOOD PRESSURE: 68 MMHG

## 2018-11-28 VITALS — DIASTOLIC BLOOD PRESSURE: 67 MMHG | SYSTOLIC BLOOD PRESSURE: 139 MMHG

## 2018-11-28 LAB
ADD MANUAL DIFF: NO
ANION GAP SERPL CALC-SCNC: 9 MMOL/L (ref 5–15)
BASOPHILS NFR BLD AUTO: 0.2 % (ref 0–2)
BUN SERPL-MCNC: 11 MG/DL (ref 7–18)
CALCIUM SERPL-MCNC: 8.3 MG/DL (ref 8.5–10.1)
CHLORIDE SERPL-SCNC: 109 MMOL/L (ref 98–107)
CO2 SERPL-SCNC: 24 MMOL/L (ref 21–32)
CREAT SERPL-MCNC: 0.7 MG/DL (ref 0.55–1.3)
EOSINOPHIL NFR BLD AUTO: 0.4 % (ref 0–3)
ERYTHROCYTE [DISTWIDTH] IN BLOOD BY AUTOMATED COUNT: 16.4 % (ref 11.6–14.8)
HCT VFR BLD CALC: 28.8 % (ref 37–47)
HGB BLD-MCNC: 9.1 G/DL (ref 12–16)
LYMPHOCYTES NFR BLD AUTO: 10.6 % (ref 20–45)
MCV RBC AUTO: 85 FL (ref 80–99)
MONOCYTES NFR BLD AUTO: 5.8 % (ref 1–10)
NEUTROPHILS NFR BLD AUTO: 82.9 % (ref 45–75)
PLATELET # BLD: 339 K/UL (ref 150–450)
POTASSIUM SERPL-SCNC: 3.8 MMOL/L (ref 3.5–5.1)
RBC # BLD AUTO: 3.4 M/UL (ref 4.2–5.4)
SODIUM SERPL-SCNC: 142 MMOL/L (ref 136–145)
WBC # BLD AUTO: 9.3 K/UL (ref 4.8–10.8)

## 2018-11-28 RX ADMIN — LORAZEPAM PRN MG: 2 INJECTION, SOLUTION INTRAMUSCULAR; INTRAVENOUS at 05:28

## 2018-11-28 RX ADMIN — DIBASIC SODIUM PHOSPHATE, MONOBASIC POTASSIUM PHOSPHATE AND MONOBASIC SODIUM PHOSPHATE SCH MG: 852; 155; 130 TABLET ORAL at 17:31

## 2018-11-28 RX ADMIN — DIBASIC SODIUM PHOSPHATE, MONOBASIC POTASSIUM PHOSPHATE AND MONOBASIC SODIUM PHOSPHATE SCH MG: 852; 155; 130 TABLET ORAL at 08:55

## 2018-11-28 RX ADMIN — SITAGLIPTIN SCH MG: 50 TABLET, FILM COATED ORAL at 05:28

## 2018-11-28 RX ADMIN — HYDROCODONE BITARTRATE AND ACETAMINOPHEN PRN TAB: 5; 325 TABLET ORAL at 13:05

## 2018-11-28 RX ADMIN — DIBASIC SODIUM PHOSPHATE, MONOBASIC POTASSIUM PHOSPHATE AND MONOBASIC SODIUM PHOSPHATE SCH MG: 852; 155; 130 TABLET ORAL at 12:35

## 2018-11-28 RX ADMIN — HYDROCODONE BITARTRATE AND ACETAMINOPHEN PRN TAB: 5; 325 TABLET ORAL at 04:03

## 2018-11-28 RX ADMIN — OLANZAPINE SCH MG: 2.5 TABLET, FILM COATED ORAL at 08:55

## 2018-11-28 RX ADMIN — VITAMIN D, TAB 1000IU (100/BT) SCH INTLU: 25 TAB at 08:55

## 2018-11-28 RX ADMIN — DICYCLOMINE HYDROCHLORIDE SCH MG: 10 CAPSULE ORAL at 12:36

## 2018-11-28 RX ADMIN — DICYCLOMINE HYDROCHLORIDE SCH MG: 10 CAPSULE ORAL at 17:31

## 2018-11-28 RX ADMIN — DICYCLOMINE HYDROCHLORIDE SCH MG: 10 CAPSULE ORAL at 08:55

## 2018-11-28 RX ADMIN — METFORMIN HYDROCHLORIDE SCH MG: 500 TABLET ORAL at 17:31

## 2018-11-28 RX ADMIN — LOSARTAN POTASSIUM SCH MG: 50 TABLET, FILM COATED ORAL at 08:56

## 2018-11-28 RX ADMIN — METFORMIN HYDROCHLORIDE SCH MG: 500 TABLET ORAL at 08:56

## 2018-11-28 NOTE — INTERNAL MED PROGRESS NOTE
Subjective


Date of Service:  Nov 28, 2018


Physician Name


Cheo Ortiz


Attending Physician


Juan Carlos Magana MD





Current Medications








 Medications


  (Trade)  Dose


 Ordered  Sig/Violette


 Route


 PRN Reason  Start Time


 Stop Time Status Last Admin


Dose Admin


 


 Acetaminophen


  (Tylenol)  650 mg  Q6H  PRN


 ORAL


 Mild Pain/Temp > 100.5  11/16/18 17:15


 12/16/18 17:14  11/25/18 08:31


 


 


 Acetaminophen/


 Hydrocodone Bitart


  (Norco 5/325)  1 tab  Q6H  PRN


 ORAL


 PAIN 4-10  11/24/18 11:30


 12/1/18 11:29  11/28/18 04:03


 


 


 Albuterol/


 Ipratropium


  (Albuterol/


 Ipratropium)  3 ml  Q4H  PRN


 HHN


 Shortness of Breath  11/24/18 09:00


 11/29/18 08:59   


 


 


 Atenolol


  (Tenormin)  50 mg  Q12HR


 ORAL


   11/16/18 21:00


 12/16/18 20:59  11/28/18 08:57


 


 


 Atorvastatin


 Calcium


  (Lipitor)  20 mg  BEDTIME


 ORAL


   11/17/18 21:00


 12/17/18 20:59  11/27/18 21:48


 


 


 Clonidine HCl


  (Catapres Tab)  0.1 mg  EVERY 8  HOURS


 ORAL


   11/16/18 22:00


 12/16/18 21:59  11/27/18 13:01


 


 


 Dextrose


  (Dextrose 50%)  25 ml  Q30M  PRN


 IV


 bs 60-69  11/20/18 15:30


 12/20/18 15:29   


 


 


 Dicyclomine HCl


  (Bentyl)  10 mg  QID


 ORAL


   11/16/18 18:00


 12/16/18 17:59  11/28/18 08:55


 


 


 Haloperidol


 Lactate


  (Haldol)  5 mg  Q6H  PRN


 IM


 Agitation  11/19/18 10:45


 12/19/18 10:44   


 


 


 Lorazepam


  (Ativan)  2 mg  Q6H  PRN


 ORAL


 For Anxiety  11/28/18 11:45


 12/5/18 11:44   


 


 


 Losartan Potassium


  (Cozaar)  50 mg  DAILY


 ORAL


   11/20/18 09:00


 12/17/18 08:59  11/28/18 08:56


 


 


 Metformin HCl


  (Glucophage)  500 mg  BID


 ORAL


   11/17/18 09:00


 12/17/18 08:59  11/28/18 08:56


 


 


 Mirtazapine


  (Remeron)  15 mg  BEDTIME


 ORAL


   11/27/18 21:00


 12/27/18 20:59  11/27/18 21:48


 


 


 Phosphorus


  (Phospha 250


 Neutral)  250 mg  THREE TIMES A  DAY


 ORAL


   11/24/18 09:00


 12/24/18 08:59  11/28/18 08:55


 


 


 Potassium Chloride


  (K-Dur)  10 meq  DAILY


 ORAL


   11/17/18 09:00


 12/17/18 08:59  11/28/18 08:56


 


 


 Sitagliptin


 Phosphate


  (Januvia)  50 mg  ACBREAKFAST


 ORAL


   11/17/18 06:30


 12/17/18 06:29  11/27/18 06:00


 


 


 Sorbitol


  (Sorbitol)  45 ml  QHS


 ORAL


   11/22/18 21:00


 12/22/18 20:59  11/25/18 20:15


 


 


 Vitamin D


  (Vitamin D)  5,000 intlu  DAILY


 ORAL


   11/17/18 09:00


 12/17/18 08:59  11/28/18 08:55


 








Allergies:  


Coded Allergies:  


     No Known Allergies (Unverified , 3/26/14)


ROS Limited/Unobtainable:  No


Constitutional:  Reports: no symptoms


HEENT:  Reports: no symptoms


Cardiovascular:  Reports: no symptoms


Respiratory:  Reports: no symptoms


Gastrointestinal/Abdominal:  Reports: no symptoms


Genitourinary:  Reports: no symptoms


Neurologic/Psychiatric:  Reports: no symptoms


Subjective


79 YO F admitted with generalized weakness.  Cover for Int Med-Dr Magana.  S/P  

right hip total arthroplasty 11/20/18.  S/P colonoscopy 11/20/18.  Await SNF vs 

Acite rehab





Objective





Last Vital Signs








  Date Time  Temp Pulse Resp B/P (MAP) Pulse Ox O2 Delivery O2 Flow Rate FiO2


 


11/28/18 09:00      Room Air  





      Room Air  


 


11/28/18 08:57  78  143/64    


 


11/28/18 08:00 98.7  18  99   


 


11/27/18 19:44        21


 


11/24/18 21:00       2.0 





       2.0 











Laboratory Tests








Test


  11/28/18


05:00


 


White Blood Count


  9.3 K/UL


(4.8-10.8)


 


Red Blood Count


  3.40 M/UL


(4.20-5.40)  L


 


Hemoglobin


  9.1 G/DL


(12.0-16.0)  L


 


Hematocrit


  28.8 %


(37.0-47.0)  L


 


Mean Corpuscular Volume 85 FL (80-99)  


 


Mean Corpuscular Hemoglobin


  26.7 PG


(27.0-31.0)  L


 


Mean Corpuscular Hemoglobin


Concent 31.5 G/DL


(32.0-36.0)  L


 


Red Cell Distribution Width


  16.4 %


(11.6-14.8)  H


 


Platelet Count


  339 K/UL


(150-450)


 


Mean Platelet Volume


  7.1 FL


(6.5-10.1)


 


Neutrophils (%) (Auto)


  82.9 %


(45.0-75.0)  H


 


Lymphocytes (%) (Auto)


  10.6 %


(20.0-45.0)  L


 


Monocytes (%) (Auto)


  5.8 %


(1.0-10.0)


 


Eosinophils (%) (Auto)


  0.4 %


(0.0-3.0)


 


Basophils (%) (Auto)


  0.2 %


(0.0-2.0)


 


Sodium Level


  142 MMOL/L


(136-145)


 


Potassium Level


  3.8 MMOL/L


(3.5-5.1)


 


Chloride Level


  109 MMOL/L


()  H


 


Carbon Dioxide Level


  24 MMOL/L


(21-32)


 


Anion Gap


  9 mmol/L


(5-15)


 


Blood Urea Nitrogen


  11 mg/dL


(7-18)


 


Creatinine


  0.7 MG/DL


(0.55-1.30)


 


Estimat Glomerular Filtration


Rate  mL/min (>60)  


 


 


Glucose Level


  125 MG/DL


()  H


 


Calcium Level


  8.3 MG/DL


(8.5-10.1)  L

















Intake and Output  


 


 11/27/18 11/28/18





 18:59 06:59


 


Intake Total 1061 ml 220 ml


 


Balance 1061 ml 220 ml


 


  


 


Intake Oral 236 ml 220 ml


 


IV Total 825 ml 


 


# Voids 1 4








Objective


General Appearance:  WD/WN, mild distress, agitated


EENT:  PERRL/EOMI, normal ENT inspection


Neck:  non-tender, normal alignment, supple, normal inspection


Cardiovascular:  normal peripheral pulses, normal rate, regular rhythm, no 

gallop/murmur, no JVD


Respiratory/Chest:  chest wall non-tender, lungs clear, normal breath sounds, 

no accessory muscle use, respiratory distress


Abdomen:  normal bowel sounds, non tender, soft, no organomegaly, no mass


Extremities:  normal range of motion


Neurologic:  CNs II-XII grossly normal


Skin:  normal pigmentation, warm/dry





Assessment/Plan


Problem List:  


(1) Diabetes mellitus, type II


Assessment & Plan:  Continue metformin and januvia





(2) HTN (hypertension)


Assessment & Plan:  continue atenolol and cozaar





(3) Sick sinus syndrome


Assessment & Plan:  S/P pacemaker





(4) Aortic stenosis


(5) Pulmonary hypertension


(6) Alzheimer's dementia


(7) Dislocation, hip closed


Assessment & Plan:  S/P right total hip arthroplasty 11/20/18-see ortho note.





(8) Rectal bleed


Assessment & Plan:  S/P colonoscopy 11/20/18=ulcerated hemorrhoid.  See GI note.





(9) Severe anemia


Assessment & Plan:  Due to rectal bleed.  S/P transfusion 2 units packed RBC 11/ 21/18 and 1 unit PRBC 11/23/18.





Assessment/Plan


Discharge planning:  post acute care vs SNF vs acute rehab











Cheo Ortiz MD Nov 28, 2018 12:19

## 2018-11-28 NOTE — GENERAL PROGRESS NOTE
Assessment/Plan


Problem List:  


(1) Psychosis


ICD Codes:  F29 - Unspecified psychosis not due to a substance or known 

physiological condition


SNOMED:  99166950


(2) Alzheimer's dementia


ICD Codes:  G30.9 - Alzheimer's disease, unspecified; F02.80 - Dementia in 

other diseases classified elsewhere without behavioral disturbance


SNOMED:  39688969


Status:  stable


Assessment/Plan


Remeron 15mg


increase Ativan prn


the pt lacks capacity to make decisions. 


aneesh Shukla





Subjective


Date patient seen:  Nov 28, 2018


Neurologic/Psychiatric:  Reports: anxiety, depressed, emotional problems


Allergies:  


Coded Allergies:  


     No Known Allergies (Unverified , 3/26/14)


Subjective


the pt was agitated and pull out the IV access. the pt was yelling all day





Objective





Last 24 Hour Vital Signs








  Date Time  Temp Pulse Resp B/P (MAP) Pulse Ox O2 Delivery O2 Flow Rate FiO2


 


11/28/18 09:00      Room Air  





      Room Air  


 


11/28/18 08:57  78  143/64    


 


11/28/18 08:56    143/64    


 


11/28/18 08:00 98.7 78 18 143/64 (90) 99   


 


11/28/18 04:33 98.1       


 


11/28/18 04:00 98.1 78 20 139/67 (91) 96   


 


11/28/18 00:00 97.0 75 20 141/68 (92) 99   


 


11/27/18 21:48  67  128/59    


 


11/27/18 21:09    128/59    


 


11/27/18 21:00      Room Air  





      Room Air  


 


11/27/18 20:00 98.0 67 20 128/59 (82) 97   


 


11/27/18 19:44  68 16   Room Air  21


 


11/27/18 16:00 97.9 66 16 117/57 (77) 98   


 


11/27/18 13:01    116/60    


 


11/27/18 12:00 96.1 64 16 116/60 (78) 100   

















Intake and Output  


 


 11/27/18 11/28/18





 18:59 06:59


 


Intake Total 1061 ml 220 ml


 


Balance 1061 ml 220 ml


 


  


 


Intake Oral 236 ml 220 ml


 


IV Total 825 ml 


 


# Voids 1 4








Laboratory Tests


11/28/18 05:00: 


White Blood Count 9.3, Red Blood Count 3.40L, Hemoglobin 9.1L, Hematocrit 28.8L

, Mean Corpuscular Volume 85, Mean Corpuscular Hemoglobin 26.7L, Mean 

Corpuscular Hemoglobin Concent 31.5L, Red Cell Distribution Width 16.4H, 

Platelet Count 339, Mean Platelet Volume 7.1, Neutrophils (%) (Auto) 82.9H, 

Lymphocytes (%) (Auto) 10.6L, Monocytes (%) (Auto) 5.8, Eosinophils (%) (Auto) 

0.4, Basophils (%) (Auto) 0.2, Sodium Level 142, Potassium Level 3.8, Chloride 

Level 109H, Carbon Dioxide Level 24, Anion Gap 9, Blood Urea Nitrogen 11, 

Creatinine 0.7, Estimat Glomerular Filtration Rate , Glucose Level 125H, 

Calcium Level 8.3L


Height (Feet):  5


Height (Inches):  5.00


Weight (Pounds):  148


General Appearance:  alert, confused, moderate distress, agitated











Josh Nunez MD Nov 28, 2018 11:47

## 2018-11-28 NOTE — CONSULTATION
DATE OF CONSULTATION:

PHYSICAL MEDICINE AND REHABILITATION CONSULTATION



DATE OF SURGERY:  11/20/2018.



CONSULTING PHYSICIAN:  Nacho Moses M.D.



REQUESTING PHYSICIANS:

1. Juan Carlos Magana M.D.

2. Cheo Ortiz M.D.



PSYCHIATRIST:  Josh Nunez M.D.



ORTHOPEDIC SURGEON:  Juan Antonio Bella M.D.



SURGEON:  Tuan Patricia M.D.



GASTROENTEROLOGIST:  Florina Butler M.D.



PULMONOLOGIST:  Korin Junior M.D.



CHIEF COMPLAINT:  Functional impairment, difficulty with ambulation and

activity of daily living, and cognitive and memory impairment in a patient

with complicated right hip fracture, multiple dislocations, now status

post total hip arthroplasty and difficulty with swallowing.



HISTORY OF PRESENT ILLNESS:  The patient is an 78-year-old [place] female

who was originally admitted to [facility] on 10/02/2018,

status post fall with right hip fracture.  At that time, the patient

underwent open reduction and internal fixation of the right hip displaced

femoral neck fracture and then underwent right hip hemiarthroplasty on

10/04/2018.  Apparently, the patient had multiple ulcers, which were seen

by a surgeon and wound care team.  The patient was eventually discharged

home.  However, she was readmitted to the hospital on 10/16/2018 due to

right hip dislocation.  This was reduced by orthopedic surgeon and the

patient was discharged home again on 11/19/2018.  The patient was

readmitted to the hospital on 11/16/2018 with hip dislocation and

significant functional impairment, also with worsening of the pressure

ulcers, was seen and followed by multiple consultants as above.  Head CT

reported chronic atrophy.  Chest x-ray reported with bilateral pleural

effusion and cardiomegaly.  Repeat of hip x-ray reported with superior

dislocation of right femoral hemiarthroplasty.  The patient was taken to

operation room by Dr. Juan Antonio Bella and underwent total hip replacement on

11/20/2018.  At this time, the patient has been followed by multiple

consultants including psychiatrist.  She had a drop on her hemoglobin and

hematocrit to as low as 6.6 on 11/21/2018, and she received two packs of

packed RBC on 2 occasions of 11/21/2018 and 11/23/2018.  The patient had

some rectal bleed, which underwent colonoscopy by gastroenterologist on

11/20/2018, reported with ulcerated hemorrhoid.  The patient also closely

followed up by psychiatrist with diagnoses of Alzheimer disease, dementia,

and unspecified psychosis.  I was asked today to evaluate the patient for

rehabilitation.  The patient has memory and cognitive impairment and

severe functional impairment and multiple ulcers, which were seen and

followed by a surgeon and wound care specialist.



Ejection fraction per echocardiogram on 10/04/2018 reported 35%.



PAST MEDICAL AND SURGICAL HISTORY:

1. Aortic stenosis.

2. Alzheimer dementia.

3. Pressure ulcer.

4. Sick sinus syndrome with pacemaker.

5. Hypertension.

6. Coronary artery disease.

7. DVT of the left axillary vein.

8. Diabetes mellitus.

9. Coronary artery bypass graft in 2017 and pacemaker placement in

2017.



ALLERGIES:  No known drug allergy.



MEDICATIONS:  Ativan p.r.n., Remeron 15 mg at bedtime, Norco p.r.n.,

albuterol inhaler every 4 hours, sorbitol 45 mL at bedtime, Cozaar 50 mg

daily, Haldol p.r.n., Lipitor 20 mg daily.  K-Dur 10 mEq daily, vitamin D

5000 units p.o. daily, Glucophage 500 mg b.i.d., Januvia 50 mg daily,

clonidine q.8 h. 0.1 mg, atenolol 50 mg  b.i.d., Bentyl 10 mg q.i.d, and

Tylenol p.r.n.



FAMILY AND SOCIAL HISTORY:  This patient was at home until October, which

she had a hip fracture and after that was in nursing home for few days,

but due to the dislocation of the hip again admitted to the hospital.

Apparently, on her previous admission, she was able to ambulate up to 30

feet with front wheeled walker.  No history of tobacco, alcohol, or

illicit drugs reported.  The patient lives with her  and family are

supporting willing to take care of her at home.  Current level of function

requires moderate assist on rolling and supine-to-sit and moderate to

maximum assist sit-to-stand and bed-to-chair transfer.  Family history is

unclear.  Family are willing to take her back home to her condominium with

4 steps.



REVIEW OF SYSTEMS:  Difficult to assess due to patient's cognitive and

memory status. CONSTITUTIONAL:  No chills.  No fever.  EYES:  No pain.

EAR, NOSE, AND THROAT:  The patient with dysphagia.  CARDIOVASCULAR:  No

chest pain.  PULMONARY:  The patient with chest x-ray reports of bilateral

pleural effusion.  No short of breath reported.  GASTROINTESTINAL:  With

ulcerative hemorrhoid and episode of rectal bleed.   GENITOURINARY:  With

Fuller catheter.   MUSCULOSKELETAL:  Right hip fracture and multiple

dislocations, now status post total hip arthroplasty.  INTEGUMENTARY:

Multiple ulcers.  NEUROLOGIC:  The patient with dementia, Alzheimer's, and

weakness.



PHYSICAL EXAMINATION:

VITAL SIGNS:  Blood pressure 130/60, respiratory rate 18 per minute, heart

rate 80 per minute, temperature 99 degrees Fahrenheit, and O2 saturation

98%.  Height is 165 cm, weight is 67 kg, and body mass index 25.

GENERAL:  No acute distress.

HEENT:  No facial droop.

NECK:  Supple with no lymphadenopathy.

HEART:  Regular.

LUNGS:  Clear to auscultation bilaterally.

ABDOMEN:  Soft, nontender, and nondistended.

EXTREMITIES:  Right knee postoperative with dressing.

SKIN:  Multiple ulcers including left heel, left ankle, and sacral.

NEUROLOGIC:  The patient is awake.  Follows simple commands.  Not oriented

to place.  Moves bilateral upper and left lower limb with weakness.  The

right lower limb movement is very limited, especially at the proximal

area, post hip fracture and surgery.



DATA:  WBC 9.3, hemoglobin 9.1, platelets 339,000.  Sodium 142, potassium

3.8, BUN 11, creatinine 0.7, and glucose 125.



Gram Stain of ulcers positive for Staphylococcus aureus.



ASSESSMENT:  An 78-year-old [place] female with



1. Status post fall on 10/02/2018, causing right _____ neck fracture,

status post right hemiarthroplasty on 10/04/2018 by Dr. Juan Antonio Bella,

status post multiple dislocations and reduction, which was not successful

and now status post conversion of previous hip surgery to total hip

arthroplasty on 11/20/2018.

2. Weightbearing as tolerated per orthopedic surgeon.

3. Multiple pressure ulcers.

4. Limb pain.

5. Debility and functional decline.

6. Gait abnormality.

7. Ulcerative hemorrhoid and rectal bleed.

8. Diabetes mellitus.

9. Hypertension.

10. Sick sinus syndrome with pacemaker in place.

11. Ejection fraction of 35%.

12. Aortic stenosis.

13. Severe anemia, required 2 times blood transfusion on 11/21/2018 and

11/23/2018.

14. Dysphagia.



RECOMMENDATION:  This patient requires physical therapy, occupational

therapy, speech therapy, and 24 hours nursing care.



Physical therapy for range of motion, transfer training, endurance,

balance, and ambulation training with appropriate assistive device.



Occupational therapy for activities of daily living, equipment, function

and transfer evaluation and training, and upper extremity range of motion

and strengthening exercise.



Speech therapy for evaluation of retraining on status of her cognition,

memory, communication and swallowing and aspiration precaution and

language and speech retraining.



Nursing for evaluation of her bowel and bladder, medication regimen,

skin care, prevention of pressure ulcer, and patient and family

education.



Wound care management specialist follow up closely on prevention of

further pressure ulcer and management of current ulcer.



Continue medical and surgical management per Medicine.



Acute inpatient rehabilitation is warranted when the patient is

medically and surgically stable.



Thank you for your consultation.









  ______________________________________________

  Nacho Moses M.D.





DR:  KRISSY

D:  11/28/2018 18:29

T:  11/28/2018 20:38

JOB#:  [id number]

CC:

## 2018-11-28 NOTE — PULMONOLOGY PROGRESS NOTE
Assessment/Plan


Problems:  


(1) Hip dislocation, right


(2) Sacral decubitus ulcer, stage III


(3) Protein-calorie malnutrition, severe


(4) Psychosis


(5) CAD (coronary artery disease)


(6) Pulmonary hypertension


(7) Aortic stenosis


(8) Pacemaker


(9) Diabetes mellitus, type II


(10) HTN (hypertension)


(11) Alzheimer's dementia


Assessment/Plan


still confused


check h/h and Pt, ptt in  


symptomatic treatment


f/u ortho recommendations


wound care consult


nutrition evaluation


social service consult


check electrolytes


dvt prophylaxis





Subjective


ROS Limited/Unobtainable:  No


Constitutional:  Reports: no symptoms


HEENT:  Repors: no symptoms


Respiratory:  Reports: no symptoms


Allergies:  


Coded Allergies:  


     No Known Allergies (Unverified , 3/26/14)





Objective





Last 24 Hour Vital Signs








  Date Time  Temp Pulse Resp B/P (MAP) Pulse Ox O2 Delivery O2 Flow Rate FiO2


 


11/28/18 12:00 96.1 64 16 116/60 (78) 100   


 


11/28/18 09:00      Room Air  





      Room Air  


 


11/28/18 08:57  78  143/64    


 


11/28/18 08:56    143/64    


 


11/28/18 08:00 98.7 78 18 143/64 (90) 99   


 


11/28/18 04:33 98.1       


 


11/28/18 04:00 98.1 78 20 139/67 (91) 96   


 


11/28/18 00:00 97.0 75 20 141/68 (92) 99   


 


11/27/18 21:48  67  128/59    


 


11/27/18 21:09    128/59    


 


11/27/18 21:00      Room Air  





      Room Air  


 


11/27/18 20:00 98.0 67 20 128/59 (82) 97   


 


11/27/18 19:44  68 16   Room Air  21


 


11/27/18 16:00 97.9 66 16 117/57 (77) 98   


 


11/27/18 13:01    116/60    

















Intake and Output  


 


 11/27/18 11/28/18





 19:00 07:00


 


Intake Total 1061 ml 220 ml


 


Balance 1061 ml 220 ml


 


  


 


Intake Oral 236 ml 220 ml


 


IV Total 825 ml 


 


# Voids 1 4








General Appearance:  WD/WN


HEENT:  normocephalic, atraumatic


Respiratory/Chest:  chest wall non-tender, lungs clear


Breasts:  no masses


Cardiovascular:  normal peripheral pulses


Skin:  no rash


Laboratory Tests


11/28/18 05:00: 


White Blood Count 9.3, Red Blood Count 3.40L, Hemoglobin 9.1L, Hematocrit 28.8L

, Mean Corpuscular Volume 85, Mean Corpuscular Hemoglobin 26.7L, Mean 

Corpuscular Hemoglobin Concent 31.5L, Red Cell Distribution Width 16.4H, 

Platelet Count 339, Mean Platelet Volume 7.1, Neutrophils (%) (Auto) 82.9H, 

Lymphocytes (%) (Auto) 10.6L, Monocytes (%) (Auto) 5.8, Eosinophils (%) (Auto) 

0.4, Basophils (%) (Auto) 0.2, Sodium Level 142, Potassium Level 3.8, Chloride 

Level 109H, Carbon Dioxide Level 24, Anion Gap 9, Blood Urea Nitrogen 11, 

Creatinine 0.7, Estimat Glomerular Filtration Rate , Glucose Level 125H, 

Calcium Level 8.3L





Current Medications








 Medications


  (Trade)  Dose


 Ordered  Sig/Violette


 Route


 PRN Reason  Start Time


 Stop Time Status Last Admin


Dose Admin


 


 Acetaminophen


  (Tylenol)  650 mg  Q6H  PRN


 ORAL


 Mild Pain/Temp > 100.5  11/16/18 17:15


 12/16/18 17:14  11/25/18 08:31


 


 


 Acetaminophen/


 Hydrocodone Bitart


  (Norco 5/325)  1 tab  Q6H  PRN


 ORAL


 PAIN 4-10  11/24/18 11:30


 12/1/18 11:29  11/28/18 04:03


 


 


 Albuterol/


 Ipratropium


  (Albuterol/


 Ipratropium)  3 ml  Q4H  PRN


 HHN


 Shortness of Breath  11/24/18 09:00


 11/29/18 08:59   


 


 


 Atenolol


  (Tenormin)  50 mg  Q12HR


 ORAL


   11/16/18 21:00


 12/16/18 20:59  11/28/18 08:57


 


 


 Atorvastatin


 Calcium


  (Lipitor)  20 mg  BEDTIME


 ORAL


   11/17/18 21:00


 12/17/18 20:59  11/27/18 21:48


 


 


 Clonidine HCl


  (Catapres Tab)  0.1 mg  EVERY 8  HOURS


 ORAL


   11/16/18 22:00


 12/16/18 21:59  11/27/18 13:01


 


 


 Dextrose


  (Dextrose 50%)  25 ml  Q30M  PRN


 IV


 bs 60-69  11/20/18 15:30


 12/20/18 15:29   


 


 


 Dicyclomine HCl


  (Bentyl)  10 mg  QID


 ORAL


   11/16/18 18:00


 12/16/18 17:59  11/28/18 12:36


 


 


 Haloperidol


 Lactate


  (Haldol)  5 mg  Q6H  PRN


 IM


 Agitation  11/19/18 10:45


 12/19/18 10:44   


 


 


 Lorazepam


  (Ativan)  2 mg  Q6H  PRN


 ORAL


 For Anxiety  11/28/18 11:45


 12/5/18 11:44   


 


 


 Losartan Potassium


  (Cozaar)  50 mg  DAILY


 ORAL


   11/20/18 09:00


 12/17/18 08:59  11/28/18 08:56


 


 


 Metformin HCl


  (Glucophage)  500 mg  BID


 ORAL


   11/17/18 09:00


 12/17/18 08:59  11/28/18 08:56


 


 


 Mirtazapine


  (Remeron)  15 mg  BEDTIME


 ORAL


   11/27/18 21:00


 12/27/18 20:59  11/27/18 21:48


 


 


 Phosphorus


  (Phospha 250


 Neutral)  250 mg  THREE TIMES A  DAY


 ORAL


   11/24/18 09:00


 12/24/18 08:59  11/28/18 12:35


 


 


 Potassium Chloride


  (K-Dur)  10 meq  DAILY


 ORAL


   11/17/18 09:00


 12/17/18 08:59  11/28/18 08:56


 


 


 Sitagliptin


 Phosphate


  (Januvia)  50 mg  ACBREAKFAST


 ORAL


   11/17/18 06:30


 12/17/18 06:29  11/27/18 06:00


 


 


 Sorbitol


  (Sorbitol)  45 ml  QHS


 ORAL


   11/22/18 21:00


 12/22/18 20:59  11/25/18 20:15


 


 


 Vitamin D


  (Vitamin D)  5,000 intlu  DAILY


 ORAL


   11/17/18 09:00


 12/17/18 08:59  11/28/18 08:55


 

















Korin Junior MD Nov 28, 2018 12:54

## 2018-11-28 NOTE — GENERAL SURGERY PROGRESS NOTE
General Surgery-Progress Note


Subjective


Additional Comments


no acute events.





Objective





Last 24 Hour Vital Signs








  Date Time  Temp Pulse Resp B/P (MAP) Pulse Ox O2 Delivery O2 Flow Rate FiO2


 


11/28/18 09:00      Room Air  





      Room Air  


 


11/28/18 08:57  78  143/64    


 


11/28/18 08:56    143/64    


 


11/28/18 08:00 98.7 78 18 143/64 (90) 99   


 


11/28/18 04:33 98.1       


 


11/28/18 04:00 98.1 78 20 139/67 (91) 96   


 


11/28/18 00:00 97.0 75 20 141/68 (92) 99   


 


11/27/18 21:48  67  128/59    


 


11/27/18 21:09    128/59    


 


11/27/18 21:00      Room Air  





      Room Air  


 


11/27/18 20:00 98.0 67 20 128/59 (82) 97   


 


11/27/18 19:44  68 16   Room Air  21


 


11/27/18 16:00 97.9 66 16 117/57 (77) 98   


 


11/27/18 13:01    116/60    


 


11/27/18 12:00 96.1 64 16 116/60 (78) 100   








I&O











Intake and Output  


 


 11/27/18 11/28/18





 18:59 06:59


 


Intake Total 1061 ml 220 ml


 


Balance 1061 ml 220 ml


 


  


 


Intake Oral 236 ml 220 ml


 


IV Total 825 ml 


 


# Voids 1 4








Dressing:  saturated


Wound:  other


Drains:  other


Cardiovascular:  RSR


Respiratory:  decreased breath sounds


Abdomen:  soft, flat, non-tender, present bowel sounds


Extremities:  other





Laboratory Tests








Test


  11/28/18


05:00


 


White Blood Count


  9.3 K/UL


(4.8-10.8)


 


Red Blood Count


  3.40 M/UL


(4.20-5.40)  L


 


Hemoglobin


  9.1 G/DL


(12.0-16.0)  L


 


Hematocrit


  28.8 %


(37.0-47.0)  L


 


Mean Corpuscular Volume 85 FL (80-99)  


 


Mean Corpuscular Hemoglobin


  26.7 PG


(27.0-31.0)  L


 


Mean Corpuscular Hemoglobin


Concent 31.5 G/DL


(32.0-36.0)  L


 


Red Cell Distribution Width


  16.4 %


(11.6-14.8)  H


 


Platelet Count


  339 K/UL


(150-450)


 


Mean Platelet Volume


  7.1 FL


(6.5-10.1)


 


Neutrophils (%) (Auto)


  82.9 %


(45.0-75.0)  H


 


Lymphocytes (%) (Auto)


  10.6 %


(20.0-45.0)  L


 


Monocytes (%) (Auto)


  5.8 %


(1.0-10.0)


 


Eosinophils (%) (Auto)


  0.4 %


(0.0-3.0)


 


Basophils (%) (Auto)


  0.2 %


(0.0-2.0)


 


Sodium Level


  142 MMOL/L


(136-145)


 


Potassium Level


  3.8 MMOL/L


(3.5-5.1)


 


Chloride Level


  109 MMOL/L


()  H


 


Carbon Dioxide Level


  24 MMOL/L


(21-32)


 


Anion Gap


  9 mmol/L


(5-15)


 


Blood Urea Nitrogen


  11 mg/dL


(7-18)


 


Creatinine


  0.7 MG/DL


(0.55-1.30)


 


Estimat Glomerular Filtration


Rate  mL/min (>60)  


 


 


Glucose Level


  125 MG/DL


()  H


 


Calcium Level


  8.3 MG/DL


(8.5-10.1)  L











Plan


Problems:  


(1) Sacral decubitus ulcer, stage III


Assessment & Plan:  Multiple pressure injuries to sacrum and R hip. Four 

individual pressure injuries in close proximity noted  to sacrum,Total area 

measuring (L)2cm x (W)7 cm., each wound with 100% yellow slough with red 

margins. Non-blanchable erythema periwound. 


Small pressure injury noted to L buttocks (L)0.4cm x(W)0.5cm with 100% slough. 


Full thickness pressure injury to R hip at historical surgical incision (L)

1.1cm x(W)0.9cm ,with 90%  yellow slough noted. Non-blanchable erythema noted 

periwound ,an area of (L)7cm x (W)6.6cm . 


Stable dry eschar medial R heel. Periwound without erythema or induration. 


Wounds present upon admission and will be cared for during hospital stay





Tx.Plan:


Reposition at least every 2hours or as tolerated.


           


Air fluidized mattress.


           


Apply Cavilon wipes to heels and off-load heels with pillow.


           


Cleanse wounds Sacrum  with Saline. Apply Therahoney to wounds .Cavilon to 

borders and cover with Optifoam Drsg.


           


Cleanse R hip wound with Saline.Apply Therahoney.Apply Cavilon wipe to red area 

periwound and cover with 


           


Optifoam drsg Daily and prn.





Will likely need excisional vs non excisional debridement of sloth down to 

healthy viable tissue.  may consider during hospitalization once ortho and GI 

care completed. Plan for outpatient care. 





Nutritional support 





Ortho f/u of wound 





okay to d/c from surgical standpoint 





(2) Failure to thrive


Assessment & Plan:  elderly female with dementia


albumin 2


multiple decubitus ulcers





nutritional consult


increase protein and Caloric intake


will need optimization to help heal wounds 





DAILY ESTIMATED NEEDS:


Needs based on Wound, DM, wasting / 51kg 


30-35  kcals/kg 


8331-1929  total kcals


1.25-1.5  g protein/kg


64-77  g total protein 





NUTRITION DIAGNOSIS:


* Increased kcal/prot needs R/T wound healing as evidenced by pt w/ stage 3 

sacral and R hip wounds, refer to WC eval.





PO DIET RECOMMENDATIONS:


[place]/ Regular diet w/ <50% po intake (texture per SLP) 





ADDITIONAL RECOMMENDATIONS:


* Calibrated bedscale wt for accurate CBW 


* Wound healing: add MVI w/ min 1 tab QD, Vit C 500mg BID, Harjeet 1pkt BID 


* Add Glucerna 1 tetra sana TID 


* Lytes daily, replete as needed ( Mg 1.6) 





. 














Tuan Patricia Nov 28, 2018 11:55

## 2018-11-28 NOTE — GENERAL PROGRESS NOTE
Assessment/Plan


Assessment/Plan


Assessment


- constipation - on sorbitol


- rectal bleeding due to ulcerated hemorrhoids


- Anemia  


- agitation/OBS


- dislocated hip - s/p arthroplasty conversion





Recommendations


- push po


- Monitor H&H


- transfuse PRN


- Ortho f/u





Subjective


Allergies:  


Coded Allergies:  


     No Known Allergies (Unverified , 3/26/14)


Subjective


no events


no abdominal issues


(+) BM


more calm


for d/c today





Objective





Last 24 Hour Vital Signs








  Date Time  Temp Pulse Resp B/P (MAP) Pulse Ox O2 Delivery O2 Flow Rate FiO2


 


11/28/18 16:00 99.5 80 18 138/67 (90) 98   


 


11/28/18 13:35 96.1       


 


11/28/18 13:05    116/60    


 


11/28/18 12:00 96.1 64 16 116/60 (78) 100   


 


11/28/18 09:00      Room Air  





      Room Air  


 


11/28/18 08:57  78  143/64    


 


11/28/18 08:56    143/64    


 


11/28/18 08:00 98.7 78 18 143/64 (90) 99   


 


11/28/18 04:00 98.1 78 20 139/67 (91) 96   


 


11/28/18 00:00 97.0 75 20 141/68 (92) 99   


 


11/27/18 21:48  67  128/59    


 


11/27/18 21:09    128/59    


 


11/27/18 21:00      Room Air  





      Room Air  

















Intake and Output  


 


 11/27/18 11/28/18





 19:00 07:00


 


Intake Total 1061 ml 220 ml


 


Balance 1061 ml 220 ml


 


  


 


Intake Oral 236 ml 220 ml


 


IV Total 825 ml 


 


# Voids 1 4








Laboratory Tests


11/28/18 05:00: 


White Blood Count 9.3, Red Blood Count 3.40L, Hemoglobin 9.1L, Hematocrit 28.8L

, Mean Corpuscular Volume 85, Mean Corpuscular Hemoglobin 26.7L, Mean 

Corpuscular Hemoglobin Concent 31.5L, Red Cell Distribution Width 16.4H, 

Platelet Count 339, Mean Platelet Volume 7.1, Neutrophils (%) (Auto) 82.9H, 

Lymphocytes (%) (Auto) 10.6L, Monocytes (%) (Auto) 5.8, Eosinophils (%) (Auto) 

0.4, Basophils (%) (Auto) 0.2, Sodium Level 142, Potassium Level 3.8, Chloride 

Level 109H, Carbon Dioxide Level 24, Anion Gap 9, Blood Urea Nitrogen 11, 

Creatinine 0.7, Estimat Glomerular Filtration Rate , Glucose Level 125H, 

Calcium Level 8.3L


Height (Feet):  5


Height (Inches):  5.00


Weight (Pounds):  148


Objective


WDWN


NCAT


supple


CTA


RRR


abd soft


no edema











Florina Butler MD Nov 28, 2018 20:45

## 2018-11-29 NOTE — DISCHARGE SUMMARY
Discharge Summary


Discharge Summary


_


DATE OF ADMISSION: 11/16/2018


DATE OF DISCHARGE: 11/28/2018





CONSULTANTS: 


Dr. Nacho Nunez





BRIEF HOSPITAL COURSE:


Patient is an 78-year-old Nigerien-speaking female, who presented with chief 

complaint of generalized weakness.  Patient was admitted to [facility] on 10/04/2018 and underwent right hip hemiarthroplasty due to right hip 

displaced femoral neck fracture.  She presented with a right dislocated 

hemiarthroplasty, and on 10/15/2018, underwent open reduction of right 

hemiarthroplasty dislocation.  She was subsequently discharged to a 

rehabilitation center and was subsequently discharged home with home health.  

She was noted to have increasing weakness home.  She was not able to ambulate.  

She was then taken to [facility] emergency room.  





On evaluation at ED, vital signs were stable.  Blood work did not show any 

leukocytosis.  Hemoglobin was 9, hematocrit 30.  Troponin was negative.  EKG 

showed normal sinus rhythm with no acute changes.  CT of the head did not show 

any acute findings.  Chest x-ray showed developing bilateral pleural effusions.

   She was admitted for evaluation of failure to thrive and generalized 

weakness.  She was continued on her home medications.  Patient is status post 

pacemaker implantation.  She was given atenolol and Cozaar.  She was continued 

on simvastatin.  Blood glucose was monitored.  





Patient has anxiety and depression and had been agitated.  Psychiatrist was 

consulted.  She was assessed to lack capacity to make medical decisions.  She 

was placed on trazodone and Klonopin.  Patient was agitated she was given 

lorazepam and Haldol.





She was noted to have stage III sacral decubitus ulcer on admission.  Surgery 

was called to evaluate and assist with care and management.  She was given 

wound care.  She was placed on air mattress.  Advised nutritional support to 

optimize wound healing.





She was noted to have a bowel movement with blood.  GI was consulted.  She was 

placed on clear liquid diet.  CBC was monitored.  There was a drop in patient's 

hemoglobin.  She required blood transfusion.





She complained on pain on the right hip.  X-ray done showed superior 

dislocation of the right femoral hemiarthroplasty prostheses.  Dr. Bella was 

consulted.  Patient had been noncompliant and had dislocated the hip.  On 11/20/ 2018, she underwent conversion of previous hip surgery to total hip arthroplasty

, complicated secondary to chronic dislocation.  She also underwent flexible 

sigmoidoscopy, originally planned as colonoscopy but procedure change to 

sigmoidoscopy due to poor colonic preparation.  Findings showed ulcerated 

internal hemorrhoids, there was no evidence of active bleed.





Postoperatively, she was given pain control.  She was given physical therapy.





There was again drop in hemoglobin on 11/23/2018, she received another 1 unit 

packed RBC blood transfusion.  She was given IV iron.





Dr. Moses was consulted.  Patient was recommended physical therapy, 

occupational therapy, speech therapy and 24-hour nursing care.  Acute inpatient 

rehabilitation was warranted.  She was eventually discharged to [facility] acute rehabilitation.





FINAL DIAGNOSES: 


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


Rectal bleeding secondary to ulcerated hemorrhoids, status post sigmoidoscopy 

on 11/20/2018


Severe anemia requiring blood transfusion


Alzheimer's dementia


Pulmonary hypertension


Aortic stenosis


Sick sinus syndrome


Eppert tension


Diabetes mellitus type 2


Constipation


Agitation


Sacral decubitus ulcer, stage III, present on admission


Protein calorie malnutrition, severe


Psychosis


Coronary artery disease











DISPOSITION: Patient was discharged to Acute Rehab.





DISCHARGE MEDICATIONS: Refer to Discharge Medication List.





I have been assigned to dictate discharge summary on this account, and I was 

not involved in the patient's management.











Anna Proctor NP Nov 29, 2018 16:06

## 2018-12-26 ENCOUNTER — HOSPITAL ENCOUNTER (EMERGENCY)
Dept: HOSPITAL 72 - EMR | Age: 78
Discharge: NURSING, REHAB OR LONG-TERM CARE | End: 2018-12-26
Payer: MEDICARE

## 2018-12-26 VITALS — SYSTOLIC BLOOD PRESSURE: 125 MMHG | DIASTOLIC BLOOD PRESSURE: 76 MMHG

## 2018-12-26 VITALS — DIASTOLIC BLOOD PRESSURE: 61 MMHG | SYSTOLIC BLOOD PRESSURE: 126 MMHG

## 2018-12-26 VITALS — BODY MASS INDEX: 20.4 KG/M2 | HEIGHT: 67 IN | WEIGHT: 130 LBS

## 2018-12-26 VITALS — DIASTOLIC BLOOD PRESSURE: 61 MMHG | SYSTOLIC BLOOD PRESSURE: 124 MMHG

## 2018-12-26 DIAGNOSIS — F20.9: ICD-10-CM

## 2018-12-26 DIAGNOSIS — E11.9: ICD-10-CM

## 2018-12-26 DIAGNOSIS — Z95.0: ICD-10-CM

## 2018-12-26 DIAGNOSIS — I38: Primary | ICD-10-CM

## 2018-12-26 DIAGNOSIS — J45.909: ICD-10-CM

## 2018-12-26 DIAGNOSIS — F02.80: ICD-10-CM

## 2018-12-26 DIAGNOSIS — Z95.1: ICD-10-CM

## 2018-12-26 DIAGNOSIS — G30.9: ICD-10-CM

## 2018-12-26 DIAGNOSIS — I10: ICD-10-CM

## 2018-12-26 PROCEDURE — 99282 EMERGENCY DEPT VISIT SF MDM: CPT

## 2018-12-26 NOTE — EMERGENCY ROOM REPORT
History of Present Illness


General


Chief Complaint:  General Complaint


Source:  EMS





Present Illness


HPI


Patient was sent to the emergency room as she has had no IV access


Apparently recently pulled out her PICC line


Which was being used to treat endocarditis





Patient herself has severe Alzheimer's and dementia


History is obtained from the nursing facility notes and paramedics patient 

herself cannot provide any appropriate input there was no reports of any 

vomiting or diarrhea





No documented recent fevers


Allergies:  


Coded Allergies:  


     No Known Allergies (Unverified , 3/26/14)





Patient History


Limited by:  medical condition


Past Medical History:  see triage record


Pertinent Family History:  none


Reviewed Nursing Documentation:  PMH: Agreed; PSxH: Agreed





Nursing Documentation-PMH


Past Medical History:  No History, Except For


Hx Cardiac Problems:  Yes - HTN, PACEMAKER, 3 BYPASS SUGERIES


Hx Hypertension:  Yes


Hx Pacemaker:  Yes


Hx Asthma:  Yes


Hx Diabetes:  Yes


Hx Cancer:  No - UNKNOWN


Hx Gastrointestinal Problems:  No - UNKNOWN, PT IS CONFUSED


Hx Neurological Problems:  Yes - DEMENTIA, ALHZEIMERS, SCHIZOPHRENIA


Hx Dementia:  Yes


Hx Memory Loss:  Yes





Review of Systems


All Other Systems:  limited - Other than the ones mentioned in the history of 

present illness all others are reviewed however they do stay limited due to the 

patient's mental status





Physical Exam





Vital Signs








  Date Time  Temp Pulse Resp B/P (MAP) Pulse Ox O2 Delivery O2 Flow Rate FiO2


 


12/26/18 17:58 98.2 85 17 125/76 98 Room Air  








Sp02 EP Interpretation:  reviewed, normal


General Appearance:  no apparent distress


Head:  normocephalic, atraumatic


Eyes:  bilateral eye PERRL, bilateral eye EOMI


ENT:  normal pharynx


Neck:  supple


Respiratory:  lungs clear


Cardiovascular #1:  regular rate, rhythm


Gastrointestinal:  non tender, soft


Musculoskeletal:  other - No obvious focal deficit


Neurologic:  responsive - To verbal and physical stimuli


Skin:  normal color, no rash


Lymphatic:  no adenopathy





Medical Decision Making


Diagnostic Impression:  


 Primary Impression:  


 Alzheimer's dementia


 Additional Impression:  


 Endocarditis


ER Course


Given the patient's history and presentation


Patient's primary physician at the nursing facility is contacted





He will accept a peripheral line at this time





Patient did have a peripheral line placed


And is able to have continued medications at the nursing facility


Disposition for further outpatient care


Rhythm Strip Diag. Results


EP Interpretation:  yes


Rate:  66


Rhythm:  NSR, no PVC's, no ectopy





Last Vital Signs








  Date Time  Temp Pulse Resp B/P (MAP) Pulse Ox O2 Delivery O2 Flow Rate FiO2


 


12/26/18 18:40 98.2 76 17 125/76 98 Room Air  








Status:  improved


Disposition:  XFER SNF


Condition:  Improved





Additional Instructions:  


Please follow-up with primary physician next one day, patient will require 

likely or permanent IV access however the peripheral line can be used safely 

for the next 2-3 days











Jimmy Mi DO Dec 26, 2018 19:22

## 2019-02-07 ENCOUNTER — HOSPITAL ENCOUNTER (INPATIENT)
Dept: HOSPITAL 72 - EMR | Age: 79
LOS: 16 days | DRG: 871 | End: 2019-02-23
Payer: MEDICARE

## 2019-02-07 VITALS — SYSTOLIC BLOOD PRESSURE: 110 MMHG | DIASTOLIC BLOOD PRESSURE: 64 MMHG

## 2019-02-07 VITALS — WEIGHT: 147 LBS | HEIGHT: 65 IN | BODY MASS INDEX: 24.49 KG/M2

## 2019-02-07 VITALS — SYSTOLIC BLOOD PRESSURE: 119 MMHG | DIASTOLIC BLOOD PRESSURE: 67 MMHG

## 2019-02-07 VITALS — DIASTOLIC BLOOD PRESSURE: 62 MMHG | SYSTOLIC BLOOD PRESSURE: 120 MMHG

## 2019-02-07 VITALS — SYSTOLIC BLOOD PRESSURE: 126 MMHG | DIASTOLIC BLOOD PRESSURE: 68 MMHG

## 2019-02-07 VITALS — SYSTOLIC BLOOD PRESSURE: 128 MMHG | DIASTOLIC BLOOD PRESSURE: 63 MMHG

## 2019-02-07 VITALS — DIASTOLIC BLOOD PRESSURE: 68 MMHG | SYSTOLIC BLOOD PRESSURE: 132 MMHG

## 2019-02-07 DIAGNOSIS — I47.2: ICD-10-CM

## 2019-02-07 DIAGNOSIS — I35.0: ICD-10-CM

## 2019-02-07 DIAGNOSIS — E86.0: ICD-10-CM

## 2019-02-07 DIAGNOSIS — E43: ICD-10-CM

## 2019-02-07 DIAGNOSIS — Z74.01: ICD-10-CM

## 2019-02-07 DIAGNOSIS — I38: ICD-10-CM

## 2019-02-07 DIAGNOSIS — R13.10: ICD-10-CM

## 2019-02-07 DIAGNOSIS — Z96.641: ICD-10-CM

## 2019-02-07 DIAGNOSIS — K62.5: ICD-10-CM

## 2019-02-07 DIAGNOSIS — I76: ICD-10-CM

## 2019-02-07 DIAGNOSIS — E11.9: ICD-10-CM

## 2019-02-07 DIAGNOSIS — T80.29XA: ICD-10-CM

## 2019-02-07 DIAGNOSIS — K52.9: ICD-10-CM

## 2019-02-07 DIAGNOSIS — G30.9: ICD-10-CM

## 2019-02-07 DIAGNOSIS — F02.80: ICD-10-CM

## 2019-02-07 DIAGNOSIS — N17.0: ICD-10-CM

## 2019-02-07 DIAGNOSIS — D69.6: ICD-10-CM

## 2019-02-07 DIAGNOSIS — G93.41: ICD-10-CM

## 2019-02-07 DIAGNOSIS — I21.A1: ICD-10-CM

## 2019-02-07 DIAGNOSIS — D64.9: ICD-10-CM

## 2019-02-07 DIAGNOSIS — I25.2: ICD-10-CM

## 2019-02-07 DIAGNOSIS — I25.10: ICD-10-CM

## 2019-02-07 DIAGNOSIS — K92.0: ICD-10-CM

## 2019-02-07 DIAGNOSIS — J90: ICD-10-CM

## 2019-02-07 DIAGNOSIS — I50.20: ICD-10-CM

## 2019-02-07 DIAGNOSIS — E87.6: ICD-10-CM

## 2019-02-07 DIAGNOSIS — E87.2: ICD-10-CM

## 2019-02-07 DIAGNOSIS — E78.00: ICD-10-CM

## 2019-02-07 DIAGNOSIS — Z95.1: ICD-10-CM

## 2019-02-07 DIAGNOSIS — I11.0: ICD-10-CM

## 2019-02-07 DIAGNOSIS — L03.114: ICD-10-CM

## 2019-02-07 DIAGNOSIS — I49.5: ICD-10-CM

## 2019-02-07 DIAGNOSIS — D61.818: ICD-10-CM

## 2019-02-07 DIAGNOSIS — J15.0: ICD-10-CM

## 2019-02-07 DIAGNOSIS — Z95.0: ICD-10-CM

## 2019-02-07 DIAGNOSIS — N39.0: ICD-10-CM

## 2019-02-07 DIAGNOSIS — L89.153: ICD-10-CM

## 2019-02-07 DIAGNOSIS — I26.90: ICD-10-CM

## 2019-02-07 DIAGNOSIS — R62.7: ICD-10-CM

## 2019-02-07 DIAGNOSIS — A41.02: Primary | ICD-10-CM

## 2019-02-07 DIAGNOSIS — I27.20: ICD-10-CM

## 2019-02-07 LAB
ADD MANUAL DIFF: YES
ALBUMIN SERPL-MCNC: 2.1 G/DL (ref 3.4–5)
ALBUMIN/GLOB SERPL: 0.5 {RATIO} (ref 1–2.7)
ALP SERPL-CCNC: 133 U/L (ref 46–116)
ALT SERPL-CCNC: 85 U/L (ref 12–78)
ANION GAP SERPL CALC-SCNC: 13 MMOL/L (ref 5–15)
APPEARANCE UR: CLEAR
APTT PPP: YELLOW S
AST SERPL-CCNC: 204 U/L (ref 15–37)
BILIRUB DIRECT SERPL-MCNC: 1.2 MG/DL (ref 0–0.3)
BILIRUB SERPL-MCNC: 2 MG/DL (ref 0.2–1)
BUN SERPL-MCNC: 47 MG/DL (ref 7–18)
CALCIUM SERPL-MCNC: 9.2 MG/DL (ref 8.5–10.1)
CHLORIDE SERPL-SCNC: 111 MMOL/L (ref 98–107)
CK MB SERPL-MCNC: 0.8 NG/ML (ref 0–3.6)
CK SERPL-CCNC: 211 U/L (ref 26–308)
CO2 SERPL-SCNC: 21 MMOL/L (ref 21–32)
CREAT SERPL-MCNC: 1.5 MG/DL (ref 0.55–1.3)
ERYTHROCYTE [DISTWIDTH] IN BLOOD BY AUTOMATED COUNT: 16.9 % (ref 11.6–14.8)
GLOBULIN SER-MCNC: 4.5 G/DL
GLUCOSE UR STRIP-MCNC: NEGATIVE MG/DL
HCT VFR BLD CALC: 36.4 % (ref 37–47)
HGB BLD-MCNC: 11.3 G/DL (ref 12–16)
KETONES UR QL STRIP: (no result)
LEUKOCYTE ESTERASE UR QL STRIP: (no result)
MCV RBC AUTO: 93 FL (ref 80–99)
NITRITE UR QL STRIP: NEGATIVE
PH UR STRIP: 5 [PH] (ref 4.5–8)
PLATELET # BLD: 124 K/UL (ref 150–450)
POTASSIUM SERPL-SCNC: 5.2 MMOL/L (ref 3.5–5.1)
PROT UR QL STRIP: (no result)
RBC # BLD AUTO: 3.92 M/UL (ref 4.2–5.4)
SODIUM SERPL-SCNC: 145 MMOL/L (ref 136–145)
SP GR UR STRIP: 1.01 (ref 1–1.03)
UROBILINOGEN UR-MCNC: 4 MG/DL (ref 0–1)
WBC # BLD AUTO: 14.6 K/UL (ref 4.8–10.8)

## 2019-02-07 PROCEDURE — 82378 CARCINOEMBRYONIC ANTIGEN: CPT

## 2019-02-07 PROCEDURE — 86171 COMPLEMENT FIXATION EACH: CPT

## 2019-02-07 PROCEDURE — 76937 US GUIDE VASCULAR ACCESS: CPT

## 2019-02-07 PROCEDURE — 86612 BLASTOMYCES ANTIBODY: CPT

## 2019-02-07 PROCEDURE — 89050 BODY FLUID CELL COUNT: CPT

## 2019-02-07 PROCEDURE — 84550 ASSAY OF BLOOD/URIC ACID: CPT

## 2019-02-07 PROCEDURE — 87340 HEPATITIS B SURFACE AG IA: CPT

## 2019-02-07 PROCEDURE — 85610 PROTHROMBIN TIME: CPT

## 2019-02-07 PROCEDURE — 82553 CREATINE MB FRACTION: CPT

## 2019-02-07 PROCEDURE — 99285 EMERGENCY DEPT VISIT HI MDM: CPT

## 2019-02-07 PROCEDURE — 76700 US EXAM ABDOM COMPLETE: CPT

## 2019-02-07 PROCEDURE — 86705 HEP B CORE ANTIBODY IGM: CPT

## 2019-02-07 PROCEDURE — 71045 X-RAY EXAM CHEST 1 VIEW: CPT

## 2019-02-07 PROCEDURE — 80076 HEPATIC FUNCTION PANEL: CPT

## 2019-02-07 PROCEDURE — 82550 ASSAY OF CK (CPK): CPT

## 2019-02-07 PROCEDURE — 86920 COMPATIBILITY TEST SPIN: CPT

## 2019-02-07 PROCEDURE — 82746 ASSAY OF FOLIC ACID SERUM: CPT

## 2019-02-07 PROCEDURE — 93926 LOWER EXTREMITY STUDY: CPT

## 2019-02-07 PROCEDURE — 83735 ASSAY OF MAGNESIUM: CPT

## 2019-02-07 PROCEDURE — 74018 RADEX ABDOMEN 1 VIEW: CPT

## 2019-02-07 PROCEDURE — 80202 ASSAY OF VANCOMYCIN: CPT

## 2019-02-07 PROCEDURE — 84100 ASSAY OF PHOSPHORUS: CPT

## 2019-02-07 PROCEDURE — 80053 COMPREHEN METABOLIC PANEL: CPT

## 2019-02-07 PROCEDURE — 86580 TB INTRADERMAL TEST: CPT

## 2019-02-07 PROCEDURE — 86140 C-REACTIVE PROTEIN: CPT

## 2019-02-07 PROCEDURE — 86900 BLOOD TYPING SEROLOGIC ABO: CPT

## 2019-02-07 PROCEDURE — 93306 TTE W/DOPPLER COMPLETE: CPT

## 2019-02-07 PROCEDURE — 87205 SMEAR GRAM STAIN: CPT

## 2019-02-07 PROCEDURE — 84484 ASSAY OF TROPONIN QUANT: CPT

## 2019-02-07 PROCEDURE — 74177 CT ABD & PELVIS W/CONTRAST: CPT

## 2019-02-07 PROCEDURE — 83615 LACTATE (LD) (LDH) ENZYME: CPT

## 2019-02-07 PROCEDURE — 92950 HEART/LUNG RESUSCITATION CPR: CPT

## 2019-02-07 PROCEDURE — 83540 ASSAY OF IRON: CPT

## 2019-02-07 PROCEDURE — 83690 ASSAY OF LIPASE: CPT

## 2019-02-07 PROCEDURE — 94640 AIRWAY INHALATION TREATMENT: CPT

## 2019-02-07 PROCEDURE — 82947 ASSAY GLUCOSE BLOOD QUANT: CPT

## 2019-02-07 PROCEDURE — 36569 INSJ PICC 5 YR+ W/O IMAGING: CPT

## 2019-02-07 PROCEDURE — 93970 EXTREMITY STUDY: CPT

## 2019-02-07 PROCEDURE — 94664 DEMO&/EVAL PT USE INHALER: CPT

## 2019-02-07 PROCEDURE — 83010 ASSAY OF HAPTOGLOBIN QUANT: CPT

## 2019-02-07 PROCEDURE — 82803 BLOOD GASES ANY COMBINATION: CPT

## 2019-02-07 PROCEDURE — 87040 BLOOD CULTURE FOR BACTERIA: CPT

## 2019-02-07 PROCEDURE — 85651 RBC SED RATE NONAUTOMATED: CPT

## 2019-02-07 PROCEDURE — 86901 BLOOD TYPING SEROLOGIC RH(D): CPT

## 2019-02-07 PROCEDURE — 82270 OCCULT BLOOD FECES: CPT

## 2019-02-07 PROCEDURE — 83605 ASSAY OF LACTIC ACID: CPT

## 2019-02-07 PROCEDURE — 87081 CULTURE SCREEN ONLY: CPT

## 2019-02-07 PROCEDURE — 87181 SC STD AGAR DILUTION PER AGT: CPT

## 2019-02-07 PROCEDURE — 36415 COLL VENOUS BLD VENIPUNCTURE: CPT

## 2019-02-07 PROCEDURE — 80048 BASIC METABOLIC PNL TOTAL CA: CPT

## 2019-02-07 PROCEDURE — 87070 CULTURE OTHR SPECIMN AEROBIC: CPT

## 2019-02-07 PROCEDURE — 87116 MYCOBACTERIA CULTURE: CPT

## 2019-02-07 PROCEDURE — 83550 IRON BINDING TEST: CPT

## 2019-02-07 PROCEDURE — 36600 WITHDRAWAL OF ARTERIAL BLOOD: CPT

## 2019-02-07 PROCEDURE — 85025 COMPLETE CBC W/AUTO DIFF WBC: CPT

## 2019-02-07 PROCEDURE — 96374 THER/PROPH/DIAG INJ IV PUSH: CPT

## 2019-02-07 PROCEDURE — 86709 HEPATITIS A IGM ANTIBODY: CPT

## 2019-02-07 PROCEDURE — 84133 ASSAY OF URINE POTASSIUM: CPT

## 2019-02-07 PROCEDURE — 87556 M.TUBERCULO DNA AMP PROBE: CPT

## 2019-02-07 PROCEDURE — 84300 ASSAY OF URINE SODIUM: CPT

## 2019-02-07 PROCEDURE — 85730 THROMBOPLASTIN TIME PARTIAL: CPT

## 2019-02-07 PROCEDURE — 93971 EXTREMITY STUDY: CPT

## 2019-02-07 PROCEDURE — 81001 URINALYSIS AUTO W/SCOPE: CPT

## 2019-02-07 PROCEDURE — 85384 FIBRINOGEN ACTIVITY: CPT

## 2019-02-07 PROCEDURE — 93005 ELECTROCARDIOGRAM TRACING: CPT

## 2019-02-07 PROCEDURE — 86850 RBC ANTIBODY SCREEN: CPT

## 2019-02-07 PROCEDURE — 86803 HEPATITIS C AB TEST: CPT

## 2019-02-07 PROCEDURE — 94760 N-INVAS EAR/PLS OXIMETRY 1: CPT

## 2019-02-07 PROCEDURE — 85007 BL SMEAR W/DIFF WBC COUNT: CPT

## 2019-02-07 PROCEDURE — 71260 CT THORAX DX C+: CPT

## 2019-02-07 PROCEDURE — 82248 BILIRUBIN DIRECT: CPT

## 2019-02-07 PROCEDURE — 82962 GLUCOSE BLOOD TEST: CPT

## 2019-02-07 PROCEDURE — 86635 COCCIDIOIDES ANTIBODY: CPT

## 2019-02-07 PROCEDURE — 82728 ASSAY OF FERRITIN: CPT

## 2019-02-07 RX ADMIN — HEPARIN SODIUM SCH UNITS: 5000 INJECTION INTRAVENOUS; SUBCUTANEOUS at 22:08

## 2019-02-07 RX ADMIN — INSULIN ASPART SCH UNITS: 100 INJECTION, SOLUTION INTRAVENOUS; SUBCUTANEOUS at 20:57

## 2019-02-07 RX ADMIN — INSULIN ASPART SCH UNITS: 100 INJECTION, SOLUTION INTRAVENOUS; SUBCUTANEOUS at 22:10

## 2019-02-07 NOTE — HISTORY & PHYSICAL
History and Physical


History & Physicial





Patient: BHUMIKA GUTIERREZ


[facility] Rec #: [id number]


Date of Service: 02/07/19


 


DATE OF ADMISSION:  02/07/2019


CHIEF COMPLAINT:  The patient is an 78-year-old British-speaking female who 

presents with chief complaint of generalized weakness and decreased oral intake.





HISTORY OF PRESENT ILLNESS:  The patient was admitted to [facility] 

from 10/14/2018 to 10/19/2018.  The patient is status post open reduction and 

internal fixation and right hip hemiarthroplasty on 10/15/2018.  The patient is 

status post open reduction and internal fixation of the right hip on 10/04/

2018.  The patient had dislocation on


10/14/2018.  The patient underwent open reduction of right hip hemiarthroplasty 

dislocation on 10/15/2018.





She presented to the hospital from country we last saw nursing facility due to 

the severe dehydration, decreased oral intake, and weakness. 


she has not been able to ambulate.  The patient presented to [place] emergency 

department as per request by Dr. Cheo Ortiz for further evaluation and 

aggressive therapy.  Shortly after initial evaluation emergency patient was 

noted to have elevated lactic acid and severe dehydration and subsequently 

patient was admitted to hospital for possible sepsis and acute kidney injury 

with dehydration.





REVIEW OF SYSTEMS:  Unable to assess secondary to the patient's mental


status.





PAST MEDICAL HISTORY:  Significant for:





1. Right hip fracture on 10/02/2018 as above, status post right hip


hemiarthroplasty on 10/04/2018.


2. Dislocation of right hip arthroplasty and subsequent reduction of


dislocation on 10/15/2018 as above.


3. Diabetes type 2.


4. Hypertension.


5. Sick sinus syndrome.


6. Hypercholesterolemia.


7. Coronary artery disease, status post non-ST elevated myocardial


infarction.


8. Hypertension.


9. Aortic stenosis.


10. Pulmonary hypertension.


11. Alzheimer dementia.





PAST SURGICAL HISTORY:  Significant for:





1. Reduction of right hip hemiarthroplasty dislocation on 10/15/2018.


2. Right hip hemiarthroplasty on 10/04/2018.


3. Coronary artery bypass graft in 2017.


4. Pacemaker generator change in 2017.


5. Elbow debridement.


6. Pacemaker implantation in 2014.





CURRENT MEDICATIONS:


1. Aspirin 81 mg p.o. daily.


2. Atenolol 50 mg p.o. twice daily.


3. Klonopin 0.5 mg p.o. q.6 h. p.r.n.


4. Clonidine 0.1 mg p.o. q.8 h.


5. Clopidogrel 75 mg p.o. daily.


6. Bentyl 10 mg p.o. four times daily.


7. Pepcid 20 mg p.o. at bedtime.


8. Lasix 40 mg p.o. daily.


9. Norco 5/325 one tablet p.o. q.6 h. p.r.n.


10. Losartan 25 mg p.o. daily.


11. Potassium chloride 10 mEq p.o. daily.


12. Crestor 10 mg p.o. daily.


13. Sitagliptin/metformin 50/1000 one tablet p.o. daily.


14. Trazodone 50 mg p.o. at bedtime.


15. Vitamin D 5000 units p.o. daily.





ALLERGIES:  No known drug allergies.





SOCIAL HISTORY:  The patient is  and lives with her .  The


patient denies tobacco or alcohol use.





PHYSICAL EXAMINATION:


VITAL SIGNS: 








  Date Time  Temp Pulse Resp B/P (MAP) Pulse Ox O2 Delivery O2 Flow Rate FiO2


 


2/7/19 16:05 98.9 86 18 132/68 100 Room Air  


 


2/7/19 14:05 99.4 80 18 126/68 100 Room Air  


 


2/7/19 13:05 99.4 78 18 120/62 100 Room Air  


 


2/7/19 12:05  68 20   Room Air  


 


2/7/19 12:05 99.4 78 18 110/64 100 Room Air  


 


2/7/19 11:54 99.5 68 20 100/64 98 Room Air  








GENERAL: Awake responsive to deep stimuli with opening of her eyes, in no 

apparent distress.


HEENT:  Eyes, pupils equal responsive to light and accommodation.


Extraocular movements are intact.


NECK:  Supple.  No lymphadenopathy.


CHEST: Poor inspiratory effort, decreased air in the bases no wheezes or 

rhonchi was appreciated


CARDIOVASCULAR:  Regular rhythm and rate.  S1 and S2 are normal without murmurs 

or gallops.


ABDOMEN:  Soft, nontender, and nondistended.  Positive bowel sounds.  No 

rebounding or guarding noted.


EXTREMITIES:  Negative for clubbing, cyanosis, or edema, muscle atrophy in 

bilateral lower extremity


RECTAL/GENITAL:  Not performed.


NEUROLOGIC: Less responsive, unable to follow commands, however open her eyes 

with a deep stimulation, unable to evaluate for gait due to the patient's 

status.





LABORATORY STUDIES:  








Test


  2/7/19


12:07 2/7/19


13:00 2/7/19


13:36 2/7/19


19:20


 


White Blood Count


  14.6 K/UL


(4.8-10.8) 


  


  


 


 


Red Blood Count


  3.92 M/UL


(4.20-5.40) 


  


  


 


 


Hemoglobin


  11.3 G/DL


(12.0-16.0) 


  


  


 


 


Hematocrit


  36.4 %


(37.0-47.0) 


  


  


 


 


Mean Corpuscular Volume 93 FL (80-99)    


 


Mean Corpuscular Hemoglobin


  28.9 PG


(27.0-31.0) 


  


  


 


 


Mean Corpuscular Hemoglobin


Concent 31.2 G/DL


(32.0-36.0) 


  


  


 


 


Red Cell Distribution Width


  16.9 %


(11.6-14.8) 


  


  


 


 


Platelet Count


  124 K/UL


(150-450) 


  


  


 


 


Mean Platelet Volume


  9.5 FL


(6.5-10.1) 


  


  


 


 


Neutrophils (%) (Auto)  % (45.0-75.0)    


 


Lymphocytes (%) (Auto)  % (20.0-45.0)    


 


Monocytes (%) (Auto)  % (1.0-10.0)    


 


Eosinophils (%) (Auto)  % (0.0-3.0)    


 


Basophils (%) (Auto)  % (0.0-2.0)    


 


Differential Total Cells


Counted 100 


  


  


  


 


 


Neutrophils % (Manual) 84 % (45-75)    


 


Lymphocytes % (Manual) 4 % (20-45)    


 


Monocytes % (Manual) 2 % (1-10)    


 


Eosinophils % (Manual) 0 % (0-3)    


 


Basophils % (Manual) 0 % (0-2)    


 


Band Neutrophils 10 % (0-8)    


 


Platelet Estimate Decreased    


 


Platelet Morphology Normal    


 


Hypochromasia 1+    


 


Anisocytosis 1+    


 


Sodium Level


  145 MMOL/L


(136-145) 


  


  


 


 


Potassium Level


  5.2 MMOL/L


(3.5-5.1) 


  


  


 


 


Chloride Level


  111 MMOL/L


() 


  


  


 


 


Carbon Dioxide Level


  21 MMOL/L


(21-32) 


  


  


 


 


Anion Gap


  13 mmol/L


(5-15) 


  


  


 


 


Blood Urea Nitrogen


  47 mg/dL


(7-18) 


  


  


 


 


Creatinine


  1.5 MG/DL


(0.55-1.30) 


  


  


 


 


Estimat Glomerular Filtration


Rate  mL/min (>60) 


  


  


  


 


 


Glucose Level


  131 MG/DL


() 


  


  


 


 


Lactic Acid Level


  3.70 mmol/L


(0.4-2.0) 


  5.30 mmol/L


(0.66-2.22) 


 


 


Uric Acid


  5.7 MG/DL


(2.6-7.2) 


  


  


 


 


Calcium Level


  9.2 MG/DL


(8.5-10.1) 


  


  


 


 


Total Bilirubin


  2.0 MG/DL


(0.2-1.0) 


  


  


 


 


Direct Bilirubin


  1.2 MG/DL


(0.0-0.3) 


  


  


 


 


Aspartate Amino Transf


(AST/SGOT) 204 U/L


(15-37) 


  


  


 


 


Alanine Aminotransferase


(ALT/SGPT) 85 U/L (12-78) 


  


  


  


 


 


Alkaline Phosphatase


  133 U/L


() 


  


  


 


 


Total Creatine Kinase


  211 U/L


() 


  


  


 


 


Creatine Kinase MB


  0.8 NG/ML


(0.0-3.6) 


  


  


 


 


Creatine Kinase MB Relative


Index 0.3 


  


  


  


 


 


Troponin I


  0.131 ng/mL


(0.000-0.056) 


  


  


 


 


Total Protein


  6.6 G/DL


(6.4-8.2) 


  


  


 


 


Albumin


  2.1 G/DL


(3.4-5.0) 


  


  


 


 


Globulin 4.5 g/dL    


 


Albumin/Globulin Ratio 0.5 (1.0-2.7)    


 


Lipase


  58 U/L


() 


  


  


 


 


Urine Eosinophils


  


  None seen


(NONE SEEN) 


  


 


 


Urine Random Sodium


  


  < 20 mmol/L


() 


  


 


 


Urine Potassium Timed


  


  61 mmol/L


(12-62) 


  


 








ASSESSMENT:  This is an 78-year-old white female.





1. Sepsis, lactic acidosis.


2. Failure to thrive with severe protein calorie malnutrition.


3. Coronary artery disease.


4. Diabetes, type 2.


5. Hypertension.


6. Sick sinus syndrome.


7. Hypercholesterolemia.


8. Aortic stenosis.


9. Pulmonary hypertension.


10. Alzheimer dementia.


11.  Acute tubular necrosis/acute kidney injury most likely secondary to 

dehydration and sepsis.





TREATMENT:


1.  Patient on broad spectrum antibiotics with vancomycin and cefepime.  

Monitor culture.


2. Coronary artery disease.  The patient is status post coronary artery


bypass graft in 2017.


3. Diabetes, type 2.  The patient has been started on NovoLog sliding


scale.  Januvia and metformin have been discontinued, as the patient is


not tolerating p.o. well.


4. Hypertension.  The patient is to continue on atenolol and losartan as


above.


5. Sick sinus syndrome.  The patient is status post pacemaker


implantation.


6. Hypercholesterolemia.  Continue simvastatin as above.


7. Aortic stenosis.


8. Pulmonary hypertension.


9. Alzheimer dementia.





CODE STATUS is full code as per POLST,


DVT prophylaxis: Heparin subcu.











  ______________________________________________


  Juan Carlos Magana M.D.











Juan Carlos Magana MD Feb 7, 2019 19:35

## 2019-02-07 NOTE — NUR
NURSE NOTES:

Received report from Macie CODY. Pt on ti confused, agitated, bedbound and moaning, 
transferred to bed with staff assist. Pt IVF running at prescribed rate. Bed in lowest 
position with side rails up x2 and tele monitor applied. Pt belongings list reviewed and 
signed. VSS. Pt on room air. Will continue to monitor.

## 2019-02-07 NOTE — NUR
ED Nurse Note:



pt brought in by BLS from [facility], c/o dehydration and evaluation. 
pt [place] speaking, AA&ox0, gcs=13, confused, hx dementia, skin warm and dry, 
resp even and unlabored, on RA, 100% o2sat, -n/v/d, abd soft nontender, noted 
hx incision site on right hip. pt has skin tear on right lateral LE with 
dressing, no drainage, pressure ulcer on left heel and coccyx area. noted edema 
on left elbow area, ERMD notified. NSR on cardiac monitor, vss, will cont 
monitor. pt bed lowest position w/ siderail x2, will cont monitor.

## 2019-02-07 NOTE — NUR
ED Nurse Note:





noted fluid in right elbow area, ERMD notified and MD inserted IV 20g in L EJ, 
flushed with 10cc, pt tolerated well, dressing applied and intact. IV on right 
forearm dc.

## 2019-02-07 NOTE — DIAGNOSTIC IMAGING REPORT
Indication: Pain

 

Findings: 3  views of the left elbow were obtained. 

 

No acute fractures, malalignment, erosions or periostitis are identified. Soft

tissues are unremarkable.

 

 

Impression: No acute injury

## 2019-02-07 NOTE — EMERGENCY ROOM REPORT
History of Present Illness


General


Chief Complaint:  General Complaint


Source:  Patient, EMS





Present Illness


HPI


Patient presents from nursing facility with reports of decreased oral intake


General malaise


Patient upon arrival is not verbal with staff





History of present illness is significantly limited





There was no reports of vomiting or diarrhea


Unclear change in medications





There was no reports of any rash





Attempt is being made to contact nursing facility and obtain further history as 

well


Allergies:  


Coded Allergies:  


     No Known Allergies (Unverified , 3/26/14)





Patient History


Limited by:  medical condition


Past Medical History:  see triage record


Pertinent Family History:  unable to obtain


Pregnant Now:  No


Reviewed Nursing Documentation:  PMH: Agreed; PSxH: Agreed





Nursing Documentation-PMH


Past Medical History:  No History, Except For


Hx Cardiac Problems:  Yes - HTN, PACEMAKER L SIDE, 3 BYPASS SUGERIES


Hx Hypertension:  Yes


Hx Pacemaker:  Yes


Hx Asthma:  Yes


Hx Diabetes:  Yes - TYPE 2


Hx Cancer:  No - UNKNOWN


Hx Gastrointestinal Problems:  No - UNKNOWN, PT IS CONFUSED


Hx Neurological Problems:  Yes - DEMENTIA, ALHZEIMERS, SCHIZOPHRENIA


Hx Dementia:  Yes


Hx Memory Loss:  Yes





Review of Systems


All Other Systems:  limited - Other than the ones mentioned in the history of 

present illness all others are reviewed however they do stay limited due to the 

patient's mental status





Physical Exam





Vital Signs








  Date Time  Temp Pulse Resp B/P (MAP) Pulse Ox O2 Delivery O2 Flow Rate FiO2


 


2/7/19 11:54 99.5 68 20 100/64 98 Room Air  








Sp02 EP Interpretation:  reviewed, normal


General Appearance:  mild distress - Appears agitated


Head:  normocephalic, atraumatic


Eyes:  bilateral eye PERRL, bilateral eye EOMI


ENT:  dry mucus membranes


Neck:  supple


Respiratory:  lungs clear, no retraction, no accessory muscle use


Cardiovascular #1:  regular rate, rhythm


Gastrointestinal:  non tender, soft


Musculoskeletal:  swelling - Left elbow area


Neurologic:  responsive - To physical and verbal stimuli


Skin:  other - As above along with decubitus ulcer lower back


Lymphatic:  no adenopathy





Medical Decision Making


Diagnostic Impression:  


 Primary Impression:  


 Dehydration


 Additional Impressions:  


 Weakness


 Acute encephalopathy


 Leukocytosis


ER Course


Patient is a fairly complex patient with multiple differential to consideration 

including but not limited to cardiac cardiopulmonary and vascular emergencies


Other intracranial, infectious pathology also entertained


Patient's white blood cell count is elevated lactic acid is also elevated





Patient is further hydrated





However does not meet severe sepsis criteria





Also consideration for fluid overload is made and patient has limited fluid 

intake


There is no obvious source of infection an antibiotic for held until further 

inpatient consultation





Labs








Test


  2/7/19


12:07 2/7/19


13:00 2/7/19


13:36


 


White Blood Count


  14.6 K/UL


(4.8-10.8) 


  


 


 


Red Blood Count


  3.92 M/UL


(4.20-5.40) 


  


 


 


Hemoglobin


  11.3 G/DL


(12.0-16.0) 


  


 


 


Hematocrit


  36.4 %


(37.0-47.0) 


  


 


 


Mean Corpuscular Volume 93 FL (80-99)   


 


Mean Corpuscular Hemoglobin


  28.9 PG


(27.0-31.0) 


  


 


 


Mean Corpuscular Hemoglobin


Concent 31.2 G/DL


(32.0-36.0) 


  


 


 


Red Cell Distribution Width


  16.9 %


(11.6-14.8) 


  


 


 


Platelet Count


  124 K/UL


(150-450) 


  


 


 


Mean Platelet Volume


  9.5 FL


(6.5-10.1) 


  


 


 


Neutrophils (%) (Auto)  % (45.0-75.0)   


 


Lymphocytes (%) (Auto)  % (20.0-45.0)   


 


Monocytes (%) (Auto)  % (1.0-10.0)   


 


Eosinophils (%) (Auto)  % (0.0-3.0)   


 


Basophils (%) (Auto)  % (0.0-2.0)   


 


Differential Total Cells


Counted 100 


  


  


 


 


Neutrophils % (Manual) 84 % (45-75)   


 


Lymphocytes % (Manual) 4 % (20-45)   


 


Monocytes % (Manual) 2 % (1-10)   


 


Eosinophils % (Manual) 0 % (0-3)   


 


Basophils % (Manual) 0 % (0-2)   


 


Band Neutrophils 10 % (0-8)   


 


Platelet Estimate Decreased   


 


Platelet Morphology Normal   


 


Hypochromasia 1+   


 


Anisocytosis 1+   


 


Sodium Level


  145 MMOL/L


(136-145) 


  


 


 


Potassium Level


  5.2 MMOL/L


(3.5-5.1) 


  


 


 


Chloride Level


  111 MMOL/L


() 


  


 


 


Carbon Dioxide Level


  21 MMOL/L


(21-32) 


  


 


 


Anion Gap


  13 mmol/L


(5-15) 


  


 


 


Blood Urea Nitrogen


  47 mg/dL


(7-18) 


  


 


 


Creatinine


  1.5 MG/DL


(0.55-1.30) 


  


 


 


Estimat Glomerular Filtration


Rate  mL/min (>60) 


  


  


 


 


Glucose Level


  131 MG/DL


() 


  


 


 


Lactic Acid Level


  3.70 mmol/L


(0.4-2.0) 


  5.30 mmol/L


(0.66-2.22)


 


Uric Acid


  5.7 MG/DL


(2.6-7.2) 


  


 


 


Calcium Level


  9.2 MG/DL


(8.5-10.1) 


  


 


 


Total Bilirubin


  2.0 MG/DL


(0.2-1.0) 


  


 


 


Direct Bilirubin


  1.2 MG/DL


(0.0-0.3) 


  


 


 


Aspartate Amino Transf


(AST/SGOT) 204 U/L


(15-37) 


  


 


 


Alanine Aminotransferase


(ALT/SGPT) 85 U/L (12-78) 


  


  


 


 


Alkaline Phosphatase


  133 U/L


() 


  


 


 


Total Creatine Kinase


  211 U/L


() 


  


 


 


Creatine Kinase MB


  0.8 NG/ML


(0.0-3.6) 


  


 


 


Creatine Kinase MB Relative


Index 0.3 


  


  


 


 


Troponin I


  0.131 ng/mL


(0.000-0.056) 


  


 


 


Total Protein


  6.6 G/DL


(6.4-8.2) 


  


 


 


Albumin


  2.1 G/DL


(3.4-5.0) 


  


 


 


Globulin 4.5 g/dL   


 


Albumin/Globulin Ratio 0.5 (1.0-2.7)   


 


Lipase


  58 U/L


() 


  


 


 


Urine Eosinophils


  


  None seen


(NONE SEEN) 


 


 


Urine Random Sodium


  


  < 20 mmol/L


() 


 


 


Urine Potassium Timed


  


  61 mmol/L


(12-62) 


 








Rhythm Strip Diag. Results


EP Interpretation:  yes


Rate:  80


Rhythm:  NSR, no PVC's, no ectopy





Chest X-Ray Diagnostic Results


Chest X-Ray Diagnostic Results :  


   Chest X-Ray Ordered:  Yes


   # of Views/Limited/Complete:  1 View


   Indication:  Chest Pain


   EP Interpretation:  Yes


   Interpretation:  no consolidation, no effusion, no pneumothorax


   Impression:  No acute disease


   Electronically Signed by:  Jimmy Mi DO





Other X-Ray Diagnostic Results


Other X-Ray Diagnostic Results :  


   X-Ray ordered:  Left elbow


   # of Views/Limited Vs Complete:  3 View


   Indication:  Pain


   EP Interpretation:  Yes


   Interpretation:  no dislocation, no soft tissue swelling, no fractures


   Impression:  No acute disease


   Electronically Signed by:  Jimmy Mi DO





Last Vital Signs








  Date Time  Temp Pulse Resp B/P (MAP) Pulse Ox O2 Delivery O2 Flow Rate FiO2


 


2/7/19 12:05  68 20   Room Air  


 


2/7/19 12:05 99.4   110/64 100   








Status:  improved


Disposition:  ADMITTED AS INPATIENT


Condition:  Serious


Referrals:  


Cheo Ortiz MD (PCP)











Jimmy Mi DO Feb 7, 2019 14:38

## 2019-02-07 NOTE — NUR
ED Nurse Note:





pt transferred to tele, all belongings sent with pt, pt vss, airway intact, 
endorsed care to [facility].

## 2019-02-07 NOTE — NUR
NURSE NOTES:

Rechecked BS and there was increase from 49 to 104 after administering dextrose 50% syringe. 
CN made aware.

## 2019-02-07 NOTE — CONSULTATION
History of Present Illness


General


Date patient seen:  Feb 7, 2019


Chief Complaint:  General Complaint





Present Illness


HPI


78 year old female with hx of dementia, HTN, pace-maker, pulmonary hypertension

, DM, aortic stenosis, decubiti ulcers, right hip replacement brought in by 

paramedics with CC of less responsive and poor oral intake.  She has multiple 

decubiti ulcers and was found to be azotemic. She is admitted for further work 

up.


Allergies:  


Coded Allergies:  


     No Known Allergies (Unverified , 3/26/14)





Medication History


Scheduled


Ascorbic Acid* (Ascorbic Acid*), 500 MG ORAL DAILY, (Reported)


Aspirin (Aspirin EC), 81 MG ORAL DAILY, (Reported)


Atenolol* (Tenormin*), 50 MG ORAL BID, (Reported)


Atorvastatin Calcium* (Atorvastatin Calcium*), 20 MG ORAL BEDTIME, (Reported)


Cholecalciferol (Vitamin D3)* (Vitamin D*), 2,000 UNITS ORAL DAILY, (Reported)


Clonazepam* (Klonopin*), 0.5 MG ORAL Q6H, (Reported)


Clonidine Hcl* (Catapres*), 0.1 MG ORAL EVERY 8 HOURS, (Reported)


Clopidogrel* (Clopidogrel*), 75 MG ORAL DAILY, (Reported)


Dicyclomine Hcl* (Dicyclomine Hcl*), 10 MG PO QID


Famotidine (Pepcid), 20 MG ORAL BEDTIME


Furosemide* (Lasix*), 40 MG ORAL DAILY, (Reported)


Losartan Potassium* (Losartan Potassium*), 25 MG ORAL DAILY, (Reported)


Metformin Hcl* (Metformin Hcl*), 500 MG ORAL TWICE A DAY, (Reported)


Mirtazapine* (Mirtazapine*), 15 MG ORAL BEDTIME, (Reported)


Potassium Chloride (Potassium Chloride), 10 MEQ ORAL DAILY, (Reported)


Rosuvastatin Calcium* (Crestor*), 10 MG ORAL DAILY, (Reported)


Sitagliptin Phos/Metformin Hcl (Janumet Xr 50-1,000 Mg Tablet), 1 TAB ORAL DAILY

, (Reported)


Trazodone* (Trazodone*), 50 MG ORAL BEDTIME, (Reported)


Vancomycin HCl (Vancocin HCl), 1,000 MG IV DAILY, (Reported)


Vitamin D (Vitamin D3), 5,000 UNITS ORAL DAILY, (Reported)


Zinc Sulfate (Zinc Sulfate*), 220 MG ORAL DAILY, (Reported)





Scheduled PRN


Haloperidol* (Haldol*), 5 MG IM EVERY 6 HOURS PRN for Agitation, (Reported)


Hydrocodone Bit/Acetaminophen 5-325* (Norco 5-325*), 1 TAB ORAL Q6H PRN for For 

Pain


Lorazepam* (Lorazepam*), 2 MG ORAL THREE TIMES A DAY PRN for For Anxiety, (

Reported)





Miscellaneous Medications


Apixaban (Eliquis), 5 MG PO, (Reported)





Patient History


Healthcare decision maker





Resuscitation status





Advanced Directive on File








Past Medical/Surgical History


Past Medical/Surgical History:  


(1) Diabetes mellitus, type II


(2) Psychosis


(3) CAD (coronary artery disease)


(4) HTN (hypertension)


(5) Pacemaker


(6) Protein-calorie malnutrition, severe


(7) Alzheimer's dementia


(8) Sacral decubitus ulcer, stage III





Review of Systems


All Other Systems:  negative except mentioned in HPI





Physical Exam


General Appearance:  cachetic


Lines, tubes and drains:  peripheral


HEENT:  normocephalic


Neck:  non-tender, normal alignment


Respiratory/Chest:  chest wall non-tender, lungs clear


Cardiovascular/Chest:  normal peripheral pulses, regular rhythm


Abdomen:  normal bowel sounds, non tender


Genitourinary/Rectal:  normal genital exam


Extremities:  non-pitting


Skin Exam:  normal pigmentation


Neurologic:  CNs II-XII grossly normal





Last 24 Hour Vital Signs








  Date Time  Temp Pulse Resp B/P (MAP) Pulse Ox O2 Delivery O2 Flow Rate FiO2


 


2/7/19 11:54 99.5 68 20 100/64 98 Room Air  











Laboratory Tests








Test


  2/7/19


12:07


 


White Blood Count


  14.6 K/UL


(4.8-10.8)  H


 


Red Blood Count


  3.92 M/UL


(4.20-5.40)  L


 


Hemoglobin


  11.3 G/DL


(12.0-16.0)  L


 


Hematocrit


  36.4 %


(37.0-47.0)  L


 


Mean Corpuscular Volume 93 FL (80-99)  


 


Mean Corpuscular Hemoglobin


  28.9 PG


(27.0-31.0)


 


Mean Corpuscular Hemoglobin


Concent 31.2 G/DL


(32.0-36.0)  L


 


Red Cell Distribution Width


  16.9 %


(11.6-14.8)  H


 


Platelet Count


  124 K/UL


(150-450)  L


 


Mean Platelet Volume


  9.5 FL


(6.5-10.1)


 


Neutrophils (%) (Auto)


  % (45.0-75.0)


 


 


Lymphocytes (%) (Auto)


  % (20.0-45.0)


 


 


Monocytes (%) (Auto)  % (1.0-10.0)  


 


Eosinophils (%) (Auto)  % (0.0-3.0)  


 


Basophils (%) (Auto)  % (0.0-2.0)  


 


Neutrophils % (Manual) Pending  


 


Lymphocytes % (Manual) Pending  


 


Platelet Estimate Pending  


 


Platelet Morphology Pending  


 


Sodium Level


  145 MMOL/L


(136-145)


 


Potassium Level


  5.2 MMOL/L


(3.5-5.1)  H


 


Chloride Level


  111 MMOL/L


()  H


 


Carbon Dioxide Level


  21 MMOL/L


(21-32)


 


Anion Gap


  13 mmol/L


(5-15)


 


Blood Urea Nitrogen


  47 mg/dL


(7-18)  H


 


Creatinine


  1.5 MG/DL


(0.55-1.30)  H


 


Estimat Glomerular Filtration


Rate  mL/min (>60)  


 


 


Glucose Level


  131 MG/DL


()  H


 


Lactic Acid Level


  3.70 mmol/L


(0.4-2.0)  H


 


Calcium Level


  9.2 MG/DL


(8.5-10.1)


 


Total Bilirubin


  2.0 MG/DL


(0.2-1.0)  H


 


Direct Bilirubin


  1.2 MG/DL


(0.0-0.3)  H


 


Aspartate Amino Transf


(AST/SGOT) 204 U/L


(15-37)  H


 


Alanine Aminotransferase


(ALT/SGPT) 85 U/L (12-78)


H


 


Alkaline Phosphatase


  133 U/L


()  H


 


Total Creatine Kinase


  211 U/L


()


 


Creatine Kinase MB


  0.8 NG/ML


(0.0-3.6)


 


Creatine Kinase MB Relative


Index 0.3  


 


 


Troponin I


  0.131 ng/mL


(0.000-0.056)


 


Total Protein


  6.6 G/DL


(6.4-8.2)


 


Albumin


  2.1 G/DL


(3.4-5.0)  L


 


Globulin 4.5 g/dL  


 


Albumin/Globulin Ratio


  0.5 (1.0-2.7)


L


 


Lipase


  58 U/L


()  L








Height (Feet):  5


Height (Inches):  6.00


Weight (Pounds):  150





Assessment/Plan


Problem List:  


(1) Sepsis


ICD Codes:  A41.9 - Sepsis, unspecified organism


SNOMED:  93683405


(2) ATN (acute tubular necrosis)


ICD Codes:  N17.0 - Acute kidney failure with tubular necrosis


SNOMED:  77501974


(3) Pulmonary hypertension


ICD Codes:  I27.20 - Pulmonary hypertension, unspecified


SNOMED:  88225822


(4) Pacemaker


ICD Codes:  Z95.0 - Presence of cardiac pacemaker


SNOMED:  325782339


(5) HTN (hypertension)


ICD Codes:  I10 - Essential (primary) hypertension


SNOMED:  11086593


(6) CAD (coronary artery disease)


ICD Codes:  I25.10 - Atherosclerotic heart disease of native coronary artery 

without angina pectoris


SNOMED:  98093383


(7) Protein-calorie malnutrition, severe


ICD Codes:  E43 - Unspecified severe protein-calorie malnutrition


SNOMED:  823381545


(8) Alzheimer's dementia


ICD Codes:  G30.9 - Alzheimer's disease, unspecified; F02.80 - Dementia in 

other diseases classified elsewhere without behavioral disturbance


SNOMED:  06664126


Assessment/Plan


pan cultures


iv fluids


sliding scale


check electrolytes daily


broad spectrum abx


dvt prophylaxis


insulin coverage.


social service consult











Korin Junior MD Feb 7, 2019 13:25

## 2019-02-07 NOTE — DIAGNOSTIC IMAGING REPORT
Indication: Chest pain

 

Comparison:  11/16/2018

 

A single view chest radiograph was obtained.

 

Findings:

 

Lungs are clear. Cardiomegaly is present. Bilateral pacemakers noted. Bones are

osteopenic. No pleural effusion identified.

 

IMPRESSION:

 

No acute disease

## 2019-02-08 VITALS — SYSTOLIC BLOOD PRESSURE: 142 MMHG | DIASTOLIC BLOOD PRESSURE: 88 MMHG

## 2019-02-08 VITALS — SYSTOLIC BLOOD PRESSURE: 133 MMHG | DIASTOLIC BLOOD PRESSURE: 99 MMHG

## 2019-02-08 VITALS — DIASTOLIC BLOOD PRESSURE: 56 MMHG | SYSTOLIC BLOOD PRESSURE: 112 MMHG

## 2019-02-08 VITALS — SYSTOLIC BLOOD PRESSURE: 125 MMHG | DIASTOLIC BLOOD PRESSURE: 74 MMHG

## 2019-02-08 VITALS — DIASTOLIC BLOOD PRESSURE: 75 MMHG | SYSTOLIC BLOOD PRESSURE: 122 MMHG

## 2019-02-08 VITALS — DIASTOLIC BLOOD PRESSURE: 72 MMHG | SYSTOLIC BLOOD PRESSURE: 120 MMHG

## 2019-02-08 VITALS — SYSTOLIC BLOOD PRESSURE: 122 MMHG | DIASTOLIC BLOOD PRESSURE: 54 MMHG

## 2019-02-08 LAB
ADD MANUAL DIFF: YES
ALBUMIN SERPL-MCNC: 1.5 G/DL (ref 3.4–5)
ALBUMIN/GLOB SERPL: 0.4 {RATIO} (ref 1–2.7)
ALP SERPL-CCNC: 90 U/L (ref 46–116)
ALT SERPL-CCNC: 54 U/L (ref 12–78)
ANION GAP SERPL CALC-SCNC: 14 MMOL/L (ref 5–15)
AST SERPL-CCNC: 68 U/L (ref 15–37)
BILIRUB DIRECT SERPL-MCNC: 1 MG/DL (ref 0–0.3)
BILIRUB SERPL-MCNC: 1.4 MG/DL (ref 0.2–1)
BUN SERPL-MCNC: 47 MG/DL (ref 7–18)
CALCIUM SERPL-MCNC: 8 MG/DL (ref 8.5–10.1)
CHLORIDE SERPL-SCNC: 111 MMOL/L (ref 98–107)
CO2 SERPL-SCNC: 17 MMOL/L (ref 21–32)
CREAT SERPL-MCNC: 1.3 MG/DL (ref 0.55–1.3)
ERYTHROCYTE [DISTWIDTH] IN BLOOD BY AUTOMATED COUNT: 16 % (ref 11.6–14.8)
GLOBULIN SER-MCNC: 3.6 G/DL
HCT VFR BLD CALC: 31.6 % (ref 37–47)
HGB BLD-MCNC: 10 G/DL (ref 12–16)
MCV RBC AUTO: 92 FL (ref 80–99)
PHOSPHATE SERPL-MCNC: 3.6 MG/DL (ref 2.5–4.9)
PLATELET # BLD: 105 K/UL (ref 150–450)
POTASSIUM SERPL-SCNC: 3.6 MMOL/L (ref 3.5–5.1)
RBC # BLD AUTO: 3.42 M/UL (ref 4.2–5.4)
SODIUM SERPL-SCNC: 142 MMOL/L (ref 136–145)
WBC # BLD AUTO: 14.9 K/UL (ref 4.8–10.8)

## 2019-02-08 RX ADMIN — DEXTROSE AND SODIUM CHLORIDE SCH MLS/HR: 5; .45 INJECTION, SOLUTION INTRAVENOUS at 23:08

## 2019-02-08 RX ADMIN — HEPARIN SODIUM SCH UNITS: 5000 INJECTION INTRAVENOUS; SUBCUTANEOUS at 21:54

## 2019-02-08 RX ADMIN — DEXTROSE AND SODIUM CHLORIDE SCH MLS/HR: 5; .45 INJECTION, SOLUTION INTRAVENOUS at 13:19

## 2019-02-08 RX ADMIN — INSULIN ASPART SCH UNITS: 100 INJECTION, SOLUTION INTRAVENOUS; SUBCUTANEOUS at 12:10

## 2019-02-08 RX ADMIN — INSULIN ASPART SCH UNITS: 100 INJECTION, SOLUTION INTRAVENOUS; SUBCUTANEOUS at 05:55

## 2019-02-08 RX ADMIN — HEPARIN SODIUM SCH UNITS: 5000 INJECTION INTRAVENOUS; SUBCUTANEOUS at 10:00

## 2019-02-08 RX ADMIN — SODIUM CHLORIDE SCH MLS/HR: 0.9 INJECTION INTRAVENOUS at 17:15

## 2019-02-08 RX ADMIN — OLANZAPINE SCH MG: 2.5 TABLET, FILM COATED ORAL at 18:00

## 2019-02-08 RX ADMIN — INSULIN ASPART SCH UNITS: 100 INJECTION, SOLUTION INTRAVENOUS; SUBCUTANEOUS at 21:49

## 2019-02-08 RX ADMIN — SODIUM CHLORIDE SCH MLS/HR: 9 INJECTION, SOLUTION INTRAVENOUS at 18:05

## 2019-02-08 RX ADMIN — INSULIN ASPART SCH UNITS: 100 INJECTION, SOLUTION INTRAVENOUS; SUBCUTANEOUS at 17:21

## 2019-02-08 NOTE — NUR
NURSE NOTES:WOUND CARE NOTES:PT presents with DTPI sacrum.Maroon- indurated discoloration 
with opening at sacral coccygeal area(L)3.8cm x (W)1.4cm with entire wound measuring 
(L)10cmx(W)12cm.Small amt malodorous brown exudate.Stable dry eschar R heel with dry peeling 
skin periwound (L)2cm x (W)3.5cm. Periwound is also red and boggy. Small dry eschar noted to 
lateral R 5th metatarsal (L)1.4cm x (W)0.3cm.  L heel is boggy but colour is dusky . 
Resolving skin tear lateral R tibia. Wound bed is clean and dry.  

Tx. Plan:Cleanse Sacral wound with Saline.Apply Therahoney to opening at sacrococcygeal 
area.Cavilon skin barrier to      

            DTPI. Cover with Optifoam drsg .Change Daily and prn.

            Apply Betadine to R heel  and Lateral R 5th metatarsal.Cover with Abd pad .Wrap 
with kerlix daily and prn.

            Apply CAvilon Skin Barrier to L heel.Cover with Optifoam drsg .Change every 7 
days and prn.

            Reposition at least every 2hours or as tolerated.

            Off-load heels with pillow.

## 2019-02-08 NOTE — CONSULTATION
History of Present Illness


General


Date patient seen:  Feb 8, 2019


Chief Complaint:  General Complaint


Reason for Consultation:  Sepsis





Present Illness


HPI








Ms. Carrasquillo is a 77 yo female with PMHx DM, CAD, Alzheimer dementia and 

Hip fracture who presents with dehydration and weakness. The patient was seen 

by Dr. Ortiz and was then sent to the hospital for futher treatment. The patient 

has been experincing wekaness and poor oral intake. She has not been able to 

walk and lives at a nursing home. We are unabel to get futher history due to 

her dementia. In the ED she was afebrile with WBCs of 14. She also had elevated 

lactic acid. CXR negative, UA positive. 


ID consulted for sepsis





PMHx/PSHx


Right hip fracture with hemiarthroplasty Oct 2018


DM


HTN


Sick sinus syndrome sp Pacemaker


HLD


CAD - SP MI and CABG


Aortic stenosis.


Pulmonary hypertension.


Alzheimer dementia.





SocHx


 Unable to obtain secondary to dementia





FamHx


 Unable to obtain secondary to dementia


Allergies:  


Coded Allergies:  


     No Known Allergies (Unverified , 3/26/14)





Medication History


Scheduled


Ascorbic Acid* (Ascorbic Acid*), 500 MG ORAL DAILY, (Reported)


Aspirin (Aspirin EC), 81 MG ORAL DAILY, (Reported)


Atenolol* (Tenormin*), 50 MG ORAL BID, (Reported)


Atorvastatin Calcium* (Atorvastatin Calcium*), 20 MG ORAL BEDTIME, (Reported)


Cholecalciferol (Vitamin D3)* (Vitamin D*), 2,000 UNITS ORAL DAILY, (Reported)


Clonazepam* (Klonopin*), 0.5 MG ORAL Q6H, (Reported)


Clonidine Hcl* (Catapres*), 0.1 MG ORAL EVERY 8 HOURS, (Reported)


Clopidogrel* (Clopidogrel*), 75 MG ORAL DAILY, (Reported)


Dicyclomine Hcl* (Dicyclomine Hcl*), 10 MG PO QID


Famotidine (Pepcid), 20 MG ORAL BEDTIME


Furosemide* (Lasix*), 40 MG ORAL DAILY, (Reported)


Losartan Potassium* (Losartan Potassium*), 25 MG ORAL DAILY, (Reported)


Metformin Hcl* (Metformin Hcl*), 500 MG ORAL TWICE A DAY, (Reported)


Mirtazapine* (Mirtazapine*), 15 MG ORAL BEDTIME, (Reported)


Potassium Chloride (Potassium Chloride), 10 MEQ ORAL DAILY, (Reported)


Rosuvastatin Calcium* (Crestor*), 10 MG ORAL DAILY, (Reported)


Sitagliptin Phos/Metformin Hcl (Janumet Xr 50-1,000 Mg Tablet), 1 TAB ORAL DAILY

, (Reported)


Trazodone* (Trazodone*), 50 MG ORAL BEDTIME, (Reported)


Vancomycin HCl (Vancocin HCl), 1,000 MG IV DAILY, (Reported)


Vitamin D (Vitamin D3), 5,000 UNITS ORAL DAILY, (Reported)


Zinc Sulfate (Zinc Sulfate*), 220 MG ORAL DAILY, (Reported)





Scheduled PRN


Haloperidol* (Haldol*), 5 MG IM EVERY 6 HOURS PRN for Agitation, (Reported)


Hydrocodone Bit/Acetaminophen 5-325* (Norco 5-325*), 1 TAB ORAL Q6H PRN for For 

Pain


Lorazepam* (Lorazepam*), 2 MG ORAL THREE TIMES A DAY PRN for For Anxiety, (

Reported)





Miscellaneous Medications


Apixaban (Eliquis), 5 MG PO, (Reported)





Patient History


Healthcare decision maker





Resuscitation status


Full Code


Advanced Directive on File








Review of Systems


ROS Narrative


 Unable to obtain secondary to dementia





Physical Exam





Last 24 Hour Vital Signs








  Date Time  Temp Pulse Resp B/P (MAP) Pulse Ox O2 Delivery O2 Flow Rate FiO2


 


2/8/19 08:51      Room Air  


 


2/8/19 08:00 98.9 70 19 125/74 (91) 92   


 


2/8/19 04:00  65      


 


2/8/19 04:00 97.6  20 112/56 (74) 95   


 


2/8/19 00:00  61      


 


2/8/19 00:00 97.5 71 20 122/54 (76) 95   


 


2/7/19 22:45 97.2       


 


2/7/19 22:42      Room Air  


 


2/7/19 20:37 97.2 86 18 128/63 100 Room Air  


 


2/7/19 20:33 98.9 67 18 119/67 100 Room Air  


 


2/7/19 18:05 98.9 86 18 128/63 100 Room Air  


 


2/7/19 16:05 98.9 86 18 132/68 100 Room Air  


 


2/7/19 14:05 99.4 80 18 126/68 100 Room Air  


 


2/7/19 13:05 99.4 78 18 120/62 100 Room Air  


 


2/7/19 12:05  68 20   Room Air  


 


2/7/19 12:05 99.4 78 18 110/64 100 Room Air  


 


2/7/19 11:54 99.5 68 20 100/64 98 Room Air  

















Intake and Output  


 


 2/7/19 2/8/19





 19:00 07:00


 


Intake Total  668 ml


 


Balance  668 ml


 


  


 


Intake IV Total  668 ml


 


# Voids 1 











Laboratory Tests








Test


  2/7/19


12:07 2/7/19


13:00 2/7/19


13:36 2/7/19


19:20


 


White Blood Count


  14.6 K/UL


(4.8-10.8)  H 


  


  


 


 


Red Blood Count


  3.92 M/UL


(4.20-5.40)  L 


  


  


 


 


Hemoglobin


  11.3 G/DL


(12.0-16.0)  L 


  


  


 


 


Hematocrit


  36.4 %


(37.0-47.0)  L 


  


  


 


 


Mean Corpuscular Volume 93 FL (80-99)     


 


Mean Corpuscular Hemoglobin


  28.9 PG


(27.0-31.0) 


  


  


 


 


Mean Corpuscular Hemoglobin


Concent 31.2 G/DL


(32.0-36.0)  L 


  


  


 


 


Red Cell Distribution Width


  16.9 %


(11.6-14.8)  H 


  


  


 


 


Platelet Count


  124 K/UL


(150-450)  L 


  


  


 


 


Mean Platelet Volume


  9.5 FL


(6.5-10.1) 


  


  


 


 


Neutrophils (%) (Auto)


  % (45.0-75.0)


  


  


  


 


 


Lymphocytes (%) (Auto)


  % (20.0-45.0)


  


  


  


 


 


Monocytes (%) (Auto)  % (1.0-10.0)     


 


Eosinophils (%) (Auto)  % (0.0-3.0)     


 


Basophils (%) (Auto)  % (0.0-2.0)     


 


Differential Total Cells


Counted 100  


  


  


  


 


 


Neutrophils % (Manual) 84 % (45-75)  H   


 


Lymphocytes % (Manual) 4 % (20-45)  L   


 


Monocytes % (Manual) 2 % (1-10)     


 


Eosinophils % (Manual) 0 % (0-3)     


 


Basophils % (Manual) 0 % (0-2)     


 


Band Neutrophils 10 % (0-8)  H   


 


Platelet Estimate Decreased  L   


 


Platelet Morphology Normal     


 


Hypochromasia 1+     


 


Anisocytosis 1+     


 


Sodium Level


  145 MMOL/L


(136-145) 


  


  


 


 


Potassium Level


  5.2 MMOL/L


(3.5-5.1)  H 


  


  


 


 


Chloride Level


  111 MMOL/L


()  H 


  


  


 


 


Carbon Dioxide Level


  21 MMOL/L


(21-32) 


  


  


 


 


Anion Gap


  13 mmol/L


(5-15) 


  


  


 


 


Blood Urea Nitrogen


  47 mg/dL


(7-18)  H 


  


  


 


 


Creatinine


  1.5 MG/DL


(0.55-1.30)  H 


  


  


 


 


Estimat Glomerular Filtration


Rate  mL/min (>60)  


  


  


  


 


 


Glucose Level


  131 MG/DL


()  H 


  


  


 


 


Lactic Acid Level


  3.70 mmol/L


(0.4-2.0)  H 


  5.30 mmol/L


(0.66-2.22)  H 3.30 mmol/L


(0.4-2.0)  H


 


Uric Acid


  5.7 MG/DL


(2.6-7.2) 


  


  


 


 


Calcium Level


  9.2 MG/DL


(8.5-10.1) 


  


  


 


 


Total Bilirubin


  2.0 MG/DL


(0.2-1.0)  H 


  


  


 


 


Direct Bilirubin


  1.2 MG/DL


(0.0-0.3)  H 


  


  


 


 


Aspartate Amino Transf


(AST/SGOT) 204 U/L


(15-37)  H 


  


  


 


 


Alanine Aminotransferase


(ALT/SGPT) 85 U/L (12-78)


H 


  


  


 


 


Alkaline Phosphatase


  133 U/L


()  H 


  


  


 


 


Total Creatine Kinase


  211 U/L


() 


  


  


 


 


Creatine Kinase MB


  0.8 NG/ML


(0.0-3.6) 


  


  


 


 


Creatine Kinase MB Relative


Index 0.3  


  


  


  


 


 


Troponin I


  0.131 ng/mL


(0.000-0.056) 


  


  


 


 


Total Protein


  6.6 G/DL


(6.4-8.2) 


  


  


 


 


Albumin


  2.1 G/DL


(3.4-5.0)  L 


  


  


 


 


Globulin 4.5 g/dL     


 


Albumin/Globulin Ratio


  0.5 (1.0-2.7)


L 


  


  


 


 


Lipase


  58 U/L


()  L 


  


  


 


 


Urine Eosinophils


  


  None seen


(NONE SEEN) 


  


 


 


Urine Random Sodium


  


  < 20 mmol/L


()  L 


  


 


 


Urine Potassium Timed


  


  61 mmol/L


(12-62) 


  


 


 


Test


  2/7/19


19:33 2/8/19


07:15 


  


 


 


Urine Color Yellow     


 


Urine Appearance Clear     


 


Urine pH 5 (4.5-8.0)     


 


Urine Specific Gravity


  1.015


(1.005-1.035) 


  


  


 


 


Urine Protein


  2+ (NEGATIVE)


H 


  


  


 


 


Urine Glucose (UA)


  Negative


(NEGATIVE) 


  


  


 


 


Urine Ketones


  1+ (NEGATIVE)


H 


  


  


 


 


Urine Blood


  3+ (NEGATIVE)


H 


  


  


 


 


Urine Nitrite


  Negative


(NEGATIVE) 


  


  


 


 


Urine Bilirubin


  1+ (NEGATIVE)


H 


  


  


 


 


Urine Ictotest


  Negative


(NEGATIVE) 


  


  


 


 


Urine Urobilinogen


  4 MG/DL


(0.0-1.0)  H 


  


  


 


 


Urine Leukocyte Esterase


  1+ (NEGATIVE)


H 


  


  


 


 


Urine RBC


  10-15 /HPF (0


- 2)  H 


  


  


 


 


Urine WBC


  5-10 /HPF (0 -


2)  H 


  


  


 


 


Urine Squamous Epithelial


Cells Few /LPF


(NONE/OCC) 


  


  


 


 


Urine Bacteria


  Few /HPF


(NONE) 


  


  


 


 


White Blood Count


  


  14.9 K/UL


(4.8-10.8)  H 


  


 


 


Red Blood Count


  


  3.42 M/UL


(4.20-5.40)  L 


  


 


 


Hemoglobin


  


  10.0 G/DL


(12.0-16.0)  L 


  


 


 


Hematocrit


  


  31.6 %


(37.0-47.0)  L 


  


 


 


Mean Corpuscular Volume  92 FL (80-99)    


 


Mean Corpuscular Hemoglobin


  


  29.1 PG


(27.0-31.0) 


  


 


 


Mean Corpuscular Hemoglobin


Concent 


  31.5 G/DL


(32.0-36.0)  L 


  


 


 


Red Cell Distribution Width


  


  16.0 %


(11.6-14.8)  H 


  


 


 


Platelet Count


  


  105 K/UL


(150-450)  L 


  


 


 


Mean Platelet Volume


  


  11.4 FL


(6.5-10.1)  H 


  


 


 


Neutrophils (%) (Auto)


  


  % (45.0-75.0)


  


  


 


 


Lymphocytes (%) (Auto)


  


  % (20.0-45.0)


  


  


 


 


Monocytes (%) (Auto)   % (1.0-10.0)    


 


Eosinophils (%) (Auto)   % (0.0-3.0)    


 


Basophils (%) (Auto)   % (0.0-2.0)    


 


Neutrophils % (Manual)  Pending    


 


Lymphocytes % (Manual)  Pending    


 


Platelet Estimate  Pending    


 


Platelet Morphology  Pending    


 


Sodium Level


  


  142 MMOL/L


(136-145) 


  


 


 


Potassium Level


  


  3.6 MMOL/L


(3.5-5.1) 


  


 


 


Chloride Level


  


  111 MMOL/L


()  H 


  


 


 


Carbon Dioxide Level


  


  17 MMOL/L


(21-32)  L 


  


 


 


Anion Gap


  


  14 mmol/L


(5-15) 


  


 


 


Blood Urea Nitrogen


  


  47 mg/dL


(7-18)  H 


  


 


 


Creatinine


  


  1.3 MG/DL


(0.55-1.30) 


  


 


 


Estimat Glomerular Filtration


Rate 


   mL/min (>60)  


  


  


 


 


Glucose Level


  


  352 MG/DL


()  #H 


  


 


 


Calcium Level


  


  8.0 MG/DL


(8.5-10.1)  L 


  


 


 


Phosphorus Level


  


  3.6 MG/DL


(2.5-4.9) 


  


 


 


Magnesium Level


  


  1.6 MG/DL


(1.8-2.4)  L 


  


 


 


Total Bilirubin


  


  1.4 MG/DL


(0.2-1.0)  H 


  


 


 


Direct Bilirubin


  


  1.0 MG/DL


(0.0-0.3)  H 


  


 


 


Aspartate Amino Transf


(AST/SGOT) 


  68 U/L (15-37)


H 


  


 


 


Alanine Aminotransferase


(ALT/SGPT) 


  54 U/L (12-78)


  


  


 


 


Alkaline Phosphatase


  


  90 U/L


() 


  


 


 


Troponin I


  


  0.062 ng/mL


(0.000-0.056) 


  


 


 


Total Protein


  


  5.1 G/DL


(6.4-8.2)  L 


  


 


 


Albumin


  


  1.5 G/DL


(3.4-5.0)  L 


  


 


 


Globulin  3.6 g/dL    


 


Albumin/Globulin Ratio


  


  0.4 (1.0-2.7)


L 


  


 








Height (Feet):  5


Height (Inches):  6.00


Weight (Pounds):  150


Medications





Current Medications








 Medications


  (Trade)  Dose


 Ordered  Sig/Violette


 Route


 PRN Reason  Start Time


 Stop Time Status Last Admin


Dose Admin


 


 Acetaminophen


  (Tylenol)  650 mg  Q4H  PRN


 ORAL


 T>100.5  2/7/19 13:30


 3/9/19 13:29   


 


 


 Albuterol/


 Ipratropium


  (Albuterol/


 Ipratropium)  3 ml  Q4H  PRN


 HHN


 Shortness of Breath  2/7/19 13:30


 2/12/19 13:29   


 


 


 Cefepime HCl 1 gm/


 Dextrose  55 ml @ 


 110 mls/hr  Q24H


 IVPB


   2/8/19 16:00


 2/15/19 15:59   


 


 


 Clonazepam


  (KlonoPIN)  0.5 mg  Q6H  PRN


 ORAL


 Anxiety/agitation  2/7/19 13:30


 2/14/19 13:29   


 


 


 Clonidine HCl


  (Catapres Tab)  0.1 mg  Q8H  PRN


 ORAL


 SBP>160mmHg  2/7/19 17:00


 3/9/19 16:59   


 


 


 Dextrose


  (Dextrose 50%)  25 ml  Q30M  PRN


 IV


 Hypoglycemia  2/7/19 13:45


 3/9/19 13:30   


 


 


 Dextrose


  (Dextrose 50%)  50 ml  ONCE  PRN


 IV


 HYPOGLYCEMIA  2/8/19 06:00


 3/10/19 05:59   


 


 


 Dextrose


  (Dextrose 50%)  50 ml  Q30M  PRN


 IV


 hypoglycemia  2/7/19 13:45


 3/9/19 13:44  2/8/19 06:46


 


 


 Dextrose/Sodium


 Chloride  1,000 ml @ 


 100 mls/hr  Q10H


 IV


   2/8/19 07:00


 3/10/19 06:59   


 


 


 Heparin Sodium


  (Porcine)


  (Heparin 5000


 units/ml)  5,000 units  EVERY 12  HOURS


 SUBQ


   2/7/19 21:00


 3/9/19 20:59  2/7/19 22:08


 


 


 Insulin Aspart


  (NovoLOG)    BEFORE MEALS AND  HS


 SUBQ


   2/7/19 16:30


 3/9/19 16:29   


 


 


 Morphine Sulfate


  (Morphine


 Sulfate)  2 mg  Q4H  PRN


 IVP


 Moderate Pain (Pain Scale 4-6)  2/7/19 13:30


 2/14/19 13:29  2/7/19 22:15


 


 


 Ondansetron HCl


  (Zofran)  4 mg  Q6H  PRN


 IVP


 Nausea & Vomiting  2/7/19 13:30


 3/9/19 13:29   


 


 


 Phenazopyridine


 HCl


  (Pyridium)  100 mg  DAILYPRN  PRN


 ORAL


 dysuria  2/7/19 13:30


 3/9/19 13:29   


 


 


 Polyethylene


 Glycol


  (Miralax)  17 gm  DAILYPRN  PRN


 ORAL


 Constipation  2/7/19 13:30


 3/9/19 13:29   


 


 


 Temazepam


  (Restoril)  15 mg  HSPRN  PRN


 ORAL


 Insomnia  2/7/19 21:00


 2/14/19 20:59   


 


 


 Trazodone HCl


  (Desyrel)  50 mg  BEDTIME


 ORAL


   2/7/19 21:00


 3/9/19 20:59   


 


 


 Vancomycin HCl


  (Vanco rx to


 dose)  1 ea  DAILY  PRN


 MISC


 .  2/7/19 14:45


 3/9/19 14:44   


 


 


 Vancomycin HCl


 750 mg/Sodium


 Chloride  275 ml @ 


 183.333


 mls/hr  Q24H


 IVPB


   2/8/19 17:00


 2/13/19 16:59   


 








Objective Narrative


Gen:  Moaning, Awake but no following


HEENT: NCAT, MMM, EOMI, PERRL, poor dentition, no scleral icterus 


NECK:  full range of motion, supple, no meningismus, No LAD, No JVD


LUNGS:  CTAB, No W/C, No Accessory muscle use


CARDS: RRR, S1, S2, No M/R/G, 


ABD: Soft, NT, ND, No R/G, + BS, No HSM, No Masses


: Deferred


Ext: Poor circulation to Ext (cool to touch) , Pulses 2+ B/L (DP, Rad): 


NEURO: A/O x 4, Strength and Sensation Grossly intact


PSYCH:  Mood/affect normal


SKIN:  Dry, No rashes, Large sacral ulcer. Mild odor no surrounding cellulitis, 

foot with eschar





Assessment/Plan


Assessment/Plan


77 yo female with PMHx DM, CAD, Alzheimer dementia and Hip fracture who 

presents with dehydration and weakness. 





Sepsis 


    Unclear souce suspect may be lack of oral intake with UTI


   Mild leukocytosis


   Aferbile


   Positive UA 


   CXR neg





Pressure ulcers


  Not infected





Right hip fracture with hemiarthroplasty Oct 2018


DM


HTN


Sick sinus syndrome sp Pacemaker


HLD


CAD - SP MI and CABG


Aortic stenosis.


Pulmonary hypertension.


Alzheimer dementia.





PLAN


- continue cefepime and vancomycin - pending Cx


- f/u cultures


- Monitor CBC and temps


- wound care


- Nutritional support





Thank you for this consult. We will continue to follow the patient during this 

hospitalization.











Raffi Scott MD Feb 8, 2019 09:53

## 2019-02-08 NOTE — NUR
RD ASSESSMENT & RECOMMENDATIONS

SEE CARE ACTIVITY FOR COMPLETE ASSESSMENT



DAILY ESTIMATED NEEDS:

Needs based on Wound, DM, wasting, sepsis / 55kg 

30-35  kcals/kg 

1750-5995  total kcals

1.25-2  g protein/kg

  g total protein 

25-30  mL/kg

5369-6031  total fluid mLs



NUTRITION DIAGNOSIS:

* Increased kcal/prot needs R/T wound healing as evidenced by pt w/

stage 3 sacral wound per MD





CURRENT DIET:NPO     





ENTERAL NUTRITION RECOMMENDATIONS:

Glucerna 1.5 @47ml/hr x24 hrs   

to provide 1128ml, 1692 kcal, 93g prot, 856ml free H2O  



- Obtain GI access, start Glucerna 1.5 @17ml/hr for 6 hrs

- Advance 10ml/hr q4-6 hrs to goal as tolerated. TF at goal meets 100% est needs

- Flush per MD. HOB over 30 degrees







ADDITIONAL RECOMMENDATIONS:

* RE-calibrate bedscale for accurate CBW  

* Wound healing: add MVI w/ min 1 tab QD, Vit C 250mg BID, Harjeet 1pkt BID 

    + ZnSO4 220mg daily  

* Lytes daily, replete as needed  

* SLP eval/ currently NPO  

* TF recs as above as needed/  if part of POC

## 2019-02-08 NOTE — NUR
HAND-OFF: 



Report given to ESCOBAR Goel. Patient is awake lying semi-schmitz's; resting comfortably. In 
stable condition.

## 2019-02-08 NOTE — NUR
HAND-OFF: 

Report given to ESCOBAR Gallegos. How Severe Is Your Acne?: moderate Is This A New Presentation, Or A Follow-Up?: Acne

## 2019-02-08 NOTE — DIAGNOSTIC IMAGING REPORT
History: NGT

 

Exam: XR [id number] single image

 

Comparison: 

 

FINDINGS/IMPRESSION:

 

Nasogastric tube tip either at the antrum of the distal stomach or 

duodenal bulb with side-port over the distal stomach.

## 2019-02-08 NOTE — NUR
NURSE NOTES:

Talked to Rebeca, speech therapist. Per Rebeca, no swallow eval will be done till Monday and 
we should proceed with the NG tube placement.

## 2019-02-08 NOTE — NUR
NURSE NOTES:



Received report from ESCOBAR Cox. Patient is asleep lying semi-schmitz's; resting 
comfortably. Arousable to verbal stimuli. No signs of acute distress or pain noted at this 
time. AOx1; unable to verbalize needs. Checked IV site, line, and IV rate; patent and 
running. No erythema, bleeding, or infiltration noted. Bed at lowest position, brakes on, 
siderails up x3. Call light within reach. Will continue to monitor.

## 2019-02-08 NOTE — NUR
NURSE NOTES:



Notified [facility] pharmacist regarding 0600 dose of Dextrose 50 not scanned. Was told, "you 
can scan a dose now and we'll just call the on-site pharmacy to see if they can address this 
a bit better."

## 2019-02-08 NOTE — INTERNAL MED PROGRESS NOTE
Subjective


Physician Name


Juan Carlos Magana


Attending Physician


Juan Carlos Magana MD





Current Medications








 Medications


  (Trade)  Dose


 Ordered  Sig/Violette


 Route


 PRN Reason  Start Time


 Stop Time Status Last Admin


Dose Admin


 


 Acetaminophen


  (Tylenol)  650 mg  Q4H  PRN


 ORAL


 T>100.5  2/8/19 13:30


 3/9/19 13:29   


 


 


 Albuterol/


 Ipratropium


  (Albuterol/


 Ipratropium)  3 ml  Q4H  PRN


 HHN


 Shortness of Breath  2/8/19 13:30


 2/12/19 13:29   


 


 


 Cefepime HCl 1 gm/


 Dextrose  55 ml @ 


 110 mls/hr  Q24H


 IVPB


   2/8/19 16:00


 2/15/19 15:59   


 


 


 Clonidine HCl


  (Catapres Tab)  0.1 mg  Q8H  PRN


 ORAL


 SBP>160mmHg  2/8/19 12:52


 3/10/19 12:51   


 


 


 Dextrose


  (Dextrose 50%)  25 ml  Q30M  PRN


 IV


 Hypoglycemia  2/8/19 13:15


 3/9/19 13:30   


 


 


 Dextrose


  (Dextrose 50%)  50 ml  Q30M  PRN


 IV


 hypoglycemia  2/8/19 13:15


 3/9/19 13:44   


 


 


 Dextrose/Sodium


 Chloride  1,000 ml @ 


 100 mls/hr  Q10H


 IV


   2/8/19 12:52


 3/10/19 12:51  2/8/19 13:19


 


 


 Heparin Sodium


  (Porcine)


  (Heparin 5000


 units/ml)  5,000 units  EVERY 12  HOURS


 SUBQ


   2/8/19 21:00


 3/9/19 20:59   


 


 


 Insulin Aspart


  (NovoLOG)    BEFORE MEALS AND  HS


 SUBQ


   2/8/19 16:30


 3/9/19 16:29   


 


 


 Lorazepam


  (Ativan)  1 mg  Q6H  PRN


 ORAL


 For Anxiety  2/8/19 12:53


 2/15/19 12:52   


 


 


 Mirtazapine


  (Remeron)  7.5 mg  BEDTIME


 ORAL


   2/8/19 21:00


 3/10/19 20:59   


 


 


 Morphine Sulfate


  (Morphine


 Sulfate)  2 mg  Q4H  PRN


 IVP


 Moderate Pain (Pain Scale 4-6)  2/8/19 12:53


 2/15/19 12:52   


 


 


 Olanzapine


  (ZyPREXA)  2.5 mg  BID


 ORAL


   2/8/19 18:00


 3/10/19 10:59   


 


 


 Ondansetron HCl


  (Zofran)  4 mg  Q6H  PRN


 IVP


 Nausea & Vomiting  2/8/19 12:53


 3/10/19 12:52   


 


 


 Phenazopyridine


 HCl


  (Pyridium)  100 mg  DAILYPRN  PRN


 ORAL


 dysuria  2/8/19 12:53


 3/10/19 12:52   


 


 


 Polyethylene


 Glycol


  (Miralax)  17 gm  DAILYPRN  PRN


 ORAL


 Constipation  2/8/19 12:53


 3/10/19 12:52   


 


 


 Temazepam


  (Restoril)  15 mg  HSPRN  PRN


 ORAL


 Insomnia  2/8/19 21:00


 2/14/19 20:59   


 


 


 Vancomycin HCl


  (Vanco rx to


 dose)  1 ea  DAILY  PRN


 MISC


 .  2/9/19 09:00


 3/9/19 14:44   


 


 


 Vancomycin HCl


 750 mg/Sodium


 Chloride  275 ml @ 


 183.333


 mls/hr  Q24H


 IVPB


   2/8/19 17:00


 2/13/19 16:59   


 








Allergies:  


Coded Allergies:  


     No Known Allergies (Unverified , 3/26/14)


Subjective


Patient is awake and more responsive, no chest pain, no shortness of breath, 

feeling very weak,





Objective





Last Vital Signs








  Date Time  Temp Pulse Resp B/P (MAP) Pulse Ox O2 Delivery O2 Flow Rate FiO2


 


2/8/19 16:00 97.7 77 20 120/72 (88) 96   


 


2/8/19 08:51      Room Air  











Laboratory Tests








Test


  2/7/19


19:20 2/7/19


19:33 2/8/19


07:15


 


Lactic Acid Level


  3.30 mmol/L


(0.4-2.0)  H 


  


 


 


Urine Color  Yellow   


 


Urine Appearance  Clear   


 


Urine pH  5 (4.5-8.0)   


 


Urine Specific Gravity


  


  1.015


(1.005-1.035) 


 


 


Urine Protein


  


  2+ (NEGATIVE)


H 


 


 


Urine Glucose (UA)


  


  Negative


(NEGATIVE) 


 


 


Urine Ketones


  


  1+ (NEGATIVE)


H 


 


 


Urine Blood


  


  3+ (NEGATIVE)


H 


 


 


Urine Nitrite


  


  Negative


(NEGATIVE) 


 


 


Urine Bilirubin


  


  1+ (NEGATIVE)


H 


 


 


Urine Ictotest


  


  Negative


(NEGATIVE) 


 


 


Urine Urobilinogen


  


  4 MG/DL


(0.0-1.0)  H 


 


 


Urine Leukocyte Esterase


  


  1+ (NEGATIVE)


H 


 


 


Urine RBC


  


  10-15 /HPF (0


- 2)  H 


 


 


Urine WBC


  


  5-10 /HPF (0 -


2)  H 


 


 


Urine Squamous Epithelial


Cells 


  Few /LPF


(NONE/OCC) 


 


 


Urine Bacteria


  


  Few /HPF


(NONE) 


 


 


White Blood Count


  


  


  14.9 K/UL


(4.8-10.8)  H


 


Red Blood Count


  


  


  3.42 M/UL


(4.20-5.40)  L


 


Hemoglobin


  


  


  10.0 G/DL


(12.0-16.0)  L


 


Hematocrit


  


  


  31.6 %


(37.0-47.0)  L


 


Mean Corpuscular Volume   92 FL (80-99)  


 


Mean Corpuscular Hemoglobin


  


  


  29.1 PG


(27.0-31.0)


 


Mean Corpuscular Hemoglobin


Concent 


  


  31.5 G/DL


(32.0-36.0)  L


 


Red Cell Distribution Width


  


  


  16.0 %


(11.6-14.8)  H


 


Platelet Count


  


  


  105 K/UL


(150-450)  L


 


Mean Platelet Volume


  


  


  11.4 FL


(6.5-10.1)  H


 


Neutrophils (%) (Auto)


  


  


  % (45.0-75.0)


 


 


Lymphocytes (%) (Auto)


  


  


  % (20.0-45.0)


 


 


Monocytes (%) (Auto)    % (1.0-10.0)  


 


Eosinophils (%) (Auto)    % (0.0-3.0)  


 


Basophils (%) (Auto)    % (0.0-2.0)  


 


Differential Total Cells


Counted 


  


  100  


 


 


Neutrophils % (Manual)   89 % (45-75)  H


 


Lymphocytes % (Manual)   3 % (20-45)  L


 


Monocytes % (Manual)   1 % (1-10)  


 


Eosinophils % (Manual)   0 % (0-3)  


 


Basophils % (Manual)   0 % (0-2)  


 


Band Neutrophils   7 % (0-8)  


 


Platelet Estimate   Decreased  L


 


Platelet Morphology   Normal  


 


Hypochromasia   2+  


 


Anisocytosis   1+  


 


Ovalocytes   1+  


 


Casper Cells   Occasional  


 


Sodium Level


  


  


  142 MMOL/L


(136-145)


 


Potassium Level


  


  


  3.6 MMOL/L


(3.5-5.1)


 


Chloride Level


  


  


  111 MMOL/L


()  H


 


Carbon Dioxide Level


  


  


  17 MMOL/L


(21-32)  L


 


Anion Gap


  


  


  14 mmol/L


(5-15)


 


Blood Urea Nitrogen


  


  


  47 mg/dL


(7-18)  H


 


Creatinine


  


  


  1.3 MG/DL


(0.55-1.30)


 


Estimat Glomerular Filtration


Rate 


  


   mL/min (>60)  


 


 


Glucose Level


  


  


  352 MG/DL


()  #H


 


Calcium Level


  


  


  8.0 MG/DL


(8.5-10.1)  L


 


Phosphorus Level


  


  


  3.6 MG/DL


(2.5-4.9)


 


Magnesium Level


  


  


  1.6 MG/DL


(1.8-2.4)  L


 


Total Bilirubin


  


  


  1.4 MG/DL


(0.2-1.0)  H


 


Direct Bilirubin


  


  


  1.0 MG/DL


(0.0-0.3)  H


 


Aspartate Amino Transf


(AST/SGOT) 


  


  68 U/L (15-37)


H


 


Alanine Aminotransferase


(ALT/SGPT) 


  


  54 U/L (12-78)


 


 


Alkaline Phosphatase


  


  


  90 U/L


()


 


Troponin I


  


  


  0.062 ng/mL


(0.000-0.056)


 


Total Protein


  


  


  5.1 G/DL


(6.4-8.2)  L


 


Albumin


  


  


  1.5 G/DL


(3.4-5.0)  L


 


Globulin   3.6 g/dL  


 


Albumin/Globulin Ratio


  


  


  0.4 (1.0-2.7)


L











Microbiology








 Date/Time


Source Procedure


Growth Status


 


 


 2/7/19 12:07


Blood Blood Culture - Preliminary Resulted


 


 2/7/19 12:07


Blood Blood Culture - Preliminary Resulted

















Intake and Output  


 


 2/7/19 2/8/19





 19:00 07:00


 


Intake Total  668 ml


 


Balance  668 ml


 


  


 


Intake IV Total  668 ml


 


# Voids 1 








Objective


General: No acute distress, awake and more responsive, opening her eyes, 

cachectic.


HEENT: NCAT, sclera anicteric, PERRL, EOMI.


Neck: Supple, no significant jugular venous distention, 


Lungs: Poor inspiratory effort, decreased air in the bases , no Wheeze or Rales.


Heart: Regular rate and rhythm, normal S1/S2, no murmur.


Abdomen: soft, nontender, nondistended. Normoactive bowel sounds.


 / Rectal: Refused and deferred.


Extremities: No Cyanosis , clubbing or edema.  Lateral lower extremity / feet 

dressing intact.


Neuro: A&O x 3, Able to move all extremities slowly


Skin: warm, no rashes or lesions, ecchymosis in bilateral upper extremity noted.





Assessment/Plan


Assessment/Plan


1. Sepsis /gram positive cocci bacteremia, lactic acidosis.


2. Failure to thrive with severe protein calorie malnutrition.


3. Coronary artery disease.


4. Diabetes, type 2.


5. Hypertension.


6. Sick sinus syndrome.


7. Hypercholesterolemia.


8. Aortic stenosis.


9. Pulmonary hypertension.


10. Alzheimer dementia.


11.  Acute tubular necrosis/acute kidney injury most likely secondary to 

dehydration and sepsis.





TREATMENT:


1.  Patient on broad spectrum antibiotics with vancomycin and cefepime.  

Monitor culture.


2. Coronary artery disease.  The patient is status post coronary artery


bypass graft in 2017.


3. Diabetes, type 2.  The patient has been started on NovoLog sliding


scale.  Januvia and metformin have been discontinued, as the patient is


not tolerating p.o. well.


4. Hypertension.  The patient is to continue on atenolol and losartan as


above.


5. Sick sinus syndrome.  The patient is status post pacemaker


implantation.


6. Hypercholesterolemia.  Continue simvastatin as above.


7. Aortic stenosis.


8. Pulmonary hypertension.


9. Alzheimer dementia.





CODE STATUS is full code as per POLST,


DVT prophylaxis: Heparin subcu.











Juan Carlos Magana MD Feb 8, 2019 16:48

## 2019-02-08 NOTE — NUR
NURSE NOTES:



Received new order from Dr. Junior to change 1/2 NS fluid to D5 1/2 NS at 100 mls/hr. Noted 
and carried out.

## 2019-02-08 NOTE — NUR
NURSE NOTES:



NURSE NOTES:



Dextrose 50 administered as ordered since patient's blood sugar level was noted to be 36. 
Will continue to monitor.

## 2019-02-08 NOTE — NUR
NURSE NOTES:

Gave report to Makeda OLIVAREZ RN per House Supervisor and CN. Endorsed continuity of care.

## 2019-02-08 NOTE — NUR
NURSE NOTES:

Pt is transferred to the floor via gurney. Received report form ESCOBAR Goel. Pt is in the bed. 
Confused, on the RA. No s/s of respiratory distress or discomfort noted. Pictures had been 
taken earlier today and uploaded. Belongings reviewed and accounted for. 

Bed is in the lowest position. Call light is within the reach. Will continue to monitor.

## 2019-02-08 NOTE — NUR
CASE MANAGEMENT: REVIEW



78/F BIBA FROM [facility]



CC: WEAKNESS



SI: DEHYDRATION . SEPSIS . SACRAL DECUBITUS ULCER, STAGE III

T 99.5 HR 68 RR 20 /64 SAT 98% ROOM AIR

WBC 14.6 H/H 11.3/36.4 LACTIC ACID 3.30 







IS: NS IVF BOLUS X1

MAXIPIME IV X1

VANCO IV X1



***INTERQUAL CRITERIA MET: PATIENT ADMITTED TO MED/SURG UNIT 02/07/2019***

DCP: PATIENT IS FROM [facility]

## 2019-02-08 NOTE — CONSULTATION
History of Present Illness


General


Date patient seen:  Feb 7, 2019


Chief Complaint:  General Complaint


Reason for Consultation:  Sepsis





Present Illness


HPI


 78-year-old female with hx of mmp and dementia who presents with chief 

complaint of generalized weakness and decreased oral intake. the pt was yelling 

and screaming she was agitated and was unable to provide hx.


Allergies:  


Coded Allergies:  


     No Known Allergies (Unverified , 3/26/14)





Medication History


Scheduled


Ascorbic Acid* (Ascorbic Acid*), 500 MG ORAL DAILY, (Reported)


Aspirin (Aspirin EC), 81 MG ORAL DAILY, (Reported)


Atenolol* (Tenormin*), 50 MG ORAL BID, (Reported)


Atorvastatin Calcium* (Atorvastatin Calcium*), 20 MG ORAL BEDTIME, (Reported)


Cholecalciferol (Vitamin D3)* (Vitamin D*), 2,000 UNITS ORAL DAILY, (Reported)


Clonazepam* (Klonopin*), 0.5 MG ORAL Q6H, (Reported)


Clonidine Hcl* (Catapres*), 0.1 MG ORAL EVERY 8 HOURS, (Reported)


Clopidogrel* (Clopidogrel*), 75 MG ORAL DAILY, (Reported)


Dicyclomine Hcl* (Dicyclomine Hcl*), 10 MG PO QID


Famotidine (Pepcid), 20 MG ORAL BEDTIME


Furosemide* (Lasix*), 40 MG ORAL DAILY, (Reported)


Losartan Potassium* (Losartan Potassium*), 25 MG ORAL DAILY, (Reported)


Metformin Hcl* (Metformin Hcl*), 500 MG ORAL TWICE A DAY, (Reported)


Mirtazapine* (Mirtazapine*), 15 MG ORAL BEDTIME, (Reported)


Potassium Chloride (Potassium Chloride), 10 MEQ ORAL DAILY, (Reported)


Rosuvastatin Calcium* (Crestor*), 10 MG ORAL DAILY, (Reported)


Sitagliptin Phos/Metformin Hcl (Janumet Xr 50-1,000 Mg Tablet), 1 TAB ORAL DAILY

, (Reported)


Trazodone* (Trazodone*), 50 MG ORAL BEDTIME, (Reported)


Vancomycin HCl (Vancocin HCl), 1,000 MG IV DAILY, (Reported)


Vitamin D (Vitamin D3), 5,000 UNITS ORAL DAILY, (Reported)


Zinc Sulfate (Zinc Sulfate*), 220 MG ORAL DAILY, (Reported)





Scheduled PRN


Haloperidol* (Haldol*), 5 MG IM EVERY 6 HOURS PRN for Agitation, (Reported)


Hydrocodone Bit/Acetaminophen 5-325* (Norco 5-325*), 1 TAB ORAL Q6H PRN for For 

Pain


Lorazepam* (Lorazepam*), 2 MG ORAL THREE TIMES A DAY PRN for For Anxiety, (

Reported)





Miscellaneous Medications


Apixaban (Eliquis), 5 MG PO, (Reported)





Patient History


Limited by:  medical condition


History Provided By:  Medical Record, PMD


Healthcare decision maker





Resuscitation status


Full Code


Advanced Directive on File








Past Medical/Surgical History


Past Medical/Surgical History:  


(1) Sepsis


(2) ATN (acute tubular necrosis)


(3) Dehydration


(4) Weakness


(5) Delirium


(6) Acute on chronic systolic CHF (congestive heart failure)


(7) Colitis


(8) Diabetes mellitus, type II


(9) Psychosis


(10) DVT of left axillary vein, acute


(11) Sick sinus syndrome


(12) CAD (coronary artery disease)


(13) HTN (hypertension)


(14) Pacemaker


(15) Fracture of femoral neck, right, closed


(16) UTI (urinary tract infection)


(17) Protein-calorie malnutrition, severe


(18) Pulmonary hypertension


(19) Aortic stenosis


(20) Alzheimer's dementia


(21) Sacral decubitus ulcer, stage III


(22) Rectal bleed


(23) Severe anemia





Review of Systems


Psychiatric:  Reports: prior hx, anxiety, emotional problems, hallucinations





Physical Exam


General Appearance:  alert, lethargic - waxing and waning of conciousness , 

confused, agitated





Last 24 Hour Vital Signs








  Date Time  Temp Pulse Resp B/P (MAP) Pulse Ox O2 Delivery O2 Flow Rate FiO2


 


2/8/19 12:00 98.6 62 20 122/75 (91) 98   


 


2/8/19 08:51      Room Air  


 


2/8/19 08:00 98.9 70 19 125/74 (91) 92   


 


2/8/19 07:56  70      


 


2/8/19 04:00  65      


 


2/8/19 04:00 97.6  20 112/56 (74) 95   


 


2/8/19 00:00  61      


 


2/8/19 00:00 97.5 71 20 122/54 (76) 95   


 


2/7/19 22:45 97.2       


 


2/7/19 22:42      Room Air  


 


2/7/19 20:37 97.2 86 18 128/63 100 Room Air  


 


2/7/19 20:33 98.9 67 18 119/67 100 Room Air  


 


2/7/19 18:05 98.9 86 18 128/63 100 Room Air  


 


2/7/19 16:05 98.9 86 18 132/68 100 Room Air  

















Intake and Output  


 


 2/7/19 2/8/19





 19:00 07:00


 


Intake Total  668 ml


 


Balance  668 ml


 


  


 


Intake IV Total  668 ml


 


# Voids 1 











Laboratory Tests








Test


  2/7/19


19:20 2/7/19


19:33 2/8/19


07:15


 


Lactic Acid Level


  3.30 mmol/L


(0.4-2.0)  H 


  


 


 


Urine Color  Yellow   


 


Urine Appearance  Clear   


 


Urine pH  5 (4.5-8.0)   


 


Urine Specific Gravity


  


  1.015


(1.005-1.035) 


 


 


Urine Protein


  


  2+ (NEGATIVE)


H 


 


 


Urine Glucose (UA)


  


  Negative


(NEGATIVE) 


 


 


Urine Ketones


  


  1+ (NEGATIVE)


H 


 


 


Urine Blood


  


  3+ (NEGATIVE)


H 


 


 


Urine Nitrite


  


  Negative


(NEGATIVE) 


 


 


Urine Bilirubin


  


  1+ (NEGATIVE)


H 


 


 


Urine Ictotest


  


  Negative


(NEGATIVE) 


 


 


Urine Urobilinogen


  


  4 MG/DL


(0.0-1.0)  H 


 


 


Urine Leukocyte Esterase


  


  1+ (NEGATIVE)


H 


 


 


Urine RBC


  


  10-15 /HPF (0


- 2)  H 


 


 


Urine WBC


  


  5-10 /HPF (0 -


2)  H 


 


 


Urine Squamous Epithelial


Cells 


  Few /LPF


(NONE/OCC) 


 


 


Urine Bacteria


  


  Few /HPF


(NONE) 


 


 


White Blood Count


  


  


  14.9 K/UL


(4.8-10.8)  H


 


Red Blood Count


  


  


  3.42 M/UL


(4.20-5.40)  L


 


Hemoglobin


  


  


  10.0 G/DL


(12.0-16.0)  L


 


Hematocrit


  


  


  31.6 %


(37.0-47.0)  L


 


Mean Corpuscular Volume   92 FL (80-99)  


 


Mean Corpuscular Hemoglobin


  


  


  29.1 PG


(27.0-31.0)


 


Mean Corpuscular Hemoglobin


Concent 


  


  31.5 G/DL


(32.0-36.0)  L


 


Red Cell Distribution Width


  


  


  16.0 %


(11.6-14.8)  H


 


Platelet Count


  


  


  105 K/UL


(150-450)  L


 


Mean Platelet Volume


  


  


  11.4 FL


(6.5-10.1)  H


 


Neutrophils (%) (Auto)


  


  


  % (45.0-75.0)


 


 


Lymphocytes (%) (Auto)


  


  


  % (20.0-45.0)


 


 


Monocytes (%) (Auto)    % (1.0-10.0)  


 


Eosinophils (%) (Auto)    % (0.0-3.0)  


 


Basophils (%) (Auto)    % (0.0-2.0)  


 


Differential Total Cells


Counted 


  


  100  


 


 


Neutrophils % (Manual)   89 % (45-75)  H


 


Lymphocytes % (Manual)   3 % (20-45)  L


 


Monocytes % (Manual)   1 % (1-10)  


 


Eosinophils % (Manual)   0 % (0-3)  


 


Basophils % (Manual)   0 % (0-2)  


 


Band Neutrophils   7 % (0-8)  


 


Platelet Estimate   Decreased  L


 


Platelet Morphology   Normal  


 


Hypochromasia   2+  


 


Anisocytosis   1+  


 


Ovalocytes   1+  


 


Victor Hugo Cells   Occasional  


 


Sodium Level


  


  


  142 MMOL/L


(136-145)


 


Potassium Level


  


  


  3.6 MMOL/L


(3.5-5.1)


 


Chloride Level


  


  


  111 MMOL/L


()  H


 


Carbon Dioxide Level


  


  


  17 MMOL/L


(21-32)  L


 


Anion Gap


  


  


  14 mmol/L


(5-15)


 


Blood Urea Nitrogen


  


  


  47 mg/dL


(7-18)  H


 


Creatinine


  


  


  1.3 MG/DL


(0.55-1.30)


 


Estimat Glomerular Filtration


Rate 


  


   mL/min (>60)  


 


 


Glucose Level


  


  


  352 MG/DL


()  #H


 


Calcium Level


  


  


  8.0 MG/DL


(8.5-10.1)  L


 


Phosphorus Level


  


  


  3.6 MG/DL


(2.5-4.9)


 


Magnesium Level


  


  


  1.6 MG/DL


(1.8-2.4)  L


 


Total Bilirubin


  


  


  1.4 MG/DL


(0.2-1.0)  H


 


Direct Bilirubin


  


  


  1.0 MG/DL


(0.0-0.3)  H


 


Aspartate Amino Transf


(AST/SGOT) 


  


  68 U/L (15-37)


H


 


Alanine Aminotransferase


(ALT/SGPT) 


  


  54 U/L (12-78)


 


 


Alkaline Phosphatase


  


  


  90 U/L


()


 


Troponin I


  


  


  0.062 ng/mL


(0.000-0.056)


 


Total Protein


  


  


  5.1 G/DL


(6.4-8.2)  L


 


Albumin


  


  


  1.5 G/DL


(3.4-5.0)  L


 


Globulin   3.6 g/dL  


 


Albumin/Globulin Ratio


  


  


  0.4 (1.0-2.7)


L








Height (Feet):  5


Height (Inches):  6.00


Weight (Pounds):  150


Medications





Current Medications








 Medications


  (Trade)  Dose


 Ordered  Sig/Violette


 Route


 PRN Reason  Start Time


 Stop Time Status Last Admin


Dose Admin


 


 Acetaminophen


  (Tylenol)  650 mg  Q4H  PRN


 ORAL


 T>100.5  2/8/19 13:30


 3/9/19 13:29   


 


 


 Albuterol/


 Ipratropium


  (Albuterol/


 Ipratropium)  3 ml  Q4H  PRN


 HHN


 Shortness of Breath  2/8/19 13:30


 2/12/19 13:29   


 


 


 Cefepime HCl 1 gm/


 Dextrose  55 ml @ 


 110 mls/hr  Q24H


 IVPB


   2/8/19 16:00


 2/15/19 15:59   


 


 


 Clonidine HCl


  (Catapres Tab)  0.1 mg  Q8H  PRN


 ORAL


 SBP>160mmHg  2/8/19 12:52


 3/10/19 12:51   


 


 


 Dextrose


  (Dextrose 50%)  25 ml  Q30M  PRN


 IV


 Hypoglycemia  2/8/19 13:15


 3/9/19 13:30   


 


 


 Dextrose


  (Dextrose 50%)  50 ml  Q30M  PRN


 IV


 hypoglycemia  2/8/19 13:15


 3/9/19 13:44   


 


 


 Dextrose/Sodium


 Chloride  1,000 ml @ 


 100 mls/hr  Q10H


 IV


   2/8/19 12:52


 3/10/19 12:51  2/8/19 13:19


 


 


 Heparin Sodium


  (Porcine)


  (Heparin 5000


 units/ml)  5,000 units  EVERY 12  HOURS


 SUBQ


   2/8/19 21:00


 3/9/19 20:59   


 


 


 Insulin Aspart


  (NovoLOG)    BEFORE MEALS AND  HS


 SUBQ


   2/8/19 16:30


 3/9/19 16:29   


 


 


 Lorazepam


  (Ativan)  1 mg  Q6H  PRN


 ORAL


 For Anxiety  2/8/19 12:53


 2/15/19 12:52   


 


 


 Mirtazapine


  (Remeron)  7.5 mg  BEDTIME


 ORAL


   2/8/19 21:00


 3/10/19 20:59   


 


 


 Morphine Sulfate


  (Morphine


 Sulfate)  2 mg  Q4H  PRN


 IVP


 Moderate Pain (Pain Scale 4-6)  2/8/19 12:53


 2/15/19 12:52   


 


 


 Olanzapine


  (ZyPREXA)  2.5 mg  BID


 ORAL


   2/8/19 18:00


 3/10/19 10:59   


 


 


 Ondansetron HCl


  (Zofran)  4 mg  Q6H  PRN


 IVP


 Nausea & Vomiting  2/8/19 12:53


 3/10/19 12:52   


 


 


 Phenazopyridine


 HCl


  (Pyridium)  100 mg  DAILYPRN  PRN


 ORAL


 dysuria  2/8/19 12:53


 3/10/19 12:52   


 


 


 Polyethylene


 Glycol


  (Miralax)  17 gm  DAILYPRN  PRN


 ORAL


 Constipation  2/8/19 12:53


 3/10/19 12:52   


 


 


 Temazepam


  (Restoril)  15 mg  HSPRN  PRN


 ORAL


 Insomnia  2/8/19 21:00


 2/14/19 20:59   


 


 


 Vancomycin HCl


  (Vanco rx to


 dose)  1 ea  DAILY  PRN


 MISC


 .  2/9/19 09:00


 3/9/19 14:44   


 


 


 Vancomycin HCl


 750 mg/Sodium


 Chloride  275 ml @ 


 183.333


 mls/hr  Q24H


 IVPB


   2/8/19 17:00


 2/13/19 16:59   


 











Assessment/Plan


Problem List:  


(1) Acute encephalopathy


Assessment & Plan:  metabolic


ICD Codes:  G93.40 - Encephalopathy, unspecified


SNOMED:  14047575, 248824272


(2) Delirium


ICD Codes:  R41.0 - Disorientation, unspecified


SNOMED:  2556180


(3) Alzheimer's dementia


ICD Codes:  G30.9 - Alzheimer's disease, unspecified; F02.80 - Dementia in 

other diseases classified elsewhere without behavioral disturbance


SNOMED:  31277177


Assessment/Plan


Zyprexa 


the pt lacks capacity


soft restraints if needed











Josh Nunez MD Feb 8, 2019 15:37

## 2019-02-08 NOTE — PULMONOLOGY PROGRESS NOTE
Assessment/Plan


Problems:  


(1) Sepsis


(2) ATN (acute tubular necrosis)


(3) Pulmonary hypertension


(4) Pacemaker


(5) CAD (coronary artery disease)


(6) Sacral decubitus ulcer, stage III


(7) Alzheimer's dementia


(8) Protein-calorie malnutrition, severe


(9) HTN (hypertension)


Assessment/Plan


d/w wound care nurse, 


pan cultures


iv fluids


check electrolytes


sliding scale


check electrolytes daily


broad spectrum abx


dvt prophylaxis


insulin coverage.


social service consult





Subjective


ROS Limited/Unobtainable:  No


Constitutional:  Reports: no symptoms


HEENT:  Repors: no symptoms


Respiratory:  Reports: no symptoms


Allergies:  


Coded Allergies:  


     No Known Allergies (Unverified , 3/26/14)





Objective





Last 24 Hour Vital Signs








  Date Time  Temp Pulse Resp B/P (MAP) Pulse Ox O2 Delivery O2 Flow Rate FiO2


 


2/8/19 08:51      Room Air  


 


2/8/19 08:00 98.9 70 19 125/74 (91) 92   


 


2/8/19 04:00  65      


 


2/8/19 04:00 97.6  20 112/56 (74) 95   


 


2/8/19 00:00  61      


 


2/8/19 00:00 97.5 71 20 122/54 (76) 95   


 


2/7/19 22:45 97.2       


 


2/7/19 22:42      Room Air  


 


2/7/19 20:37 97.2 86 18 128/63 100 Room Air  


 


2/7/19 20:33 98.9 67 18 119/67 100 Room Air  


 


2/7/19 18:05 98.9 86 18 128/63 100 Room Air  


 


2/7/19 16:05 98.9 86 18 132/68 100 Room Air  


 


2/7/19 14:05 99.4 80 18 126/68 100 Room Air  


 


2/7/19 13:05 99.4 78 18 120/62 100 Room Air  


 


2/7/19 12:05  68 20   Room Air  


 


2/7/19 12:05 99.4 78 18 110/64 100 Room Air  


 


2/7/19 11:54 99.5 68 20 100/64 98 Room Air  

















Intake and Output  


 


 2/7/19 2/8/19





 19:00 07:00


 


Intake Total  668 ml


 


Balance  668 ml


 


  


 


Intake IV Total  668 ml


 


# Voids 1 








General Appearance:  cachetic


HEENT:  normocephalic, atraumatic


Respiratory/Chest:  chest wall non-tender, lungs clear


Cardiovascular:  normal peripheral pulses, normal rate


Abdomen:  normal bowel sounds, no organomegaly


Extremities:  other - cold toes


Laboratory Tests


2/7/19 12:07: 


White Blood Count 14.6H, Red Blood Count 3.92L, Hemoglobin 11.3L, Hematocrit 

36.4L, Mean Corpuscular Volume 93, Mean Corpuscular Hemoglobin 28.9, Mean 

Corpuscular Hemoglobin Concent 31.2L, Red Cell Distribution Width 16.9H, 

Platelet Count 124L, Mean Platelet Volume 9.5, Neutrophils (%) (Auto) , 

Lymphocytes (%) (Auto) , Monocytes (%) (Auto) , Eosinophils (%) (Auto) , 

Basophils (%) (Auto) , Differential Total Cells Counted 100, Neutrophils % (

Manual) 84H, Lymphocytes % (Manual) 4L, Monocytes % (Manual) 2, Eosinophils % (

Manual) 0, Basophils % (Manual) 0, Band Neutrophils 10H, Platelet Estimate 

DecreasedL, Platelet Morphology Normal, Hypochromasia 1+, Anisocytosis 1+, 

Sodium Level 145, Potassium Level 5.2H, Chloride Level 111H, Carbon Dioxide 

Level 21, Anion Gap 13, Blood Urea Nitrogen 47H, Creatinine 1.5H, Estimat 

Glomerular Filtration Rate , Glucose Level 131H, Lactic Acid Level 3.70H, Uric 

Acid 5.7, Calcium Level 9.2, Total Bilirubin 2.0H, Direct Bilirubin 1.2H, 

Aspartate Amino Transf (AST/SGOT) 204H, Alanine Aminotransferase (ALT/SGPT) 85H

, Alkaline Phosphatase 133H, Total Creatine Kinase 211, Creatine Kinase MB 0.8, 

Creatine Kinase MB Relative Index 0.3, Troponin I 0.131H, Total Protein 6.6, 

Albumin 2.1L, Globulin 4.5, Albumin/Globulin Ratio 0.5L, Lipase 58L


2/7/19 13:00: 


Urine Eosinophils None seen, Urine Random Sodium < 20L, Urine Potassium Timed 61


2/7/19 13:36: Lactic Acid Level 5.30H


2/7/19 19:20: Lactic Acid Level 3.30H


2/7/19 19:33: 


Urine Color Yellow, Urine Appearance Clear, Urine pH 5, Urine Specific Gravity 

1.015, Urine Protein 2+H, Urine Glucose (UA) Negative, Urine Ketones 1+H, Urine 

Blood 3+H, Urine Nitrite Negative, Urine Bilirubin 1+H, Urine Ictotest Negative

, Urine Urobilinogen 4H, Urine Leukocyte Esterase 1+H, Urine RBC 10-15H, Urine 

WBC 5-10H, Urine Squamous Epithelial Cells Few, Urine Bacteria Few


2/8/19 07:15: 


White Blood Count 14.9H, Red Blood Count 3.42L, Hemoglobin 10.0L, Hematocrit 

31.6L, Mean Corpuscular Volume 92, Mean Corpuscular Hemoglobin 29.1, Mean 

Corpuscular Hemoglobin Concent 31.5L, Red Cell Distribution Width 16.0H, 

Platelet Count 105L, Mean Platelet Volume 11.4H, Neutrophils (%) (Auto) , 

Lymphocytes (%) (Auto) , Monocytes (%) (Auto) , Eosinophils (%) (Auto) , 

Basophils (%) (Auto) , Differential Total Cells Counted 100, Neutrophils % (

Manual) 89H, Lymphocytes % (Manual) 3L, Monocytes % (Manual) 1, Eosinophils % (

Manual) 0, Basophils % (Manual) 0, Band Neutrophils 7, Platelet Estimate 

DecreasedL, Platelet Morphology Normal, Hypochromasia 2+, Anisocytosis 1+, 

Ovalocytes 1+, [place] Cells Occasional, Sodium Level 142, Potassium Level 3.6, 

Chloride Level 111H, Carbon Dioxide Level 17L, Anion Gap 14, Blood Urea 

Nitrogen 47H, Creatinine 1.3, Estimat Glomerular Filtration Rate , Glucose 

Level 352#H, Calcium Level 8.0L, Phosphorus Level 3.6, Magnesium Level 1.6L, 

Total Bilirubin 1.4H, Direct Bilirubin 1.0H, Aspartate Amino Transf (AST/SGOT) 

68H, Alanine Aminotransferase (ALT/SGPT) 54, Alkaline Phosphatase 90, Troponin 

I 0.062H, Total Protein 5.1L, Albumin 1.5L, Globulin 3.6, Albumin/Globulin 

Ratio 0.4L





Current Medications








 Medications


  (Trade)  Dose


 Ordered  Sig/Violette


 Route


 PRN Reason  Start Time


 Stop Time Status Last Admin


Dose Admin


 


 Acetaminophen


  (Tylenol)  650 mg  Q4H  PRN


 ORAL


 T>100.5  2/7/19 13:30


 3/9/19 13:29   


 


 


 Albuterol/


 Ipratropium


  (Albuterol/


 Ipratropium)  3 ml  Q4H  PRN


 HHN


 Shortness of Breath  2/7/19 13:30


 2/12/19 13:29   


 


 


 Cefepime HCl 1 gm/


 Dextrose  55 ml @ 


 110 mls/hr  Q24H


 IVPB


   2/8/19 16:00


 2/15/19 15:59   


 


 


 Clonidine HCl


  (Catapres Tab)  0.1 mg  Q8H  PRN


 ORAL


 SBP>160mmHg  2/7/19 17:00


 3/9/19 16:59   


 


 


 Dextrose


  (Dextrose 50%)  25 ml  Q30M  PRN


 IV


 Hypoglycemia  2/7/19 13:45


 3/9/19 13:30   


 


 


 Dextrose


  (Dextrose 50%)  50 ml  ONCE  PRN


 IV


 HYPOGLYCEMIA  2/8/19 06:00


 3/10/19 05:59   


 


 


 Dextrose


  (Dextrose 50%)  50 ml  Q30M  PRN


 IV


 hypoglycemia  2/7/19 13:45


 3/9/19 13:44  2/8/19 06:46


 


 


 Dextrose/Sodium


 Chloride  1,000 ml @ 


 100 mls/hr  Q10H


 IV


   2/8/19 07:00


 3/10/19 06:59  2/8/19 10:22


 


 


 Heparin Sodium


  (Porcine)


  (Heparin 5000


 units/ml)  5,000 units  EVERY 12  HOURS


 SUBQ


   2/7/19 21:00


 3/9/19 20:59  2/7/19 22:08


 


 


 Insulin Aspart


  (NovoLOG)    BEFORE MEALS AND  HS


 SUBQ


   2/7/19 16:30


 3/9/19 16:29   


 


 


 Mirtazapine


  (Remeron)  7.5 mg  BEDTIME


 ORAL


   2/8/19 21:00


 3/10/19 20:59 UNV  


 


 


 Morphine Sulfate


  (Morphine


 Sulfate)  2 mg  Q4H  PRN


 IVP


 Moderate Pain (Pain Scale 4-6)  2/7/19 13:30


 2/14/19 13:29  2/7/19 22:15


 


 


 Ondansetron HCl


  (Zofran)  4 mg  Q6H  PRN


 IVP


 Nausea & Vomiting  2/7/19 13:30


 3/9/19 13:29   


 


 


 Phenazopyridine


 HCl


  (Pyridium)  100 mg  DAILYPRN  PRN


 ORAL


 dysuria  2/7/19 13:30


 3/9/19 13:29   


 


 


 Polyethylene


 Glycol


  (Miralax)  17 gm  DAILYPRN  PRN


 ORAL


 Constipation  2/7/19 13:30


 3/9/19 13:29   


 


 


 Temazepam


  (Restoril)  15 mg  HSPRN  PRN


 ORAL


 Insomnia  2/7/19 21:00


 2/14/19 20:59   


 


 


 Vancomycin HCl


  (Vanco rx to


 dose)  1 ea  DAILY  PRN


 MISC


 .  2/7/19 14:45


 3/9/19 14:44   


 


 


 Vancomycin HCl


 750 mg/Sodium


 Chloride  275 ml @ 


 183.333


 mls/hr  Q24H


 IVPB


   2/8/19 17:00


 2/13/19 16:59   


 

















Korin Junior MD Feb 8, 2019 11:02

## 2019-02-08 NOTE — NUR
NURSE NOTES:

Pt 02 sat now at 96-99%. Stable VS. Dr Magana aware. No s/s of acute distress. NG placement 
confirmed via ABD xray. Called Dr Junior for NG feeding orders.

## 2019-02-08 NOTE — DIAGNOSTIC IMAGING REPORT
History: SOB

 

Exam: XR CXR 1 VIEW

 

Comparison: 2/7/2019

 

FINDINGS:

 

Mild patchy right base opacity may represent atelectasis or developing 

infiltrate.  The left lung appears clear.  Cardiac silhouette appears 

unchanged with again note of multilead pacemakers.  Nasogastric tube is 

seen at the second portion of the duodenum and tip extends off the 

inferior edge of film.

 

IMPRESSION:

 

Mild patchy right base opacity may represent atelectasis or developing 

infiltrate.  The left lung appears clear.  

 

Cardiac silhouette appears unchanged with again note of multilead 

pacemakers.  

 

Nasogastric tube is seen at the second portion of the duodenum and tip 

extends off the inferior edge of film.

## 2019-02-08 NOTE — NUR
Social Work

This Sw received a consult due to end of life decision making. Patient is not able to 
swallow and will require NG for nutrition. Patient is from [facility], with 
daughter, Shoshana Teran () listed as her contact. This SW made attempts to 
contact daughter; left message and awaiting call return at this time. -East Charge RN, 
Raffi also explains they will continue making efforts to locate family as well. POLST 
stating full code, full treatment at this time.

## 2019-02-08 NOTE — NUR
NURSE NOTES:

RT at bedside, patient 02 sat 35-40%, Dr Magana called. ABG stat and CXR ordered by Dr. Magana.

## 2019-02-08 NOTE — NUR
NURSE NOTES:

Transferring patient to [facility] [id number], md Junior placed order for NG-Tube placement and 
xray to confirm placement

## 2019-02-08 NOTE — CONSULTATION
History of Present Illness


General


Date patient seen:  Feb 8, 2019


Chief Complaint:  General Complaint


Reason for Consultation:  Sepsis





Present Illness


HPI


77 yo female with multiple medical comorbidities including DM, CAD, Alzheimer 

dementia and Hip fracture presented with dehydration and weakness.  On 

admission noted to have multiple wounds requiring care and assistance.  patient 

nursing home resident dependant on care.  surgery called to evaluate and assist 

with care/management.


Allergies:  


Coded Allergies:  


     No Known Allergies (Unverified , 3/26/14)





Medication History


Scheduled


Ascorbic Acid* (Ascorbic Acid*), 500 MG ORAL DAILY, (Reported)


Aspirin (Aspirin EC), 81 MG ORAL DAILY, (Reported)


Atenolol* (Tenormin*), 50 MG ORAL BID, (Reported)


Atorvastatin Calcium* (Atorvastatin Calcium*), 20 MG ORAL BEDTIME, (Reported)


Cholecalciferol (Vitamin D3)* (Vitamin D*), 2,000 UNITS ORAL DAILY, (Reported)


Clonazepam* (Klonopin*), 0.5 MG ORAL Q6H, (Reported)


Clonidine Hcl* (Catapres*), 0.1 MG ORAL EVERY 8 HOURS, (Reported)


Clopidogrel* (Clopidogrel*), 75 MG ORAL DAILY, (Reported)


Dicyclomine Hcl* (Dicyclomine Hcl*), 10 MG PO QID


Famotidine (Pepcid), 20 MG ORAL BEDTIME


Furosemide* (Lasix*), 40 MG ORAL DAILY, (Reported)


Losartan Potassium* (Losartan Potassium*), 25 MG ORAL DAILY, (Reported)


Metformin Hcl* (Metformin Hcl*), 500 MG ORAL TWICE A DAY, (Reported)


Mirtazapine* (Mirtazapine*), 15 MG ORAL BEDTIME, (Reported)


Potassium Chloride (Potassium Chloride), 10 MEQ ORAL DAILY, (Reported)


Rosuvastatin Calcium* (Crestor*), 10 MG ORAL DAILY, (Reported)


Sitagliptin Phos/Metformin Hcl (Janumet Xr 50-1,000 Mg Tablet), 1 TAB ORAL DAILY

, (Reported)


Trazodone* (Trazodone*), 50 MG ORAL BEDTIME, (Reported)


Vancomycin HCl (Vancocin HCl), 1,000 MG IV DAILY, (Reported)


Vitamin D (Vitamin D3), 5,000 UNITS ORAL DAILY, (Reported)


Zinc Sulfate (Zinc Sulfate*), 220 MG ORAL DAILY, (Reported)





Scheduled PRN


Haloperidol* (Haldol*), 5 MG IM EVERY 6 HOURS PRN for Agitation, (Reported)


Hydrocodone Bit/Acetaminophen 5-325* (Norco 5-325*), 1 TAB ORAL Q6H PRN for For 

Pain


Lorazepam* (Lorazepam*), 2 MG ORAL THREE TIMES A DAY PRN for For Anxiety, (

Reported)





Miscellaneous Medications


Apixaban (Eliquis), 5 MG PO, (Reported)





Patient History


Limited by:  medical condition


History Provided By:  Medical Record, PMD


Healthcare decision maker





Resuscitation status


Full Code


Advanced Directive on File








Past Medical/Surgical History


Past Medical/Surgical History:  


(1) Sepsis


(2) ATN (acute tubular necrosis)


(3) Dehydration


(4) Weakness


(5) Delirium


(6) Acute on chronic systolic CHF (congestive heart failure)


(7) Colitis


(8) Diabetes mellitus, type II


(9) Psychosis


(10) DVT of left axillary vein, acute


(11) Sick sinus syndrome


(12) CAD (coronary artery disease)


(13) HTN (hypertension)


(14) Pacemaker


(15) Fracture of femoral neck, right, closed


(16) UTI (urinary tract infection)


(17) Protein-calorie malnutrition, severe


(18) Pulmonary hypertension


(19) Aortic stenosis


(20) Alzheimer's dementia


(21) Sacral decubitus ulcer, stage III


(22) Rectal bleed


(23) Severe anemia





Review of Systems


ROS Narrative


cannot obtain





Physical Exam


General Appearance:  no apparent distress


Lines, tubes and drains:  peripheral


HEENT:  mucous membranes moist


Neck:  normal inspection


Respiratory/Chest:  normal breath sounds


Cardiovascular/Chest:  regular rhythm


Abdomen:  soft, no organomegaly, no mass


Extremities:  other


Skin Exam:  other





Last 24 Hour Vital Signs








  Date Time  Temp Pulse Resp B/P (MAP) Pulse Ox O2 Delivery O2 Flow Rate FiO2


 


2/8/19 08:51      Room Air  


 


2/8/19 08:00 98.9 70 19 125/74 (91) 92   


 


2/8/19 04:00  65      


 


2/8/19 04:00 97.6  20 112/56 (74) 95   


 


2/8/19 00:00  61      


 


2/8/19 00:00 97.5 71 20 122/54 (76) 95   


 


2/7/19 22:45 97.2       


 


2/7/19 22:42      Room Air  


 


2/7/19 20:37 97.2 86 18 128/63 100 Room Air  


 


2/7/19 20:33 98.9 67 18 119/67 100 Room Air  


 


2/7/19 18:05 98.9 86 18 128/63 100 Room Air  


 


2/7/19 16:05 98.9 86 18 132/68 100 Room Air  


 


2/7/19 14:05 99.4 80 18 126/68 100 Room Air  


 


2/7/19 13:05 99.4 78 18 120/62 100 Room Air  

















Intake and Output  


 


 2/7/19 2/8/19





 19:00 07:00


 


Intake Total  668 ml


 


Balance  668 ml


 


  


 


Intake IV Total  668 ml


 


# Voids 1 











Laboratory Tests








Test


  2/7/19


13:00 2/7/19


13:36 2/7/19


19:20 2/7/19


19:33


 


Urine Eosinophils


  None seen


(NONE SEEN) 


  


  


 


 


Urine Random Sodium


  < 20 mmol/L


()  L 


  


  


 


 


Urine Potassium Timed


  61 mmol/L


(12-62) 


  


  


 


 


Lactic Acid Level


  


  5.30 mmol/L


(0.66-2.22)  H 3.30 mmol/L


(0.4-2.0)  H 


 


 


Urine Color    Yellow  


 


Urine Appearance    Clear  


 


Urine pH    5 (4.5-8.0)  


 


Urine Specific Gravity


  


  


  


  1.015


(1.005-1.035)


 


Urine Protein


  


  


  


  2+ (NEGATIVE)


H


 


Urine Glucose (UA)


  


  


  


  Negative


(NEGATIVE)


 


Urine Ketones


  


  


  


  1+ (NEGATIVE)


H


 


Urine Blood


  


  


  


  3+ (NEGATIVE)


H


 


Urine Nitrite


  


  


  


  Negative


(NEGATIVE)


 


Urine Bilirubin


  


  


  


  1+ (NEGATIVE)


H


 


Urine Ictotest


  


  


  


  Negative


(NEGATIVE)


 


Urine Urobilinogen


  


  


  


  4 MG/DL


(0.0-1.0)  H


 


Urine Leukocyte Esterase


  


  


  


  1+ (NEGATIVE)


H


 


Urine RBC


  


  


  


  10-15 /HPF (0


- 2)  H


 


Urine WBC


  


  


  


  5-10 /HPF (0 -


2)  H


 


Urine Squamous Epithelial


Cells 


  


  


  Few /LPF


(NONE/OCC)


 


Urine Bacteria


  


  


  


  Few /HPF


(NONE)


 


Test


  2/8/19


07:15 


  


  


 


 


White Blood Count


  14.9 K/UL


(4.8-10.8)  H 


  


  


 


 


Red Blood Count


  3.42 M/UL


(4.20-5.40)  L 


  


  


 


 


Hemoglobin


  10.0 G/DL


(12.0-16.0)  L 


  


  


 


 


Hematocrit


  31.6 %


(37.0-47.0)  L 


  


  


 


 


Mean Corpuscular Volume 92 FL (80-99)     


 


Mean Corpuscular Hemoglobin


  29.1 PG


(27.0-31.0) 


  


  


 


 


Mean Corpuscular Hemoglobin


Concent 31.5 G/DL


(32.0-36.0)  L 


  


  


 


 


Red Cell Distribution Width


  16.0 %


(11.6-14.8)  H 


  


  


 


 


Platelet Count


  105 K/UL


(150-450)  L 


  


  


 


 


Mean Platelet Volume


  11.4 FL


(6.5-10.1)  H 


  


  


 


 


Neutrophils (%) (Auto)


  % (45.0-75.0)


  


  


  


 


 


Lymphocytes (%) (Auto)


  % (20.0-45.0)


  


  


  


 


 


Monocytes (%) (Auto)  % (1.0-10.0)     


 


Eosinophils (%) (Auto)  % (0.0-3.0)     


 


Basophils (%) (Auto)  % (0.0-2.0)     


 


Differential Total Cells


Counted 100  


  


  


  


 


 


Neutrophils % (Manual) 89 % (45-75)  H   


 


Lymphocytes % (Manual) 3 % (20-45)  L   


 


Monocytes % (Manual) 1 % (1-10)     


 


Eosinophils % (Manual) 0 % (0-3)     


 


Basophils % (Manual) 0 % (0-2)     


 


Band Neutrophils 7 % (0-8)     


 


Platelet Estimate Decreased  L   


 


Platelet Morphology Normal     


 


Hypochromasia 2+     


 


Anisocytosis 1+     


 


Ovalocytes 1+     


 


[place] Cells Occasional     


 


Sodium Level


  142 MMOL/L


(136-145) 


  


  


 


 


Potassium Level


  3.6 MMOL/L


(3.5-5.1) 


  


  


 


 


Chloride Level


  111 MMOL/L


()  H 


  


  


 


 


Carbon Dioxide Level


  17 MMOL/L


(21-32)  L 


  


  


 


 


Anion Gap


  14 mmol/L


(5-15) 


  


  


 


 


Blood Urea Nitrogen


  47 mg/dL


(7-18)  H 


  


  


 


 


Creatinine


  1.3 MG/DL


(0.55-1.30) 


  


  


 


 


Estimat Glomerular Filtration


Rate  mL/min (>60)  


  


  


  


 


 


Glucose Level


  352 MG/DL


()  #H 


  


  


 


 


Calcium Level


  8.0 MG/DL


(8.5-10.1)  L 


  


  


 


 


Phosphorus Level


  3.6 MG/DL


(2.5-4.9) 


  


  


 


 


Magnesium Level


  1.6 MG/DL


(1.8-2.4)  L 


  


  


 


 


Total Bilirubin


  1.4 MG/DL


(0.2-1.0)  H 


  


  


 


 


Direct Bilirubin


  1.0 MG/DL


(0.0-0.3)  H 


  


  


 


 


Aspartate Amino Transf


(AST/SGOT) 68 U/L (15-37)


H 


  


  


 


 


Alanine Aminotransferase


(ALT/SGPT) 54 U/L (12-78)


  


  


  


 


 


Alkaline Phosphatase


  90 U/L


() 


  


  


 


 


Troponin I


  0.062 ng/mL


(0.000-0.056) 


  


  


 


 


Total Protein


  5.1 G/DL


(6.4-8.2)  L 


  


  


 


 


Albumin


  1.5 G/DL


(3.4-5.0)  L 


  


  


 


 


Globulin 3.6 g/dL     


 


Albumin/Globulin Ratio


  0.4 (1.0-2.7)


L 


  


  


 








Height (Feet):  5


Height (Inches):  6.00


Weight (Pounds):  150


Medications





Current Medications








 Medications


  (Trade)  Dose


 Ordered  Sig/Violette


 Route


 PRN Reason  Start Time


 Stop Time Status Last Admin


Dose Admin


 


 Acetaminophen


  (Tylenol)  650 mg  Q4H  PRN


 ORAL


 T>100.5  2/7/19 13:30


 3/9/19 13:29   


 


 


 Albuterol/


 Ipratropium


  (Albuterol/


 Ipratropium)  3 ml  Q4H  PRN


 HHN


 Shortness of Breath  2/7/19 13:30


 2/12/19 13:29   


 


 


 Cefepime HCl 1 gm/


 Dextrose  55 ml @ 


 110 mls/hr  Q24H


 IVPB


   2/8/19 16:00


 2/15/19 15:59   


 


 


 Clonidine HCl


  (Catapres Tab)  0.1 mg  Q8H  PRN


 ORAL


 SBP>160mmHg  2/7/19 17:00


 3/9/19 16:59   


 


 


 Dextrose


  (Dextrose 50%)  25 ml  Q30M  PRN


 IV


 Hypoglycemia  2/7/19 13:45


 3/9/19 13:30   


 


 


 Dextrose


  (Dextrose 50%)  50 ml  ONCE  PRN


 IV


 HYPOGLYCEMIA  2/8/19 06:00


 3/10/19 05:59   


 


 


 Dextrose


  (Dextrose 50%)  50 ml  Q30M  PRN


 IV


 hypoglycemia  2/7/19 13:45


 3/9/19 13:44  2/8/19 12:08


 


 


 Dextrose/Sodium


 Chloride  1,000 ml @ 


 100 mls/hr  Q10H


 IV


   2/8/19 07:00


 3/10/19 06:59  2/8/19 10:22


 


 


 Heparin Sodium


  (Porcine)


  (Heparin 5000


 units/ml)  5,000 units  EVERY 12  HOURS


 SUBQ


   2/7/19 21:00


 3/9/19 20:59  2/7/19 22:08


 


 


 Insulin Aspart


  (NovoLOG)    BEFORE MEALS AND  HS


 SUBQ


   2/7/19 16:30


 3/9/19 16:29   


 


 


 Lorazepam


  (Ativan)  1 mg  Q6H  PRN


 ORAL


 For Anxiety  2/8/19 11:00


 2/15/19 10:59   


 


 


 Mirtazapine


  (Remeron)  7.5 mg  BEDTIME


 ORAL


   2/8/19 21:00


 3/10/19 20:59   


 


 


 Morphine Sulfate


  (Morphine


 Sulfate)  2 mg  Q4H  PRN


 IVP


 Moderate Pain (Pain Scale 4-6)  2/7/19 13:30


 2/14/19 13:29  2/7/19 22:15


 


 


 Olanzapine


  (ZyPREXA)  2.5 mg  BID


 ORAL


   2/8/19 11:00


 3/10/19 10:59   


 


 


 Ondansetron HCl


  (Zofran)  4 mg  Q6H  PRN


 IVP


 Nausea & Vomiting  2/7/19 13:30


 3/9/19 13:29   


 


 


 Phenazopyridine


 HCl


  (Pyridium)  100 mg  DAILYPRN  PRN


 ORAL


 dysuria  2/7/19 13:30


 3/9/19 13:29   


 


 


 Polyethylene


 Glycol


  (Miralax)  17 gm  DAILYPRN  PRN


 ORAL


 Constipation  2/7/19 13:30


 3/9/19 13:29   


 


 


 Temazepam


  (Restoril)  15 mg  HSPRN  PRN


 ORAL


 Insomnia  2/7/19 21:00


 2/14/19 20:59   


 


 


 Vancomycin HCl


  (Vanco rx to


 dose)  1 ea  DAILY  PRN


 MISC


 .  2/7/19 14:45


 3/9/19 14:44   


 


 


 Vancomycin HCl


 750 mg/Sodium


 Chloride  275 ml @ 


 183.333


 mls/hr  Q24H


 IVPB


   2/8/19 17:00


 2/13/19 16:59   


 











Assessment/Plan


Problem List:  


(1) Sepsis


ICD Codes:  A41.9 - Sepsis, unspecified organism


SNOMED:  23350580


(2) Sacral decubitus ulcer, stage III


Assessment & Plan:  DTPI sacrum.Maroon- indurated discoloration with opening at 

sacral coccygeal area(L)3.8cm x (W)1.4cm with entire wound measuring (L)10cmx(W)

12cm.Small amt malodorous brown exudate.


Stable dry eschar R heel with dry peeling skin periwound (L)2cm x (W)3.5cm. 

Periwound is also red and boggy. 


Small dry eschar noted to lateral R 5th metatarsal (L)1.4cm x (W)0.3cm.  


L heel is boggy but colour is dusky . 


Resolving skin tear lateral R tibia. Wound bed is clean and dry.  





Tx. Plan:


Cleanse Sacral wound with Saline.Apply Therahoney to opening at sacrococcygeal 

area.Cavilon skin barrier to DTPI. Cover with Optifoam drsg .Change Daily and 

prn.


            


Apply Betadine to R heel  and Lateral R 5th metatarsal.Cover with Abd pad .Wrap 

with kerlix daily and prn.


            


Apply Cavilon Skin Barrier to L heel.Cover with Optifoam drsg .Change every 7 

days and prn.


            


Reposition at least every 2hours or as tolerated.


            


Off-load heels with pillow.


ICD Codes:  L89.153 - Pressure ulcer of sacral region, stage 3


SNOMED:  774028136, [id number]


(3) Rectal bleed


ICD Codes:  K62.5 - Hemorrhage of anus and rectum


SNOMED:  71514340


(4) Protein-calorie malnutrition, severe


ICD Codes:  E43 - Unspecified severe protein-calorie malnutrition


SNOMED:  516058552


(5) Dehydration


ICD Codes:  E86.0 - Dehydration


SNOMED:  47827524











Tuan Patricia Feb 8, 2019 12:27

## 2019-02-08 NOTE — DIAGNOSTIC IMAGING REPORT
Indication: NG tube placement

 

Comparison:  None

 

Single view of the abdomen obtained 

 

Findings:

   

Single view of the upper abdomen shows a nasogastric tube projected over the lower

chest presumably within the esophagus. The tip is several centimeters above the EG

junction.

 

IMPRESSION:

 

Nasogastric tube tip is likely within the distal esophagus several centimeters above

the EG junction.

## 2019-02-08 NOTE — NUR
NURSE NOTES:



Called Dr. Junior to notify him that patient's blood sugar is 50 after pushing Dextrose 50 
when patient's blood sugar was inititally 36. Awaiting callback.

## 2019-02-08 NOTE — NUR
NURSE NOTES:

Pt in bed with HOB in semi fowlers and in a low position, pt is confused Ox zero, may or may 
not respond to name, and is a [place] speaker, is a 1:1 feeder, is not strong enough so pt 
needs to be turned every 2hrs, night nurse reported that the pt was having episodes of 
hypoglycemia and D50% was given twice to bring up the blood sugar to acceptable levels, no 
s/s of distress or sob noted.

## 2019-02-09 VITALS — SYSTOLIC BLOOD PRESSURE: 118 MMHG | DIASTOLIC BLOOD PRESSURE: 69 MMHG

## 2019-02-09 VITALS — SYSTOLIC BLOOD PRESSURE: 132 MMHG | DIASTOLIC BLOOD PRESSURE: 80 MMHG

## 2019-02-09 VITALS — DIASTOLIC BLOOD PRESSURE: 76 MMHG | SYSTOLIC BLOOD PRESSURE: 143 MMHG

## 2019-02-09 VITALS — DIASTOLIC BLOOD PRESSURE: 48 MMHG | SYSTOLIC BLOOD PRESSURE: 108 MMHG

## 2019-02-09 VITALS — DIASTOLIC BLOOD PRESSURE: 66 MMHG | SYSTOLIC BLOOD PRESSURE: 126 MMHG

## 2019-02-09 VITALS — DIASTOLIC BLOOD PRESSURE: 54 MMHG | SYSTOLIC BLOOD PRESSURE: 106 MMHG

## 2019-02-09 RX ADMIN — HEPARIN SODIUM SCH UNITS: 5000 INJECTION INTRAVENOUS; SUBCUTANEOUS at 09:00

## 2019-02-09 RX ADMIN — DEXTROSE AND SODIUM CHLORIDE SCH MLS/HR: 5; .45 INJECTION, SOLUTION INTRAVENOUS at 14:41

## 2019-02-09 RX ADMIN — OLANZAPINE SCH MG: 2.5 TABLET, FILM COATED ORAL at 09:30

## 2019-02-09 RX ADMIN — SODIUM CHLORIDE SCH MLS/HR: 9 INJECTION, SOLUTION INTRAVENOUS at 17:08

## 2019-02-09 RX ADMIN — INSULIN ASPART SCH UNITS: 100 INJECTION, SOLUTION INTRAVENOUS; SUBCUTANEOUS at 11:30

## 2019-02-09 RX ADMIN — MORPHINE SULFATE PRN MG: 2 INJECTION, SOLUTION INTRAMUSCULAR; INTRAVENOUS at 12:02

## 2019-02-09 RX ADMIN — LORAZEPAM PRN MG: 1 TABLET ORAL at 22:50

## 2019-02-09 RX ADMIN — MORPHINE SULFATE PRN MG: 2 INJECTION, SOLUTION INTRAMUSCULAR; INTRAVENOUS at 20:49

## 2019-02-09 RX ADMIN — OLANZAPINE SCH MG: 2.5 TABLET, FILM COATED ORAL at 17:08

## 2019-02-09 RX ADMIN — HEPARIN SODIUM SCH UNITS: 5000 INJECTION INTRAVENOUS; SUBCUTANEOUS at 20:54

## 2019-02-09 RX ADMIN — LORAZEPAM PRN MG: 1 TABLET ORAL at 03:13

## 2019-02-09 RX ADMIN — INSULIN ASPART SCH UNITS: 100 INJECTION, SOLUTION INTRAVENOUS; SUBCUTANEOUS at 05:41

## 2019-02-09 RX ADMIN — INSULIN ASPART SCH UNITS: 100 INJECTION, SOLUTION INTRAVENOUS; SUBCUTANEOUS at 16:30

## 2019-02-09 RX ADMIN — SODIUM CHLORIDE SCH MLS/HR: 0.9 INJECTION INTRAVENOUS at 16:30

## 2019-02-09 RX ADMIN — DEXTROSE AND SODIUM CHLORIDE SCH MLS/HR: 5; .45 INJECTION, SOLUTION INTRAVENOUS at 08:52

## 2019-02-09 NOTE — NUR
NURSE NOTES:

Patient bedside glucose was 46. Dextrose given and bedside glucose is now 82. Message left 
for MD Magana. Awaiting any further orders. Charge nurse Nolvia made aware.

## 2019-02-09 NOTE — NUR
NURSE NOTES:

Received patient in bed, non verbal. On NG tube feeding, currently running at 20cc/hr, goal 
to be 47cc/hr. No S/Sx of aspiration, HOB remaining up. IV on L IJ intact, patent running 
fluids. Wound dressing intact, dry. Bed in lowest position, locked, alarms on. Call light in 
reach.

## 2019-02-09 NOTE — NUR
NURSE NOTES:

Started tube feeding Glucerna 1.5 at 10mL/hour. Goal is 47mL/hour. Patient tolerating well.

## 2019-02-09 NOTE — NUR
NURSE NOTES:

Patient recievd on bed, asleep. IV sites intact and patent. NG tube in place. Bed in low and 
locked position, call light within reach. No signs of respiratory distress or pain. Room 
board updated, will continue to monitor.

## 2019-02-09 NOTE — INTERNAL MED PROGRESS NOTE
Subjective


Physician Name


Juan Carlos Magana


Attending Physician


Juan Carlos Magana MD





Current Medications








 Medications


  (Trade)  Dose


 Ordered  Sig/Violette


 Route


 PRN Reason  Start Time


 Stop Time Status Last Admin


Dose Admin


 


 Acetaminophen


  (Tylenol)  650 mg  Q4H  PRN


 ORAL


 T>100.5  2/8/19 13:30


 3/9/19 13:29   


 


 


 Albuterol/


 Ipratropium


  (Albuterol/


 Ipratropium)  3 ml  Q4H  PRN


 HHN


 Shortness of Breath  2/8/19 13:30


 2/12/19 13:29   


 


 


 Cefepime HCl 1 gm/


 Dextrose  55 ml @ 


 110 mls/hr  Q24H


 IVPB


   2/8/19 16:00


 2/15/19 15:59  2/9/19 16:30


 


 


 Clonidine HCl


  (Catapres Tab)  0.1 mg  Q8H  PRN


 ORAL


 SBP>160mmHg  2/8/19 12:52


 3/10/19 12:51   


 


 


 Dextrose


  (Dextrose 50%)  25 ml  Q30M  PRN


 IV


 Hypoglycemia  2/8/19 13:15


 3/9/19 13:30   


 


 


 Dextrose


  (Dextrose 50%)  50 ml  Q30M  PRN


 IV


 hypoglycemia  2/8/19 13:15


 3/9/19 13:44  2/9/19 11:50


 


 


 Dextrose/Sodium


 Chloride  1,000 ml @ 


 100 mls/hr  Q10H


 IV


   2/8/19 12:52


 3/10/19 12:51  2/9/19 14:41


 


 


 Heparin Sodium


  (Porcine)


  (Heparin 5000


 units/ml)  5,000 units  EVERY 12  HOURS


 SUBQ


   2/8/19 21:00


 3/9/19 20:59  2/8/19 21:54


 


 


 Insulin Aspart


  (NovoLOG)    EVERY 6  HOURS


 SUBQ


   2/10/19 00:00


 3/9/19 16:29   


 


 


 Lorazepam


  (Ativan)  1 mg  Q6H  PRN


 ORAL


 For Anxiety  2/8/19 12:53


 2/15/19 12:52  2/9/19 22:50


 


 


 Mirtazapine


  (Remeron)  7.5 mg  BEDTIME


 ORAL


   2/8/19 21:00


 3/10/19 20:59  2/9/19 20:48


 


 


 Morphine Sulfate


  (Morphine


 Sulfate)  2 mg  Q4H  PRN


 IVP


 Moderate Pain (Pain Scale 4-6)  2/8/19 12:53


 2/15/19 12:52  2/9/19 20:49


 


 


 Olanzapine


  (ZyPREXA)  2.5 mg  BID


 ORAL


   2/8/19 18:00


 3/10/19 10:59  2/9/19 17:08


 


 


 Ondansetron HCl


  (Zofran)  4 mg  Q6H  PRN


 IVP


 Nausea & Vomiting  2/8/19 12:53


 3/10/19 12:52   


 


 


 Phenazopyridine


 HCl


  (Pyridium)  100 mg  DAILYPRN  PRN


 ORAL


 dysuria  2/8/19 12:53


 3/10/19 12:52   


 


 


 Polyethylene


 Glycol


  (Miralax)  17 gm  DAILYPRN  PRN


 ORAL


 Constipation  2/8/19 12:53


 3/10/19 12:52   


 


 


 Temazepam


  (Restoril)  15 mg  HSPRN  PRN


 ORAL


 Insomnia  2/8/19 21:00


 2/14/19 20:59   


 


 


 Vancomycin HCl


  (Vanco rx to


 dose)  1 ea  DAILY  PRN


 MISC


 .  2/9/19 09:00


 3/9/19 14:44   


 


 


 Vancomycin HCl


 750 mg/Sodium


 Chloride  275 ml @ 


 183.333


 mls/hr  Q24H


 IVPB


   2/8/19 17:00


 2/13/19 16:59  2/9/19 17:08


 








Allergies:  


Coded Allergies:  


     No Known Allergies (Unverified , 3/26/14)


Subjective


Awake and responsive,with open eyes, NAD, agitated.,





Objective





Last Vital Signs








  Date Time  Temp Pulse Resp B/P (MAP) Pulse Ox O2 Delivery O2 Flow Rate FiO2


 


2/9/19 21:00      Room Air  


 


2/9/19 20:00  70 20     21


 


2/9/19 20:00 98.8   132/80 (97) 99   











Laboratory Tests








Test


  2/9/19


16:15


 


Vancomycin Level Trough


  15.4 ug/mL


(5.0-12.0)  H











Microbiology








 Date/Time


Source Procedure


Growth Status


 


 


 2/7/19 12:07


Blood Blood Culture - Preliminary


Staphylococcus Aureus Resulted


 


 2/7/19 12:07


Blood Blood Culture - Preliminary


Staphylococcus Aureus Resulted





 2/8/19 05:30


Wound Gram Stain - Final Resulted


 


 2/8/19 05:30 Wound Culture - Preliminary


Gram Negative Esequiel


Staphylococcus Aureus Resulted


 


 2/7/19 13:00


Nasal Nares MRSA Culture - Final


Staphylococcus Aureus - Mrsa Complete

















Intake and Output  


 


 2/8/19 2/9/19





 19:00 07:00


 


Intake Total 510 ml 983.333 ml


 


Balance 510 ml 983.333 ml


 


  


 


IV Total 510 ml 983.333 ml


 


# Voids  2








Objective


General: No acute distress, awake and responsive, opening her eyes, cachectic.


HEENT: NCAT, sclera anicteric, PERRL, NG tube.


Neck: Supple, no significant jugular venous distention, 


Lungs: Poor inspiratory effort, decreased air in the bases , no Wheeze or Rales.


Heart: Regular rate and rhythm, normal S1/S2, no murmur.


Abdomen: soft, nontender, nondistended. Normoactive bowel sounds.


 / Rectal: Refused and deferred.


Extremities: No Cyanosis , No clubbing,  Left UE edema.  Lateral lower 

extremity / feet dressing intact.


Neuro: A&O x 1, Able to move all extremities


Skin: warm, no rashes or lesions, ecchymosis in bilateral upper extremity noted.





Assessment/Plan


Assessment/Plan


1. Sepsis /  STAPHYLOCOCCUS AUREUS bacteremia, lactic acidosis.


2. Failure to thrive with severe protein calorie malnutrition.


3. Coronary artery disease.


4. Diabetes, type 2.


5. Hypertension.


6. Sick sinus syndrome.


7. Hypercholesterolemia.


8. Aortic stenosis.


9. Pulmonary hypertension.


10. Alzheimer dementia.


11.  Acute tubular necrosis/acute kidney injury most likely secondary to 

dehydration and sepsis.


12. Sacral decubitus ulcer, stage III


13. Severe dehydration





TREATMENT:


1.  Patient on broad spectrum antibiotics with vancomycin and cefepime.  

Monitor culture.


2. Coronary artery disease.  The patient is status post coronary artery


bypass graft in 2017.


3. Diabetes, type 2.  The patient has been started on NovoLog sliding


scale.  Januvia and metformin have been discontinued, as the patient is


not tolerating p.o. well.


4. Hypertension.  The patient is to continue on atenolol and losartan as


above.


5. Sick sinus syndrome.  The patient is status post pacemaker


implantation.


6. Hypercholesterolemia.  Continue simvastatin as above.


7. Aortic stenosis.


8. Pulmonary hypertension.


9. Alzheimer dementia.





CODE STATUS is full code as per POLST,


DVT prophylaxis: Heparin subcu.











Juan Carlos Magana MD Feb 9, 2019 23:47

## 2019-02-09 NOTE — SURGERY PROGRESS NOTE
Surgery Progress Note


Subjective


Additional Comments


no acute events.  leukocytosis.  abnormal LFT's.





Objective





Last 24 Hour Vital Signs








  Date Time  Temp Pulse Resp B/P (MAP) Pulse Ox O2 Delivery O2 Flow Rate FiO2


 


2/9/19 09:00      Room Air  


 


2/9/19 08:00 97.4 73 18 126/66 (86) 98   


 


2/9/19 04:00 97.7 68 20 106/54 (71) 97   


 


2/9/19 00:00 97.8 70 20 108/48 (68) 95   


 


2/8/19 21:00      Room Air  


 


2/8/19 20:00 98.6 84 20 142/88 (106) 96   


 


2/8/19 19:48  76 20   Room Air  21


 


2/8/19 16:00 97.7 77 20 120/72 (88) 96   


 


2/8/19 13:45 98.2 73 20 133/99 (110) 98   








I&O











Intake and Output  


 


 2/8/19 2/9/19





 19:00 07:00


 


Intake Total 510 ml 983.333 ml


 


Balance 510 ml 983.333 ml


 


  


 


Intake IV Total 510 ml 983.333 ml


 


# Voids  2








Dressing:  dry


Wound:  other


Drains:  other


Cardiovascular:  RSR


Respiratory:  clear


Abdomen:  soft, present bowel sounds, non-distended


Extremities:  other





Laboratory Tests








Test


  2/8/19


20:15


 


Arterial Blood pH


  7.468


(7.350-7.450)


 


Arterial Blood Partial


Pressure CO2 22.2 mmHg


(35.0-45.0)  *L


 


Arterial Blood Partial


Pressure O2 499.7 mmHg


(75.0-100.0)  H


 


Arterial Blood HCO3


  15.7 mmol/L


(22.0-26.0)  *L


 


Arterial Blood Oxygen


Saturation 99.2 %


()


 


Arterial Blood Base Excess -6.7 (-2-2)  L


 


Frank Test Positive  











Plan


Problems:  


(1) Sepsis


Assessment & Plan:  Leukocytosis


LFT's elevated w/ t bili / d bili


AM labs ordered 


hydrate


Ultrasound abdomen ordered


will follow with recs. 





(2) Sacral decubitus ulcer, stage III


Assessment & Plan:  DTPI sacrum.Maroon- indurated discoloration with opening at 

sacral coccygeal area(L)3.8cm x (W)1.4cm with entire wound measuring (L)10cmx(W)

12cm.Small amt malodorous brown exudate.


Stable dry eschar R heel with dry peeling skin periwound (L)2cm x (W)3.5cm. 

Periwound is also red and boggy. 


Small dry eschar noted to lateral R 5th metatarsal (L)1.4cm x (W)0.3cm.  


L heel is boggy but colour is dusky . 


Resolving skin tear lateral R tibia. Wound bed is clean and dry.  





Tx. Plan:


Cleanse Sacral wound with Saline.Apply Therahoney to opening at sacrococcygeal 

area.Cavilon skin barrier to DTPI. Cover with Optifoam drsg .Change Daily and 

prn.


            


Apply Betadine to R heel  and Lateral R 5th metatarsal.Cover with Abd pad .Wrap 

with kerlix daily and prn.


            


Apply Cavilon Skin Barrier to L heel.Cover with Optifoam drsg .Change every 7 

days and prn.


            


Reposition at least every 2hours or as tolerated.


            


Off-load heels with pillow.





(3) Rectal bleed


(4) Protein-calorie malnutrition, severe


(5) Dehydration











Tuan Patricia Feb 9, 2019 12:54

## 2019-02-09 NOTE — GENERAL PROGRESS NOTE
Assessment/Plan


Problem List:  


(1) Acute encephalopathy


Assessment & Plan:  metabolic


ICD Codes:  G93.40 - Encephalopathy, unspecified


SNOMED:  31634518, [id number]


(2) Delirium


ICD Codes:  R41.0 - Disorientation, unspecified


SNOMED:  2171751


(3) Alzheimer's dementia


ICD Codes:  G30.9 - Alzheimer's disease, unspecified; F02.80 - Dementia in 

other diseases classified elsewhere without behavioral disturbance


SNOMED:  23641427


Assessment/Plan


Zyprexa 


the pt lacks capacity


soft restraints if needed





Subjective


Allergies:  


Coded Allergies:  


     No Known Allergies (Unverified , 3/26/14)


Subjective


calmer was not yelling as much





Objective





Last 24 Hour Vital Signs








  Date Time  Temp Pulse Resp B/P (MAP) Pulse Ox O2 Delivery O2 Flow Rate FiO2


 


2/9/19 21:00      Room Air  


 


2/9/19 20:00  70 20   Room Air  21


 


2/9/19 20:00 98.8 70 21 132/80 (97) 99   


 


2/9/19 16:00 97.8 83 18 143/76 (98) 96   


 


2/9/19 12:00 97.1 68 20 118/69 (85) 94   


 


2/9/19 09:00      Room Air  


 


2/9/19 08:00 97.4 73 18 126/66 (86) 98   


 


2/9/19 04:00 97.7 68 20 106/54 (71) 97   


 


2/9/19 00:00 97.8 70 20 108/48 (68) 95   

















Intake and Output  


 


 2/8/19 2/9/19





 19:00 07:00


 


Intake Total 510 ml 983.333 ml


 


Balance 510 ml 983.333 ml


 


  


 


IV Total 510 ml 983.333 ml


 


# Voids  2








Laboratory Tests


2/9/19 16:15: Vancomycin Level Trough 15.4H


Height (Feet):  5


Height (Inches):  6.00


Weight (Pounds):  150


General Appearance:  alert, confused, agitated











Josh Nunez MD Feb 9, 2019 22:09

## 2019-02-09 NOTE — PULMONOLOGY PROGRESS NOTE
Assessment/Plan


Problems:  


(1) Sepsis


(2) ATN (acute tubular necrosis)


(3) Pulmonary hypertension


(4) Pacemaker


(5) CAD (coronary artery disease)


(6) Sacral decubitus ulcer, stage III


(7) Alzheimer's dementia


(8) Protein-calorie malnutrition, severe


(9) HTN (hypertension)


Assessment/Plan


d/w wound care nurse, 


pan cultures


iv fluids


check electrolytes


sliding scale


check electrolytes daily


broad spectrum abx


dvt prophylaxis


insulin coverage.


social service consult





Subjective


ROS Limited/Unobtainable:  No


Constitutional:  Reports: no symptoms


HEENT:  Repors: no symptoms


Respiratory:  Reports: no symptoms


Allergies:  


Coded Allergies:  


     No Known Allergies (Unverified , 3/26/14)





Objective





Last 24 Hour Vital Signs








  Date Time  Temp Pulse Resp B/P (MAP) Pulse Ox O2 Delivery O2 Flow Rate FiO2


 


2/9/19 12:00 97.1 68 20 118/69 (85) 94   


 


2/9/19 09:00      Room Air  


 


2/9/19 08:00 97.4 73 18 126/66 (86) 98   


 


2/9/19 04:00 97.7 68 20 106/54 (71) 97   


 


2/9/19 00:00 97.8 70 20 108/48 (68) 95   


 


2/8/19 21:00      Room Air  


 


2/8/19 20:00 98.6 84 20 142/88 (106) 96   


 


2/8/19 19:48  76 20   Room Air  21


 


2/8/19 16:00 97.7 77 20 120/72 (88) 96   


 


2/8/19 13:45 98.2 73 20 133/99 (110) 98   

















Intake and Output  


 


 2/8/19 2/9/19





 19:00 07:00


 


Intake Total 510 ml 983.333 ml


 


Balance 510 ml 983.333 ml


 


  


 


Intake IV Total 510 ml 983.333 ml


 


# Voids  2








Objective


General Appearance:  cachectic


HEENT:  normocephalic, somnolent


Respiratory/Chest:  chest wall non-tender, lungs clear


Cardiovascular:  normal peripheral pulses, normal rate


Abdomen:  normal bowel sounds, soft, non tender


Extremities:  no cyanosis


Skin:  no rash





Microbiology








 Date/Time


Source Procedure


Growth Status


 


 


 2/7/19 12:07


Blood Blood Culture - Preliminary


Staphylococcus Aureus Resulted


 


 2/7/19 12:07


Blood Blood Culture - Preliminary


Staphylococcus Aureus Resulted





 2/8/19 05:30


Wound Gram Stain - Final Resulted


 


 2/8/19 05:30 Wound Culture - Preliminary


Gram Negative Esequiel


Staphylococcus Aureus Resulted


 


 2/7/19 13:00


Nasal Nares MRSA Culture - Final


Staphylococcus Aureus - Mrsa Complete








Laboratory Tests


2/8/19 20:15: 


Arterial Blood pH 7.468H, Arterial Blood Partial Pressure CO2 22.2*L, Arterial 

Blood Partial Pressure O2 499.7H, Arterial Blood HCO3 15.7*L, Arterial Blood 

Oxygen Saturation 99.2, Arterial Blood Base Excess -6.7L, Frank Test Positive





Current Medications








 Medications


  (Trade)  Dose


 Ordered  Sig/Violette


 Route


 PRN Reason  Start Time


 Stop Time Status Last Admin


Dose Admin


 


 Acetaminophen


  (Tylenol)  650 mg  Q4H  PRN


 ORAL


 T>100.5  2/8/19 13:30


 3/9/19 13:29   


 


 


 Albuterol/


 Ipratropium


  (Albuterol/


 Ipratropium)  3 ml  Q4H  PRN


 HHN


 Shortness of Breath  2/8/19 13:30


 2/12/19 13:29   


 


 


 Cefepime HCl 1 gm/


 Dextrose  55 ml @ 


 110 mls/hr  Q24H


 IVPB


   2/8/19 16:00


 2/15/19 15:59  2/8/19 17:15


 


 


 Clonidine HCl


  (Catapres Tab)  0.1 mg  Q8H  PRN


 ORAL


 SBP>160mmHg  2/8/19 12:52


 3/10/19 12:51   


 


 


 Dextrose


  (Dextrose 50%)  25 ml  Q30M  PRN


 IV


 Hypoglycemia  2/8/19 13:15


 3/9/19 13:30   


 


 


 Dextrose


  (Dextrose 50%)  50 ml  Q30M  PRN


 IV


 hypoglycemia  2/8/19 13:15


 3/9/19 13:44  2/9/19 11:50


 


 


 Dextrose/Sodium


 Chloride  1,000 ml @ 


 100 mls/hr  Q10H


 IV


   2/8/19 12:52


 3/10/19 12:51  2/8/19 23:08


 


 


 Heparin Sodium


  (Porcine)


  (Heparin 5000


 units/ml)  5,000 units  EVERY 12  HOURS


 SUBQ


   2/8/19 21:00


 3/9/19 20:59  2/8/19 21:54


 


 


 Insulin Aspart


  (NovoLOG)    BEFORE MEALS AND  HS


 SUBQ


   2/8/19 16:30


 3/9/19 16:29  2/9/19 05:41


 


 


 Lorazepam


  (Ativan)  1 mg  Q6H  PRN


 ORAL


 For Anxiety  2/8/19 12:53


 2/15/19 12:52  2/9/19 03:13


 


 


 Mirtazapine


  (Remeron)  7.5 mg  BEDTIME


 ORAL


   2/8/19 21:00


 3/10/19 20:59  2/8/19 22:52


 


 


 Morphine Sulfate


  (Morphine


 Sulfate)  2 mg  Q4H  PRN


 IVP


 Moderate Pain (Pain Scale 4-6)  2/8/19 12:53


 2/15/19 12:52  2/9/19 12:02


 


 


 Olanzapine


  (ZyPREXA)  2.5 mg  BID


 ORAL


   2/8/19 18:00


 3/10/19 10:59  2/9/19 09:30


 


 


 Ondansetron HCl


  (Zofran)  4 mg  Q6H  PRN


 IVP


 Nausea & Vomiting  2/8/19 12:53


 3/10/19 12:52   


 


 


 Phenazopyridine


 HCl


  (Pyridium)  100 mg  DAILYPRN  PRN


 ORAL


 dysuria  2/8/19 12:53


 3/10/19 12:52   


 


 


 Polyethylene


 Glycol


  (Miralax)  17 gm  DAILYPRN  PRN


 ORAL


 Constipation  2/8/19 12:53


 3/10/19 12:52   


 


 


 Temazepam


  (Restoril)  15 mg  HSPRN  PRN


 ORAL


 Insomnia  2/8/19 21:00


 2/14/19 20:59   


 


 


 Vancomycin HCl


  (Vanco rx to


 dose)  1 ea  DAILY  PRN


 MISC


 .  2/9/19 09:00


 3/9/19 14:44   


 


 


 Vancomycin HCl


 750 mg/Sodium


 Chloride  275 ml @ 


 183.333


 mls/hr  Q24H


 IVPB


   2/8/19 17:00


 2/13/19 16:59  2/8/19 18:05


 

















Korin Junior MD Feb 9, 2019 13:25

## 2019-02-10 VITALS — DIASTOLIC BLOOD PRESSURE: 66 MMHG | SYSTOLIC BLOOD PRESSURE: 131 MMHG

## 2019-02-10 VITALS — SYSTOLIC BLOOD PRESSURE: 126 MMHG | DIASTOLIC BLOOD PRESSURE: 68 MMHG

## 2019-02-10 VITALS — SYSTOLIC BLOOD PRESSURE: 143 MMHG | DIASTOLIC BLOOD PRESSURE: 73 MMHG

## 2019-02-10 VITALS — DIASTOLIC BLOOD PRESSURE: 69 MMHG | SYSTOLIC BLOOD PRESSURE: 124 MMHG

## 2019-02-10 VITALS — SYSTOLIC BLOOD PRESSURE: 132 MMHG | DIASTOLIC BLOOD PRESSURE: 88 MMHG

## 2019-02-10 VITALS — SYSTOLIC BLOOD PRESSURE: 124 MMHG | DIASTOLIC BLOOD PRESSURE: 63 MMHG

## 2019-02-10 VITALS — DIASTOLIC BLOOD PRESSURE: 61 MMHG | SYSTOLIC BLOOD PRESSURE: 126 MMHG

## 2019-02-10 LAB
ADD MANUAL DIFF: YES
ALBUMIN SERPL-MCNC: 1.4 G/DL (ref 3.4–5)
ALBUMIN/GLOB SERPL: 0.4 {RATIO} (ref 1–2.7)
ALP SERPL-CCNC: 175 U/L (ref 46–116)
ALT SERPL-CCNC: 117 U/L (ref 12–78)
ANION GAP SERPL CALC-SCNC: 12 MMOL/L (ref 5–15)
AST SERPL-CCNC: 242 U/L (ref 15–37)
BILIRUB DIRECT SERPL-MCNC: 1.2 MG/DL (ref 0–0.3)
BILIRUB SERPL-MCNC: 1.7 MG/DL (ref 0.2–1)
BUN SERPL-MCNC: 36 MG/DL (ref 7–18)
CALCIUM SERPL-MCNC: 8 MG/DL (ref 8.5–10.1)
CHLORIDE SERPL-SCNC: 112 MMOL/L (ref 98–107)
CO2 SERPL-SCNC: 18 MMOL/L (ref 21–32)
CREAT SERPL-MCNC: 1 MG/DL (ref 0.55–1.3)
ERYTHROCYTE [DISTWIDTH] IN BLOOD BY AUTOMATED COUNT: 16.5 % (ref 11.6–14.8)
GLOBULIN SER-MCNC: 3.7 G/DL
HCT VFR BLD CALC: 27 % (ref 37–47)
HGB BLD-MCNC: 8.8 G/DL (ref 12–16)
MCV RBC AUTO: 91 FL (ref 80–99)
PLATELET # BLD: 43 K/UL (ref 150–450)
POTASSIUM SERPL-SCNC: 3 MMOL/L (ref 3.5–5.1)
RBC # BLD AUTO: 2.97 M/UL (ref 4.2–5.4)
SODIUM SERPL-SCNC: 142 MMOL/L (ref 136–145)
WBC # BLD AUTO: 10.6 K/UL (ref 4.8–10.8)

## 2019-02-10 RX ADMIN — DEXTROSE MONOHYDRATE SCH MLS/HR: 50 INJECTION, SOLUTION INTRAVENOUS at 12:27

## 2019-02-10 RX ADMIN — HEPARIN SODIUM SCH UNITS: 5000 INJECTION INTRAVENOUS; SUBCUTANEOUS at 21:00

## 2019-02-10 RX ADMIN — INSULIN ASPART SCH UNITS: 100 INJECTION, SOLUTION INTRAVENOUS; SUBCUTANEOUS at 00:00

## 2019-02-10 RX ADMIN — DEXTROSE MONOHYDRATE SCH MLS/HR: 50 INJECTION, SOLUTION INTRAVENOUS at 22:28

## 2019-02-10 RX ADMIN — SODIUM CHLORIDE SCH MLS/HR: 9 INJECTION, SOLUTION INTRAVENOUS at 17:29

## 2019-02-10 RX ADMIN — DEXTROSE AND SODIUM CHLORIDE SCH MLS/HR: 5; .45 INJECTION, SOLUTION INTRAVENOUS at 03:42

## 2019-02-10 RX ADMIN — INSULIN ASPART SCH UNITS: 100 INJECTION, SOLUTION INTRAVENOUS; SUBCUTANEOUS at 17:26

## 2019-02-10 RX ADMIN — DEXTROSE AND SODIUM CHLORIDE SCH MLS/HR: 5; .45 INJECTION, SOLUTION INTRAVENOUS at 12:43

## 2019-02-10 RX ADMIN — LORAZEPAM PRN MG: 1 TABLET ORAL at 09:34

## 2019-02-10 RX ADMIN — MORPHINE SULFATE PRN MG: 2 INJECTION, SOLUTION INTRAMUSCULAR; INTRAVENOUS at 05:54

## 2019-02-10 RX ADMIN — INSULIN ASPART SCH UNITS: 100 INJECTION, SOLUTION INTRAVENOUS; SUBCUTANEOUS at 12:47

## 2019-02-10 RX ADMIN — MORPHINE SULFATE PRN MG: 2 INJECTION, SOLUTION INTRAMUSCULAR; INTRAVENOUS at 17:30

## 2019-02-10 RX ADMIN — OLANZAPINE SCH MG: 2.5 TABLET, FILM COATED ORAL at 17:24

## 2019-02-10 RX ADMIN — INSULIN ASPART SCH UNITS: 100 INJECTION, SOLUTION INTRAVENOUS; SUBCUTANEOUS at 05:47

## 2019-02-10 RX ADMIN — MORPHINE SULFATE PRN MG: 2 INJECTION, SOLUTION INTRAMUSCULAR; INTRAVENOUS at 12:43

## 2019-02-10 RX ADMIN — HEPARIN SODIUM SCH UNITS: 5000 INJECTION INTRAVENOUS; SUBCUTANEOUS at 09:00

## 2019-02-10 RX ADMIN — OLANZAPINE SCH MG: 2.5 TABLET, FILM COATED ORAL at 09:31

## 2019-02-10 NOTE — NUR
NURSE NOTES:

RECEIVED PATIENT FROM 4E S/P RRT DUE TO INCREASED RESPIRATORY RATE 40/MIN.PATIENT 
OBTUNDED,MOANS WHEN TOUCH./69  ,HR 91 SINUS ON THE MONITOR,ON VENTURI MASK 55% WITH O2 
SAT 95-97%,FEBRILE 100.5.

ABG RESULTS: PH 7.3;CO2 25.6;HCO3  15.3 PO2 82.0,CHEST X-RAY RESULTS SHOWS PATCHY RIGHT 
GREATER THAN LEFT LING BASE AIRSPACE  DIS. MAY REFLECT ATELECTASIS OR PNEUMONIA .,AIRSPACE 
DIS. HAS PROGRESSED.PAGED AND INFORMED DR CAAL.

## 2019-02-10 NOTE — INTERNAL MED PROGRESS NOTE
Subjective


Physician Name


Juan Carlos Magana


Attending Physician


Juan Carlos Magana MD





Current Medications








 Medications


  (Trade)  Dose


 Ordered  Sig/Violette


 Route


 PRN Reason  Start Time


 Stop Time Status Last Admin


Dose Admin


 


 Acetaminophen


  (Tylenol)  650 mg  Q4H  PRN


 ORAL


 T>100.5  2/8/19 13:30


 3/9/19 13:29   


 


 


 Albuterol/


 Ipratropium


  (Albuterol/


 Ipratropium)  3 ml  Q4H  PRN


 HHN


 Shortness of Breath  2/8/19 13:30


 2/12/19 13:29   


 


 


 Clonidine HCl


  (Catapres Tab)  0.1 mg  Q8H  PRN


 ORAL


 SBP>160mmHg  2/8/19 12:52


 3/10/19 12:51   


 


 


 Dextrose


  (Dextrose 50%)  25 ml  Q30M  PRN


 IV


 Hypoglycemia  2/8/19 13:15


 3/9/19 13:30  2/10/19 00:21


 


 


 Dextrose


  (Dextrose 50%)  50 ml  Q30M  PRN


 IV


 hypoglycemia  2/8/19 13:15


 3/9/19 13:44  2/9/19 11:50


 


 


 Dextrose/Sodium


 Chloride  1,000 ml @ 


 100 mls/hr  Q10H


 IV


   2/8/19 12:52


 3/10/19 12:51  2/10/19 12:43


 


 


 Heparin Sodium


  (Porcine)


  (Heparin 5000


 units/ml)  5,000 units  EVERY 12  HOURS


 SUBQ


   2/8/19 21:00


 3/9/19 20:59  2/8/19 21:54


 


 


 Insulin Aspart


  (NovoLOG)    EVERY 6  HOURS


 SUBQ


   2/10/19 00:00


 3/9/19 16:29  2/10/19 12:47


 


 


 Lorazepam


  (Ativan)  1 mg  Q6H  PRN


 ORAL


 For Anxiety  2/8/19 12:53


 2/15/19 12:52  2/10/19 09:34


 


 


 Mirtazapine


  (Remeron)  7.5 mg  BEDTIME


 ORAL


   2/8/19 21:00


 3/10/19 20:59  2/9/19 20:48


 


 


 Morphine Sulfate


  (Morphine


 Sulfate)  2 mg  Q4H  PRN


 IVP


 Moderate Pain (Pain Scale 4-6)  2/8/19 12:53


 2/15/19 12:52  2/10/19 12:43


 


 


 Olanzapine


  (ZyPREXA)  2.5 mg  BID


 ORAL


   2/8/19 18:00


 3/10/19 10:59  2/10/19 09:31


 


 


 Ondansetron HCl


  (Zofran)  4 mg  Q6H  PRN


 IVP


 Nausea & Vomiting  2/8/19 12:53


 3/10/19 12:52   


 


 


 Phenazopyridine


 HCl


  (Pyridium)  100 mg  DAILYPRN  PRN


 ORAL


 dysuria  2/8/19 12:53


 3/10/19 12:52   


 


 


 Piperacillin Sod/


 Tazobactam Sod


 3.375 gm/Dextrose  110 ml @ 


 27.5 mls/hr  EVERY 8  HOURS


 IVPB


   2/10/19 11:00


 2/15/19 10:59  2/10/19 12:27


 


 


 Polyethylene


 Glycol


  (Miralax)  17 gm  DAILYPRN  PRN


 ORAL


 Constipation  2/8/19 12:53


 3/10/19 12:52   


 


 


 Temazepam


  (Restoril)  15 mg  HSPRN  PRN


 ORAL


 Insomnia  2/8/19 21:00


 2/14/19 20:59   


 


 


 Vancomycin HCl


  (Vanco rx to


 dose)  1 ea  DAILY  PRN


 MISC


 .  2/9/19 09:00


 3/9/19 14:44   


 


 


 Vancomycin HCl


 750 mg/Sodium


 Chloride  275 ml @ 


 183.333


 mls/hr  Q24H


 IVPB


   2/8/19 17:00


 2/13/19 16:59  2/9/19 17:08


 








Allergies:  


Coded Allergies:  


     No Known Allergies (Unverified , 3/26/14)


Subjective


Awake and less responsive, sleepy., K: 3.0, abnormal liver function test





Objective





Last Vital Signs








  Date Time  Temp Pulse Resp B/P (MAP) Pulse Ox O2 Delivery O2 Flow Rate FiO2


 


2/10/19 09:00      Room Air  


 


2/10/19 08:15  76 20     21


 


2/10/19 08:00 97.4   126/61 (82) 98   











Laboratory Tests








Test


  2/9/19


16:15 2/10/19


05:40 2/10/19


10:20


 


Vancomycin Level Trough


  15.4 ug/mL


(5.0-12.0)  H 


  


 


 


White Blood Count


  


  10.6 K/UL


(4.8-10.8) 


 


 


Red Blood Count


  


  2.97 M/UL


(4.20-5.40)  L 


 


 


Hemoglobin


  


  8.8 G/DL


(12.0-16.0)  L 


 


 


Hematocrit


  


  27.0 %


(37.0-47.0)  L 


 


 


Mean Corpuscular Volume  91 FL (80-99)   


 


Mean Corpuscular Hemoglobin


  


  29.5 PG


(27.0-31.0) 


 


 


Mean Corpuscular Hemoglobin


Concent 


  32.5 G/DL


(32.0-36.0) 


 


 


Red Cell Distribution Width


  


  16.5 %


(11.6-14.8)  H 


 


 


Platelet Count


  


  43 K/UL


(150-450)  L 


 


 


Mean Platelet Volume


  


  11.8 FL


(6.5-10.1)  H 


 


 


Neutrophils (%) (Auto)


  


  % (45.0-75.0)


  


 


 


Lymphocytes (%) (Auto)


  


  % (20.0-45.0)


  


 


 


Monocytes (%) (Auto)   % (1.0-10.0)   


 


Eosinophils (%) (Auto)   % (0.0-3.0)   


 


Basophils (%) (Auto)   % (0.0-2.0)   


 


Differential Total Cells


Counted 


  100  


  


 


 


Neutrophils % (Manual)  89 % (45-75)  H 


 


Lymphocytes % (Manual)  1 % (20-45)  L 


 


Monocytes % (Manual)  3 % (1-10)   


 


Eosinophils % (Manual)  0 % (0-3)   


 


Basophils % (Manual)  0 % (0-2)   


 


Band Neutrophils  7 % (0-8)   


 


Platelet Estimate  Decreased  L 


 


Platelet Morphology  Normal   


 


Anisocytosis  1+   


 


Victor Hugo Cells  1+   


 


Sodium Level


  


  142 MMOL/L


(136-145) 


 


 


Potassium Level


  


  3.0 MMOL/L


(3.5-5.1)  L 


 


 


Chloride Level


  


  112 MMOL/L


()  H 


 


 


Carbon Dioxide Level


  


  18 MMOL/L


(21-32)  L 


 


 


Anion Gap


  


  12 mmol/L


(5-15) 


 


 


Blood Urea Nitrogen


  


  36 mg/dL


(7-18)  H 


 


 


Creatinine


  


  1.0 MG/DL


(0.55-1.30) 


 


 


Estimat Glomerular Filtration


Rate 


   mL/min (>60)  


  


 


 


Glucose Level


  


  218 MG/DL


()  H 


 


 


Lactic Acid Level


  


  2.60 mmol/L


(0.4-2.0)  H 2.50 mmol/L


(0.66-2.22)  H


 


Calcium Level


  


  8.0 MG/DL


(8.5-10.1)  L 


 


 


Total Bilirubin


  


  1.7 MG/DL


(0.2-1.0)  H 


 


 


Direct Bilirubin


  


  1.2 MG/DL


(0.0-0.3)  H 


 


 


Aspartate Amino Transf


(AST/SGOT) 


  242 U/L


(15-37)  H 


 


 


Alanine Aminotransferase


(ALT/SGPT) 


  117 U/L


(12-78)  H 


 


 


Alkaline Phosphatase


  


  175 U/L


()  H 


 


 


Total Protein


  


  5.1 G/DL


(6.4-8.2)  L 


 


 


Albumin


  


  1.4 G/DL


(3.4-5.0)  L 


 


 


Globulin  3.7 g/dL   


 


Albumin/Globulin Ratio


  


  0.4 (1.0-2.7)


L 


 


 


Lipase


  


  58 U/L


()  L 


 











Microbiology








 Date/Time


Source Procedure


Growth Status


 


 


 2/8/19 05:30


Wound Gram Stain - Final Complete


 


 2/8/19 05:30 Wound Culture - Final


Klebsiella Pneumoniae


Escherichia Coli - Esbl


Staphylococcus Aureus - Mrsa Complete

















Intake and Output  


 


 2/9/19 2/10/19





 19:00 07:00


 


Intake Total 780.000 ml 1620 ml


 


Balance 780.000 ml 1620 ml


 


  


 


Intake Free Water 100 ml 100 ml


 


IV Total 630.000 ml 1200 ml


 


Tube Feeding 50 ml 320 ml








Objective


General: No acute distress, awake and less responsive, Sleepy, cachectic.


HEENT: NCAT, sclera anicteric, PERRL, NG tube.


Neck: Supple, no significant jugular venous distention, 


Lungs: Poor inspiratory effort, decreased air in the bases , no Wheeze or Rales.


Heart: Regular rate and rhythm, normal S1/S2, no murmur.


Abdomen: soft, nontender, nondistended. Normoactive bowel sounds.


 / Rectal: Refused and deferred.


Extremities: No Cyanosis , No clubbing,  Left UE edema.  Lateral lower 

extremity / feet dressing intact.


Neuro: A&O x 1, Able to move all extremities


Skin: warm, no rashes or lesions, ecchymosis in bilateral upper extremity noted.





Assessment/Plan


Assessment/Plan


1. Sepsis /  STAPHYLOCOCCUS AUREUS bacteremia, lactic acidosis.


2. Failure to thrive with severe protein calorie malnutrition.


3. Coronary artery disease.


4. Diabetes, type 2.


5. Hypertension.


6. Sick sinus syndrome.


7. Hypercholesterolemia.


8. Aortic stenosis.


9. Pulmonary hypertension.


10. Alzheimer dementia.


11.  Acute tubular necrosis/acute kidney injury most likely secondary to 

dehydration and sepsis.


12. Sacral decubitus ulcer, stage III


13. Severe dehydration


14.  Hypokalemia.


15.  Abnormal liver function test





TREATMENT:


1.  Patient on broad spectrum antibiotics with vancomycin and Zosyn IV.  

Monitor culture.


2. Coronary artery disease.  The patient is status post coronary artery


bypass graft in 2017.


3. Diabetes, type 2.  The patient has been started on NovoLog sliding


scale.  


4. Hypertension.  The patient is to continue on atenolol and losartan as


above.


5. Sick sinus syndrome.  The patient is status post pacemaker


implantation.


6. Hypercholesterolemia.  Continue simvastatin as above.


7. Aortic stenosis.


8. Pulmonary hypertension.


9. Alzheimer dementia.





KCl supplement, 


will follow her laboratory as well as culture in the morning.


Continue tube feeding at rate of 45 cc/hr.


CODE STATUS is full code as per POLST,


DVT prophylaxis: Heparin subcu.











Juan Carlos Magana MD Feb 10, 2019 13:29

## 2019-02-10 NOTE — CARDIOLOGY REPORT
--------------- APPROVED REPORT --------------





EXAM: Two-dimensional and M-mode echocardiogram with Doppler and color 

Doppler.



INDICATION

ENDOCARDITIS



M-Mode DIMENSIONS 

IVSd1.3 (0.7-1.1cm)Left Atrium (MM)4.4 (1.6-4.0cm)

LVDd5.1 (3.5-5.6cm)Aortic Root3.0 (2.0-3.7cm)

PWd1.1 (0.7-1.1cm)Aortic Cusp Exc.1.3 (1.5-2.0cm)

IVSs1.7 cm

LVDs4.2 (2.5-4.0cm)

PWs1.0 cm





Technically difficult study due to patient's contractures..

Global left ventricular hypokinesis .

Mild left ventricular enlargement .

Left ventricular ejection fraction estimated to be 20-25%.

Mild left ventricular hypertrophy.

No evidence of pericardial fat or effusion.

Mild bi- atrial enlargement.

Right ventricular chamber sizes is within normal limits. 

Aortic valve calcification with decreased cusp excursion c/w aortic stenosis.

Mildly thickened mitral valve leaflets with normal excursion.

Mild mitral annulus and aortic root calcification.

Pulmonic valve not well visualized.

Subcostal views are not obtained .

Pacemaker wire present in the right side chambers.



A  color flow and spectral Doppler study was performed and revealed:

Trace aortic insufficiency .

Peak aortic valve gradient of  13   mm Hg and a mean of  7  mmHg.

Aortic valve area     1.4  cm2 calculated by continuity equation.

Mitral diastolic velocities suggest reduced left ventricular relaxation c/w mild LV 

diastolic dysfunction (Grade I )

Mild mitral regurgitation.

Moderate  tricuspid regurgitation.

Tricuspid  systolic velocities suggests peak right ventricular systolic pressure of  

77mmHg,consistent with severe pulmonary hypertension .

Mild pulmonic regurgitation .

## 2019-02-10 NOTE — NUR
Nurse's notes: per Jv Andrews, requesting photos of wounds be taken prior 
to transfer to [facility] s/p RRT as this patient will be the assigned to Jeff BELTRÁN

## 2019-02-10 NOTE — NUR
NURSE NOTES:

Transferred patient to [facility] 242-1. Belongings checked. Report given to charge nurse Yoselin.

## 2019-02-10 NOTE — NUR
CASE MANAGEMENT: REVIEW 



2/10/2019



SI: DEHYDRATION . SEPSIS . SACRAL DECUBITUS ULCER, STAGE III

T 97.4 HR 76 RR 18 B/P 126/61 SATS 98% ON RA 

K 3  CO2 18 BUN 36 GLUCOSE 218 LACTIC ACID 2.6 CA 8 TBILI 1.7 DBILI 1.2  ALT 
117 





IS: IVF @ 100 mL/HR 

INSULIN ASPART SUBQ Q6H 

ZOSYN IV Q8H 

K DUR PO BID 

VANCO IV Q24H 



***MED/SURG STATUS***



DCP: PATIENT IS FROM [facility]

## 2019-02-10 NOTE — CARDIOLOGY REPORT
--------------- APPROVED REPORT --------------





EKG Measurement

Heart [id number]

CT 

[id number]

[id number]

[id number]

[id number]





Normal sinus rhythm

Left axis deviation

Nonspecific intraventricular block

Possible Lateral infarct, age undetermined

Abnormal ECG

## 2019-02-10 NOTE — GENERAL PROGRESS NOTE
Assessment/Plan


Problem List:  


(1) Acute encephalopathy


Assessment & Plan:  metabolic


ICD Codes:  G93.40 - Encephalopathy, unspecified


SNOMED:  61881383, [id number]


(2) Delirium


ICD Codes:  R41.0 - Disorientation, unspecified


SNOMED:  1476042


(3) Alzheimer's dementia


ICD Codes:  G30.9 - Alzheimer's disease, unspecified; F02.80 - Dementia in 

other diseases classified elsewhere without behavioral disturbance


SNOMED:  35603050


Assessment/Plan


Zyprexa 


the pt lacks capacity


soft restraints if needed





Subjective


Neurologic/Psychiatric:  Reports: anxiety


Allergies:  


Coded Allergies:  


     No Known Allergies (Unverified , 3/26/14)


Subjective


cont to be agitated





Objective





Last 24 Hour Vital Signs








  Date Time  Temp Pulse Resp B/P (MAP) Pulse Ox O2 Delivery O2 Flow Rate FiO2


 


2/10/19 20:18  97 28   Venturi Mask  55


 


2/10/19 20:18  99 28  97 Venturi Mask 14.0 55


 


2/10/19 20:08  109 32  100 Venturi Mask 8.0 40


 


2/10/19 18:00 98.1       


 


2/10/19 16:00 98.1 76 18 126/68 (87) 94   


 


2/10/19 12:00 97.9 80 18 124/63 (83) 98   


 


2/10/19 09:00      Room Air  


 


2/10/19 08:15  76 20   Room Air  21


 


2/10/19 08:00 97.4 76 18 126/61 (82) 98   


 


2/10/19 04:00 99.3 90 22 131/66 (87) 100   


 


2/10/19 00:00 99.1 72 21 132/88 (103) 100   

















Intake and Output  


 


 2/9/19 2/10/19





 19:00 07:00


 


Intake Total 780.000 ml 1620 ml


 


Balance 780.000 ml 1620 ml


 


  


 


Intake Free Water 100 ml 100 ml


 


IV Total 630.000 ml 1200 ml


 


Tube Feeding 50 ml 320 ml








Laboratory Tests


2/10/19 05:40: 


White Blood Count 10.6, Red Blood Count 2.97L, Hemoglobin 8.8L, Hematocrit 27.0L

, Mean Corpuscular Volume 91, Mean Corpuscular Hemoglobin 29.5, Mean 

Corpuscular Hemoglobin Concent 32.5, Red Cell Distribution Width 16.5H, 

Platelet Count 43L, Mean Platelet Volume 11.8H, Neutrophils (%) (Auto) , 

Lymphocytes (%) (Auto) , Monocytes (%) (Auto) , Eosinophils (%) (Auto) , 

Basophils (%) (Auto) , Differential Total Cells Counted 100, Neutrophils % (

Manual) 89H, Lymphocytes % (Manual) 1L, Monocytes % (Manual) 3, Eosinophils % (

Manual) 0, Basophils % (Manual) 0, Band Neutrophils 7, Platelet Estimate 

DecreasedL, Platelet Morphology Normal, Anisocytosis 1+, [place] Cells 1+, Sodium 

Level 142, Potassium Level 3.0L, Chloride Level 112H, Carbon Dioxide Level 18L, 

Anion Gap 12, Blood Urea Nitrogen 36H, Creatinine 1.0, Estimat Glomerular 

Filtration Rate , Glucose Level 218H, Lactic Acid Level 2.60H, Calcium Level 

8.0L, Total Bilirubin 1.7H, Direct Bilirubin 1.2H, Aspartate Amino Transf (AST/

SGOT) 242H, Alanine Aminotransferase (ALT/SGPT) 117H, Alkaline Phosphatase 175H

, Total Protein 5.1L, Albumin 1.4L, Globulin 3.7, Albumin/Globulin Ratio 0.4L, 

Lipase 58L


2/10/19 10:20: Lactic Acid Level 2.50H


2/10/19 20:06: 


Arterial Blood pH 7.395, Arterial Blood Partial Pressure CO2 25.6L, Arterial 

Blood Partial Pressure O2 82.4, Arterial Blood HCO3 15.3*L, Arterial Blood 

Oxygen Saturation 95.0, Arterial Blood Base Excess -8.3L, Frank Test Positive


Height (Feet):  5


Height (Inches):  6.00


Weight (Pounds):  150


General Appearance:  alert, confused, agitated











Josh Nunez MD Feb 10, 2019 21:53

## 2019-02-10 NOTE — NUR
RESPIRATORY NOTE:

RAPID RESPONSE CALLED AT THIS TIME. RT PRESENT IN ROOM WHE RAPID WAS CALLED. RN ELÍAS APONTE PRESENT AND ASSISTED IN NT SX.  LARGE AMOUNT OF TAN SECRETIONS OBTAINED ROM THIS PT. 
PT WAS FOUND WITH AND INCREASED RR OF 40-44BPM. HR -112, SPO2 IN LOW 90"S ON 2LPM N/C. 
AND LOUD RALES BILATERALLY. PT PLACED ON VENTI MASK 8LPM 40 %. ABG DRAWN. FIO2 INCREASED TO 
14LPM 55% VENTI MASK POST ABG RESULTS. BREATHING TX GIVEN AND PT CONDITION IMPROVED POST TX. 
CURRENT VS HR99, SPO2 97, RR 28. WILL CONTINUE TO MONITOR.

## 2019-02-10 NOTE — INFECTIOUS DISEASES PROG NOTE
Assessment/Plan


Assessment/Plan





77 yo female with PMHx DM, CAD, Alzheimer dementia and Hip fracture who 

presents with dehydration and weakness. 





Septicemia - Sacral wound likely source


    2/8/19 Blood Cx 2/2 MRSA


    2/7/19 Wound Cx MRSA


   


   Mild leukocytosis - resolved


   Aferbile


   Positive UA 


   CXR neg


   TTE to look of obvious endocarditis


         Will need to consider risks and benefits of MARTHA if negative given 

Pacemaker





Pressure ulcers


  Not infected





Right hip fracture with hemiarthroplasty Oct 2018


DM


HTN


Sick sinus syndrome sp Pacemaker


HLD


CAD - SP MI and CABG


Aortic stenosis.


Pulmonary hypertension.


Alzheimer dementia.





PLAN


- D/C Cefepime #3


- Start Zosyn #1


- Continue Vancomycin #3





- f/u repeat cultures


- Monitor CBC and temps


- wound care


- Nutritional support





Thank you for this consult. We will continue to follow the patient during this 

hospitalization.





Subjective


Allergies:  


Coded Allergies:  


     No Known Allergies (Unverified , 3/26/14)


Subjective


Afebrile


Leukocytosis resolved





Objective


Vital Signs





Last 24 Hour Vital Signs








  Date Time  Temp Pulse Resp B/P (MAP) Pulse Ox O2 Delivery O2 Flow Rate FiO2


 


2/10/19 04:00 99.3 90 22 131/66 (87) 100   


 


2/10/19 00:00 99.1 72 21 132/88 (103) 100   


 


2/9/19 21:00      Room Air  


 


2/9/19 20:00  70 20   Room Air  21


 


2/9/19 20:00 98.8 70 21 132/80 (97) 99   


 


2/9/19 16:00 97.8 83 18 143/76 (98) 96   


 


2/9/19 12:00 97.1 68 20 118/69 (85) 94   


 


2/9/19 09:00      Room Air  








Height (Feet):  5


Height (Inches):  6.00


Weight (Pounds):  150


Objective


Gen:  Moaning, Awake but no following


HEENT: NCAT, MMM, EOMI, PERRL


LUNGS:  CTAB, No W


CARDS: RRR, S1, S2, No M/R/G, 


ABD: Soft, NT, ND, + BS


Ext: Poor circulation to Ext (cool to touch) , Pulses 2+ B/L (DP, Rad): 


SKIN:  Dry, No rashes, Large sacral ulcer. Mild odor no surrounding cellulitis, 

foot with eschar





Microbiology








 Date/Time


Source Procedure


Growth Status


 


 


 2/7/19 12:07


Blood Blood Culture - Final


Staphylococcus Aureus - Mrsa Complete


 


 2/7/19 12:07


Blood Blood Culture - Final


Staphylococcus Aureus - Mrsa Complete





 2/8/19 05:30


Wound Gram Stain - Final Complete


 


 2/8/19 05:30 Wound Culture - Final


Klebsiella Pneumoniae


Escherichia Coli - Esbl


Staphylococcus Aureus - Mrsa Complete


 


 2/7/19 13:00


Nasal Nares MRSA Culture - Final


Staphylococcus Aureus - Mrsa Complete











Laboratory Tests








Test


  2/9/19


16:15 2/10/19


05:40


 


Vancomycin Level Trough


  15.4 ug/mL


(5.0-12.0)  H 


 


 


White Blood Count


  


  10.6 K/UL


(4.8-10.8)


 


Red Blood Count


  


  2.97 M/UL


(4.20-5.40)  L


 


Hemoglobin


  


  8.8 G/DL


(12.0-16.0)  L


 


Hematocrit


  


  27.0 %


(37.0-47.0)  L


 


Mean Corpuscular Volume  91 FL (80-99)  


 


Mean Corpuscular Hemoglobin


  


  29.5 PG


(27.0-31.0)


 


Mean Corpuscular Hemoglobin


Concent 


  32.5 G/DL


(32.0-36.0)


 


Red Cell Distribution Width


  


  16.5 %


(11.6-14.8)  H


 


Platelet Count


  


  43 K/UL


(150-450)  L


 


Mean Platelet Volume


  


  11.8 FL


(6.5-10.1)  H


 


Neutrophils (%) (Auto)


  


  % (45.0-75.0)


 


 


Lymphocytes (%) (Auto)


  


  % (20.0-45.0)


 


 


Monocytes (%) (Auto)   % (1.0-10.0)  


 


Eosinophils (%) (Auto)   % (0.0-3.0)  


 


Basophils (%) (Auto)   % (0.0-2.0)  


 


Neutrophils % (Manual)  Pending  


 


Lymphocytes % (Manual)  Pending  


 


Platelet Estimate  Pending  


 


Platelet Morphology  Pending  


 


Sodium Level


  


  142 MMOL/L


(136-145)


 


Potassium Level


  


  3.0 MMOL/L


(3.5-5.1)  L


 


Chloride Level


  


  112 MMOL/L


()  H


 


Carbon Dioxide Level


  


  18 MMOL/L


(21-32)  L


 


Anion Gap


  


  12 mmol/L


(5-15)


 


Blood Urea Nitrogen


  


  36 mg/dL


(7-18)  H


 


Creatinine


  


  1.0 MG/DL


(0.55-1.30)


 


Estimat Glomerular Filtration


Rate 


   mL/min (>60)  


 


 


Glucose Level


  


  218 MG/DL


()  H


 


Lactic Acid Level


  


  2.60 mmol/L


(0.4-2.0)  H


 


Calcium Level


  


  8.0 MG/DL


(8.5-10.1)  L


 


Total Bilirubin


  


  1.7 MG/DL


(0.2-1.0)  H


 


Direct Bilirubin


  


  1.2 MG/DL


(0.0-0.3)  H


 


Aspartate Amino Transf


(AST/SGOT) 


  242 U/L


(15-37)  H


 


Alanine Aminotransferase


(ALT/SGPT) 


  117 U/L


(12-78)  H


 


Alkaline Phosphatase


  


  175 U/L


()  H


 


Total Protein


  


  5.1 G/DL


(6.4-8.2)  L


 


Albumin


  


  1.4 G/DL


(3.4-5.0)  L


 


Globulin  3.7 g/dL  


 


Albumin/Globulin Ratio


  


  0.4 (1.0-2.7)


L


 


Lipase


  


  58 U/L


()  L











Current Medications








 Medications


  (Trade)  Dose


 Ordered  Sig/Violette


 Route


 PRN Reason  Start Time


 Stop Time Status Last Admin


Dose Admin


 


 Acetaminophen


  (Tylenol)  650 mg  Q4H  PRN


 ORAL


 T>100.5  2/8/19 13:30


 3/9/19 13:29   


 


 


 Albuterol/


 Ipratropium


  (Albuterol/


 Ipratropium)  3 ml  Q4H  PRN


 HHN


 Shortness of Breath  2/8/19 13:30


 2/12/19 13:29   


 


 


 Cefepime HCl 1 gm/


 Dextrose  55 ml @ 


 110 mls/hr  Q24H


 IVPB


   2/8/19 16:00


 2/15/19 15:59  2/9/19 16:30


 


 


 Clonidine HCl


  (Catapres Tab)  0.1 mg  Q8H  PRN


 ORAL


 SBP>160mmHg  2/8/19 12:52


 3/10/19 12:51   


 


 


 Dextrose


  (Dextrose 50%)  25 ml  Q30M  PRN


 IV


 Hypoglycemia  2/8/19 13:15


 3/9/19 13:30  2/10/19 00:21


 


 


 Dextrose


  (Dextrose 50%)  50 ml  Q30M  PRN


 IV


 hypoglycemia  2/8/19 13:15


 3/9/19 13:44  2/9/19 11:50


 


 


 Dextrose/Sodium


 Chloride  1,000 ml @ 


 100 mls/hr  Q10H


 IV


   2/8/19 12:52


 3/10/19 12:51  2/10/19 03:42


 


 


 Heparin Sodium


  (Porcine)


  (Heparin 5000


 units/ml)  5,000 units  EVERY 12  HOURS


 SUBQ


   2/8/19 21:00


 3/9/19 20:59  2/8/19 21:54


 


 


 Insulin Aspart


  (NovoLOG)    EVERY 6  HOURS


 SUBQ


   2/10/19 00:00


 3/9/19 16:29   


 


 


 Lorazepam


  (Ativan)  1 mg  Q6H  PRN


 ORAL


 For Anxiety  2/8/19 12:53


 2/15/19 12:52  2/9/19 22:50


 


 


 Mirtazapine


  (Remeron)  7.5 mg  BEDTIME


 ORAL


   2/8/19 21:00


 3/10/19 20:59  2/9/19 20:48


 


 


 Morphine Sulfate


  (Morphine


 Sulfate)  2 mg  Q4H  PRN


 IVP


 Moderate Pain (Pain Scale 4-6)  2/8/19 12:53


 2/15/19 12:52  2/10/19 05:54


 


 


 Olanzapine


  (ZyPREXA)  2.5 mg  BID


 ORAL


   2/8/19 18:00


 3/10/19 10:59  2/9/19 17:08


 


 


 Ondansetron HCl


  (Zofran)  4 mg  Q6H  PRN


 IVP


 Nausea & Vomiting  2/8/19 12:53


 3/10/19 12:52   


 


 


 Phenazopyridine


 HCl


  (Pyridium)  100 mg  DAILYPRN  PRN


 ORAL


 dysuria  2/8/19 12:53


 3/10/19 12:52   


 


 


 Polyethylene


 Glycol


  (Miralax)  17 gm  DAILYPRN  PRN


 ORAL


 Constipation  2/8/19 12:53


 3/10/19 12:52   


 


 


 Temazepam


  (Restoril)  15 mg  HSPRN  PRN


 ORAL


 Insomnia  2/8/19 21:00


 2/14/19 20:59   


 


 


 Vancomycin HCl


  (Vanco rx to


 dose)  1 ea  DAILY  PRN


 MISC


 .  2/9/19 09:00


 3/9/19 14:44   


 


 


 Vancomycin HCl


 750 mg/Sodium


 Chloride  275 ml @ 


 183.333


 mls/hr  Q24H


 IVPB


   2/8/19 17:00


 2/13/19 16:59  2/9/19 17:08


 

















Raffi Scott MD Feb 10, 2019 08:56

## 2019-02-10 NOTE — NUR
NURSE NOTES:

Patient noted with low blood sugar of 63 at 0020. 25ml dextrose given and blood sugar noted 
140 at 0035. Currently NG tube feeding running glucerna 1.5 at 30cc/hr, goal to be 47cc/hr. 
IV intact, patent running D5 1/2 NS 100cc/hr. Asymptomatic, V/S stable. Left message to Dr Magana and Dr Junior.

## 2019-02-10 NOTE — SURGERY PROGRESS NOTE
Surgery Progress Note


Subjective


Additional Comments


no acute events.  stable.  comfortable.





Objective





Last 24 Hour Vital Signs








  Date Time  Temp Pulse Resp B/P (MAP) Pulse Ox O2 Delivery O2 Flow Rate FiO2


 


2/10/19 13:13 97.4       


 


2/10/19 09:00      Room Air  


 


2/10/19 08:15  76 20   Room Air  21


 


2/10/19 08:00 97.4 76 18 126/61 (82) 98   


 


2/10/19 04:00 99.3 90 22 131/66 (87) 100   


 


2/10/19 00:00 99.1 72 21 132/88 (103) 100   


 


2/9/19 21:00      Room Air  


 


2/9/19 20:00  70 20   Room Air  21


 


2/9/19 20:00 98.8 70 21 132/80 (97) 99   


 


2/9/19 16:00 97.8 83 18 143/76 (98) 96   








I&O











Intake and Output  


 


 2/9/19 2/10/19





 19:00 07:00


 


Intake Total 780.000 ml 1620 ml


 


Balance 780.000 ml 1620 ml


 


  


 


Intake Free Water 100 ml 100 ml


 


IV Total 630.000 ml 1200 ml


 


Tube Feeding 50 ml 320 ml








Dressing:  dry


Wound:  other


Drains:  other


Cardiovascular:  RSR


Respiratory:  clear


Abdomen:  soft, flat, present bowel sounds, non-distended


Extremities:  other





Laboratory Tests








Test


  2/9/19


16:15 2/10/19


05:40 2/10/19


10:20


 


Vancomycin Level Trough


  15.4 ug/mL


(5.0-12.0)  H 


  


 


 


White Blood Count


  


  10.6 K/UL


(4.8-10.8) 


 


 


Red Blood Count


  


  2.97 M/UL


(4.20-5.40)  L 


 


 


Hemoglobin


  


  8.8 G/DL


(12.0-16.0)  L 


 


 


Hematocrit


  


  27.0 %


(37.0-47.0)  L 


 


 


Mean Corpuscular Volume  91 FL (80-99)   


 


Mean Corpuscular Hemoglobin


  


  29.5 PG


(27.0-31.0) 


 


 


Mean Corpuscular Hemoglobin


Concent 


  32.5 G/DL


(32.0-36.0) 


 


 


Red Cell Distribution Width


  


  16.5 %


(11.6-14.8)  H 


 


 


Platelet Count


  


  43 K/UL


(150-450)  L 


 


 


Mean Platelet Volume


  


  11.8 FL


(6.5-10.1)  H 


 


 


Neutrophils (%) (Auto)


  


  % (45.0-75.0)


  


 


 


Lymphocytes (%) (Auto)


  


  % (20.0-45.0)


  


 


 


Monocytes (%) (Auto)   % (1.0-10.0)   


 


Eosinophils (%) (Auto)   % (0.0-3.0)   


 


Basophils (%) (Auto)   % (0.0-2.0)   


 


Differential Total Cells


Counted 


  100  


  


 


 


Neutrophils % (Manual)  89 % (45-75)  H 


 


Lymphocytes % (Manual)  1 % (20-45)  L 


 


Monocytes % (Manual)  3 % (1-10)   


 


Eosinophils % (Manual)  0 % (0-3)   


 


Basophils % (Manual)  0 % (0-2)   


 


Band Neutrophils  7 % (0-8)   


 


Platelet Estimate  Decreased  L 


 


Platelet Morphology  Normal   


 


Anisocytosis  1+   


 


[place] Cells  1+   


 


Sodium Level


  


  142 MMOL/L


(136-145) 


 


 


Potassium Level


  


  3.0 MMOL/L


(3.5-5.1)  L 


 


 


Chloride Level


  


  112 MMOL/L


()  H 


 


 


Carbon Dioxide Level


  


  18 MMOL/L


(21-32)  L 


 


 


Anion Gap


  


  12 mmol/L


(5-15) 


 


 


Blood Urea Nitrogen


  


  36 mg/dL


(7-18)  H 


 


 


Creatinine


  


  1.0 MG/DL


(0.55-1.30) 


 


 


Estimat Glomerular Filtration


Rate 


   mL/min (>60)  


  


 


 


Glucose Level


  


  218 MG/DL


()  H 


 


 


Lactic Acid Level


  


  2.60 mmol/L


(0.4-2.0)  H 2.50 mmol/L


(0.66-2.22)  H


 


Calcium Level


  


  8.0 MG/DL


(8.5-10.1)  L 


 


 


Total Bilirubin


  


  1.7 MG/DL


(0.2-1.0)  H 


 


 


Direct Bilirubin


  


  1.2 MG/DL


(0.0-0.3)  H 


 


 


Aspartate Amino Transf


(AST/SGOT) 


  242 U/L


(15-37)  H 


 


 


Alanine Aminotransferase


(ALT/SGPT) 


  117 U/L


(12-78)  H 


 


 


Alkaline Phosphatase


  


  175 U/L


()  H 


 


 


Total Protein


  


  5.1 G/DL


(6.4-8.2)  L 


 


 


Albumin


  


  1.4 G/DL


(3.4-5.0)  L 


 


 


Globulin  3.7 g/dL   


 


Albumin/Globulin Ratio


  


  0.4 (1.0-2.7)


L 


 


 


Lipase


  


  58 U/L


()  L 


 











Plan


Problems:  


(1) Sepsis


Assessment & Plan:  Leukocytosis


LFT's elevated w/ t bili / d bili


AM labs ordered 


hydrate


Ultrasound abdomen ordered


will follow with recs. 





(2) Sacral decubitus ulcer, stage III


Assessment & Plan:  DTPI sacrum.Maroon- indurated discoloration with opening at 

sacral coccygeal area(L)3.8cm x (W)1.4cm with entire wound measuring (L)10cmx(W)

12cm.Small amt malodorous brown exudate.


Stable dry eschar R heel with dry peeling skin periwound (L)2cm x (W)3.5cm. 

Periwound is also red and boggy. 


Small dry eschar noted to lateral R 5th metatarsal (L)1.4cm x (W)0.3cm.  


L heel is boggy but colour is dusky . 


Resolving skin tear lateral R tibia. Wound bed is clean and dry.  





Tx. Plan:


Cleanse Sacral wound with Saline.Apply Therahoney to opening at sacrococcygeal 

area.Cavilon skin barrier to DTPI. Cover with Optifoam drsg .Change Daily and 

prn.


            


Apply Betadine to R heel  and Lateral R 5th metatarsal.Cover with Abd pad .Wrap 

with kerlix daily and prn.


            


Apply Cavilon Skin Barrier to L heel.Cover with Optifoam drsg .Change every 7 

days and prn.


            


Reposition at least every 2hours or as tolerated.


            


Off-load heels with pillow.





(3) Rectal bleed


(4) Protein-calorie malnutrition, severe


(5) Dehydration











Tuan Patricia Feb 10, 2019 13:59

## 2019-02-10 NOTE — NUR
NURSE NOTES:

Patient noted with RR of 40-44 with gurgling breathing sound upon rounding. Stopped NG 
feeding, raised HOB. RRT was called by CN. Large amt of secretion noted with oral and deep 
suctioning. Removed about 600cc of air from NG tube. HHN Tx and O2 via venturi mask given by 
RT. V/S noted /[id number]% on 14 lpm 55% ventri mask. Blood sugar at 
186. Stat ABG and CXR taken. Dr Magana was notified and agreed to transfer to [facility].

## 2019-02-10 NOTE — PULMONOLOGY PROGRESS NOTE
Assessment/Plan


Problems:  


(1) Sepsis


(2) ATN (acute tubular necrosis)


(3) Pulmonary hypertension


(4) Pacemaker


(5) CAD (coronary artery disease)


(6) Sacral decubitus ulcer, stage III


(7) Alzheimer's dementia


(8) Protein-calorie malnutrition, severe


(9) HTN (hypertension)


Assessment/Plan


d/w wound care nurse, 


pan cultures


iv fluids


check electrolytes


sliding scale


check electrolytes daily


broad spectrum abx


dvt prophylaxis


insulin coverage.


social service consult





Subjective


ROS Limited/Unobtainable:  No


Interval Events:  mourning


Allergies:  


Coded Allergies:  


     No Known Allergies (Unverified , 3/26/14)





Objective





Last 24 Hour Vital Signs








  Date Time  Temp Pulse Resp B/P (MAP) Pulse Ox O2 Delivery O2 Flow Rate FiO2


 


2/10/19 18:00 98.1       


 


2/10/19 16:00 98.1 76 18 126/68 (87) 94   


 


2/10/19 12:00 97.9 80 18 124/63 (83) 98   


 


2/10/19 09:00      Room Air  


 


2/10/19 08:15  76 20   Room Air  21


 


2/10/19 08:00 97.4 76 18 126/61 (82) 98   


 


2/10/19 04:00 99.3 90 22 131/66 (87) 100   


 


2/10/19 00:00 99.1 72 21 132/88 (103) 100   


 


2/9/19 21:00      Room Air  

















Intake and Output  


 


 2/9/19 2/10/19





 19:00 07:00


 


Intake Total 780.000 ml 1620 ml


 


Balance 780.000 ml 1620 ml


 


  


 


Intake Free Water 100 ml 100 ml


 


IV Total 630.000 ml 1200 ml


 


Tube Feeding 50 ml 320 ml








Objective


General Appearance:  cachectic


HEENT:  normocephalic, somnolent


Respiratory/Chest:  chest wall non-tender, lungs clear


Cardiovascular:  normal peripheral pulses, normal rate


Abdomen:  normal bowel sounds, soft, non tender


Extremities:  no cyanosis


Skin:  no rash





Microbiology








 Date/Time


Source Procedure


Growth Status


 


 


 2/8/19 05:30


Wound Gram Stain - Final Complete


 


 2/8/19 05:30 Wound Culture - Final


Klebsiella Pneumoniae


Escherichia Coli - Esbl


Staphylococcus Aureus - Mrsa Complete








Laboratory Tests


2/10/19 05:40: 


White Blood Count 10.6, Red Blood Count 2.97L, Hemoglobin 8.8L, Hematocrit 27.0L

, Mean Corpuscular Volume 91, Mean Corpuscular Hemoglobin 29.5, Mean 

Corpuscular Hemoglobin Concent 32.5, Red Cell Distribution Width 16.5H, 

Platelet Count 43L, Mean Platelet Volume 11.8H, Neutrophils (%) (Auto) , 

Lymphocytes (%) (Auto) , Monocytes (%) (Auto) , Eosinophils (%) (Auto) , 

Basophils (%) (Auto) , Differential Total Cells Counted 100, Neutrophils % (

Manual) 89H, Lymphocytes % (Manual) 1L, Monocytes % (Manual) 3, Eosinophils % (

Manual) 0, Basophils % (Manual) 0, Band Neutrophils 7, Platelet Estimate 

DecreasedL, Platelet Morphology Normal, Anisocytosis 1+, [place] Cells 1+, Sodium 

Level 142, Potassium Level 3.0L, Chloride Level 112H, Carbon Dioxide Level 18L, 

Anion Gap 12, Blood Urea Nitrogen 36H, Creatinine 1.0, Estimat Glomerular 

Filtration Rate , Glucose Level 218H, Lactic Acid Level 2.60H, Calcium Level 

8.0L, Total Bilirubin 1.7H, Direct Bilirubin 1.2H, Aspartate Amino Transf (AST/

SGOT) 242H, Alanine Aminotransferase (ALT/SGPT) 117H, Alkaline Phosphatase 175H

, Total Protein 5.1L, Albumin 1.4L, Globulin 3.7, Albumin/Globulin Ratio 0.4L, 

Lipase 58L


2/10/19 10:20: Lactic Acid Level 2.50H


2/10/19 20:06: 


Arterial Blood pH 7.395, Arterial Blood Partial Pressure CO2 25.6L, Arterial 

Blood Partial Pressure O2 82.4, Arterial Blood HCO3 15.3*L, Arterial Blood 

Oxygen Saturation 95.0, Arterial Blood Base Excess -8.3L, Frank Test Positive





Current Medications








 Medications


  (Trade)  Dose


 Ordered  Sig/Violette


 Route


 PRN Reason  Start Time


 Stop Time Status Last Admin


Dose Admin


 


 Acetaminophen


  (Tylenol)  650 mg  Q4H  PRN


 ORAL


 T>100.5  2/8/19 13:30


 3/9/19 13:29   


 


 


 Albuterol/


 Ipratropium


  (Albuterol/


 Ipratropium)  3 ml  Q4H  PRN


 HHN


 Shortness of Breath  2/8/19 13:30


 2/12/19 13:29   


 


 


 Clonidine HCl


  (Catapres Tab)  0.1 mg  Q8H  PRN


 ORAL


 SBP>160mmHg  2/8/19 12:52


 3/10/19 12:51   


 


 


 Dextrose


  (Dextrose 50%)  25 ml  Q30M  PRN


 IV


 Hypoglycemia  2/8/19 13:15


 3/9/19 13:30  2/10/19 00:21


 


 


 Dextrose


  (Dextrose 50%)  50 ml  Q30M  PRN


 IV


 hypoglycemia  2/8/19 13:15


 3/9/19 13:44  2/9/19 11:50


 


 


 Dextrose/Sodium


 Chloride  1,000 ml @ 


 100 mls/hr  Q10H


 IV


   2/8/19 12:52


 3/10/19 12:51  2/10/19 12:43


 


 


 Heparin Sodium


  (Porcine)


  (Heparin 5000


 units/ml)  5,000 units  EVERY 12  HOURS


 SUBQ


   2/8/19 21:00


 3/9/19 20:59  2/8/19 21:54


 


 


 Insulin Aspart


  (NovoLOG)    EVERY 6  HOURS


 SUBQ


   2/10/19 00:00


 3/9/19 16:29  2/10/19 17:26


 


 


 Lorazepam


  (Ativan)  1 mg  Q6H  PRN


 ORAL


 For Anxiety  2/8/19 12:53


 2/15/19 12:52  2/10/19 09:34


 


 


 Morphine Sulfate


  (Morphine


 Sulfate)  2 mg  Q4H  PRN


 IVP


 Moderate Pain (Pain Scale 4-6)  2/8/19 12:53


 2/15/19 12:52  2/10/19 17:30


 


 


 Olanzapine


  (ZyPREXA)  2.5 mg  BID


 ORAL


   2/8/19 18:00


 3/10/19 10:59  2/10/19 17:24


 


 


 Ondansetron HCl


  (Zofran)  4 mg  Q6H  PRN


 IVP


 Nausea & Vomiting  2/8/19 12:53


 3/10/19 12:52   


 


 


 Phenazopyridine


 HCl


  (Pyridium)  100 mg  DAILYPRN  PRN


 ORAL


 dysuria  2/8/19 12:53


 3/10/19 12:52   


 


 


 Piperacillin Sod/


 Tazobactam Sod


 3.375 gm/Dextrose  110 ml @ 


 27.5 mls/hr  EVERY 8  HOURS


 IVPB


   2/10/19 11:00


 2/15/19 10:59  2/10/19 12:27


 


 


 Polyethylene


 Glycol


  (Miralax)  17 gm  DAILYPRN  PRN


 ORAL


 Constipation  2/8/19 12:53


 3/10/19 12:52   


 


 


 Potassium Chloride


  (K-Dur)  40 meq  TWICE A  DAY


 ORAL


   2/10/19 18:00


 2/11/19 17:59  2/10/19 17:23


 


 


 Temazepam


  (Restoril)  15 mg  HSPRN  PRN


 ORAL


 Insomnia  2/8/19 21:00


 2/14/19 20:59   


 


 


 Vancomycin HCl


  (Vanco rx to


 dose)  1 ea  DAILY  PRN


 MISC


 .  2/9/19 09:00


 3/9/19 14:44   


 


 


 Vancomycin HCl


 750 mg/Sodium


 Chloride  275 ml @ 


 183.333


 mls/hr  Q24H


 IVPB


   2/8/19 17:00


 2/13/19 16:59  2/10/19 17:29


 

















Korin Junior MD Feb 10, 2019 20:21

## 2019-02-11 VITALS — SYSTOLIC BLOOD PRESSURE: 150 MMHG | DIASTOLIC BLOOD PRESSURE: 77 MMHG

## 2019-02-11 VITALS — DIASTOLIC BLOOD PRESSURE: 106 MMHG | SYSTOLIC BLOOD PRESSURE: 139 MMHG

## 2019-02-11 VITALS — DIASTOLIC BLOOD PRESSURE: 71 MMHG | SYSTOLIC BLOOD PRESSURE: 133 MMHG

## 2019-02-11 VITALS — DIASTOLIC BLOOD PRESSURE: 60 MMHG | SYSTOLIC BLOOD PRESSURE: 108 MMHG

## 2019-02-11 VITALS — SYSTOLIC BLOOD PRESSURE: 144 MMHG | DIASTOLIC BLOOD PRESSURE: 64 MMHG

## 2019-02-11 VITALS — SYSTOLIC BLOOD PRESSURE: 124 MMHG | DIASTOLIC BLOOD PRESSURE: 75 MMHG

## 2019-02-11 LAB
ADD MANUAL DIFF: YES
ALBUMIN SERPL-MCNC: 1.2 G/DL (ref 3.4–5)
ALBUMIN/GLOB SERPL: 0.4 {RATIO} (ref 1–2.7)
ALP SERPL-CCNC: 134 U/L (ref 46–116)
ALT SERPL-CCNC: 103 U/L (ref 12–78)
ANION GAP SERPL CALC-SCNC: 10 MMOL/L (ref 5–15)
AST SERPL-CCNC: 148 U/L (ref 15–37)
BILIRUB DIRECT SERPL-MCNC: 1.6 MG/DL (ref 0–0.3)
BILIRUB SERPL-MCNC: 2 MG/DL (ref 0.2–1)
BUN SERPL-MCNC: 36 MG/DL (ref 7–18)
CALCIUM SERPL-MCNC: 7.7 MG/DL (ref 8.5–10.1)
CHLORIDE SERPL-SCNC: 113 MMOL/L (ref 98–107)
CO2 SERPL-SCNC: 20 MMOL/L (ref 21–32)
CREAT SERPL-MCNC: 1 MG/DL (ref 0.55–1.3)
ERYTHROCYTE [DISTWIDTH] IN BLOOD BY AUTOMATED COUNT: 16.7 % (ref 11.6–14.8)
GLOBULIN SER-MCNC: 3.4 G/DL
HCT VFR BLD CALC: 22.5 % (ref 37–47)
HGB BLD-MCNC: 7.1 G/DL (ref 12–16)
MCV RBC AUTO: 91 FL (ref 80–99)
PHOSPHATE SERPL-MCNC: 1.6 MG/DL (ref 2.5–4.9)
PLATELET # BLD: 20 K/UL (ref 150–450)
POTASSIUM SERPL-SCNC: 3.5 MMOL/L (ref 3.5–5.1)
RBC # BLD AUTO: 2.46 M/UL (ref 4.2–5.4)
SODIUM SERPL-SCNC: 143 MMOL/L (ref 136–145)
WBC # BLD AUTO: 6.4 K/UL (ref 4.8–10.8)

## 2019-02-11 RX ADMIN — INSULIN ASPART SCH UNITS: 100 INJECTION, SOLUTION INTRAVENOUS; SUBCUTANEOUS at 05:55

## 2019-02-11 RX ADMIN — DEXTROSE AND SODIUM CHLORIDE SCH MLS/HR: 5; .45 INJECTION, SOLUTION INTRAVENOUS at 21:31

## 2019-02-11 RX ADMIN — MORPHINE SULFATE PRN MG: 2 INJECTION, SOLUTION INTRAMUSCULAR; INTRAVENOUS at 12:23

## 2019-02-11 RX ADMIN — DEXTROSE MONOHYDRATE SCH MLS/HR: 50 INJECTION, SOLUTION INTRAVENOUS at 14:23

## 2019-02-11 RX ADMIN — INSULIN ASPART SCH UNITS: 100 INJECTION, SOLUTION INTRAVENOUS; SUBCUTANEOUS at 11:54

## 2019-02-11 RX ADMIN — OLANZAPINE SCH MG: 2.5 TABLET, FILM COATED ORAL at 18:51

## 2019-02-11 RX ADMIN — INSULIN ASPART SCH UNITS: 100 INJECTION, SOLUTION INTRAVENOUS; SUBCUTANEOUS at 18:52

## 2019-02-11 RX ADMIN — OLANZAPINE SCH MG: 2.5 TABLET, FILM COATED ORAL at 09:03

## 2019-02-11 RX ADMIN — DEXTROSE MONOHYDRATE SCH MLS/HR: 50 INJECTION, SOLUTION INTRAVENOUS at 05:52

## 2019-02-11 RX ADMIN — DEXTROSE AND SODIUM CHLORIDE SCH MLS/HR: 5; .45 INJECTION, SOLUTION INTRAVENOUS at 01:11

## 2019-02-11 RX ADMIN — DEXTROSE MONOHYDRATE SCH MLS/HR: 50 INJECTION, SOLUTION INTRAVENOUS at 21:32

## 2019-02-11 RX ADMIN — SODIUM CHLORIDE SCH MLS/HR: 9 INJECTION, SOLUTION INTRAVENOUS at 17:14

## 2019-02-11 RX ADMIN — DEXTROSE AND SODIUM CHLORIDE SCH MLS/HR: 5; .45 INJECTION, SOLUTION INTRAVENOUS at 09:02

## 2019-02-11 RX ADMIN — INSULIN ASPART SCH UNITS: 100 INJECTION, SOLUTION INTRAVENOUS; SUBCUTANEOUS at 01:12

## 2019-02-11 NOTE — SURGERY PROGRESS NOTE
Surgery Progress Note


Subjective


Additional Comments


low grade fevers.  leukocytosis resolved. lft's elevated.





Objective





Last 24 Hour Vital Signs








  Date Time  Temp Pulse Resp B/P (MAP) Pulse Ox O2 Delivery O2 Flow Rate FiO2


 


2/11/19 08:00 97.7 78 24 150/77 (101) 100   





  78      


 


2/11/19 08:00  73      


 


2/11/19 08:00      Venturi Mask 14.0 


 


2/11/19 07:21  99 20   Venturi Mask  30


 


2/11/19 04:00      Venturi Mask 14.0 


 


2/11/19 04:00 96.6 98 22 133/71 (91) 95   





  80      


 


2/11/19 04:00  80      


 


2/11/19 00:00 98.6 98 24 124/75 (91) 98   


 


2/11/19 00:00      Venturi Mask 14.0 


 


2/11/19 00:00  79      


 


2/11/19 00:00  79      


 


2/10/19 23:00 99.9       


 


2/10/19 22:00  92      


 


2/10/19 21:30      Venturi Mask 14.0 


 


2/10/19 21:30  94      


 


2/10/19 21:30 100.5 92 40 124/69 (87) 96   


 


2/10/19 20:18  97 28   Venturi Mask  55


 


2/10/19 20:18  99 28  97 Venturi Mask 14.0 55


 


2/10/19 20:08  109 32  100 Venturi Mask 8.0 40


 


2/10/19 20:00 100.9 127 40 143/73 (96) 100   


 


2/10/19 18:00 98.1       


 


2/10/19 16:00 98.1 76 18 126/68 (87) 94   








I&O











Intake and Output  


 


 2/10/19 2/11/19





 18:59 06:59


 


Intake Total 893.333 ml 997.5 ml


 


Balance 893.333 ml 997.5 ml


 


  


 


Intake Free Water  60 ml


 


IV Total 893.333 ml 937.5 ml


 


# Voids  2








Drains:  none


Cardiovascular:  RSR


Respiratory:  clear


Abdomen:  soft, flat, non-tender, non-distended


Extremities:  other





Laboratory Tests








Test


  2/10/19


20:06 2/11/19


03:20


 


Arterial Blood pH


  7.395


(7.350-7.450) 


 


 


Arterial Blood Partial


Pressure CO2 25.6 mmHg


(35.0-45.0)  L 


 


 


Arterial Blood Partial


Pressure O2 82.4 mmHg


(75.0-100.0) 


 


 


Arterial Blood HCO3


  15.3 mmol/L


(22.0-26.0)  *L 


 


 


Arterial Blood Oxygen


Saturation 95.0 %


() 


 


 


Arterial Blood Base Excess -8.3 (-2-2)  L 


 


Frank Test Positive   


 


White Blood Count


  


  6.4 K/UL


(4.8-10.8)


 


Red Blood Count


  


  2.46 M/UL


(4.20-5.40)  L


 


Hemoglobin


  


  7.1 G/DL


(12.0-16.0)  L


 


Hematocrit


  


  22.5 %


(37.0-47.0)  L


 


Mean Corpuscular Volume  91 FL (80-99)  


 


Mean Corpuscular Hemoglobin


  


  29.1 PG


(27.0-31.0)


 


Mean Corpuscular Hemoglobin


Concent 


  31.8 G/DL


(32.0-36.0)  L


 


Red Cell Distribution Width


  


  16.7 %


(11.6-14.8)  H


 


Platelet Count


  


  20 K/UL


(150-450)  #L


 


Mean Platelet Volume


  


  13.2 FL


(6.5-10.1)  H


 


Neutrophils (%) (Auto)


  


  % (45.0-75.0)


 


 


Lymphocytes (%) (Auto)


  


  % (20.0-45.0)


 


 


Monocytes (%) (Auto)   % (1.0-10.0)  


 


Eosinophils (%) (Auto)   % (0.0-3.0)  


 


Basophils (%) (Auto)   % (0.0-2.0)  


 


Differential Total Cells


Counted 


  100  


 


 


Neutrophils % (Manual)  80 % (45-75)  H


 


Lymphocytes % (Manual)  6 % (20-45)  L


 


Monocytes % (Manual)  4 % (1-10)  


 


Eosinophils % (Manual)  0 % (0-3)  


 


Basophils % (Manual)  0 % (0-2)  


 


Band Neutrophils  10 % (0-8)  H


 


Platelet Estimate  Decreased  L


 


Platelet Morphology  Normal  


 


Anisocytosis  1+  


 


Target Cells  2+  


 


Ovalocytes  1+  


 


Acanthocytes  1+  


 


Schistocytes  1+  


 


Sodium Level


  


  143 MMOL/L


(136-145)


 


Potassium Level


  


  3.5 MMOL/L


(3.5-5.1)


 


Chloride Level


  


  113 MMOL/L


()  H


 


Carbon Dioxide Level


  


  20 MMOL/L


(21-32)  L


 


Anion Gap


  


  10 mmol/L


(5-15)


 


Blood Urea Nitrogen


  


  36 mg/dL


(7-18)  H


 


Creatinine


  


  1.0 MG/DL


(0.55-1.30)


 


Estimat Glomerular Filtration


Rate 


   mL/min (>60)  


 


 


Glucose Level


  


  127 MG/DL


()  H


 


Calcium Level


  


  7.7 MG/DL


(8.5-10.1)  L


 


Phosphorus Level


  


  1.6 MG/DL


(2.5-4.9)  L


 


Magnesium Level


  


  1.4 MG/DL


(1.8-2.4)  L


 


Total Bilirubin


  


  2.0 MG/DL


(0.2-1.0)  H


 


Direct Bilirubin


  


  1.6 MG/DL


(0.0-0.3)  H


 


Aspartate Amino Transf


(AST/SGOT) 


  148 U/L


(15-37)  H


 


Alanine Aminotransferase


(ALT/SGPT) 


  103 U/L


(12-78)  H


 


Alkaline Phosphatase


  


  134 U/L


()  H


 


Total Protein


  


  4.6 G/DL


(6.4-8.2)  L


 


Albumin


  


  1.2 G/DL


(3.4-5.0)  L


 


Globulin  3.4 g/dL  


 


Albumin/Globulin Ratio


  


  0.4 (1.0-2.7)


L











Plan


Problems:  


(1) Sepsis


Assessment & Plan:  Leukocytosis resolved 


LFT's elevated w/ t bili / d bili


AM labs ordered 


hydrate


Ultrasound abdomen ordered and pending 


will follow with recs. 





(2) Sacral decubitus ulcer, stage III


Assessment & Plan:  DTPI sacrum.Maroon- indurated discoloration with opening at 

sacral coccygeal area(L)3.8cm x (W)1.4cm with entire wound measuring (L)10cmx(W)

12cm.Small amt malodorous brown exudate.


Stable dry eschar R heel with dry peeling skin periwound (L)2cm x (W)3.5cm. 

Periwound is also red and boggy. 


Small dry eschar noted to lateral R 5th metatarsal (L)1.4cm x (W)0.3cm.  


L heel is boggy but colour is dusky . 


Resolving skin tear lateral R tibia. Wound bed is clean and dry.  





Tx. Plan:


Cleanse Sacral wound with Saline.Apply Therahoney to opening at sacrococcygeal 

area.Cavilon skin barrier to DTPI. Cover with Optifoam drsg .Change Daily and 

prn.


            


Apply Betadine to R heel  and Lateral R 5th metatarsal.Cover with Abd pad .Wrap 

with kerlix daily and prn.


            


Apply Cavilon Skin Barrier to L heel.Cover with Optifoam drsg .Change every 7 

days and prn.


            


Reposition at least every 2hours or as tolerated.


            


Off-load heels with pillow.





(3) Rectal bleed


(4) Protein-calorie malnutrition, severe


(5) Dehydration











Tuan Patricia Feb 11, 2019 12:05

## 2019-02-11 NOTE — NUR
HAND-OFF: 



Report given to ORLY Orozco RN. IV site D/C per patient, unable to insert new line. 
Endorsed to receiving nurse. 1 PRBC ready at 18:15, patient's daughter has not yet called 
back to give consent for blood transfusion.

## 2019-02-11 NOTE — DIAGNOSTIC IMAGING REPORT
Indication: Chest pain

 

Technique: One view of the chest

 

Comparison: 2/8/2019

 

Findings: Nasogastric tube has retracted, tip now projected at the level of the

gastric fundus and proximal port possibly the gastroesophageal junction. Bilateral

pacemakers are again demonstrated. There is increased parenchymal consolidation in

the right mid and lower lung and possibly in the left retrocardiac region. Suspect

that there is a small amount of pleural fluid on the right, unchanged

 

Impression: Retracted and now somewhat high position of nasogastric tube, advancement

recommended. This finding was discussed with patient's nurse at the time of

interpretation

 

Slightly worsening bilateral infiltrates, over 2 days

 

Other findings as noted

 

This agrees with the preliminary interpretation provided overnight by [facility]

teleradiology service.

## 2019-02-11 NOTE — PULMONOLOGY PROGRESS NOTE
Assessment/Plan


Problems:  


(1) Sepsis


(2) ATN (acute tubular necrosis)


(3) Pulmonary hypertension


(4) Pacemaker


(5) CAD (coronary artery disease)


(6) Sacral decubitus ulcer, stage III


(7) Alzheimer's dementia


(8) Protein-calorie malnutrition, severe


(9) HTN (hypertension)


Assessment/Plan


continues to have episodes of loud mourning


failed swallow study


Morphine prn


d/w wound care nurse, 


pan cultures


iv fluids


check electrolytes


sliding scale


check electrolytes daily


broad spectrum abx


dvt prophylaxis


insulin coverage.


social service consult





Subjective


ROS Limited/Unobtainable:  No


Constitutional:  Reports: no symptoms


HEENT:  Repors: no symptoms


Respiratory:  Reports: no symptoms


Allergies:  


Coded Allergies:  


     No Known Allergies (Unverified , 3/26/14)





Objective





Last 24 Hour Vital Signs








  Date Time  Temp Pulse Resp B/P (MAP) Pulse Ox O2 Delivery O2 Flow Rate FiO2


 


2/11/19 08:00 97.7 78 24 150/77 (101) 100   





  78      


 


2/11/19 08:00  73      


 


2/11/19 08:00      Venturi Mask 14.0 


 


2/11/19 07:21  99 20   Venturi Mask  30


 


2/11/19 04:00      Venturi Mask 14.0 


 


2/11/19 04:00 96.6 98 22 133/71 (91) 95   





  80      


 


2/11/19 04:00  80      


 


2/11/19 00:00 98.6 98 24 124/75 (91) 98   


 


2/11/19 00:00      Venturi Mask 14.0 


 


2/11/19 00:00  79      


 


2/11/19 00:00  79      


 


2/10/19 23:00 99.9       


 


2/10/19 22:00  92      


 


2/10/19 21:30      Venturi Mask 14.0 


 


2/10/19 21:30  94      


 


2/10/19 21:30 100.5 92 40 124/69 (87) 96   


 


2/10/19 20:18  97 28   Venturi Mask  55


 


2/10/19 20:18  99 28  97 Venturi Mask 14.0 55


 


2/10/19 20:08  109 32  100 Venturi Mask 8.0 40


 


2/10/19 20:00 100.9 127 40 143/73 (96) 100   


 


2/10/19 18:00 98.1       


 


2/10/19 16:00 98.1 76 18 126/68 (96) 94   

















Intake and Output  


 


 2/10/19 2/11/19





 18:59 06:59


 


Intake Total 893.333 ml 997.5 ml


 


Balance 893.333 ml 997.5 ml


 


  


 


Intake Free Water  60 ml


 


IV Total 893.333 ml 937.5 ml


 


# Voids  2








Objective


General Appearance:  cachectic


HEENT:  normocephalic, somnolent


Respiratory/Chest:  chest wall non-tender, lungs clear


Cardiovascular:  normal peripheral pulses, normal rate


Abdomen:  normal bowel sounds, soft, non tender


Extremities:  no cyanosis


Skin:  no rash





Microbiology








 Date/Time


Source Procedure


Growth Status


 


 


 2/10/19 10:31


Blood Blood Culture - Preliminary Resulted


 


 2/10/19 10:20


Blood Blood Culture - Preliminary Resulted








Laboratory Tests


2/10/19 20:06: 


Arterial Blood pH 7.395, Arterial Blood Partial Pressure CO2 25.6L, Arterial 

Blood Partial Pressure O2 82.4, Arterial Blood HCO3 15.3*L, Arterial Blood 

Oxygen Saturation 95.0, Arterial Blood Base Excess -8.3L, Frank Test Positive


2/11/19 03:20: 


White Blood Count 6.4, Red Blood Count 2.46L, Hemoglobin 7.1L, Hematocrit 22.5L

, Mean Corpuscular Volume 91, Mean Corpuscular Hemoglobin 29.1, Mean 

Corpuscular Hemoglobin Concent 31.8L, Red Cell Distribution Width 16.7H, 

Platelet Count 20#L, Mean Platelet Volume 13.2H, Neutrophils (%) (Auto) , 

Lymphocytes (%) (Auto) , Monocytes (%) (Auto) , Eosinophils (%) (Auto) , 

Basophils (%) (Auto) , Differential Total Cells Counted 100, Neutrophils % (

Manual) 80H, Lymphocytes % (Manual) 6L, Monocytes % (Manual) 4, Eosinophils % (

Manual) 0, Basophils % (Manual) 0, Band Neutrophils 10H, Platelet Estimate 

DecreasedL, Platelet Morphology Normal, Anisocytosis 1+, Target Cells 2+, 

Ovalocytes 1+, Acanthocytes 1+, Schistocytes 1+, Sodium Level 143, Potassium 

Level 3.5, Chloride Level 113H, Carbon Dioxide Level 20L, Anion Gap 10, Blood 

Urea Nitrogen 36H, Creatinine 1.0, Estimat Glomerular Filtration Rate , Glucose 

Level 127H, Calcium Level 7.7L, Phosphorus Level 1.6L, Magnesium Level 1.4L, 

Total Bilirubin 2.0H, Direct Bilirubin 1.6H, Aspartate Amino Transf (AST/SGOT) 

148H, Alanine Aminotransferase (ALT/SGPT) 103H, Alkaline Phosphatase 134H, 

Total Protein 4.6L, Albumin 1.2L, Globulin 3.4, Albumin/Globulin Ratio 0.4L





Current Medications








 Medications


  (Trade)  Dose


 Ordered  Sig/Violette


 Route


 PRN Reason  Start Time


 Stop Time Status Last Admin


Dose Admin


 


 Acetaminophen


  (Tylenol)  650 mg  Q4H  PRN


 ORAL


 T>100.5  2/11/19 09:30


 3/9/19 13:29   


 


 


 Albuterol/


 Ipratropium


  (Albuterol/


 Ipratropium)  3 ml  Q4H  PRN


 HHN


 Shortness of Breath  2/11/19 09:30


 2/12/19 13:29   


 


 


 Clonidine HCl


  (Catapres Tab)  0.1 mg  Q8H  PRN


 ORAL


 SBP>160mmHg  2/11/19 06:30


 3/10/19 06:29   


 


 


 Dextrose


  (Dextrose 50%)  25 ml  Q30M  PRN


 IV


 Hypoglycemia  2/11/19 06:15


 3/9/19 13:30   


 


 


 Dextrose


  (Dextrose 50%)  50 ml  Q30M  PRN


 IV


 hypoglycemia  2/11/19 06:15


 3/9/19 13:44   


 


 


 Dextrose/Sodium


 Chloride  1,000 ml @ 


 100 mls/hr  Q10H


 IV


   2/11/19 10:00


 3/10/19 12:51  2/11/19 09:02


 


 


 Heparin Sodium


  (Porcine)


  (Heparin 5000


 units/ml)  5,000 units  EVERY 12  HOURS


 SUBQ


   2/11/19 09:00


 3/9/19 20:59 UNV  


 


 


 Insulin Aspart


  (NovoLOG)    EVERY 6  HOURS


 SUBQ


   2/11/19 12:00


 3/9/19 16:29  2/11/19 11:54


 


 


 Lorazepam


  (Ativan)  1 mg  Q6H  PRN


 ORAL


 For Anxiety  2/11/19 07:00


 2/15/19 12:52   


 


 


 Morphine Sulfate


  (Morphine


 Sulfate)  2 mg  Q4H  PRN


 IVP


 Moderate Pain (Pain Scale 4-6)  2/11/19 09:00


 2/15/19 12:52   


 


 


 Olanzapine


  (ZyPREXA)  2.5 mg  BID


 ORAL


   2/11/19 09:00


 3/10/19 10:59  2/11/19 09:03


 


 


 Ondansetron HCl


  (Zofran)  4 mg  Q6H  PRN


 IVP


 Nausea & Vomiting  2/11/19 07:00


 3/10/19 12:52   


 


 


 Phenazopyridine


 HCl


  (Pyridium)  100 mg  DAILYPRN  PRN


 ORAL


 dysuria  2/11/19 06:30


 3/10/19 06:29   


 


 


 Piperacillin Sod/


 Tazobactam Sod


 3.375 gm/Dextrose  110 ml @ 


 27.5 mls/hr  EVERY 8  HOURS


 IVPB


   2/11/19 14:00


 2/15/19 10:59   


 


 


 Polyethylene


 Glycol


  (Miralax)  17 gm  DAILYPRN  PRN


 ORAL


 Constipation  2/11/19 06:30


 3/10/19 06:29   


 


 


 Potassium Chloride


  (K-Dur)  40 meq  TWICE A  DAY


 ORAL


   2/11/19 09:00


 2/11/19 17:59  2/11/19 09:03


 


 


 Temazepam


  (Restoril)  15 mg  HSPRN  PRN


 ORAL


 Insomnia  2/11/19 21:00


 2/14/19 20:59   


 


 


 Vancomycin HCl


  (Vanco rx to


 dose)  1 ea  DAILY  PRN


 MISC


 .  2/11/19 09:00


 3/9/19 14:44   


 


 


 Vancomycin HCl


 750 mg/Sodium


 Chloride  275 ml @ 


 183.333


 mls/hr  Q24H


 IVPB


   2/11/19 17:00


 2/13/19 16:59   


 

















Korin Junior MD Feb 11, 2019 12:18

## 2019-02-11 NOTE — GENERAL PROGRESS NOTE
Assessment/Plan


Problem List:  


(1) Acute encephalopathy


Assessment & Plan:  metabolic


ICD Codes:  G93.40 - Encephalopathy, unspecified


SNOMED:  51340182, 132231729


(2) Delirium


ICD Codes:  R41.0 - Disorientation, unspecified


SNOMED:  6909793


(3) Alzheimer's dementia


ICD Codes:  G30.9 - Alzheimer's disease, unspecified; F02.80 - Dementia in 

other diseases classified elsewhere without behavioral disturbance


SNOMED:  31941843


Assessment/Plan


Zyprexa 


the pt lacks capacity


soft restraints if needed





Subjective


Allergies:  


Coded Allergies:  


     No Known Allergies (Unverified , 3/26/14)


Subjective


cont to be agitated 


lethargic today





Objective





Last 24 Hour Vital Signs








  Date Time  Temp Pulse Resp B/P (MAP) Pulse Ox O2 Delivery O2 Flow Rate FiO2


 


2/11/19 08:00 97.7 78 24 150/77 (101) 100   





  78      


 


2/11/19 08:00  73      


 


2/11/19 08:00      Venturi Mask 14.0 


 


2/11/19 07:21  99 20   Venturi Mask  30


 


2/11/19 04:00      Venturi Mask 14.0 


 


2/11/19 04:00 96.6 98 22 133/71 (91) 95   





  80      


 


2/11/19 04:00  80      


 


2/11/19 00:00 98.6 98 24 124/75 (91) 98   


 


2/11/19 00:00      Venturi Mask 14.0 


 


2/11/19 00:00  79      


 


2/11/19 00:00  79      


 


2/10/19 23:00 99.9       


 


2/10/19 22:00  92      


 


2/10/19 21:30      Venturi Mask 14.0 


 


2/10/19 21:30  94      


 


2/10/19 21:30 100.5 92 40 124/69 (87) 96   


 


2/10/19 20:18  97 28   Venturi Mask  55


 


2/10/19 20:18  99 28  97 Venturi Mask 14.0 55


 


2/10/19 20:08  109 32  100 Venturi Mask 8.0 40


 


2/10/19 20:00 100.9 127 40 143/73 (96) 100   


 


2/10/19 18:00 98.1       


 


2/10/19 16:00 98.1 76 18 126/68 (87) 94   

















Intake and Output  


 


 2/10/19 2/11/19





 18:59 06:59


 


Intake Total 893.333 ml 997.5 ml


 


Balance 893.333 ml 997.5 ml


 


  


 


Intake Free Water  60 ml


 


IV Total 893.333 ml 937.5 ml


 


# Voids  2








Laboratory Tests


2/10/19 20:06: 


Arterial Blood pH 7.395, Arterial Blood Partial Pressure CO2 25.6L, Arterial 

Blood Partial Pressure O2 82.4, Arterial Blood HCO3 15.3*L, Arterial Blood 

Oxygen Saturation 95.0, Arterial Blood Base Excess -8.3L, Frank Test Positive


2/11/19 03:20: 


White Blood Count 6.4, Red Blood Count 2.46L, Hemoglobin 7.1L, Hematocrit 22.5L

, Mean Corpuscular Volume 91, Mean Corpuscular Hemoglobin 29.1, Mean 

Corpuscular Hemoglobin Concent 31.8L, Red Cell Distribution Width 16.7H, 

Platelet Count 20#L, Mean Platelet Volume 13.2H, Neutrophils (%) (Auto) , 

Lymphocytes (%) (Auto) , Monocytes (%) (Auto) , Eosinophils (%) (Auto) , 

Basophils (%) (Auto) , Differential Total Cells Counted 100, Neutrophils % (

Manual) 80H, Lymphocytes % (Manual) 6L, Monocytes % (Manual) 4, Eosinophils % (

Manual) 0, Basophils % (Manual) 0, Band Neutrophils 10H, Platelet Estimate 

DecreasedL, Platelet Morphology Normal, Anisocytosis 1+, Target Cells 2+, 

Ovalocytes 1+, Acanthocytes 1+, Schistocytes 1+, Sodium Level 143, Potassium 

Level 3.5, Chloride Level 113H, Carbon Dioxide Level 20L, Anion Gap 10, Blood 

Urea Nitrogen 36H, Creatinine 1.0, Estimat Glomerular Filtration Rate , Glucose 

Level 127H, Calcium Level 7.7L, Phosphorus Level 1.6L, Magnesium Level 1.4L, 

Total Bilirubin 2.0H, Direct Bilirubin 1.6H, Aspartate Amino Transf (AST/SGOT) 

148H, Alanine Aminotransferase (ALT/SGPT) 103H, Alkaline Phosphatase 134H, 

Total Protein 4.6L, Albumin 1.2L, Globulin 3.4, Albumin/Globulin Ratio 0.4L


Height (Feet):  5


Height (Inches):  6.00


Weight (Pounds):  150


General Appearance:  lethargic, confused, agitated











Josh Nunez MD Feb 11, 2019 12:13

## 2019-02-11 NOTE — NUR
swallow/speech therapy note:  see swallow eval completed post chart review



REFERRED BY DR BELL AND DR SANDERS:



DYSPHAGIA RISK FACTORS FOR THIS 78 Y.O. [place]ian-SPEAKING WOMAN:



ACUTE DEHYDRATION, WEAKNESS, POOR INTAKE, DIFFICULTY CLEARING SECRETIONS AND RALES PER RT, 
PER CXR 2/8/19 MILD PATCHY RIGHT BASE OPACITY MAY REP ATELECTASIS OR DEV INFILTRATE. 
SEPSIS,PROTEIN-CALORIE MALNUTRITION, AMS.



RELEVANT MEDS - ATIVAN, ALBUTEROL, MORPHINE, ZYPREXA



H/O ALZHEIMER'S DZ DEMENTIA (?SEVERITY),FTT, HAD MILD OP DYSPHAGIA ON 11/2018, DM2, CHF, 
HTN, PSYCH (SCHIZO), PM (2014), ASTHMA.



PER POLST FULL TX (NO NOTATION ABOUT TF PREFERENCES)



PRIOR TO ADMIT AT A SNF ? DIET. AT [facility] [place] 11/2018 HAD A MILD OROPHARYNGEAL DYSPHAGIA WITH ASP 
RISK AND POOR INTAKE. [facility]QU[facility] AND GLUCERNA 
SUPPLEMENTS. PT NPO AND WAS GETTING NGT FEEDINGS OF GLUCERNA ON HOLD NOW DUE TO RESP ISSUES. 




INITIAL IMPRESSIONS:

HIGH RISK FOR A PERSISTENT AND POSSIBLY WORSENENED OROPHARYNGEAL DYSPHAGIA

PER RT HAS DIFFICULTY CLEARING ORAL SECRETIONS AND NEED DEEP NASAL SUCTION AND HAS MODERATE 
THICK AND THIN SECRETIONS.



PATIENT DID NOT FOLLOW COMMANDS TO MOVE TONGUE AND WOULD ONLY MOAN.  



DID NOT SOUND LIKE SHE NEEDED OROPHARYNGEAL SUCTION.    



HAS HIGH RISK FOR SILENT ASPIRATION 



VITALS SIGNS STABLE FOR EXAM 



RECOMMENDATIONS:



CONTINUE WITH ORAL CARE, ASP PREC FOR SECRETIONS (SEE SIGN) AND NONORAL FEEDINGS (GLUCERNA) 
WHEN ABLE



COMPLETED MOD BARIUM SWALLOW STUDY WHEN READY TO FURTHER ASSESS SWALLOW, DETERMINE SILENT 
ASP RISK/ETIOLOGY, AND ATTEMPT TRIAL TX TECHNIQUES



CONTINUE WITH DYSPHAGIA MANAGEMENT AND TX



COG-COM EVAL/TX 



D/W RN (DAYA)

## 2019-02-11 NOTE — INFECTIOUS DISEASES PROG NOTE
Assessment/Plan


Assessment/Plan





77 yo female with PMHx DM, CAD, Alzheimer dementia and Hip fracture who 

presents with dehydration and weakness. 





High grade bacteremia- suspect endovascular source (ie endocarditis, PPM 

infection)


   -2d Echo (limited study due to contractures): no vegetations seen


    -2/10 Bcx 4/4 GPC clusters


    2/8/19 Blood Cx  4/4 MRSA


    2/7/19 Wound Cx MRSA, ESBL E.coli (S ZOsyn), K. pna (S Zosyn)


   


   Mild leukocytosis - resolved


  Low grade fever


   Positive UA 


   CXR neg


   


Pressure ulcers


  Not infected





Right hip fracture with hemiarthroplasty Oct 2018


DM


HTN


Sick sinus syndrome sp Pacemaker


HLD


CAD - SP MI and CABG


Aortic stenosis.


Pulmonary hypertension.


Alzheimer dementia.





PLAN





- Continue Vancomycin #4 for persistent MRSA Bacteremia


- Continue Zosyn #2/5 for sacral wound pathogens


   -2/10 SP Cefepime #3





-Will need MARTHA to look for endocarditis and/or PPM infection if consistent with 

goals of care


-REpeat 2 sets of Bcx


- f/u repeat cultures


- Monitor CBC and temps


- wound care


- Nutritional support





Thank you for this consult. We will continue to follow the patient during this 

hospitalization.





Subjective


Allergies:  


Coded Allergies:  


     No Known Allergies (Unverified , 3/26/14)


Subjective


Tm 100.9


no leukoycotis


remains bacteremic





Objective


Vital Signs





Last 24 Hour Vital Signs








  Date Time  Temp Pulse Resp B/P (MAP) Pulse Ox O2 Delivery O2 Flow Rate FiO2


 


2/11/19 08:00 97.7 78 24 150/77 (101) 100   





  78      


 


2/11/19 07:21  99 20   Venturi Mask  30


 


2/11/19 04:00      Venturi Mask 14.0 


 


2/11/19 04:00 96.6 98 22 133/71 (91) 95   





  80      


 


2/11/19 04:00  80      


 


2/11/19 00:00 98.6 98 24 124/75 (91) 98   


 


2/11/19 00:00      Venturi Mask 14.0 


 


2/11/19 00:00  79      


 


2/11/19 00:00  79      


 


2/10/19 23:00 99.9       


 


2/10/19 22:00  92      


 


2/10/19 21:30      Venturi Mask 14.0 


 


2/10/19 21:30  94      


 


2/10/19 21:30 100.5 92 40 124/69 (87) 96   


 


2/10/19 20:18  97 28   Venturi Mask  55


 


2/10/19 20:18  99 28  97 Venturi Mask 14.0 55


 


2/10/19 20:08  109 32  100 Venturi Mask 8.0 40


 


2/10/19 20:00 100.9 127 40 143/73 (96) 100   


 


2/10/19 18:00 98.1       


 


2/10/19 16:00 98.1 76 18 126/68 (87) 94   


 


2/10/19 12:00 97.9 80 18 124/63 (83) 98   








Height (Feet):  5


Height (Inches):  6.00


Weight (Pounds):  150


Objective


Gen:  Moaning, Awake but no following


HEENT: NCAT, MMM, EOMI, PERRL


LUNGS:  CTAB, No W


CARDS: RRR, S1, S2, No M/R/G, 


ABD: Soft, NT, ND, + BS


Ext: Poor circulation to Ext (cool to touch) , Pulses 2+ B/L (DP, Rad): 


SKIN:  Dry, No rashes, Large sacral ulcer. Mild odor no surrounding cellulitis, 

foot with eschar





Microbiology








 Date/Time


Source Procedure


Growth Status


 


 


 2/10/19 10:31


Blood Blood Culture - Preliminary Resulted


 


 2/10/19 10:20


Blood Blood Culture - Preliminary Resulted











Laboratory Tests








Test


  2/10/19


20:06 2/11/19


03:20


 


Arterial Blood pH


  7.395


(7.350-7.450) 


 


 


Arterial Blood Partial


Pressure CO2 25.6 mmHg


(35.0-45.0)  L 


 


 


Arterial Blood Partial


Pressure O2 82.4 mmHg


(75.0-100.0) 


 


 


Arterial Blood HCO3


  15.3 mmol/L


(22.0-26.0)  *L 


 


 


Arterial Blood Oxygen


Saturation 95.0 %


() 


 


 


Arterial Blood Base Excess -8.3 (-2-2)  L 


 


Frank Test Positive   


 


White Blood Count


  


  6.4 K/UL


(4.8-10.8)


 


Red Blood Count


  


  2.46 M/UL


(4.20-5.40)  L


 


Hemoglobin


  


  7.1 G/DL


(12.0-16.0)  L


 


Hematocrit


  


  22.5 %


(37.0-47.0)  L


 


Mean Corpuscular Volume  91 FL (80-99)  


 


Mean Corpuscular Hemoglobin


  


  29.1 PG


(27.0-31.0)


 


Mean Corpuscular Hemoglobin


Concent 


  31.8 G/DL


(32.0-36.0)  L


 


Red Cell Distribution Width


  


  16.7 %


(11.6-14.8)  H


 


Platelet Count


  


  20 K/UL


(150-450)  #L


 


Mean Platelet Volume


  


  13.2 FL


(6.5-10.1)  H


 


Neutrophils (%) (Auto)


  


  % (45.0-75.0)


 


 


Lymphocytes (%) (Auto)


  


  % (20.0-45.0)


 


 


Monocytes (%) (Auto)   % (1.0-10.0)  


 


Eosinophils (%) (Auto)   % (0.0-3.0)  


 


Basophils (%) (Auto)   % (0.0-2.0)  


 


Differential Total Cells


Counted 


  100  


 


 


Neutrophils % (Manual)  80 % (45-75)  H


 


Lymphocytes % (Manual)  6 % (20-45)  L


 


Monocytes % (Manual)  4 % (1-10)  


 


Eosinophils % (Manual)  0 % (0-3)  


 


Basophils % (Manual)  0 % (0-2)  


 


Band Neutrophils  10 % (0-8)  H


 


Platelet Estimate  Decreased  L


 


Platelet Morphology  Normal  


 


Anisocytosis  1+  


 


Target Cells  2+  


 


Ovalocytes  1+  


 


Acanthocytes  1+  


 


Schistocytes  1+  


 


Sodium Level


  


  143 MMOL/L


(136-145)


 


Potassium Level


  


  3.5 MMOL/L


(3.5-5.1)


 


Chloride Level


  


  113 MMOL/L


()  H


 


Carbon Dioxide Level


  


  20 MMOL/L


(21-32)  L


 


Anion Gap


  


  10 mmol/L


(5-15)


 


Blood Urea Nitrogen


  


  36 mg/dL


(7-18)  H


 


Creatinine


  


  1.0 MG/DL


(0.55-1.30)


 


Estimat Glomerular Filtration


Rate 


   mL/min (>60)  


 


 


Glucose Level


  


  127 MG/DL


()  H


 


Calcium Level


  


  7.7 MG/DL


(8.5-10.1)  L


 


Phosphorus Level


  


  1.6 MG/DL


(2.5-4.9)  L


 


Magnesium Level


  


  1.4 MG/DL


(1.8-2.4)  L


 


Total Bilirubin


  


  2.0 MG/DL


(0.2-1.0)  H


 


Direct Bilirubin


  


  1.6 MG/DL


(0.0-0.3)  H


 


Aspartate Amino Transf


(AST/SGOT) 


  148 U/L


(15-37)  H


 


Alanine Aminotransferase


(ALT/SGPT) 


  103 U/L


(12-78)  H


 


Alkaline Phosphatase


  


  134 U/L


()  H


 


Total Protein


  


  4.6 G/DL


(6.4-8.2)  L


 


Albumin


  


  1.2 G/DL


(3.4-5.0)  L


 


Globulin  3.4 g/dL  


 


Albumin/Globulin Ratio


  


  0.4 (1.0-2.7)


L











Current Medications








 Medications


  (Trade)  Dose


 Ordered  Sig/Violette


 Route


 PRN Reason  Start Time


 Stop Time Status Last Admin


Dose Admin


 


 Acetaminophen


  (Tylenol)  650 mg  Q4H  PRN


 ORAL


 T>100.5  2/11/19 09:30


 3/9/19 13:29   


 


 


 Albuterol/


 Ipratropium


  (Albuterol/


 Ipratropium)  3 ml  Q4H  PRN


 HHN


 Shortness of Breath  2/11/19 09:30


 2/12/19 13:29   


 


 


 Clonidine HCl


  (Catapres Tab)  0.1 mg  Q8H  PRN


 ORAL


 SBP>160mmHg  2/11/19 06:30


 3/10/19 06:29   


 


 


 Dextrose


  (Dextrose 50%)  25 ml  Q30M  PRN


 IV


 Hypoglycemia  2/11/19 06:15


 3/9/19 13:30   


 


 


 Dextrose


  (Dextrose 50%)  50 ml  Q30M  PRN


 IV


 hypoglycemia  2/11/19 06:15


 3/9/19 13:44   


 


 


 Dextrose/Sodium


 Chloride  1,000 ml @ 


 100 mls/hr  Q10H


 IV


   2/11/19 10:00


 3/10/19 12:51  2/11/19 09:02


 


 


 Heparin Sodium


  (Porcine)


  (Heparin 5000


 units/ml)  5,000 units  EVERY 12  HOURS


 SUBQ


   2/11/19 09:00


 3/9/19 20:59 UNV  


 


 


 Insulin Aspart


  (NovoLOG)    EVERY 6  HOURS


 SUBQ


   2/11/19 12:00


 3/9/19 16:29   


 


 


 Lorazepam


  (Ativan)  1 mg  Q6H  PRN


 ORAL


 For Anxiety  2/11/19 07:00


 2/15/19 12:52   


 


 


 Morphine Sulfate


  (Morphine


 Sulfate)  2 mg  Q4H  PRN


 IVP


 Moderate Pain (Pain Scale 4-6)  2/11/19 09:00


 2/15/19 12:52   


 


 


 Olanzapine


  (ZyPREXA)  2.5 mg  BID


 ORAL


   2/11/19 09:00


 3/10/19 10:59  2/11/19 09:03


 


 


 Ondansetron HCl


  (Zofran)  4 mg  Q6H  PRN


 IVP


 Nausea & Vomiting  2/11/19 07:00


 3/10/19 12:52   


 


 


 Phenazopyridine


 HCl


  (Pyridium)  100 mg  DAILYPRN  PRN


 ORAL


 dysuria  2/11/19 06:30


 3/10/19 06:29   


 


 


 Piperacillin Sod/


 Tazobactam Sod


 3.375 gm/Dextrose  110 ml @ 


 27.5 mls/hr  EVERY 8  HOURS


 IVPB


   2/11/19 14:00


 2/15/19 10:59   


 


 


 Polyethylene


 Glycol


  (Miralax)  17 gm  DAILYPRN  PRN


 ORAL


 Constipation  2/11/19 06:30


 3/10/19 06:29   


 


 


 Potassium Chloride


  (K-Dur)  40 meq  TWICE A  DAY


 ORAL


   2/11/19 09:00


 2/11/19 17:59  2/11/19 09:03


 


 


 Temazepam


  (Restoril)  15 mg  HSPRN  PRN


 ORAL


 Insomnia  2/11/19 21:00


 2/14/19 20:59   


 


 


 Vancomycin HCl


  (Vanco rx to


 dose)  1 ea  DAILY  PRN


 MISC


 .  2/11/19 09:00


 3/9/19 14:44   


 


 


 Vancomycin HCl


 750 mg/Sodium


 Chloride  275 ml @ 


 183.333


 mls/hr  Q24H


 IVPB


   2/11/19 17:00


 2/13/19 16:59   


 

















Olivia Rosas M.D. Feb 11, 2019 11:03

## 2019-02-11 NOTE — NUR
NURSE NOTES:

Received bedside report from Tigist,RN.Patient stable,nonverbal,sleeping in a bed,moaning al 
the time,no IV access,patient hard to stick,will try later,tolerated non rebreathing mask 
with 15L well, SR on cardiac monitor,BS active in all quadrants,no s/s of pain,no 
respiratory distress noted,bed in a low safety position,call light within a reach,will 
continue to monitor.

## 2019-02-11 NOTE — INTERNAL MED PROGRESS NOTE
Subjective


Physician Name


Cheo Ortiz


Attending Physician


Juan Carlos Magana MD





Current Medications








 Medications


  (Trade)  Dose


 Ordered  Sig/Violette


 Route


 PRN Reason  Start Time


 Stop Time Status Last Admin


Dose Admin


 


 Acetaminophen


  (Tylenol)  650 mg  Q4H  PRN


 ORAL


 T>100.5  2/11/19 09:30


 3/9/19 13:29   


 


 


 Albuterol/


 Ipratropium


  (Albuterol/


 Ipratropium)  3 ml  Q4H  PRN


 HHN


 Shortness of Breath  2/11/19 09:30


 2/12/19 13:29   


 


 


 Clonidine HCl


  (Catapres Tab)  0.1 mg  Q8H  PRN


 ORAL


 SBP>160mmHg  2/11/19 06:30


 3/10/19 06:29   


 


 


 Dextrose


  (Dextrose 50%)  25 ml  Q30M  PRN


 IV


 Hypoglycemia  2/11/19 06:15


 3/9/19 13:30   


 


 


 Dextrose


  (Dextrose 50%)  50 ml  Q30M  PRN


 IV


 hypoglycemia  2/11/19 06:15


 3/9/19 13:44   


 


 


 Dextrose/Sodium


 Chloride  1,000 ml @ 


 100 mls/hr  Q10H


 IV


   2/11/19 10:00


 3/10/19 12:51  2/11/19 09:02


 


 


 Insulin Aspart


  (NovoLOG)    EVERY 6  HOURS


 SUBQ


   2/11/19 12:00


 3/9/19 16:29  2/11/19 11:54


 


 


 Lorazepam


  (Ativan)  1 mg  Q6H  PRN


 ORAL


 For Anxiety  2/11/19 07:00


 2/15/19 12:52   


 


 


 Morphine Sulfate


  (Morphine


 Sulfate)  2 mg  Q4H  PRN


 IVP


 Moderate Pain (Pain Scale 4-6)  2/11/19 09:00


 2/15/19 12:52  2/11/19 12:23


 


 


 Olanzapine


  (ZyPREXA)  2.5 mg  BID


 ORAL


   2/11/19 09:00


 3/10/19 10:59  2/11/19 18:51


 


 


 Ondansetron HCl


  (Zofran)  4 mg  Q6H  PRN


 IVP


 Nausea & Vomiting  2/11/19 07:00


 3/10/19 12:52   


 


 


 Phenazopyridine


 HCl


  (Pyridium)  100 mg  DAILYPRN  PRN


 ORAL


 dysuria  2/11/19 06:30


 3/10/19 06:29   


 


 


 Piperacillin Sod/


 Tazobactam Sod


 3.375 gm/Dextrose  110 ml @ 


 27.5 mls/hr  EVERY 8  HOURS


 IVPB


   2/11/19 14:00


 2/15/19 10:59  2/11/19 14:23


 


 


 Polyethylene


 Glycol


  (Miralax)  17 gm  DAILYPRN  PRN


 ORAL


 Constipation  2/11/19 06:30


 3/10/19 06:29   


 


 


 Temazepam


  (Restoril)  15 mg  HSPRN  PRN


 ORAL


 Insomnia  2/11/19 21:00


 2/14/19 20:59   


 


 


 Vancomycin HCl


  (Vanco rx to


 dose)  1 ea  DAILY  PRN


 MISC


 .  2/11/19 09:00


 3/9/19 14:44   


 


 


 Vancomycin HCl


 750 mg/Sodium


 Chloride  275 ml @ 


 183.333


 mls/hr  Q24H


 IVPB


   2/11/19 17:00


 2/13/19 16:59  2/11/19 17:14


 








Allergies:  


Coded Allergies:  


     No Known Allergies (Unverified , 3/26/14)





Objective





Last Vital Signs








  Date Time  Temp Pulse Resp B/P (MAP) Pulse Ox O2 Delivery O2 Flow Rate FiO2


 


2/11/19 16:00  79      


 


2/11/19 16:00      Venturi Mask 14.0 


 


2/11/19 12:53 97.7       


 


2/11/19 12:00   31 139/106 (117) 96   


 


2/11/19 07:21        30











Laboratory Tests








Test


  2/10/19


20:06 2/11/19


03:20


 


Arterial Blood pH


  7.395


(7.350-7.450) 


 


 


Arterial Blood Partial


Pressure CO2 25.6 mmHg


(35.0-45.0)  L 


 


 


Arterial Blood Partial


Pressure O2 82.4 mmHg


(75.0-100.0) 


 


 


Arterial Blood HCO3


  15.3 mmol/L


(22.0-26.0)  *L 


 


 


Arterial Blood Oxygen


Saturation 95.0 %


() 


 


 


Arterial Blood Base Excess -8.3 (-2-2)  L 


 


Frank Test Positive   


 


White Blood Count


  


  6.4 K/UL


(4.8-10.8)


 


Red Blood Count


  


  2.46 M/UL


(4.20-5.40)  L


 


Hemoglobin


  


  7.1 G/DL


(12.0-16.0)  L


 


Hematocrit


  


  22.5 %


(37.0-47.0)  L


 


Mean Corpuscular Volume  91 FL (80-99)  


 


Mean Corpuscular Hemoglobin


  


  29.1 PG


(27.0-31.0)


 


Mean Corpuscular Hemoglobin


Concent 


  31.8 G/DL


(32.0-36.0)  L


 


Red Cell Distribution Width


  


  16.7 %


(11.6-14.8)  H


 


Platelet Count


  


  20 K/UL


(150-450)  #L


 


Mean Platelet Volume


  


  13.2 FL


(6.5-10.1)  H


 


Neutrophils (%) (Auto)


  


  % (45.0-75.0)


 


 


Lymphocytes (%) (Auto)


  


  % (20.0-45.0)


 


 


Monocytes (%) (Auto)   % (1.0-10.0)  


 


Eosinophils (%) (Auto)   % (0.0-3.0)  


 


Basophils (%) (Auto)   % (0.0-2.0)  


 


Differential Total Cells


Counted 


  100  


 


 


Neutrophils % (Manual)  80 % (45-75)  H


 


Lymphocytes % (Manual)  6 % (20-45)  L


 


Monocytes % (Manual)  4 % (1-10)  


 


Eosinophils % (Manual)  0 % (0-3)  


 


Basophils % (Manual)  0 % (0-2)  


 


Band Neutrophils  10 % (0-8)  H


 


Platelet Estimate  Decreased  L


 


Platelet Morphology  Normal  


 


Anisocytosis  1+  


 


Target Cells  2+  


 


Ovalocytes  1+  


 


Acanthocytes  1+  


 


Schistocytes  1+  


 


Sodium Level


  


  143 MMOL/L


(136-145)


 


Potassium Level


  


  3.5 MMOL/L


(3.5-5.1)


 


Chloride Level


  


  113 MMOL/L


()  H


 


Carbon Dioxide Level


  


  20 MMOL/L


(21-32)  L


 


Anion Gap


  


  10 mmol/L


(5-15)


 


Blood Urea Nitrogen


  


  36 mg/dL


(7-18)  H


 


Creatinine


  


  1.0 MG/DL


(0.55-1.30)


 


Estimat Glomerular Filtration


Rate 


   mL/min (>60)  


 


 


Glucose Level


  


  127 MG/DL


()  H


 


Calcium Level


  


  7.7 MG/DL


(8.5-10.1)  L


 


Phosphorus Level


  


  1.6 MG/DL


(2.5-4.9)  L


 


Magnesium Level


  


  1.4 MG/DL


(1.8-2.4)  L


 


Total Bilirubin


  


  2.0 MG/DL


(0.2-1.0)  H


 


Direct Bilirubin


  


  1.6 MG/DL


(0.0-0.3)  H


 


Aspartate Amino Transf


(AST/SGOT) 


  148 U/L


(15-37)  H


 


Alanine Aminotransferase


(ALT/SGPT) 


  103 U/L


(12-78)  H


 


Alkaline Phosphatase


  


  134 U/L


()  H


 


Total Protein


  


  4.6 G/DL


(6.4-8.2)  L


 


Albumin


  


  1.2 G/DL


(3.4-5.0)  L


 


Globulin  3.4 g/dL  


 


Albumin/Globulin Ratio


  


  0.4 (1.0-2.7)


L











Microbiology








 Date/Time


Source Procedure


Growth Status


 


 


 2/10/19 10:31


Blood Blood Culture - Preliminary Resulted


 


 2/10/19 10:20


Blood Blood Culture - Preliminary Resulted

















Intake and Output  


 


 2/10/19 2/11/19





 19:00 07:00


 


Intake Total 893.333 ml 1125.0 ml


 


Balance 893.333 ml 1125.0 ml


 


  


 


Intake Free Water  60 ml


 


IV Total 893.333 ml 1065.0 ml


 


# Voids  2








Objective


Objective


General: No acute distress, awake and less responsive, Sleepy, cachectic.


HEENT: NCAT, sclera anicteric, PERRL, NG tube.


Neck: Supple, no significant jugular venous distention, 


Lungs: Poor inspiratory effort, decreased air in the bases , no Wheeze or Rales.


Heart: Regular rate and rhythm, normal S1/S2, no murmur.


Abdomen: soft, nontender, nondistended. Normoactive bowel sounds.


 / Rectal: Refused and deferred.


Extremities: No Cyanosis , No clubbing,  Left UE edema.  Lateral lower 

extremity / feet dressing intact.


Neuro: A&O x 1, Able to move all extremities


Skin: warm, no rashes or lesions, ecchymosis in bilateral upper extremity noted.





Assessment/Plan


Status:  not improved


Assessment/Plan


1. Sepsis /  METHACILLIN RESISTANT STAPHYLOCOCCUS AUREUS bacteremia, lactic 

acidosis.


2. Failure to thrive with severe protein calorie malnutrition.


3. Coronary artery disease.


4. Diabetes, type 2.


5. Hypertension.


6. Sick sinus syndrome.


7. Hypercholesterolemia.


8. Aortic stenosis.


9. Pulmonary hypertension.


10. Alzheimer dementia.


11.  Acute tubular necrosis/acute kidney injury most likely secondary to 

dehydration and sepsis.


12. Sacral decubitus ulcer, stage III


13. Severe dehydration


14.  Hypokalemia.


15.  Abnormal liver function test





TREATMENT:


1.  Patient on broad spectrum antibiotics with vancomycin and Zosyn IV.  

Monitor culture.


2. Coronary artery disease.  The patient is status post coronary artery


bypass graft in 2017.


3. Diabetes, type 2.  The patient has been started on NovoLog sliding


scale.  


4. Hypertension.  The patient is to continue on atenolol and losartan as


above.


5. Sick sinus syndrome.  The patient is status post pacemaker


implantation.


6. Hypercholesterolemia.  Continue simvastatin as above.


7. Aortic stenosis.


8. Pulmonary hypertension.


9. Alzheimer dementia.





KCl supplement, 


will follow her laboratory as well as culture in the morning.


Continue tube feeding at rate of 45 cc/hr.


CODE STATUS is full code as per POLST,


DVT prophylaxis: Heparin subcu.











Cheo Ortiz MD Feb 11, 2019 19:06

## 2019-02-11 NOTE — DIAGNOSTIC IMAGING REPORT
Indication: Abnormal liver function tests

 

Technique: Gray-scale and duplex images of the upper abdomen were obtained

 

Comparison: 1/29/2007

 

Findings: Exam is very limited, due to limited ability of the patient to cooperate

and due to contraction   Gallbladder is distended, demonstrates sludge but no

definite stones. No wall thickening or pericholecystic fluid.  Sonographic Ewing's

sign is negative.  Common bile duct measures 4 mm in diameter.  No intrahepatic

biliary ductal dilatation.  Liver demonstrates normal echogenicity, no focal

abnormality.   Portal vein and hepatic veins are patent.   Pancreas is unremarkable. 

 The spleen cannot be visualized  Left kidney measures 9.5 cm in length.  Right

kidney measures 10.3 cm length.  Both kidneys demonstrate normal echogenicity.  There

is no hydronephrosis.   There are questionable bilateral parapelvic cysts .

Non-aneurysmal abdominal aorta .  There is incidental finding of a right pleural

effusion

 

Impression: Limited exam, as described. Note inability to visualize the spleen

 

Negative for gallstones or dilated bile ducts

 

Questionable bilateral renal parapelvic cysts

 

Small to moderate right pleural effusion

## 2019-02-11 NOTE — NUR
NURSE NOTES:



Received report from FER Thomas RN. Patient asleep in bed, responsive to tactile stimuli, 
nonverbal. Receiving O2 via Venturi mask @ 14L/min, FiO2 55%, saturating at 100%. Left NGT 
in place, patient NPO at this time. Female external catheter in place and attached to 
continuous, low suction. Left EJ IV site infusing D51/2NS @ 100 cc/hr, asymptomatic. Bed 
locked in lowest position with side rails up x 3. All needs attended to. Call light within 
reach. Will continue to monitor.

## 2019-02-12 VITALS — DIASTOLIC BLOOD PRESSURE: 80 MMHG | SYSTOLIC BLOOD PRESSURE: 125 MMHG

## 2019-02-12 VITALS — DIASTOLIC BLOOD PRESSURE: 74 MMHG | SYSTOLIC BLOOD PRESSURE: 149 MMHG

## 2019-02-12 VITALS — DIASTOLIC BLOOD PRESSURE: 75 MMHG | SYSTOLIC BLOOD PRESSURE: 131 MMHG

## 2019-02-12 VITALS — SYSTOLIC BLOOD PRESSURE: 121 MMHG | DIASTOLIC BLOOD PRESSURE: 77 MMHG

## 2019-02-12 VITALS — SYSTOLIC BLOOD PRESSURE: 118 MMHG | DIASTOLIC BLOOD PRESSURE: 67 MMHG

## 2019-02-12 VITALS — SYSTOLIC BLOOD PRESSURE: 134 MMHG | DIASTOLIC BLOOD PRESSURE: 62 MMHG

## 2019-02-12 LAB
ADD MANUAL DIFF: YES
ANION GAP SERPL CALC-SCNC: 12 MMOL/L (ref 5–15)
BUN SERPL-MCNC: 34 MG/DL (ref 7–18)
CALCIUM SERPL-MCNC: 8.4 MG/DL (ref 8.5–10.1)
CHLORIDE SERPL-SCNC: 113 MMOL/L (ref 98–107)
CO2 SERPL-SCNC: 18 MMOL/L (ref 21–32)
CREAT SERPL-MCNC: 1 MG/DL (ref 0.55–1.3)
ERYTHROCYTE [DISTWIDTH] IN BLOOD BY AUTOMATED COUNT: 15.7 % (ref 11.6–14.8)
ERYTHROCYTE [DISTWIDTH] IN BLOOD BY AUTOMATED COUNT: 16.5 % (ref 11.6–14.8)
ERYTHROCYTE [DISTWIDTH] IN BLOOD BY AUTOMATED COUNT: 17 % (ref 11.6–14.8)
HCT VFR BLD CALC: 21.8 % (ref 37–47)
HCT VFR BLD CALC: 22.4 % (ref 37–47)
HCT VFR BLD CALC: 30.7 % (ref 37–47)
HGB BLD-MCNC: 6.2 G/DL (ref 12–16)
HGB BLD-MCNC: 7.2 G/DL (ref 12–16)
HGB BLD-MCNC: 9.7 G/DL (ref 12–16)
MCV RBC AUTO: 102 FL (ref 80–99)
MCV RBC AUTO: 91 FL (ref 80–99)
MCV RBC AUTO: 93 FL (ref 80–99)
PLATELET # BLD: 21 K/UL (ref 150–450)
PLATELET # BLD: 23 K/UL (ref 150–450)
PLATELET # BLD: 37 K/UL (ref 150–450)
POTASSIUM SERPL-SCNC: 3.9 MMOL/L (ref 3.5–5.1)
RBC # BLD AUTO: 2.14 M/UL (ref 4.2–5.4)
RBC # BLD AUTO: 2.46 M/UL (ref 4.2–5.4)
RBC # BLD AUTO: 3.3 M/UL (ref 4.2–5.4)
SODIUM SERPL-SCNC: 143 MMOL/L (ref 136–145)
WBC # BLD AUTO: 6.3 K/UL (ref 4.8–10.8)
WBC # BLD AUTO: 6.5 K/UL (ref 4.8–10.8)
WBC # BLD AUTO: 8.1 K/UL (ref 4.8–10.8)

## 2019-02-12 RX ADMIN — OLANZAPINE SCH MG: 2.5 TABLET, FILM COATED ORAL at 09:00

## 2019-02-12 RX ADMIN — DEXTROSE MONOHYDRATE SCH MLS/HR: 50 INJECTION, SOLUTION INTRAVENOUS at 13:13

## 2019-02-12 RX ADMIN — SODIUM CHLORIDE SCH MLS/HR: 9 INJECTION, SOLUTION INTRAVENOUS at 17:14

## 2019-02-12 RX ADMIN — DEXTROSE AND SODIUM CHLORIDE SCH MLS/HR: 5; .45 INJECTION, SOLUTION INTRAVENOUS at 06:04

## 2019-02-12 RX ADMIN — DEXTROSE MONOHYDRATE SCH MLS/HR: 50 INJECTION, SOLUTION INTRAVENOUS at 06:04

## 2019-02-12 RX ADMIN — MORPHINE SULFATE PRN MG: 2 INJECTION, SOLUTION INTRAMUSCULAR; INTRAVENOUS at 00:36

## 2019-02-12 RX ADMIN — CHLORHEXIDINE GLUCONATE SCH APPLIC: 213 SOLUTION TOPICAL at 20:23

## 2019-02-12 RX ADMIN — OLANZAPINE SCH MG: 2.5 TABLET, FILM COATED ORAL at 17:14

## 2019-02-12 RX ADMIN — INSULIN ASPART SCH UNITS: 100 INJECTION, SOLUTION INTRAVENOUS; SUBCUTANEOUS at 17:14

## 2019-02-12 RX ADMIN — DEXTROSE AND SODIUM CHLORIDE SCH MLS/HR: 5; .45 INJECTION, SOLUTION INTRAVENOUS at 11:36

## 2019-02-12 RX ADMIN — INSULIN ASPART SCH UNITS: 100 INJECTION, SOLUTION INTRAVENOUS; SUBCUTANEOUS at 00:29

## 2019-02-12 RX ADMIN — INSULIN ASPART SCH UNITS: 100 INJECTION, SOLUTION INTRAVENOUS; SUBCUTANEOUS at 06:04

## 2019-02-12 RX ADMIN — MORPHINE SULFATE PRN MG: 2 INJECTION, SOLUTION INTRAMUSCULAR; INTRAVENOUS at 22:01

## 2019-02-12 RX ADMIN — INSULIN ASPART SCH UNITS: 100 INJECTION, SOLUTION INTRAVENOUS; SUBCUTANEOUS at 12:22

## 2019-02-12 RX ADMIN — DEXTROSE MONOHYDRATE SCH MLS/HR: 50 INJECTION, SOLUTION INTRAVENOUS at 21:05

## 2019-02-12 NOTE — NUR
CASE MANAGEMENT: REVIEW







SI: DEHYDRATION . SEPSIS . SACRAL DECUBITUS ULCER, STAGE III

T 100.2  RR 26 /75 SAT 93% VENTURI MASK FIO2 50

H/H 9.7/30.7 







IS: VANCO IV Q24HR

ZOSYN IV Q8HR

S/P TRANSFUSION PRBC



***STEP DOWN UNIT STATUS ***

DCP: PATIENT IS FROM [facility]\



PLAN: 

MARTHA

## 2019-02-12 NOTE — SURGERY PROGRESS NOTE
Surgery Progress Note


Subjective


Additional Comments


ill appearing.  responded to blood products.  labs noted.





Objective





Last 24 Hour Vital Signs








  Date Time  Temp Pulse Resp B/P (MAP) Pulse Ox O2 Delivery O2 Flow Rate FiO2


 


2/12/19 12:00      Non-Rebreather 15.0 


 


2/12/19 12:00 99.1 89 27 121/77 (92) 99   


 


2/12/19 08:00  98      


 


2/12/19 08:00      Non-Rebreather 15.0 


 


2/12/19 08:00 98.9 93 26 134/62 (86) 98   


 


2/12/19 04:00      Non-Rebreather 15.0 


 


2/12/19 04:00 99.0 99 24 125/80 (95) 100   


 


2/12/19 04:00  85      


 


2/12/19 00:00      Venturi Mask 14.0 


 


2/12/19 00:00 98.4 99 24 149/74 (99) 100   


 


2/11/19 23:21  82      


 


2/11/19 20:15  88 20   Venturi Mask 12.0 50


 


2/11/19 20:15     99 Venturi Mask 12.0 50


 


2/11/19 20:15      Venturi Mask 12.0 50


 


2/11/19 20:00  94      


 


2/11/19 20:00 99.0 108 28 108/60 (76) 100   


 


2/11/19 20:00      Venturi Mask 14.0 


 


2/11/19 16:00 98.9 99 21 144/64 (90) 96   





  99      


 


2/11/19 16:00  79      


 


2/11/19 16:00      Venturi Mask 14.0 


 


2/11/19 12:53 97.7       








I&O











Intake and Output  


 


 2/11/19 2/12/19





 18:59 06:59


 


Intake Total 1954.458 ml 920.00 ml


 


Output Total 600 ml 200 ml


 


Balance 1354.458 ml 720.00 ml


 


  


 


Intake Free Water  60 ml


 


IV Total 1754.458 ml 860.00 ml


 


Other 200 ml 


 


Output Urine Total 600 ml 200 ml


 


# Bowel Movements  1








Dressing:  other


Wound:  other


Drains:  other


Cardiovascular:  RSR


Respiratory:  decreased breath sounds


Abdomen:  soft, present bowel sounds, non-distended


Extremities:  other





Laboratory Tests








Test


  2/12/19


03:45 2/12/19


09:10 2/12/19


11:45


 


White Blood Count


  6.3 K/UL


(4.8-10.8) 6.5 K/UL


(4.8-10.8) 8.1 K/UL


(4.8-10.8)


 


Red Blood Count


  2.46 M/UL


(4.20-5.40)  L 2.14 M/UL


(4.20-5.40)  L 3.30 M/UL


(4.20-5.40)  L


 


Hemoglobin


  7.2 G/DL


(12.0-16.0)  L 6.2 G/DL


(12.0-16.0)  *L 9.7 G/DL


(12.0-16.0)  #L


 


Hematocrit


  22.4 %


(37.0-47.0)  L 21.8 %


(37.0-47.0)  L 30.7 %


(37.0-47.0)  #L


 


Mean Corpuscular Volume


  91 FL (80-99)  


  102 FL (80-99)


#H 93 FL (80-99)


#


 


Mean Corpuscular Hemoglobin


  29.2 PG


(27.0-31.0) 29.0 PG


(27.0-31.0) 29.2 PG


(27.0-31.0)


 


Mean Corpuscular Hemoglobin


Concent 31.9 G/DL


(32.0-36.0)  L 28.4 G/DL


(32.0-36.0)  L 31.4 G/DL


(32.0-36.0)  L


 


Red Cell Distribution Width


  16.5 %


(11.6-14.8)  H 17.0 %


(11.6-14.8)  H 15.7 %


(11.6-14.8)  H


 


Platelet Count


  23 K/UL


(150-450)  L 21 K/UL


(150-450)  L 37 K/UL


(150-450)  #L


 


Mean Platelet Volume


  11.2 FL


(6.5-10.1)  H 16.5 FL


(6.5-10.1)  H 12.9 FL


(6.5-10.1)  H


 


Neutrophils (%) (Auto)


  % (45.0-75.0)


 


 


Lymphocytes (%) (Auto)


  % (20.0-45.0)


 


 


Monocytes (%) (Auto)  % (1.0-10.0)    % (1.0-10.0)    % (1.0-10.0)  


 


Eosinophils (%) (Auto)  % (0.0-3.0)    % (0.0-3.0)    % (0.0-3.0)  


 


Basophils (%) (Auto)  % (0.0-2.0)    % (0.0-2.0)    % (0.0-2.0)  


 


Differential Total Cells


Counted 100  


  100  


  


 


 


Neutrophils % (Manual) 80 % (45-75)  H 79 % (45-75)  H Pending  


 


Lymphocytes % (Manual) 2 % (20-45)  L 8 % (20-45)  L Pending  


 


Monocytes % (Manual) 4 % (1-10)   5 % (1-10)   


 


Eosinophils % (Manual) 0 % (0-3)   0 % (0-3)   


 


Basophils % (Manual) 0 % (0-2)   0 % (0-2)   


 


Band Neutrophils 14 % (0-8)  H 8 % (0-8)   


 


Platelet Estimate Decreased  L Decreased  L Pending  


 


Platelet Morphology Normal   Normal   Pending  


 


Hypochromasia 1+   2+   


 


Anisocytosis 1+    


 


Ovalocytes 1+    


 


Sodium Level


  


  


  143 MMOL/L


(136-145)


 


Potassium Level


  


  


  3.9 MMOL/L


(3.5-5.1)


 


Chloride Level


  


  


  113 MMOL/L


()  H


 


Carbon Dioxide Level


  


  


  18 MMOL/L


(21-32)  L


 


Anion Gap


  


  


  12 mmol/L


(5-15)


 


Blood Urea Nitrogen


  


  


  34 mg/dL


(7-18)  H


 


Creatinine


  


  


  1.0 MG/DL


(0.55-1.30)


 


Estimat Glomerular Filtration


Rate 


  


   mL/min (>60)  


 


 


Glucose Level


  


  


  116 MG/DL


()  H


 


Calcium Level


  


  


  8.4 MG/DL


(8.5-10.1)  L











Plan


Problems:  


(1) Sepsis


Assessment & Plan:  Leukocytosis resolved 


LFT's elevated w/ t bili / d bili - improved 


AM labs ordered 


hydrate


US Impression: Limited exam, as described. Note inability to visualize the 

spleen Negative for gallstones or dilated bile ducts Questionable bilateral 

renal parapelvic cysts Small to moderate right pleural effusion


no acute surgical intervention planned. 


will follow with recs. 





(2) Sacral decubitus ulcer, stage III


Assessment & Plan:  DTPI sacrum.Maroon- indurated discoloration with opening at 

sacral coccygeal area(L)3.8cm x (W)1.4cm with entire wound measuring (L)10cmx(W)

12cm.Small amt malodorous brown exudate.


Stable dry eschar R heel with dry peeling skin periwound (L)2cm x (W)3.5cm. 

Periwound is also red and boggy. 


Small dry eschar noted to lateral R 5th metatarsal (L)1.4cm x (W)0.3cm.  


L heel is boggy but colour is dusky . 


Resolving skin tear lateral R tibia. Wound bed is clean and dry.  





Tx. Plan:


Cleanse Sacral wound with Saline.Apply Therahoney to opening at sacrococcygeal 

area.Cavilon skin barrier to DTPI. Cover with Optifoam drsg .Change Daily and 

prn.


            


Apply Betadine to R heel  and Lateral R 5th metatarsal.Cover with Abd pad .Wrap 

with kerlix daily and prn.


            


Apply Cavilon Skin Barrier to L heel.Cover with Optifoam drsg .Change every 7 

days and prn.


            


Reposition at least every 2hours or as tolerated.


            


Off-load heels with pillow.





(3) Rectal bleed


(4) Protein-calorie malnutrition, severe


(5) Dehydration











Tuan Patricia Feb 12, 2019 12:41

## 2019-02-12 NOTE — PULMONOLOGY PROGRESS NOTE
Assessment/Plan


Problems:  


(1) Sepsis


(2) ATN (acute tubular necrosis)


(3) Pulmonary hypertension


(4) Pacemaker


(5) CAD (coronary artery disease)


(6) Sacral decubitus ulcer, stage III


(7) Alzheimer's dementia


(8) Protein-calorie malnutrition, severe


(9) HTN (hypertension)


Assessment/Plan


on face mask


prbc X 2


failed swallow study


Morphine prn


d/w wound care nurse, 


pan cultures


iv fluids


check electrolytes


sliding scale


check electrolytes daily


broad spectrum abx


dvt prophylaxis


insulin coverage.


social service consult





Subjective


ROS Limited/Unobtainable:  Yes


Constitutional:  Reports: no symptoms


HEENT:  Repors: no symptoms


Allergies:  


Coded Allergies:  


     No Known Allergies (Unverified , 3/26/14)





Objective





Last 24 Hour Vital Signs








  Date Time  Temp Pulse Resp B/P (MAP) Pulse Ox O2 Delivery O2 Flow Rate FiO2


 


2/12/19 08:00  98      


 


2/12/19 08:00      Non-Rebreather 15.0 


 


2/12/19 08:00 98.9 93 26 134/62 (86) 98   


 


2/12/19 04:00      Non-Rebreather 15.0 


 


2/12/19 04:00 99.0 99 24 125/80 (95) 100   


 


2/12/19 04:00  85      


 


2/12/19 00:00      Venturi Mask 14.0 


 


2/12/19 00:00 98.4 99 24 149/74 (99) 100   


 


2/11/19 23:21  82      


 


2/11/19 20:15  88 20   Venturi Mask 12.0 50


 


2/11/19 20:15     99 Venturi Mask 12.0 50


 


2/11/19 20:15      Venturi Mask 12.0 50


 


2/11/19 20:00  94      


 


2/11/19 20:00 99.0 108 28 108/60 (76) 100   


 


2/11/19 20:00      Venturi Mask 14.0 


 


2/11/19 16:00 98.9 99 21 144/64 (90) 96   





  99      


 


2/11/19 16:00  79      


 


2/11/19 16:00      Venturi Mask 14.0 


 


2/11/19 12:53 97.7       


 


2/11/19 12:00  90      


 


2/11/19 12:00 98.4 76 31 139/106 (117) 96   





  76      


 


2/11/19 12:00      Venturi Mask 14.0 

















Intake and Output  


 


 2/11/19 2/12/19





 19:00 07:00


 


Intake Total 1826.958 ml 1047.50 ml


 


Output Total 600 ml 200 ml


 


Balance 1226.958 ml 847.50 ml


 


  


 


Intake Free Water  60 ml


 


IV Total 1626.958 ml 987.50 ml


 


Other 200 ml 


 


Output Urine Total 600 ml 200 ml


 


# Bowel Movements  1








Objective


General Appearance:  cachectic


HEENT:  normocephalic, somnolent


Respiratory/Chest:  chest wall non-tender, lungs clear


Cardiovascular:  normal peripheral pulses, normal rate


Abdomen:  normal bowel sounds, soft, non tender


Extremities:  no cyanosis


Skin:  no rash





Microbiology








 Date/Time


Source Procedure


Growth Status


 


 


 2/11/19 15:15


Blood Blood Culture - Preliminary Resulted


 


 2/11/19 15:15


Blood Blood Culture - Preliminary Resulted


 


 2/10/19 10:31


Blood Blood Culture - Preliminary


Staphylococcus Aureus Resulted


 


 2/10/19 10:20


Blood Blood Culture - Preliminary


Staphylococcus Aureus Resulted








Laboratory Tests


2/12/19 03:45: 


White Blood Count 6.3, Red Blood Count 2.46L, Hemoglobin 7.2L, Hematocrit 22.4L

, Mean Corpuscular Volume 91, Mean Corpuscular Hemoglobin 29.2, Mean 

Corpuscular Hemoglobin Concent 31.9L, Red Cell Distribution Width 16.5H, 

Platelet Count 23L, Mean Platelet Volume 11.2H, Neutrophils (%) (Auto) , 

Lymphocytes (%) (Auto) , Monocytes (%) (Auto) , Eosinophils (%) (Auto) , 

Basophils (%) (Auto) , Differential Total Cells Counted 100, Neutrophils % (

Manual) 80H, Lymphocytes % (Manual) 2L, Monocytes % (Manual) 4, Eosinophils % (

Manual) 0, Basophils % (Manual) 0, Band Neutrophils 14H, Platelet Estimate 

DecreasedL, Platelet Morphology Normal, Hypochromasia 1+, Anisocytosis 1+, 

Ovalocytes 1+


2/12/19 09:10: 


White Blood Count 6.5, Red Blood Count 2.14L, Hemoglobin 6.2*L, Hematocrit 21.8L

, Mean Corpuscular Volume 102#H, Mean Corpuscular Hemoglobin 29.0, Mean 

Corpuscular Hemoglobin Concent 28.4L, Red Cell Distribution Width 17.0H, 

Platelet Count 21L, Mean Platelet Volume 16.5H, Neutrophils (%) (Auto) , 

Lymphocytes (%) (Auto) , Monocytes (%) (Auto) , Eosinophils (%) (Auto) , 

Basophils (%) (Auto) , Neutrophils % (Manual) [Pending], Lymphocytes % (Manual) 

[Pending], Platelet Estimate [Pending], Platelet Morphology [Pending]





Current Medications








 Medications


  (Trade)  Dose


 Ordered  Sig/Violette


 Route


 PRN Reason  Start Time


 Stop Time Status Last Admin


Dose Admin


 


 Acetaminophen


  (Tylenol)  650 mg  Q4H  PRN


 ORAL


 T>100.5  2/11/19 09:30


 3/9/19 13:29   


 


 


 Albuterol/


 Ipratropium


  (Albuterol/


 Ipratropium)  3 ml  Q4H  PRN


 HHN


 Shortness of Breath  2/11/19 09:30


 2/12/19 13:29   


 


 


 Chlorhexidine


 Gluconate


  (Evita-Hex 2%)  1 applic  DAILY@2000


 TOPIC


   2/12/19 20:00


 3/14/19 19:59   


 


 


 Clonidine HCl


  (Catapres Tab)  0.1 mg  Q8H  PRN


 ORAL


 SBP>160mmHg  2/11/19 06:30


 3/10/19 06:29   


 


 


 Dextrose


  (Dextrose 50%)  25 ml  Q30M  PRN


 IV


 Hypoglycemia  2/11/19 06:15


 3/9/19 13:30   


 


 


 Dextrose


  (Dextrose 50%)  50 ml  Q30M  PRN


 IV


 hypoglycemia  2/11/19 06:15


 3/9/19 13:44   


 


 


 Dextrose/Sodium


 Chloride  1,000 ml @ 


 100 mls/hr  Q10H


 IV


   2/11/19 10:00


 3/10/19 12:51  2/12/19 06:04


 


 


 Heparin Sodium/


 Sodium Chloride


  (Heparin 2000


 units/Ns 1000ml


 premix)  2,000 unit  ONCE  PRN


 INJ


 picc line placement  2/12/19 11:00


 2/13/19 10:59   


 


 


 Insulin Aspart


  (NovoLOG)    EVERY 6  HOURS


 SUBQ


   2/11/19 12:00


 3/9/19 16:29  2/12/19 06:04


 


 


 Lidocaine HCl


  (Xylocaine 1%


 30ml)  30 ml  ONCE  PRN


 INJ


 picc line placement  2/12/19 11:00


 2/13/19 10:59   


 


 


 Lorazepam


  (Ativan)  1 mg  Q6H  PRN


 ORAL


 For Anxiety  2/11/19 07:00


 2/15/19 12:52   


 


 


 Morphine Sulfate


  (Morphine


 Sulfate)  2 mg  Q4H  PRN


 IVP


 Moderate Pain (Pain Scale 4-6)  2/11/19 09:00


 2/15/19 12:52  2/12/19 00:36


 


 


 Olanzapine


  (ZyPREXA)  2.5 mg  BID


 ORAL


   2/11/19 09:00


 3/10/19 10:59  2/11/19 18:51


 


 


 Ondansetron HCl


  (Zofran)  4 mg  Q6H  PRN


 IVP


 Nausea & Vomiting  2/11/19 07:00


 3/10/19 12:52   


 


 


 Phenazopyridine


 HCl


  (Pyridium)  100 mg  DAILYPRN  PRN


 ORAL


 dysuria  2/11/19 06:30


 3/10/19 06:29   


 


 


 Piperacillin Sod/


 Tazobactam Sod


 3.375 gm/Dextrose  110 ml @ 


 27.5 mls/hr  EVERY 8  HOURS


 IVPB


   2/11/19 14:00


 2/15/19 10:59  2/12/19 06:04


 


 


 Polyethylene


 Glycol


  (Miralax)  17 gm  DAILYPRN  PRN


 ORAL


 Constipation  2/11/19 06:30


 3/10/19 06:29   


 


 


 Temazepam


  (Restoril)  15 mg  HSPRN  PRN


 ORAL


 Insomnia  2/11/19 21:00


 2/14/19 20:59   


 


 


 Vancomycin HCl


  (Vanco rx to


 dose)  1 ea  DAILY  PRN


 MISC


 .  2/11/19 09:00


 3/9/19 14:44   


 


 


 Vancomycin HCl


 750 mg/Sodium


 Chloride  275 ml @ 


 183.333


 mls/hr  Q24H


 IVPB


   2/11/19 17:00


 2/13/19 16:59  2/11/19 17:14


 

















Korin Junior MD Feb 12, 2019 11:12

## 2019-02-12 NOTE — NUR
NURSE NOTES:

Received bedside report from ESCOBAR Barnett.Patient stable,nonverbal,sleeping in a bed,moaning 
all the time,tolerated non rebreather mask with 15L well, SR on cardiac monitor,BS active in 
all quadrants,no feeding started yet,NGT in place,will confirm with doctor, no s/s of 
pain,no respiratory distress noted,bed in a low safety position,call light within a 
reach,will continue to monitor.

## 2019-02-12 NOTE — INTERNAL MED PROGRESS NOTE
Subjective


Date of Service:  Feb 12, 2019


Physician Name


Cheo Ortiz


Attending Physician


Juan Carlos Magana MD





Current Medications








 Medications


  (Trade)  Dose


 Ordered  Sig/Violette


 Route


 PRN Reason  Start Time


 Stop Time Status Last Admin


Dose Admin


 


 Acetaminophen


  (Tylenol)  650 mg  Q4H  PRN


 ORAL


 T>100.5  2/11/19 09:30


 3/9/19 13:29   


 


 


 Chlorhexidine


 Gluconate


  (Evita-Hex 2%)  1 applic  DAILY@2000


 TOPIC


   2/12/19 20:00


 3/14/19 19:59   


 


 


 Clonidine HCl


  (Catapres Tab)  0.1 mg  Q8H  PRN


 ORAL


 SBP>160mmHg  2/11/19 06:30


 3/10/19 06:29   


 


 


 Dextrose


  (Dextrose 50%)  25 ml  Q30M  PRN


 IV


 Hypoglycemia  2/11/19 06:15


 3/9/19 13:30   


 


 


 Dextrose


  (Dextrose 50%)  50 ml  Q30M  PRN


 IV


 hypoglycemia  2/11/19 06:15


 3/9/19 13:44   


 


 


 Dextrose/Sodium


 Chloride  1,000 ml @ 


 50 mls/hr  Q20H


 IV


   2/12/19 11:30


 3/14/19 11:29  2/12/19 11:36


 


 


 Heparin Sodium/


 Sodium Chloride


  (Heparin 2000


 units/Ns 1000ml


 premix)  2,000 unit  ONCE  PRN


 INJ


 picc line placement  2/12/19 11:00


 2/13/19 10:59   


 


 


 Insulin Aspart


  (NovoLOG)    EVERY 6  HOURS


 SUBQ


   2/11/19 12:00


 3/9/19 16:29  2/12/19 12:22


 


 


 Lidocaine HCl


  (Xylocaine 1%


 30ml)  30 ml  ONCE  PRN


 INJ


 picc line placement  2/12/19 11:00


 2/13/19 10:59   


 


 


 Lorazepam


  (Ativan)  1 mg  Q6H  PRN


 ORAL


 For Anxiety  2/11/19 07:00


 2/15/19 12:52   


 


 


 Morphine Sulfate


  (Morphine


 Sulfate)  2 mg  Q4H  PRN


 IVP


 Moderate Pain (Pain Scale 4-6)  2/11/19 09:00


 2/15/19 12:52  2/12/19 00:36


 


 


 Olanzapine


  (ZyPREXA)  2.5 mg  BID


 ORAL


   2/11/19 09:00


 3/10/19 10:59  2/11/19 18:51


 


 


 Ondansetron HCl


  (Zofran)  4 mg  Q6H  PRN


 IVP


 Nausea & Vomiting  2/11/19 07:00


 3/10/19 12:52   


 


 


 Phenazopyridine


 HCl


  (Pyridium)  100 mg  DAILYPRN  PRN


 ORAL


 dysuria  2/11/19 06:30


 3/10/19 06:29   


 


 


 Piperacillin Sod/


 Tazobactam Sod


 3.375 gm/Dextrose  110 ml @ 


 27.5 mls/hr  EVERY 8  HOURS


 IVPB


   2/11/19 14:00


 2/15/19 10:59  2/12/19 13:13


 


 


 Polyethylene


 Glycol


  (Miralax)  17 gm  DAILYPRN  PRN


 ORAL


 Constipation  2/11/19 06:30


 3/10/19 06:29   


 


 


 Temazepam


  (Restoril)  15 mg  HSPRN  PRN


 ORAL


 Insomnia  2/11/19 21:00


 2/14/19 20:59   


 


 


 Vancomycin HCl


  (Vanco rx to


 dose)  1 ea  DAILY  PRN


 MISC


 .  2/11/19 09:00


 3/9/19 14:44   


 


 


 Vancomycin HCl


 750 mg/Sodium


 Chloride  275 ml @ 


 183.333


 mls/hr  Q24H


 IVPB


   2/11/19 17:00


 2/13/19 16:59  2/11/19 17:14


 








Allergies:  


Coded Allergies:  


     No Known Allergies (Unverified , 3/26/14)


ROS Limited/Unobtainable:  No


Constitutional:  Reports: no symptoms


HEENT:  Reports: no symptoms


Cardiovascular:  Reports: no symptoms


Respiratory:  Reports: no symptoms


Gastrointestinal/Abdominal:  Reports: no symptoms


Genitourinary:  Reports: no symptoms


Neurologic/Psychiatric:  Reports: no symptoms


Subjective


79 YO F admitted with dehydration and hypoxia.  Now sepsis.  Cover for Int Med-

Dr Magana.  FAWN





Objective





Last Vital Signs








  Date Time  Temp Pulse Resp B/P (MAP) Pulse Ox O2 Delivery O2 Flow Rate FiO2


 


2/12/19 12:00  84      


 


2/12/19 12:00      Non-Rebreather 15.0 


 


2/12/19 12:00 99.1  27 121/77 (92) 99   


 


2/11/19 20:15        50











Laboratory Tests








Test


  2/12/19


03:45 2/12/19


09:10 2/12/19


11:45


 


White Blood Count


  6.3 K/UL


(4.8-10.8) 6.5 K/UL


(4.8-10.8) 8.1 K/UL


(4.8-10.8)


 


Red Blood Count


  2.46 M/UL


(4.20-5.40)  L 2.14 M/UL


(4.20-5.40)  L 3.30 M/UL


(4.20-5.40)  L


 


Hemoglobin


  7.2 G/DL


(12.0-16.0)  L 6.2 G/DL


(12.0-16.0)  *L 9.7 G/DL


(12.0-16.0)  #L


 


Hematocrit


  22.4 %


(37.0-47.0)  L 21.8 %


(37.0-47.0)  L 30.7 %


(37.0-47.0)  #L


 


Mean Corpuscular Volume


  91 FL (80-99)  


  102 FL (80-99)


#H 93 FL (80-99)


#


 


Mean Corpuscular Hemoglobin


  29.2 PG


(27.0-31.0) 29.0 PG


(27.0-31.0) 29.2 PG


(27.0-31.0)


 


Mean Corpuscular Hemoglobin


Concent 31.9 G/DL


(32.0-36.0)  L 28.4 G/DL


(32.0-36.0)  L 31.4 G/DL


(32.0-36.0)  L


 


Red Cell Distribution Width


  16.5 %


(11.6-14.8)  H 17.0 %


(11.6-14.8)  H 15.7 %


(11.6-14.8)  H


 


Platelet Count


  23 K/UL


(150-450)  L 21 K/UL


(150-450)  L 37 K/UL


(150-450)  #L


 


Mean Platelet Volume


  11.2 FL


(6.5-10.1)  H 16.5 FL


(6.5-10.1)  H 12.9 FL


(6.5-10.1)  H


 


Neutrophils (%) (Auto)


  % (45.0-75.0)


 


 


Lymphocytes (%) (Auto)


  % (20.0-45.0)


 


 


Monocytes (%) (Auto)  % (1.0-10.0)    % (1.0-10.0)    % (1.0-10.0)  


 


Eosinophils (%) (Auto)  % (0.0-3.0)    % (0.0-3.0)    % (0.0-3.0)  


 


Basophils (%) (Auto)  % (0.0-2.0)    % (0.0-2.0)    % (0.0-2.0)  


 


Differential Total Cells


Counted 100  


  100  


  100  


 


 


Neutrophils % (Manual) 80 % (45-75)  H 79 % (45-75)  H 72 % (45-75)  


 


Lymphocytes % (Manual) 2 % (20-45)  L 8 % (20-45)  L 4 % (20-45)  L


 


Monocytes % (Manual) 4 % (1-10)   5 % (1-10)   4 % (1-10)  


 


Eosinophils % (Manual) 0 % (0-3)   0 % (0-3)   0 % (0-3)  


 


Basophils % (Manual) 0 % (0-2)   0 % (0-2)   0 % (0-2)  


 


Band Neutrophils 14 % (0-8)  H 8 % (0-8)   20 % (0-8)  H


 


Platelet Estimate Decreased  L Decreased  L Decreased  L


 


Platelet Morphology Normal   Normal   Normal  


 


Hypochromasia 1+   2+   


 


Anisocytosis 1+    1+  


 


Ovalocytes 1+    


 


Liguori Cells   1+  


 


Sodium Level


  


  


  143 MMOL/L


(136-145)


 


Potassium Level


  


  


  3.9 MMOL/L


(3.5-5.1)


 


Chloride Level


  


  


  113 MMOL/L


()  H


 


Carbon Dioxide Level


  


  


  18 MMOL/L


(21-32)  L


 


Anion Gap


  


  


  12 mmol/L


(5-15)


 


Blood Urea Nitrogen


  


  


  34 mg/dL


(7-18)  H


 


Creatinine


  


  


  1.0 MG/DL


(0.55-1.30)


 


Estimat Glomerular Filtration


Rate 


  


   mL/min (>60)  


 


 


Glucose Level


  


  


  116 MG/DL


()  H


 


Calcium Level


  


  


  8.4 MG/DL


(8.5-10.1)  L











Microbiology








 Date/Time


Source Procedure


Growth Status


 


 


 2/11/19 15:15


Blood Blood Culture - Preliminary Resulted


 


 2/11/19 15:15


Blood Blood Culture - Preliminary Resulted


 


 2/10/19 10:31


Blood Blood Culture - Preliminary


Staphylococcus Aureus Resulted


 


 2/10/19 10:20


Blood Blood Culture - Preliminary


Staphylococcus Aureus Resulted

















Intake and Output  


 


 2/11/19 2/12/19





 18:59 06:59


 


Intake Total 1954.458 ml 920.00 ml


 


Output Total 600 ml 200 ml


 


Balance 1354.458 ml 720.00 ml


 


  


 


Intake Free Water  60 ml


 


IV Total 1754.458 ml 860.00 ml


 


Other 200 ml 


 


Output Urine Total 600 ml 200 ml


 


# Bowel Movements  1








Objective


Objective


General: No acute distress, awake and less responsive, Sleepy, cachectic.


HEENT: NCAT, sclera anicteric, PERRL, NG tube.


Neck: Supple, no significant jugular venous distention, 


Lungs: Poor inspiratory effort, decreased air in the bases , no Wheeze or Rales.


Heart: Regular rate and rhythm, normal S1/S2, no murmur.


Abdomen: soft, nontender, nondistended. Normoactive bowel sounds.


 / Rectal: Refused and deferred.


Extremities: No Cyanosis , No clubbing,  Left UE edema.  Lateral lower 

extremity / feet dressing intact.


Neuro: A&O x 1, Able to move all extremities


Skin: warm, no rashes or lesions, ecchymosis in bilateral upper extremity noted.





Assessment/Plan


1. Sepsis /  METHACILLIN RESISTANT STAPHYLOCOCCUS AUREUS bacteremia, lactic 

acidosis.


2. Failure to thrive with severe protein calorie malnutrition.


3. Coronary artery disease.


4. Diabetes, type 2.


5. Hypertension.


6. Sick sinus syndrome.


7. Hypercholesterolemia.


8. Aortic stenosis.


9. Pulmonary hypertension.


10. Alzheimer dementia.


11.  Acute tubular necrosis/acute kidney injury most likely secondary to 

dehydration and sepsis.


12. Sacral decubitus ulcer, stage III


13. Severe dehydration


14.  Hypokalemia.


15.  Abnormal liver function test





TREATMENT:


1.  Patient on broad spectrum antibiotics with vancomycin and Zosyn IV.  Will 

require transesophageal echocardiogram to R/O endocarditis-see ID note


2. Coronary artery disease.  The patient is status post coronary artery


bypass graft in 2017.


3. Diabetes, type 2.  The patient has been started on NovoLog sliding


scale.  


4. Hypertension.  The patient is to continue on atenolol and losartan as


above.


5. Sick sinus syndrome.  The patient is status post pacemaker


implantation.


6. Hypercholesterolemia.  Continue simvastatin as above.


7. Aortic stenosis.


8. Pulmonary hypertension.


9. Alzheimer dementia.





KCl supplement, 


will follow her laboratory as well as culture in the morning.


Continue tube feeding at rate of 45 cc/hr.


CODE STATUS is full code as per POLST,


DVT prophylaxis: Heparin subcu.











Cheo Ortiz MD Feb 12, 2019 16:47

## 2019-02-12 NOTE — INFECTIOUS DISEASES PROG NOTE
Assessment/Plan


Assessment/Plan





79 yo female with PMHx DM, CAD, Alzheimer dementia and Hip fracture who 

presents with dehydration and weakness. 





High grade persistent bacteremia- suspect endovascular source (ie endocarditis, 

PPM infection)


   -2d Echo (limited study due to contractures): no vegetations seen


    -2/10 Bcx 4/4MRSA; 2/11 Bcx 2/4 GPC clusters


    2/8/19 Blood Cx  4/4 MRSA


    2/7/19 Wound Cx MRSA, ESBL E.coli (S ZOsyn), K. pna (S Zosyn)


   


   Mild leukocytosis - resolved


  Low grade fever; improving


   Positive UA 


   CXR neg


   


Pressure ulcers


  Not infected





Right hip fracture with hemiarthroplasty Oct 2018


DM


HTN


Sick sinus syndrome sp Pacemaker


HLD


CAD - SP MI and CABG


Aortic stenosis.


Pulmonary hypertension.


Alzheimer dementia.





PLAN





- Continue Vancomycin #5 for persistent MRSA Bacteremia


- Continue Zosyn #3/5 for sacral wound pathogens


   -2/10 SP Cefepime #3





-Will need MARTHA to look for endocarditis and/or PPM infection if consistent with 

goals of care


-REpeat 2 sets of Bcx


- f/u repeat cultures


- Monitor CBC and temps


- wound care


- Nutritional support





Thank you for this consult. We will continue to follow the patient during this 

hospitalization.





Discussed with Dr Ortiz and Dr Junior





Subjective


Allergies:  


Coded Allergies:  


     No Known Allergies (Unverified , 3/26/14)


Subjective


afebrile >36hrs


no leukoycotis


remains bacteremic





Objective


Vital Signs





Last 24 Hour Vital Signs








  Date Time  Temp Pulse Resp B/P (MAP) Pulse Ox O2 Delivery O2 Flow Rate FiO2


 


2/12/19 12:00  84      


 


2/12/19 12:00      Non-Rebreather 15.0 


 


2/12/19 12:00 99.1 89 27 121/77 (92) 99   


 


2/12/19 08:00  98      


 


2/12/19 08:00      Non-Rebreather 15.0 


 


2/12/19 08:00 98.9 93 26 134/62 (86) 98   


 


2/12/19 04:00      Non-Rebreather 15.0 


 


2/12/19 04:00 99.0 99 24 125/80 (95) 100   


 


2/12/19 04:00  85      


 


2/12/19 00:00      Venturi Mask 14.0 


 


2/12/19 00:00 98.4 99 24 149/74 (99) 100   


 


2/11/19 23:21  82      


 


2/11/19 20:15  88 20   Venturi Mask 12.0 50


 


2/11/19 20:15     99 Venturi Mask 12.0 50


 


2/11/19 20:15      Venturi Mask 12.0 50


 


2/11/19 20:00  94      


 


2/11/19 20:00 99.0 108 28 108/60 (76) 100   


 


2/11/19 20:00      Venturi Mask 14.0 








Height (Feet):  5


Height (Inches):  6.00


Weight (Pounds):  150


Objective


Gen:  Moaning, Awake but no following


HEENT: NCAT, MMM, EOMI, PERRL


LUNGS:  CTAB, No W


CARDS: RRR, S1, S2, No M/R/G, 


ABD: Soft, NT, ND, + BS


Ext: Poor circulation to Ext (cool to touch) , Pulses 2+ B/L (DP, Rad): 


SKIN:  Dry, No rashes, Large sacral ulcer. Mild odor no surrounding cellulitis, 

foot with eschar





Microbiology








 Date/Time


Source Procedure


Growth Status


 


 


 2/11/19 15:15


Blood Blood Culture - Preliminary Resulted


 


 2/11/19 15:15


Blood Blood Culture - Preliminary Resulted


 


 2/10/19 10:31


Blood Blood Culture - Preliminary


Staphylococcus Aureus Resulted


 


 2/10/19 10:20


Blood Blood Culture - Preliminary


Staphylococcus Aureus Resulted











Laboratory Tests








Test


  2/12/19


03:45 2/12/19


09:10 2/12/19


11:45


 


White Blood Count


  6.3 K/UL


(4.8-10.8) 6.5 K/UL


(4.8-10.8) 8.1 K/UL


(4.8-10.8)


 


Red Blood Count


  2.46 M/UL


(4.20-5.40)  L 2.14 M/UL


(4.20-5.40)  L 3.30 M/UL


(4.20-5.40)  L


 


Hemoglobin


  7.2 G/DL


(12.0-16.0)  L 6.2 G/DL


(12.0-16.0)  *L 9.7 G/DL


(12.0-16.0)  #L


 


Hematocrit


  22.4 %


(37.0-47.0)  L 21.8 %


(37.0-47.0)  L 30.7 %


(37.0-47.0)  #L


 


Mean Corpuscular Volume


  91 FL (80-99)  


  102 FL (80-99)


#H 93 FL (80-99)


#


 


Mean Corpuscular Hemoglobin


  29.2 PG


(27.0-31.0) 29.0 PG


(27.0-31.0) 29.2 PG


(27.0-31.0)


 


Mean Corpuscular Hemoglobin


Concent 31.9 G/DL


(32.0-36.0)  L 28.4 G/DL


(32.0-36.0)  L 31.4 G/DL


(32.0-36.0)  L


 


Red Cell Distribution Width


  16.5 %


(11.6-14.8)  H 17.0 %


(11.6-14.8)  H 15.7 %


(11.6-14.8)  H


 


Platelet Count


  23 K/UL


(150-450)  L 21 K/UL


(150-450)  L 37 K/UL


(150-450)  #L


 


Mean Platelet Volume


  11.2 FL


(6.5-10.1)  H 16.5 FL


(6.5-10.1)  H 12.9 FL


(6.5-10.1)  H


 


Neutrophils (%) (Auto)


  % (45.0-75.0)


 


 


Lymphocytes (%) (Auto)


  % (20.0-45.0)


 


 


Monocytes (%) (Auto)  % (1.0-10.0)    % (1.0-10.0)    % (1.0-10.0)  


 


Eosinophils (%) (Auto)  % (0.0-3.0)    % (0.0-3.0)    % (0.0-3.0)  


 


Basophils (%) (Auto)  % (0.0-2.0)    % (0.0-2.0)    % (0.0-2.0)  


 


Differential Total Cells


Counted 100  


  100  


  100  


 


 


Neutrophils % (Manual) 80 % (45-75)  H 79 % (45-75)  H 72 % (45-75)  


 


Lymphocytes % (Manual) 2 % (20-45)  L 8 % (20-45)  L 4 % (20-45)  L


 


Monocytes % (Manual) 4 % (1-10)   5 % (1-10)   4 % (1-10)  


 


Eosinophils % (Manual) 0 % (0-3)   0 % (0-3)   0 % (0-3)  


 


Basophils % (Manual) 0 % (0-2)   0 % (0-2)   0 % (0-2)  


 


Band Neutrophils 14 % (0-8)  H 8 % (0-8)   20 % (0-8)  H


 


Platelet Estimate Decreased  L Decreased  L Decreased  L


 


Platelet Morphology Normal   Normal   Normal  


 


Hypochromasia 1+   2+   


 


Anisocytosis 1+    1+  


 


Ovalocytes 1+    


 


[place] Cells   1+  


 


Sodium Level


  


  


  143 MMOL/L


(136-145)


 


Potassium Level


  


  


  3.9 MMOL/L


(3.5-5.1)


 


Chloride Level


  


  


  113 MMOL/L


()  H


 


Carbon Dioxide Level


  


  


  18 MMOL/L


(21-32)  L


 


Anion Gap


  


  


  12 mmol/L


(5-15)


 


Blood Urea Nitrogen


  


  


  34 mg/dL


(7-18)  H


 


Creatinine


  


  


  1.0 MG/DL


(0.55-1.30)


 


Estimat Glomerular Filtration


Rate 


  


   mL/min (>60)  


 


 


Glucose Level


  


  


  116 MG/DL


()  H


 


Calcium Level


  


  


  8.4 MG/DL


(8.5-10.1)  L











Current Medications








 Medications


  (Trade)  Dose


 Ordered  Sig/Violette


 Route


 PRN Reason  Start Time


 Stop Time Status Last Admin


Dose Admin


 


 Acetaminophen


  (Tylenol)  650 mg  Q4H  PRN


 ORAL


 T>100.5  2/11/19 09:30


 3/9/19 13:29   


 


 


 Chlorhexidine


 Gluconate


  (Evita-Hex 2%)  1 applic  DAILY@2000


 TOPIC


   2/12/19 20:00


 3/14/19 19:59   


 


 


 Clonidine HCl


  (Catapres Tab)  0.1 mg  Q8H  PRN


 ORAL


 SBP>160mmHg  2/11/19 06:30


 3/10/19 06:29   


 


 


 Dextrose


  (Dextrose 50%)  25 ml  Q30M  PRN


 IV


 Hypoglycemia  2/11/19 06:15


 3/9/19 13:30   


 


 


 Dextrose


  (Dextrose 50%)  50 ml  Q30M  PRN


 IV


 hypoglycemia  2/11/19 06:15


 3/9/19 13:44   


 


 


 Dextrose/Sodium


 Chloride  1,000 ml @ 


 50 mls/hr  Q20H


 IV


   2/12/19 11:30


 3/14/19 11:29  2/12/19 11:36


 


 


 Heparin Sodium/


 Sodium Chloride


  (Heparin 2000


 units/Ns 1000ml


 premix)  2,000 unit  ONCE  PRN


 INJ


 picc line placement  2/12/19 11:00


 2/13/19 10:59   


 


 


 Insulin Aspart


  (NovoLOG)    EVERY 6  HOURS


 SUBQ


   2/11/19 12:00


 3/9/19 16:29  2/12/19 12:22


 


 


 Lidocaine HCl


  (Xylocaine 1%


 30ml)  30 ml  ONCE  PRN


 INJ


 picc line placement  2/12/19 11:00


 2/13/19 10:59   


 


 


 Lorazepam


  (Ativan)  1 mg  Q6H  PRN


 ORAL


 For Anxiety  2/11/19 07:00


 2/15/19 12:52   


 


 


 Morphine Sulfate


  (Morphine


 Sulfate)  2 mg  Q4H  PRN


 IVP


 Moderate Pain (Pain Scale 4-6)  2/11/19 09:00


 2/15/19 12:52  2/12/19 00:36


 


 


 Olanzapine


  (ZyPREXA)  2.5 mg  BID


 ORAL


   2/11/19 09:00


 3/10/19 10:59  2/11/19 18:51


 


 


 Ondansetron HCl


  (Zofran)  4 mg  Q6H  PRN


 IVP


 Nausea & Vomiting  2/11/19 07:00


 3/10/19 12:52   


 


 


 Phenazopyridine


 HCl


  (Pyridium)  100 mg  DAILYPRN  PRN


 ORAL


 dysuria  2/11/19 06:30


 3/10/19 06:29   


 


 


 Piperacillin Sod/


 Tazobactam Sod


 3.375 gm/Dextrose  110 ml @ 


 27.5 mls/hr  EVERY 8  HOURS


 IVPB


   2/11/19 14:00


 2/15/19 10:59  2/12/19 13:13


 


 


 Polyethylene


 Glycol


  (Miralax)  17 gm  DAILYPRN  PRN


 ORAL


 Constipation  2/11/19 06:30


 3/10/19 06:29   


 


 


 Temazepam


  (Restoril)  15 mg  HSPRN  PRN


 ORAL


 Insomnia  2/11/19 21:00


 2/14/19 20:59   


 


 


 Vancomycin HCl


  (Vanco rx to


 dose)  1 ea  DAILY  PRN


 MISC


 .  2/11/19 09:00


 3/9/19 14:44   


 


 


 Vancomycin HCl


 750 mg/Sodium


 Chloride  275 ml @ 


 183.333


 mls/hr  Q24H


 IVPB


   2/11/19 17:00


 2/13/19 16:59  2/11/19 17:14


 

















Olivia Rosas M.D. Feb 12, 2019 16:06

## 2019-02-12 NOTE — NUR
NURSE NOTES:

Received report ESCOBAR Gilliam. Patient is resting in bed, in stable condition. Non-rebreather 
noted at 15 L, SpO2 93%. NGT noted in position. Patient is NPO. No s/sx of SOB, breathing is 
even and unlabored. Observed no presence of pain or discomfort at this time. Bed is in 
lowest position, brakes engaged. Call light is kept within easy reach. Will continue to 
monitor patient.

## 2019-02-12 NOTE — NUR
NURSE NOTES:

Called  to confirm feeding,received instruction to start feeding in a morning.Will 
endorse next shift,charge nurse aware.

## 2019-02-12 NOTE — NUR
NURSE NOTES:

Radiology department informed this nurse that PICC line may not be inserted at this time 
because per chest x-ray patient is shown with bilateral chest pacemakers. Informed Dr. Junior, Dr. Junior acknowledged and ordered to have a Midline inserted. Order entered and 
noted. Called and informed radiology tech of Midline insertion for patient, radiology tech 
acknowledged. Will continue to monitor patient.

## 2019-02-12 NOTE — DIAGNOSTIC IMAGING REPORT
Indication: Post nasogastric tube placement

 

Technique: Supine view of the upper abdomen

 

Comparison: February 8 2019

 

Findings: There is a nasogastric tube in place, tip projected at the level gastric

antrum. Bowel gas pattern is unremarkable. Pulmonary parenchymal disease and

bilateral pacemakers are noted

 

Impression: Satisfactory nasogastric tube position

 

Other stable findings as described

## 2019-02-12 NOTE — NUR
NURSE NOTES:

Blood transfusion done,no adverse reaction noted,V/S stable.Will continue to monitor patient

## 2019-02-12 NOTE — NUR
NURSE NOTES:

Per report Dr. Junior is aware of current magnesium and phosphorus levels. Noted. Will 
continue to monitor patient.

## 2019-02-12 NOTE — NUR
NURSE NOTES:

Called and left message for patient's daughter, Shoshana, regarding PICC placement consent. 
Will call back. Will continue to monitor patient.

## 2019-02-12 NOTE — NUR
RD ASSESSMENT & RECOMMENDATIONS

SEE CARE ACTIVITY FOR COMPLETE ASSESSMENT



DAILY ESTIMATED NEEDS:

Needs based on Wound, DM, wasting, sepsis / 55kg 

30-35  kcals/kg 

3383-7818  total kcals

1.25-2  g protein/kg

  g total protein 

25-30  mL/kg

7352-7487  total fluid mLs



NUTRITION DIAGNOSIS:

* Increased kcal/prot needs R/T wound healing as evidenced by pt w/

stage 3 sacral wound per MD

* Swallowing difficulty R/T dysphagia, respiratory status as evidenced by

per SLP, pt at high risk for silent aspiration, rec for nonoral feedings,

TF held at this time due to respiratory status.





CURRENT TF:Glucerna 1.5 @ 47ml/hr x 24 hrs -> HELD 





ENTERAL NUTRITION RECOMMENDATIONS:

Glucerna 1.5 @47ml/hr x24 hrs   to provide 1128ml, 1692 kcal, 93g prot, 856ml free H2O  



- Resume TF as medically appropriate

- Flush per MD. HOB over 30 degrees







ADDITIONAL RECOMMENDATIONS:

* RE-calibrate bedscale for accurate CBW  

* Wound healing: add MVI w/ min 1 tab QD, Vit C 500mg QD, Harjeet 1pkt BID 

    + ZnSO4 220mg dailyx 10 days  

* Lytes daily, replete as needed (low mag and phos) 

* RESUME TF AS MEDICALLY APPROPRIATE 

. 

.

## 2019-02-12 NOTE — GENERAL PROGRESS NOTE
Assessment/Plan


Problem List:  


(1) Acute encephalopathy


Assessment & Plan:  metabolic


ICD Codes:  G93.40 - Encephalopathy, unspecified


SNOMED:  35900238, 728506307


(2) Delirium


ICD Codes:  R41.0 - Disorientation, unspecified


SNOMED:  9768321


(3) Alzheimer's dementia


ICD Codes:  G30.9 - Alzheimer's disease, unspecified; F02.80 - Dementia in 

other diseases classified elsewhere without behavioral disturbance


SNOMED:  61590878


Assessment/Plan


Zyprexa 


the pt lacks capacity


soft restraints if needed





Subjective


Allergies:  


Coded Allergies:  


     No Known Allergies (Unverified , 3/26/14)


Subjective


cont to be agitated 


lethargic today





Objective





Last 24 Hour Vital Signs








  Date Time  Temp Pulse Resp B/P (MAP) Pulse Ox O2 Delivery O2 Flow Rate FiO2


 


2/12/19 12:00  84      


 


2/12/19 12:00      Non-Rebreather 15.0 


 


2/12/19 12:00 99.1 89 27 121/77 (92) 99   


 


2/12/19 08:00  98      


 


2/12/19 08:00      Non-Rebreather 15.0 


 


2/12/19 08:00 98.9 93 26 134/62 (86) 98   


 


2/12/19 04:00      Non-Rebreather 15.0 


 


2/12/19 04:00 99.0 99 24 125/80 (95) 100   


 


2/12/19 04:00  85      


 


2/12/19 00:00      Venturi Mask 14.0 


 


2/12/19 00:00 98.4 99 24 149/74 (99) 100   


 


2/11/19 23:21  82      


 


2/11/19 20:15  88 20   Venturi Mask 12.0 50


 


2/11/19 20:15     99 Venturi Mask 12.0 50


 


2/11/19 20:15      Venturi Mask 12.0 50


 


2/11/19 20:00  94      


 


2/11/19 20:00 99.0 108 28 108/60 (76) 100   


 


2/11/19 20:00      Venturi Mask 14.0 


 


2/11/19 16:00 98.9 99 21 144/64 (90) 96   





  99      


 


2/11/19 16:00  79      


 


2/11/19 16:00      Venturi Mask 14.0 

















Intake and Output  


 


 2/11/19 2/12/19





 18:59 06:59


 


Intake Total 1954.458 ml 920.00 ml


 


Output Total 600 ml 200 ml


 


Balance 1354.458 ml 720.00 ml


 


  


 


Intake Free Water  60 ml


 


IV Total 1754.458 ml 860.00 ml


 


Other 200 ml 


 


Output Urine Total 600 ml 200 ml


 


# Bowel Movements  1








Laboratory Tests


2/12/19 03:45: 


White Blood Count 6.3, Red Blood Count 2.46L, Hemoglobin 7.2L, Hematocrit 22.4L

, Mean Corpuscular Volume 91, Mean Corpuscular Hemoglobin 29.2, Mean 

Corpuscular Hemoglobin Concent 31.9L, Red Cell Distribution Width 16.5H, 

Platelet Count 23L, Mean Platelet Volume 11.2H, Neutrophils (%) (Auto) , 

Lymphocytes (%) (Auto) , Monocytes (%) (Auto) , Eosinophils (%) (Auto) , 

Basophils (%) (Auto) , Differential Total Cells Counted 100, Neutrophils % (

Manual) 80H, Lymphocytes % (Manual) 2L, Monocytes % (Manual) 4, Eosinophils % (

Manual) 0, Basophils % (Manual) 0, Band Neutrophils 14H, Platelet Estimate 

DecreasedL, Platelet Morphology Normal, Hypochromasia 1+, Anisocytosis 1+, 

Ovalocytes 1+


2/12/19 09:10: 


White Blood Count 6.5, Red Blood Count 2.14L, Hemoglobin 6.2*L, Hematocrit 21.8L

, Mean Corpuscular Volume 102#H, Mean Corpuscular Hemoglobin 29.0, Mean 

Corpuscular Hemoglobin Concent 28.4L, Red Cell Distribution Width 17.0H, 

Platelet Count 21L, Mean Platelet Volume 16.5H, Neutrophils (%) (Auto) , 

Lymphocytes (%) (Auto) , Monocytes (%) (Auto) , Eosinophils (%) (Auto) , 

Basophils (%) (Auto) , Differential Total Cells Counted 100, Neutrophils % (

Manual) 79H, Lymphocytes % (Manual) 8L, Monocytes % (Manual) 5, Eosinophils % (

Manual) 0, Basophils % (Manual) 0, Band Neutrophils 8, Platelet Estimate 

DecreasedL, Platelet Morphology Normal, Hypochromasia 2+


2/12/19 11:45: 


White Blood Count 8.1, Red Blood Count 3.30L, Hemoglobin 9.7#L, Hematocrit 30.7#

L, Mean Corpuscular Volume 93#, Mean Corpuscular Hemoglobin 29.2, Mean 

Corpuscular Hemoglobin Concent 31.4L, Red Cell Distribution Width 15.7H, 

Platelet Count 37#L, Mean Platelet Volume 12.9H, Neutrophils (%) (Auto) , 

Lymphocytes (%) (Auto) , Monocytes (%) (Auto) , Eosinophils (%) (Auto) , 

Basophils (%) (Auto) , Neutrophils % (Manual) [Pending], Lymphocytes % (Manual) 

[Pending], Platelet Estimate [Pending], Platelet Morphology [Pending], Sodium 

Level 143, Potassium Level 3.9, Chloride Level 113H, Carbon Dioxide Level 18L, 

Anion Gap 12, Blood Urea Nitrogen 34H, Creatinine 1.0, Estimat Glomerular 

Filtration Rate , Glucose Level 116H, Calcium Level 8.4L


Height (Feet):  5


Height (Inches):  6.00


Weight (Pounds):  150


General Appearance:  lethargic, agitated











Josh Nunez MD Feb 12, 2019 13:13

## 2019-02-12 NOTE — NUR
NURSE NOTES:

Dr. Junior seen and examined patient and ordered PICC placement. Order entered, noted, and 
carried out. Will continue to monitor patient.

## 2019-02-13 VITALS — DIASTOLIC BLOOD PRESSURE: 93 MMHG | SYSTOLIC BLOOD PRESSURE: 141 MMHG

## 2019-02-13 VITALS — SYSTOLIC BLOOD PRESSURE: 118 MMHG | DIASTOLIC BLOOD PRESSURE: 54 MMHG

## 2019-02-13 VITALS — SYSTOLIC BLOOD PRESSURE: 136 MMHG | DIASTOLIC BLOOD PRESSURE: 78 MMHG

## 2019-02-13 VITALS — SYSTOLIC BLOOD PRESSURE: 122 MMHG | DIASTOLIC BLOOD PRESSURE: 60 MMHG

## 2019-02-13 VITALS — DIASTOLIC BLOOD PRESSURE: 63 MMHG | SYSTOLIC BLOOD PRESSURE: 104 MMHG

## 2019-02-13 VITALS — DIASTOLIC BLOOD PRESSURE: 62 MMHG | SYSTOLIC BLOOD PRESSURE: 120 MMHG

## 2019-02-13 LAB
ADD MANUAL DIFF: YES
ALBUMIN SERPL-MCNC: 1.2 G/DL (ref 3.4–5)
ALBUMIN/GLOB SERPL: 0.3 {RATIO} (ref 1–2.7)
ALP SERPL-CCNC: 132 U/L (ref 46–116)
ALT SERPL-CCNC: 78 U/L (ref 12–78)
ANION GAP SERPL CALC-SCNC: 11 MMOL/L (ref 5–15)
APTT BLD: 36 SEC (ref 23–33)
AST SERPL-CCNC: 163 U/L (ref 15–37)
BILIRUB DIRECT SERPL-MCNC: 1.8 MG/DL (ref 0–0.3)
BILIRUB SERPL-MCNC: 2.6 MG/DL (ref 0.2–1)
BUN SERPL-MCNC: 31 MG/DL (ref 7–18)
CALCIUM SERPL-MCNC: 7.8 MG/DL (ref 8.5–10.1)
CHLORIDE SERPL-SCNC: 113 MMOL/L (ref 98–107)
CO2 SERPL-SCNC: 19 MMOL/L (ref 21–32)
CREAT SERPL-MCNC: 0.9 MG/DL (ref 0.55–1.3)
ERYTHROCYTE [DISTWIDTH] IN BLOOD BY AUTOMATED COUNT: 15 % (ref 11.6–14.8)
FERRITIN SERPL-MCNC: 942 NG/ML (ref 8–388)
GLOBULIN SER-MCNC: 3.5 G/DL
HCT VFR BLD CALC: 27.3 % (ref 37–47)
HGB BLD-MCNC: 8.7 G/DL (ref 12–16)
INR PPP: 1.3 (ref 0.9–1.1)
MCV RBC AUTO: 92 FL (ref 80–99)
PHOSPHATE SERPL-MCNC: 2.4 MG/DL (ref 2.5–4.9)
PLATELET # BLD: 36 K/UL (ref 150–450)
POTASSIUM SERPL-SCNC: 3.5 MMOL/L (ref 3.5–5.1)
RBC # BLD AUTO: 2.98 M/UL (ref 4.2–5.4)
SODIUM SERPL-SCNC: 143 MMOL/L (ref 136–145)
WBC # BLD AUTO: 8.1 K/UL (ref 4.8–10.8)

## 2019-02-13 PROCEDURE — 02HV33Z INSERTION OF INFUSION DEVICE INTO SUPERIOR VENA CAVA, PERCUTANEOUS APPROACH: ICD-10-PCS

## 2019-02-13 PROCEDURE — B548ZZA ULTRASONOGRAPHY OF SUPERIOR VENA CAVA, GUIDANCE: ICD-10-PCS

## 2019-02-13 RX ADMIN — OLANZAPINE SCH MG: 2.5 TABLET, FILM COATED ORAL at 17:23

## 2019-02-13 RX ADMIN — DEXTROSE MONOHYDRATE SCH MLS/HR: 50 INJECTION, SOLUTION INTRAVENOUS at 05:28

## 2019-02-13 RX ADMIN — INSULIN ASPART SCH UNITS: 100 INJECTION, SOLUTION INTRAVENOUS; SUBCUTANEOUS at 17:23

## 2019-02-13 RX ADMIN — DEXTROSE AND SODIUM CHLORIDE SCH MLS/HR: 5; .45 INJECTION, SOLUTION INTRAVENOUS at 07:31

## 2019-02-13 RX ADMIN — CHLORHEXIDINE GLUCONATE SCH APPLIC: 213 SOLUTION TOPICAL at 20:42

## 2019-02-13 RX ADMIN — DAPTOMYCIN SCH MLS/HR: 500 INJECTION, POWDER, LYOPHILIZED, FOR SOLUTION INTRAVENOUS at 18:11

## 2019-02-13 RX ADMIN — OLANZAPINE SCH MG: 2.5 TABLET, FILM COATED ORAL at 08:14

## 2019-02-13 RX ADMIN — INSULIN ASPART SCH UNITS: 100 INJECTION, SOLUTION INTRAVENOUS; SUBCUTANEOUS at 12:00

## 2019-02-13 RX ADMIN — PANTOPRAZOLE SODIUM SCH MLS/HR: 40 INJECTION, POWDER, FOR SOLUTION INTRAVENOUS at 15:44

## 2019-02-13 RX ADMIN — INSULIN ASPART SCH UNITS: 100 INJECTION, SOLUTION INTRAVENOUS; SUBCUTANEOUS at 00:00

## 2019-02-13 RX ADMIN — INSULIN ASPART SCH UNITS: 100 INJECTION, SOLUTION INTRAVENOUS; SUBCUTANEOUS at 05:28

## 2019-02-13 NOTE — NUR
NURSE NOTES:

SALVADOR Burns at nurse station, informed NP that patient had abdominal ultra sound already done 
on Monday. SALVADOR Burns acknowledged and ordered to continue abdominal ultra sound. Order 
discontinued. Will continue to monitor patient.

## 2019-02-13 NOTE — NUR
NURSE NOTES:

Received report from ESCOBAR Cardenas. Patient seen in bed in schmitz position. Nonverbal at this 
time, Currently on venturi mast,  15L50%. Spo02 is 96%.  No S/Sx of pain noted using FLACC 
scale.  Mid line present to right arm, peripheral IV to right hand 22g and left thumb 24 g, 
intact.  NGT to left nare present, currently on low intermittent suctioning.  Bed is in 
lowest position. Call light is within easy reach while in bed. Will continue to monitor.

## 2019-02-13 NOTE — GENERAL PROGRESS NOTE
Assessment/Plan


Problem List:  


(1) Acute encephalopathy


Assessment & Plan:  metabolic


ICD Codes:  G93.40 - Encephalopathy, unspecified


SNOMED:  94553627, [id number]


(2) Delirium


ICD Codes:  R41.0 - Disorientation, unspecified


SNOMED:  4912600


(3) Alzheimer's dementia


ICD Codes:  G30.9 - Alzheimer's disease, unspecified; F02.80 - Dementia in 

other diseases classified elsewhere without behavioral disturbance


SNOMED:  91770764


Assessment/Plan


Zyprexa 


the pt lacks capacity


soft restraints if needed





Subjective


Neurologic/Psychiatric:  Reports: anxiety, depressed, emotional problems


Allergies:  


Coded Allergies:  


     No Known Allergies (Unverified , 3/26/14)


Subjective


cont to be agitated 


lethargic





Objective





Last 24 Hour Vital Signs








  Date Time  Temp Pulse Resp B/P (MAP) Pulse Ox O2 Delivery O2 Flow Rate FiO2


 


2/13/19 12:00 97.0 83 22 136/78 (97) 100   


 


2/13/19 11:51      Venturi Mask 12.0 


 


2/13/19 08:00  63      


 


2/13/19 08:00 97.7 75 24 120/62 (81) 100   


 


2/13/19 08:00      Non-Rebreather 15.0 


 


2/13/19 07:08  82 20   Venturi Mask 12.0 50


 


2/13/19 07:08     100 Venturi Mask 12.0 50


 


2/13/19 07:08      Venturi Mask 12.0 50


 


2/13/19 04:00 97.9 91 28 118/54 (75) 100   


 


2/13/19 04:00      Non-Rebreather 15.0 


 


2/13/19 03:56  78      


 


2/13/19 00:00 98.2 89 28 122/60 (80) 100   


 


2/13/19 00:00      Non-Rebreather 15.0 


 


2/12/19 23:26  96      


 


2/12/19 20:00 98.8 93 20 118/67 (84) 100   


 


2/12/19 20:00      Non-Rebreather 15.0 


 


2/12/19 20:00  92      


 


2/12/19 19:48      Venturi Mask 12.0 50


 


2/12/19 19:48     98 Venturi Mask 12.0 50


 


2/12/19 19:47  85 20   Venturi Mask 12.0 50


 


2/12/19 16:00      Non-Rebreather 15.0 


 


2/12/19 16:00  85      


 


2/12/19 16:00 100.2 105 26 131/75 (93) 93   

















Intake and Output  


 


 2/12/19 2/13/19





 19:00 07:00


 


Intake Total 1357.500 ml 851.25 ml


 


Output Total 400 ml 350 ml


 


Balance 957.500 ml 501.25 ml


 


  


 


Intake Free Water 60 ml 


 


IV Total 1297.500 ml 751.25 ml


 


Other  100 ml


 


Output Urine Total 400 ml 350 ml








Laboratory Tests


2/13/19 03:10: 


White Blood Count 8.1, Red Blood Count 2.98L, Hemoglobin 8.7L, Hematocrit 27.3L

, Mean Corpuscular Volume 92, Mean Corpuscular Hemoglobin 29.3, Mean 

Corpuscular Hemoglobin Concent 31.9L, Red Cell Distribution Width 15.0H, 

Platelet Count 36L, Mean Platelet Volume 12.6H, Neutrophils (%) (Auto) , 

Lymphocytes (%) (Auto) , Monocytes (%) (Auto) , Eosinophils (%) (Auto) , 

Basophils (%) (Auto) , Differential Total Cells Counted 100, Neutrophils % (

Manual) 86H, Lymphocytes % (Manual) 2L, Monocytes % (Manual) 3, Eosinophils % (

Manual) 0, Basophils % (Manual) 0, Band Neutrophils 9H, Platelet Estimate 

DecreasedL, Platelet Morphology , Giant Platelets Occasional, Hypochromasia 1+, 

Anisocytosis 1+, Prothrombin Time 13.9H, Prothromb Time International Ratio 1.3H

, Activated Partial Thromboplast Time 36H, Sodium Level 143, Potassium Level 3.5

, Chloride Level 113H, Carbon Dioxide Level 19L, Anion Gap 11, Blood Urea 

Nitrogen 31H, Creatinine 0.9, Estimat Glomerular Filtration Rate , Glucose 

Level 98, Calcium Level 7.8L, Phosphorus Level 2.4L, Magnesium Level 1.3L, 

Total Bilirubin 2.6H, Direct Bilirubin 1.8H, Aspartate Amino Transf (AST/SGOT) 

163H, Alanine Aminotransferase (ALT/SGPT) 78, Alkaline Phosphatase 132H, Total 

Protein 4.7L, Albumin 1.2L, Globulin 3.5, Albumin/Globulin Ratio 0.3L


Height (Feet):  5


Height (Inches):  6.00


Weight (Pounds):  145


General Appearance:  lethargic, agitated











Farhadi,Pantea MD Feb 13, 2019 12:44

## 2019-02-13 NOTE — DIAGNOSTIC IMAGING REPORT
Indications: Needs long-term IV access

 

Technique: Procedure performed at bedside. Procedural timeout performed. Ultrasound

confirms patent compressible right brachial vein. Total sterile technique, including

sterile probe cover and sterile gel, sterile gloves, hand hygiene, hat, mask,,

sterile gown, large sterile drape, and preparation with 2% chlorhexidine utilized.

Local anesthesia with 1% lidocaine. Under real-time ultrasound guidance, puncture

vein using 21-gauge needle, passage 0.018 guidewire, exchange for 5 Danish peel-away

sheath. 5 Danish dual-lumen power PICC cut to cm. It was inserted through the

peel-away sheath. However, the guidewire catheter combination would not advance

centrally, despite multiple manipulations. The catheter was removed and cut short, 15

cm Peel-away sheath and guidewire removed. Catheter fixed to the skin. Both catheter

ports aspirated and flushed. Patient tolerated procedure well, without immediate

complication. Followup chest x-ray obtained, documents catheter tip position at the

level of the right axillary vein

 

Impression: Bedside placement of PICC under sonographic guidance, as described above.

Note that the catheter was cut short, suitable only for use as a midline. This is

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

pacemaker

## 2019-02-13 NOTE — NUR
NURSE NOTES:

Patient vomited x 1 grayish emesis. Suctioned and placed on side, and raised head of bead 30 
degrees for aspiration precaution. Contacted SALVADOR Burns of situation. SALVADOR Burns acknowledged 
and no new orders were given. Continue low intermittent suction via NGT. Noted. Will 
continue to monitor patient.

## 2019-02-13 NOTE — CONSULTATION
History of Present Illness


General


Chief Complaint:  General Complaint


Referring physician:  SOFIA MAGANA


Reason for Consultation:  GI BLEED / DYSPHAGIA





Present Illness


Allergies:  


Coded Allergies:  


     No Known Allergies (Unverified , 3/26/14)





Medication History


Scheduled


Ascorbic Acid* (Ascorbic Acid*), 500 MG ORAL DAILY, (Reported)


Aspirin (Aspirin EC), 81 MG ORAL DAILY, (Reported)


Atenolol* (Tenormin*), 50 MG ORAL BID, (Reported)


Atorvastatin Calcium* (Atorvastatin Calcium*), 20 MG ORAL BEDTIME, (Reported)


Cholecalciferol (Vitamin D3)* (Vitamin D*), 2,000 UNITS ORAL DAILY, (Reported)


Clonazepam* (Klonopin*), 0.5 MG ORAL Q6H, (Reported)


Clonidine Hcl* (Catapres*), 0.1 MG ORAL EVERY 8 HOURS, (Reported)


Clopidogrel* (Clopidogrel*), 75 MG ORAL DAILY, (Reported)


Dicyclomine Hcl* (Dicyclomine Hcl*), 10 MG PO QID


Famotidine (Pepcid), 20 MG ORAL BEDTIME


Furosemide* (Lasix*), 40 MG ORAL DAILY, (Reported)


Losartan Potassium* (Losartan Potassium*), 25 MG ORAL DAILY, (Reported)


Metformin Hcl* (Metformin Hcl*), 500 MG ORAL TWICE A DAY, (Reported)


Mirtazapine* (Mirtazapine*), 15 MG ORAL BEDTIME, (Reported)


Potassium Chloride (Potassium Chloride), 10 MEQ ORAL DAILY, (Reported)


Rosuvastatin Calcium* (Crestor*), 10 MG ORAL DAILY, (Reported)


Sitagliptin Phos/Metformin Hcl (Janumet Xr 50-1,000 Mg Tablet), 1 TAB ORAL DAILY

, (Reported)


Trazodone* (Trazodone*), 50 MG ORAL BEDTIME, (Reported)


Vancomycin HCl (Vancocin HCl), 1,000 MG IV DAILY, (Reported)


Vitamin D (Vitamin D3), 5,000 UNITS ORAL DAILY, (Reported)


Zinc Sulfate (Zinc Sulfate*), 220 MG ORAL DAILY, (Reported)





Scheduled PRN


Haloperidol* (Haldol*), 5 MG IM EVERY 6 HOURS PRN for Agitation, (Reported)


Hydrocodone Bit/Acetaminophen 5-325* (Norco 5-325*), 1 TAB ORAL Q6H PRN for For 

Pain


Lorazepam* (Lorazepam*), 2 MG ORAL THREE TIMES A DAY PRN for For Anxiety, (

Reported)





Miscellaneous Medications


Apixaban (Eliquis), 5 MG PO, (Reported)





Patient History


Healthcare decision maker





Resuscitation status


Full Code


Advanced Directive on File








Physical Exam





Last 24 Hour Vital Signs








  Date Time  Temp Pulse Resp B/P (MAP) Pulse Ox O2 Delivery O2 Flow Rate FiO2


 


2/13/19 12:00  71      


 


2/13/19 12:00 97.0 83 22 136/78 (97) 100   


 


2/13/19 11:51      Venturi Mask 12.0 


 


2/13/19 08:00  63      


 


2/13/19 08:00 97.7 75 24 120/62 (81) 100   


 


2/13/19 08:00      Venturi Mask 15.0 


 


2/13/19 07:08  82 20   Venturi Mask 12.0 50


 


2/13/19 07:08     100 Venturi Mask 12.0 50


 


2/13/19 07:08      Venturi Mask 12.0 50


 


2/13/19 04:00 97.9 91 28 118/54 (75) 100   


 


2/13/19 04:00      Non-Rebreather 15.0 


 


2/13/19 03:56  78      


 


2/13/19 00:00 98.2 89 28 122/60 (80) 100   


 


2/13/19 00:00      Non-Rebreather 15.0 


 


2/12/19 23:26  96      


 


2/12/19 20:00 98.8 93 20 118/67 (84) 100   


 


2/12/19 20:00      Non-Rebreather 15.0 


 


2/12/19 20:00  92      


 


2/12/19 19:48      Venturi Mask 12.0 50


 


2/12/19 19:48     98 Venturi Mask 12.0 50


 


2/12/19 19:47  85 20   Venturi Mask 12.0 50


 


2/12/19 16:00      Non-Rebreather 15.0 


 


2/12/19 16:00  85      


 


2/12/19 16:00 100.2 105 26 131/75 (93) 93   

















Intake and Output  


 


 2/12/19 2/13/19





 18:59 06:59


 


Intake Total 1300.555 ml 958.195 ml


 


Output Total 400 ml 350 ml


 


Balance 900.555 ml 608.195 ml


 


  


 


Intake Free Water 60 ml 


 


IV Total 1240.555 ml 858.195 ml


 


Other  100 ml


 


Output Urine Total 400 ml 350 ml











Laboratory Tests








Test


  2/13/19


03:10


 


White Blood Count


  8.1 K/UL


(4.8-10.8)


 


Red Blood Count


  2.98 M/UL


(4.20-5.40)  L


 


Hemoglobin


  8.7 G/DL


(12.0-16.0)  L


 


Hematocrit


  27.3 %


(37.0-47.0)  L


 


Mean Corpuscular Volume 92 FL (80-99)  


 


Mean Corpuscular Hemoglobin


  29.3 PG


(27.0-31.0)


 


Mean Corpuscular Hemoglobin


Concent 31.9 G/DL


(32.0-36.0)  L


 


Red Cell Distribution Width


  15.0 %


(11.6-14.8)  H


 


Platelet Count


  36 K/UL


(150-450)  L


 


Mean Platelet Volume


  12.6 FL


(6.5-10.1)  H


 


Neutrophils (%) (Auto)


  % (45.0-75.0)


 


 


Lymphocytes (%) (Auto)


  % (20.0-45.0)


 


 


Monocytes (%) (Auto)  % (1.0-10.0)  


 


Eosinophils (%) (Auto)  % (0.0-3.0)  


 


Basophils (%) (Auto)  % (0.0-2.0)  


 


Differential Total Cells


Counted 100  


 


 


Neutrophils % (Manual) 86 % (45-75)  H


 


Lymphocytes % (Manual) 2 % (20-45)  L


 


Monocytes % (Manual) 3 % (1-10)  


 


Eosinophils % (Manual) 0 % (0-3)  


 


Basophils % (Manual) 0 % (0-2)  


 


Band Neutrophils 9 % (0-8)  H


 


Platelet Estimate Decreased  L


 


Platelet Morphology   


 


Giant Platelets Occasional  


 


Hypochromasia 1+  


 


Anisocytosis 1+  


 


Prothrombin Time


  13.9 SEC


(9.30-11.50)  H


 


Prothromb Time International


Ratio 1.3 (0.9-1.1)


H


 


Activated Partial


Thromboplast Time 36 SEC (23-33)


H


 


Sodium Level


  143 MMOL/L


(136-145)


 


Potassium Level


  3.5 MMOL/L


(3.5-5.1)


 


Chloride Level


  113 MMOL/L


()  H


 


Carbon Dioxide Level


  19 MMOL/L


(21-32)  L


 


Anion Gap


  11 mmol/L


(5-15)


 


Blood Urea Nitrogen


  31 mg/dL


(7-18)  H


 


Creatinine


  0.9 MG/DL


(0.55-1.30)


 


Estimat Glomerular Filtration


Rate  mL/min (>60)  


 


 


Glucose Level


  98 MG/DL


()


 


Calcium Level


  7.8 MG/DL


(8.5-10.1)  L


 


Phosphorus Level


  2.4 MG/DL


(2.5-4.9)  L


 


Magnesium Level


  1.3 MG/DL


(1.8-2.4)  L


 


Total Bilirubin


  2.6 MG/DL


(0.2-1.0)  H


 


Direct Bilirubin


  1.8 MG/DL


(0.0-0.3)  H


 


Aspartate Amino Transf


(AST/SGOT) 163 U/L


(15-37)  H


 


Alanine Aminotransferase


(ALT/SGPT) 78 U/L (12-78)


 


 


Alkaline Phosphatase


  132 U/L


()  H


 


Total Protein


  4.7 G/DL


(6.4-8.2)  L


 


Albumin


  1.2 G/DL


(3.4-5.0)  L


 


Globulin 3.5 g/dL  


 


Albumin/Globulin Ratio


  0.3 (1.0-2.7)


L











Microbiology








 Date/Time


Source Procedure


Growth Status


 


 


 2/12/19 18:15


Blood Blood Culture - Preliminary Resulted


 


 2/12/19 18:00


Blood Blood Culture - Preliminary Resulted








Height (Feet):  5


Height (Inches):  6.00


Weight (Pounds):  145


Medications





Current Medications








 Medications


  (Trade)  Dose


 Ordered  Sig/Violette


 Route


 PRN Reason  Start Time


 Stop Time Status Last Admin


Dose Admin


 


 Acetaminophen


  (Tylenol)  650 mg  Q4H  PRN


 NG


 Mild Pain/Temp > 100.5  2/12/19 18:15


 3/14/19 18:14   


 


 


 Chlorhexidine


 Gluconate


  (Evita-Hex 2%)  1 applic  DAILY@2000


 TOPIC


   2/12/19 20:00


 3/14/19 19:59  2/12/19 20:23


 


 


 Clonidine HCl


  (Catapres Tab)  0.1 mg  Q8H  PRN


 ORAL


 SBP>160mmHg  2/11/19 06:30


 3/10/19 06:29   


 


 


 Dextrose


  (Dextrose 50%)  25 ml  Q30M  PRN


 IV


 Hypoglycemia  2/11/19 06:15


 3/9/19 13:30   


 


 


 Dextrose


  (Dextrose 50%)  50 ml  Q30M  PRN


 IV


 hypoglycemia  2/11/19 06:15


 3/9/19 13:44   


 


 


 Dextrose/Sodium


 Chloride  1,000 ml @ 


 50 mls/hr  Q20H


 IV


   2/12/19 11:30


 3/14/19 11:29  2/13/19 07:31


 


 


 Insulin Aspart


  (NovoLOG)    EVERY 6  HOURS


 SUBQ


   2/11/19 12:00


 3/9/19 16:29  2/12/19 12:22


 


 


 Lorazepam


  (Ativan)  1 mg  Q6H  PRN


 ORAL


 For Anxiety  2/11/19 07:00


 2/15/19 12:52   


 


 


 Morphine Sulfate


  (Morphine


 Sulfate)  2 mg  Q4H  PRN


 IVP


 Moderate Pain (Pain Scale 4-6)  2/11/19 09:00


 2/15/19 12:52  2/12/19 22:01


 


 


 Olanzapine


  (ZyPREXA)  2.5 mg  BID


 ORAL


   2/11/19 09:00


 3/10/19 10:59  2/13/19 08:14


 


 


 Ondansetron HCl


  (Zofran)  4 mg  Q6H  PRN


 IVP


 Nausea & Vomiting  2/11/19 07:00


 3/10/19 12:52   


 


 


 Pantoprazole 80


 mg/Sodium Chloride  250 ml @ 


 25 mls/hr  Q10H


 IV


   2/13/19 15:00


 3/15/19 14:59  2/13/19 15:44


 


 


 Phytonadione 1 mg/


 Dextrose  55.5 ml @ 


 222 mls/hr  ONCE


 IVPB


   2/13/19 15:00


 2/13/19 17:00  2/13/19 15:02


 


 


 Temazepam


  (Restoril)  15 mg  HSPRN  PRN


 ORAL


 Insomnia  2/11/19 21:00


 2/14/19 20:59  2/13/19 01:10


 


 


 Vancomycin HCl


  (Vanco rx to


 dose)  1 ea  DAILY  PRN


 MISC


 .  2/11/19 09:00


 3/9/19 14:44   


 


 


 Vancomycin HCl


 750 mg/Sodium


 Chloride  275 ml @ 


 183.333


 mls/hr  Q24H


 IVPB


   2/11/19 17:00


 2/14/19 16:59  2/12/19 17:14


 











Assessment/Plan


Assessment/Plan


Hematology Consultation





REQ MD: Diana Magana


Date of Service: 02/13/19 


CHIEF COMPLAINT:  The patient is an 78-year-old [place]-speaking female who 

presents with chief complaint of generalized weakness and decreased oral intake.


RFC: Thrombocytopenia





HISTORY OF PRESENT ILLNESS:  


The patient was admitted to [facility] from 10/14/2018 to 10/19/

2018.  The patient is status post open reduction and internal fixation and 

right hip hemiarthroplasty on 10/15/2018.  The patient is status post open 

reduction and internal fixation of the right hip on 10/04/2018.  The patient 

had dislocation on 10/14/2018.  The patient underwent open reduction of right 

hip hemiarthroplasty dislocation on 10/15/2018.She presented to the hospital 

from country we last saw nursing facility due to the severe dehydration, 

decreased oral intake, and weakness.  she has not been able to ambulate.  The 

patient presented to [place] emergency department as per request by Dr. Cheo Ortiz for further evaluation and aggressive therapy.  Shortly after 

initial evaluation emergency patient was noted to have elevated lactic acid and 

severe dehydration and subsequently patient was admitted to hospital for 

possible sepsis and acute kidney injury with dehydration.





REVIEW OF SYSTEMS:  Unable to assess secondary to the patient's mental status.





PAST MEDICAL HISTORY:  Significant for:





1. Right hip fracture on 10/02/2018 as above, status post right hip 

hemiarthroplasty on 10/04/2018.


2. Dislocation of right hip arthroplasty and subsequent reduction of 

dislocation on 10/15/2018 as above.


3. Diabetes type 2.


4. Hypertension.


5. Sick sinus syndrome.


6. Hypercholesterolemia.


7. Coronary artery disease, status post non-ST elevated myocardial infarction.


8. Hypertension.


9. Aortic stenosis.


10. Pulmonary hypertension.


11. Alzheimer dementia.





PAST SURGICAL HISTORY:  Significant for:





1. Reduction of right hip hemiarthroplasty dislocation on 10/15/2018.


2. Right hip hemiarthroplasty on 10/04/2018.


3. Coronary artery bypass graft in 2017.


4. Pacemaker generator change in 2017.


5. Elbow debridement.


6. Pacemaker implantation in 2014.





CURRENT MEDICATIONS:


1. Aspirin 81 mg p.o. daily.


2. Atenolol 50 mg p.o. twice daily.


3. Klonopin 0.5 mg p.o. q.6 h. p.r.n.


4. Clonidine 0.1 mg p.o. q.8 h.


5. Clopidogrel 75 mg p.o. daily.


6. Bentyl 10 mg p.o. four times daily.


7. Pepcid 20 mg p.o. at bedtime.


8. Lasix 40 mg p.o. daily.


9. Norco 5/325 one tablet p.o. q.6 h. p.r.n.


10. Losartan 25 mg p.o. daily.


11. Potassium chloride 10 mEq p.o. daily.


12. Crestor 10 mg p.o. daily.


13. Sitagliptin/metformin 50/1000 one tablet p.o. daily.


14. Trazodone 50 mg p.o. at bedtime.


15. Vitamin D 5000 units p.o. daily.





ALLERGIES:  No known drug allergies.





SOCIAL HISTORY:  The patient is  and lives with her .  The


patient denies tobacco or alcohol use.








Last 24 Hour Vital Signs








  Date Time  Temp Pulse Resp B/P (MAP) Pulse Ox O2 Delivery O2 Flow Rate FiO2


 


2/13/19 12:00  71      


 


2/13/19 12:00 97.0 83 22 136/78 (97) 100   


 


2/13/19 11:51      Venturi Mask 12.0 


 


2/13/19 08:00  63      


 


2/13/19 08:00 97.7 75 24 120/62 (81) 100   


 


2/13/19 08:00      Venturi Mask 15.0 


 


2/13/19 07:08  82 20   Venturi Mask 12.0 50


 


2/13/19 07:08     100 Venturi Mask 12.0 50


 


2/13/19 07:08      Venturi Mask 12.0 50


 


2/13/19 04:00 97.9 91 28 118/54 (75) 100   


 


2/13/19 04:00      Non-Rebreather 15.0 


 


2/13/19 03:56  78      


 


2/13/19 00:00 98.2 89 28 122/60 (80) 100   


 


2/13/19 00:00      Non-Rebreather 15.0 


 


2/12/19 23:26  96      


 


2/12/19 20:00 98.8 93 20 118/67 (84) 100   


 


2/12/19 20:00      Non-Rebreather 15.0 


 


2/12/19 20:00  92      


 


2/12/19 19:48      Venturi Mask 12.0 50


 


2/12/19 19:48     98 Venturi Mask 12.0 50


 


2/12/19 19:47  85 20   Venturi Mask 12.0 50


 


2/12/19 16:00      Non-Rebreather 15.0 


 


2/12/19 16:00  85      


 


2/12/19 16:00 100.2 105 26 131/75 (93) 93   











GENERAL: Awake responsive to deep stimuli with opening of her eyes, in no 

apparent distress.


HEENT:  Eyes, pupils equal responsive to light and accommodation.


Extraocular movements are intact.


NECK:  Supple.  No lymphadenopathy.


CHEST: Poor inspiratory effort, decreased air in the bases no wheezes or 

rhonchi was appreciated


CARDIOVASCULAR:  Regular rhythm and rate.  S1 and S2 are normal without murmurs 

or gallops.


ABDOMEN:  Soft, nontender, and nondistended.  Positive bowel sounds.  No 

rebounding or guarding noted.


EXTREMITIES:  Negative for clubbing, cyanosis, or edema, muscle atrophy in 

bilateral lower extremity


RECTAL/GENITAL:  Not performed.


NEUROLOGIC: Less responsive, unable to follow commands, however open her eyes 

with a deep stimulation, unable to evaluate for gait due to the patient's 

status.














Laboratory Tests








Test


  2/13/19


03:10


 


White Blood Count


  8.1 K/UL


(4.8-10.8)


 


Red Blood Count


  2.98 M/UL


(4.20-5.40)  L


 


Hemoglobin


  8.7 G/DL


(12.0-16.0)  L


 


Hematocrit


  27.3 %


(37.0-47.0)  L


 


Mean Corpuscular Volume 92 FL (80-99)  


 


Mean Corpuscular Hemoglobin


  29.3 PG


(27.0-31.0)


 


Mean Corpuscular Hemoglobin


Concent 31.9 G/DL


(32.0-36.0)  L


 


Red Cell Distribution Width


  15.0 %


(11.6-14.8)  H


 


Platelet Count


  36 K/UL


(150-450)  L


 


Mean Platelet Volume


  12.6 FL


(6.5-10.1)  H


 


Neutrophils (%) (Auto)


  % (45.0-75.0)


 


 


Lymphocytes (%) (Auto)


  % (20.0-45.0)


 


 


Monocytes (%) (Auto)  % (1.0-10.0)  


 


Eosinophils (%) (Auto)  % (0.0-3.0)  


 


Basophils (%) (Auto)  % (0.0-2.0)  


 


Differential Total Cells


Counted 100  


 


 


Neutrophils % (Manual) 86 % (45-75)  H


 


Lymphocytes % (Manual) 2 % (20-45)  L


 


Monocytes % (Manual) 3 % (1-10)  


 


Eosinophils % (Manual) 0 % (0-3)  


 


Basophils % (Manual) 0 % (0-2)  


 


Band Neutrophils 9 % (0-8)  H


 


Platelet Estimate Decreased  L


 


Platelet Morphology   


 


Giant Platelets Occasional  


 


Hypochromasia 1+  


 


Anisocytosis 1+  


 


Prothrombin Time


  13.9 SEC


(9.30-11.50)  H


 


Prothromb Time International


Ratio 1.3 (0.9-1.1)


H


 


Activated Partial


Thromboplast Time 36 SEC (23-33)


H


 


Sodium Level


  143 MMOL/L


(136-145)


 


Potassium Level


  3.5 MMOL/L


(3.5-5.1)


 


Chloride Level


  113 MMOL/L


()  H


 


Carbon Dioxide Level


  19 MMOL/L


(21-32)  L


 


Anion Gap


  11 mmol/L


(5-15)


 


Blood Urea Nitrogen


  31 mg/dL


(7-18)  H


 


Creatinine


  0.9 MG/DL


(0.55-1.30)


 


Estimat Glomerular Filtration


Rate  mL/min (>60)  


 


 


Glucose Level


  98 MG/DL


()


 


Calcium Level


  7.8 MG/DL


(8.5-10.1)  L


 


Phosphorus Level


  2.4 MG/DL


(2.5-4.9)  L


 


Magnesium Level


  1.3 MG/DL


(1.8-2.4)  L


 


Total Bilirubin


  2.6 MG/DL


(0.2-1.0)  H


 


Direct Bilirubin


  1.8 MG/DL


(0.0-0.3)  H


 


Aspartate Amino Transf


(AST/SGOT) 163 U/L


(15-37)  H


 


Alanine Aminotransferase


(ALT/SGPT) 78 U/L (12-78)


 


 


Alkaline Phosphatase


  132 U/L


()  H


 


Total Protein


  4.7 G/DL


(6.4-8.2)  L


 


Albumin


  1.2 G/DL


(3.4-5.0)  L


 


Globulin 3.5 g/dL  


 


Albumin/Globulin Ratio


  0.3 (1.0-2.7)


L














ASSESSMENT/RECS: This is an 78-year-old white female.





1. Pancytopenia with thrombocytopenia that is severe is most likely related to 

Sepsis, lactic acidosis. In addition has received heparin. US abdomen ordered 

and shows no spleen and no cirrhosis


--> heparin discontinued


--> HIT ab test ordered with venous duplex


--> smear peripheral has been ordered


--> tumor markers as needed/prn


--> hold off on transfusion unless plt <20k


--> hold off on steriods


2. Failure to thrive with severe protein calorie malnutrition.


->> calorie counts daily


--> consider mirtazapine as needed per pcp


3. Anemia of chronic disease


--> panel has been ordered


--> transfuse if hgb <7


4. Leukocytosis


--> on abx as per id for infection


5. Hypertension.


6. Sick sinus syndrome.


7. Hypercholesterolemia.


8. Aortic stenosis.


9. Pulmonary hypertension.


10. Alzheimer dementia.


11.  Acute tubular necrosis/acute kidney injury most likely secondary to 

dehydration and sepsis.





Greatly appreciate consultation!











Darian Topete MD Feb 13, 2019 15:58

## 2019-02-13 NOTE — NUR
NURSE NOTES:

Per report, night nurse endorsed to this nurse that per Dr. Junior, okay to resume tube 
feeding in AM. Noted. Will continue to monitor patient.

## 2019-02-13 NOTE — SURGERY PROGRESS NOTE
Surgery Progress Note


Subjective


Additional Comments


no acute events.  stable. unchanged.





Objective





Last 24 Hour Vital Signs








  Date Time  Temp Pulse Resp B/P (MAP) Pulse Ox O2 Delivery O2 Flow Rate FiO2


 


2/13/19 12:00 97.0 83 22 136/78 (97) 100   


 


2/13/19 11:51      Venturi Mask 12.0 


 


2/13/19 08:00  63      


 


2/13/19 08:00 97.7 75 24 120/62 (81) 100   


 


2/13/19 08:00      Non-Rebreather 15.0 


 


2/13/19 07:08  82 20   Venturi Mask 12.0 50


 


2/13/19 07:08     100 Venturi Mask 12.0 50


 


2/13/19 07:08      Venturi Mask 12.0 50


 


2/13/19 04:00 97.9 91 28 118/54 (75) 100   


 


2/13/19 04:00      Non-Rebreather 15.0 


 


2/13/19 03:56  78      


 


2/13/19 00:00 98.2 89 28 122/60 (80) 100   


 


2/13/19 00:00      Non-Rebreather 15.0 


 


2/12/19 23:26  96      


 


2/12/19 20:00 98.8 93 20 118/67 (84) 100   


 


2/12/19 20:00      Non-Rebreather 15.0 


 


2/12/19 20:00  92      


 


2/12/19 19:48      Venturi Mask 12.0 50


 


2/12/19 19:48     98 Venturi Mask 12.0 50


 


2/12/19 19:47  85 20   Venturi Mask 12.0 50


 


2/12/19 16:00      Non-Rebreather 15.0 


 


2/12/19 16:00  85      


 


2/12/19 16:00 100.2 105 26 131/75 (93) 93   








I&O











Intake and Output  


 


 2/12/19 2/13/19





 19:00 07:00


 


Intake Total 1357.500 ml 851.25 ml


 


Output Total 400 ml 350 ml


 


Balance 957.500 ml 501.25 ml


 


  


 


Intake Free Water 60 ml 


 


IV Total 1297.500 ml 751.25 ml


 


Other  100 ml


 


Output Urine Total 400 ml 350 ml








Dressing:  other


Wound:  other


Drains:  other


Cardiovascular:  RSR


Respiratory:  decreased breath sounds


Abdomen:  soft, present bowel sounds, non-distended


Extremities:  other





Laboratory Tests








Test


  2/13/19


03:10


 


White Blood Count


  8.1 K/UL


(4.8-10.8)


 


Red Blood Count


  2.98 M/UL


(4.20-5.40)  L


 


Hemoglobin


  8.7 G/DL


(12.0-16.0)  L


 


Hematocrit


  27.3 %


(37.0-47.0)  L


 


Mean Corpuscular Volume 92 FL (80-99)  


 


Mean Corpuscular Hemoglobin


  29.3 PG


(27.0-31.0)


 


Mean Corpuscular Hemoglobin


Concent 31.9 G/DL


(32.0-36.0)  L


 


Red Cell Distribution Width


  15.0 %


(11.6-14.8)  H


 


Platelet Count


  36 K/UL


(150-450)  L


 


Mean Platelet Volume


  12.6 FL


(6.5-10.1)  H


 


Neutrophils (%) (Auto)


  % (45.0-75.0)


 


 


Lymphocytes (%) (Auto)


  % (20.0-45.0)


 


 


Monocytes (%) (Auto)  % (1.0-10.0)  


 


Eosinophils (%) (Auto)  % (0.0-3.0)  


 


Basophils (%) (Auto)  % (0.0-2.0)  


 


Differential Total Cells


Counted 100  


 


 


Neutrophils % (Manual) 86 % (45-75)  H


 


Lymphocytes % (Manual) 2 % (20-45)  L


 


Monocytes % (Manual) 3 % (1-10)  


 


Eosinophils % (Manual) 0 % (0-3)  


 


Basophils % (Manual) 0 % (0-2)  


 


Band Neutrophils 9 % (0-8)  H


 


Platelet Estimate Decreased  L


 


Platelet Morphology   


 


Giant Platelets Occasional  


 


Hypochromasia 1+  


 


Anisocytosis 1+  


 


Prothrombin Time


  13.9 SEC


(9.30-11.50)  H


 


Prothromb Time International


Ratio 1.3 (0.9-1.1)


H


 


Activated Partial


Thromboplast Time 36 SEC (23-33)


H


 


Sodium Level


  143 MMOL/L


(136-145)


 


Potassium Level


  3.5 MMOL/L


(3.5-5.1)


 


Chloride Level


  113 MMOL/L


()  H


 


Carbon Dioxide Level


  19 MMOL/L


(21-32)  L


 


Anion Gap


  11 mmol/L


(5-15)


 


Blood Urea Nitrogen


  31 mg/dL


(7-18)  H


 


Creatinine


  0.9 MG/DL


(0.55-1.30)


 


Estimat Glomerular Filtration


Rate  mL/min (>60)  


 


 


Glucose Level


  98 MG/DL


()


 


Calcium Level


  7.8 MG/DL


(8.5-10.1)  L


 


Phosphorus Level


  2.4 MG/DL


(2.5-4.9)  L


 


Magnesium Level


  1.3 MG/DL


(1.8-2.4)  L


 


Total Bilirubin


  2.6 MG/DL


(0.2-1.0)  H


 


Direct Bilirubin


  1.8 MG/DL


(0.0-0.3)  H


 


Aspartate Amino Transf


(AST/SGOT) 163 U/L


(15-37)  H


 


Alanine Aminotransferase


(ALT/SGPT) 78 U/L (12-78)


 


 


Alkaline Phosphatase


  132 U/L


()  H


 


Total Protein


  4.7 G/DL


(6.4-8.2)  L


 


Albumin


  1.2 G/DL


(3.4-5.0)  L


 


Globulin 3.5 g/dL  


 


Albumin/Globulin Ratio


  0.3 (1.0-2.7)


L











Plan


Problems:  


(1) Sepsis


Assessment & Plan:  Leukocytosis resolved 


LFT's elevated w/ t bili / d bili - improved 


AM labs ordered 


hydrate


US Impression: Limited exam, as described. Note inability to visualize the 

spleen Negative for gallstones or dilated bile ducts Questionable bilateral 

renal parapelvic cysts Small to moderate right pleural effusion


no acute surgical intervention planned. 


will follow with recs. 





(2) Sacral decubitus ulcer, stage III


Assessment & Plan:  DTPI sacrum.Maroon- indurated discoloration with opening at 

sacral coccygeal area(L)3.8cm x (W)1.4cm with entire wound measuring (L)10cmx(W)

12cm.Small amt malodorous brown exudate.


Stable dry eschar R heel with dry peeling skin periwound (L)2cm x (W)3.5cm. 

Periwound is also red and boggy. 


Small dry eschar noted to lateral R 5th metatarsal (L)1.4cm x (W)0.3cm.  


L heel is boggy but colour is dusky . 


Resolving skin tear lateral R tibia. Wound bed is clean and dry.  





Tx. Plan:


Cleanse Sacral wound with Saline.Apply Therahoney to opening at sacrococcygeal 

area.Cavilon skin barrier to DTPI. Cover with Optifoam drsg .Change Daily and 

prn.


            


Apply Betadine to R heel  and Lateral R 5th metatarsal.Cover with Abd pad .Wrap 

with kerlix daily and prn.


            


Apply Cavilon Skin Barrier to L heel.Cover with Optifoam drsg .Change every 7 

days and prn.


            


Reposition at least every 2hours or as tolerated.


            


Off-load heels with pillow.





(3) Rectal bleed


(4) Protein-calorie malnutrition, severe


(5) Dehydration











Tuan Patricia Feb 13, 2019 12:57

## 2019-02-13 NOTE — NUR
ST NOTE: SWALLOW STATUS AND D/C SUMMARY:



FOLLOWED UP PT'S CONDITIONS. 

PT IS LETHARGIC AND UNABLE TO PARTICIPATE. 

NGT INSERTED BUT NO TUBE FEEDING STARTED YET. 

PER KAVIN CODY OKAY TO RESUME TUBE FEEDING PER MD. 



CURRENTLY, PT IS NOT A CANDIDATE FOR SKILLED ST SERVICE. 



PLEASE RE-ORDER ST EVAL AND IVANWALLOW STUDY WHEN PT IS MEDICALLY 

IMPROVED. 



D/W KAVIN CODY. 

-------------------------------------------------------------------------------

Addendum: 02/13/19 at 1156 by ROSALBA BARRAGAN SLP

-------------------------------------------------------------------------------

D/W BRIAN.PBenedict, BROOKS, RE:PT'S CONDITIONS AND RECOMMENDATIONS. PER N.P., POSSIBLE 

COFFEE GROUND DUE TO BLACKISH SUBSTANCE WAS COMING OUT THROUGH THE NGT. 

PT PROBABLY REQUIRES LONG-TERM NONORAL FEEDING MEANS TO MEET NUTRITION AND 

HYDRATION NEEDS DUE TO PT CURRENT MEDICAL CONDITIONS. 

ALSO, SPOKE TO MD, DR. SANDERS RE:PT'S CONDITIONS.

## 2019-02-13 NOTE — NUR
SI:     RESP FAILURE,WEAKNESS

T. 97.0 HR 83 RR 22 B/P 136/78

 MG 1.3  ALK PHOS 132







IS;      K-PHOS IV

 PROTONIX IV

MAGNESIUM IV

IVF D5NS @ 50ML/HR

VANCO IV

********STEP DOWN UNIT*******

## 2019-02-13 NOTE — INFECTIOUS DISEASES PROG NOTE
Assessment/Plan


Assessment/Plan





79 yo female with PMHx DM, CAD, Alzheimer dementia and Hip fracture who 

presents with dehydration and weakness. 





High grade persistent bacteremia- suspect endovascular source (ie endocarditis, 

PPM infection)


   -2d Echo (limited study due to contractures): no vegetations seen


    -2/10 Bcx 4/4MRSA; 2/11 Bcx 2/4 MRSA; 2/12 4/4 GPC clustesr


    2/8/19 Blood Cx  4/4 MRSA


    2/7/19 Wound Cx MRSA, ESBL E.coli (S ZOsyn), K. pna (S Zosyn)


   


   Mild leukocytosis - resolved


  Low grade fever; improving


   Positive UA 


   CXR neg





Thrombocytopenia- HIT vs medication related


   


Pressure ulcers


  Not infected





Right hip fracture with hemiarthroplasty Oct 2018


DM


HTN


Sick sinus syndrome sp Pacemaker


HLD


CAD - SP MI and CABG


Aortic stenosis.


Pulmonary hypertension.


Alzheimer dementia.





PLAN





- Switch Vancomycin #6 to Daptomycin given persistent MRSA Bacteremia


   -2/13 SP Zosyn #4


   -2/10 SP Cefepime #3





-Will need MARTHA to look for endocarditis and/or PPM infection if consistent with 

goals of care


-REpeat 2 sets of Bcx


-CT chest/abd/p w/ to eval for occult abscess


- f/u repeat cultures


- Monitor CBC and temps


- wound care


- Nutritional support


-CBC, CMP, CPK am





Thank you for this consult. We will continue to follow the patient during this 

hospitalization.





Discussed with Dr Ortiz and Dr Junior





Subjective


Allergies:  


Coded Allergies:  


     No Known Allergies (Unverified , 3/26/14)


Subjective


Tm 100.6; afebrline 24hrs


no leukoycotis


remains bacteremic





Objective


Vital Signs





Last 24 Hour Vital Signs








  Date Time  Temp Pulse Resp B/P (MAP) Pulse Ox O2 Delivery O2 Flow Rate FiO2


 


2/13/19 12:00  71      


 


2/13/19 12:00 97.0 83 22 136/78 (97) 100   


 


2/13/19 11:51      Venturi Mask 12.0 


 


2/13/19 08:00  63      


 


2/13/19 08:00 97.7 75 24 120/62 (81) 100   


 


2/13/19 08:00      Venturi Mask 15.0 


 


2/13/19 07:08  82 20   Venturi Mask 12.0 50


 


2/13/19 07:08     100 Venturi Mask 12.0 50


 


2/13/19 07:08      Venturi Mask 12.0 50


 


2/13/19 04:00 97.9 91 28 118/54 (75) 100   


 


2/13/19 04:00      Non-Rebreather 15.0 


 


2/13/19 03:56  78      


 


2/13/19 00:00 98.2 89 28 122/60 (80) 100   


 


2/13/19 00:00      Non-Rebreather 15.0 


 


2/12/19 23:26  96      


 


2/12/19 20:00 98.8 93 20 118/67 (84) 100   


 


2/12/19 20:00      Non-Rebreather 15.0 


 


2/12/19 20:00  92      


 


2/12/19 19:48      Venturi Mask 12.0 50


 


2/12/19 19:48     98 Venturi Mask 12.0 50


 


2/12/19 19:47  85 20   Venturi Mask 12.0 50


 


2/12/19 16:00      Non-Rebreather 15.0 


 


2/12/19 16:00  85      


 


2/12/19 16:00 100.2 105 26 131/75 (93) 93   








Height (Feet):  5


Height (Inches):  6.00


Weight (Pounds):  145


Objective


Gen:  Moaning, Awake but no following


HEENT: NCAT, MMM, EOMI, PERRL


LUNGS:  CTAB, No W


CARDS: RRR, S1, S2, No M/R/G, 


ABD: Soft, NT, ND, + BS


Ext: Poor circulation to Ext (cool to touch) , Pulses 2+ B/L (DP, Rad): 


SKIN:  Dry, No rashes, Large sacral ulcer. Mild odor no surrounding cellulitis, 

foot with eschar





Microbiology








 Date/Time


Source Procedure


Growth Status


 


 


 2/12/19 18:15


Blood Blood Culture - Preliminary Resulted


 


 2/12/19 18:00


Blood Blood Culture - Preliminary Resulted


 


 2/11/19 15:15


Blood Blood Culture - Preliminary


Staphylococcus Aureus Resulted


 


 2/11/19 15:15


Blood Blood Culture - Preliminary


Staphylococcus Aureus Resulted











Laboratory Tests








Test


  2/13/19


03:10


 


White Blood Count


  8.1 K/UL


(4.8-10.8)


 


Red Blood Count


  2.98 M/UL


(4.20-5.40)  L


 


Hemoglobin


  8.7 G/DL


(12.0-16.0)  L


 


Hematocrit


  27.3 %


(37.0-47.0)  L


 


Mean Corpuscular Volume 92 FL (80-99)  


 


Mean Corpuscular Hemoglobin


  29.3 PG


(27.0-31.0)


 


Mean Corpuscular Hemoglobin


Concent 31.9 G/DL


(32.0-36.0)  L


 


Red Cell Distribution Width


  15.0 %


(11.6-14.8)  H


 


Platelet Count


  36 K/UL


(150-450)  L


 


Mean Platelet Volume


  12.6 FL


(6.5-10.1)  H


 


Neutrophils (%) (Auto)


  % (45.0-75.0)


 


 


Lymphocytes (%) (Auto)


  % (20.0-45.0)


 


 


Monocytes (%) (Auto)  % (1.0-10.0)  


 


Eosinophils (%) (Auto)  % (0.0-3.0)  


 


Basophils (%) (Auto)  % (0.0-2.0)  


 


Differential Total Cells


Counted 100  


 


 


Neutrophils % (Manual) 86 % (45-75)  H


 


Lymphocytes % (Manual) 2 % (20-45)  L


 


Monocytes % (Manual) 3 % (1-10)  


 


Eosinophils % (Manual) 0 % (0-3)  


 


Basophils % (Manual) 0 % (0-2)  


 


Band Neutrophils 9 % (0-8)  H


 


Platelet Estimate Decreased  L


 


Platelet Morphology   


 


Giant Platelets Occasional  


 


Hypochromasia 1+  


 


Anisocytosis 1+  


 


Prothrombin Time


  13.9 SEC


(9.30-11.50)  H


 


Prothromb Time International


Ratio 1.3 (0.9-1.1)


H


 


Activated Partial


Thromboplast Time 36 SEC (23-33)


H


 


Sodium Level


  143 MMOL/L


(136-145)


 


Potassium Level


  3.5 MMOL/L


(3.5-5.1)


 


Chloride Level


  113 MMOL/L


()  H


 


Carbon Dioxide Level


  19 MMOL/L


(21-32)  L


 


Anion Gap


  11 mmol/L


(5-15)


 


Blood Urea Nitrogen


  31 mg/dL


(7-18)  H


 


Creatinine


  0.9 MG/DL


(0.55-1.30)


 


Estimat Glomerular Filtration


Rate  mL/min (>60)  


 


 


Glucose Level


  98 MG/DL


()


 


Calcium Level


  7.8 MG/DL


(8.5-10.1)  L


 


Phosphorus Level


  2.4 MG/DL


(2.5-4.9)  L


 


Magnesium Level


  1.3 MG/DL


(1.8-2.4)  L


 


Total Bilirubin


  2.6 MG/DL


(0.2-1.0)  H


 


Direct Bilirubin


  1.8 MG/DL


(0.0-0.3)  H


 


Aspartate Amino Transf


(AST/SGOT) 163 U/L


(15-37)  H


 


Alanine Aminotransferase


(ALT/SGPT) 78 U/L (12-78)


 


 


Alkaline Phosphatase


  132 U/L


()  H


 


Total Protein


  4.7 G/DL


(6.4-8.2)  L


 


Albumin


  1.2 G/DL


(3.4-5.0)  L


 


Globulin 3.5 g/dL  


 


Albumin/Globulin Ratio


  0.3 (1.0-2.7)


L











Current Medications








 Medications


  (Trade)  Dose


 Ordered  Sig/Violette


 Route


 PRN Reason  Start Time


 Stop Time Status Last Admin


Dose Admin


 


 Acetaminophen


  (Tylenol)  650 mg  Q4H  PRN


 NG


 Mild Pain/Temp > 100.5  2/12/19 18:15


 3/14/19 18:14   


 


 


 Chlorhexidine


 Gluconate


  (Evita-Hex 2%)  1 applic  DAILY@2000


 TOPIC


   2/12/19 20:00


 3/14/19 19:59  2/12/19 20:23


 


 


 Clonidine HCl


  (Catapres Tab)  0.1 mg  Q8H  PRN


 ORAL


 SBP>160mmHg  2/11/19 06:30


 3/10/19 06:29   


 


 


 Dextrose


  (Dextrose 50%)  25 ml  Q30M  PRN


 IV


 Hypoglycemia  2/11/19 06:15


 3/9/19 13:30   


 


 


 Dextrose


  (Dextrose 50%)  50 ml  Q30M  PRN


 IV


 hypoglycemia  2/11/19 06:15


 3/9/19 13:44   


 


 


 Dextrose/Sodium


 Chloride  1,000 ml @ 


 50 mls/hr  Q20H


 IV


   2/12/19 11:30


 3/14/19 11:29  2/13/19 07:31


 


 


 Insulin Aspart


  (NovoLOG)    EVERY 6  HOURS


 SUBQ


   2/11/19 12:00


 3/9/19 16:29  2/12/19 12:22


 


 


 Lorazepam


  (Ativan)  1 mg  Q6H  PRN


 ORAL


 For Anxiety  2/11/19 07:00


 2/15/19 12:52   


 


 


 Morphine Sulfate


  (Morphine


 Sulfate)  2 mg  Q4H  PRN


 IVP


 Moderate Pain (Pain Scale 4-6)  2/11/19 09:00


 2/15/19 12:52  2/12/19 22:01


 


 


 Olanzapine


  (ZyPREXA)  2.5 mg  BID


 ORAL


   2/11/19 09:00


 3/10/19 10:59  2/13/19 08:14


 


 


 Ondansetron HCl


  (Zofran)  4 mg  Q6H  PRN


 IVP


 Nausea & Vomiting  2/11/19 07:00


 3/10/19 12:52   


 


 


 Pantoprazole 80


 mg/Sodium Chloride  250 ml @ 


 25 mls/hr  Q10H


 IV


   2/13/19 15:00


 3/15/19 14:59  2/13/19 15:44


 


 


 Phytonadione 1 mg/


 Dextrose  55.5 ml @ 


 222 mls/hr  ONCE


 IVPB


   2/13/19 15:00


 2/13/19 17:00  2/13/19 15:02


 


 


 Temazepam


  (Restoril)  15 mg  HSPRN  PRN


 ORAL


 Insomnia  2/11/19 21:00


 2/14/19 20:59  2/13/19 01:10


 


 


 Vancomycin HCl


  (Vanco rx to


 dose)  1 ea  DAILY  PRN


 MISC


 .  2/11/19 09:00


 3/9/19 14:44   


 


 


 Vancomycin HCl


 750 mg/Sodium


 Chloride  275 ml @ 


 183.333


 mls/hr  Q24H


 IVPB


   2/11/19 17:00


 2/14/19 16:59  2/12/19 17:14


 

















Olivia Rosas M.D. Feb 13, 2019 16:06

## 2019-02-13 NOTE — NUR
NURSE NOTES:

Called patient's daughter, Shoshana Teran, and gave consent for EGD, 
esophagogastroduodenoscopy and transesophageal echocardiogram procedures. Consent verified 
with second nurse, ESCOBAR Schroeder. Consent's placed in chart. Will continue to monitor patient.

## 2019-02-13 NOTE — NUR
NURSE NOTES:

Obtained telephone consent from patient's daughter, Shoshana Teran, for IV contrast use for CT 
abdomen with contrast. ESCOBAR Cardenas is second witness. Consent is placed in chart. Will 
continue to monitor patient.

## 2019-02-13 NOTE — GI INITIAL CONSULT NOTE
History of Present Illness


General


Date patient seen:  Feb 13, 2019


Time patient seen:  13:24


Reason for Hospitalization:  General Complaint


Referring physician:  SOFIA BELL


Reason for Consultation:  GI BLEED / DYSPHAGIA





Present Illness


HPI


Patient presents from nursing facility with reports of decreased oral intake


General malaise


Patient upon arrival is not verbal with staff


History of present illness is significantly limited


There was no reports of vomiting or diarrhea


Unclear change in medications


There was no reports of any rash


Attempt is being made to contact nursing facility and obtain further history as 

well


GI consulted for coffee-ground emesis noted through the NGT.  Initial HPI as 

noted above.  ROS limited, patient seen no apparent distress, NGT present with 

coffee grounds noted.  Labs reviewed; patient presents today with anemia most 

likely secondary to blood loss, elevated LFTs and reported poor p.o. intake 

with possible dysphagia.  Unknown history of endoscopic or colonoscopy at this 

time.


Home Meds


Active Scripts


Dicyclomine Hcl* (DICYCLOMINE HCL*) 10 Mg Capsule, 10 MG PO QID, #20 CAP


   Prov:Segun Rojas MD         4/12/18


Famotidine (PEPCID) 20 Mg Tablet, 20 MG ORAL BEDTIME, #7 TAB 0 Refills


   Prov:Segun Rojas MD         4/12/18


Hydrocodone Bit/Acetaminophen 5-325* (NORCO 5-325*) 1 Each Tablet, 1 TAB ORAL 

Q6H PRN for For Pain, #20 TAB


   Prov:TANGELA BROCK         3/26/14


Reported Medications


Zinc Sulfate (ZINC SULFATE*) 220 Mg Capsule, 220 MG ORAL DAILY, CAP 0 Refills


   12/26/18


Vancomycin HCl (Vancocin HCl) 250 Mg Capsule, 1000 MG IV DAILY, CAP


   12/26/18


Mirtazapine* (MIRTAZAPINE*) 15 Mg Tablet, 15 MG ORAL BEDTIME, TAB


   12/26/18


Metformin Hcl* (METFORMIN HCL*) 500 Mg Tablet, 500 MG ORAL TWICE A DAY, TAB


   12/26/18


Lorazepam* (LORAZEPAM*) 2 Mg Tablet, 2 MG ORAL THREE TIMES A DAY PRN for For 

Anxiety, TAB


   12/26/18


Cholecalciferol (Vitamin D3)* (VITAMIN D*) 1,000 Unit Tablet, 2000 UNITS ORAL 

DAILY, #30 TAB 0 Refills


   12/26/18


Atorvastatin Calcium* (ATORVASTATIN CALCIUM*) 20 Mg Tablet, 20 MG ORAL BEDTIME, 

TAB


   12/26/18


Ascorbic Acid* (ASCORBIC ACID*) 500 Mg Tablet, 500 MG ORAL DAILY, TAB


   12/26/18


Apixaban (ELIQUIS) 5 Mg Tablet, 5 MG PO, TAB


   12/26/18


Haloperidol* (HALDOL*) 0.5 Mg Tablet, 5 MG IM EVERY 6 HOURS PRN for Agitation, #

20 TAB 0 Refills


   12/26/18


Atenolol* (TENORMIN*) 50 Mg Tablet, 50 MG ORAL BID, TAB


   10/14/18


Clonazepam* (KLONOPIN*) 0.5 Mg Tablet, 0.5 MG ORAL Q6H, #15 TAB 0 Refills


   10/2/18


Rosuvastatin Calcium* (CRESTOR*) 10 Mg Tablet, 10 MG ORAL DAILY, TAB


   10/2/18


Furosemide* (LASIX*) 40 Mg Tablet, 40 MG ORAL DAILY, TAB


   10/2/18


Trazodone* (TRAZODONE*) 150 Mg Tablet, 50 MG ORAL BEDTIME, TAB


   10/2/18


Vitamin D (Vitamin D3) 400 Unit Tablet, 5000 UNITS ORAL DAILY, TAB


   10/2/18


Losartan Potassium* (LOSARTAN POTASSIUM*) 25 Mg Tablet, 25 MG ORAL DAILY, TAB


   10/2/18


Clopidogrel* (CLOPIDOGREL*) 75 Mg Tablet, 75 MG ORAL DAILY, TAB


   10/2/18


Potassium Chloride (POTASSIUM CHLORIDE) 10 Meq Tablet.er, 10 MEQ ORAL DAILY, #

30 TAB 0 Refills


   10/2/18


Sitagliptin Phos/Metformin Hcl (JANUMET XR 50-1,000 MG TABLET) 1 Each Tbmp.24hr

, 1 TAB ORAL DAILY, TAB


   10/2/18


Clonidine Hcl* (CATAPRES*) 0.1 Mg Tablet, 0.1 MG ORAL EVERY 8 HOURS, TAB


   10/2/18


Aspirin (Aspirin EC) 81 Mg Tablet.dr, 81 MG ORAL DAILY, TAB


   9/16/18


Med list reviewed/reconciled:  Yes


Allergies:  


Coded Allergies:  


     No Known Allergies (Unverified , 3/26/14)





Patient History


PMH Narrative


Limited by:  medical condition


Past Medical History:  see triage record


Pertinent Family History:  unable to obtain


Pregnant Now:  No


Reviewed Nursing Documentation:  PMH: Agreed; PSxH: Agreed





Nursing Documentation-PMH


Past Medical History:  No History, Except For


Hx Cardiac Problems:  Yes - HTN, PACEMAKER L SIDE, 3 BYPASS SURGERIES


Hx Hypertension:  Yes


Hx Pacemaker:  Yes


Hx Asthma:  Yes


Hx Diabetes:  Yes - TYPE 2


Hx Cancer:  No - UNKNOWN


Hx Gastrointestinal Problems:  No - UNKNOWN, PT IS CONFUSED


Hx Neurological Problems:  Yes - DEMENTIA, ALZHEIMER, SCHIZOPHRENIA


Hx Dementia:  Yes


Hx Memory Loss:  Yes





Review of Systems


All Other Systems:  limited





Physical Exam





Vital Signs








  Date Time  Temp Pulse Resp B/P (MAP) Pulse Ox O2 Delivery O2 Flow Rate FiO2


 


2/9/19 08:00 97.4 73 18 126/66 (86) 98   


 


2/9/19 09:00      Room Air  


 


2/9/19 20:00        21


 


2/10/19 20:08       8.0 








Sp02 EP Interpretation:  reviewed, normal


Labs





Laboratory Tests








Test


  2/13/19


03:10


 


White Blood Count


  8.1 K/UL


(4.8-10.8)


 


Red Blood Count


  2.98 M/UL


(4.20-5.40)  L


 


Hemoglobin


  8.7 G/DL


(12.0-16.0)  L


 


Hematocrit


  27.3 %


(37.0-47.0)  L


 


Mean Corpuscular Volume 92 FL (80-99)  


 


Mean Corpuscular Hemoglobin


  29.3 PG


(27.0-31.0)


 


Mean Corpuscular Hemoglobin


Concent 31.9 G/DL


(32.0-36.0)  L


 


Red Cell Distribution Width


  15.0 %


(11.6-14.8)  H


 


Platelet Count


  36 K/UL


(150-450)  L


 


Mean Platelet Volume


  12.6 FL


(6.5-10.1)  H


 


Neutrophils (%) (Auto)


  % (45.0-75.0)


 


 


Lymphocytes (%) (Auto)


  % (20.0-45.0)


 


 


Monocytes (%) (Auto)  % (1.0-10.0)  


 


Eosinophils (%) (Auto)  % (0.0-3.0)  


 


Basophils (%) (Auto)  % (0.0-2.0)  


 


Differential Total Cells


Counted 100  


 


 


Neutrophils % (Manual) 86 % (45-75)  H


 


Lymphocytes % (Manual) 2 % (20-45)  L


 


Monocytes % (Manual) 3 % (1-10)  


 


Eosinophils % (Manual) 0 % (0-3)  


 


Basophils % (Manual) 0 % (0-2)  


 


Band Neutrophils 9 % (0-8)  H


 


Platelet Estimate Decreased  L


 


Platelet Morphology   


 


Giant Platelets Occasional  


 


Hypochromasia 1+  


 


Anisocytosis 1+  


 


Prothrombin Time


  13.9 SEC


(9.30-11.50)  H


 


Prothromb Time International


Ratio 1.3 (0.9-1.1)


H


 


Activated Partial


Thromboplast Time 36 SEC (23-33)


H


 


Sodium Level


  143 MMOL/L


(136-145)


 


Potassium Level


  3.5 MMOL/L


(3.5-5.1)


 


Chloride Level


  113 MMOL/L


()  H


 


Carbon Dioxide Level


  19 MMOL/L


(21-32)  L


 


Anion Gap


  11 mmol/L


(5-15)


 


Blood Urea Nitrogen


  31 mg/dL


(7-18)  H


 


Creatinine


  0.9 MG/DL


(0.55-1.30)


 


Estimat Glomerular Filtration


Rate  mL/min (>60)  


 


 


Glucose Level


  98 MG/DL


()


 


Calcium Level


  7.8 MG/DL


(8.5-10.1)  L


 


Phosphorus Level


  2.4 MG/DL


(2.5-4.9)  L


 


Magnesium Level


  1.3 MG/DL


(1.8-2.4)  L


 


Total Bilirubin


  2.6 MG/DL


(0.2-1.0)  H


 


Direct Bilirubin


  1.8 MG/DL


(0.0-0.3)  H


 


Aspartate Amino Transf


(AST/SGOT) 163 U/L


(15-37)  H


 


Alanine Aminotransferase


(ALT/SGPT) 78 U/L (12-78)


 


 


Alkaline Phosphatase


  132 U/L


()  H


 


Total Protein


  4.7 G/DL


(6.4-8.2)  L


 


Albumin


  1.2 G/DL


(3.4-5.0)  L


 


Globulin 3.5 g/dL  


 


Albumin/Globulin Ratio


  0.3 (1.0-2.7)


L








General Appearance:  no apparent distress


Head:  normocephalic


EENT:  PERRL/EOMI, normal ENT inspection


Neck:  supple


Respiratory:  normal breath sounds, no respiratory distress, other - On Venturi 

mask


Cardiovascular:  normal rate


Gastrointestinal:  normal inspection, non tender, soft, normal bowel sounds, non

-distended, ngt


Rectal:  deferred


Genitourinary:  no CVA tenderness


Skin:  normal inspection, normal color, no rash, warm/dry, palpation normal, 

well hydrated


Lymphatic:  normal inspection, no adenopathy


Current Medications





Current Medications








 Medications


  (Trade)  Dose


 Ordered  Sig/Violette


 Route


 PRN Reason  Start Time


 Stop Time Status Last Admin


Dose Admin


 


 Acetaminophen


  (Tylenol)  650 mg  Q4H  PRN


 NG


 Mild Pain/Temp > 100.5  2/12/19 18:15


 3/14/19 18:14   


 


 


 Chlorhexidine


 Gluconate


  (Evita-Hex 2%)  1 applic  DAILY@2000


 TOPIC


   2/12/19 20:00


 3/14/19 19:59  2/12/19 20:23


 


 


 Clonidine HCl


  (Catapres Tab)  0.1 mg  Q8H  PRN


 ORAL


 SBP>160mmHg  2/11/19 06:30


 3/10/19 06:29   


 


 


 Dextrose


  (Dextrose 50%)  25 ml  Q30M  PRN


 IV


 Hypoglycemia  2/11/19 06:15


 3/9/19 13:30   


 


 


 Dextrose


  (Dextrose 50%)  50 ml  Q30M  PRN


 IV


 hypoglycemia  2/11/19 06:15


 3/9/19 13:44   


 


 


 Dextrose/Sodium


 Chloride  1,000 ml @ 


 50 mls/hr  Q20H


 IV


   2/12/19 11:30


 3/14/19 11:29  2/13/19 07:31


 


 


 Insulin Aspart


  (NovoLOG)    EVERY 6  HOURS


 SUBQ


   2/11/19 12:00


 3/9/19 16:29  2/12/19 12:22


 


 


 Lorazepam


  (Ativan)  1 mg  Q6H  PRN


 ORAL


 For Anxiety  2/11/19 07:00


 2/15/19 12:52   


 


 


 Magnesium Sulfate  100 ml @ 


 100 mls/hr  Q1H


 IVPB


   2/13/19 13:00


 2/13/19 14:59   


 


 


 Morphine Sulfate


  (Morphine


 Sulfate)  2 mg  Q4H  PRN


 IVP


 Moderate Pain (Pain Scale 4-6)  2/11/19 09:00


 2/15/19 12:52  2/12/19 22:01


 


 


 Olanzapine


  (ZyPREXA)  2.5 mg  BID


 ORAL


   2/11/19 09:00


 3/10/19 10:59  2/13/19 08:14


 


 


 Ondansetron HCl


  (Zofran)  4 mg  Q6H  PRN


 IVP


 Nausea & Vomiting  2/11/19 07:00


 3/10/19 12:52   


 


 


 Pantoprazole


  (Protonix)  40 mg  EVERY 12  HOURS


 IVP


   2/13/19 21:00


 3/15/19 20:59   


 


 


 Potassium


 Phosphate 30 mm/


 Sodium Chloride  285 ml @ 


 47.5 mls/hr  ONCE


 IV


   2/13/19 14:00


 2/13/19 15:00   


 


 


 Temazepam


  (Restoril)  15 mg  HSPRN  PRN


 ORAL


 Insomnia  2/11/19 21:00


 2/14/19 20:59  2/13/19 01:10


 


 


 Vancomycin HCl


  (Vanco rx to


 dose)  1 ea  DAILY  PRN


 MISC


 .  2/11/19 09:00


 3/9/19 14:44   


 


 


 Vancomycin HCl


 750 mg/Sodium


 Chloride  275 ml @ 


 183.333


 mls/hr  Q24H


 IVPB


   2/11/19 17:00


 2/14/19 16:59  2/12/19 17:14


 











GI: Plan


Problems:  


(1) LFT elevation


(2) Upper GI bleed


(3) Dysphasia


(4) Severe malnutrition


(5) Severe anemia


(6) Alzheimer's dementia


(7) Protein-calorie malnutrition, severe


(8) Dehydration


(9) Weakness


Plan


Possible EGD tomorrow to evaluate upper GI bleed, will tentatively schedule.


Patient may need PEG in the future, follow-up ST evaluations


NGT to low intermittent suction


Start Protonix drip


1 dose of vitamin K


Obtain abdominal ultrasound


anemia work up


OB stool r/o GI bleed


monitor H&H, prn transfusions


bowel regime


fu labs, hepatitis panel





Discussed with Dr. Moreno.


Thank you for this patient referral, we will follow.





The patient was seen and examined at bedside and all new and available data was 

reviewed in the patients chart. I agree with the above findings, impression 

and plan.  (Patient seen earlier today. Signature stamp does not reflect 

patient encounter time.). - MD Karine Foley,[facility] NP Feb 13, 2019 13:29

## 2019-02-13 NOTE — NUR
NURSE NOTES:



Received report from MESFIN Holly RN. Patient is asleep in bed, A/O x1. No s/s of acute 
distress noted. Sinus rhythm on cardiac monitor. Receiving 15L O2 via venturi mask at 50%. 
Left nare NGT in place and set to low-intermittent suction. Purewick catheter in place and 
suctioning well. Right upper midline double lumen catheter, intact and patent; right hand 
22g IV, intact and patent; left thumb 24g IV, intact and patent. Bed locked in lowest 
position with side rails up x3. Call light left within reach. Will continue to monitor.

## 2019-02-13 NOTE — NUR
NURSE NOTES:

Dr. Junior at nurse station. Informed MD that patient has blackish substance coming out of 
NGT. Dr. Junior acknowledged and ordered Dr. Moreno as GI consult. Noted. Per Dr. Junior 
hold tube feeding at this time. Patient is on IV fluids. Noted. Will continue to monitor 
patient.

## 2019-02-13 NOTE — NUR
NURSE NOTES:

Received report from ESCOBAR Gilliam. Patient is resting in bed in stable condition. No s/sx of 
SOB, breathing is even and unlabored. Observed no presence of pain or discomfort at this 
time. Bed is in lowest position, brakes engaged. Call light is kept within easy reach. Will 
continue to monitor patient.

## 2019-02-13 NOTE — INTERNAL MED PROGRESS NOTE
Subjective


Date of Service:  Feb 13, 2019


Physician Name


OrtizCheo


Attending Physician


Juan Carlos Magana MD





Current Medications








 Medications


  (Trade)  Dose


 Ordered  Sig/Violette


 Route


 PRN Reason  Start Time


 Stop Time Status Last Admin


Dose Admin


 


 Acetaminophen


  (Tylenol)  650 mg  Q4H  PRN


 NG


 Mild Pain/Temp > 100.5  2/12/19 18:15


 3/14/19 18:14   


 


 


 Chlorhexidine


 Gluconate


  (Evita-Hex 2%)  1 applic  DAILY@2000


 TOPIC


   2/12/19 20:00


 3/14/19 19:59  2/12/19 20:23


 


 


 Clonidine HCl


  (Catapres Tab)  0.1 mg  Q8H  PRN


 ORAL


 SBP>160mmHg  2/11/19 06:30


 3/10/19 06:29   


 


 


 Dextrose


  (Dextrose 50%)  25 ml  Q30M  PRN


 IV


 Hypoglycemia  2/11/19 06:15


 3/9/19 13:30   


 


 


 Dextrose


  (Dextrose 50%)  50 ml  Q30M  PRN


 IV


 hypoglycemia  2/11/19 06:15


 3/9/19 13:44   


 


 


 Dextrose/Sodium


 Chloride  1,000 ml @ 


 50 mls/hr  Q20H


 IV


   2/12/19 11:30


 3/14/19 11:29  2/13/19 07:31


 


 


 Insulin Aspart


  (NovoLOG)    EVERY 6  HOURS


 SUBQ


   2/11/19 12:00


 3/9/19 16:29  2/12/19 12:22


 


 


 Lorazepam


  (Ativan)  1 mg  Q6H  PRN


 ORAL


 For Anxiety  2/11/19 07:00


 2/15/19 12:52   


 


 


 Magnesium Sulfate  100 ml @ 


 100 mls/hr  Q1H


 IV


   2/13/19 12:45


 2/13/19 14:44 UNV  


 


 


 Morphine Sulfate


  (Morphine


 Sulfate)  2 mg  Q4H  PRN


 IVP


 Moderate Pain (Pain Scale 4-6)  2/11/19 09:00


 2/15/19 12:52  2/12/19 22:01


 


 


 Olanzapine


  (ZyPREXA)  2.5 mg  BID


 ORAL


   2/11/19 09:00


 3/10/19 10:59  2/13/19 08:14


 


 


 Ondansetron HCl


  (Zofran)  4 mg  Q6H  PRN


 IVP


 Nausea & Vomiting  2/11/19 07:00


 3/10/19 12:52   


 


 


 Pantoprazole


  (Protonix)  40 mg  EVERY 12  HOURS


 IVP


   2/13/19 21:00


 3/15/19 20:59 UNV  


 


 


 Potassium


 Chloride 40 meq/


 Sodium Chloride  570 ml @ 


 142.5 mls/


 hr  ONCE  ONCE


 IVPB


   2/13/19 11:45


 2/13/19 15:44 UNV  


 


 


 Sodium Phosphate


 30 mm/Sodium


 Chloride  285 ml @ 


 47.5 mls/hr  ONCE  ONCE


 IV


   2/13/19 12:45


 2/13/19 18:44 UNV  


 


 


 Temazepam


  (Restoril)  15 mg  HSPRN  PRN


 ORAL


 Insomnia  2/11/19 21:00


 2/14/19 20:59  2/13/19 01:10


 


 


 Vancomycin HCl


  (Vanco rx to


 dose)  1 ea  DAILY  PRN


 MISC


 .  2/11/19 09:00


 3/9/19 14:44   


 


 


 Vancomycin HCl


 750 mg/Sodium


 Chloride  275 ml @ 


 183.333


 mls/hr  Q24H


 IVPB


   2/11/19 17:00


 2/14/19 16:59  2/12/19 17:14


 








Allergies:  


Coded Allergies:  


     No Known Allergies (Unverified , 3/26/14)


ROS Limited/Unobtainable:  Yes


Subjective


77 YO F admitted with dehydration and hypoxia.  Now sepsis.  Cover for Int Med-

Dr Magana.  FAWN.  Continues on non-rebreather





Objective





Last Vital Signs








  Date Time  Temp Pulse Resp B/P (MAP) Pulse Ox O2 Delivery O2 Flow Rate FiO2


 


2/13/19 08:00  63      


 


2/13/19 08:00 97.7  24 120/62 (81) 100   


 


2/13/19 08:00      Non-Rebreather 15.0 


 


2/13/19 07:08        50











Laboratory Tests








Test


  2/13/19


03:10


 


White Blood Count


  8.1 K/UL


(4.8-10.8)


 


Red Blood Count


  2.98 M/UL


(4.20-5.40)  L


 


Hemoglobin


  8.7 G/DL


(12.0-16.0)  L


 


Hematocrit


  27.3 %


(37.0-47.0)  L


 


Mean Corpuscular Volume 92 FL (80-99)  


 


Mean Corpuscular Hemoglobin


  29.3 PG


(27.0-31.0)


 


Mean Corpuscular Hemoglobin


Concent 31.9 G/DL


(32.0-36.0)  L


 


Red Cell Distribution Width


  15.0 %


(11.6-14.8)  H


 


Platelet Count


  36 K/UL


(150-450)  L


 


Mean Platelet Volume


  12.6 FL


(6.5-10.1)  H


 


Neutrophils (%) (Auto)


  % (45.0-75.0)


 


 


Lymphocytes (%) (Auto)


  % (20.0-45.0)


 


 


Monocytes (%) (Auto)  % (1.0-10.0)  


 


Eosinophils (%) (Auto)  % (0.0-3.0)  


 


Basophils (%) (Auto)  % (0.0-2.0)  


 


Differential Total Cells


Counted 100  


 


 


Neutrophils % (Manual) 86 % (45-75)  H


 


Lymphocytes % (Manual) 2 % (20-45)  L


 


Monocytes % (Manual) 3 % (1-10)  


 


Eosinophils % (Manual) 0 % (0-3)  


 


Basophils % (Manual) 0 % (0-2)  


 


Band Neutrophils 9 % (0-8)  H


 


Platelet Estimate Decreased  L


 


Platelet Morphology   


 


Giant Platelets Occasional  


 


Hypochromasia 1+  


 


Anisocytosis 1+  


 


Prothrombin Time


  13.9 SEC


(9.30-11.50)  H


 


Prothromb Time International


Ratio 1.3 (0.9-1.1)


H


 


Activated Partial


Thromboplast Time 36 SEC (23-33)


H


 


Sodium Level


  143 MMOL/L


(136-145)


 


Potassium Level


  3.5 MMOL/L


(3.5-5.1)


 


Chloride Level


  113 MMOL/L


()  H


 


Carbon Dioxide Level


  19 MMOL/L


(21-32)  L


 


Anion Gap


  11 mmol/L


(5-15)


 


Blood Urea Nitrogen


  31 mg/dL


(7-18)  H


 


Creatinine


  0.9 MG/DL


(0.55-1.30)


 


Estimat Glomerular Filtration


Rate  mL/min (>60)  


 


 


Glucose Level


  98 MG/DL


()


 


Calcium Level


  7.8 MG/DL


(8.5-10.1)  L


 


Phosphorus Level


  2.4 MG/DL


(2.5-4.9)  L


 


Magnesium Level


  1.3 MG/DL


(1.8-2.4)  L


 


Total Bilirubin


  2.6 MG/DL


(0.2-1.0)  H


 


Direct Bilirubin


  1.8 MG/DL


(0.0-0.3)  H


 


Aspartate Amino Transf


(AST/SGOT) 163 U/L


(15-37)  H


 


Alanine Aminotransferase


(ALT/SGPT) 78 U/L (12-78)


 


 


Alkaline Phosphatase


  132 U/L


()  H


 


Total Protein


  4.7 G/DL


(6.4-8.2)  L


 


Albumin


  1.2 G/DL


(3.4-5.0)  L


 


Globulin 3.5 g/dL  


 


Albumin/Globulin Ratio


  0.3 (1.0-2.7)


L











Microbiology








 Date/Time


Source Procedure


Growth Status


 


 


 2/12/19 18:15


Blood Blood Culture - Preliminary Resulted


 


 2/12/19 18:00


Blood Blood Culture - Preliminary Resulted


 


 2/11/19 15:15


Blood Blood Culture - Preliminary


Staphylococcus Aureus Resulted


 


 2/11/19 15:15


Blood Blood Culture - Preliminary


Staphylococcus Aureus Resulted

















Intake and Output  


 


 2/12/19 2/13/19





 19:00 07:00


 


Intake Total 1357.500 ml 851.25 ml


 


Output Total 400 ml 350 ml


 


Balance 957.500 ml 501.25 ml


 


  


 


Intake Free Water 60 ml 


 


IV Total 1297.500 ml 751.25 ml


 


Other  100 ml


 


Output Urine Total 400 ml 350 ml








Objective


Objective


General: No acute distress, awake and less responsive, Sleepy, cachectic.


HEENT: NCAT, sclera anicteric, PERRL, NG tube.


Neck: Supple, no significant jugular venous distention, 


Lungs: Non-rebreather mask; Poor inspiratory effort, decreased air in the bases 

, Bilateral Wheeze and Rales.


Heart: Regular rate and rhythm, normal S1/S2, no murmur.


Abdomen: soft, nontender, nondistended. Normoactive bowel sounds.


 / Rectal: Refused and deferred.


Extremities: No Cyanosis , No clubbing,  Left UE edema.  Lateral lower 

extremity / feet dressing intact.


Neuro: A&O x 1, Able to move all extremities


Skin: warm, no rashes or lesions, ecchymosis in bilateral upper extremity noted.





Assessment/Plan


Status:  not improved


Assessment/Plan


1. Sepsis /  METHACILLIN RESISTANT STAPHYLOCOCCUS AUREUS bacteremia, lactic 

acidosis.


2. Failure to thrive with severe protein calorie malnutrition.


3. Coronary artery disease.


4. Diabetes, type 2.


5. Hypertension.


6. Sick sinus syndrome.


7. Hypercholesterolemia.


8. Aortic stenosis.


9. Pulmonary hypertension.


10. Alzheimer dementia.


11.  Acute tubular necrosis/acute kidney injury most likely secondary to 

dehydration and sepsis.


12. Sacral decubitus ulcer, stage III


13. Severe dehydration


14.  Hypokalemia.


15.  Abnormal liver function test


16.  Anemia/thrombocytopenia





TREATMENT:


1.  Patient on broad spectrum antibiotics with vancomycin and Zosyn IV.  Will 

require transesophageal echocardiogram to R/O endocarditis-see ID note


2. Coronary artery disease.  The patient is status post coronary artery


bypass graft in 2017.


3. Diabetes, type 2.  The patient has been started on NovoLog sliding


scale.  


4. Hypertension.  The patient is to continue on atenolol and losartan as


above.


5. Sick sinus syndrome.  The patient is status post pacemaker


implantation.


6. Hypercholesterolemia.  Continue simvastatin as above.


7. Aortic stenosis.


8. Pulmonary hypertension.


9. Alzheimer dementia.


10.  Anemia/thrombocytopenia-S/P 1 unit PRBC on 2/11/19.  Heme/Onc consult.  

Hold heparin





Restart tube feeding at rate of 45 cc/hr.


CODE STATUS is full code as per POLST,


DVT prophylaxis: D/C Heparin subcu due to thrombocytopenia











Cheo Ortiz MD Feb 13, 2019 11:59

## 2019-02-13 NOTE — CARDIOLOGY PROGRESS NOTE
Assessment/Plan


Assessment/Plan


The patient is seen and examined, full consult note will be dictated shortly.





Objective





Last 24 Hour Vital Signs








  Date Time  Temp Pulse Resp B/P (MAP) Pulse Ox O2 Delivery O2 Flow Rate FiO2


 


2/13/19 20:00 98.6 72 32 104/63 (77) 100   


 


2/13/19 16:00  94      


 


2/13/19 16:00 98.5 99 22 141/93 (109) 95   


 


2/13/19 16:00      Venturi Mask 12.0 


 


2/13/19 12:00  71      


 


2/13/19 12:00 97.0 83 22 136/78 (97) 100   


 


2/13/19 11:51      Venturi Mask 12.0 


 


2/13/19 08:00  63      


 


2/13/19 08:00 97.7 75 24 120/62 (81) 100   


 


2/13/19 08:00      Venturi Mask 15.0 


 


2/13/19 07:08  82 20   Venturi Mask 12.0 50


 


2/13/19 07:08     100 Venturi Mask 12.0 50


 


2/13/19 07:08      Venturi Mask 12.0 50


 


2/13/19 04:00 97.9 91 28 118/54 (75) 100   


 


2/13/19 04:00      Non-Rebreather 15.0 


 


2/13/19 03:56  78      


 


2/13/19 00:00 98.2 89 28 122/60 (80) 100   


 


2/13/19 00:00      Non-Rebreather 15.0 


 


2/12/19 23:26  96      

















Intake and Output  


 


 2/12/19 2/13/19





 18:59 06:59


 


Intake Total 1300.555 ml 958.195 ml


 


Output Total 400 ml 350 ml


 


Balance 900.555 ml 608.195 ml


 


  


 


Intake Free Water 60 ml 


 


IV Total 1240.555 ml 858.195 ml


 


Other  100 ml


 


Output Urine Total 400 ml 350 ml











Laboratory Tests








Test


  2/13/19


03:10 2/13/19


16:30


 


White Blood Count


  8.1 K/UL


(4.8-10.8) 


 


 


Red Blood Count


  2.98 M/UL


(4.20-5.40)  L 


 


 


Hemoglobin


  8.7 G/DL


(12.0-16.0)  L 


 


 


Hematocrit


  27.3 %


(37.0-47.0)  L 


 


 


Mean Corpuscular Volume 92 FL (80-99)   


 


Mean Corpuscular Hemoglobin


  29.3 PG


(27.0-31.0) 


 


 


Mean Corpuscular Hemoglobin


Concent 31.9 G/DL


(32.0-36.0)  L 


 


 


Red Cell Distribution Width


  15.0 %


(11.6-14.8)  H 


 


 


Platelet Count


  36 K/UL


(150-450)  L 


 


 


Mean Platelet Volume


  12.6 FL


(6.5-10.1)  H 


 


 


Neutrophils (%) (Auto)


  % (45.0-75.0)


  


 


 


Lymphocytes (%) (Auto)


  % (20.0-45.0)


  


 


 


Monocytes (%) (Auto)  % (1.0-10.0)   


 


Eosinophils (%) (Auto)  % (0.0-3.0)   


 


Basophils (%) (Auto)  % (0.0-2.0)   


 


Differential Total Cells


Counted 100  


  


 


 


Neutrophils % (Manual) 86 % (45-75)  H 


 


Lymphocytes % (Manual) 2 % (20-45)  L 


 


Monocytes % (Manual) 3 % (1-10)   


 


Eosinophils % (Manual) 0 % (0-3)   


 


Basophils % (Manual) 0 % (0-2)   


 


Band Neutrophils 9 % (0-8)  H 


 


Platelet Estimate Decreased  L 


 


Platelet Morphology    


 


Giant Platelets Occasional   


 


Hypochromasia 1+   


 


Anisocytosis 1+   


 


Prothrombin Time


  13.9 SEC


(9.30-11.50)  H 


 


 


Prothromb Time International


Ratio 1.3 (0.9-1.1)


H 


 


 


Activated Partial


Thromboplast Time 36 SEC (23-33)


H 


 


 


Sodium Level


  143 MMOL/L


(136-145) 


 


 


Potassium Level


  3.5 MMOL/L


(3.5-5.1) 


 


 


Chloride Level


  113 MMOL/L


()  H 


 


 


Carbon Dioxide Level


  19 MMOL/L


(21-32)  L 


 


 


Anion Gap


  11 mmol/L


(5-15) 


 


 


Blood Urea Nitrogen


  31 mg/dL


(7-18)  H 


 


 


Creatinine


  0.9 MG/DL


(0.55-1.30) 


 


 


Estimat Glomerular Filtration


Rate  mL/min (>60)  


  


 


 


Glucose Level


  98 MG/DL


() 


 


 


Calcium Level


  7.8 MG/DL


(8.5-10.1)  L 


 


 


Phosphorus Level


  2.4 MG/DL


(2.5-4.9)  L 


 


 


Magnesium Level


  1.3 MG/DL


(1.8-2.4)  L 


 


 


Total Bilirubin


  2.6 MG/DL


(0.2-1.0)  H 


 


 


Direct Bilirubin


  1.8 MG/DL


(0.0-0.3)  H 


 


 


Aspartate Amino Transf


(AST/SGOT) 163 U/L


(15-37)  H 


 


 


Alanine Aminotransferase


(ALT/SGPT) 78 U/L (12-78)


  


 


 


Alkaline Phosphatase


  132 U/L


()  H 


 


 


Total Protein


  4.7 G/DL


(6.4-8.2)  L 


 


 


Albumin


  1.2 G/DL


(3.4-5.0)  L 


 


 


Globulin 3.5 g/dL   


 


Albumin/Globulin Ratio


  0.3 (1.0-2.7)


L 


 


 


Ferritin


  


  942 NG/ML


(8-388)  H


 


Folate


  


  13.1 NG/ML


(8.6-58.9)











Microbiology








 Date/Time


Source Procedure


Growth Status


 


 


 2/12/19 18:15


Blood Blood Culture - Preliminary Resulted


 


 2/12/19 18:00


Blood Blood Culture - Preliminary Resulted


 


 2/11/19 15:15


Blood Blood Culture - Preliminary


Staphylococcus Aureus Resulted


 


 2/11/19 15:15


Blood Blood Culture - Preliminary


Staphylococcus Aureus Resulted

















Timothy Lopez MD Feb 13, 2019 20:31

## 2019-02-13 NOTE — NUR
NURSE NOTES:



Received report from MESFIN Holly RN. Patient is asleep in bed, A/O x1. No s/s of acute 
distress noted. Sinus rhythm on cardiac monitor. Receiving 15L O2 via venturi mask at 50%. 
Left nare NGT in place and set to low-intermittent suction. Purewick catheter in place and 
suctioning well. Right upper midline double lumen catheter, intact and patent; right hand 
22g IV, intact and patent; left thumb 24g IV, intact and patent. Bed locked in lowest 
position with side rails up x3. Call light left within reach. Will continue to monitor.

-------------------------------------------------------------------------------

Addendum: 02/13/19 at 1954 by EMRE VAZ RN

-------------------------------------------------------------------------------

TIME IS 1910 -- NOT 0710

## 2019-02-14 VITALS — SYSTOLIC BLOOD PRESSURE: 101 MMHG | DIASTOLIC BLOOD PRESSURE: 79 MMHG

## 2019-02-14 VITALS — SYSTOLIC BLOOD PRESSURE: 115 MMHG | DIASTOLIC BLOOD PRESSURE: 58 MMHG

## 2019-02-14 VITALS — SYSTOLIC BLOOD PRESSURE: 120 MMHG | DIASTOLIC BLOOD PRESSURE: 66 MMHG

## 2019-02-14 VITALS — SYSTOLIC BLOOD PRESSURE: 94 MMHG | DIASTOLIC BLOOD PRESSURE: 72 MMHG

## 2019-02-14 VITALS — SYSTOLIC BLOOD PRESSURE: 104 MMHG | DIASTOLIC BLOOD PRESSURE: 68 MMHG

## 2019-02-14 VITALS — DIASTOLIC BLOOD PRESSURE: 60 MMHG | SYSTOLIC BLOOD PRESSURE: 102 MMHG

## 2019-02-14 LAB
% IRON SATURATION: 79 % (ref 15–50)
ADD MANUAL DIFF: YES
ADD MANUAL DIFF: YES
ALBUMIN SERPL-MCNC: 1.1 G/DL (ref 3.4–5)
ALP SERPL-CCNC: 91 U/L (ref 46–116)
ALT SERPL-CCNC: 46 U/L (ref 12–78)
ANION GAP SERPL CALC-SCNC: 13 MMOL/L (ref 5–15)
APTT BLD: 39 SEC (ref 23–33)
AST SERPL-CCNC: 28 U/L (ref 15–37)
BILIRUB DIRECT SERPL-MCNC: 1.2 MG/DL (ref 0–0.3)
BILIRUB SERPL-MCNC: 1.9 MG/DL (ref 0.2–1)
BUN SERPL-MCNC: 30 MG/DL (ref 7–18)
CALCIUM SERPL-MCNC: 6.6 MG/DL (ref 8.5–10.1)
CHLORIDE SERPL-SCNC: 120 MMOL/L (ref 98–107)
CK SERPL-CCNC: 92 U/L (ref 26–308)
CO2 SERPL-SCNC: 13 MMOL/L (ref 21–32)
CREAT SERPL-MCNC: 0.7 MG/DL (ref 0.55–1.3)
ERYTHROCYTE [DISTWIDTH] IN BLOOD BY AUTOMATED COUNT: 15.1 % (ref 11.6–14.8)
ERYTHROCYTE [DISTWIDTH] IN BLOOD BY AUTOMATED COUNT: 15.4 % (ref 11.6–14.8)
HCT VFR BLD CALC: 18.9 % (ref 37–47)
HCT VFR BLD CALC: 29 % (ref 37–47)
HGB BLD-MCNC: 5.9 G/DL (ref 12–16)
HGB BLD-MCNC: 9.3 G/DL (ref 12–16)
INR PPP: 1.5 (ref 0.9–1.1)
IRON SERPL-MCNC: 49 UG/DL (ref 50–175)
LDH SERPL L TO P-CCNC: 206 U/L (ref 81–234)
MCV RBC AUTO: 90 FL (ref 80–99)
MCV RBC AUTO: 93 FL (ref 80–99)
PLATELET # BLD: 30 K/UL (ref 150–450)
PLATELET # BLD: 34 K/UL (ref 150–450)
POTASSIUM SERPL-SCNC: 3.7 MMOL/L (ref 3.5–5.1)
RBC # BLD AUTO: 2.03 M/UL (ref 4.2–5.4)
RBC # BLD AUTO: 3.22 M/UL (ref 4.2–5.4)
SODIUM SERPL-SCNC: 146 MMOL/L (ref 136–145)
TIBC SERPL-MCNC: 62 UG/DL (ref 250–450)
UNSATURATED IRON BINDING: 13 UG/DL (ref 112–346)
WBC # BLD AUTO: 11.8 K/UL (ref 4.8–10.8)
WBC # BLD AUTO: 7 K/UL (ref 4.8–10.8)

## 2019-02-14 RX ADMIN — INSULIN ASPART SCH UNITS: 100 INJECTION, SOLUTION INTRAVENOUS; SUBCUTANEOUS at 06:00

## 2019-02-14 RX ADMIN — INSULIN ASPART SCH UNITS: 100 INJECTION, SOLUTION INTRAVENOUS; SUBCUTANEOUS at 12:00

## 2019-02-14 RX ADMIN — PANTOPRAZOLE SODIUM SCH MLS/HR: 40 INJECTION, POWDER, FOR SOLUTION INTRAVENOUS at 21:39

## 2019-02-14 RX ADMIN — DEXTROSE AND SODIUM CHLORIDE SCH MLS/HR: 5; .45 INJECTION, SOLUTION INTRAVENOUS at 22:57

## 2019-02-14 RX ADMIN — OLANZAPINE SCH MG: 2.5 TABLET, FILM COATED ORAL at 17:41

## 2019-02-14 RX ADMIN — OLANZAPINE SCH MG: 2.5 TABLET, FILM COATED ORAL at 08:45

## 2019-02-14 RX ADMIN — INSULIN ASPART SCH UNITS: 100 INJECTION, SOLUTION INTRAVENOUS; SUBCUTANEOUS at 00:00

## 2019-02-14 RX ADMIN — DEXTROSE AND SODIUM CHLORIDE SCH MLS/HR: 5; .45 INJECTION, SOLUTION INTRAVENOUS at 03:30

## 2019-02-14 RX ADMIN — INSULIN ASPART SCH UNITS: 100 INJECTION, SOLUTION INTRAVENOUS; SUBCUTANEOUS at 18:00

## 2019-02-14 RX ADMIN — DAPTOMYCIN SCH MLS/HR: 500 INJECTION, POWDER, LYOPHILIZED, FOR SOLUTION INTRAVENOUS at 17:41

## 2019-02-14 RX ADMIN — INSULIN ASPART SCH UNITS: 100 INJECTION, SOLUTION INTRAVENOUS; SUBCUTANEOUS at 22:56

## 2019-02-14 RX ADMIN — PANTOPRAZOLE SODIUM SCH MLS/HR: 40 INJECTION, POWDER, FOR SOLUTION INTRAVENOUS at 10:15

## 2019-02-14 RX ADMIN — PANTOPRAZOLE SODIUM SCH MLS/HR: 40 INJECTION, POWDER, FOR SOLUTION INTRAVENOUS at 00:29

## 2019-02-14 NOTE — INFECTIOUS DISEASES PROG NOTE
Assessment/Plan


Assessment/Plan





79 yo female with PMHx DM, CAD, Alzheimer dementia and Hip fracture who 

presents with dehydration and weakness. 





High grade persistent bacteremia- suspect endovascular source (ie endocarditis, 

PPM infection)


   -2d Echo (limited study due to contractures): no vegetations seen


    -2/10 Bcx 4/4MRSA; 2/11 Bcx 2/4 MRSA; 2/12 4/4  MRSA; 2/13 Bc xp


    2/8/19 Blood Cx  4/4 MRSA


    2/7/19 Wound Cx MRSA, ESBL E.coli (S ZOsyn), K. pna (S Zosyn)


   


   Mild leukocytosis, recurrent


  Low grade fever; improving


   Positive UA 


   CXR neg





Thrombocytopenia- HIT vs medication related


   


Pressure ulcers


  Not infected





Right hip fracture with hemiarthroplasty Oct 2018


DM


HTN


Sick sinus syndrome sp Pacemaker


HLD


CAD - SP MI and CABG


Aortic stenosis.


Pulmonary hypertension.


Alzheimer dementia.





PLAN





- Continue Daptomycin #2 (abx d #7) given persistent MRSA Bacteremia


   - will add Ceftaroline if ongoing bacteremia


   -2/13 SP Vancomycin #6


   -2/13 SP Zosyn #4


   -2/10 SP Cefepime #3





-Will need MARTHA to look for endocarditis and/or PPM infection if consistent with 

goals of care


-f/u REpeat 2 sets of Bcx


-f/u CT chest/abd/p w/ to eval for occult abscess


- f/u repeat cultures


- Monitor CBC and temps


- wound care


- Nutritional support








Thank you for this consult. We will continue to follow the patient during this 

hospitalization.





Subjective


Allergies:  


Coded Allergies:  


     No Known Allergies (Unverified , 3/26/14)


Subjective


; afebrline >36hrs


mild leukoycotis


Bcx 2/13 p


CT p





Objective


Vital Signs





Last 24 Hour Vital Signs








  Date Time  Temp Pulse Resp B/P (MAP) Pulse Ox O2 Delivery O2 Flow Rate FiO2


 


2/14/19 12:00      Venturi Mask 12.0 


 


2/14/19 12:00 96.8 110 28 104/68 (80) 95   


 


2/14/19 08:02  81      


 


2/14/19 08:00      Venturi Mask 12.0 


 


2/14/19 08:00 97.8 85 32 101/79 (86) 93   


 


2/14/19 04:00      Venturi Mask 12.0 


 


2/14/19 04:00 97.7 67 32 115/58 (77) 97   


 


2/14/19 03:20  71      


 


2/14/19 00:00      Venturi Mask 12.0 


 


2/14/19 00:00 98.0 96 32 120/66 (84) 95   


 


2/13/19 23:24  73      


 


2/13/19 21:55  81 20   Venturi Mask 12.0 50


 


2/13/19 21:55      Venturi Mask 12.0 50


 


2/13/19 21:55     100 Venturi Mask 12.0 50


 


2/13/19 20:00 98.6 72 32 104/63 (77) 100   


 


2/13/19 20:00      Venturi Mask 12.0 


 


2/13/19 19:29  66      


 


2/13/19 16:00  94      


 


2/13/19 16:00 98.5 99 22 141/93 (109) 95   


 


2/13/19 16:00      Venturi Mask 12.0 








Height (Feet):  5


Height (Inches):  5.00


Weight (Pounds):  144


Objective


Gen:  Moaning, Awake but no following


HEENT: NCAT, MMM, EOMI, PERRL


LUNGS:  CTAB, No W


CARDS: RRR, S1, S2, No M/R/G, 


ABD: Soft, NT, ND, + BS


Ext: Poor circulation to Ext (cool to touch) , Pulses 2+ B/L (DP, Rad): 


SKIN:  Dry, No rashes, Large sacral ulcer. Mild odor no surrounding cellulitis, 

foot with eschar





Microbiology








 Date/Time


Source Procedure


Growth Status


 


 


 2/12/19 18:15


Blood Blood Culture - Final


Staphylococcus Aureus - Mrsa Complete


 


 2/12/19 18:00


Blood Blood Culture - Final


Staphylococcus Aureus - Mrsa Complete











Laboratory Tests








Test


  2/13/19


16:30 2/14/19


05:24 2/14/19


07:45


 


Ferritin


  942 NG/ML


(8-388)  H 


  


 


 


Folate


  13.1 NG/ML


(8.6-58.9) 


  


 


 


White Blood Count


  


  7.0 K/UL


(4.8-10.8) 11.8 K/UL


(4.8-10.8)  #H


 


Red Blood Count


  


  2.03 M/UL


(4.20-5.40)  L 3.22 M/UL


(4.20-5.40)  L


 


Hemoglobin


  


  5.9 G/DL


(12.0-16.0) 9.3 G/DL


(12.0-16.0)  #L


 


Hematocrit


  


  18.9 %


(37.0-47.0)  #L 29.0 %


(37.0-47.0)  #L


 


Mean Corpuscular Volume  93 FL (80-99)   90 FL (80-99)  


 


Mean Corpuscular Hemoglobin


  


  29.1 PG


(27.0-31.0) 28.8 PG


(27.0-31.0)


 


Mean Corpuscular Hemoglobin


Concent 


  31.2 G/DL


(32.0-36.0)  L 32.0 G/DL


(32.0-36.0)


 


Red Cell Distribution Width


  


  15.4 %


(11.6-14.8)  H 15.1 %


(11.6-14.8)  H


 


Platelet Count


  


  30 K/UL


(150-450)  L 34 K/UL


(150-450)  L


 


Mean Platelet Volume


  


  13.2 FL


(6.5-10.1)  H 11.8 FL


(6.5-10.1)  H


 


Neutrophils (%) (Auto)


  


  % (45.0-75.0)


  % (45.0-75.0)


 


 


Lymphocytes (%) (Auto)


  


  % (20.0-45.0)


  % (20.0-45.0)


 


 


Monocytes (%) (Auto)   % (1.0-10.0)    % (1.0-10.0)  


 


Eosinophils (%) (Auto)   % (0.0-3.0)    % (0.0-3.0)  


 


Basophils (%) (Auto)   % (0.0-2.0)    % (0.0-2.0)  


 


Differential Total Cells


Counted 


  100  


  100  


 


 


Neutrophils % (Manual)  80 % (45-75)  H 85 % (45-75)  H


 


Lymphocytes % (Manual)  9 % (20-45)  L 4 % (20-45)  L


 


Monocytes % (Manual)  3 % (1-10)   6 % (1-10)  


 


Eosinophils % (Manual)  0 % (0-3)   1 % (0-3)  


 


Basophils % (Manual)  0 % (0-2)   0 % (0-2)  


 


Band Neutrophils  8 % (0-8)   4 % (0-8)  


 


Platelet Estimate  Decreased  L Decreased  L


 


Platelet Morphology  Normal   Normal  


 


Hypochromasia  2+   1+  


 


Anisocytosis  2+   1+  


 


Prothrombin Time


  


  15.4 SEC


(9.30-11.50)  H 


 


 


Prothromb Time International


Ratio 


  1.5 (0.9-1.1)


H 


 


 


Activated Partial


Thromboplast Time 


  39 SEC (23-33)


H 


 


 


Fibrinogen


  


  192 mg/dL


(200-400)  L 


 


 


Sodium Level


  


  146 MMOL/L


(136-145)  H 


 


 


Potassium Level


  


  3.7 MMOL/L


(3.5-5.1) 


 


 


Chloride Level


  


  120 MMOL/L


()  H 


 


 


Carbon Dioxide Level


  


  13 MMOL/L


(21-32)  L 


 


 


Anion Gap


  


  13 mmol/L


(5-15) 


 


 


Blood Urea Nitrogen


  


  30 mg/dL


(7-18)  H 


 


 


Creatinine


  


  0.7 MG/DL


(0.55-1.30) 


 


 


Estimat Glomerular Filtration


Rate 


   mL/min (>60)  


  


 


 


Glucose Level


  


  104 MG/DL


() 


 


 


Calcium Level


  


  6.6 MG/DL


(8.5-10.1)  L 


 


 


Iron Level


  


  49 ug/dL


()  L 


 


 


Total Iron Binding Capacity


  


  62 ug/dL


(250-450)  L 


 


 


Percent Iron Saturation  79 % (15-50)  H 


 


Unsaturated Iron Binding


  


  13 ug/dL


(112-346)  L 


 


 


Total Bilirubin


  


  1.9 MG/DL


(0.2-1.0)  H 


 


 


Direct Bilirubin


  


  1.2 MG/DL


(0.0-0.3)  H 


 


 


Aspartate Amino Transf


(AST/SGOT) 


  28 U/L (15-37)


  


 


 


Alanine Aminotransferase


(ALT/SGPT) 


  46 U/L (12-78)


  


 


 


Alkaline Phosphatase


  


  91 U/L


() 


 


 


Lactate Dehydrogenase


  


  206 U/L


() 


 


 


Total Creatine Kinase


  


  92 U/L


() 


 


 


C-Reactive Protein,


Quantitative 


  8.9 mg/dL


(0.00-0.90)  H 


 


 


Total Protein


  


  4.2 G/DL


(6.4-8.2)  L 


 


 


Albumin


  


  1.1 G/DL


(3.4-5.0)  L 


 


 


Erythrocyte Sedimentation Rate


  


  


  35 MM/HR


(0-30)  H


 


Haptoglobin   Pending  


 


Heparin-PF4 Antibody Screen   Pending  


 


Hepatitis A IgM Antibody   Pending  


 


Hepatitis B Surface Antigen   Pending  


 


Hepatitis B Core IgM Antibody   Pending  


 


Hepatitis C Antibody   Pending  











Current Medications








 Medications


  (Trade)  Dose


 Ordered  Sig/Violette


 Route


 PRN Reason  Start Time


 Stop Time Status Last Admin


Dose Admin


 


 Acetaminophen


  (Tylenol)  650 mg  Q4H  PRN


 NG


 Mild Pain/Temp > 100.5  2/12/19 18:15


 3/14/19 18:14   


 


 


 Barium Sulfate


  (Readi-Cat 2)  450 ml  NOW  PRN


 ORAL


 Radiology Procedure  2/13/19 16:00


 2/14/19 15:59   


 


 


 Chlorhexidine


 Gluconate


  (Evita-Hex 2%)  1 applic  DAILY@2000


 TOPIC


   2/12/19 20:00


 3/14/19 19:59  2/13/19 20:42


 


 


 Clonidine HCl


  (Catapres Tab)  0.1 mg  Q8H  PRN


 ORAL


 SBP>160mmHg  2/11/19 06:30


 3/10/19 06:29   


 


 


 Daptomycin 400 mg/


 Sodium Chloride  55 ml @ 


 100 mls/hr  Q24H


 IV


   2/13/19 18:00


 2/20/19 17:59  2/13/19 18:11


 


 


 Dextrose


  (Dextrose 50%)  25 ml  Q30M  PRN


 IV


 Hypoglycemia  2/11/19 06:15


 3/9/19 13:30   


 


 


 Dextrose


  (Dextrose 50%)  50 ml  Q30M  PRN


 IV


 hypoglycemia  2/11/19 06:15


 3/9/19 13:44   


 


 


 Dextrose/Sodium


 Chloride  1,000 ml @ 


 50 mls/hr  Q20H


 IV


   2/12/19 11:30


 3/14/19 11:29  2/14/19 03:30


 


 


 Insulin Aspart


  (NovoLOG)    EVERY 6  HOURS


 SUBQ


   2/11/19 12:00


 3/9/19 16:29  2/12/19 12:22


 


 


 Iopamidol


  (Isovue-300


 100ml)  100 ml  NOW  PRN


 INJ


 Radiology Procedure  2/13/19 16:00


 2/14/19 15:59   


 


 


 Lorazepam


  (Ativan)  1 mg  Q6H  PRN


 ORAL


 For Anxiety  2/11/19 07:00


 2/15/19 12:52   


 


 


 Morphine Sulfate


  (Morphine


 Sulfate)  2 mg  Q4H  PRN


 IVP


 Moderate Pain (Pain Scale 4-6)  2/11/19 09:00


 2/15/19 12:52  2/12/19 22:01


 


 


 Olanzapine


  (ZyPREXA)  2.5 mg  BID


 ORAL


   2/11/19 09:00


 3/10/19 10:59  2/14/19 08:45


 


 


 Ondansetron HCl


  (Zofran)  4 mg  Q6H  PRN


 IVP


 Nausea & Vomiting  2/11/19 07:00


 3/10/19 12:52   


 


 


 Pantoprazole 80


 mg/Sodium Chloride  250 ml @ 


 25 mls/hr  Q10H


 IV


   2/13/19 15:00


 3/15/19 14:59  2/14/19 10:15


 


 


 Temazepam


  (Restoril)  15 mg  HSPRN  PRN


 ORAL


 Insomnia  2/11/19 21:00


 2/14/19 20:59  2/13/19 01:10


 

















Olivia Rosas M.D. Feb 14, 2019 15:36

## 2019-02-14 NOTE — NUR
NURSE NOTES:



EGD procedure was cancelled because GI could not speak to the daughter to get consent for 
the Anesthesia. Will try again tomorrow.

## 2019-02-14 NOTE — CONSULTATION
DATE OF CONSULTATION:  02/13/2019

CARDIOLOGY CONSULTATION



CONSULTING PHYSICIAN:  Timothy Lopez M.D.



REFERRING PHYSICIAN:  Dr. Cheo Ortiz.



REASON FOR CONSULTATION:  Management of possible infective endocarditis in

the patient with high-grade persistent bacteremia with MRSA.



PAST MEDICAL HISTORY:  The patient is a very unfortunate 78-year-old lady

who is a resident of a nursing facility, who was brought in to this

facility on 02/07/2019 with decreased oral intake, general malaise.  The

patient is nonverbal.  Initial evaluation in the emergency department

revealed low-grade fever, temperature of 99.5 degrees Fahrenheit.  Her

blood pressure was 100/64 mmHg and heart rate of 68.  Initially, in the

emergency department, white blood cells elevated at 14.6 with left shift

and presence of a bandemia.  The patient also had renal failure with BUN

and creatinine of 47 and 1.5 respectively.  A troponin I was slightly

elevated at 0.131.  Initial 12-lead electrocardiogram in the emergency

department revealed sinus rhythm, heart rate of 90 with left axis

deviation, nonspecific intraventricular conduction delay, possible lateral

wall infarct, and age indeterminate.  The patient was admitted to the

hospital and underwent a workup of sepsis, which showed presence of MRSA

in the blood.  Cardiology consultation at request of Dr. Ortiz for

transesophageal echocardiography, which was recommended by Infectious

Disease specialist in face of high suspicion for infective endocarditis.



PAST MEDICAL HISTORY:

1. Hypertension.

2. Coronary artery disease, status post coronary artery bypass graft

surgery.

3. History of asthma.

4. History of diabetes mellitus, type 2.

5. History of dementia.

6. History of schizophrenia.



PAST SURGICAL HISTORY:  Including coronary artery bypass graft surgery as

well as the permanent pacemaker implantation.



FAMILY HISTORY:  No premature coronary artery disease or arrhythmogenic

death in first-degree relatives.



ALLERGIES:  No known drug allergies.



SOCIAL HISTORY:  No current history of tobacco, alcohol, or illicit drug

use.



REVIEW OF SYSTEMS:  HEENT:  Denies any headache, diplopia, or blurred

vision.  CONSTITUTIONAL:  Generalized weakness and malaise, but no fever,

chills, or night sweats.  CARDIOVASCULAR:  Denies any chest pain,

shortness breath, PND, orthopnea, or leg swelling.  PULMONARY:  Denies any

cough, hemoptysis, or wheezing.  GASTROINTESTINAL:  Denies any nausea,

vomiting, diarrhea, constipation, abdominal pain, or GI bleed.

GENITOURINARY:  Denies any hematuria, dysuria, or incontinence.

NEUROLOGIC:  Denies any motor dysfunction, sensory deficit, or altered

speech.



MEDICATIONS:  List of medications at the nursing facility includes apixaban

5 mg p.o. twice daily, ascorbic acid 500 mg daily, aspirin 81 mg daily,

Tenormin 50 mg p.o. twice daily, atorvastatin 20 mg p.o. at bedtime,

vitamin D 2000 units daily, Klonopin 0.5 mg q.6 h., Catapres 0.1 mg q.8

h., clopidogrel 75 mg p.o. daily, dicyclomine 10 mg p.o. q.i.d., Pepcid 20

mg p.o. at bedtime, Lasix 40 mg p.o. daily, Haldol 0.5 mg IM q.6 h. p.r.n.

agitation, Norco 5/325 one tablet q.6 h. p.r.n. pain, lorazepam 2 mg p.o.

three times a day p.r.n. anxiety, losartan 25 mg p.o. daily, metformin 500

mg twice daily, mirtazapine 50 mg p.o. nightly, potassium chloride 10 mEq

p.o. daily, rosuvastatin 10 mg p.o. at bedtime, Janumet 50/1000 mg one

tablet daily, trazodone 50 mg p.o. nightly, vancomycin 1000 mg IV daily,

vitamin D3 5000 unit daily, and zinc sulfate 220 mg p.o. daily.



PHYSICAL EXAMINATION:

VITAL SIGNS:  At the time of my evaluation, blood pressure was 141/93,

pulse of 99, temperature of 98.5 degrees Fahrenheit, and O2 saturation of

95% on Venturi mask, and respiratory of 22.

GENERAL:  The patient is a very unfortunate 78-year-old lady, somewhat

confused and agitated.

HEENT:  Atraumatic and normocephalic.  Anicteric.  Pupils are equal, round,

and reactive to light and accommodation.  Extraocular muscles intact.

NECK:  JVP less than 5 cm.  No carotid bruit.  Carotid upstrokes 2+

bilaterally.

CARDIOVASCULAR:  Normal S1, S2.  Regular rate and rhythm.  No murmurs,

gallops, or rubs.  PMI is at fourth intercostal space at the midclavicular

line.

LUNGS:  Clear to auscultation bilaterally.

ABDOMEN:  Soft, nontender, and nondistended.  No hepatosplenomegaly.

Positive bowel sounds.

EXTREMITIES:  Soft left elbow edema and some decubitus ulceration on the

lower back.



LABORATORY FINDINGS:  Blood test from 02/13/2019, WBC 8.1, hemoglobin 8.7,

hematocrit of 37.3, and platelet count of 36,000.  Sodium 142, potassium

is 3.5, chloride 113, bicarbonate 19, BUN of 31, creatinine 0.9, glucose

98, calcium is 7.8, and magnesium is 1.3.  AST is 163 and ALT is 78.  A 2D

echocardiography done on 02/10/2019 global left ventricular hypokinesia

with septal wall dyskinesia (left ventricular ejection fraction

approximately 20 to 25%), biatrial enlargement, evidence of her right

ventricular volume and pressure overload, grade 1 LV diastolic

dysfunction, and severe pulmonary hypertension with right ventricular

systolic pressure measured at 77 mmHg.  Chest x-ray obtained from

02/10/2019 shows two pacemaker system with a right middle lobe and lower

lobe infiltration, highly suggestive of aspiration pneumonia.



ASSESSMENT AND PLAN:  The patient is a very unfortunate 78-year-old female,

seen in Cardiology consultation at request of Dr. Ortiz.



1. MRSA bacteremia.  The patient is being evaluated for obtaining

consent for transesophageal echocardiography.  The patient is considered

to be high-risk and given severe LV systolic dysfunction with LVEF of

approximately 20% as well as evidence of non-ST-elevation myocardial

infarction type 2.  Given elevation of troponin I level at the time of

arrival to the ER.  We discussed with the primary care physician as well

as Infectious Disease to figure if the patient has management with

antibiotic would be different if TE ruled in infective endocarditis now.

In the meantime, we will continue with IV antibiotics, recommended by

Infectious Disease.  Of note, 2D echocardiography, which is a good study

with good acoustic windows does not reveal any vegetative mass in the

aortic or mitral leaflets.

2. Severe left ventricular systolic dysfunction with most likely

ischemic cardiomyopathy in view of history of coronary artery disease and

coronary artery bypass graft surgery.  Given comorbidities.  The patient

is not considered to be a candidate for ischemic workup or any invasive

procedure.

3. Slight elevation of troponin I level consistent with non-ST-elevation

myocardial infarction, type 2 with demand ischemia.  The patient will

require to be on aspirin, statins, and beta-blockers.

4. Severe pulmonary hypertension with right ventricular systolic

pressure measured at 77 mmHg.



I would like to thank, Dr. Ortiz, for courtesy of this consultation.









  ______________________________________________

  Timothy Lopez M.D.





DR:  GT

D:  02/14/2019 18:48

T:  02/14/2019 20:20

JOB#:  [id number]

CC:

## 2019-02-14 NOTE — ANETHESIA PREOPERATIVE EVAL
Anesthesia Pre-op PMH/ROS


General


Date of Evaluation:  Feb 14, 2019


Time of Evaluation:  11:41


Anesthesiologist:  Sarai Loyd CRNA


ASA Score:  ASA 4


Mallampati Score


Class I : Soft palate, uvula, fauces, pillars visible


Class II: Soft palate, uvula, fauces visible


Class III: Soft palate, base of uvula visible


Class IV: Only hard plate visible


Mallampati Classification:  Class II


Surgeon:  Tiffanie


Diagnosis:  dehydration, gi bleed


Surgical Procedure:  EGD diagnostic


Anesthesia History:  none


Family History:  no anesthesia problems


Allergies:  


Coded Allergies:  


     No Known Allergies (Unverified , 3/26/14)


Medications:  see eMAR


Patient NPO?:  Yes


NPO Date:  Feb 14, 2019


NPO Time:  00:00





Past Medical History


Cardiovascular:  Reports: HTN, CAD, arrhythmia - sick sinus syndrome, (+) 

pacemacker, CHF


Pulmonary:  Reports: other - pulmonary hypertension


Gastrointestinal/Genitourinary:  Reports: other - acute tubular necrosis


Neurologic/Psychiatric:  Reports: dementia - alzheimers


Endocrine:  Reports: DM


Hematology/Immune:  Reports: anemia, DVT; 


   Denies: bleeding disorder, other


Musculoskeletal/Integumentary:  Reports: other - sacral decubitus ulcer


PMH Narrative:


as above


PSxH Narrative:


see H & P





Anesthesia Pre-op Phys. Exam


Physician Exam





Last Vital Signs








  Date Time  Temp Pulse Resp B/P (MAP) Pulse Ox O2 Delivery O2 Flow Rate FiO2


 


2/14/19 08:00      Venturi Mask 12.0 


 


2/14/19 08:00 97.8 85 32 101/79 (86) 93   


 


2/13/19 21:55        50








Constitutional:  other - contractured


Cardiovascular:  RRR


Respiratory:  other - Venturi mask


Gastrointestinal:  S/NT/ND, other - Ng tube insitu





Airway Exam


Mallampati Score:  Class II


Teeth:  missing





Anesthesia Pre-op A/P


Labs





Hematology








Test


  2/14/19


05:24 2/14/19


07:45


 


White Blood Count


  7.0 K/UL


(4.8-10.8) 11.8 K/UL


(4.8-10.8)  #H


 


Red Blood Count


  2.03 M/UL


(4.20-5.40)  L 3.22 M/UL


(4.20-5.40)  L


 


Hemoglobin


  5.9 G/DL


(12.0-16.0) 9.3 G/DL


(12.0-16.0)  #L


 


Hematocrit


  18.9 %


(37.0-47.0)  #L 29.0 %


(37.0-47.0)  #L


 


Mean Corpuscular Volume 93 FL (80-99)   90 FL (80-99)  


 


Mean Corpuscular Hemoglobin


  29.1 PG


(27.0-31.0) 28.8 PG


(27.0-31.0)


 


Mean Corpuscular Hemoglobin


Concent 31.2 G/DL


(32.0-36.0)  L 32.0 G/DL


(32.0-36.0)


 


Red Cell Distribution Width


  15.4 %


(11.6-14.8)  H 15.1 %


(11.6-14.8)  H


 


Platelet Count


  30 K/UL


(150-450)  L 34 K/UL


(150-450)  L


 


Mean Platelet Volume


  13.2 FL


(6.5-10.1)  H 11.8 FL


(6.5-10.1)  H


 


Neutrophils (%) (Auto)


  % (45.0-75.0)


  % (45.0-75.0)


 


 


Lymphocytes (%) (Auto)


  % (20.0-45.0)


  % (20.0-45.0)


 


 


Monocytes (%) (Auto)  % (1.0-10.0)    % (1.0-10.0)  


 


Eosinophils (%) (Auto)  % (0.0-3.0)    % (0.0-3.0)  


 


Basophils (%) (Auto)  % (0.0-2.0)    % (0.0-2.0)  


 


Differential Total Cells


Counted 100  


  100  


 


 


Neutrophils % (Manual) 80 % (45-75)  H 85 % (45-75)  H


 


Lymphocytes % (Manual) 9 % (20-45)  L 4 % (20-45)  L


 


Monocytes % (Manual) 3 % (1-10)   6 % (1-10)  


 


Eosinophils % (Manual) 0 % (0-3)   1 % (0-3)  


 


Basophils % (Manual) 0 % (0-2)   0 % (0-2)  


 


Band Neutrophils 8 % (0-8)   4 % (0-8)  


 


Platelet Estimate Decreased  L Decreased  L


 


Platelet Morphology Normal   Normal  


 


Hypochromasia 2+   1+  


 


Anisocytosis 2+   1+  


 


Erythrocyte Sedimentation Rate


  


  35 MM/HR


(0-30)  H


 


Haptoglobin  Pending  








Coagulation








Test


  2/14/19


05:24


 


Prothrombin Time


  15.4 SEC


(9.30-11.50)  H


 


Prothromb Time International


Ratio 1.5 (0.9-1.1)


H


 


Activated Partial


Thromboplast Time 39 SEC (23-33)


H


 


Fibrinogen


  192 mg/dL


(200-400)  L








Chemistry








Test


  2/13/19


16:30 2/14/19


05:24


 


Ferritin


  942 NG/ML


(8-388)  H 


 


 


Folate


  13.1 NG/ML


(8.6-58.9) 


 


 


Sodium Level


  


  146 MMOL/L


(136-145)  H


 


Potassium Level


  


  3.7 MMOL/L


(3.5-5.1)


 


Chloride Level


  


  120 MMOL/L


()  H


 


Carbon Dioxide Level


  


  13 MMOL/L


(21-32)  L


 


Anion Gap


  


  13 mmol/L


(5-15)


 


Blood Urea Nitrogen


  


  30 mg/dL


(7-18)  H


 


Creatinine


  


  0.7 MG/DL


(0.55-1.30)


 


Estimat Glomerular Filtration


Rate 


   mL/min (>60)  


 


 


Glucose Level


  


  104 MG/DL


()


 


Calcium Level


  


  6.6 MG/DL


(8.5-10.1)  L


 


Iron Level


  


  49 ug/dL


()  L


 


Total Iron Binding Capacity


  


  62 ug/dL


(250-450)  L


 


Percent Iron Saturation  79 % (15-50)  H


 


Unsaturated Iron Binding


  


  13 ug/dL


(112-346)  L


 


Total Bilirubin


  


  1.9 MG/DL


(0.2-1.0)  H


 


Direct Bilirubin


  


  1.2 MG/DL


(0.0-0.3)  H


 


Aspartate Amino Transf


(AST/SGOT) 


  28 U/L (15-37)


 


 


Alanine Aminotransferase


(ALT/SGPT) 


  46 U/L (12-78)


 


 


Alkaline Phosphatase


  


  91 U/L


()


 


Lactate Dehydrogenase


  


  206 U/L


()


 


Total Creatine Kinase


  


  92 U/L


()


 


C-Reactive Protein,


Quantitative 


  8.9 mg/dL


(0.00-0.90)  H


 


Total Protein


  


  4.2 G/DL


(6.4-8.2)  L


 


Albumin


  


  1.1 G/DL


(3.4-5.0)  L











Studies


Pre-op Studies:  EKG - NSR with lateral infarct, echo - EF 20-25, RT ventricula 

systolic pressure 77





Risk Assessment & Plan


Assessment:


ASA 4, ok to proceed


Plan:


MAC





Pre-Antibiotics


Given Within 1 Hr of Incision:  Sarai Carpenter CRNA Feb 14, 2019 11:49

## 2019-02-14 NOTE — DIAGNOSTIC IMAGING REPORT
CLINICAL INDICATION:Abdominal pain, chest pain, persistent MRSA bacteremia

 

TECHNIQUE: Patient given enteric contrast  IV administration nonionic contrast 

multiphasic spiral acquisitions obtained through the chest, abdomen, and pelvis.  

Multiplanar  reconstructions were generated. Total dose length product 2250.17 mGycm.

 CTDIvol(s) 24.46,23.8 mGy. Radiation dose was minimized using automated exposure

control

 

COMPARISON: Reference made to CT pelvis exam 10/2/2018, CT abdomen and pelvis

4/12/2018, recent sonogram 2/11/2019. No comparison chest CTs

 

FINDINGS: There is anasarca, with diffuse edema of the subcutaneous, abdominal,

and pelvic and mediastinal fat, as well as pleural fluid and ascites

 

Chest:  There is a large right pleural effusion, occupying approximately 40% of

the right hemithorax. There is resultant compressive atelectasis of the majority of

the right lower lobe. Reticular and groundglass opacity is seen in the right upper

lobe. Multiple cavitary masses are seen bilaterally. These demonstrate intermediate

wall thickness. The largest on the right is in the upper lobe and measures 2.5 cm in

diameter. Largest on the left is in the upper lobe, measures 2.8 cm long axis

dimension. There is a small-to-moderate left pleural effusion. This results in

compressive atelectasis of portions of the left lower lobe. Reticular interstitial

opacities are seen in the inferior left lower lobe. A noncavitary 9 mm nodule is seen

in the mid left upper lobe. Other noncavitary nodules are seen scattered within the

right middle lobe.

 

The right and left main pulmonary arteries are ectatic but not frankly dilated.

Descending thoracic aortic is mildly ectatic but not aneurysmal. There is a moderate

size hiatal hernia. There is a nasogastric tube in place. A small amount of debris is

seen in the posterior tracheal lumen. The heart is mildly enlarged. There is our

bilateral pacemakers. There is evidence of narrowing of the bilateral innominate

veins. Right arm midline is seen, tip terminating in the right axillary vein. No

pericardial effusion. No mediastinal or hilar mass or adenopathy. The visualized

portion of the thyroid is demonstrates a questionable subcentimeter low-attenuation

lesion in the left lower pole. No mediastinal or hilar mass or adenopathy. No

axillary or chest wall mass or adenopathy. The bones demonstrate compression fracture

deformities of the T6 and T9 vertebral bodies; the T9 lesion was demonstrated on the

prior 4/12/2018 abdomen pelvis CT. The T6 lesion is age indeterminate although

suspect old. 

 

Abdomen pelvis: The appendix is not definitely identified. However, no findings

to suggest acute appendicitis are identified. Contrast is seen throughout the

entirety of the small bowel and is far distally as ileocecal valve. The nasogastric

tube tip terminates in the gastric antrum. The stomach and duodenum are otherwise

unremarkable. There is a small amount of free intraperitoneal fluid demonstrated. No

small bowel wall thickening. The prior exam demonstrated diverticulosis. This is less

evident on the current exam, probably due to the surrounding anasarca. The presence

of generalized edema makes diverticulitis and possible to exclude. There is mild

distention of the rectal wall by feces.

 

The liver, gallbladder, bile ducts, pancreas are all unremarkable. The spleen

demonstrates a wedge-shaped area of low-attenuation in the upper pole. This is not

evident on the prior CT abdomen and pelvis. The kidneys again demonstrate bilateral

parapelvic cysts and subcentimeter low attenuation parenchymal lesions. No renal or

ureteral calculi or hydronephrosis. The uterus and adnexal structures are

unremarkable. The bladder is largely obscured by a right hip prosthesis causing

streak artifact. The right hip prosthesis is a new finding since previous exams.

There is a healed fracture deformity of the left superior and inferior pubic rami,

demonstrated on prior pelvis CT of 10/2/2018 as well as an acute hip fracture some

heterotopic ossification surrounds the prosthesis.

 

Again demonstrated is bilateral L4 spondylolysis, L4 on L5 spondylolisthesis, and

secondary degenerative change of the L4-5 disc.



IMPRESSION: Interim development of multiple bilateral mostly upper lobe cavitary

pulmonary parenchymal lesions. Given normal appearing chest radiograph on 2/7/2019,

these are overwhelmingly likely infectious/inflammatory in nature. Most likely

represent septic pulmonary emboli. The possibility of mycobacterial infection should

also be included, deemed less likely given absence of adenopathy but still possible.

Differential considerations also include fungal infections, noninfectious

inflammatory processes. This finding was discussed by phone with Dr. Junior at the

time of interpretation

 

Other noncavitary nodules are also demonstrated, with the same differential

 

Large right and small-to-moderate left pleural effusions

 

Compressive atelectasis of the majority of the right lower lobe.

 

Infiltrates seen in the right upper lobe

 

Anasarca, with, in addition to the above mentioned pleural fluid, ascites and

generalized severe edema of the subcutaneous, mediastinal, abdominal and pelvic fat

 

Ectatic but not frankly dilated bilateral pulmonary arteries, there is possibly

pulmonary arterial hypertension

 

Mild cardiomegaly

 

Bilateral pacemaker. Evidence of central venoocclusive disease

 

Questional subcentimeter low-attenuation left lower pole thyroid nodule. No further

follow-up necessary

 

T6 and T9 vertebral body compression fractures. T6 lesion is age-indeterminate, T9

vertebral lesion is old

 

Wedge-shaped area of low-attenuation in the upper pole of the spleen, suspicious for

infarct

 

Postsurgical changes of the right hip. Healed fracture deformity of the left superior

and inferior pubic rami

 

Bilateral L4 spondylolysis, L4 on L5 spondylolisthesis, secondary degenerative

changes of the L5 disc.

 

Diverticulosis, better evident previously

 

Minimal rectal distention by feces, early fecal impaction possible

 

Other findings as noted, including bilateral renal parapelvic cysts and probable

cortical cysts, hiatal hernia, nasogastric tube, bilateral pacemakers, right arm

midline

 

The CT scanner at [facility] is accredited by the [employer] College [employer]

Radiology and the scans are performed using protocols designed to limit radiation

exposure to as low as reasonably achievable to attain images of sufficient resolution

adequate for diagnostic evaluation.

## 2019-02-14 NOTE — NUR
NURSE NOTES:



Received patient from [facility]. Patient is in bed on venturi mask 50%. Cardiac monitor in 
placed. NGT on the left nare on intermittent low suction. Currently NPO. IV sites are 
asymptomatic. HGB is 5.9, Dr. Magana aware. Will repeat CBC before infusing blood. Bed in 
lowest position with side rails up. Will continue to follow plan of care.

## 2019-02-14 NOTE — INTERNAL MED PROGRESS NOTE
Subjective


Date of Service:  Feb 14, 2019


Physician Name


Cheo Ortiz


Attending Physician


Juan Carlos Magana MD





Current Medications








 Medications


  (Trade)  Dose


 Ordered  Sig/Violette


 Route


 PRN Reason  Start Time


 Stop Time Status Last Admin


Dose Admin


 


 Acetaminophen


  (Tylenol)  650 mg  Q4H  PRN


 NG


 Mild Pain/Temp > 100.5  2/12/19 18:15


 3/14/19 18:14   


 


 


 Chlorhexidine


 Gluconate


  (Evita-Hex 2%)  1 applic  DAILY@2000


 TOPIC


   2/12/19 20:00


 3/14/19 19:59  2/13/19 20:42


 


 


 Clonidine HCl


  (Catapres Tab)  0.1 mg  Q8H  PRN


 ORAL


 SBP>160mmHg  2/11/19 06:30


 3/10/19 06:29   


 


 


 Daptomycin 400 mg/


 Sodium Chloride  55 ml @ 


 100 mls/hr  Q24H


 IV


   2/13/19 18:00


 2/20/19 17:59  2/14/19 17:41


 


 


 Dextrose


  (Dextrose 50%)  25 ml  Q30M  PRN


 IV


 Hypoglycemia  2/11/19 06:15


 3/9/19 13:30   


 


 


 Dextrose


  (Dextrose 50%)  50 ml  Q30M  PRN


 IV


 hypoglycemia  2/11/19 06:15


 3/9/19 13:44   


 


 


 Dextrose/Sodium


 Chloride  1,000 ml @ 


 50 mls/hr  Q20H


 IV


   2/12/19 11:30


 3/14/19 11:29  2/14/19 03:30


 


 


 Insulin Aspart


  (NovoLOG)    EVERY 6  HOURS


 SUBQ


   2/11/19 12:00


 3/9/19 16:29  2/12/19 12:22


 


 


 Lorazepam


  (Ativan)  1 mg  Q6H  PRN


 ORAL


 For Anxiety  2/11/19 07:00


 2/15/19 12:52   


 


 


 Morphine Sulfate


  (Morphine


 Sulfate)  2 mg  Q4H  PRN


 IVP


 Moderate Pain (Pain Scale 4-6)  2/11/19 09:00


 2/15/19 12:52  2/12/19 22:01


 


 


 Olanzapine


  (ZyPREXA)  2.5 mg  BID


 ORAL


   2/11/19 09:00


 3/10/19 10:59  2/14/19 17:41


 


 


 Ondansetron HCl


  (Zofran)  4 mg  Q6H  PRN


 IVP


 Nausea & Vomiting  2/11/19 07:00


 3/10/19 12:52   


 


 


 Pantoprazole 80


 mg/Sodium Chloride  250 ml @ 


 25 mls/hr  Q10H


 IV


   2/13/19 15:00


 3/15/19 14:59  2/14/19 10:15


 


 


 Temazepam


  (Restoril)  15 mg  HSPRN  PRN


 ORAL


 Insomnia  2/11/19 21:00


 2/14/19 20:59  2/13/19 01:10


 








Allergies:  


Coded Allergies:  


     No Known Allergies (Unverified , 3/26/14)


ROS Limited/Unobtainable:  Yes


Subjective


77 YO F admitted with dehydration and hypoxia.  Now sepsis.  Cover for Int Med-

Dr Magana.  FAWN.  Continues on venturi mask





Objective





Last Vital Signs








  Date Time  Temp Pulse Resp B/P (MAP) Pulse Ox O2 Delivery O2 Flow Rate FiO2


 


2/14/19 18:00      Venturi Mask 12.0 


 


2/14/19 16:00 97.5 77 20 102/60 (74) 95   


 


2/13/19 21:55        50











Laboratory Tests








Test


  2/14/19


05:24 2/14/19


07:45


 


White Blood Count


  7.0 K/UL


(4.8-10.8) 11.8 K/UL


(4.8-10.8)  #H


 


Red Blood Count


  2.03 M/UL


(4.20-5.40)  L 3.22 M/UL


(4.20-5.40)  L


 


Hemoglobin


  5.9 G/DL


(12.0-16.0) 9.3 G/DL


(12.0-16.0)  #L


 


Hematocrit


  18.9 %


(37.0-47.0)  #L 29.0 %


(37.0-47.0)  #L


 


Mean Corpuscular Volume 93 FL (80-99)   90 FL (80-99)  


 


Mean Corpuscular Hemoglobin


  29.1 PG


(27.0-31.0) 28.8 PG


(27.0-31.0)


 


Mean Corpuscular Hemoglobin


Concent 31.2 G/DL


(32.0-36.0)  L 32.0 G/DL


(32.0-36.0)


 


Red Cell Distribution Width


  15.4 %


(11.6-14.8)  H 15.1 %


(11.6-14.8)  H


 


Platelet Count


  30 K/UL


(150-450)  L 34 K/UL


(150-450)  L


 


Mean Platelet Volume


  13.2 FL


(6.5-10.1)  H 11.8 FL


(6.5-10.1)  H


 


Neutrophils (%) (Auto)


  % (45.0-75.0)


  % (45.0-75.0)


 


 


Lymphocytes (%) (Auto)


  % (20.0-45.0)


  % (20.0-45.0)


 


 


Monocytes (%) (Auto)  % (1.0-10.0)    % (1.0-10.0)  


 


Eosinophils (%) (Auto)  % (0.0-3.0)    % (0.0-3.0)  


 


Basophils (%) (Auto)  % (0.0-2.0)    % (0.0-2.0)  


 


Differential Total Cells


Counted 100  


  100  


 


 


Neutrophils % (Manual) 80 % (45-75)  H 85 % (45-75)  H


 


Lymphocytes % (Manual) 9 % (20-45)  L 4 % (20-45)  L


 


Monocytes % (Manual) 3 % (1-10)   6 % (1-10)  


 


Eosinophils % (Manual) 0 % (0-3)   1 % (0-3)  


 


Basophils % (Manual) 0 % (0-2)   0 % (0-2)  


 


Band Neutrophils 8 % (0-8)   4 % (0-8)  


 


Platelet Estimate Decreased  L Decreased  L


 


Platelet Morphology Normal   Normal  


 


Hypochromasia 2+   1+  


 


Anisocytosis 2+   1+  


 


Prothrombin Time


  15.4 SEC


(9.30-11.50)  H 


 


 


Prothromb Time International


Ratio 1.5 (0.9-1.1)


H 


 


 


Activated Partial


Thromboplast Time 39 SEC (23-33)


H 


 


 


Fibrinogen


  192 mg/dL


(200-400)  L 


 


 


Sodium Level


  146 MMOL/L


(136-145)  H 


 


 


Potassium Level


  3.7 MMOL/L


(3.5-5.1) 


 


 


Chloride Level


  120 MMOL/L


()  H 


 


 


Carbon Dioxide Level


  13 MMOL/L


(21-32)  L 


 


 


Anion Gap


  13 mmol/L


(5-15) 


 


 


Blood Urea Nitrogen


  30 mg/dL


(7-18)  H 


 


 


Creatinine


  0.7 MG/DL


(0.55-1.30) 


 


 


Estimat Glomerular Filtration


Rate  mL/min (>60)  


  


 


 


Glucose Level


  104 MG/DL


() 


 


 


Calcium Level


  6.6 MG/DL


(8.5-10.1)  L 


 


 


Iron Level


  49 ug/dL


()  L 


 


 


Total Iron Binding Capacity


  62 ug/dL


(250-450)  L 


 


 


Percent Iron Saturation 79 % (15-50)  H 


 


Unsaturated Iron Binding


  13 ug/dL


(112-346)  L 


 


 


Total Bilirubin


  1.9 MG/DL


(0.2-1.0)  H 


 


 


Direct Bilirubin


  1.2 MG/DL


(0.0-0.3)  H 


 


 


Aspartate Amino Transf


(AST/SGOT) 28 U/L (15-37)


  


 


 


Alanine Aminotransferase


(ALT/SGPT) 46 U/L (12-78)


  


 


 


Alkaline Phosphatase


  91 U/L


() 


 


 


Lactate Dehydrogenase


  206 U/L


() 


 


 


Total Creatine Kinase


  92 U/L


() 


 


 


C-Reactive Protein,


Quantitative 8.9 mg/dL


(0.00-0.90)  H 


 


 


Total Protein


  4.2 G/DL


(6.4-8.2)  L 


 


 


Albumin


  1.1 G/DL


(3.4-5.0)  L 


 


 


Erythrocyte Sedimentation Rate


  


  35 MM/HR


(0-30)  H


 


Haptoglobin  Pending  


 


Heparin-PF4 Antibody Screen  Pending  


 


Hepatitis A IgM Antibody  Pending  


 


Hepatitis B Surface Antigen  Pending  


 


Hepatitis B Core IgM Antibody  Pending  


 


Hepatitis C Antibody  Pending  











Microbiology








 Date/Time


Source Procedure


Growth Status


 


 


 2/12/19 18:15


Blood Blood Culture - Final


Staphylococcus Aureus - Mrsa Complete


 


 2/12/19 18:00


Blood Blood Culture - Final


Staphylococcus Aureus - Mrsa Complete

















Intake and Output  


 


 2/13/19 2/14/19





 19:00 07:00


 


Intake Total 775 ml 925 ml


 


Output Total 300 ml 350 ml


 


Balance 475 ml 575 ml


 


  


 


IV Total 675 ml 825 ml


 


Other 100 ml 100 ml


 


Output Urine Total 300 ml 350 ml


 


# Voids 2 








Objective


Objective


General: No acute distress, awake and less responsive, Sleepy, cachectic.


HEENT: NCAT, sclera anicteric, PERRL, NG tube.


Neck: Supple, no significant jugular venous distention, 


Lungs: Non-rebreather mask; Poor inspiratory effort, decreased air in the bases 

, Bilateral Wheeze and Rales.


Heart: Regular rate and rhythm, normal S1/S2, no murmur.


Abdomen: soft, nontender, nondistended. Normoactive bowel sounds.


 / Rectal: Refused and deferred.


Extremities: No Cyanosis , No clubbing,  Left UE edema.  Lateral lower 

extremity / feet dressing intact.


Neuro: A&O x 1, Able to move all extremities


Skin: warm, no rashes or lesions, ecchymosis in bilateral upper extremity noted.





Assessment/Plan


1. Sepsis /  METHACILLIN RESISTANT STAPHYLOCOCCUS AUREUS bacteremia, lactic 

acidosis.


2. Failure to thrive with severe protein calorie malnutrition.


3. Coronary artery disease.


4. Diabetes, type 2.


5. Hypertension.


6. Sick sinus syndrome.


7. Hypercholesterolemia.


8. Aortic stenosis.


9. Pulmonary hypertension.


10. Alzheimer dementia.


11.  Acute tubular necrosis/acute kidney injury most likely secondary to 

dehydration and sepsis.


12. Sacral decubitus ulcer, stage III


13. Severe dehydration


14.  Hypokalemia.


15.  Abnormal liver function test


16.  Anemia/thrombocytopenia





TREATMENT:


1.  Patient on broad spectrum antibiotics with vancomycin and Zosyn IV.  Will 

require transesophageal echocardiogram to R/O endocarditis-see ID note


2. Coronary artery disease.  The patient is status post coronary artery


bypass graft in 2017.


3. Diabetes, type 2.  The patient has been started on NovoLog sliding


scale.  


4. Hypertension.  The patient is to continue on atenolol and losartan as


above.


5. Sick sinus syndrome.  The patient is status post pacemaker


implantation.


6. Hypercholesterolemia.  Continue simvastatin as above.


7. Aortic stenosis.


8. Pulmonary hypertension.


9. Alzheimer dementia.


10.  Anemia/thrombocytopenia-S/P 1 unit PRBC on 2/11/19.  Heme/Onc consult.  

Hold heparin





Restart tube feeding at rate of 45 cc/hr.


CODE STATUS is full code as per POLST,


DVT prophylaxis: D/C Heparin subcu due to thrombocytopenia











Cheo Ortiz MD Feb 14, 2019 19:27

## 2019-02-14 NOTE — PRE-PROCEDURE NOTE/ATTESTATION
Pre-Procedure Note/Attestation


Complete Prior to Procedure


Planned Procedure:  not applicable


Procedure Narrative:


EGD





Indications for Procedure


Pre-Operative Diagnosis:


GERD





Attestation


I attest that I discussed the nature of the procedure; its benefits; risks and 

complications; and alternatives (and the risks and benefits of such alternatives

), prior to the procedure, with the patient (or the patient's legal 

representative).





I attest that, if there was a reasonable possibility of needing a blood 

transfusion, the patient (or the patient's legal representative) was given the 

[facility] standardized written summary, pursuant 

to the [place] Blood Safety Act ([place] Health and Safety Code # 1645, as 

amended).





I attest that I re-evaluated the patient just prior to the surgery and that 

there has been no change in the patient's H&P, except as documented below:











Cal Moreno MD Feb 14, 2019 11:44

## 2019-02-14 NOTE — NUR
NURSE NOTES:

Received patient from [facility]. Patient is awake and resting comfortably in bed. Alert and 
oriented x2. Barb[place] speaking only. Family member call and spoked with patient via 
telephone. Patient shows no signs and symptoms of pain and/or distress. Will continue plan 
of care.

## 2019-02-14 NOTE — INTERNAL MED PROGRESS NOTE
Subjective


Date of Service:  Feb 14, 2019


Physician Name


Cheo Ortiz


Attending Physician


Juan Carlos Magana MD





Current Medications








 Medications


  (Trade)  Dose


 Ordered  Sig/Violette


 Route


 PRN Reason  Start Time


 Stop Time Status Last Admin


Dose Admin


 


 Acetaminophen


  (Tylenol)  650 mg  Q4H  PRN


 NG


 Mild Pain/Temp > 100.5  2/12/19 18:15


 3/14/19 18:14   


 


 


 Chlorhexidine


 Gluconate


  (Evita-Hex 2%)  1 applic  DAILY@2000


 TOPIC


   2/12/19 20:00


 3/14/19 19:59  2/13/19 20:42


 


 


 Clonidine HCl


  (Catapres Tab)  0.1 mg  Q8H  PRN


 ORAL


 SBP>160mmHg  2/11/19 06:30


 3/10/19 06:29   


 


 


 Daptomycin 400 mg/


 Sodium Chloride  55 ml @ 


 100 mls/hr  Q24H


 IV


   2/13/19 18:00


 2/20/19 17:59  2/14/19 17:41


 


 


 Dextrose


  (Dextrose 50%)  25 ml  Q30M  PRN


 IV


 Hypoglycemia  2/11/19 06:15


 3/9/19 13:30   


 


 


 Dextrose


  (Dextrose 50%)  50 ml  Q30M  PRN


 IV


 hypoglycemia  2/11/19 06:15


 3/9/19 13:44   


 


 


 Dextrose/Sodium


 Chloride  1,000 ml @ 


 50 mls/hr  Q20H


 IV


   2/12/19 11:30


 3/14/19 11:29  2/14/19 03:30


 


 


 Insulin Aspart


  (NovoLOG)    EVERY 6  HOURS


 SUBQ


   2/11/19 12:00


 3/9/19 16:29  2/12/19 12:22


 


 


 Lorazepam


  (Ativan)  1 mg  Q6H  PRN


 ORAL


 For Anxiety  2/11/19 07:00


 2/15/19 12:52   


 


 


 Morphine Sulfate


  (Morphine


 Sulfate)  2 mg  Q4H  PRN


 IVP


 Moderate Pain (Pain Scale 4-6)  2/11/19 09:00


 2/15/19 12:52  2/12/19 22:01


 


 


 Olanzapine


  (ZyPREXA)  2.5 mg  BID


 ORAL


   2/11/19 09:00


 3/10/19 10:59  2/14/19 17:41


 


 


 Ondansetron HCl


  (Zofran)  4 mg  Q6H  PRN


 IVP


 Nausea & Vomiting  2/11/19 07:00


 3/10/19 12:52   


 


 


 Pantoprazole 80


 mg/Sodium Chloride  250 ml @ 


 25 mls/hr  Q10H


 IV


   2/13/19 15:00


 3/15/19 14:59  2/14/19 10:15


 


 


 Temazepam


  (Restoril)  15 mg  HSPRN  PRN


 ORAL


 Insomnia  2/11/19 21:00


 2/14/19 20:59  2/13/19 01:10


 








Allergies:  


Coded Allergies:  


     No Known Allergies (Unverified , 3/26/14)


ROS Limited/Unobtainable:  Yes


Subjective


77 YO F admitted with dehydration and hypoxia.  Now sepsis.  Cover for Int Med-

Dr Magana.  FAWN.  Continues on venturi mask





Objective





Last Vital Signs








  Date Time  Temp Pulse Resp B/P (MAP) Pulse Ox O2 Delivery O2 Flow Rate FiO2


 


2/14/19 18:00      Venturi Mask 12.0 


 


2/14/19 16:00 97.5 77 20 102/60 (74) 95   


 


2/13/19 21:55        50











Laboratory Tests








Test


  2/14/19


05:24 2/14/19


07:45


 


White Blood Count


  7.0 K/UL


(4.8-10.8) 11.8 K/UL


(4.8-10.8)  #H


 


Red Blood Count


  2.03 M/UL


(4.20-5.40)  L 3.22 M/UL


(4.20-5.40)  L


 


Hemoglobin


  5.9 G/DL


(12.0-16.0) 9.3 G/DL


(12.0-16.0)  #L


 


Hematocrit


  18.9 %


(37.0-47.0)  #L 29.0 %


(37.0-47.0)  #L


 


Mean Corpuscular Volume 93 FL (80-99)   90 FL (80-99)  


 


Mean Corpuscular Hemoglobin


  29.1 PG


(27.0-31.0) 28.8 PG


(27.0-31.0)


 


Mean Corpuscular Hemoglobin


Concent 31.2 G/DL


(32.0-36.0)  L 32.0 G/DL


(32.0-36.0)


 


Red Cell Distribution Width


  15.4 %


(11.6-14.8)  H 15.1 %


(11.6-14.8)  H


 


Platelet Count


  30 K/UL


(150-450)  L 34 K/UL


(150-450)  L


 


Mean Platelet Volume


  13.2 FL


(6.5-10.1)  H 11.8 FL


(6.5-10.1)  H


 


Neutrophils (%) (Auto)


  % (45.0-75.0)


  % (45.0-75.0)


 


 


Lymphocytes (%) (Auto)


  % (20.0-45.0)


  % (20.0-45.0)


 


 


Monocytes (%) (Auto)  % (1.0-10.0)    % (1.0-10.0)  


 


Eosinophils (%) (Auto)  % (0.0-3.0)    % (0.0-3.0)  


 


Basophils (%) (Auto)  % (0.0-2.0)    % (0.0-2.0)  


 


Differential Total Cells


Counted 100  


  100  


 


 


Neutrophils % (Manual) 80 % (45-75)  H 85 % (45-75)  H


 


Lymphocytes % (Manual) 9 % (20-45)  L 4 % (20-45)  L


 


Monocytes % (Manual) 3 % (1-10)   6 % (1-10)  


 


Eosinophils % (Manual) 0 % (0-3)   1 % (0-3)  


 


Basophils % (Manual) 0 % (0-2)   0 % (0-2)  


 


Band Neutrophils 8 % (0-8)   4 % (0-8)  


 


Platelet Estimate Decreased  L Decreased  L


 


Platelet Morphology Normal   Normal  


 


Hypochromasia 2+   1+  


 


Anisocytosis 2+   1+  


 


Prothrombin Time


  15.4 SEC


(9.30-11.50)  H 


 


 


Prothromb Time International


Ratio 1.5 (0.9-1.1)


H 


 


 


Activated Partial


Thromboplast Time 39 SEC (23-33)


H 


 


 


Fibrinogen


  192 mg/dL


(200-400)  L 


 


 


Sodium Level


  146 MMOL/L


(136-145)  H 


 


 


Potassium Level


  3.7 MMOL/L


(3.5-5.1) 


 


 


Chloride Level


  120 MMOL/L


()  H 


 


 


Carbon Dioxide Level


  13 MMOL/L


(21-32)  L 


 


 


Anion Gap


  13 mmol/L


(5-15) 


 


 


Blood Urea Nitrogen


  30 mg/dL


(7-18)  H 


 


 


Creatinine


  0.7 MG/DL


(0.55-1.30) 


 


 


Estimat Glomerular Filtration


Rate  mL/min (>60)  


  


 


 


Glucose Level


  104 MG/DL


() 


 


 


Calcium Level


  6.6 MG/DL


(8.5-10.1)  L 


 


 


Iron Level


  49 ug/dL


()  L 


 


 


Total Iron Binding Capacity


  62 ug/dL


(250-450)  L 


 


 


Percent Iron Saturation 79 % (15-50)  H 


 


Unsaturated Iron Binding


  13 ug/dL


(112-346)  L 


 


 


Total Bilirubin


  1.9 MG/DL


(0.2-1.0)  H 


 


 


Direct Bilirubin


  1.2 MG/DL


(0.0-0.3)  H 


 


 


Aspartate Amino Transf


(AST/SGOT) 28 U/L (15-37)


  


 


 


Alanine Aminotransferase


(ALT/SGPT) 46 U/L (12-78)


  


 


 


Alkaline Phosphatase


  91 U/L


() 


 


 


Lactate Dehydrogenase


  206 U/L


() 


 


 


Total Creatine Kinase


  92 U/L


() 


 


 


C-Reactive Protein,


Quantitative 8.9 mg/dL


(0.00-0.90)  H 


 


 


Total Protein


  4.2 G/DL


(6.4-8.2)  L 


 


 


Albumin


  1.1 G/DL


(3.4-5.0)  L 


 


 


Erythrocyte Sedimentation Rate


  


  35 MM/HR


(0-30)  H


 


Haptoglobin  Pending  


 


Heparin-PF4 Antibody Screen  Pending  


 


Hepatitis A IgM Antibody  Pending  


 


Hepatitis B Surface Antigen  Pending  


 


Hepatitis B Core IgM Antibody  Pending  


 


Hepatitis C Antibody  Pending  











Microbiology








 Date/Time


Source Procedure


Growth Status


 


 


 2/12/19 18:15


Blood Blood Culture - Final


Staphylococcus Aureus - Mrsa Complete


 


 2/12/19 18:00


Blood Blood Culture - Final


Staphylococcus Aureus - Mrsa Complete

















Intake and Output  


 


 2/13/19 2/14/19





 19:00 07:00


 


Intake Total 775 ml 925 ml


 


Output Total 300 ml 350 ml


 


Balance 475 ml 575 ml


 


  


 


IV Total 675 ml 825 ml


 


Other 100 ml 100 ml


 


Output Urine Total 300 ml 350 ml


 


# Voids 2 








Objective


Objective


General: No acute distress, awake and less responsive, Sleepy, cachectic.


HEENT: NCAT, sclera anicteric, PERRL, NG tube.


Neck: Supple, no significant jugular venous distention, 


Lungs: Non-rebreather mask; Poor inspiratory effort, decreased air in the bases 

, Bilateral Wheeze and Rales.


Heart: Regular rate and rhythm, normal S1/S2, no murmur.


Abdomen: soft, nontender, nondistended. Normoactive bowel sounds.


 / Rectal: Refused and deferred.


Extremities: No Cyanosis , No clubbing,  Left UE edema.  Lateral lower 

extremity / feet dressing intact.


Neuro: A&O x 1, Able to move all extremities


Skin: warm, no rashes or lesions, ecchymosis in bilateral upper extremity noted.





Assessment/Plan


1. Sepsis /  METHACILLIN RESISTANT STAPHYLOCOCCUS AUREUS bacteremia, lactic 

acidosis.


2. Failure to thrive with severe protein calorie malnutrition.


3. Coronary artery disease.


4. Diabetes, type 2.


5. Hypertension.


6. Sick sinus syndrome.


7. Hypercholesterolemia.


8. Aortic stenosis.


9. Pulmonary hypertension.


10. Alzheimer dementia.


11.  Acute tubular necrosis/acute kidney injury most likely secondary to 

dehydration and sepsis.


12. Sacral decubitus ulcer, stage III


13. Severe dehydration


14.  Hypokalemia.


15.  Abnormal liver function test


16.  Anemia/thrombocytopenia


17.  Bilateral pneumonia ?septic emboli?





TREATMENT:


1.  Patient on broad spectrum antibiotics with vancomycin and Zosyn IV.  Will 

require transesophageal echocardiogram to R/O endocarditis-see ID note


2. Coronary artery disease.  The patient is status post coronary artery


bypass graft in 2017.


3. Diabetes, type 2.  The patient has been started on NovoLog sliding


scale.  


4. Hypertension.  The patient is to continue on atenolol and losartan as


above.


5. Sick sinus syndrome.  The patient is status post pacemaker


implantation.


6. Hypercholesterolemia.  Continue simvastatin as above.


7. Aortic stenosis.


8. Pulmonary hypertension.


9. Alzheimer dementia.


10.  Anemia/thrombocytopenia-S/P 1 unit PRBC on 2/11/19.  Heme/Onc consult.  

Hold heparin


11.  Septic pulmonary emboli-await MARTHA-see cardiology note.





Restart tube feeding at rate of 45 cc/hr.


CODE STATUS is full code as per POLST,


DVT prophylaxis: D/C Heparin subcu due to thrombocytopenia











Cheo Ortiz MD Feb 14, 2019 19:29

## 2019-02-14 NOTE — GENERAL PROGRESS NOTE
Assessment/Plan


Assessment/Plan








ASSESSMENT/RECS:





1. Pancytopenia with thrombocytopenia that is severe is most likely related to 

Sepsis, lactic acidosis. In addition has received heparin. US abdomen ordered 

and shows no spleen and no cirrhosis


--> heparin discontinued


--> HIT ab test ordered with venous duplex


--> smear peripheral has been ordered


--> tumor markers as needed/prn


--> hold off on transfusion unless plt <20k


--> hold off on steriods


--> plt trend: 34-->


2. Failure to thrive with severe protein calorie malnutrition.


->> calorie counts daily


--> consider mirtazapine as needed per pcp


3. Anemia of chronic disease


--> panel has been ordered


--> transfuse if hgb <7


4. Leukocytosis


--> on abx as per id for infection


5. Hypertension.


6. Sick sinus syndrome.


7. Hypercholesterolemia.


8. Aortic stenosis.


9. Pulmonary hypertension.


10. Alzheimer dementia.


11.  Acute tubular necrosis/acute kidney injury most likely secondary to 

dehydration and sepsis.





Greatly appreciate consultation!





Subjective


Allergies:  


Coded Allergies:  


     No Known Allergies (Unverified , 3/26/14)


Subjective


2/14:seen by bedside, Possible EGD tomorrow to evaluate upper GI bleed, today's 

EGD was canceled by anesthesia, plt remains low at 34, hgb trending up .





Objective





Last 24 Hour Vital Signs








  Date Time  Temp Pulse Resp B/P (MAP) Pulse Ox O2 Delivery O2 Flow Rate FiO2


 


2/14/19 12:00      Venturi Mask 12.0 


 


2/14/19 12:00 96.8 110 28 104/68 (80) 95   


 


2/14/19 11:46  96      


 


2/14/19 08:02  81      


 


2/14/19 08:00      Venturi Mask 12.0 


 


2/14/19 08:00 97.8 85 32 101/79 (86) 93   


 


2/14/19 04:00      Venturi Mask 12.0 


 


2/14/19 04:00 97.7 67 32 115/58 (77) 97   


 


2/14/19 03:20  71      


 


2/14/19 00:00      Venturi Mask 12.0 


 


2/14/19 00:00 98.0 96 32 120/66 (84) 95   


 


2/13/19 23:24  73      


 


2/13/19 21:55  81 20   Venturi Mask 12.0 50


 


2/13/19 21:55      Venturi Mask 12.0 50


 


2/13/19 21:55     100 Venturi Mask 12.0 50


 


2/13/19 20:00 98.6 72 32 104/63 (77) 100   


 


2/13/19 20:00      Venturi Mask 12.0 


 


2/13/19 19:29  66      

















Intake and Output  


 


 2/13/19 2/14/19





 19:00 07:00


 


Intake Total 775 ml 925 ml


 


Output Total 300 ml 350 ml


 


Balance 475 ml 575 ml


 


  


 


IV Total 675 ml 825 ml


 


Other 100 ml 100 ml


 


Output Urine Total 300 ml 350 ml


 


# Voids 2 








Laboratory Tests


2/14/19 05:24: 


White Blood Count 7.0, Red Blood Count 2.03L, Hemoglobin 5.9#*L, Hematocrit 18.9

#L, Mean Corpuscular Volume 93, Mean Corpuscular Hemoglobin 29.1, Mean 

Corpuscular Hemoglobin Concent 31.2L, Red Cell Distribution Width 15.4H, 

Platelet Count 30L, Mean Platelet Volume 13.2H, Neutrophils (%) (Auto) , 

Lymphocytes (%) (Auto) , Monocytes (%) (Auto) , Eosinophils (%) (Auto) , 

Basophils (%) (Auto) , Differential Total Cells Counted 100, Neutrophils % (

Manual) 80H, Lymphocytes % (Manual) 9L, Monocytes % (Manual) 3, Eosinophils % (

Manual) 0, Basophils % (Manual) 0, Band Neutrophils 8, Platelet Estimate 

DecreasedL, Platelet Morphology Normal, Hypochromasia 2+, Anisocytosis 2+, 

Prothrombin Time 15.4H, Prothromb Time International Ratio 1.5H, Activated 

Partial Thromboplast Time 39H, Fibrinogen 192L, Sodium Level 146H, Potassium 

Level 3.7, Chloride Level 120H, Carbon Dioxide Level 13L, Anion Gap 13, Blood 

Urea Nitrogen 30H, Creatinine 0.7, Estimat Glomerular Filtration Rate , Glucose 

Level 104, Calcium Level 6.6L, Iron Level 49L, Total Iron Binding Capacity 62L, 

Percent Iron Saturation 79H, Unsaturated Iron Binding 13L, Total Bilirubin 1.9H

, Direct Bilirubin 1.2H, Aspartate Amino Transf (AST/SGOT) 28, Alanine 

Aminotransferase (ALT/SGPT) 46, Alkaline Phosphatase 91, Lactate Dehydrogenase 

206, Total Creatine Kinase 92, C-Reactive Protein, Quantitative 8.9H, Total 

Protein 4.2L, Albumin 1.1L


2/14/19 07:45: 


White Blood Count 11.8#H, Red Blood Count 3.22L, Hemoglobin 9.3#L, Hematocrit 

29.0#L, Mean Corpuscular Volume 90, Mean Corpuscular Hemoglobin 28.8, Mean 

Corpuscular Hemoglobin Concent 32.0, Red Cell Distribution Width 15.1H, 

Platelet Count 34L, Mean Platelet Volume 11.8H, Neutrophils (%) (Auto) , 

Lymphocytes (%) (Auto) , Monocytes (%) (Auto) , Eosinophils (%) (Auto) , 

Basophils (%) (Auto) , Differential Total Cells Counted 100, Neutrophils % (

Manual) 85H, Lymphocytes % (Manual) 4L, Monocytes % (Manual) 6, Eosinophils % (

Manual) 1, Basophils % (Manual) 0, Band Neutrophils 4, Platelet Estimate 

DecreasedL, Platelet Morphology Normal, Hypochromasia 1+, Anisocytosis 1+, 

Erythrocyte Sedimentation Rate 35H, Haptoglobin [Pending], Heparin-PF4 Antibody 

Screen [Pending], Hepatitis A IgM Antibody [Pending], Hepatitis B Surface 

Antigen [Pending], Hepatitis B Core IgM Antibody [Pending], Hepatitis C 

Antibody [Pending]


Height (Feet):  5


Height (Inches):  5.00


Weight (Pounds):  144


Objective





GENERAL: Awake responsive to deep stimuli with opening of her eyes, in no 

apparent distress.


HEENT:  Eyes, pupils equal responsive to light and accommodation.


CHEST: Poor inspiratory effort, decreased air in the bases no wheezes or 

rhonchi was appreciated


CARDIOVASCULAR:  Regular rhythm and rate.  S1 and S2 are normal without murmurs 

or gallops.


ABDOMEN:  Soft, nontender, and nondistended.  Positive bowel sounds.  No 

rebounding or guarding noted.


EXTREMITIES:  Negative for clubbing, cyanosis, or edema, muscle atrophy in 

bilateral lower extremity


RECTAL/GENITAL:  Not performed.


NEUROLOGIC: Less responsive, unable to follow commands, however open her eyes 

with a deep stimulation, unable to evaluate for gait due to the patient's 

status.











Darian Topete MD Feb 14, 2019 16:54

## 2019-02-14 NOTE — PULMONOLOGY PROGRESS NOTE
Assessment/Plan


Problems:  


(1) Thrombocytopenia


(2) Sepsis


(3) ATN (acute tubular necrosis)


(4) Pulmonary hypertension


(5) Sacral decubitus ulcer, stage III


(6) Alzheimer's dementia


(7) Protein-calorie malnutrition, severe


(8) HTN (hypertension)


(9) Pacemaker


(10) CAD (coronary artery disease)


Assessment/Plan


on face mask


/u PLT


failed swallow study


Morphine prn


d/w wound care nurse, 


pan cultures


iv fluids


check electrolytes


sliding scale


check electrolytes daily


broad spectrum abx


dvt prophylaxis


insulin coverage.


social service consult





Subjective


ROS Limited/Unobtainable:  Yes


Interval Events:  responds to pain only


Constitutional:  Reports: no symptoms


Allergies:  


Coded Allergies:  


     No Known Allergies (Unverified , 3/26/14)





Objective





Last 24 Hour Vital Signs








  Date Time  Temp Pulse Resp B/P (MAP) Pulse Ox O2 Delivery O2 Flow Rate FiO2


 


2/14/19 08:00      Venturi Mask 12.0 


 


2/14/19 08:00 97.8 85 32 101/79 (86) 93   


 


2/14/19 04:00      Venturi Mask 12.0 


 


2/14/19 04:00 97.7 67 32 115/58 (77) 97   


 


2/14/19 03:20  71      


 


2/14/19 00:00      Venturi Mask 12.0 


 


2/14/19 00:00 98.0 96 32 120/66 (84) 95   


 


2/13/19 23:24  73      


 


2/13/19 21:55  81 20   Venturi Mask 12.0 50


 


2/13/19 21:55      Venturi Mask 12.0 50


 


2/13/19 21:55     100 Venturi Mask 12.0 50


 


2/13/19 20:00 98.6 72 32 104/63 (77) 100   


 


2/13/19 20:00      Venturi Mask 12.0 


 


2/13/19 19:29  66      


 


2/13/19 16:00  94      


 


2/13/19 16:00 98.5 99 22 141/93 (109) 95   


 


2/13/19 16:00      Venturi Mask 12.0 


 


2/13/19 12:00  71      


 


2/13/19 12:00 97.0 83 22 136/78 (97) 100   


 


2/13/19 11:51      Venturi Mask 12.0 

















Intake and Output  


 


 2/13/19 2/14/19





 19:00 07:00


 


Intake Total 775 ml 875 ml


 


Output Total 300 ml 350 ml


 


Balance 475 ml 525 ml


 


  


 


IV Total 675 ml 775 ml


 


Other 100 ml 100 ml


 


Output Urine Total 300 ml 350 ml


 


# Voids 2 








Objective


General Appearance:  cachectic


HEENT:  normocephalic, somnolent


Respiratory/Chest:  chest wall non-tender, lungs clear


Cardiovascular:  normal peripheral pulses, normal rate


Abdomen:  normal bowel sounds, soft, non tender


Extremities:  no cyanosis


Skin:  no rash





Microbiology








 Date/Time


Source Procedure


Growth Status


 


 


 2/12/19 18:15


Blood Blood Culture - Final


Staphylococcus Aureus - Mrsa Complete


 


 2/12/19 18:00


Blood Blood Culture - Final


Staphylococcus Aureus - Mrsa Complete


 


 2/11/19 15:15


Blood Blood Culture - Final


Staphylococcus Aureus - Mrsa Complete


 


 2/11/19 15:15


Blood Blood Culture - Final


Staphylococcus Aureus - Mrsa Complete








Laboratory Tests


2/13/19 16:30: 


Ferritin 942H, Folate 13.1


2/14/19 05:24: 


White Blood Count 7.0, Red Blood Count 2.03L, Hemoglobin 5.9#*L, Hematocrit 18.9

#L, Mean Corpuscular Volume 93, Mean Corpuscular Hemoglobin 29.1, Mean 

Corpuscular Hemoglobin Concent 31.2L, Red Cell Distribution Width 15.4H, 

Platelet Count 30L, Mean Platelet Volume 13.2H, Neutrophils (%) (Auto) , 

Lymphocytes (%) (Auto) , Monocytes (%) (Auto) , Eosinophils (%) (Auto) , 

Basophils (%) (Auto) , Differential Total Cells Counted 100, Neutrophils % (

Manual) 80H, Lymphocytes % (Manual) 9L, Monocytes % (Manual) 3, Eosinophils % (

Manual) 0, Basophils % (Manual) 0, Band Neutrophils 8, Platelet Estimate 

DecreasedL, Platelet Morphology Normal, Hypochromasia 2+, Anisocytosis 2+, 

Prothrombin Time 15.4H, Prothromb Time International Ratio 1.5H, Activated 

Partial Thromboplast Time 39H, Fibrinogen 192L, Sodium Level 146H, Potassium 

Level 3.7, Chloride Level 120H, Carbon Dioxide Level 13L, Anion Gap 13, Blood 

Urea Nitrogen 30H, Creatinine 0.7, Estimat Glomerular Filtration Rate , Glucose 

Level 104, Calcium Level 6.6L, Iron Level 49L, Total Iron Binding Capacity 62L, 

Percent Iron Saturation 79H, Unsaturated Iron Binding 13L, Total Bilirubin 1.9H

, Direct Bilirubin 1.2H, Aspartate Amino Transf (AST/SGOT) 28, Alanine 

Aminotransferase (ALT/SGPT) 46, Alkaline Phosphatase 91, Lactate Dehydrogenase 

206, Total Creatine Kinase 92, C-Reactive Protein, Quantitative 8.9H, Total 

Protein 4.2L, Albumin 1.1L


2/14/19 07:45: 


White Blood Count 11.8#H, Red Blood Count 3.22L, Hemoglobin 9.3#L, Hematocrit 

29.0#L, Mean Corpuscular Volume 90, Mean Corpuscular Hemoglobin 28.8, Mean 

Corpuscular Hemoglobin Concent 32.0, Red Cell Distribution Width 15.1H, 

Platelet Count 34L, Mean Platelet Volume 11.8H, Neutrophils (%) (Auto) , 

Lymphocytes (%) (Auto) , Monocytes (%) (Auto) , Eosinophils (%) (Auto) , 

Basophils (%) (Auto) , Differential Total Cells Counted 100, Neutrophils % (

Manual) 85H, Lymphocytes % (Manual) 4L, Monocytes % (Manual) 6, Eosinophils % (

Manual) 1, Basophils % (Manual) 0, Band Neutrophils 4, Platelet Estimate 

DecreasedL, Platelet Morphology Normal, Hypochromasia 1+, Anisocytosis 1+, 

Erythrocyte Sedimentation Rate 35H, Haptoglobin [Pending], Heparin-PF4 Antibody 

Screen [Pending], Hepatitis A IgM Antibody [Pending], Hepatitis B Surface 

Antigen [Pending], Hepatitis B Core IgM Antibody [Pending], Hepatitis C 

Antibody [Pending]





Current Medications








 Medications


  (Trade)  Dose


 Ordered  Sig/Violette


 Route


 PRN Reason  Start Time


 Stop Time Status Last Admin


Dose Admin


 


 Acetaminophen


  (Tylenol)  650 mg  Q4H  PRN


 NG


 Mild Pain/Temp > 100.5  2/12/19 18:15


 3/14/19 18:14   


 


 


 Barium Sulfate


  (Readi-Cat 2)  450 ml  NOW  PRN


 ORAL


 Radiology Procedure  2/13/19 16:00


 2/14/19 15:59   


 


 


 Chlorhexidine


 Gluconate


  (Evita-Hex 2%)  1 applic  DAILY@2000


 TOPIC


   2/12/19 20:00


 3/14/19 19:59  2/13/19 20:42


 


 


 Clonidine HCl


  (Catapres Tab)  0.1 mg  Q8H  PRN


 ORAL


 SBP>160mmHg  2/11/19 06:30


 3/10/19 06:29   


 


 


 Daptomycin 400 mg/


 Sodium Chloride  55 ml @ 


 100 mls/hr  Q24H


 IV


   2/13/19 18:00


 2/20/19 17:59  2/13/19 18:11


 


 


 Dextrose


  (Dextrose 50%)  25 ml  Q30M  PRN


 IV


 Hypoglycemia  2/11/19 06:15


 3/9/19 13:30   


 


 


 Dextrose


  (Dextrose 50%)  50 ml  Q30M  PRN


 IV


 hypoglycemia  2/11/19 06:15


 3/9/19 13:44   


 


 


 Dextrose/Sodium


 Chloride  1,000 ml @ 


 50 mls/hr  Q20H


 IV


   2/12/19 11:30


 3/14/19 11:29  2/14/19 03:30


 


 


 Insulin Aspart


  (NovoLOG)    EVERY 6  HOURS


 SUBQ


   2/11/19 12:00


 3/9/19 16:29  2/12/19 12:22


 


 


 Iopamidol


  (Isovue-300


 100ml)  100 ml  NOW  PRN


 INJ


 Radiology Procedure  2/13/19 16:00


 2/14/19 15:59   


 


 


 Lorazepam


  (Ativan)  1 mg  Q6H  PRN


 ORAL


 For Anxiety  2/11/19 07:00


 2/15/19 12:52   


 


 


 Morphine Sulfate


  (Morphine


 Sulfate)  2 mg  Q4H  PRN


 IVP


 Moderate Pain (Pain Scale 4-6)  2/11/19 09:00


 2/15/19 12:52  2/12/19 22:01


 


 


 Olanzapine


  (ZyPREXA)  2.5 mg  BID


 ORAL


   2/11/19 09:00


 3/10/19 10:59  2/14/19 08:45


 


 


 Ondansetron HCl


  (Zofran)  4 mg  Q6H  PRN


 IVP


 Nausea & Vomiting  2/11/19 07:00


 3/10/19 12:52   


 


 


 Pantoprazole 80


 mg/Sodium Chloride  250 ml @ 


 25 mls/hr  Q10H


 IV


   2/13/19 15:00


 3/15/19 14:59  2/14/19 10:15


 


 


 Temazepam


  (Restoril)  15 mg  HSPRN  PRN


 ORAL


 Insomnia  2/11/19 21:00


 2/14/19 20:59  2/13/19 01:10


 

















Korin Junior MD Feb 14, 2019 10:48

## 2019-02-14 NOTE — GENERAL PROGRESS NOTE
Assessment/Plan


Problem List:  


(1) Severe anemia


ICD Codes:  D64.9 - Anemia, unspecified


SNOMED:  494139836


(2) Alzheimer's dementia


ICD Codes:  G30.9 - Alzheimer's disease, unspecified; F02.80 - Dementia in 

other diseases classified elsewhere without behavioral disturbance


SNOMED:  58621879


(3) Pulmonary hypertension


ICD Codes:  I27.20 - Pulmonary hypertension, unspecified


SNOMED:  78295172


(4) Protein-calorie malnutrition, severe


ICD Codes:  E43 - Unspecified severe protein-calorie malnutrition


SNOMED:  722004354


(5) Pacemaker


ICD Codes:  Z95.0 - Presence of cardiac pacemaker


SNOMED:  339704578


(6) HTN (hypertension)


ICD Codes:  I10 - Essential (primary) hypertension


SNOMED:  25755792


(7) CAD (coronary artery disease)


ICD Codes:  I25.10 - Atherosclerotic heart disease of native coronary artery 

without angina pectoris


SNOMED:  70474519


(8) Diabetes mellitus, type II


ICD Codes:  E11.9 - Type 2 diabetes mellitus without complications


SNOMED:  08883617


Assessment/Plan


Possible EGD tomorrow to evaluate upper GI bleed, today's EGD was canceled by 

anesthesia


Patient may need PEG in the future, follow-up ST evaluations


NGT to low intermittent suction


 Protonix drip


anemia work up


OB stool r/o GI bleed


monitor H&H, prn transfusions


bowel regime


fu labs, hepatitis panel





Subjective


ROS Limited/Unobtainable:  No


Allergies:  


Coded Allergies:  


     No Known Allergies (Unverified , 3/26/14)





Objective





Last 24 Hour Vital Signs








  Date Time  Temp Pulse Resp B/P (MAP) Pulse Ox O2 Delivery O2 Flow Rate FiO2


 


2/14/19 08:02  81      


 


2/14/19 08:00      Venturi Mask 12.0 


 


2/14/19 08:00 97.8 85 32 101/79 (86) 93   


 


2/14/19 04:00      Venturi Mask 12.0 


 


2/14/19 04:00 97.7 67 32 115/58 (77) 97   


 


2/14/19 03:20  71      


 


2/14/19 00:00      Venturi Mask 12.0 


 


2/14/19 00:00 98.0 96 32 120/66 (84) 95   


 


2/13/19 23:24  73      


 


2/13/19 21:55  81 20   Venturi Mask 12.0 50


 


2/13/19 21:55      Venturi Mask 12.0 50


 


2/13/19 21:55     100 Venturi Mask 12.0 50


 


2/13/19 20:00 98.6 72 32 104/63 (77) 100   


 


2/13/19 20:00      Venturi Mask 12.0 


 


2/13/19 19:29  66      


 


2/13/19 16:00  94      


 


2/13/19 16:00 98.5 99 22 141/93 (109) 95   


 


2/13/19 16:00      Venturi Mask 12.0 

















Intake and Output  


 


 2/13/19 2/14/19





 19:00 07:00


 


Intake Total 775 ml 875 ml


 


Output Total 300 ml 350 ml


 


Balance 475 ml 525 ml


 


  


 


IV Total 675 ml 775 ml


 


Other 100 ml 100 ml


 


Output Urine Total 300 ml 350 ml


 


# Voids 2 








Laboratory Tests


2/13/19 16:30: 


Ferritin 942H, Folate 13.1


2/14/19 05:24: 


White Blood Count 7.0, Red Blood Count 2.03L, Hemoglobin 5.9#*L, Hematocrit 18.9

#L, Mean Corpuscular Volume 93, Mean Corpuscular Hemoglobin 29.1, Mean 

Corpuscular Hemoglobin Concent 31.2L, Red Cell Distribution Width 15.4H, 

Platelet Count 30L, Mean Platelet Volume 13.2H, Neutrophils (%) (Auto) , 

Lymphocytes (%) (Auto) , Monocytes (%) (Auto) , Eosinophils (%) (Auto) , 

Basophils (%) (Auto) , Differential Total Cells Counted 100, Neutrophils % (

Manual) 80H, Lymphocytes % (Manual) 9L, Monocytes % (Manual) 3, Eosinophils % (

Manual) 0, Basophils % (Manual) 0, Band Neutrophils 8, Platelet Estimate 

DecreasedL, Platelet Morphology Normal, Hypochromasia 2+, Anisocytosis 2+, 

Prothrombin Time 15.4H, Prothromb Time International Ratio 1.5H, Activated 

Partial Thromboplast Time 39H, Fibrinogen 192L, Sodium Level 146H, Potassium 

Level 3.7, Chloride Level 120H, Carbon Dioxide Level 13L, Anion Gap 13, Blood 

Urea Nitrogen 30H, Creatinine 0.7, Estimat Glomerular Filtration Rate , Glucose 

Level 104, Calcium Level 6.6L, Iron Level 49L, Total Iron Binding Capacity 62L, 

Percent Iron Saturation 79H, Unsaturated Iron Binding 13L, Total Bilirubin 1.9H

, Direct Bilirubin 1.2H, Aspartate Amino Transf (AST/SGOT) 28, Alanine 

Aminotransferase (ALT/SGPT) 46, Alkaline Phosphatase 91, Lactate Dehydrogenase 

206, Total Creatine Kinase 92, C-Reactive Protein, Quantitative 8.9H, Total 

Protein 4.2L, Albumin 1.1L


2/14/19 07:45: 


White Blood Count 11.8#H, Red Blood Count 3.22L, Hemoglobin 9.3#L, Hematocrit 

29.0#L, Mean Corpuscular Volume 90, Mean Corpuscular Hemoglobin 28.8, Mean 

Corpuscular Hemoglobin Concent 32.0, Red Cell Distribution Width 15.1H, 

Platelet Count 34L, Mean Platelet Volume 11.8H, Neutrophils (%) (Auto) , 

Lymphocytes (%) (Auto) , Monocytes (%) (Auto) , Eosinophils (%) (Auto) , 

Basophils (%) (Auto) , Differential Total Cells Counted 100, Neutrophils % (

Manual) 85H, Lymphocytes % (Manual) 4L, Monocytes % (Manual) 6, Eosinophils % (

Manual) 1, Basophils % (Manual) 0, Band Neutrophils 4, Platelet Estimate 

DecreasedL, Platelet Morphology Normal, Hypochromasia 1+, Anisocytosis 1+, 

Erythrocyte Sedimentation Rate 35H, Haptoglobin [Pending], Heparin-PF4 Antibody 

Screen [Pending], Hepatitis A IgM Antibody [Pending], Hepatitis B Surface 

Antigen [Pending], Hepatitis B Core IgM Antibody [Pending], Hepatitis C 

Antibody [Pending]


Height (Feet):  5


Height (Inches):  5.00


Weight (Pounds):  144


General Appearance:  confused


EENT:  normal ENT inspection


Neck:  supple


Cardiovascular:  tachycardia


Respiratory/Chest:  decreased breath sounds


Abdomen:  normal bowel sounds, non tender, soft


Extremities:  non-tender











Cal Moreno MD Feb 14, 2019 12:28

## 2019-02-14 NOTE — SURGERY PROGRESS NOTE
Surgery Progress Note


Subjective


Additional Comments


no acute events.





Objective





Last 24 Hour Vital Signs








  Date Time  Temp Pulse Resp B/P (MAP) Pulse Ox O2 Delivery O2 Flow Rate FiO2


 


2/14/19 12:00      Venturi Mask 12.0 


 


2/14/19 12:00 96.8 110 28 104/68 (80) 95   


 


2/14/19 11:46  96      


 


2/14/19 08:02  81      


 


2/14/19 08:00      Venturi Mask 12.0 


 


2/14/19 08:00 97.8 85 32 101/79 (86) 93   


 


2/14/19 04:00      Venturi Mask 12.0 


 


2/14/19 04:00 97.7 67 32 115/58 (77) 97   


 


2/14/19 03:20  71      


 


2/14/19 00:00      Venturi Mask 12.0 


 


2/14/19 00:00 98.0 96 32 120/66 (84) 95   


 


2/13/19 23:24  73      


 


2/13/19 21:55  81 20   Venturi Mask 12.0 50


 


2/13/19 21:55      Venturi Mask 12.0 50


 


2/13/19 21:55     100 Venturi Mask 12.0 50


 


2/13/19 20:00 98.6 72 32 104/63 (77) 100   


 


2/13/19 20:00      Venturi Mask 12.0 


 


2/13/19 19:29  66      








I&O











Intake and Output  


 


 2/13/19 2/14/19





 19:00 07:00


 


Intake Total 775 ml 925 ml


 


Output Total 300 ml 350 ml


 


Balance 475 ml 575 ml


 


  


 


IV Total 675 ml 825 ml


 


Other 100 ml 100 ml


 


Output Urine Total 300 ml 350 ml


 


# Voids 2 








Dressing:  other


Wound:  other


Drains:  other


Cardiovascular:  RSR


Respiratory:  clear


Abdomen:  soft, non-distended


Extremities:  other





Laboratory Tests








Test


  2/14/19


05:24 2/14/19


07:45


 


White Blood Count


  7.0 K/UL


(4.8-10.8) 11.8 K/UL


(4.8-10.8)  #H


 


Red Blood Count


  2.03 M/UL


(4.20-5.40)  L 3.22 M/UL


(4.20-5.40)  L


 


Hemoglobin


  5.9 G/DL


(12.0-16.0) 9.3 G/DL


(12.0-16.0)  #L


 


Hematocrit


  18.9 %


(37.0-47.0)  #L 29.0 %


(37.0-47.0)  #L


 


Mean Corpuscular Volume 93 FL (80-99)   90 FL (80-99)  


 


Mean Corpuscular Hemoglobin


  29.1 PG


(27.0-31.0) 28.8 PG


(27.0-31.0)


 


Mean Corpuscular Hemoglobin


Concent 31.2 G/DL


(32.0-36.0)  L 32.0 G/DL


(32.0-36.0)


 


Red Cell Distribution Width


  15.4 %


(11.6-14.8)  H 15.1 %


(11.6-14.8)  H


 


Platelet Count


  30 K/UL


(150-450)  L 34 K/UL


(150-450)  L


 


Mean Platelet Volume


  13.2 FL


(6.5-10.1)  H 11.8 FL


(6.5-10.1)  H


 


Neutrophils (%) (Auto)


  % (45.0-75.0)


  % (45.0-75.0)


 


 


Lymphocytes (%) (Auto)


  % (20.0-45.0)


  % (20.0-45.0)


 


 


Monocytes (%) (Auto)  % (1.0-10.0)    % (1.0-10.0)  


 


Eosinophils (%) (Auto)  % (0.0-3.0)    % (0.0-3.0)  


 


Basophils (%) (Auto)  % (0.0-2.0)    % (0.0-2.0)  


 


Differential Total Cells


Counted 100  


  100  


 


 


Neutrophils % (Manual) 80 % (45-75)  H 85 % (45-75)  H


 


Lymphocytes % (Manual) 9 % (20-45)  L 4 % (20-45)  L


 


Monocytes % (Manual) 3 % (1-10)   6 % (1-10)  


 


Eosinophils % (Manual) 0 % (0-3)   1 % (0-3)  


 


Basophils % (Manual) 0 % (0-2)   0 % (0-2)  


 


Band Neutrophils 8 % (0-8)   4 % (0-8)  


 


Platelet Estimate Decreased  L Decreased  L


 


Platelet Morphology Normal   Normal  


 


Hypochromasia 2+   1+  


 


Anisocytosis 2+   1+  


 


Prothrombin Time


  15.4 SEC


(9.30-11.50)  H 


 


 


Prothromb Time International


Ratio 1.5 (0.9-1.1)


H 


 


 


Activated Partial


Thromboplast Time 39 SEC (23-33)


H 


 


 


Fibrinogen


  192 mg/dL


(200-400)  L 


 


 


Sodium Level


  146 MMOL/L


(136-145)  H 


 


 


Potassium Level


  3.7 MMOL/L


(3.5-5.1) 


 


 


Chloride Level


  120 MMOL/L


()  H 


 


 


Carbon Dioxide Level


  13 MMOL/L


(21-32)  L 


 


 


Anion Gap


  13 mmol/L


(5-15) 


 


 


Blood Urea Nitrogen


  30 mg/dL


(7-18)  H 


 


 


Creatinine


  0.7 MG/DL


(0.55-1.30) 


 


 


Estimat Glomerular Filtration


Rate  mL/min (>60)  


  


 


 


Glucose Level


  104 MG/DL


() 


 


 


Calcium Level


  6.6 MG/DL


(8.5-10.1)  L 


 


 


Iron Level


  49 ug/dL


()  L 


 


 


Total Iron Binding Capacity


  62 ug/dL


(250-450)  L 


 


 


Percent Iron Saturation 79 % (15-50)  H 


 


Unsaturated Iron Binding


  13 ug/dL


(112-346)  L 


 


 


Total Bilirubin


  1.9 MG/DL


(0.2-1.0)  H 


 


 


Direct Bilirubin


  1.2 MG/DL


(0.0-0.3)  H 


 


 


Aspartate Amino Transf


(AST/SGOT) 28 U/L (15-37)


  


 


 


Alanine Aminotransferase


(ALT/SGPT) 46 U/L (12-78)


  


 


 


Alkaline Phosphatase


  91 U/L


() 


 


 


Lactate Dehydrogenase


  206 U/L


() 


 


 


Total Creatine Kinase


  92 U/L


() 


 


 


C-Reactive Protein,


Quantitative 8.9 mg/dL


(0.00-0.90)  H 


 


 


Total Protein


  4.2 G/DL


(6.4-8.2)  L 


 


 


Albumin


  1.1 G/DL


(3.4-5.0)  L 


 


 


Erythrocyte Sedimentation Rate


  


  35 MM/HR


(0-30)  H


 


Haptoglobin  Pending  


 


Heparin-PF4 Antibody Screen  Pending  


 


Hepatitis A IgM Antibody  Pending  


 


Hepatitis B Surface Antigen  Pending  


 


Hepatitis B Core IgM Antibody  Pending  


 


Hepatitis C Antibody  Pending  











Plan


Problems:  


(1) Sepsis


Assessment & Plan:  Leukocytosis resolved 


LFT's elevated w/ t bili / d bili - improved 


AM labs ordered 


hydrate


US Impression: Limited exam, as described. Note inability to visualize the 

spleen Negative for gallstones or dilated bile ducts Questionable bilateral 

renal parapelvic cysts Small to moderate right pleural effusion


no acute surgical intervention planned. 


will follow with recs. 





(2) Sacral decubitus ulcer, stage III


Assessment & Plan:  DTPI sacrum.Maroon- indurated discoloration with opening at 

sacral coccygeal area(L)3.8cm x (W)1.4cm with entire wound measuring (L)10cmx(W)

12cm.Small amt malodorous brown exudate.


Stable dry eschar R heel with dry peeling skin periwound (L)2cm x (W)3.5cm. 

Periwound is also red and boggy. 


Small dry eschar noted to lateral R 5th metatarsal (L)1.4cm x (W)0.3cm.  


L heel is boggy but colour is dusky . 


Resolving skin tear lateral R tibia. Wound bed is clean and dry.  





Tx. Plan:


Cleanse Sacral wound with Saline.Apply Therahoney to opening at sacrococcygeal 

area.Cavilon skin barrier to DTPI. Cover with Optifoam drsg .Change Daily and 

prn.


            


Apply Betadine to R heel  and Lateral R 5th metatarsal.Cover with Abd pad .Wrap 

with kerlix daily and prn.


            


Apply Cavilon Skin Barrier to L heel.Cover with Optifoam drsg .Change every 7 

days and prn.


            


Reposition at least every 2hours or as tolerated.


            


Off-load heels with pillow.





(3) Rectal bleed


(4) Protein-calorie malnutrition, severe


(5) Dehydration











Tuan Patricia Feb 14, 2019 17:04

## 2019-02-14 NOTE — NUR
NURSE NOTES:WOUND CARE FOLLOW-UP NOTES:Pt's phalanges both hands cyanotic and cold.L upper 
ext edematous and weeping serous exudate.Non-blanchable erythema L elbow with an area 
medially noted to be weeping serous exudate.#3 petechiae noted to R scapula. R upper ext 
also edematous. 

DTPI sacrum-base of wound maroon ,indurated with multiple open areas with slough. Borders 
are indurated and purple small amt sanguineous exudate noted. Mild odor noted.(L)9.5cm x 
(W)11cm.

L foot edematous,dusky and cold. cyanosis of metatarsals and plantar aspect .Dorso /flexor L 
foot maroon with scattered purple areas within base of affected area,oozing small amt serous 
exudate(L)7cm x (W)8cm.

Stable dry eschar L heel2.5cm x (W)2cm .Periwound heel is dusky in colour.

Stable dry eschar distal /posterior R tibia (L)1.1cm x (W)3.5cm.

Wound posterior R calf with mixed slough/necrosis .Small amt non-odorous brown exudate noted 
(L)1.5cm x (W)1.5cm.

All wound prevention protocols are being observed as implemented.Pt has an APM with JONNA 
mattress overlay and observed positioned properly with pillows.



Tx.Plan : Apply Abd pads and wrap L forearm with kerlix .Change daily and prn.

             Continue all current wound care orders as ordered .

## 2019-02-14 NOTE — NUR
NURSE NOTES:



No blood was transfused because repeat of CBC was HGB 9.1. 


-------------------------------------------------------------------------------

Addendum: 02/14/19 at 1949 by Rani Fox RN

-------------------------------------------------------------------------------

HGB 9.3

## 2019-02-15 VITALS — SYSTOLIC BLOOD PRESSURE: 109 MMHG | DIASTOLIC BLOOD PRESSURE: 62 MMHG

## 2019-02-15 VITALS — DIASTOLIC BLOOD PRESSURE: 47 MMHG | SYSTOLIC BLOOD PRESSURE: 109 MMHG

## 2019-02-15 VITALS — SYSTOLIC BLOOD PRESSURE: 103 MMHG | DIASTOLIC BLOOD PRESSURE: 63 MMHG

## 2019-02-15 VITALS — SYSTOLIC BLOOD PRESSURE: 117 MMHG | DIASTOLIC BLOOD PRESSURE: 56 MMHG

## 2019-02-15 VITALS — SYSTOLIC BLOOD PRESSURE: 110 MMHG | DIASTOLIC BLOOD PRESSURE: 43 MMHG

## 2019-02-15 VITALS — SYSTOLIC BLOOD PRESSURE: 112 MMHG | DIASTOLIC BLOOD PRESSURE: 85 MMHG

## 2019-02-15 LAB
ADD MANUAL DIFF: YES
ALBUMIN SERPL-MCNC: 1.3 G/DL (ref 3.4–5)
ALBUMIN/GLOB SERPL: 0.4 {RATIO} (ref 1–2.7)
ALP SERPL-CCNC: 76 U/L (ref 46–116)
ALT SERPL-CCNC: 35 U/L (ref 12–78)
ANION GAP SERPL CALC-SCNC: 14 MMOL/L (ref 5–15)
AST SERPL-CCNC: 21 U/L (ref 15–37)
BILIRUB DIRECT SERPL-MCNC: 1 MG/DL (ref 0–0.3)
BILIRUB SERPL-MCNC: 1.5 MG/DL (ref 0.2–1)
BUN SERPL-MCNC: 35 MG/DL (ref 7–18)
CALCIUM SERPL-MCNC: 7.1 MG/DL (ref 8.5–10.1)
CHLORIDE SERPL-SCNC: 113 MMOL/L (ref 98–107)
CO2 SERPL-SCNC: 17 MMOL/L (ref 21–32)
CREAT SERPL-MCNC: 1 MG/DL (ref 0.55–1.3)
ERYTHROCYTE [DISTWIDTH] IN BLOOD BY AUTOMATED COUNT: 15 % (ref 11.6–14.8)
GLOBULIN SER-MCNC: 3 G/DL
HCT VFR BLD CALC: 24.3 % (ref 37–47)
HGB BLD-MCNC: 7.9 G/DL (ref 12–16)
INR PPP: 1.3 (ref 0.9–1.1)
MCV RBC AUTO: 90 FL (ref 80–99)
PHOSPHATE SERPL-MCNC: 4.4 MG/DL (ref 2.5–4.9)
PLATELET # BLD: 44 K/UL (ref 150–450)
POTASSIUM SERPL-SCNC: 3.6 MMOL/L (ref 3.5–5.1)
RBC # BLD AUTO: 2.7 M/UL (ref 4.2–5.4)
SODIUM SERPL-SCNC: 144 MMOL/L (ref 136–145)
WBC # BLD AUTO: 12.3 K/UL (ref 4.8–10.8)

## 2019-02-15 RX ADMIN — INSULIN ASPART SCH UNITS: 100 INJECTION, SOLUTION INTRAVENOUS; SUBCUTANEOUS at 11:55

## 2019-02-15 RX ADMIN — INSULIN ASPART SCH UNITS: 100 INJECTION, SOLUTION INTRAVENOUS; SUBCUTANEOUS at 19:56

## 2019-02-15 RX ADMIN — INSULIN ASPART SCH UNITS: 100 INJECTION, SOLUTION INTRAVENOUS; SUBCUTANEOUS at 05:18

## 2019-02-15 RX ADMIN — PANTOPRAZOLE SODIUM SCH MLS/HR: 40 INJECTION, POWDER, FOR SOLUTION INTRAVENOUS at 07:00

## 2019-02-15 RX ADMIN — OLANZAPINE SCH MG: 2.5 TABLET, FILM COATED ORAL at 08:17

## 2019-02-15 RX ADMIN — OLANZAPINE SCH MG: 2.5 TABLET, FILM COATED ORAL at 19:03

## 2019-02-15 RX ADMIN — DAPTOMYCIN SCH MLS/HR: 500 INJECTION, POWDER, LYOPHILIZED, FOR SOLUTION INTRAVENOUS at 19:55

## 2019-02-15 RX ADMIN — DEXTROSE AND SODIUM CHLORIDE SCH MLS/HR: 5; .45 INJECTION, SOLUTION INTRAVENOUS at 15:45

## 2019-02-15 NOTE — PULMONOLOGY PROGRESS NOTE
Assessment/Plan


Problems:  


(1) Sepsis


(2) Thrombocytopenia


(3) ATN (acute tubular necrosis)


(4) Pulmonary hypertension


(5) Sacral decubitus ulcer, stage III


(6) Alzheimer's dementia


(7) Protein-calorie malnutrition, severe


(8) HTN (hypertension)


(9) Pacemaker


(10) CAD (coronary artery disease)


Assessment/Plan


on face mask


/u PLT, better today


failed swallow study


Morphine prn


d/w wound care nurse, 


pan cultures


iv fluids


check electrolytes


sliding scale


check electrolytes daily


broad spectrum abx


dvt prophylaxis


insulin coverage.


social service consult





Subjective


ROS Limited/Unobtainable:  No


Interval Events:  still on face mask


Constitutional:  Reports: no symptoms


Allergies:  


Coded Allergies:  


     No Known Allergies (Unverified , 3/26/14)





Objective





Last 24 Hour Vital Signs








  Date Time  Temp Pulse Resp B/P (MAP) Pulse Ox O2 Delivery O2 Flow Rate FiO2


 


2/15/19 08:00 99.0 91 22 109/47 (67) 100   


 


2/15/19 08:00      Venturi Mask 12.0 


 


2/15/19 07:36  91      


 


2/15/19 04:00 98.1 83 32 110/43 (65) 94   


 


2/15/19 04:00      Venturi Mask 12.0 


 


2/15/19 03:36  91      


 


2/15/19 00:00      Venturi Mask 12.0 


 


2/15/19 00:00 97.9 60 32 109/62 (78) 95   


 


2/14/19 23:36  67      


 


2/14/19 20:00  79 20   Venturi Mask 12.0 50


 


2/14/19 20:00      Venturi Mask 12.0 50


 


2/14/19 20:00     93 Venturi Mask 12.0 50


 


2/14/19 20:00      Venturi Mask 12.0 


 


2/14/19 20:00 97.0 68 24 94/72 (79) 94   


 


2/14/19 19:01  75      


 


2/14/19 18:00      Venturi Mask 12.0 


 


2/14/19 16:00      Venturi Mask 12.0 


 


2/14/19 16:00 97.5 77 20 102/60 (74) 95   


 


2/14/19 15:34  78      


 


2/14/19 12:00      Venturi Mask 12.0 


 


2/14/19 12:00 96.8 110 28 104/68 (80) 95   


 


2/14/19 11:46  96      

















Intake and Output  


 


 2/14/19 2/15/19





 18:59 06:59


 


Intake Total 948.75 ml 758.82 ml


 


Output Total 70 ml 500 ml


 


Balance 878.75 ml 258.82 ml


 


  


 


IV Total 848.75 ml 758.82 ml


 


Other 100 ml 


 


Output Urine Total 20 ml 500 ml


 


Gastric Drainage Total 50 ml 


 


# Voids 1 


 


# Bowel Movements 2 2








Objective


General Appearance:  cachectic


HEENT:  normocephalic, somnolent


Respiratory/Chest:  chest wall non-tender, lungs clear


Cardiovascular:  normal peripheral pulses, normal rate


Abdomen:  normal bowel sounds, soft, non tender


Extremities:  no cyanosis


Skin:  no rash





Microbiology








 Date/Time


Source Procedure


Growth Status


 


 


 2/12/19 18:15


Blood Blood Culture - Final


Staphylococcus Aureus - Mrsa Complete


 


 2/12/19 18:00


Blood Blood Culture - Final


Staphylococcus Aureus - Mrsa Complete








Laboratory Tests


2/15/19 03:00: Stool Occult Blood [Pending]


2/15/19 04:25: 


White Blood Count 12.3H, Red Blood Count 2.70L, Hemoglobin 7.9L, Hematocrit 

24.3L, Mean Corpuscular Volume 90, Mean Corpuscular Hemoglobin 29.2, Mean 

Corpuscular Hemoglobin Concent 32.4, Red Cell Distribution Width 15.0H, 

Platelet Count 44L, Mean Platelet Volume 11.0H, Neutrophils (%) (Auto) , 

Lymphocytes (%) (Auto) , Monocytes (%) (Auto) , Eosinophils (%) (Auto) , 

Basophils (%) (Auto) , Differential Total Cells Counted 100, Neutrophils % (

Manual) 95H, Lymphocytes % (Manual) 3L, Monocytes % (Manual) 2, Eosinophils % (

Manual) 0, Basophils % (Manual) 0, Band Neutrophils 0, Platelet Estimate 

DecreasedL, Platelet Morphology Normal, Anisocytosis 1+, Acanthocytes 1+, 

Erythrocyte Sedimentation Rate 30, Prothrombin Time 13.4H, Prothromb Time 

International Ratio 1.3H, Sodium Level 144, Potassium Level 3.6, Chloride Level 

113H, Carbon Dioxide Level 17L, Anion Gap 14, Blood Urea Nitrogen 35H, 

Creatinine 1.0, Estimat Glomerular Filtration Rate , Glucose Level 137H, 

Calcium Level 7.1L, Phosphorus Level 4.4, Magnesium Level 1.7L, Total Bilirubin 

1.5H, Direct Bilirubin 1.0H, Aspartate Amino Transf (AST/SGOT) 21, Alanine 

Aminotransferase (ALT/SGPT) 35, Alkaline Phosphatase 76, C-Reactive Protein, 

Quantitative 11.0H, Total Protein 4.3L, Albumin 1.3L, Globulin 3.0, Albumin/

Globulin Ratio 0.4L





Current Medications








 Medications


  (Trade)  Dose


 Ordered  Sig/Violette


 Route


 PRN Reason  Start Time


 Stop Time Status Last Admin


Dose Admin


 


 Acetaminophen


  (Tylenol)  650 mg  Q4H  PRN


 NG


 Mild Pain/Temp > 100.5  2/12/19 18:15


 3/14/19 18:14   


 


 


 Clonidine HCl


  (Catapres Tab)  0.1 mg  Q8H  PRN


 ORAL


 SBP>160mmHg  2/11/19 06:30


 3/10/19 06:29   


 


 


 Daptomycin 400 mg/


 Sodium Chloride  55 ml @ 


 100 mls/hr  Q24H


 IV


   2/13/19 18:00


 2/20/19 17:59  2/14/19 17:41


 


 


 Dextrose


  (Dextrose 50%)  25 ml  Q30M  PRN


 IV


 Hypoglycemia  2/11/19 06:15


 3/9/19 13:30   


 


 


 Dextrose


  (Dextrose 50%)  50 ml  Q30M  PRN


 IV


 hypoglycemia  2/11/19 06:15


 3/9/19 13:44   


 


 


 Dextrose/Sodium


 Chloride  1,000 ml @ 


 50 mls/hr  Q20H


 IV


   2/12/19 11:30


 3/14/19 11:29  2/14/19 22:57


 


 


 Insulin Aspart


  (NovoLOG)    EVERY 6  HOURS


 SUBQ


   2/11/19 12:00


 3/9/19 16:29  2/12/19 12:22


 


 


 Lansoprazole


  (Prevacid)  30 mg  DAILY


 NG


   2/16/19 09:00


 3/18/19 08:59 UNV  


 


 


 Lorazepam


  (Ativan)  1 mg  Q6H  PRN


 ORAL


 For Anxiety  2/11/19 07:00


 2/15/19 12:52   


 


 


 Magnesium Sulfate  100 ml @ 


 100 mls/hr  Q1H


 IV


   2/15/19 12:00


 2/15/19 13:59 UNV  


 


 


 Morphine Sulfate


  (Morphine


 Sulfate)  2 mg  Q4H  PRN


 IVP


 Moderate Pain (Pain Scale 4-6)  2/11/19 09:00


 2/15/19 12:52  2/12/19 22:01


 


 


 Olanzapine


  (ZyPREXA)  2.5 mg  BID


 ORAL


   2/11/19 09:00


 3/10/19 10:59  2/15/19 08:17


 


 


 Ondansetron HCl


  (Zofran)  4 mg  Q6H  PRN


 IVP


 Nausea & Vomiting  2/11/19 07:00


 3/10/19 12:52   


 

















Korin Junior MD Feb 15, 2019 10:51

## 2019-02-15 NOTE — GENERAL PROGRESS NOTE
Assessment/Plan


Problem List:  


(1) Severe anemia


ICD Codes:  D64.9 - Anemia, unspecified


SNOMED:  674899576


(2) Alzheimer's dementia


ICD Codes:  G30.9 - Alzheimer's disease, unspecified; F02.80 - Dementia in 

other diseases classified elsewhere without behavioral disturbance


SNOMED:  50384465


(3) Pulmonary hypertension


ICD Codes:  I27.20 - Pulmonary hypertension, unspecified


SNOMED:  63243895


(4) Protein-calorie malnutrition, severe


ICD Codes:  E43 - Unspecified severe protein-calorie malnutrition


SNOMED:  865596305


(5) Pacemaker


ICD Codes:  Z95.0 - Presence of cardiac pacemaker


SNOMED:  701200799


(6) HTN (hypertension)


ICD Codes:  I10 - Essential (primary) hypertension


SNOMED:  24414372


(7) CAD (coronary artery disease)


ICD Codes:  I25.10 - Atherosclerotic heart disease of native coronary artery 

without angina pectoris


SNOMED:  28400264


(8) Diabetes mellitus, type II


ICD Codes:  E11.9 - Type 2 diabetes mellitus without complications


SNOMED:  24354447


Assessment/Plan


EGD on hold per cardiology recs, pending MARTHA on Tuesday


add ppi


no obvious GIB per nurses


start NGTF


repeat labs


fu stool ob





Subjective


ROS Limited/Unobtainable:  No


Allergies:  


Coded Allergies:  


     No Known Allergies (Unverified , 3/26/14)





Objective





Last 24 Hour Vital Signs








  Date Time  Temp Pulse Resp B/P (MAP) Pulse Ox O2 Delivery O2 Flow Rate FiO2


 


2/15/19 08:00 99.0 91 22 109/47 (67) 100   


 


2/15/19 08:00      Venturi Mask 12.0 


 


2/15/19 04:00 98.1 83 32 110/43 (65) 94   


 


2/15/19 04:00      Venturi Mask 12.0 


 


2/15/19 03:36  91      


 


2/15/19 00:00      Venturi Mask 12.0 


 


2/15/19 00:00 97.9 60 32 109/62 (78) 95   


 


2/14/19 23:36  67      


 


2/14/19 20:00  79 20   Venturi Mask 12.0 50


 


2/14/19 20:00      Venturi Mask 12.0 50


 


2/14/19 20:00     93 Venturi Mask 12.0 50


 


2/14/19 20:00      Venturi Mask 12.0 


 


2/14/19 20:00 97.0 68 24 94/72 (79) 94   


 


2/14/19 19:01  75      


 


2/14/19 18:00      Venturi Mask 12.0 


 


2/14/19 16:00      Venturi Mask 12.0 


 


2/14/19 16:00 97.5 77 20 102/60 (74) 95   


 


2/14/19 15:34  78      


 


2/14/19 12:00      Venturi Mask 12.0 


 


2/14/19 12:00 96.8 110 28 104/68 (80) 95   


 


2/14/19 11:46  96      

















Intake and Output  


 


 2/14/19 2/15/19





 19:00 07:00


 


Intake Total 948.75 ml 708.82 ml


 


Output Total 70 ml 500 ml


 


Balance 878.75 ml 208.82 ml


 


  


 


IV Total 848.75 ml 708.82 ml


 


Other 100 ml 


 


Output Urine Total 20 ml 500 ml


 


Gastric Drainage Total 50 ml 


 


# Voids 1 


 


# Bowel Movements 2 2








Laboratory Tests


2/15/19 03:00: Stool Occult Blood [Pending]


2/15/19 04:25: 


White Blood Count 12.3H, Red Blood Count 2.70L, Hemoglobin 7.9L, Hematocrit 

24.3L, Mean Corpuscular Volume 90, Mean Corpuscular Hemoglobin 29.2, Mean 

Corpuscular Hemoglobin Concent 32.4, Red Cell Distribution Width 15.0H, 

Platelet Count 44L, Mean Platelet Volume 11.0H, Neutrophils (%) (Auto) , 

Lymphocytes (%) (Auto) , Monocytes (%) (Auto) , Eosinophils (%) (Auto) , 

Basophils (%) (Auto) , Neutrophils % (Manual) [Pending], Lymphocytes % (Manual) 

[Pending], Platelet Estimate [Pending], Platelet Morphology [Pending], 

Erythrocyte Sedimentation Rate 30, Prothrombin Time 13.4H, Prothromb Time 

International Ratio 1.3H, Sodium Level 144, Potassium Level 3.6, Chloride Level 

113H, Carbon Dioxide Level 17L, Anion Gap 14, Blood Urea Nitrogen 35H, 

Creatinine 1.0, Estimat Glomerular Filtration Rate , Glucose Level 137H, 

Calcium Level 7.1L, Phosphorus Level 4.4, Magnesium Level 1.7L, Total Bilirubin 

1.5H, Direct Bilirubin 1.0H, Aspartate Amino Transf (AST/SGOT) 21, Alanine 

Aminotransferase (ALT/SGPT) 35, Alkaline Phosphatase 76, C-Reactive Protein, 

Quantitative 11.0H, Total Protein 4.3L, Albumin 1.3L, Globulin 3.0, Albumin/

Globulin Ratio 0.4L


Height (Feet):  5


Height (Inches):  5.00


Weight (Pounds):  144


General Appearance:  lethargic


EENT:  normal ENT inspection


Neck:  supple


Cardiovascular:  normal rate


Respiratory/Chest:  decreased breath sounds


Abdomen:  normal bowel sounds, non tender, soft


Extremities:  non-tender











Cal Moreno MD Feb 15, 2019 10:01

## 2019-02-15 NOTE — NUR
NURSE NOTES:

Received pt. and report from ESCOBAR Thacker. Observed pt. resting in bed with both eyes open. Pt. 
is A/O x1; responds to name only. Cardiac monitor is in placed. Pt. has a AKIN midline; 
currently running D5 1/2 NS @ 50cc/hr. Pt. also has a Lt thumb 24G S/L and Rt hand 22G S/L; 
both IV sites are intact, asymptomatic, and patent. Pt. has a NGT currently feeding Vital AF 
1.2 @ 30cc/hr; goal rate of feeding is 55cc/hr. Call light within reach, bed is in the 
lowest position and locked. No acute distress noted at this time. Will continue plan of 
care.

-------------------------------------------------------------------------------

Addendum: 02/16/19 at 0054 by Pamela BELTRÁN Mai, RN

-------------------------------------------------------------------------------

DOMINIC midline

## 2019-02-15 NOTE — INTERNAL MED PROGRESS NOTE
Subjective


Physician Name


Juan Carlos Magana


Attending Physician


Juan Carlos Magana MD





Current Medications








 Medications


  (Trade)  Dose


 Ordered  Sig/Violette


 Route


 PRN Reason  Start Time


 Stop Time Status Last Admin


Dose Admin


 


 Acetaminophen


  (Tylenol)  650 mg  Q4H  PRN


 NG


 Mild Pain/Temp > 100.5  2/15/19 14:43


 3/14/19 14:42   


 


 


 Ceftaroline


 Fosamil 400 mg/


 Sodium Chloride  55 ml @ 55


 mls/hr  Q12HR


 IV


   2/15/19 21:00


 2/22/19 20:59   


 


 


 Clonidine HCl


  (Catapres Tab)  0.1 mg  Q8H  PRN


 ORAL


 SBP>160mmHg  2/15/19 14:43


 3/10/19 14:42   


 


 


 Daptomycin 400 mg/


 Sodium Chloride  55 ml @ 


 100 mls/hr  Q24H


 IV


   2/15/19 18:00


 2/20/19 17:59   


 


 


 Dextrose


  (Dextrose 50%)  25 ml  Q30M  PRN


 IV


 Hypoglycemia  2/15/19 14:45


 3/9/19 13:30   


 


 


 Dextrose


  (Dextrose 50%)  50 ml  Q30M  PRN


 IV


 hypoglycemia  2/15/19 14:45


 3/9/19 13:44   


 


 


 Dextrose/Sodium


 Chloride  1,000 ml @ 


 50 mls/hr  Q20H


 IV


   2/15/19 14:43


 3/14/19 14:42  2/15/19 15:45


 


 


 Insulin Aspart


  (NovoLOG)    EVERY 6  HOURS


 SUBQ


   2/15/19 18:00


 3/9/19 16:29   


 


 


 Iopamidol


  (Isovue-300


 100ml)  100 ml  NOW  PRN


 INJ


 Radiology Procedure  2/15/19 14:43


 2/16/19 14:42   


 


 


 Lansoprazole


  (Prevacid)  30 mg  DAILY


 NG


   2/16/19 09:00


 3/18/19 08:59   


 


 


 Olanzapine


  (ZyPREXA)  2.5 mg  BID


 ORAL


   2/15/19 18:00


 3/10/19 10:59   


 


 


 Ondansetron HCl


  (Zofran)  4 mg  Q6H  PRN


 IVP


 Nausea & Vomiting  2/15/19 14:44


 3/10/19 14:43   


 








Allergies:  


Coded Allergies:  


     No Known Allergies (Unverified , 3/26/14)


Subjective


looking weak, less responsive, sleepy in respiratory isolation, on Venti Mask





Objective





Last Vital Signs








  Date Time  Temp Pulse Resp B/P (MAP) Pulse Ox O2 Delivery O2 Flow Rate FiO2


 


2/15/19 16:00 98.1 77 22 117/56 (76) 95   


 


2/15/19 16:00      Venturi Mask 12.0 


 


2/14/19 20:00        50











Laboratory Tests








Test


  2/15/19


03:00 2/15/19


04:25 2/15/19


12:30


 


Stool Occult Blood


  Positive


(NEGATIVE) 


  


 


 


White Blood Count


  


  12.3 K/UL


(4.8-10.8)  H 


 


 


Red Blood Count


  


  2.70 M/UL


(4.20-5.40)  L 


 


 


Hemoglobin


  


  7.9 G/DL


(12.0-16.0)  L 


 


 


Hematocrit


  


  24.3 %


(37.0-47.0)  L 


 


 


Mean Corpuscular Volume  90 FL (80-99)   


 


Mean Corpuscular Hemoglobin


  


  29.2 PG


(27.0-31.0) 


 


 


Mean Corpuscular Hemoglobin


Concent 


  32.4 G/DL


(32.0-36.0) 


 


 


Red Cell Distribution Width


  


  15.0 %


(11.6-14.8)  H 


 


 


Platelet Count


  


  44 K/UL


(150-450)  L 


 


 


Mean Platelet Volume


  


  11.0 FL


(6.5-10.1)  H 


 


 


Neutrophils (%) (Auto)


  


  % (45.0-75.0)


  


 


 


Lymphocytes (%) (Auto)


  


  % (20.0-45.0)


  


 


 


Monocytes (%) (Auto)   % (1.0-10.0)   


 


Eosinophils (%) (Auto)   % (0.0-3.0)   


 


Basophils (%) (Auto)   % (0.0-2.0)   


 


Differential Total Cells


Counted 


  100  


  


 


 


Neutrophils % (Manual)  95 % (45-75)  H 


 


Lymphocytes % (Manual)  3 % (20-45)  L 


 


Monocytes % (Manual)  2 % (1-10)   


 


Eosinophils % (Manual)  0 % (0-3)   


 


Basophils % (Manual)  0 % (0-2)   


 


Band Neutrophils  0 % (0-8)   


 


Platelet Estimate  Decreased  L 


 


Platelet Morphology  Normal   


 


Anisocytosis  1+   


 


Acanthocytes  1+   


 


Erythrocyte Sedimentation Rate


  


  30 MM/HR


(0-30) 


 


 


Prothrombin Time


  


  13.4 SEC


(9.30-11.50)  H 


 


 


Prothromb Time International


Ratio 


  1.3 (0.9-1.1)


H 


 


 


Sodium Level


  


  144 MMOL/L


(136-145) 


 


 


Potassium Level


  


  3.6 MMOL/L


(3.5-5.1) 


 


 


Chloride Level


  


  113 MMOL/L


()  H 


 


 


Carbon Dioxide Level


  


  17 MMOL/L


(21-32)  L 


 


 


Anion Gap


  


  14 mmol/L


(5-15) 


 


 


Blood Urea Nitrogen


  


  35 mg/dL


(7-18)  H 


 


 


Creatinine


  


  1.0 MG/DL


(0.55-1.30) 


 


 


Estimat Glomerular Filtration


Rate 


   mL/min (>60)  


  


 


 


Glucose Level


  


  137 MG/DL


()  H 


 


 


Calcium Level


  


  7.1 MG/DL


(8.5-10.1)  L 


 


 


Phosphorus Level


  


  4.4 MG/DL


(2.5-4.9) 


 


 


Magnesium Level


  


  1.7 MG/DL


(1.8-2.4)  L 


 


 


Total Bilirubin


  


  1.5 MG/DL


(0.2-1.0)  H 


 


 


Direct Bilirubin


  


  1.0 MG/DL


(0.0-0.3)  H 


 


 


Aspartate Amino Transf


(AST/SGOT) 


  21 U/L (15-37)


  


 


 


Alanine Aminotransferase


(ALT/SGPT) 


  35 U/L (12-78)


  


 


 


Alkaline Phosphatase


  


  76 U/L


() 


 


 


Lactate Dehydrogenase


  


  320 U/L


()  H 


 


 


C-Reactive Protein,


Quantitative 


  11.0 mg/dL


(0.00-0.90)  H 


 


 


Total Protein


  


  4.3 G/DL


(6.4-8.2)  L 


 


 


Albumin


  


  1.3 G/DL


(3.4-5.0)  L 


 


 


Globulin  3.0 g/dL   


 


Albumin/Globulin Ratio


  


  0.4 (1.0-2.7)


L 


 


 


Carcinoembryonic Antigen  Pending   


 


Blastomyces Ab Immunodiffusion  Pending   


 


Histoplasma Mycelial Antibody  Pending   


 


Histoplasma Antibody w


Mycelial Ag 


  Pending  


  


 


 


Histoplasma Antibody with


Yeast Ag 


  Pending  


  


 


 


TB Test (T-Spot)   Pending  


 


TB Test Nil Control (T-Spot)   Pending  


 


TB Test Panel A (T-Spot)   Pending  


 


TB Test Panel B (T-Spot)   Pending  


 


TB Test Positive Control


(T-Spot) 


  


  Pending  


 











Microbiology








 Date/Time


Source Procedure


Growth Status


 


 


 2/14/19 07:45


Blood Blood Culture - Preliminary Resulted


 


 2/14/19 07:35


Blood Blood Culture - Preliminary Resulted

















Intake and Output  


 


 2/14/19 2/15/19





 19:00 07:00


 


Intake Total 948.75 ml 708.82 ml


 


Output Total 70 ml 500 ml


 


Balance 878.75 ml 208.82 ml


 


  


 


IV Total 848.75 ml 708.82 ml


 


Other 100 ml 


 


Output Urine Total 20 ml 500 ml


 


Gastric Drainage Total 50 ml 


 


# Voids 1 


 


# Bowel Movements 2 2








Objective


General: Awake and less responsive, Sleepy, cachectic.


HEENT: NCAT, sclera anicteric, PERRL, NG tube.


Neck: Supple, no significant jugular venous distention, 


Lungs: Poor inspiratory effort, decreased air in the bases , no Wheeze or Rales.


Heart: Regular rate and rhythm, normal S1/S2, no murmur.


Abdomen: soft, nontender, nondistended. Normoactive bowel sounds.


 / Rectal: Refused and deferred.


Extremities: No Cyanosis , No clubbing,  Left UE edema.  Lateral lower 

extremity / feet dressing intact. RUE Piccline.


Neuro: A&O x 1, Able to move all extremities


Skin: warm, no rashes or lesions, ecchymosis in bilateral upper extremity 

noted.Stage 4 Decubi ulceration.





Assessment/Plan


Assessment/Plan


1. Sepsis /  High grade persistent MRSA bacteremia w/ septic emboli- Infective 

endocarditis until proven otherwise- r/o probable PPM infection


2. Failure to thrive with severe protein calorie malnutrition.


3. Coronary artery disease.


4. Diabetes, type 2.


5. Hypertension.


6. Sick sinus syndrome.


7. Hypercholesterolemia.


8. Aortic stenosis.


9. Pulmonary hypertension.


10. Alzheimer dementia.


11.  Acute tubular necrosis/acute kidney injury most likely secondary to 

dehydration and sepsis.


12. Sacral decubitus ulcer, stage III


13. Severe dehydration


14.  Hypokalemia.


15.  Abnormal liver function test





TREATMENT:


1.  Antibiotics with Daptomycin and Ceftaroline  Monitor culture, for MARTHA.


2. Coronary artery disease.  The patient is status post coronary artery


bypass graft in 2017.


3. Diabetes, type 2.  The patient has been started on NovoLog sliding


scale.  


4. Hypertension.  The patient is to continue on atenolol and losartan as


above.


5. Sick sinus syndrome.  The patient is status post pacemaker


implantation.


6. Hypercholesterolemia.  Continue simvastatin as above.


7. Aortic stenosis.


8. Pulmonary hypertension.


9. Alzheimer dementia.





will follow her laboratory as well as culture in the morning.


Tube feeding at rate of 45 cc/hr.


CODE STATUS is full code as per POLST,


DVT prophylaxis: Heparin subcu.











Juan Carlos Magana MD Feb 15, 2019 19:20

## 2019-02-15 NOTE — NUR
TRANSFER TO FLOOR:

Patient transferred to Telemetry 217-2, per Dr. Junior.   Report given to ESCOBAR Thacker.  
Belongings (1 gold bracelet) and medications given to ESCOBAR Thacker. Patient stable.

## 2019-02-15 NOTE — GENERAL PROGRESS NOTE
Assessment/Plan


Assessment/Plan








ASSESSMENT/RECS:





1. Pancytopenia with thrombocytopenia that is severe is most likely related to 

Sepsis, lactic acidosis. In addition has received heparin. US abdomen ordered 

and shows no spleen and no cirrhosis


--> heparin discontinued


--> HIT ab test ordered with venous duplex


--> smear peripheral has been ordered


--> tumor markers as needed/prn


--> hold off on transfusion unless plt <20k


--> hold off on steriods


--> plt trend: 34-->


2. Failure to thrive with severe protein calorie malnutrition.


->> calorie counts daily


--> consider mirtazapine as needed per pcp


3. Anemia of chronic disease


--> panel has been ordered


--> transfuse if hgb <7


4. Leukocytosis


--> on abx as per id for infection


5. Hypertension.


6. Sick sinus syndrome.


7. Hypercholesterolemia.


8. Aortic stenosis.


9. Pulmonary hypertension.


10. Alzheimer dementia.


11.  Acute tubular necrosis/acute kidney injury most likely secondary to 

dehydration and sepsis.





Greatly appreciate consultation!





Subjective


Constitutional:  Denies: no symptoms, chills, diaphoresis, fever, malaise, 

weakness, other


HEENT:  Denies: no symptoms, eye pain, blurred vision, tearing, double vision, 

ear pain, ear discharge, nose pain, nose congestion, throat pain, throat 

swelling, mouth pain, mouth swelling, other


Cardiovascular:  Denies: no symptoms, chest pain, edema, irregular heart rate, 

lightheadedness, palpitations, syncope, other


Respiratory:  Denies: no symptoms, cough, orthopnea, shortness of breath, SOB 

with excertion, SOB at rest, sputum, stridor, wheezing, other


Gastrointestinal/Abdominal:  Denies: no symptoms, abdomen distended, abdominal 

pain, black stools, tarry stools, blood in stool, constipated, diarrhea, 

difficulty swallowing, nausea, poor appetite, poor fluid intake, rectal bleeding

, vomiting, other


Genitourinary:  Denies: no symptoms, burning, discharge, frequency, flank pain, 

hematuria, incontinence, pain, urgency, other


Endocrine:  Denies: no symptoms, excessive sweating, flushing, intolerance to 

cold, intolerance to heat, increased hunger, increased thirst, increased urine, 

unexplained weight gain, unexplained weight loss, other


Hematologic/Lymphatic:  Denies: no symptoms, anemia, easy bleeding, easy 

bruising, other


Allergies:  


Coded Allergies:  


     No Known Allergies (Unverified , 3/26/14)


Subjective


2/14:seen by bedside, Possible EGD tomorrow to evaluate upper GI bleed, today's 

EGD was canceled by anesthesia, plt remains low at 34, hgb trending up . 


2/15: Scheduled for EGD today,CT w/ cavitary lung lessions; placed on isolation

, remains bacteremic, wbc 12.





Objective





Last 24 Hour Vital Signs








  Date Time  Temp Pulse Resp B/P (MAP) Pulse Ox O2 Delivery O2 Flow Rate FiO2


 


2/15/19 16:00 98.1 77 22 117/56 (76) 95   


 


2/15/19 16:00      Venturi Mask 12.0 


 


2/15/19 15:36  83      


 


2/15/19 14:00      Venturi Mask 12.0 


 


2/15/19 12:00 98.6 77 22 112/85 (94) 100   


 


2/15/19 12:00      Venturi Mask 12.0 


 


2/15/19 11:34  95      


 


2/15/19 08:00 99.0 91 22 109/47 (67) 100   


 


2/15/19 08:00      Venturi Mask 12.0 


 


2/15/19 07:36  91      


 


2/15/19 04:00 98.1 83 32 110/43 (65) 94   


 


2/15/19 04:00      Venturi Mask 12.0 


 


2/15/19 03:36  91      


 


2/15/19 00:00      Venturi Mask 12.0 


 


2/15/19 00:00 97.9 60 32 109/62 (78) 95   


 


2/14/19 23:36  67      

















Intake and Output  


 


 2/14/19 2/15/19





 19:00 07:00


 


Intake Total 948.75 ml 708.82 ml


 


Output Total 70 ml 500 ml


 


Balance 878.75 ml 208.82 ml


 


  


 


IV Total 848.75 ml 708.82 ml


 


Other 100 ml 


 


Output Urine Total 20 ml 500 ml


 


Gastric Drainage Total 50 ml 


 


# Voids 1 


 


# Bowel Movements 2 2








Laboratory Tests


2/15/19 03:00: Stool Occult Blood Positive


2/15/19 04:25: 


White Blood Count 12.3H, Red Blood Count 2.70L, Hemoglobin 7.9L, Hematocrit 

24.3L, Mean Corpuscular Volume 90, Mean Corpuscular Hemoglobin 29.2, Mean 

Corpuscular Hemoglobin Concent 32.4, Red Cell Distribution Width 15.0H, 

Platelet Count 44L, Mean Platelet Volume 11.0H, Neutrophils (%) (Auto) , 

Lymphocytes (%) (Auto) , Monocytes (%) (Auto) , Eosinophils (%) (Auto) , 

Basophils (%) (Auto) , Differential Total Cells Counted 100, Neutrophils % (

Manual) 95H, Lymphocytes % (Manual) 3L, Monocytes % (Manual) 2, Eosinophils % (

Manual) 0, Basophils % (Manual) 0, Band Neutrophils 0, Platelet Estimate 

DecreasedL, Platelet Morphology Normal, Anisocytosis 1+, Acanthocytes 1+, 

Erythrocyte Sedimentation Rate 30, Prothrombin Time 13.4H, Prothromb Time 

International Ratio 1.3H, Sodium Level 144, Potassium Level 3.6, Chloride Level 

113H, Carbon Dioxide Level 17L, Anion Gap 14, Blood Urea Nitrogen 35H, 

Creatinine 1.0, Estimat Glomerular Filtration Rate , Glucose Level 137H, 

Calcium Level 7.1L, Phosphorus Level 4.4, Magnesium Level 1.7L, Total Bilirubin 

1.5H, Direct Bilirubin 1.0H, Aspartate Amino Transf (AST/SGOT) 21, Alanine 

Aminotransferase (ALT/SGPT) 35, Alkaline Phosphatase 76, Lactate Dehydrogenase 

320H, C-Reactive Protein, Quantitative 11.0H, Total Protein 4.3L, Albumin 1.3L, 

Globulin 3.0, Albumin/Globulin Ratio 0.4L, Carcinoembryonic Antigen [Pending], 

Blastomyces Ab Immunodiffusion [Pending], Histoplasma Mycelial Antibody [Pending

], Histoplasma Antibody w Mycelial Ag [Pending], Histoplasma Antibody with 

Yeast Ag [Pending]


2/15/19 12:30: 


TB Test (T-Spot) [Pending], TB Test Nil Control (T-Spot) [Pending], TB Test 

Panel A (T-Spot) [Pending], TB Test Panel B (T-Spot) [Pending], TB Test 

Positive Control (T-Spot) [Pending]


Height (Feet):  5


Height (Inches):  5.00


Weight (Pounds):  144


Objective





GENERAL: Awake responsive to deep stimuli with opening of her eyes, in no 

apparent distress.


HEENT:  Eyes, pupils equal responsive to light and accommodation.


CHEST: Poor inspiratory effort, decreased air in the bases no wheezes or 

rhonchi was appreciated


CARDIOVASCULAR:  Regular rhythm and rate.  S1 and S2 are normal without murmurs 

or gallops.


ABDOMEN:  Soft, nontender, and nondistended.  Positive bowel sounds.  No 

rebounding or guarding noted.


EXTREMITIES:  Negative for clubbing, cyanosis, or edema, muscle atrophy in 

bilateral lower extremity


RECTAL/GENITAL:  Not performed.


NEUROLOGIC: Less responsive, unable to follow commands, however open her eyes 

with a deep stimulation, unable to evaluate for gait due to the patient's 

status.











Darian Topete MD Feb 15, 2019 21:01

## 2019-02-15 NOTE — GENERAL PROGRESS NOTE
Assessment/Plan


Problem List:  


(1) Acute encephalopathy


Assessment & Plan:  metabolic


ICD Codes:  G93.40 - Encephalopathy, unspecified


SNOMED:  63728530, [id number]


(2) Delirium


ICD Codes:  R41.0 - Disorientation, unspecified


SNOMED:  0071951


(3) Alzheimer's dementia


ICD Codes:  G30.9 - Alzheimer's disease, unspecified; F02.80 - Dementia in 

other diseases classified elsewhere without behavioral disturbance


SNOMED:  81729432


Assessment/Plan


Zyprexa 


the pt lacks capacity


soft restraints if needed





Subjective


Neurologic/Psychiatric:  Reports: anxiety


Allergies:  


Coded Allergies:  


     No Known Allergies (Unverified , 3/26/14)


Subjective


cont to be agitated 


lethargic





Objective





Last 24 Hour Vital Signs








  Date Time  Temp Pulse Resp B/P (MAP) Pulse Ox O2 Delivery O2 Flow Rate FiO2


 


2/15/19 16:00 98.1 77 22 117/56 (76) 95   


 


2/15/19 16:00      Venturi Mask 12.0 


 


2/15/19 15:36  83      


 


2/15/19 14:00      Venturi Mask 12.0 


 


2/15/19 12:00 98.6 77 22 112/85 (94) 100   


 


2/15/19 12:00      Venturi Mask 12.0 


 


2/15/19 11:34  95      


 


2/15/19 08:00 99.0 91 22 109/47 (67) 100   


 


2/15/19 08:00      Venturi Mask 12.0 


 


2/15/19 07:36  91      


 


2/15/19 04:00 98.1 83 32 110/43 (65) 94   


 


2/15/19 04:00      Venturi Mask 12.0 


 


2/15/19 03:36  91      


 


2/15/19 00:00      Venturi Mask 12.0 


 


2/15/19 00:00 97.9 60 32 109/62 (78) 95   


 


2/14/19 23:36  67      

















Intake and Output  


 


 2/14/19 2/15/19





 19:00 07:00


 


Intake Total 948.75 ml 708.82 ml


 


Output Total 70 ml 500 ml


 


Balance 878.75 ml 208.82 ml


 


  


 


IV Total 848.75 ml 708.82 ml


 


Other 100 ml 


 


Output Urine Total 20 ml 500 ml


 


Gastric Drainage Total 50 ml 


 


# Voids 1 


 


# Bowel Movements 2 2








Laboratory Tests


2/15/19 03:00: Stool Occult Blood Positive


2/15/19 04:25: 


White Blood Count 12.3H, Red Blood Count 2.70L, Hemoglobin 7.9L, Hematocrit 

24.3L, Mean Corpuscular Volume 90, Mean Corpuscular Hemoglobin 29.2, Mean 

Corpuscular Hemoglobin Concent 32.4, Red Cell Distribution Width 15.0H, 

Platelet Count 44L, Mean Platelet Volume 11.0H, Neutrophils (%) (Auto) , 

Lymphocytes (%) (Auto) , Monocytes (%) (Auto) , Eosinophils (%) (Auto) , 

Basophils (%) (Auto) , Differential Total Cells Counted 100, Neutrophils % (

Manual) 95H, Lymphocytes % (Manual) 3L, Monocytes % (Manual) 2, Eosinophils % (

Manual) 0, Basophils % (Manual) 0, Band Neutrophils 0, Platelet Estimate 

DecreasedL, Platelet Morphology Normal, Anisocytosis 1+, Acanthocytes 1+, 

Erythrocyte Sedimentation Rate 30, Prothrombin Time 13.4H, Prothromb Time 

International Ratio 1.3H, Sodium Level 144, Potassium Level 3.6, Chloride Level 

113H, Carbon Dioxide Level 17L, Anion Gap 14, Blood Urea Nitrogen 35H, 

Creatinine 1.0, Estimat Glomerular Filtration Rate , Glucose Level 137H, 

Calcium Level 7.1L, Phosphorus Level 4.4, Magnesium Level 1.7L, Total Bilirubin 

1.5H, Direct Bilirubin 1.0H, Aspartate Amino Transf (AST/SGOT) 21, Alanine 

Aminotransferase (ALT/SGPT) 35, Alkaline Phosphatase 76, Lactate Dehydrogenase 

320H, C-Reactive Protein, Quantitative 11.0H, Total Protein 4.3L, Albumin 1.3L, 

Globulin 3.0, Albumin/Globulin Ratio 0.4L, Carcinoembryonic Antigen [Pending], 

Blastomyces Ab Immunodiffusion [Pending], Histoplasma Mycelial Antibody [Pending

], Histoplasma Antibody w Mycelial Ag [Pending], Histoplasma Antibody with 

Yeast Ag [Pending]


2/15/19 12:30: 


TB Test (T-Spot) [Pending], TB Test Nil Control (T-Spot) [Pending], TB Test 

Panel A (T-Spot) [Pending], TB Test Panel B (T-Spot) [Pending], TB Test 

Positive Control (T-Spot) [Pending]


Height (Feet):  5


Height (Inches):  5.00


Weight (Pounds):  144


General Appearance:  lethargic, confused, agitated











Josh Nunez MD Feb 15, 2019 20:38

## 2019-02-15 NOTE — NUR
NURSE NOTES:

Received patient from ESCOBAR Mock.  Patient on Venturi Mask 12L.  Patient has midline and IV 
sites intact, asymptomatic and patent.  Patient is on heart monitor at [facility].  Patient is AAO 
x1, responds in [place].  Patient is on Marlee 1.2 at 20ml/hr.  NGT is secured.  Patient has 
purewick attached.  Patient has wounds.  Sacral has pressure ulcer stage 4.  right shoulder 
as pressure ulcer.  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  
Patient was resting in bed.  Will f/u with plan of care.

  

Increase rate at 18:00 for Marlee 1.2 to 30ml/hr.  



Had one BM at 18:30  patient was changed.

## 2019-02-15 NOTE — INFECTIOUS DISEASES PROG NOTE
Assessment/Plan


Assessment/Plan





77 yo female with PMHx DM, CAD, Alzheimer dementia and Hip fracture who 

presents with dehydration and weakness. 





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 

until proven otherwise- r/o probable PPM infection- doubt TB


    -2/14 CT c/a/p w/: Interim development of multiple bilateral mostly upper 

lobe cavitary pulmonary parenchymal lesions. Given normal appearing chest 

radiograph on 2/7/2019, these are overwhelmingly likely infectious/inflammatory 

in nature. Most likely represent septic pulmonary emboli. The possibility of 

mycobacterial infection should also be included, deemed less likely given 

absence of adenopathy but still possible. Differential considerations also 

include fungal infections, noninfectious inflammatory processes. Other 

noncavitary nodules are also demonstrated, with the same differential. Large 

right and small-to-moderate left pleural effusions. Compressive atelectasis of 

the majority of the right lower lobe. Infiltrates seen in the right upper lobe. 

Bilateral pacemaker. Evidence of central venoocclusive disease. Wedge-shaped 

area of low-attenuation in the upper pole of the spleen, suspicious for infarct.


   -2d Echo (limited study due to contractures): no vegetations seen


    -2/10 Bcx 4/4MRSA; 2/11 Bcx 2/4 MRSA; 2/12 4/4  MRSA; 2/14 Bcx 4/4 GPC 

clusters


    2/8/19 Blood Cx  4/4 MRSA


    2/7/19 Wound Cx MRSA, ESBL E.coli (S ZOsyn), K. pna (S Zosyn)


   


 Leukocytosis, recurrent, increasing


  Low grade fever; improving


   Positive UA 


   CXR neg





Thrombocytopenia- HIT vs medication related


   


Pressure ulcers


  Not infected





Right hip fracture with hemiarthroplasty Oct 2018


DM


HTN


Sick sinus syndrome sp Pacemaker


HLD


CAD - SP MI and CABG


Aortic stenosis.


Pulmonary hypertension.


Alzheimer dementia.





PLAN





- Continue Daptomycin #3 (abx d #8) and add IV Ceftaroline given persistent 

MRSA Bacteremia


   - repeat 2 sets of Bcx


   -2/13 SP Vancomycin #6


   -2/13 SP Zosyn #4


   -2/10 SP Cefepime #3





-Will need MARTHA to look for endocarditis and/or PPM infection if consistent with 

goals of care


-placed on airborne isolation


   -AFB s/cx x3, MTB PCR x1


- f/u repeat cultures


- Monitor CBC and temps


- wound care


- Nutritional support


-f/u cocci, crAg, histo, blasto serologies








Thank you for this consult. We will continue to follow the patient during this 

hospitalization.





Subjective


Allergies:  


Coded Allergies:  


     No Known Allergies (Unverified , 3/26/14)


Subjective


; afebrline ~72hrs


CT w/ cavitary lung lessions; placed on isolation


Leukoycotis increasing


remains bacteremic





Objective


Vital Signs





Last 24 Hour Vital Signs








  Date Time  Temp Pulse Resp B/P (MAP) Pulse Ox O2 Delivery O2 Flow Rate FiO2


 


2/15/19 12:00 98.6 77 22 112/85 (94) 100   


 


2/15/19 12:00      Venturi Mask 12.0 


 


2/15/19 11:34  95      


 


2/15/19 08:00 99.0 91 22 109/47 (67) 100   


 


2/15/19 08:00      Venturi Mask 12.0 


 


2/15/19 07:36  91      


 


2/15/19 04:00 98.1 83 32 110/43 (65) 94   


 


2/15/19 04:00      Venturi Mask 12.0 


 


2/15/19 03:36  91      


 


2/15/19 00:00      Venturi Mask 12.0 


 


2/15/19 00:00 97.9 60 32 109/62 (78) 95   


 


2/14/19 23:36  67      


 


2/14/19 20:00  79 20   Venturi Mask 12.0 50


 


2/14/19 20:00      Venturi Mask 12.0 50


 


2/14/19 20:00     93 Venturi Mask 12.0 50


 


2/14/19 20:00      Venturi Mask 12.0 


 


2/14/19 20:00 97.0 68 24 94/72 (79) 94   


 


2/14/19 19:01  75      


 


2/14/19 18:00      Venturi Mask 12.0 


 


2/14/19 16:00      Venturi Mask 12.0 


 


2/14/19 16:00 97.5 77 20 102/60 (74) 95   








Height (Feet):  5


Height (Inches):  5.00


Weight (Pounds):  144


Objective


Gen:  Moaning, Awake but no following


HEENT: NCAT, MMM, EOMI, PERRL


LUNGS:  CTAB, No W


CARDS: RRR, S1, S2, No M/R/G, 


ABD: Soft, NT, ND, + BS


Ext: Poor circulation to Ext (cool to touch) , Pulses 2+ B/L (DP, Rad): 


SKIN:  Dry, No rashes, Large sacral ulcer. Mild odor no surrounding cellulitis, 

foot with eschar





Microbiology








 Date/Time


Source Procedure


Growth Status


 


 


 2/14/19 07:45


Blood Blood Culture - Preliminary Resulted


 


 2/14/19 07:35


Blood Blood Culture - Preliminary Resulted


 


 2/12/19 18:15


Blood Blood Culture - Final


Staphylococcus Aureus - Mrsa Complete


 


 2/12/19 18:00


Blood Blood Culture - Final


Staphylococcus Aureus - Mrsa Complete











Laboratory Tests








Test


  2/15/19


03:00 2/15/19


04:25 2/15/19


12:30


 


Stool Occult Blood


  Positive


(NEGATIVE) 


  


 


 


White Blood Count


  


  12.3 K/UL


(4.8-10.8)  H 


 


 


Red Blood Count


  


  2.70 M/UL


(4.20-5.40)  L 


 


 


Hemoglobin


  


  7.9 G/DL


(12.0-16.0)  L 


 


 


Hematocrit


  


  24.3 %


(37.0-47.0)  L 


 


 


Mean Corpuscular Volume  90 FL (80-99)   


 


Mean Corpuscular Hemoglobin


  


  29.2 PG


(27.0-31.0) 


 


 


Mean Corpuscular Hemoglobin


Concent 


  32.4 G/DL


(32.0-36.0) 


 


 


Red Cell Distribution Width


  


  15.0 %


(11.6-14.8)  H 


 


 


Platelet Count


  


  44 K/UL


(150-450)  L 


 


 


Mean Platelet Volume


  


  11.0 FL


(6.5-10.1)  H 


 


 


Neutrophils (%) (Auto)


  


  % (45.0-75.0)


  


 


 


Lymphocytes (%) (Auto)


  


  % (20.0-45.0)


  


 


 


Monocytes (%) (Auto)   % (1.0-10.0)   


 


Eosinophils (%) (Auto)   % (0.0-3.0)   


 


Basophils (%) (Auto)   % (0.0-2.0)   


 


Differential Total Cells


Counted 


  100  


  


 


 


Neutrophils % (Manual)  95 % (45-75)  H 


 


Lymphocytes % (Manual)  3 % (20-45)  L 


 


Monocytes % (Manual)  2 % (1-10)   


 


Eosinophils % (Manual)  0 % (0-3)   


 


Basophils % (Manual)  0 % (0-2)   


 


Band Neutrophils  0 % (0-8)   


 


Platelet Estimate  Decreased  L 


 


Platelet Morphology  Normal   


 


Anisocytosis  1+   


 


Acanthocytes  1+   


 


Erythrocyte Sedimentation Rate


  


  30 MM/HR


(0-30) 


 


 


Prothrombin Time


  


  13.4 SEC


(9.30-11.50)  H 


 


 


Prothromb Time International


Ratio 


  1.3 (0.9-1.1)


H 


 


 


Sodium Level


  


  144 MMOL/L


(136-145) 


 


 


Potassium Level


  


  3.6 MMOL/L


(3.5-5.1) 


 


 


Chloride Level


  


  113 MMOL/L


()  H 


 


 


Carbon Dioxide Level


  


  17 MMOL/L


(21-32)  L 


 


 


Anion Gap


  


  14 mmol/L


(5-15) 


 


 


Blood Urea Nitrogen


  


  35 mg/dL


(7-18)  H 


 


 


Creatinine


  


  1.0 MG/DL


(0.55-1.30) 


 


 


Estimat Glomerular Filtration


Rate 


   mL/min (>60)  


  


 


 


Glucose Level


  


  137 MG/DL


()  H 


 


 


Calcium Level


  


  7.1 MG/DL


(8.5-10.1)  L 


 


 


Phosphorus Level


  


  4.4 MG/DL


(2.5-4.9) 


 


 


Magnesium Level


  


  1.7 MG/DL


(1.8-2.4)  L 


 


 


Total Bilirubin


  


  1.5 MG/DL


(0.2-1.0)  H 


 


 


Direct Bilirubin


  


  1.0 MG/DL


(0.0-0.3)  H 


 


 


Aspartate Amino Transf


(AST/SGOT) 


  21 U/L (15-37)


  


 


 


Alanine Aminotransferase


(ALT/SGPT) 


  35 U/L (12-78)


  


 


 


Alkaline Phosphatase


  


  76 U/L


() 


 


 


Lactate Dehydrogenase


  


  320 U/L


()  H 


 


 


C-Reactive Protein,


Quantitative 


  11.0 mg/dL


(0.00-0.90)  H 


 


 


Total Protein


  


  4.3 G/DL


(6.4-8.2)  L 


 


 


Albumin


  


  1.3 G/DL


(3.4-5.0)  L 


 


 


Globulin  3.0 g/dL   


 


Albumin/Globulin Ratio


  


  0.4 (1.0-2.7)


L 


 


 


Carcinoembryonic Antigen  Pending   


 


Blastomyces Ab Immunodiffusion  Pending   


 


Histoplasma Mycelial Antibody  Pending   


 


Histoplasma Antibody w


Mycelial Ag 


  Pending  


  


 


 


Histoplasma Antibody with


Yeast Ag 


  Pending  


  


 


 


TB Test (T-Spot)   Pending  


 


TB Test Nil Control (T-Spot)   Pending  


 


TB Test Panel A (T-Spot)   Pending  


 


TB Test Panel B (T-Spot)   Pending  


 


TB Test Positive Control


(T-Spot) 


  


  Pending  


 











Current Medications








 Medications


  (Trade)  Dose


 Ordered  Sig/Violette


 Route


 PRN Reason  Start Time


 Stop Time Status Last Admin


Dose Admin


 


 Acetaminophen


  (Tylenol)  650 mg  Q4H  PRN


 NG


 Mild Pain/Temp > 100.5  2/15/19 14:43


 3/14/19 14:42   


 


 


 Clonidine HCl


  (Catapres Tab)  0.1 mg  Q8H  PRN


 ORAL


 SBP>160mmHg  2/15/19 14:43


 3/10/19 14:42   


 


 


 Daptomycin 400 mg/


 Sodium Chloride  55 ml @ 


 100 mls/hr  Q24H


 IV


   2/15/19 18:00


 2/20/19 17:59   


 


 


 Dextrose


  (Dextrose 50%)  25 ml  Q30M  PRN


 IV


 Hypoglycemia  2/15/19 14:45


 3/9/19 13:30   


 


 


 Dextrose


  (Dextrose 50%)  50 ml  Q30M  PRN


 IV


 hypoglycemia  2/15/19 14:45


 3/9/19 13:44   


 


 


 Dextrose/Sodium


 Chloride  1,000 ml @ 


 50 mls/hr  Q20H


 IV


   2/15/19 14:43


 3/14/19 14:42   


 


 


 Insulin Aspart


  (NovoLOG)    EVERY 6  HOURS


 SUBQ


   2/15/19 18:00


 3/9/19 16:29   


 


 


 Iopamidol


  (Isovue-300


 100ml)  100 ml  NOW  PRN


 INJ


 Radiology Procedure  2/15/19 14:43


 2/16/19 14:42   


 


 


 Lansoprazole


  (Prevacid)  30 mg  DAILY


 NG


   2/16/19 09:00


 3/18/19 08:59   


 


 


 Olanzapine


  (ZyPREXA)  2.5 mg  BID


 ORAL


   2/15/19 18:00


 3/10/19 10:59   


 


 


 Ondansetron HCl


  (Zofran)  4 mg  Q6H  PRN


 IVP


 Nausea & Vomiting  2/15/19 14:44


 3/10/19 14:43   


 

















Olivia Rosas M.D. Feb 15, 2019 15:53

## 2019-02-15 NOTE — NUR
RD ASSESSMENT & RECOMMENDATIONS

SEE CARE ACTIVITY FOR COMPLETE ASSESSMENT



DAILY ESTIMATED NEEDS:

Needs based on Wound, DM, wasting, sepsis / 55kg 

30-35  kcals/kg 

5645-2260  total kcals

1.25-2  g protein/kg

  g total protein 

25-30  mL/kg

6533-5365  total fluid mLs





NUTRITION DIAGNOSIS:

* Increased kcal/prot needs R/T wound healing as evidenced by pt w/

stage 3 sacral wound per MD

* Swallowing difficulty R/T dysphagia, respiratory status as evidenced by

per SLP, pt at high risk for silent aspiration, rec for nonoral feedings,

on NGT feeding.





CURRENT TF:Vital AF 1.2 @ 55ml/hr x 24 hrs -> just ordered 





ENTERAL NUTRITION RECOMMENDATIONS:

Glucerna 1.2 @ 60ml/hr x 24 hrs  to provide 1440ml, 1728kcal, 86g prot, 1159ml free water 



- Rec Glucerna 1.2-> elemental formula of Vital not indicated.

- Initiate Glucerna 1.2 @ 10ml/hr x 6 hrs, advance 10ml q 4-6 hrs as tolerated to goal rate.

- Flush per MD/ HOB over 30 degrees





ADDITIONAL RECOMMENDATIONS:

* RE-calibrate bedscale for accurate CBW  

* Wound healing: Vit C 500mg BID, Harjeet 1pkt BID 

                             + ZnSO4 220mg daily x 10 days  

* Lytes daily, replete as needed  

. 
## 2019-02-15 NOTE — NUR
NURSE NOTES:



Received patient from [facility]. Patient in bed and asleep. On venturi mask 50% 12L. 
Currently NPO. Scheduled for EGD today. Cardiac monitor in placed. IV sites and AKIN midline 
are asymptomatic. Bed in lowest position with side rails up. Will continue to follow plan of 
care.

## 2019-02-15 NOTE — CARDIOLOGY PROGRESS NOTE
Subjective


Subjective


The patient is well known to me. She has bacteremia. She has two pacemaker 

batteries and 4 pacemkaer leads. Will need MARTHA, potentially pacemaker removal 

if there are vegetations on the leasd seen


I will proceed with MARTHA next week.





Objective





Last 24 Hour Vital Signs








  Date Time  Temp Pulse Resp B/P (MAP) Pulse Ox O2 Delivery O2 Flow Rate FiO2


 


2/15/19 12:00      Venturi Mask 12.0 


 


2/15/19 11:34  95      


 


2/15/19 08:00 99.0 91 22 109/47 (67) 100   


 


2/15/19 08:00      Venturi Mask 12.0 


 


2/15/19 07:36  91      


 


2/15/19 04:00 98.1 83 32 110/43 (65) 94   


 


2/15/19 04:00      Venturi Mask 12.0 


 


2/15/19 03:36  91      


 


2/15/19 00:00      Venturi Mask 12.0 


 


2/15/19 00:00 97.9 60 32 109/62 (78) 95   


 


2/14/19 23:36  67      


 


2/14/19 20:00  79 20   Venturi Mask 12.0 50


 


2/14/19 20:00      Venturi Mask 12.0 50


 


2/14/19 20:00     93 Venturi Mask 12.0 50


 


2/14/19 20:00      Venturi Mask 12.0 


 


2/14/19 20:00 97.0 68 24 94/72 (79) 94   


 


2/14/19 19:01  75      


 


2/14/19 18:00      Venturi Mask 12.0 


 


2/14/19 16:00      Venturi Mask 12.0 


 


2/14/19 16:00 97.5 77 20 102/60 (74) 95   


 


2/14/19 15:34  78      

















Intake and Output  


 


 2/14/19 2/15/19





 18:59 06:59


 


Intake Total 948.75 ml 758.82 ml


 


Output Total 70 ml 500 ml


 


Balance 878.75 ml 258.82 ml


 


  


 


IV Total 848.75 ml 758.82 ml


 


Other 100 ml 


 


Output Urine Total 20 ml 500 ml


 


Gastric Drainage Total 50 ml 


 


# Voids 1 


 


# Bowel Movements 2 2











Laboratory Tests








Test


  2/15/19


03:00 2/15/19


04:25 2/15/19


12:30


 


Stool Occult Blood


  Positive


(NEGATIVE) 


  


 


 


White Blood Count


  


  12.3 K/UL


(4.8-10.8)  H 


 


 


Red Blood Count


  


  2.70 M/UL


(4.20-5.40)  L 


 


 


Hemoglobin


  


  7.9 G/DL


(12.0-16.0)  L 


 


 


Hematocrit


  


  24.3 %


(37.0-47.0)  L 


 


 


Mean Corpuscular Volume  90 FL (80-99)   


 


Mean Corpuscular Hemoglobin


  


  29.2 PG


(27.0-31.0) 


 


 


Mean Corpuscular Hemoglobin


Concent 


  32.4 G/DL


(32.0-36.0) 


 


 


Red Cell Distribution Width


  


  15.0 %


(11.6-14.8)  H 


 


 


Platelet Count


  


  44 K/UL


(150-450)  L 


 


 


Mean Platelet Volume


  


  11.0 FL


(6.5-10.1)  H 


 


 


Neutrophils (%) (Auto)


  


  % (45.0-75.0)


  


 


 


Lymphocytes (%) (Auto)


  


  % (20.0-45.0)


  


 


 


Monocytes (%) (Auto)   % (1.0-10.0)   


 


Eosinophils (%) (Auto)   % (0.0-3.0)   


 


Basophils (%) (Auto)   % (0.0-2.0)   


 


Differential Total Cells


Counted 


  100  


  


 


 


Neutrophils % (Manual)  95 % (45-75)  H 


 


Lymphocytes % (Manual)  3 % (20-45)  L 


 


Monocytes % (Manual)  2 % (1-10)   


 


Eosinophils % (Manual)  0 % (0-3)   


 


Basophils % (Manual)  0 % (0-2)   


 


Band Neutrophils  0 % (0-8)   


 


Platelet Estimate  Decreased  L 


 


Platelet Morphology  Normal   


 


Anisocytosis  1+   


 


Acanthocytes  1+   


 


Erythrocyte Sedimentation Rate


  


  30 MM/HR


(0-30) 


 


 


Prothrombin Time


  


  13.4 SEC


(9.30-11.50)  H 


 


 


Prothromb Time International


Ratio 


  1.3 (0.9-1.1)


H 


 


 


Sodium Level


  


  144 MMOL/L


(136-145) 


 


 


Potassium Level


  


  3.6 MMOL/L


(3.5-5.1) 


 


 


Chloride Level


  


  113 MMOL/L


()  H 


 


 


Carbon Dioxide Level


  


  17 MMOL/L


(21-32)  L 


 


 


Anion Gap


  


  14 mmol/L


(5-15) 


 


 


Blood Urea Nitrogen


  


  35 mg/dL


(7-18)  H 


 


 


Creatinine


  


  1.0 MG/DL


(0.55-1.30) 


 


 


Estimat Glomerular Filtration


Rate 


   mL/min (>60)  


  


 


 


Glucose Level


  


  137 MG/DL


()  H 


 


 


Calcium Level


  


  7.1 MG/DL


(8.5-10.1)  L 


 


 


Phosphorus Level


  


  4.4 MG/DL


(2.5-4.9) 


 


 


Magnesium Level


  


  1.7 MG/DL


(1.8-2.4)  L 


 


 


Total Bilirubin


  


  1.5 MG/DL


(0.2-1.0)  H 


 


 


Direct Bilirubin


  


  1.0 MG/DL


(0.0-0.3)  H 


 


 


Aspartate Amino Transf


(AST/SGOT) 


  21 U/L (15-37)


  


 


 


Alanine Aminotransferase


(ALT/SGPT) 


  35 U/L (12-78)


  


 


 


Alkaline Phosphatase


  


  76 U/L


() 


 


 


Lactate Dehydrogenase


  


  320 U/L


()  H 


 


 


C-Reactive Protein,


Quantitative 


  11.0 mg/dL


(0.00-0.90)  H 


 


 


Total Protein


  


  4.3 G/DL


(6.4-8.2)  L 


 


 


Albumin


  


  1.3 G/DL


(3.4-5.0)  L 


 


 


Globulin  3.0 g/dL   


 


Albumin/Globulin Ratio


  


  0.4 (1.0-2.7)


L 


 


 


Carcinoembryonic Antigen  Pending   


 


Blastomyces Ab Immunodiffusion  Pending   


 


Histoplasma Mycelial Antibody  Pending   


 


Histoplasma Antibody w


Mycelial Ag 


  Pending  


  


 


 


Histoplasma Antibody with


Yeast Ag 


  Pending  


  


 


 


TB Test (T-Spot)   Pending  


 


TB Test Nil Control (T-Spot)   Pending  


 


TB Test Panel A (T-Spot)   Pending  


 


TB Test Panel B (T-Spot)   Pending  


 


TB Test Positive Control


(T-Spot) 


  


  Pending  


 











Microbiology








 Date/Time


Source Procedure


Growth Status


 


 


 2/14/19 07:45


Blood Blood Culture - Preliminary Resulted


 


 2/14/19 07:35


Blood Blood Culture - Preliminary Resulted


 


 2/12/19 18:15


Blood Blood Culture - Final


Staphylococcus Aureus - Mrsa Complete


 


 2/12/19 18:00


Blood Blood Culture - Final


Staphylococcus Aureus - Mrsa Complete

















Lara Tong MD Feb 15, 2019 13:22

## 2019-02-16 VITALS — SYSTOLIC BLOOD PRESSURE: 137 MMHG | DIASTOLIC BLOOD PRESSURE: 75 MMHG

## 2019-02-16 VITALS — DIASTOLIC BLOOD PRESSURE: 58 MMHG | SYSTOLIC BLOOD PRESSURE: 107 MMHG

## 2019-02-16 VITALS — DIASTOLIC BLOOD PRESSURE: 49 MMHG | SYSTOLIC BLOOD PRESSURE: 122 MMHG

## 2019-02-16 VITALS — SYSTOLIC BLOOD PRESSURE: 131 MMHG | DIASTOLIC BLOOD PRESSURE: 56 MMHG

## 2019-02-16 VITALS — SYSTOLIC BLOOD PRESSURE: 114 MMHG | DIASTOLIC BLOOD PRESSURE: 58 MMHG

## 2019-02-16 VITALS — DIASTOLIC BLOOD PRESSURE: 87 MMHG | SYSTOLIC BLOOD PRESSURE: 109 MMHG

## 2019-02-16 LAB
ADD MANUAL DIFF: YES
ALBUMIN SERPL-MCNC: 1.4 G/DL (ref 3.4–5)
ALBUMIN/GLOB SERPL: 0.4 {RATIO} (ref 1–2.7)
ALP SERPL-CCNC: 106 U/L (ref 46–116)
ALT SERPL-CCNC: 31 U/L (ref 12–78)
ANION GAP SERPL CALC-SCNC: 12 MMOL/L (ref 5–15)
AST SERPL-CCNC: 24 U/L (ref 15–37)
BILIRUB DIRECT SERPL-MCNC: 0.8 MG/DL (ref 0–0.3)
BILIRUB SERPL-MCNC: 1.5 MG/DL (ref 0.2–1)
BUN SERPL-MCNC: 30 MG/DL (ref 7–18)
CALCIUM SERPL-MCNC: 8.1 MG/DL (ref 8.5–10.1)
CHLORIDE SERPL-SCNC: 113 MMOL/L (ref 98–107)
CO2 SERPL-SCNC: 16 MMOL/L (ref 21–32)
CREAT SERPL-MCNC: 0.9 MG/DL (ref 0.55–1.3)
ERYTHROCYTE [DISTWIDTH] IN BLOOD BY AUTOMATED COUNT: 15.6 % (ref 11.6–14.8)
GLOBULIN SER-MCNC: 3.6 G/DL
HCT VFR BLD CALC: 26.4 % (ref 37–47)
HGB BLD-MCNC: 8.4 G/DL (ref 12–16)
MCV RBC AUTO: 94 FL (ref 80–99)
PHOSPHATE SERPL-MCNC: 3.4 MG/DL (ref 2.5–4.9)
PLATELET # BLD: 49 K/UL (ref 150–450)
POTASSIUM SERPL-SCNC: 3.4 MMOL/L (ref 3.5–5.1)
RBC # BLD AUTO: 2.83 M/UL (ref 4.2–5.4)
SODIUM SERPL-SCNC: 141 MMOL/L (ref 136–145)
WBC # BLD AUTO: 12.8 K/UL (ref 4.8–10.8)

## 2019-02-16 RX ADMIN — INSULIN ASPART SCH UNITS: 100 INJECTION, SOLUTION INTRAVENOUS; SUBCUTANEOUS at 06:24

## 2019-02-16 RX ADMIN — DEXTROSE MONOHYDRATE SCH MLS/HR: 50 INJECTION, SOLUTION INTRAVENOUS at 09:24

## 2019-02-16 RX ADMIN — OLANZAPINE SCH MG: 2.5 TABLET, FILM COATED ORAL at 17:39

## 2019-02-16 RX ADMIN — INSULIN ASPART SCH UNITS: 100 INJECTION, SOLUTION INTRAVENOUS; SUBCUTANEOUS at 12:02

## 2019-02-16 RX ADMIN — INSULIN ASPART SCH UNITS: 100 INJECTION, SOLUTION INTRAVENOUS; SUBCUTANEOUS at 17:40

## 2019-02-16 RX ADMIN — OLANZAPINE SCH MG: 2.5 TABLET, FILM COATED ORAL at 09:23

## 2019-02-16 RX ADMIN — INSULIN ASPART SCH UNITS: 100 INJECTION, SOLUTION INTRAVENOUS; SUBCUTANEOUS at 00:08

## 2019-02-16 RX ADMIN — DEXTROSE MONOHYDRATE SCH MLS/HR: 50 INJECTION, SOLUTION INTRAVENOUS at 20:57

## 2019-02-16 RX ADMIN — DEXTROSE AND SODIUM CHLORIDE SCH MLS/HR: 5; .45 INJECTION, SOLUTION INTRAVENOUS at 10:58

## 2019-02-16 RX ADMIN — DAPTOMYCIN SCH MLS/HR: 500 INJECTION, POWDER, LYOPHILIZED, FOR SOLUTION INTRAVENOUS at 18:14

## 2019-02-16 NOTE — CARDIOLOGY PROGRESS NOTE
Assessment/Plan


Assessment/Plan


staph bacteremia 


anemai 


thrombocytopenia 


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 


metabolic encphalopathy 


multipe pacemaker generator and wires 








dr alberto who is very familiar with this pt feel she is not a candidate for 

salomon at this time  (to which i woudl agree) 


specilly now with such  low plt counts 


iv abx 


supportive care 


watch plt and hgn for need for tx if full care is felt cassandra necessary i would


in light of pulm cavitating lesion likey has micro embolic form eith erthe 

wires of right sided valve infection as a source of pulm lesion 


pt in pulm isolation for possible tb as well in light of pulm cavitating lesion 

however likely related to staph bacteremia





Subjective


ROS Limited/Unobtainable:  Yes





Objective





Last 24 Hour Vital Signs








  Date Time  Temp Pulse Resp B/P (MAP) Pulse Ox O2 Delivery O2 Flow Rate FiO2


 


2/16/19 12:00 97.7 90 22 107/58 (74) 100   


 


2/16/19 09:00      Venturi Mask 12.0 


 


2/16/19 08:49  85 20   Venturi Mask 12.0 50


 


2/16/19 08:49      Venturi Mask 12.0 50


 


2/16/19 08:49     100 Venturi Mask 12.0 50


 


2/16/19 08:00 97.0 69 20 122/49 (73) 100   


 


2/16/19 08:00  91      


 


2/16/19 04:00 97.7 90 20 109/87 (94) 98   


 


2/16/19 04:00  87      


 


2/16/19 00:00 97.7 78 22 114/58 (76) 100   


 


2/15/19 23:25  81      


 


2/15/19 22:15     100 Venturi Mask 12.0 50


 


2/15/19 22:15      Venturi Mask 12.0 50


 


2/15/19 22:15  84 20   Venturi Mask 12.0 50


 


2/15/19 21:00      Venturi Mask 12.0 


 


2/15/19 20:00  82      


 


2/15/19 20:00 97.0 80 20 103/63 (76) 96   


 


2/15/19 16:00 98.1 77 22 117/56 (76) 95   


 


2/15/19 16:00      Venturi Mask 12.0 


 


2/15/19 15:36  83      


 


2/15/19 14:00      Venturi Mask 12.0 








General Appearance:  no apparent distress


Cardiovascular:  normal rate, tachycardia


Respiratory/Chest:  lungs clear


Abdomen:  non tender, soft


Extremities:  moderate edema











Intake and Output  


 


 2/15/19 2/16/19





 18:59 06:59


 


Intake Total 710 ml 


 


Output Total 100 ml 


 


Balance 610 ml 


 


  


 


Intake Free Water 30 ml 


 


IV Total 600 ml 


 


Tube Feeding 80 ml 


 


Output Urine Total 100 ml 


 


# Voids 2 3


 


# Bowel Movements 2 











Laboratory Tests








Test


  2/16/19


08:20


 


White Blood Count


  12.8 K/UL


(4.8-10.8)  H


 


Red Blood Count


  2.83 M/UL


(4.20-5.40)  L


 


Hemoglobin


  8.4 G/DL


(12.0-16.0)  L


 


Hematocrit


  26.4 %


(37.0-47.0)  L


 


Mean Corpuscular Volume 94 FL (80-99)  


 


Mean Corpuscular Hemoglobin


  29.7 PG


(27.0-31.0)


 


Mean Corpuscular Hemoglobin


Concent 31.7 G/DL


(32.0-36.0)  L


 


Red Cell Distribution Width


  15.6 %


(11.6-14.8)  H


 


Platelet Count


  49 K/UL


(150-450)  L


 


Mean Platelet Volume


  9.4 FL


(6.5-10.1)


 


Neutrophils (%) (Auto)


  % (45.0-75.0)


 


 


Lymphocytes (%) (Auto)


  % (20.0-45.0)


 


 


Monocytes (%) (Auto)  % (1.0-10.0)  


 


Eosinophils (%) (Auto)  % (0.0-3.0)  


 


Basophils (%) (Auto)  % (0.0-2.0)  


 


Differential Total Cells


Counted 100  


 


 


Neutrophils % (Manual) 94 % (45-75)  H


 


Lymphocytes % (Manual) 4 % (20-45)  L


 


Monocytes % (Manual) 2 % (1-10)  


 


Eosinophils % (Manual) 0 % (0-3)  


 


Basophils % (Manual) 0 % (0-2)  


 


Band Neutrophils 0 % (0-8)  


 


Platelet Estimate Decreased  L


 


Platelet Morphology Normal  


 


Hypochromasia 1+  


 


Anisocytosis 1+  


 


Erythrocyte Sedimentation Rate


  40 MM/HR


(0-30)  H


 


Sodium Level


  141 MMOL/L


(136-145)


 


Potassium Level


  3.4 MMOL/L


(3.5-5.1)  L


 


Chloride Level


  113 MMOL/L


()  H


 


Carbon Dioxide Level


  16 MMOL/L


(21-32)  L


 


Anion Gap


  12 mmol/L


(5-15)


 


Blood Urea Nitrogen


  30 mg/dL


(7-18)  H


 


Creatinine


  0.9 MG/DL


(0.55-1.30)


 


Estimat Glomerular Filtration


Rate  mL/min (>60)  


 


 


Glucose Level


  203 MG/DL


()  H


 


Calcium Level


  8.1 MG/DL


(8.5-10.1)  L


 


Phosphorus Level


  3.4 MG/DL


(2.5-4.9)


 


Magnesium Level


  2.1 MG/DL


(1.8-2.4)


 


Total Bilirubin


  1.5 MG/DL


(0.2-1.0)  H


 


Direct Bilirubin


  0.8 MG/DL


(0.0-0.3)  H


 


Aspartate Amino Transf


(AST/SGOT) 24 U/L (15-37)


 


 


Alanine Aminotransferase


(ALT/SGPT) 31 U/L (12-78)


 


 


Alkaline Phosphatase


  106 U/L


()


 


C-Reactive Protein,


Quantitative 16.0 mg/dL


(0.00-0.90)  H


 


Total Protein


  5.0 G/DL


(6.4-8.2)  L


 


Albumin


  1.4 G/DL


(3.4-5.0)  L


 


Globulin 3.6 g/dL  


 


Albumin/Globulin Ratio


  0.4 (1.0-2.7)


L











Microbiology








 Date/Time


Source Procedure


Growth Status


 


 


 2/14/19 07:45


Blood Blood Culture - Preliminary


Staphylococcus Aureus Resulted


 


 2/14/19 07:35


Blood Blood Culture - Preliminary


Staphylococcus Aureus Resulted

















Mc Nguyen MD Feb 16, 2019 13:21

## 2019-02-16 NOTE — GENERAL PROGRESS NOTE
Assessment/Plan


Problem List:  


(1) Severe anemia


ICD Codes:  D64.9 - Anemia, unspecified


SNOMED:  439337228


(2) Alzheimer's dementia


ICD Codes:  G30.9 - Alzheimer's disease, unspecified; F02.80 - Dementia in 

other diseases classified elsewhere without behavioral disturbance


SNOMED:  54721541


(3) Pulmonary hypertension


ICD Codes:  I27.20 - Pulmonary hypertension, unspecified


SNOMED:  12138077


(4) Protein-calorie malnutrition, severe


ICD Codes:  E43 - Unspecified severe protein-calorie malnutrition


SNOMED:  344726812


(5) Pacemaker


ICD Codes:  Z95.0 - Presence of cardiac pacemaker


SNOMED:  020879885


(6) HTN (hypertension)


ICD Codes:  I10 - Essential (primary) hypertension


SNOMED:  17344749


(7) CAD (coronary artery disease)


ICD Codes:  I25.10 - Atherosclerotic heart disease of native coronary artery 

without angina pectoris


SNOMED:  43606374


(8) Diabetes mellitus, type II


ICD Codes:  E11.9 - Type 2 diabetes mellitus without complications


SNOMED:  32759374


Assessment/Plan


EGD on hold per cardiology recs, pending MARTHA on Tuesday


on ppi


stool ob positive>> monitor H&H


 NGTF>> plan dc IVF if NGTF tolerated


repeat labs





Subjective


ROS Limited/Unobtainable:  No


Allergies:  


Coded Allergies:  


     No Known Allergies (Unverified , 3/26/14)





Objective





Last 24 Hour Vital Signs








  Date Time  Temp Pulse Resp B/P (MAP) Pulse Ox O2 Delivery O2 Flow Rate FiO2


 


2/16/19 09:00      Venturi Mask 12.0 


 


2/16/19 08:49  85 20   Venturi Mask 12.0 50


 


2/16/19 08:49      Venturi Mask 12.0 50


 


2/16/19 08:49     100 Venturi Mask 12.0 50


 


2/16/19 08:00 97.0 69 20 122/49 (73) 100   


 


2/16/19 08:00  91      


 


2/16/19 04:00 97.7 90 20 109/87 (94) 98   


 


2/16/19 04:00  87      


 


2/16/19 00:00 97.7 78 22 114/58 (76) 100   


 


2/15/19 23:25  81      


 


2/15/19 22:15     100 Venturi Mask 12.0 50


 


2/15/19 22:15      Venturi Mask 12.0 50


 


2/15/19 22:15  84 20   Venturi Mask 12.0 50


 


2/15/19 21:00      Venturi Mask 12.0 


 


2/15/19 20:00  82      


 


2/15/19 20:00 97.0 80 20 103/63 (76) 96   


 


2/15/19 16:00 98.1 77 22 117/56 (76) 95   


 


2/15/19 16:00      Venturi Mask 12.0 


 


2/15/19 15:36  83      


 


2/15/19 14:00      Venturi Mask 12.0 


 


2/15/19 12:00 98.6 77 22 112/85 (94) 100   


 


2/15/19 12:00      Venturi Mask 12.0 


 


2/15/19 11:34  95      

















Intake and Output  


 


 2/15/19 2/16/19





 18:59 06:59


 


Intake Total 710 ml 


 


Output Total 100 ml 


 


Balance 610 ml 


 


  


 


Intake Free Water 30 ml 


 


IV Total 600 ml 


 


Tube Feeding 80 ml 


 


Output Urine Total 100 ml 


 


# Voids 2 3


 


# Bowel Movements 2 








Laboratory Tests


2/15/19 12:30: 


TB Test (T-Spot) [Pending], TB Test Nil Control (T-Spot) [Pending], TB Test 

Panel A (T-Spot) [Pending], TB Test Panel B (T-Spot) [Pending], TB Test 

Positive Control (T-Spot) [Pending]


2/16/19 08:20: 


White Blood Count 12.8H, Red Blood Count 2.83L, Hemoglobin 8.4L, Hematocrit 

26.4L, Mean Corpuscular Volume 94, Mean Corpuscular Hemoglobin 29.7, Mean 

Corpuscular Hemoglobin Concent 31.7L, Red Cell Distribution Width 15.6H, 

Platelet Count 49L, Mean Platelet Volume 9.4, Neutrophils (%) (Auto) , 

Lymphocytes (%) (Auto) , Monocytes (%) (Auto) , Eosinophils (%) (Auto) , 

Basophils (%) (Auto) , Differential Total Cells Counted 100, Neutrophils % (

Manual) 94H, Lymphocytes % (Manual) 4L, Monocytes % (Manual) 2, Eosinophils % (

Manual) 0, Basophils % (Manual) 0, Band Neutrophils 0, Platelet Estimate 

DecreasedL, Platelet Morphology Normal, Hypochromasia 1+, Anisocytosis 1+, 

Erythrocyte Sedimentation Rate 40H, Sodium Level 141, Potassium Level 3.4L, 

Chloride Level 113H, Carbon Dioxide Level 16L, Anion Gap 12, Blood Urea 

Nitrogen 30H, Creatinine 0.9, Estimat Glomerular Filtration Rate , Glucose 

Level 203H, Calcium Level 8.1L, Phosphorus Level 3.4, Magnesium Level 2.1, 

Total Bilirubin 1.5H, Direct Bilirubin 0.8H, Aspartate Amino Transf (AST/SGOT) 

24, Alanine Aminotransferase (ALT/SGPT) 31, Alkaline Phosphatase 106, C-

Reactive Protein, Quantitative 16.0H, Total Protein 5.0L, Albumin 1.4L, 

Globulin 3.6, Albumin/Globulin Ratio 0.4L


Height (Feet):  5


Height (Inches):  5.00


Weight (Pounds):  144


General Appearance:  lethargic


EENT:  normal ENT inspection


Neck:  supple


Cardiovascular:  tachycardia, systolic murmur


Respiratory/Chest:  decreased breath sounds


Abdomen:  soft, hypoactive bowel sounds


Extremities:  non-tender











Cal Moreno MD Feb 16, 2019 11:34

## 2019-02-16 NOTE — SURGERY PROGRESS NOTE
Surgery Progress Note


Subjective


Additional Comments


leukocytosis.  ESR elevated.  exam unchanged.  ng tube feeds.





Objective





Last 24 Hour Vital Signs








  Date Time  Temp Pulse Resp B/P (MAP) Pulse Ox O2 Delivery O2 Flow Rate FiO2


 


2/16/19 12:00 97.7 90 22 107/58 (74) 100   


 


2/16/19 09:00      Venturi Mask 12.0 


 


2/16/19 08:49  85 20   Venturi Mask 12.0 50


 


2/16/19 08:49      Venturi Mask 12.0 50


 


2/16/19 08:49     100 Venturi Mask 12.0 50


 


2/16/19 08:00 97.0 69 20 122/49 (73) 100   


 


2/16/19 08:00  91      


 


2/16/19 04:00 97.7 90 20 109/87 (94) 98   


 


2/16/19 04:00  87      


 


2/16/19 00:00 97.7 78 22 114/58 (76) 100   


 


2/15/19 23:25  81      


 


2/15/19 22:15     100 Venturi Mask 12.0 50


 


2/15/19 22:15      Venturi Mask 12.0 50


 


2/15/19 22:15  84 20   Venturi Mask 12.0 50


 


2/15/19 21:00      Venturi Mask 12.0 


 


2/15/19 20:00  82      


 


2/15/19 20:00 97.0 80 20 103/63 (76) 96   








I&O











Intake and Output  


 


 2/15/19 2/16/19





 18:59 06:59


 


Intake Total 710 ml 


 


Output Total 100 ml 


 


Balance 610 ml 


 


  


 


Intake Free Water 30 ml 


 


IV Total 600 ml 


 


Tube Feeding 80 ml 


 


Output Urine Total 100 ml 


 


# Voids 2 3


 


# Bowel Movements 2 








Dressing:  saturated


Wound:  other


Drains:  other


Cardiovascular:  RSR


Respiratory:  decreased breath sounds


Abdomen:  soft, present bowel sounds


Extremities:  other





Laboratory Tests








Test


  2/16/19


08:20


 


White Blood Count


  12.8 K/UL


(4.8-10.8)  H


 


Red Blood Count


  2.83 M/UL


(4.20-5.40)  L


 


Hemoglobin


  8.4 G/DL


(12.0-16.0)  L


 


Hematocrit


  26.4 %


(37.0-47.0)  L


 


Mean Corpuscular Volume 94 FL (80-99)  


 


Mean Corpuscular Hemoglobin


  29.7 PG


(27.0-31.0)


 


Mean Corpuscular Hemoglobin


Concent 31.7 G/DL


(32.0-36.0)  L


 


Red Cell Distribution Width


  15.6 %


(11.6-14.8)  H


 


Platelet Count


  49 K/UL


(150-450)  L


 


Mean Platelet Volume


  9.4 FL


(6.5-10.1)


 


Neutrophils (%) (Auto)


  % (45.0-75.0)


 


 


Lymphocytes (%) (Auto)


  % (20.0-45.0)


 


 


Monocytes (%) (Auto)  % (1.0-10.0)  


 


Eosinophils (%) (Auto)  % (0.0-3.0)  


 


Basophils (%) (Auto)  % (0.0-2.0)  


 


Differential Total Cells


Counted 100  


 


 


Neutrophils % (Manual) 94 % (45-75)  H


 


Lymphocytes % (Manual) 4 % (20-45)  L


 


Monocytes % (Manual) 2 % (1-10)  


 


Eosinophils % (Manual) 0 % (0-3)  


 


Basophils % (Manual) 0 % (0-2)  


 


Band Neutrophils 0 % (0-8)  


 


Platelet Estimate Decreased  L


 


Platelet Morphology Normal  


 


Hypochromasia 1+  


 


Anisocytosis 1+  


 


Erythrocyte Sedimentation Rate


  40 MM/HR


(0-30)  H


 


Sodium Level


  141 MMOL/L


(136-145)


 


Potassium Level


  3.4 MMOL/L


(3.5-5.1)  L


 


Chloride Level


  113 MMOL/L


()  H


 


Carbon Dioxide Level


  16 MMOL/L


(21-32)  L


 


Anion Gap


  12 mmol/L


(5-15)


 


Blood Urea Nitrogen


  30 mg/dL


(7-18)  H


 


Creatinine


  0.9 MG/DL


(0.55-1.30)


 


Estimat Glomerular Filtration


Rate  mL/min (>60)  


 


 


Glucose Level


  203 MG/DL


()  H


 


Calcium Level


  8.1 MG/DL


(8.5-10.1)  L


 


Phosphorus Level


  3.4 MG/DL


(2.5-4.9)


 


Magnesium Level


  2.1 MG/DL


(1.8-2.4)


 


Total Bilirubin


  1.5 MG/DL


(0.2-1.0)  H


 


Direct Bilirubin


  0.8 MG/DL


(0.0-0.3)  H


 


Aspartate Amino Transf


(AST/SGOT) 24 U/L (15-37)


 


 


Alanine Aminotransferase


(ALT/SGPT) 31 U/L (12-78)


 


 


Alkaline Phosphatase


  106 U/L


()


 


C-Reactive Protein,


Quantitative 16.0 mg/dL


(0.00-0.90)  H


 


Total Protein


  5.0 G/DL


(6.4-8.2)  L


 


Albumin


  1.4 G/DL


(3.4-5.0)  L


 


Globulin 3.6 g/dL  


 


Albumin/Globulin Ratio


  0.4 (1.0-2.7)


L











Plan


Problems:  


(1) Sepsis


Assessment & Plan:  Leukocytosis resolved 


LFT's elevated w/ t bili / d bili - improved 


AM labs ordered 


hydrate


US Impression: Limited exam, as described. Note inability to visualize the 

spleen Negative for gallstones or dilated bile ducts Questionable bilateral 

renal parapelvic cysts Small to moderate right pleural effusion


no acute surgical intervention planned. 


MARTHA this week for septic emboli


tube feeds as tolerated 


will follow with recs. 





(2) Sacral decubitus ulcer, stage III


Assessment & Plan:  DTPI sacrum.Maroon- indurated discoloration with opening at 

sacral coccygeal area(L)3.8cm x (W)1.4cm with entire wound measuring (L)10cmx(W)

12cm.Small amt malodorous brown exudate.


Stable dry eschar R heel with dry peeling skin periwound (L)2cm x (W)3.5cm. 

Periwound is also red and boggy. 


Small dry eschar noted to lateral R 5th metatarsal (L)1.4cm x (W)0.3cm.  


L heel is boggy but colour is dusky . 


Resolving skin tear lateral R tibia. Wound bed is clean and dry.  





Tx. Plan:


Cleanse Sacral wound with Saline.Apply Therahoney to opening at sacrococcygeal 

area.Cavilon skin barrier to DTPI. Cover with Optifoam drsg .Change Daily and 

prn.


            


Apply Betadine to R heel  and Lateral R 5th metatarsal.Cover with Abd pad .Wrap 

with kerlix daily and prn.


            


Apply Cavilon Skin Barrier to L heel.Cover with Optifoam drsg .Change every 7 

days and prn.


            


Reposition at least every 2hours or as tolerated.


            


Off-load heels with pillow.





(3) Rectal bleed


(4) Protein-calorie malnutrition, severe


(5) Dehydration











Tuan Patricia Feb 16, 2019 16:05

## 2019-02-16 NOTE — NUR
NURSE NOTES:

Received pt. and report from ESCOBAR De La Rosa. Patient is resting in bed with both eyes open. Pt. is 
A/O x1; responds to name only. Cardiac monitor is in placed. Pt. has a AKIN midline; 
currently running D5 1/2 NS @ 50cc/hr. Pt. has a NGT currently feeding Vital AF 1.2 @ 
40cc/hr; goal rate of feeding is 55cc/hr. Call light and bed side table within reach, Call 
light on. Bed is in the lowest position with two side rails up and locked. No acute distress 
noted at this time. Will continue to monitor and follow plan of care.

## 2019-02-16 NOTE — NUR
NURSE NOTES:

received pt AAOx1 pt on bed . no acute distress noted at this time. fall measures in place. 
will continue to monitor.

## 2019-02-16 NOTE — CARDIOLOGY PROGRESS NOTE
Subjective


Subjective


[id number]





Objective





Last 24 Hour Vital Signs








  Date Time  Temp Pulse Resp B/P (MAP) Pulse Ox O2 Delivery O2 Flow Rate FiO2


 


2/16/19 16:00  85      


 


2/16/19 16:00 96.8 87 20 131/56 (81) 100   


 


2/16/19 12:00 97.7 90 22 107/58 (74) 100   


 


2/16/19 12:00  93      


 


2/16/19 09:00      Venturi Mask 12.0 


 


2/16/19 08:49  85 20   Venturi Mask 12.0 50


 


2/16/19 08:49      Venturi Mask 12.0 50


 


2/16/19 08:49     100 Venturi Mask 12.0 50


 


2/16/19 08:00 97.0 69 20 122/49 (73) 100   


 


2/16/19 08:00  91      


 


2/16/19 04:00 97.7 90 20 109/87 (94) 98   


 


2/16/19 04:00  87      


 


2/16/19 00:00 97.7 78 22 114/58 (76) 100   


 


2/15/19 23:25  81      


 


2/15/19 22:15     100 Venturi Mask 12.0 50


 


2/15/19 22:15      Venturi Mask 12.0 50


 


2/15/19 22:15  84 20   Venturi Mask 12.0 50


 


2/15/19 21:00      Venturi Mask 12.0 


 


2/15/19 20:00  82      


 


2/15/19 20:00 97.0 80 20 103/63 (76) 96   

















Intake and Output  


 


 2/15/19 2/16/19





 19:00 07:00


 


Intake Total 710 ml 


 


Output Total 100 ml 


 


Balance 610 ml 


 


  


 


Intake Free Water 30 ml 


 


IV Total 600 ml 


 


Tube Feeding 80 ml 


 


Output Urine Total 100 ml 


 


# Voids 2 3


 


# Bowel Movements 2 











Laboratory Tests








Test


  2/16/19


08:20


 


White Blood Count


  12.8 K/UL


(4.8-10.8)  H


 


Red Blood Count


  2.83 M/UL


(4.20-5.40)  L


 


Hemoglobin


  8.4 G/DL


(12.0-16.0)  L


 


Hematocrit


  26.4 %


(37.0-47.0)  L


 


Mean Corpuscular Volume 94 FL (80-99)  


 


Mean Corpuscular Hemoglobin


  29.7 PG


(27.0-31.0)


 


Mean Corpuscular Hemoglobin


Concent 31.7 G/DL


(32.0-36.0)  L


 


Red Cell Distribution Width


  15.6 %


(11.6-14.8)  H


 


Platelet Count


  49 K/UL


(150-450)  L


 


Mean Platelet Volume


  9.4 FL


(6.5-10.1)


 


Neutrophils (%) (Auto)


  % (45.0-75.0)


 


 


Lymphocytes (%) (Auto)


  % (20.0-45.0)


 


 


Monocytes (%) (Auto)  % (1.0-10.0)  


 


Eosinophils (%) (Auto)  % (0.0-3.0)  


 


Basophils (%) (Auto)  % (0.0-2.0)  


 


Differential Total Cells


Counted 100  


 


 


Neutrophils % (Manual) 94 % (45-75)  H


 


Lymphocytes % (Manual) 4 % (20-45)  L


 


Monocytes % (Manual) 2 % (1-10)  


 


Eosinophils % (Manual) 0 % (0-3)  


 


Basophils % (Manual) 0 % (0-2)  


 


Band Neutrophils 0 % (0-8)  


 


Platelet Estimate Decreased  L


 


Platelet Morphology Normal  


 


Hypochromasia 1+  


 


Anisocytosis 1+  


 


Erythrocyte Sedimentation Rate


  40 MM/HR


(0-30)  H


 


Sodium Level


  141 MMOL/L


(136-145)


 


Potassium Level


  3.4 MMOL/L


(3.5-5.1)  L


 


Chloride Level


  113 MMOL/L


()  H


 


Carbon Dioxide Level


  16 MMOL/L


(21-32)  L


 


Anion Gap


  12 mmol/L


(5-15)


 


Blood Urea Nitrogen


  30 mg/dL


(7-18)  H


 


Creatinine


  0.9 MG/DL


(0.55-1.30)


 


Estimat Glomerular Filtration


Rate  mL/min (>60)  


 


 


Glucose Level


  203 MG/DL


()  H


 


Calcium Level


  8.1 MG/DL


(8.5-10.1)  L


 


Phosphorus Level


  3.4 MG/DL


(2.5-4.9)


 


Magnesium Level


  2.1 MG/DL


(1.8-2.4)


 


Total Bilirubin


  1.5 MG/DL


(0.2-1.0)  H


 


Direct Bilirubin


  0.8 MG/DL


(0.0-0.3)  H


 


Aspartate Amino Transf


(AST/SGOT) 24 U/L (15-37)


 


 


Alanine Aminotransferase


(ALT/SGPT) 31 U/L (12-78)


 


 


Alkaline Phosphatase


  106 U/L


()


 


C-Reactive Protein,


Quantitative 16.0 mg/dL


(0.00-0.90)  H


 


Total Protein


  5.0 G/DL


(6.4-8.2)  L


 


Albumin


  1.4 G/DL


(3.4-5.0)  L


 


Globulin 3.6 g/dL  


 


Albumin/Globulin Ratio


  0.4 (1.0-2.7)


L











Microbiology








 Date/Time


Source Procedure


Growth Status


 


 


 2/14/19 07:45


Blood Blood Culture - Preliminary


Staphylococcus Aureus Resulted


 


 2/14/19 07:35


Blood Blood Culture - Preliminary


Staphylococcus Aureus Resulted

















Lara Tong MD Feb 16, 2019 18:05

## 2019-02-16 NOTE — INFECTIOUS DISEASES PROG NOTE
Assessment/Plan


Assessment/Plan


77 yo female with PMHx DM, CAD, Alzheimer dementia and Hip fracture who 

presents with dehydration and weakness. 





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 

until proven otherwise- r/o probable PPM infection- doubt TB


    -2/14 CT c/a/p w/: Interim development of multiple bilateral mostly upper 

lobe cavitary pulmonary parenchymal lesions. Given normal appearing chest 

radiograph on 2/7/2019, these are overwhelmingly likely infectious/inflammatory 

in nature. Most likely represent septic pulmonary emboli. The possibility of 

mycobacterial infection should also be included, deemed less likely given 

absence of adenopathy but still possible. Differential considerations also 

include fungal infections, noninfectious inflammatory processes. Other 

noncavitary nodules are also demonstrated, with the same differential. Large 

right and small-to-moderate left pleural effusions. Compressive atelectasis of 

the majority of the right lower lobe. Infiltrates seen in the right upper lobe. 

Bilateral pacemaker. Evidence of central venoocclusive disease. Wedge-shaped 

area of low-attenuation in the upper pole of the spleen, suspicious for infarct.


   -2d Echo (limited study due to contractures): no vegetations seen


    -2/10 Bcx 4/4MRSA; 2/11 Bcx 2/4 MRSA; 2/12 4/4  MRSA; 2/14 Bcx 4/4 GPC 

clusters


    2/8/19 Blood Cx  4/4 MRSA


    2/7/19 Wound Cx MRSA, ESBL E.coli (S ZOsyn), K. pna (S Zosyn)


   


 Leukocytosis, recurrent, increasing


  Low grade fever; improving


   Positive UA 


   CXR neg





Thrombocytopenia- HIT vs medication related


   


Pressure ulcers


  Not infected





Right hip fracture with hemiarthroplasty Oct 2018


DM


HTN


Sick sinus syndrome sp Pacemaker


HLD


CAD - SP MI and CABG


Aortic stenosis.


Pulmonary hypertension.


Alzheimer dementia.





PLAN





- Continue Daptomycin #4 (abx d #9) and Ceftaroline #2 given persistent MRSA 

Bacteremia


   - repeat 2 sets of Bcx


   -2/13 SP Vancomycin #6


   -2/13 SP Zosyn #4


   -2/10 SP Cefepime #3





-Will need MARTHA to look for endocarditis and/or PPM infection if consistent with 

goals of care


-placed on airborne isolation


   -AFB s/cx x3, MTB PCR x1


- f/u repeat cultures


- Monitor CBC and temps


- wound care


- Nutritional support


-f/u cocci, crAg, histo, blasto serologies








Thank you for this consult. We will continue to follow the patient during this 

hospitalization.





Subjective


Allergies:  


Coded Allergies:  


     No Known Allergies (Unverified , 3/26/14)


Subjective


Afebrile


Leukocytosis mild





Objective


Vital Signs





Last 24 Hour Vital Signs








  Date Time  Temp Pulse Resp B/P (MAP) Pulse Ox O2 Delivery O2 Flow Rate FiO2


 


2/16/19 04:00 97.7 90 20 109/87 (94) 98   


 


2/16/19 04:00  87      


 


2/16/19 00:00 97.7 78 22 114/58 (76) 100   


 


2/15/19 23:25  81      


 


2/15/19 22:15     100 Venturi Mask 12.0 50


 


2/15/19 22:15      Venturi Mask 12.0 50


 


2/15/19 22:15  84 20   Venturi Mask 12.0 50


 


2/15/19 21:00      Venturi Mask 12.0 


 


2/15/19 20:00  82      


 


2/15/19 20:00 97.0 80 20 103/63 (76) 96   


 


2/15/19 16:00 98.1 77 22 117/56 (76) 95   


 


2/15/19 16:00      Venturi Mask 12.0 


 


2/15/19 15:36  83      


 


2/15/19 14:00      Venturi Mask 12.0 


 


2/15/19 12:00 98.6 77 22 112/85 (94) 100   


 


2/15/19 12:00      Venturi Mask 12.0 


 


2/15/19 11:34  95      


 


2/15/19 08:00 99.0 91 22 109/47 (67) 100   


 


2/15/19 08:00      Venturi Mask 12.0 








Height (Feet):  5


Height (Inches):  5.00


Weight (Pounds):  144


Objective


Gen:  NAD, Awake


HEENT: NCAT, MMM, EOMI, PERRL


LUNGS:  CTAB, No W


CARDS: RRR, S1, S2, No M/R/G,





Microbiology








 Date/Time


Source Procedure


Growth Status


 


 


 2/14/19 07:45


Blood Blood Culture - Preliminary


Staphylococcus Aureus Resulted


 


 2/14/19 07:35


Blood Blood Culture - Preliminary


Staphylococcus Aureus Resulted











Laboratory Tests








Test


  2/15/19


12:30


 


TB Test (T-Spot) Pending  


 


TB Test Nil Control (T-Spot) Pending  


 


TB Test Panel A (T-Spot) Pending  


 


TB Test Panel B (T-Spot) Pending  


 


TB Test Positive Control


(T-Spot) Pending  


 











Current Medications








 Medications


  (Trade)  Dose


 Ordered  Sig/Violette


 Route


 PRN Reason  Start Time


 Stop Time Status Last Admin


Dose Admin


 


 Acetaminophen


  (Tylenol)  650 mg  Q4H  PRN


 NG


 Mild Pain/Temp > 100.5  2/15/19 14:43


 3/14/19 14:42   


 


 


 Ceftaroline


 Fosamil 400 mg/


 Sodium Chloride  55 ml @ 55


 mls/hr  Q12HR


 IV


   2/15/19 21:00


 2/22/19 20:59  2/15/19 21:35


 


 


 Clonidine HCl


  (Catapres Tab)  0.1 mg  Q8H  PRN


 ORAL


 SBP>160mmHg  2/15/19 14:43


 3/10/19 14:42   


 


 


 Daptomycin 400 mg/


 Sodium Chloride  55 ml @ 


 100 mls/hr  Q24H


 IV


   2/15/19 18:00


 2/20/19 17:59  2/15/19 19:55


 


 


 Dextrose


  (Dextrose 50%)  25 ml  Q30M  PRN


 IV


 Hypoglycemia  2/15/19 14:45


 3/9/19 13:30   


 


 


 Dextrose


  (Dextrose 50%)  50 ml  Q30M  PRN


 IV


 hypoglycemia  2/15/19 14:45


 3/9/19 13:44   


 


 


 Dextrose/Sodium


 Chloride  1,000 ml @ 


 50 mls/hr  Q20H


 IV


   2/15/19 14:43


 3/14/19 14:42  2/15/19 15:45


 


 


 Insulin Aspart


  (NovoLOG)    EVERY 6  HOURS


 SUBQ


   2/15/19 18:00


 3/9/19 16:29  2/16/19 06:24


 


 


 Iopamidol


  (Isovue-300


 100ml)  100 ml  NOW  PRN


 INJ


 Radiology Procedure  2/15/19 14:43


 2/16/19 14:42   


 


 


 Lansoprazole


  (Prevacid)  30 mg  DAILY


 NG


   2/16/19 09:00


 3/18/19 08:59   


 


 


 Olanzapine


  (ZyPREXA)  2.5 mg  BID


 ORAL


   2/15/19 18:00


 3/10/19 10:59  2/15/19 19:03


 


 


 Ondansetron HCl


  (Zofran)  4 mg  Q6H  PRN


 IVP


 Nausea & Vomiting  2/15/19 14:44


 3/10/19 14:43   


 

















Raffi Scott MD Feb 16, 2019 07:44

## 2019-02-16 NOTE — GENERAL PROGRESS NOTE
Assessment/Plan


Problem List:  


(1) Acute encephalopathy


Assessment & Plan:  metabolic


ICD Codes:  G93.40 - Encephalopathy, unspecified


SNOMED:  04576174, 704687248


(2) Delirium


ICD Codes:  R41.0 - Disorientation, unspecified


SNOMED:  2446078


(3) Alzheimer's dementia


ICD Codes:  G30.9 - Alzheimer's disease, unspecified; F02.80 - Dementia in 

other diseases classified elsewhere without behavioral disturbance


SNOMED:  45226809


Assessment/Plan


Zyprexa 


the pt lacks capacity


soft restraints if needed





Subjective


Allergies:  


Coded Allergies:  


     No Known Allergies (Unverified , 3/26/14)


Subjective


cont to be agitated





Objective





Last 24 Hour Vital Signs








  Date Time  Temp Pulse Resp B/P (MAP) Pulse Ox O2 Delivery O2 Flow Rate FiO2


 


2/16/19 20:00 96.8 79 20 137/75 (95) 100   


 


2/16/19 20:00  71      


 


2/16/19 16:00  85      


 


2/16/19 16:00 96.8 87 20 131/56 (81) 100   


 


2/16/19 12:00 97.7 90 22 107/58 (74) 100   


 


2/16/19 12:00  93      


 


2/16/19 09:00      Venturi Mask 12.0 


 


2/16/19 08:49  85 20   Venturi Mask 12.0 50


 


2/16/19 08:49      Venturi Mask 12.0 50


 


2/16/19 08:49     100 Venturi Mask 12.0 50


 


2/16/19 08:00 97.0 69 20 122/49 (73) 100   


 


2/16/19 08:00  91      


 


2/16/19 04:00 97.7 90 20 109/87 (94) 98   


 


2/16/19 04:00  87      


 


2/16/19 00:00 97.7 78 22 114/58 (76) 100   


 


2/15/19 23:25  81      


 


2/15/19 22:15     100 Venturi Mask 12.0 50


 


2/15/19 22:15      Venturi Mask 12.0 50


 


2/15/19 22:15  84 20   Venturi Mask 12.0 50

















Intake and Output  


 


 2/15/19 2/16/19





 19:00 07:00


 


Intake Total 710 ml 


 


Output Total 100 ml 


 


Balance 610 ml 


 


  


 


Intake Free Water 30 ml 


 


IV Total 600 ml 


 


Tube Feeding 80 ml 


 


Output Urine Total 100 ml 


 


# Voids 2 3


 


# Bowel Movements 2 








Laboratory Tests


2/16/19 08:20: 


White Blood Count 12.8H, Red Blood Count 2.83L, Hemoglobin 8.4L, Hematocrit 

26.4L, Mean Corpuscular Volume 94, Mean Corpuscular Hemoglobin 29.7, Mean 

Corpuscular Hemoglobin Concent 31.7L, Red Cell Distribution Width 15.6H, 

Platelet Count 49L, Mean Platelet Volume 9.4, Neutrophils (%) (Auto) , 

Lymphocytes (%) (Auto) , Monocytes (%) (Auto) , Eosinophils (%) (Auto) , 

Basophils (%) (Auto) , Differential Total Cells Counted 100, Neutrophils % (

Manual) 94H, Lymphocytes % (Manual) 4L, Monocytes % (Manual) 2, Eosinophils % (

Manual) 0, Basophils % (Manual) 0, Band Neutrophils 0, Platelet Estimate 

DecreasedL, Platelet Morphology Normal, Hypochromasia 1+, Anisocytosis 1+, 

Erythrocyte Sedimentation Rate 40H, Sodium Level 141, Potassium Level 3.4L, 

Chloride Level 113H, Carbon Dioxide Level 16L, Anion Gap 12, Blood Urea 

Nitrogen 30H, Creatinine 0.9, Estimat Glomerular Filtration Rate , Glucose 

Level 203H, Calcium Level 8.1L, Phosphorus Level 3.4, Magnesium Level 2.1, 

Total Bilirubin 1.5H, Direct Bilirubin 0.8H, Aspartate Amino Transf (AST/SGOT) 

24, Alanine Aminotransferase (ALT/SGPT) 31, Alkaline Phosphatase 106, C-

Reactive Protein, Quantitative 16.0H, Total Protein 5.0L, Albumin 1.4L, 

Globulin 3.6, Albumin/Globulin Ratio 0.4L


Height (Feet):  5


Height (Inches):  5.00


Weight (Pounds):  144


General Appearance:  no apparent distress, confused, agitated











Josh Nunez MD Feb 16, 2019 22:09

## 2019-02-16 NOTE — NUR
HAND-OFF: 

Report given to Makeda.

-------------------------------------------------------------------------------

Addendum: 02/16/19 at 1945 by YURIDIA HERNANDEZ RN RN

-------------------------------------------------------------------------------

RN

## 2019-02-16 NOTE — PULMONOLOGY PROGRESS NOTE
Assessment/Plan


Problems:  


(1) Sepsis


(2) Thrombocytopenia


(3) ATN (acute tubular necrosis)


(4) Pulmonary hypertension


(5) Sacral decubitus ulcer, stage III


(6) Alzheimer's dementia


(7) Protein-calorie malnutrition, severe


(8) HTN (hypertension)


(9) Pacemaker


(10) CAD (coronary artery disease)


Assessment/Plan


on face mask


/u PLT, better today


failed swallow study


Morphine prn


d/w wound care nurse, 


pan cultures


iv fluids


check electrolytes


sliding scale


check electrolytes daily


broad spectrum abx


dvt prophylaxis


insulin coverage.


social service consult





Subjective


ROS Limited/Unobtainable:  No


Constitutional:  Reports: no symptoms


HEENT:  Repors: no symptoms


Respiratory:  Reports: no symptoms


Allergies:  


Coded Allergies:  


     No Known Allergies (Unverified , 3/26/14)





Objective





Last 24 Hour Vital Signs








  Date Time  Temp Pulse Resp B/P (MAP) Pulse Ox O2 Delivery O2 Flow Rate FiO2


 


2/16/19 08:49  85 20   Venturi Mask 12.0 50


 


2/16/19 08:49      Venturi Mask 12.0 50


 


2/16/19 08:49     100 Venturi Mask 12.0 50


 


2/16/19 08:00 97.0 69 20 122/49 (73) 100   


 


2/16/19 04:00 97.7 90 20 109/87 (94) 98   


 


2/16/19 04:00  87      


 


2/16/19 00:00 97.7 78 22 114/58 (76) 100   


 


2/15/19 23:25  81      


 


2/15/19 22:15     100 Venturi Mask 12.0 50


 


2/15/19 22:15      Venturi Mask 12.0 50


 


2/15/19 22:15  84 20   Venturi Mask 12.0 50


 


2/15/19 21:00      Venturi Mask 12.0 


 


2/15/19 20:00  82      


 


2/15/19 20:00 97.0 80 20 103/63 (76) 96   


 


2/15/19 16:00 98.1 77 22 117/56 (76) 95   


 


2/15/19 16:00      Venturi Mask 12.0 


 


2/15/19 15:36  83      


 


2/15/19 14:00      Venturi Mask 12.0 


 


2/15/19 12:00 98.6 77 22 112/85 (94) 100   


 


2/15/19 12:00      Venturi Mask 12.0 


 


2/15/19 11:34  95      

















Intake and Output  


 


 2/15/19 2/16/19





 18:59 06:59


 


Intake Total 710 ml 


 


Output Total 100 ml 


 


Balance 610 ml 


 


  


 


Intake Free Water 30 ml 


 


IV Total 600 ml 


 


Tube Feeding 80 ml 


 


Output Urine Total 100 ml 


 


# Voids 2 3


 


# Bowel Movements 2 








Objective


General Appearance:  cachectic


HEENT:  normocephalic, somnolent


Respiratory/Chest:  chest wall non-tender, lungs clear


Cardiovascular:  normal peripheral pulses, normal rate


Abdomen:  normal bowel sounds, soft, non tender


Extremities:  no cyanosis


Skin:  no rash





Microbiology








 Date/Time


Source Procedure


Growth Status


 


 


 2/14/19 07:45


Blood Blood Culture - Preliminary


Staphylococcus Aureus Resulted


 


 2/14/19 07:35


Blood Blood Culture - Preliminary


Staphylococcus Aureus Resulted








Laboratory Tests


2/15/19 12:30: 


TB Test (T-Spot) [Pending], TB Test Nil Control (T-Spot) [Pending], TB Test 

Panel A (T-Spot) [Pending], TB Test Panel B (T-Spot) [Pending], TB Test 

Positive Control (T-Spot) [Pending]


2/16/19 08:20: 


White Blood Count [Pending], Red Blood Count [Pending], Hemoglobin [Pending], 

Hematocrit [Pending], Mean Corpuscular Volume [Pending], Mean Corpuscular 

Hemoglobin [Pending], Mean Corpuscular Hemoglobin Concent [Pending], Red Cell 

Distribution Width [Pending], Platelet Count [Pending], Mean Platelet Volume [

Pending], Neutrophils (%) (Auto) [Pending], Lymphocytes (%) (Auto) [Pending], 

Monocytes (%) (Auto) [Pending], Eosinophils (%) (Auto) [Pending], Basophils (%) 

(Auto) [Pending], Erythrocyte Sedimentation Rate [Pending], Sodium Level 141, 

Potassium Level 3.4L, Chloride Level 113H, Carbon Dioxide Level 16L, Anion Gap 

12, Blood Urea Nitrogen 30H, Creatinine 0.9, Estimat Glomerular Filtration Rate 

, Glucose Level 203H, Calcium Level 8.1L, Phosphorus Level 3.4, Magnesium Level 

2.1, Total Bilirubin 1.5H, Direct Bilirubin [Pending], Aspartate Amino Transf (

AST/SGOT) 24, Alanine Aminotransferase (ALT/SGPT) 31, Alkaline Phosphatase 106, 

C-Reactive Protein, Quantitative 16.0H, Total Protein 5.0L, Albumin 1.4L, 

Globulin 3.6, Albumin/Globulin Ratio 0.4L





Current Medications








 Medications


  (Trade)  Dose


 Ordered  Sig/Violette


 Route


 PRN Reason  Start Time


 Stop Time Status Last Admin


Dose Admin


 


 Acetaminophen


  (Tylenol)  650 mg  Q4H  PRN


 NG


 Mild Pain/Temp > 100.5  2/15/19 14:43


 3/14/19 14:42   


 


 


 Ceftaroline


 Fosamil 400 mg/


 Sodium Chloride  55 ml @ 55


 mls/hr  Q12HR


 IV


   2/16/19 09:00


 2/23/19 08:59  2/16/19 09:24


 


 


 Clonidine HCl


  (Catapres Tab)  0.1 mg  Q8H  PRN


 ORAL


 SBP>160mmHg  2/15/19 14:43


 3/10/19 14:42   


 


 


 Daptomycin 400 mg/


 Sodium Chloride  55 ml @ 


 100 mls/hr  Q24H


 IV


   2/15/19 18:00


 2/20/19 17:59  2/15/19 19:55


 


 


 Dextrose


  (Dextrose 50%)  25 ml  Q30M  PRN


 IV


 Hypoglycemia  2/15/19 14:45


 3/9/19 13:30   


 


 


 Dextrose


  (Dextrose 50%)  50 ml  Q30M  PRN


 IV


 hypoglycemia  2/15/19 14:45


 3/9/19 13:44   


 


 


 Dextrose/Sodium


 Chloride  1,000 ml @ 


 50 mls/hr  Q20H


 IV


   2/15/19 14:43


 3/14/19 14:42  2/15/19 15:45


 


 


 Insulin Aspart


  (NovoLOG)    EVERY 6  HOURS


 SUBQ


   2/15/19 18:00


 3/9/19 16:29  2/16/19 06:24


 


 


 Iopamidol


  (Isovue-300


 100ml)  100 ml  NOW  PRN


 INJ


 Radiology Procedure  2/15/19 14:43


 2/16/19 14:42   


 


 


 Lansoprazole


  (Prevacid)  30 mg  DAILY


 NG


   2/16/19 09:00


 3/18/19 08:59  2/16/19 09:24


 


 


 Olanzapine


  (ZyPREXA)  2.5 mg  BID


 ORAL


   2/15/19 18:00


 3/10/19 10:59  2/16/19 09:23


 


 


 Ondansetron HCl


  (Zofran)  4 mg  Q6H  PRN


 IVP


 Nausea & Vomiting  2/15/19 14:44


 3/10/19 14:43   


 

















Korin Junior MD Feb 16, 2019 09:47

## 2019-02-16 NOTE — CONSULTATION
DATE OF CONSULTATION:  02/15/2019

CARDIOLOGY CONSULTATION



CONSULTING PHYSICIAN:  Lara Tong M.D.



REFERRING PHYSICIAN:  Juan Carlos Magana M.D.



REASON FOR EVALUATION:  Bacteremia.



HISTORY OF PRESENT ILLNESS:  This is a very unfortunate 78-year-old female

who was brought from nursing home with failure to thrive for oral intake,

confusion, bedridden status, and dehydration.  The patient was cultured.

Blood cultures revealed positive for Staph aureus in multiple samples.



The patient is well known to me in the past.  She is the patient with

hypertension and pacemaker, which first one was placed remotely 10 years

ago and the second one was placed in 2017 in June by myself.

Subsequently, the patient also had coronary artery disease and she had

coronary intervention with stent placement in the LAD and circumflex with

acute coronary syndrome that happened in July 2018, both of these

procedures were done at [facility].  I will _____ of this

patient.  Previously, she also had evidence of GI bleed and DVT and then

apparently was placed.



REVIEW OF SYSTEMS:  Unobtainable because she is totally confused and

nonresponsive.



PHYSICAL EXAMINATION:

GENERAL:  Revealed extremely ill, moribund-appearing, cachectic female who

is moaning.  She is not opening her eyes.  She is bedridden.  She has very

dry mucosa.

VITAL SIGNS:  Blood pressure 103/70, heart rate is 80, pulse oximetry 96%,

and temperature is 97.7.

HEENT:  She has very dry mucosa.  She has NG-tube in her nose which has

some crusted blood around it.

NECK:  Her neck veins are flat and her carotid upstroke is diminished, low

amplitude, but normal upstroke.

LUNGS:  She has crackles bilaterally.

HEART:  Very distant because of the moaning and has slightly accented A2.

 

CHEST:  There is no any rash on her chest, but she is cachectic with a

breast bone sticking out and is visualized.

ABDOMEN:  Soft, scaphoid, tender throughout.  It is difficult to say where

is the tenderness because the patient is moaning with every touch and she

has some decubital ulcers on the sacral area and the heels.



LABORATORY AND DIAGNOSTIC DATA:  Her EKG showed atrial-paced rhythm

intermittent with sinus rhythm.  The laboratory numbers from the beginning

remarkable for elevated white count to 14.6, hemoglobin 10, and platelets

105,000.  At present time, her hemoglobin is 8.7, WBC 8.1, and platelets

are 36,000.  The patient's chemistry remarkable for sodium 144, potassium

3.6, chloride 113, BUN 35, and creatinine 1.  These are all numbers from

February 15.  Blood sugar was 137, bilirubin 1.5.  Her INR is 1.3.  Her

chest x-ray was done and it revealed on February 11, bilateral

infiltrates, comment about nasogastric tube.  The patient also underwent a

CAT scan of the abdomen and pelvis and it revealed large right pleural

effusion and multiple cavitary pulmonary parenchymal lesions, probably

infectious and inflammatory in nature, possible septic pulmonary emboli.

TB could not be ruled out either.  There is some minimum cavitary nodules

as well.  She has anasarca, ascites, and severe edema.  Her echocardiogram

which was done on February 10, reveals inferolateral akinesis and

flattened interventricular septum, normally moving anterior wall

suspicious for old anteroinferior infarct, ejection fraction assessed was

approximately 40%.



IMPRESSION AND RECOMMENDATION:  This patient is septic.  She is extremely

malnourished, bedridden, and cachectic.  She probably has endocarditis

based on her CAT scan presentation and bacteremia.  She probably has

infected pacemaker lead I think.  She has 2 pacemakers, one on the left

side and one on the right side.  Because the left-sided pacemaker was

placed previously by unknown  and so the ventricular lead was

mistakenly placed in coronary sinus, so I put a new set of this one and a

half year ago on the right side and then the plan was to remove the

previous pacemaker on the left side, but the patient never showed up for

followup and so I am pretty confident that she unfortunately has

vegetations on her pacemaker leads, however, at present time, she is in

critical moribund type of condition with extreme malnutrition, sepsis, and

respiratory failure and she should be treated with antibiotics to

stabilize her because she is not a candidate for surgery definitely at

this time considering her general condition.  She is not a candidate for

laser lead removal which is necessary to treat her _____ foreign body

endocarditis.  She is not a candidate for transesophageal echo at this

time as well in my opinion and this is very very ill patient with terrible

prognosis.  That is going to be discussed with primary care physician.  IV

antibiotics versus palliative care should be considered at present time.

 



Thank you very much for your consult.









  ______________________________________________

  Lara Tong M.D.





DR:  Marilin

D:  02/16/2019 18:07

T:  02/16/2019 21:39

JOB#:  [id number]

CC:

## 2019-02-16 NOTE — INTERNAL MED PROGRESS NOTE
Subjective


Date of Service:  Feb 16, 2019


Physician Name


Cheo Ortiz


Attending Physician


Juan Carlos Magana MD





Current Medications








 Medications


  (Trade)  Dose


 Ordered  Sig/Violette


 Route


 PRN Reason  Start Time


 Stop Time Status Last Admin


Dose Admin


 


 Acetaminophen


  (Tylenol)  650 mg  Q4H  PRN


 NG


 Mild Pain/Temp > 100.5  2/15/19 14:43


 3/14/19 14:42   


 


 


 Ceftaroline


 Fosamil 400 mg/


 Sodium Chloride  55 ml @ 55


 mls/hr  Q12HR


 IV


   2/16/19 09:00


 2/23/19 08:59  2/16/19 09:24


 


 


 Clonidine HCl


  (Catapres Tab)  0.1 mg  Q8H  PRN


 NG


 SBP>160mmHg  2/16/19 14:00


 3/10/19 13:59   


 


 


 Daptomycin 400 mg/


 Sodium Chloride  55 ml @ 


 100 mls/hr  Q24H


 IV


   2/15/19 18:00


 2/20/19 17:59  2/16/19 18:14


 


 


 Dextrose


  (Dextrose 50%)  25 ml  Q30M  PRN


 IV


 Hypoglycemia  2/15/19 14:45


 3/9/19 13:30   


 


 


 Dextrose


  (Dextrose 50%)  50 ml  Q30M  PRN


 IV


 hypoglycemia  2/15/19 14:45


 3/9/19 13:44   


 


 


 Dextrose/Sodium


 Chloride  1,000 ml @ 


 50 mls/hr  Q20H


 IV


   2/15/19 14:43


 3/14/19 14:42  2/16/19 10:58


 


 


 Insulin Aspart


  (NovoLOG)    EVERY 6  HOURS


 SUBQ


   2/15/19 18:00


 3/9/19 16:29  2/16/19 17:40


 


 


 Lansoprazole


  (Prevacid)  30 mg  DAILY


 NG


   2/16/19 09:00


 3/18/19 08:59  2/16/19 09:24


 


 


 Olanzapine


  (ZyPREXA)  2.5 mg  BID


 NG


   2/16/19 18:00


 3/10/19 10:59  2/16/19 17:39


 


 


 Ondansetron HCl


  (Zofran)  4 mg  Q6H  PRN


 IVP


 Nausea & Vomiting  2/15/19 14:44


 3/10/19 14:43   


 








Allergies:  


Coded Allergies:  


     No Known Allergies (Unverified , 3/26/14)


ROS Limited/Unobtainable:  Yes


Subjective


77 YO F admitted with dehydration and hypoxia.  Now sepsis.  Cover for Int Med-

Dr [place].  FAWN.  Continues on venturi mask





Objective





Last Vital Signs








  Date Time  Temp Pulse Resp B/P (MAP) Pulse Ox O2 Delivery O2 Flow Rate FiO2


 


2/16/19 16:00  85      


 


2/16/19 16:00 96.8  20 131/56 (81) 100   


 


2/16/19 09:00      Venturi Mask 12.0 


 


2/16/19 08:49        50











Laboratory Tests








Test


  2/16/19


08:20


 


White Blood Count


  12.8 K/UL


(4.8-10.8)  H


 


Red Blood Count


  2.83 M/UL


(4.20-5.40)  L


 


Hemoglobin


  8.4 G/DL


(12.0-16.0)  L


 


Hematocrit


  26.4 %


(37.0-47.0)  L


 


Mean Corpuscular Volume 94 FL (80-99)  


 


Mean Corpuscular Hemoglobin


  29.7 PG


(27.0-31.0)


 


Mean Corpuscular Hemoglobin


Concent 31.7 G/DL


(32.0-36.0)  L


 


Red Cell Distribution Width


  15.6 %


(11.6-14.8)  H


 


Platelet Count


  49 K/UL


(150-450)  L


 


Mean Platelet Volume


  9.4 FL


(6.5-10.1)


 


Neutrophils (%) (Auto)


  % (45.0-75.0)


 


 


Lymphocytes (%) (Auto)


  % (20.0-45.0)


 


 


Monocytes (%) (Auto)  % (1.0-10.0)  


 


Eosinophils (%) (Auto)  % (0.0-3.0)  


 


Basophils (%) (Auto)  % (0.0-2.0)  


 


Differential Total Cells


Counted 100  


 


 


Neutrophils % (Manual) 94 % (45-75)  H


 


Lymphocytes % (Manual) 4 % (20-45)  L


 


Monocytes % (Manual) 2 % (1-10)  


 


Eosinophils % (Manual) 0 % (0-3)  


 


Basophils % (Manual) 0 % (0-2)  


 


Band Neutrophils 0 % (0-8)  


 


Platelet Estimate Decreased  L


 


Platelet Morphology Normal  


 


Hypochromasia 1+  


 


Anisocytosis 1+  


 


Erythrocyte Sedimentation Rate


  40 MM/HR


(0-30)  H


 


Sodium Level


  141 MMOL/L


(136-145)


 


Potassium Level


  3.4 MMOL/L


(3.5-5.1)  L


 


Chloride Level


  113 MMOL/L


()  H


 


Carbon Dioxide Level


  16 MMOL/L


(21-32)  L


 


Anion Gap


  12 mmol/L


(5-15)


 


Blood Urea Nitrogen


  30 mg/dL


(7-18)  H


 


Creatinine


  0.9 MG/DL


(0.55-1.30)


 


Estimat Glomerular Filtration


Rate  mL/min (>60)  


 


 


Glucose Level


  203 MG/DL


()  H


 


Calcium Level


  8.1 MG/DL


(8.5-10.1)  L


 


Phosphorus Level


  3.4 MG/DL


(2.5-4.9)


 


Magnesium Level


  2.1 MG/DL


(1.8-2.4)


 


Total Bilirubin


  1.5 MG/DL


(0.2-1.0)  H


 


Direct Bilirubin


  0.8 MG/DL


(0.0-0.3)  H


 


Aspartate Amino Transf


(AST/SGOT) 24 U/L (15-37)


 


 


Alanine Aminotransferase


(ALT/SGPT) 31 U/L (12-78)


 


 


Alkaline Phosphatase


  106 U/L


()


 


C-Reactive Protein,


Quantitative 16.0 mg/dL


(0.00-0.90)  H


 


Total Protein


  5.0 G/DL


(6.4-8.2)  L


 


Albumin


  1.4 G/DL


(3.4-5.0)  L


 


Globulin 3.6 g/dL  


 


Albumin/Globulin Ratio


  0.4 (1.0-2.7)


L











Microbiology








 Date/Time


Source Procedure


Growth Status


 


 


 2/14/19 07:45


Blood Blood Culture - Preliminary


Staphylococcus Aureus Resulted


 


 2/14/19 07:35


Blood Blood Culture - Preliminary


Staphylococcus Aureus Resulted

















Intake and Output  


 


 2/15/19 2/16/19





 19:00 07:00


 


Intake Total 710 ml 


 


Output Total 100 ml 


 


Balance 610 ml 


 


  


 


Intake Free Water 30 ml 


 


IV Total 600 ml 


 


Tube Feeding 80 ml 


 


Output Urine Total 100 ml 


 


# Voids 2 3


 


# Bowel Movements 2 








Objective


Objective


General: No acute distress, awake and less responsive, Sleepy, cachectic.


HEENT: NCAT, sclera anicteric, PERRL, NG tube.


Neck: Supple, no significant jugular venous distention, 


Lungs: Non-rebreather mask; Poor inspiratory effort, decreased air in the bases 

, Bilateral Wheeze and Rales.


Heart: Regular rate and rhythm, normal S1/S2, no murmur.


Abdomen: soft, nontender, nondistended. Normoactive bowel sounds.


 / Rectal: Refused and deferred.


Extremities: No Cyanosis , No clubbing,  Left UE edema.  Lateral lower 

extremity / feet dressing intact.


Neuro: A&O x 1, Able to move all extremities


Skin: warm, no rashes or lesions, ecchymosis in bilateral upper extremity noted.





Assessment/Plan


1. Sepsis /  METHACILLIN RESISTANT STAPHYLOCOCCUS AUREUS bacteremia, lactic 

acidosis.


2. Failure to thrive with severe protein calorie malnutrition.


3. Coronary artery disease.


4. Diabetes, type 2.


5. Hypertension.


6. Sick sinus syndrome.


7. Hypercholesterolemia.


8. Aortic stenosis.


9. Pulmonary hypertension.


10. Alzheimer dementia.


11.  Acute tubular necrosis/acute kidney injury most likely secondary to 

dehydration and sepsis.


12. Sacral decubitus ulcer, stage III


13. Severe dehydration


14.  Hypokalemia.


15.  Abnormal liver function test


16.  Anemia/thrombocytopenia


17.  Bilateral pneumonia ?septic emboli?





TREATMENT:


1.  Patient on broad spectrum antibiotics with vancomycin and Zosyn IV.  Will 

require transesophageal echocardiogram to R/O endocarditis-see ID note


2. Coronary artery disease.  The patient is status post coronary artery


bypass graft in 2017.


3. Diabetes, type 2.  The patient has been started on NovoLog sliding


scale.  


4. Hypertension.  The patient is to continue on atenolol and losartan as


above.


5. Sick sinus syndrome.  The patient is status post pacemaker


implantation.


6. Hypercholesterolemia.  Continue simvastatin as above.


7. Aortic stenosis.


8. Pulmonary hypertension.


9. Alzheimer dementia.


10.  Anemia/thrombocytopenia-S/P 1 unit PRBC on 2/11/19.  Heme/Onc consult.  

Hold heparin


11.  Septic pulmonary emboli-await MARTHA on Tuesday 2/19/19-see cardiology note.





Restart tube feeding at rate of 45 cc/hr.


CODE STATUS is full code as per POLST,


DVT prophylaxis: D/C Heparin subcu due to thrombocytopenia











Cheo Ortiz MD Feb 16, 2019 18:47

## 2019-02-17 VITALS — SYSTOLIC BLOOD PRESSURE: 107 MMHG | DIASTOLIC BLOOD PRESSURE: 68 MMHG

## 2019-02-17 VITALS — DIASTOLIC BLOOD PRESSURE: 78 MMHG | SYSTOLIC BLOOD PRESSURE: 136 MMHG

## 2019-02-17 VITALS — SYSTOLIC BLOOD PRESSURE: 114 MMHG | DIASTOLIC BLOOD PRESSURE: 50 MMHG

## 2019-02-17 VITALS — SYSTOLIC BLOOD PRESSURE: 141 MMHG | DIASTOLIC BLOOD PRESSURE: 62 MMHG

## 2019-02-17 VITALS — SYSTOLIC BLOOD PRESSURE: 110 MMHG | DIASTOLIC BLOOD PRESSURE: 44 MMHG

## 2019-02-17 VITALS — DIASTOLIC BLOOD PRESSURE: 44 MMHG | SYSTOLIC BLOOD PRESSURE: 120 MMHG

## 2019-02-17 VITALS — DIASTOLIC BLOOD PRESSURE: 63 MMHG | SYSTOLIC BLOOD PRESSURE: 125 MMHG

## 2019-02-17 VITALS — SYSTOLIC BLOOD PRESSURE: 134 MMHG | DIASTOLIC BLOOD PRESSURE: 59 MMHG

## 2019-02-17 LAB
ADD MANUAL DIFF: YES
ANION GAP SERPL CALC-SCNC: 11 MMOL/L (ref 5–15)
BUN SERPL-MCNC: 23 MG/DL (ref 7–18)
CALCIUM SERPL-MCNC: 7.8 MG/DL (ref 8.5–10.1)
CHLORIDE SERPL-SCNC: 114 MMOL/L (ref 98–107)
CO2 SERPL-SCNC: 19 MMOL/L (ref 21–32)
CREAT SERPL-MCNC: 0.7 MG/DL (ref 0.55–1.3)
ERYTHROCYTE [DISTWIDTH] IN BLOOD BY AUTOMATED COUNT: 16.2 % (ref 11.6–14.8)
HCT VFR BLD CALC: 24.3 % (ref 37–47)
HGB BLD-MCNC: 7.8 G/DL (ref 12–16)
MCV RBC AUTO: 92 FL (ref 80–99)
PLATELET # BLD: 55 K/UL (ref 150–450)
POTASSIUM SERPL-SCNC: 4.4 MMOL/L (ref 3.5–5.1)
RBC # BLD AUTO: 2.63 M/UL (ref 4.2–5.4)
SODIUM SERPL-SCNC: 144 MMOL/L (ref 136–145)
WBC # BLD AUTO: 11.7 K/UL (ref 4.8–10.8)

## 2019-02-17 RX ADMIN — DEXTROSE AND SODIUM CHLORIDE SCH MLS/HR: 5; .45 INJECTION, SOLUTION INTRAVENOUS at 06:17

## 2019-02-17 RX ADMIN — DEXTROSE MONOHYDRATE SCH MLS/HR: 50 INJECTION, SOLUTION INTRAVENOUS at 20:49

## 2019-02-17 RX ADMIN — OLANZAPINE SCH MG: 2.5 TABLET, FILM COATED ORAL at 09:18

## 2019-02-17 RX ADMIN — DAPTOMYCIN SCH MLS/HR: 500 INJECTION, POWDER, LYOPHILIZED, FOR SOLUTION INTRAVENOUS at 18:04

## 2019-02-17 RX ADMIN — INSULIN ASPART SCH UNITS: 100 INJECTION, SOLUTION INTRAVENOUS; SUBCUTANEOUS at 06:00

## 2019-02-17 RX ADMIN — INSULIN ASPART SCH UNITS: 100 INJECTION, SOLUTION INTRAVENOUS; SUBCUTANEOUS at 18:17

## 2019-02-17 RX ADMIN — OLANZAPINE SCH MG: 2.5 TABLET, FILM COATED ORAL at 18:04

## 2019-02-17 RX ADMIN — INSULIN ASPART SCH UNITS: 100 INJECTION, SOLUTION INTRAVENOUS; SUBCUTANEOUS at 00:55

## 2019-02-17 RX ADMIN — INSULIN ASPART SCH UNITS: 100 INJECTION, SOLUTION INTRAVENOUS; SUBCUTANEOUS at 12:06

## 2019-02-17 RX ADMIN — DEXTROSE MONOHYDRATE SCH MLS/HR: 50 INJECTION, SOLUTION INTRAVENOUS at 09:17

## 2019-02-17 NOTE — NUR
HAND-OFF: 



Report given to ESCOBAR Gerard and Mariah RN. Patient is awake lying semi-schmitz's; resting 
comfortably. In stable condition.

## 2019-02-17 NOTE — PULMONOLOGY PROGRESS NOTE
Assessment/Plan


Problems:  


(1) Sepsis


(2) Thrombocytopenia


(3) ATN (acute tubular necrosis)


(4) Pulmonary hypertension


(5) Sacral decubitus ulcer, stage III


(6) Alzheimer's dementia


(7) Protein-calorie malnutrition, severe


(8) HTN (hypertension)


(9) Pacemaker


(10) CAD (coronary artery disease)


Assessment/Plan


on face mask


/u PLT, better today up to 55


failed swallow study


Morphine prn


d/w wound care nurse, 


pan cultures


iv fluids


check electrolytes


sliding scale


check electrolytes daily


broad spectrum abx


dvt prophylaxis


insulin coverage.


social service consult





Subjective


ROS Limited/Unobtainable:  Yes


Allergies:  


Coded Allergies:  


     No Known Allergies (Unverified , 3/26/14)





Objective





Last 24 Hour Vital Signs








  Date Time  Temp Pulse Resp B/P (MAP) Pulse Ox O2 Delivery O2 Flow Rate FiO2


 


2/17/19 12:00 97.3 95 22 120/44 (69) 100   


 


2/17/19 10:12      Venturi Mask 12.0 


 


2/17/19 08:11  88 18   Non-Rebreather 15.0 


 


2/17/19 08:11      Non-Rebreather 15.0 100


 


2/17/19 08:11     91 Non-Rebreather 15.0 


 


2/17/19 08:00 97.2 83 22 107/68 (81) 90   


 


2/17/19 08:00  67      


 


2/17/19 04:00  84      


 


2/17/19 04:00 99.3 76 18 141/62 (88) 95   


 


2/17/19 02:05  92 22   Non-Rebreather 15.0 


 


2/17/19 02:05     93 Non-Rebreather 15.0 


 


2/17/19 02:05      Non-Rebreather 15.0 


 


2/17/19 00:00  78      


 


2/17/19 00:00 97.0 79 18 110/44 (66) 98   


 


2/16/19 21:00      Venturi Mask 12.0 


 


2/16/19 20:00 96.8 79 20 137/75 (95) 100   


 


2/16/19 20:00  71      


 


2/16/19 16:00  85      


 


2/16/19 16:00 96.8 87 20 131/56 (81) 100   

















Intake and Output  


 


 2/16/19 2/17/19





 18:59 06:59


 


Intake Total  1195 ml


 


Balance  1195 ml


 


  


 


Intake Free Water  100 ml


 


IV Total  655 ml


 


Tube Feeding  440 ml


 


# Voids 2 3


 


# Bowel Movements 1 








Objective


General Appearance:  cachectic


HEENT:  normocephalic, somnolent


Respiratory/Chest:  chest wall non-tender,loud rhonchi


Cardiovascular:  normal peripheral pulses, normal rate


Abdomen:  normal bowel sounds, soft, non tender


Extremities:  no cyanosis


Skin:  no rash





Microbiology








 Date/Time


Source Procedure


Growth Status


 


 


 2/16/19 08:45


Blood Blood Culture - Preliminary Resulted


 


 2/16/19 08:20


Blood Blood Culture - Preliminary Resulted








Laboratory Tests


2/17/19 07:50: 


White Blood Count 11.7H, Red Blood Count 2.63L, Hemoglobin 7.8L, Hematocrit 

24.3L, Mean Corpuscular Volume 92, Mean Corpuscular Hemoglobin 29.8, Mean 

Corpuscular Hemoglobin Concent 32.3, Red Cell Distribution Width 16.2H, 

Platelet Count 55L, Mean Platelet Volume 11.0H, Neutrophils (%) (Auto) , 

Lymphocytes (%) (Auto) , Monocytes (%) (Auto) , Eosinophils (%) (Auto) , 

Basophils (%) (Auto) , Differential Total Cells Counted 100, Neutrophils % (

Manual) 91H, Lymphocytes % (Manual) 7L, Monocytes % (Manual) 2, Eosinophils % (

Manual) 0, Basophils % (Manual) 0, Band Neutrophils 0, Platelet Estimate 

DecreasedL, Platelet Morphology Normal, Hypochromasia 1+, Anisocytosis 1+, 

Sodium Level 144, Potassium Level 4.4, Chloride Level 114H, Carbon Dioxide 

Level 19L, Anion Gap 11, Blood Urea Nitrogen 23H, Creatinine 0.7, Estimat 

Glomerular Filtration Rate , Glucose Level 183H, Calcium Level 7.8L, 

Coccidioides Antibody (Comp Fix) [Pending]





Current Medications








 Medications


  (Trade)  Dose


 Ordered  Sig/Violette


 Route


 PRN Reason  Start Time


 Stop Time Status Last Admin


Dose Admin


 


 Acetaminophen


  (Tylenol)  650 mg  Q4H  PRN


 NG


 Mild Pain/Temp > 100.5  2/15/19 14:43


 3/14/19 14:42   


 


 


 Ceftaroline


 Fosamil 400 mg/


 Sodium Chloride  55 ml @ 55


 mls/hr  Q12HR


 IV


   2/16/19 09:00


 2/23/19 08:59  2/17/19 09:17


 


 


 Clonidine HCl


  (Catapres Tab)  0.1 mg  Q8H  PRN


 NG


 SBP>160mmHg  2/16/19 14:00


 3/10/19 13:59   


 


 


 Daptomycin 400 mg/


 Sodium Chloride  55 ml @ 


 100 mls/hr  Q24H


 IV


   2/15/19 18:00


 2/20/19 17:59  2/16/19 18:14


 


 


 Dextrose


  (Dextrose 50%)  25 ml  Q30M  PRN


 IV


 Hypoglycemia  2/15/19 14:45


 3/9/19 13:30   


 


 


 Dextrose


  (Dextrose 50%)  50 ml  Q30M  PRN


 IV


 hypoglycemia  2/15/19 14:45


 3/9/19 13:44   


 


 


 Insulin Aspart


  (NovoLOG)    EVERY 6  HOURS


 SUBQ


   2/15/19 18:00


 3/9/19 16:29  2/17/19 12:06


 


 


 Lansoprazole


  (Prevacid)  30 mg  DAILY


 NG


   2/16/19 09:00


 3/18/19 08:59  2/17/19 09:18


 


 


 Olanzapine


  (ZyPREXA)  2.5 mg  BID


 NG


   2/16/19 18:00


 3/10/19 10:59  2/17/19 09:18


 


 


 Ondansetron HCl


  (Zofran)  4 mg  Q6H  PRN


 IVP


 Nausea & Vomiting  2/15/19 14:44


 3/10/19 14:43   


 

















Korin Junior MD Feb 17, 2019 12:46

## 2019-02-17 NOTE — SURGERY PROGRESS NOTE
Surgery Progress Note


Subjective


Additional Comments


no acute events.  stable





Objective





Last 24 Hour Vital Signs








  Date Time  Temp Pulse Resp B/P (MAP) Pulse Ox O2 Delivery O2 Flow Rate FiO2


 


2/17/19 12:00 97.3 95 22 120/44 (69) 100   


 


2/17/19 12:00  86      


 


2/17/19 10:12      Venturi Mask 12.0 


 


2/17/19 08:11  88 18   Non-Rebreather 15.0 


 


2/17/19 08:11      Non-Rebreather 15.0 100


 


2/17/19 08:11     91 Non-Rebreather 15.0 


 


2/17/19 08:00 97.2 83 22 107/68 (81) 90   


 


2/17/19 08:00  67      


 


2/17/19 04:00  84      


 


2/17/19 04:00 99.3 76 18 141/62 (88) 95   


 


2/17/19 02:05  92 22   Non-Rebreather 15.0 


 


2/17/19 02:05     93 Non-Rebreather 15.0 


 


2/17/19 02:05      Non-Rebreather 15.0 


 


2/17/19 00:00  78      


 


2/17/19 00:00 97.0 79 18 110/44 (66) 98   


 


2/16/19 21:00      Venturi Mask 12.0 


 


2/16/19 20:00 96.8 79 20 137/75 (95) 100   


 


2/16/19 20:00  71      


 


2/16/19 16:00  85      


 


2/16/19 16:00 96.8 87 20 131/56 (81) 100   








I&O











Intake and Output  


 


 2/16/19 2/17/19





 18:59 06:59


 


Intake Total  1195 ml


 


Balance  1195 ml


 


  


 


Intake Free Water  100 ml


 


IV Total  655 ml


 


Tube Feeding  440 ml


 


# Voids 2 3


 


# Bowel Movements 1 








Dressing:  saturated, other


Wound:  other


Drains:  other


Cardiovascular:  RSR


Respiratory:  clear, decreased breath sounds


Abdomen:  soft, non-tender, present bowel sounds


Extremities:  other





Laboratory Tests








Test


  2/17/19


07:50


 


White Blood Count


  11.7 K/UL


(4.8-10.8)  H


 


Red Blood Count


  2.63 M/UL


(4.20-5.40)  L


 


Hemoglobin


  7.8 G/DL


(12.0-16.0)  L


 


Hematocrit


  24.3 %


(37.0-47.0)  L


 


Mean Corpuscular Volume 92 FL (80-99)  


 


Mean Corpuscular Hemoglobin


  29.8 PG


(27.0-31.0)


 


Mean Corpuscular Hemoglobin


Concent 32.3 G/DL


(32.0-36.0)


 


Red Cell Distribution Width


  16.2 %


(11.6-14.8)  H


 


Platelet Count


  55 K/UL


(150-450)  L


 


Mean Platelet Volume


  11.0 FL


(6.5-10.1)  H


 


Neutrophils (%) (Auto)


  % (45.0-75.0)


 


 


Lymphocytes (%) (Auto)


  % (20.0-45.0)


 


 


Monocytes (%) (Auto)  % (1.0-10.0)  


 


Eosinophils (%) (Auto)  % (0.0-3.0)  


 


Basophils (%) (Auto)  % (0.0-2.0)  


 


Differential Total Cells


Counted 100  


 


 


Neutrophils % (Manual) 91 % (45-75)  H


 


Lymphocytes % (Manual) 7 % (20-45)  L


 


Monocytes % (Manual) 2 % (1-10)  


 


Eosinophils % (Manual) 0 % (0-3)  


 


Basophils % (Manual) 0 % (0-2)  


 


Band Neutrophils 0 % (0-8)  


 


Platelet Estimate Decreased  L


 


Platelet Morphology Normal  


 


Hypochromasia 1+  


 


Anisocytosis 1+  


 


Sodium Level


  144 MMOL/L


(136-145)


 


Potassium Level


  4.4 MMOL/L


(3.5-5.1)


 


Chloride Level


  114 MMOL/L


()  H


 


Carbon Dioxide Level


  19 MMOL/L


(21-32)  L


 


Anion Gap


  11 mmol/L


(5-15)


 


Blood Urea Nitrogen


  23 mg/dL


(7-18)  H


 


Creatinine


  0.7 MG/DL


(0.55-1.30)


 


Estimat Glomerular Filtration


Rate  mL/min (>60)  


 


 


Glucose Level


  183 MG/DL


()  H


 


Calcium Level


  7.8 MG/DL


(8.5-10.1)  L


 


Coccidioides Antibody (Comp


Fix) Pending  


 











Plan


Problems:  


(1) Sepsis


Assessment & Plan:  Leukocytosis resolved 


LFT's elevated w/ t bili / d bili - improved 


AM labs ordered 


hydrate


US Impression: Limited exam, as described. Note inability to visualize the 

spleen Negative for gallstones or dilated bile ducts Questionable bilateral 

renal parapelvic cysts Small to moderate right pleural effusion


no acute surgical intervention planned. 


MARTHA this week for septic emboli


tube feeds as tolerated 


will follow with recs. 





(2) Sacral decubitus ulcer, stage III


Assessment & Plan:  DTPI sacrum.Maroon- indurated discoloration with opening at 

sacral coccygeal area(L)3.8cm x (W)1.4cm with entire wound measuring (L)10cmx(W)

12cm.Small amt malodorous brown exudate.


Stable dry eschar R heel with dry peeling skin periwound (L)2cm x (W)3.5cm. 

Periwound is also red and boggy. 


Small dry eschar noted to lateral R 5th metatarsal (L)1.4cm x (W)0.3cm.  


L heel is boggy but colour is dusky . 


Resolving skin tear lateral R tibia. Wound bed is clean and dry.  





Tx. Plan:


Cleanse Sacral wound with Saline.Apply Therahoney to opening at sacrococcygeal 

area.Cavilon skin barrier to DTPI. Cover with Optifoam drsg .Change Daily and 

prn.


            


Apply Betadine to R heel  and Lateral R 5th metatarsal.Cover with Abd pad .Wrap 

with kerlix daily and prn.


            


Apply Cavilon Skin Barrier to L heel.Cover with Optifoam drsg .Change every 7 

days and prn.


            


Reposition at least every 2hours or as tolerated.


            


Off-load heels with pillow.





(3) Rectal bleed


(4) Protein-calorie malnutrition, severe


(5) Dehydration











Tuan Patricia Feb 17, 2019 15:44

## 2019-02-17 NOTE — NUR
NURSE NOTES:



Patient's blood sugar noted to be 67. Increased Glucerna 1.2 tube feeding rate to 40 mls/hr 
for a goal of 55 mls/hr. No residual noted. Will continue to monitor patient.

## 2019-02-17 NOTE — GENERAL PROGRESS NOTE
Assessment/Plan


Assessment/Plan








ASSESSMENT/RECS:





1. Pancytopenia with thrombocytopenia that is severe is most likely related to 

Sepsis, lactic acidosis. In addition has received heparin. US abdomen ordered 

and shows no spleen and no cirrhosis


--> heparin discontinued


--> HIT ab test ordered pending


--> venous duplex negative


--> smear peripheral has been reviewed and negative for abnml cells


--> tumor markers as needed/prn


--> hold off on transfusion unless plt <20k


--> hold off on steriods


--> plt trend: 34--> 50k


2. Failure to thrive with severe protein calorie malnutrition.


->> calorie counts daily


--> consider mirtazapine as needed per pcp


--> getting ngtfs


3. Anemia of chronic disease


--> panel has been ordered


--> transfuse if hgb <7


4. Leukocytosis


--> on abx as per id for infection


5. Hypertension.


6. Sick sinus syndrome.


7. Hypercholesterolemia.


8. Aortic stenosis.


9. Pulmonary hypertension.


10. Alzheimer dementia.


11.  Acute tubular necrosis/acute kidney injury most likely secondary to 

dehydration and sepsis.





Greatly appreciate consultation!





Subjective


Constitutional:  Denies: no symptoms, chills, diaphoresis, fever, malaise, 

weakness, other


HEENT:  Denies: no symptoms, eye pain, blurred vision, tearing, double vision, 

ear pain, ear discharge, nose pain, nose congestion, throat pain, throat 

swelling, mouth pain, mouth swelling, other


Cardiovascular:  Denies: no symptoms, chest pain, edema, irregular heart rate, 

lightheadedness, palpitations, syncope, other


Gastrointestinal/Abdominal:  Denies: no symptoms, abdomen distended, abdominal 

pain, black stools, tarry stools, blood in stool, constipated, diarrhea, 

difficulty swallowing, nausea, poor appetite, poor fluid intake, rectal bleeding

, vomiting, other


Genitourinary:  Denies: no symptoms, burning, discharge, frequency, flank pain, 

hematuria, incontinence, pain, urgency, other


Neurologic/Psychiatric:  Denies: no symptoms, anxiety, depressed, emotional 

problems, headache, numbness, paresthesia, pre-existing deficit, seizure, 

tingling, tremors, weakness, other


Hematologic/Lymphatic:  Denies: no symptoms, anemia, easy bleeding, easy 

bruising, other


Allergies:  


Coded Allergies:  


     No Known Allergies (Unverified , 3/26/14)


Subjective


2/14:seen by bedside, Possible EGD tomorrow to evaluate upper GI bleed, today's 

EGD was canceled by anesthesia, plt remains low at 34, hgb trending up . 


2/15: Scheduled for EGD today,CT w/ cavitary lung lessions; placed on isolation

, remains bacteremic, wbc 12.


2/17: continues to be somewhat sob, imaging reviewed, labs are better this am, 

plts uptrending, getting ngtfs





Objective





Last 24 Hour Vital Signs








  Date Time  Temp Pulse Resp B/P (MAP) Pulse Ox O2 Delivery O2 Flow Rate FiO2


 


2/17/19 18:30 98.2 94 24 114/50 (71) 100   


 


2/17/19 16:22 99.0 102 24 125/63 (83) 100   


 


2/17/19 16:07 98.4 125 27 136/78 (97) 100   


 


2/17/19 16:00  114      


 


2/17/19 12:00 97.3 95 22 120/44 (69) 100   


 


2/17/19 12:00  86      


 


2/17/19 10:12      Venturi Mask 12.0 


 


2/17/19 08:11  88 18   Non-Rebreather 15.0 


 


2/17/19 08:11      Non-Rebreather 15.0 100


 


2/17/19 08:11     91 Non-Rebreather 15.0 


 


2/17/19 08:00 97.2 83 22 107/68 (81) 90   


 


2/17/19 08:00  67      


 


2/17/19 04:00  84      


 


2/17/19 04:00 99.3 76 18 141/62 (88) 95   


 


2/17/19 02:05  92 22   Non-Rebreather 15.0 


 


2/17/19 02:05     93 Non-Rebreather 15.0 


 


2/17/19 02:05      Non-Rebreather 15.0 


 


2/17/19 00:00  78      


 


2/17/19 00:00 97.0 79 18 110/44 (66) 98   


 


2/16/19 21:00      Venturi Mask 12.0 


 


2/16/19 20:00 96.8 79 20 137/75 (95) 100   


 


2/16/19 20:00  71      

















Intake and Output  


 


 2/16/19 2/17/19





 18:59 06:59


 


Intake Total  1195 ml


 


Balance  1195 ml


 


  


 


Intake Free Water  100 ml


 


IV Total  655 ml


 


Tube Feeding  440 ml


 


# Voids 2 3


 


# Bowel Movements 1 








Laboratory Tests


2/17/19 07:50: 


White Blood Count 11.7H, Red Blood Count 2.63L, Hemoglobin 7.8L, Hematocrit 

24.3L, Mean Corpuscular Volume 92, Mean Corpuscular Hemoglobin 29.8, Mean 

Corpuscular Hemoglobin Concent 32.3, Red Cell Distribution Width 16.2H, 

Platelet Count 55L, Mean Platelet Volume 11.0H, Neutrophils (%) (Auto) , 

Lymphocytes (%) (Auto) , Monocytes (%) (Auto) , Eosinophils (%) (Auto) , 

Basophils (%) (Auto) , Differential Total Cells Counted 100, Neutrophils % (

Manual) 91H, Lymphocytes % (Manual) 7L, Monocytes % (Manual) 2, Eosinophils % (

Manual) 0, Basophils % (Manual) 0, Band Neutrophils 0, Platelet Estimate 

DecreasedL, Platelet Morphology Normal, Hypochromasia 1+, Anisocytosis 1+, 

Sodium Level 144, Potassium Level 4.4, Chloride Level 114H, Carbon Dioxide 

Level 19L, Anion Gap 11, Blood Urea Nitrogen 23H, Creatinine 0.7, Estimat 

Glomerular Filtration Rate , Glucose Level 183H, Calcium Level 7.8L, 

Coccidioides Antibody (Comp Fix) [Pending]


Height (Feet):  5


Height (Inches):  5.00


Weight (Pounds):  144


Objective





GENERAL: Awake responsive to deep stimuli with opening of her eyes, in no 

apparent distress.


HEENT:  Eyes, pupils equal responsive to light and accommodation. ++ ngtf


CHEST: Poor inspiratory effort, decreased air in the bases no wheezes or 

rhonchi was appreciated


CARDIOVASCULAR:  Regular rhythm and rate.  S1 and S2 are normal without murmurs 

or gallops.


ABDOMEN:  Soft, nontender, and nondistended.  Positive bowel sounds.  No 

rebounding or guarding noted.


EXTREMITIES:  Negative for clubbing, cyanosis, or edema, muscle atrophy in 

bilateral lower extremity


RECTAL/GENITAL:  Not performed.


NEUROLOGIC:somewhat responsive, better than before











Darian Topete MD Feb 17, 2019 19:18

## 2019-02-17 NOTE — NUR
NURSE NOTES:

Received pt. and report from ESCOBAR Gerard. Observed pt. resting in bed with both eyes closed. Pt. 
is A/O x1; responds to name only. Cardiac monitor is in placed. Pt. has a DOMINIC midline; 
intact, asymptomatic, patent, and saline locked. Pt. has a NGT currently feeding Glucerna 
1.2 @ 55cc/hr. Call light is within reach, bed is in the lowest position and locked. Pt. is 
currently on airborne precaution. No acute distress noted at this time. Will continue plan 
of care.

## 2019-02-17 NOTE — NUR
NURSE NOTES:

Blood transfusion is completed. No adverse reaction noted. VSS. Will continue plan of care.

## 2019-02-17 NOTE — NUR
NURSE NOTES:

Received report from Makeda/ESCOBAR. Patient AAOx1 pt on bed. Lab is taking blood at this time No 
acute distress noted, fall measures in place bed is in low position, call light in reach. 
will continue to monitor.

## 2019-02-17 NOTE — INTERNAL MED PROGRESS NOTE
Subjective


Date of Service:  Feb 17, 2019


Physician Name


Cheo Ortiz


Attending Physician


Juan Carlos Magana MD





Current Medications








 Medications


  (Trade)  Dose


 Ordered  Sig/Violette


 Route


 PRN Reason  Start Time


 Stop Time Status Last Admin


Dose Admin


 


 Acetaminophen


  (Tylenol)  650 mg  Q4H  PRN


 NG


 Mild Pain/Temp > 100.5  2/15/19 14:43


 3/14/19 14:42   


 


 


 Ceftaroline


 Fosamil 400 mg/


 Sodium Chloride  55 ml @ 55


 mls/hr  Q12HR


 IV


   2/16/19 09:00


 2/23/19 08:59  2/17/19 09:17


 


 


 Clonidine HCl


  (Catapres Tab)  0.1 mg  Q8H  PRN


 NG


 SBP>160mmHg  2/16/19 14:00


 3/10/19 13:59   


 


 


 Daptomycin 400 mg/


 Sodium Chloride  55 ml @ 


 100 mls/hr  Q24H


 IV


   2/15/19 18:00


 2/20/19 17:59  2/16/19 18:14


 


 


 Dextrose


  (Dextrose 50%)  25 ml  Q30M  PRN


 IV


 Hypoglycemia  2/15/19 14:45


 3/9/19 13:30   


 


 


 Dextrose


  (Dextrose 50%)  50 ml  Q30M  PRN


 IV


 hypoglycemia  2/15/19 14:45


 3/9/19 13:44   


 


 


 Insulin Aspart


  (NovoLOG)    EVERY 6  HOURS


 SUBQ


   2/15/19 18:00


 3/9/19 16:29  2/17/19 12:06


 


 


 Lansoprazole


  (Prevacid)  30 mg  DAILY


 NG


   2/16/19 09:00


 3/18/19 08:59  2/17/19 09:18


 


 


 Olanzapine


  (ZyPREXA)  2.5 mg  BID


 NG


   2/16/19 18:00


 3/10/19 10:59  2/17/19 09:18


 


 


 Ondansetron HCl


  (Zofran)  4 mg  Q6H  PRN


 IVP


 Nausea & Vomiting  2/15/19 14:44


 3/10/19 14:43   


 








Allergies:  


Coded Allergies:  


     No Known Allergies (Unverified , 3/26/14)


Subjective


77 YO F admitted with dehydration and hypoxia.  Now sepsis.  Cover for Int Med-

Dr Magana.  Continues on venturi mask





Objective





Last Vital Signs








  Date Time  Temp Pulse Resp B/P (MAP) Pulse Ox O2 Delivery O2 Flow Rate FiO2


 


2/17/19 16:22 99.0 102 24 125/63 (83) 100   


 


2/17/19 10:12      Venturi Mask 12.0 


 


2/17/19 08:11        100











Laboratory Tests








Test


  2/17/19


07:50


 


White Blood Count


  11.7 K/UL


(4.8-10.8)  H


 


Red Blood Count


  2.63 M/UL


(4.20-5.40)  L


 


Hemoglobin


  7.8 G/DL


(12.0-16.0)  L


 


Hematocrit


  24.3 %


(37.0-47.0)  L


 


Mean Corpuscular Volume 92 FL (80-99)  


 


Mean Corpuscular Hemoglobin


  29.8 PG


(27.0-31.0)


 


Mean Corpuscular Hemoglobin


Concent 32.3 G/DL


(32.0-36.0)


 


Red Cell Distribution Width


  16.2 %


(11.6-14.8)  H


 


Platelet Count


  55 K/UL


(150-450)  L


 


Mean Platelet Volume


  11.0 FL


(6.5-10.1)  H


 


Neutrophils (%) (Auto)


  % (45.0-75.0)


 


 


Lymphocytes (%) (Auto)


  % (20.0-45.0)


 


 


Monocytes (%) (Auto)  % (1.0-10.0)  


 


Eosinophils (%) (Auto)  % (0.0-3.0)  


 


Basophils (%) (Auto)  % (0.0-2.0)  


 


Differential Total Cells


Counted 100  


 


 


Neutrophils % (Manual) 91 % (45-75)  H


 


Lymphocytes % (Manual) 7 % (20-45)  L


 


Monocytes % (Manual) 2 % (1-10)  


 


Eosinophils % (Manual) 0 % (0-3)  


 


Basophils % (Manual) 0 % (0-2)  


 


Band Neutrophils 0 % (0-8)  


 


Platelet Estimate Decreased  L


 


Platelet Morphology Normal  


 


Hypochromasia 1+  


 


Anisocytosis 1+  


 


Sodium Level


  144 MMOL/L


(136-145)


 


Potassium Level


  4.4 MMOL/L


(3.5-5.1)


 


Chloride Level


  114 MMOL/L


()  H


 


Carbon Dioxide Level


  19 MMOL/L


(21-32)  L


 


Anion Gap


  11 mmol/L


(5-15)


 


Blood Urea Nitrogen


  23 mg/dL


(7-18)  H


 


Creatinine


  0.7 MG/DL


(0.55-1.30)


 


Estimat Glomerular Filtration


Rate  mL/min (>60)  


 


 


Glucose Level


  183 MG/DL


()  H


 


Calcium Level


  7.8 MG/DL


(8.5-10.1)  L


 


Coccidioides Antibody (Comp


Fix) Pending  


 











Microbiology








 Date/Time


Source Procedure


Growth Status


 


 


 2/16/19 08:45


Blood Blood Culture - Preliminary Resulted


 


 2/16/19 08:20


Blood Blood Culture - Preliminary Resulted

















Intake and Output  


 


 2/16/19 2/17/19





 18:59 06:59


 


Intake Total  1195 ml


 


Balance  1195 ml


 


  


 


Intake Free Water  100 ml


 


IV Total  655 ml


 


Tube Feeding  440 ml


 


# Voids 2 3


 


# Bowel Movements 1 








Objective


Objective


General: No acute distress, awake and less responsive, Sleepy, cachectic.


HEENT: NCAT, sclera anicteric, PERRL, NG tube.


Neck: Supple, no significant jugular venous distention, 


Lungs: Non-rebreather mask; Poor inspiratory effort, decreased air in the bases 

, Bilateral Wheeze and Rales.


Heart: Regular rate and rhythm, normal S1/S2, no murmur.


Abdomen: soft, nontender, nondistended. Normoactive bowel sounds.


 / Rectal: Refused and deferred.


Extremities: No Cyanosis , No clubbing,  Left UE edema.  Lateral lower 

extremity / feet dressing intact.


Neuro: A&O x 1, Able to move all extremities


Skin: warm, no rashes or lesions, ecchymosis in bilateral upper extremity noted.





Assessment/Plan


1. Sepsis /  METHACILLIN RESISTANT STAPHYLOCOCCUS AUREUS bacteremia, lactic 

acidosis.


2. Failure to thrive with severe protein calorie malnutrition.


3. Coronary artery disease.


4. Diabetes, type 2.


5. Hypertension.


6. Sick sinus syndrome.


7. Hypercholesterolemia.


8. Aortic stenosis.


9. Pulmonary hypertension.


10. Alzheimer dementia.


11.  Acute tubular necrosis/acute kidney injury most likely secondary to 

dehydration and sepsis.


12. Sacral decubitus ulcer, stage III


13. Severe dehydration


14.  Hypokalemia.


15.  Abnormal liver function test


16.  Anemia/thrombocytopenia


17.  Bilateral pneumonia ?septic emboli?





TREATMENT:


1.  Patient on broad spectrum antibiotics with vancomycin and Zosyn IV.  Will 

require transesophageal echocardiogram to R/O endocarditis-see ID note


2. Coronary artery disease.  The patient is status post coronary artery


bypass graft in 2017.


3. Diabetes, type 2.  The patient has been started on NovoLog sliding


scale.  


4. Hypertension.  The patient is to continue on atenolol and losartan as


above.


5. Sick sinus syndrome.  The patient is status post pacemaker


implantation.


6. Hypercholesterolemia.  Continue simvastatin as above.


7. Aortic stenosis.


8. Pulmonary hypertension.


9. Alzheimer dementia.


10.  Anemia/thrombocytopenia-S/P 1 unit PRBC on 2/11/19.  Heme/Onc consult.  

Hold heparin


11.  Septic pulmonary emboli-await MARTHA on Tuesday 2/19/19-see cardiology note.





Restart tube feeding at rate of 45 cc/hr.


CODE STATUS is full code as per POLST,


DVT prophylaxis: D/C Heparin subcu due to thrombocytopenia











Cheo Ortiz MD Feb 17, 2019 17:23

## 2019-02-17 NOTE — NUR
HAND-OFF: 

Report given to ESCOBAR De La Rosa. Patient is in stable condition. No acute distress/SOB noted. 
Endorsed plan of care.

## 2019-02-17 NOTE — CARDIOLOGY PROGRESS NOTE
Assessment/Plan


Assessment/Plan


staph bacteremia 


anemai 


thrombocytopenia 


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 


metabolic encphalopathy 


multipe pacemaker generator and wires 








dr alberto who is very familiar with this pt feel she is not a candidate for 

salomon at this time  (to which i woudl agree) 


specilly now with such  low plt counts 


iv abx 


supportive care 


watch plt and hgb for need for tx if full care is felt cassandra necessary i would


in light of pulm cavitating lesion likey has micro embolic form eith erthe 

wires of right sided valve infection as a source of pulm lesion 


pt in pulm isolation for possible tb as well in light of pulm cavitating lesion 

however likely related to staph bacteremia





Subjective


ROS Limited/Unobtainable:  Yes





Objective





Last 24 Hour Vital Signs








  Date Time  Temp Pulse Resp B/P (MAP) Pulse Ox O2 Delivery O2 Flow Rate FiO2


 


2/17/19 12:00 97.3 95 22 120/44 (69) 100   


 


2/17/19 12:00  86      


 


2/17/19 10:12      Venturi Mask 12.0 


 


2/17/19 08:11  88 18   Non-Rebreather 15.0 


 


2/17/19 08:11      Non-Rebreather 15.0 100


 


2/17/19 08:11     91 Non-Rebreather 15.0 


 


2/17/19 08:00 97.2 83 22 107/68 (81) 90   


 


2/17/19 08:00  67      


 


2/17/19 04:00  84      


 


2/17/19 04:00 99.3 76 18 141/62 (88) 95   


 


2/17/19 02:05  92 22   Non-Rebreather 15.0 


 


2/17/19 02:05     93 Non-Rebreather 15.0 


 


2/17/19 02:05      Non-Rebreather 15.0 


 


2/17/19 00:00  78      


 


2/17/19 00:00 97.0 79 18 110/44 (66) 98   


 


2/16/19 21:00      Venturi Mask 12.0 


 


2/16/19 20:00 96.8 79 20 137/75 (95) 100   


 


2/16/19 20:00  71      


 


2/16/19 16:00  85      


 


2/16/19 16:00 96.8 87 20 131/56 (81) 100   








General Appearance:  no apparent distress











Intake and Output  


 


 2/16/19 2/17/19





 18:59 06:59


 


Intake Total  1195 ml


 


Balance  1195 ml


 


  


 


Intake Free Water  100 ml


 


IV Total  655 ml


 


Tube Feeding  440 ml


 


# Voids 2 3


 


# Bowel Movements 1 











Laboratory Tests








Test


  2/17/19


07:50


 


White Blood Count


  11.7 K/UL


(4.8-10.8)  H


 


Red Blood Count


  2.63 M/UL


(4.20-5.40)  L


 


Hemoglobin


  7.8 G/DL


(12.0-16.0)  L


 


Hematocrit


  24.3 %


(37.0-47.0)  L


 


Mean Corpuscular Volume 92 FL (80-99)  


 


Mean Corpuscular Hemoglobin


  29.8 PG


(27.0-31.0)


 


Mean Corpuscular Hemoglobin


Concent 32.3 G/DL


(32.0-36.0)


 


Red Cell Distribution Width


  16.2 %


(11.6-14.8)  H


 


Platelet Count


  55 K/UL


(150-450)  L


 


Mean Platelet Volume


  11.0 FL


(6.5-10.1)  H


 


Neutrophils (%) (Auto)


  % (45.0-75.0)


 


 


Lymphocytes (%) (Auto)


  % (20.0-45.0)


 


 


Monocytes (%) (Auto)  % (1.0-10.0)  


 


Eosinophils (%) (Auto)  % (0.0-3.0)  


 


Basophils (%) (Auto)  % (0.0-2.0)  


 


Differential Total Cells


Counted 100  


 


 


Neutrophils % (Manual) 91 % (45-75)  H


 


Lymphocytes % (Manual) 7 % (20-45)  L


 


Monocytes % (Manual) 2 % (1-10)  


 


Eosinophils % (Manual) 0 % (0-3)  


 


Basophils % (Manual) 0 % (0-2)  


 


Band Neutrophils 0 % (0-8)  


 


Platelet Estimate Decreased  L


 


Platelet Morphology Normal  


 


Hypochromasia 1+  


 


Anisocytosis 1+  


 


Sodium Level


  144 MMOL/L


(136-145)


 


Potassium Level


  4.4 MMOL/L


(3.5-5.1)


 


Chloride Level


  114 MMOL/L


()  H


 


Carbon Dioxide Level


  19 MMOL/L


(21-32)  L


 


Anion Gap


  11 mmol/L


(5-15)


 


Blood Urea Nitrogen


  23 mg/dL


(7-18)  H


 


Creatinine


  0.7 MG/DL


(0.55-1.30)


 


Estimat Glomerular Filtration


Rate  mL/min (>60)  


 


 


Glucose Level


  183 MG/DL


()  H


 


Calcium Level


  7.8 MG/DL


(8.5-10.1)  L


 


Coccidioides Antibody (Comp


Fix) Pending  


 











Microbiology








 Date/Time


Source Procedure


Growth Status


 


 


 2/16/19 08:45


Blood Blood Culture - Preliminary Resulted


 


 2/16/19 08:20


Blood Blood Culture - Preliminary Resulted

















Mc Nguyen MD Feb 17, 2019 14:05

## 2019-02-17 NOTE — GENERAL PROGRESS NOTE
Assessment/Plan


Problem List:  


(1) Severe anemia


ICD Codes:  D64.9 - Anemia, unspecified


SNOMED:  023310238


(2) Alzheimer's dementia


ICD Codes:  G30.9 - Alzheimer's disease, unspecified; F02.80 - Dementia in 

other diseases classified elsewhere without behavioral disturbance


SNOMED:  29951993


(3) Pulmonary hypertension


ICD Codes:  I27.20 - Pulmonary hypertension, unspecified


SNOMED:  71393635


(4) Protein-calorie malnutrition, severe


ICD Codes:  E43 - Unspecified severe protein-calorie malnutrition


SNOMED:  884393512


(5) Pacemaker


ICD Codes:  Z95.0 - Presence of cardiac pacemaker


SNOMED:  416797624


(6) HTN (hypertension)


ICD Codes:  I10 - Essential (primary) hypertension


SNOMED:  84167320


(7) CAD (coronary artery disease)


ICD Codes:  I25.10 - Atherosclerotic heart disease of native coronary artery 

without angina pectoris


SNOMED:  80741608


(8) Diabetes mellitus, type II


ICD Codes:  E11.9 - Type 2 diabetes mellitus without complications


SNOMED:  25482090


Assessment/Plan


EGD on hold per cardiology recs, pending MARTHA on Tuesday


on ppi


stool ob positive>> monitor H&H


 NGTF


repeat labs





Subjective


ROS Limited/Unobtainable:  No


Allergies:  


Coded Allergies:  


     No Known Allergies (Unverified , 3/26/14)





Objective





Last 24 Hour Vital Signs








  Date Time  Temp Pulse Resp B/P (MAP) Pulse Ox O2 Delivery O2 Flow Rate FiO2


 


2/17/19 10:12      Venturi Mask 12.0 


 


2/17/19 08:11  88 18   Non-Rebreather 15.0 


 


2/17/19 08:11      Non-Rebreather 15.0 100


 


2/17/19 08:11     91 Non-Rebreather 15.0 


 


2/17/19 08:00 97.2 83 22 107/68 (81) 90   


 


2/17/19 08:00  67      


 


2/17/19 04:00  84      


 


2/17/19 04:00 99.3 76 18 141/62 (88) 95   


 


2/17/19 02:05  92 22   Non-Rebreather 15.0 


 


2/17/19 02:05     93 Non-Rebreather 15.0 


 


2/17/19 02:05      Non-Rebreather 15.0 


 


2/17/19 00:00  78      


 


2/17/19 00:00 97.0 79 18 110/44 (66) 98   


 


2/16/19 21:00      Venturi Mask 12.0 


 


2/16/19 20:00 96.8 79 20 137/75 (95) 100   


 


2/16/19 20:00  71      


 


2/16/19 16:00  85      


 


2/16/19 16:00 96.8 87 20 131/56 (81) 100   

















Intake and Output  


 


 2/16/19 2/17/19





 18:59 06:59


 


Intake Total  1195 ml


 


Balance  1195 ml


 


  


 


Intake Free Water  100 ml


 


IV Total  655 ml


 


Tube Feeding  440 ml


 


# Voids 2 3


 


# Bowel Movements 1 








Laboratory Tests


2/17/19 07:50: 


White Blood Count 11.7H, Red Blood Count 2.63L, Hemoglobin 7.8L, Hematocrit 

24.3L, Mean Corpuscular Volume 92, Mean Corpuscular Hemoglobin 29.8, Mean 

Corpuscular Hemoglobin Concent 32.3, Red Cell Distribution Width 16.2H, 

Platelet Count 55L, Mean Platelet Volume 11.0H, Neutrophils (%) (Auto) , 

Lymphocytes (%) (Auto) , Monocytes (%) (Auto) , Eosinophils (%) (Auto) , 

Basophils (%) (Auto) , Differential Total Cells Counted 100, Neutrophils % (

Manual) 91H, Lymphocytes % (Manual) 7L, Monocytes % (Manual) 2, Eosinophils % (

Manual) 0, Basophils % (Manual) 0, Band Neutrophils 0, Platelet Estimate 

DecreasedL, Platelet Morphology Normal, Hypochromasia 1+, Anisocytosis 1+, 

Sodium Level 144, Potassium Level 4.4, Chloride Level 114H, Carbon Dioxide 

Level 19L, Anion Gap 11, Blood Urea Nitrogen 23H, Creatinine 0.7, Estimat 

Glomerular Filtration Rate , Glucose Level 183H, Calcium Level 7.8L, 

Coccidioides Antibody (Comp Fix) [Pending]


Height (Feet):  5


Height (Inches):  5.00


Weight (Pounds):  144


General Appearance:  no apparent distress


EENT:  normal ENT inspection


Neck:  supple


Cardiovascular:  tachycardia


Respiratory/Chest:  decreased breath sounds


Abdomen:  normal bowel sounds, non tender, soft


Extremities:  non-tender











Cal Mroeno MD Feb 17, 2019 12:01

## 2019-02-17 NOTE — NUR
NURSE NOTES:

Called granddaughter Shoshana Teran ([phone]) to get a consent for blood transfusion and 
left a message

## 2019-02-17 NOTE — NUR
NURSE NOTES:

Blood product is verified with another RN, and started with 125cc/hr, right upper arm 
midline. Vital sign are stable and we will continue plan of care.

## 2019-02-17 NOTE — GENERAL PROGRESS NOTE
Assessment/Plan


Problem List:  


(1) Acute encephalopathy


Assessment & Plan:  metabolic


ICD Codes:  G93.40 - Encephalopathy, unspecified


SNOMED:  57234001, 419595106


(2) Delirium


ICD Codes:  R41.0 - Disorientation, unspecified


SNOMED:  2720274


(3) Alzheimer's dementia


ICD Codes:  G30.9 - Alzheimer's disease, unspecified; F02.80 - Dementia in 

other diseases classified elsewhere without behavioral disturbance


SNOMED:  57538357


Assessment/Plan


Zyprexa 


the pt lacks capacity


soft restraints if needed





Subjective


Allergies:  


Coded Allergies:  


     No Known Allergies (Unverified , 3/26/14)


Subjective


cont to be agitated 


waxing and waning





Objective





Last 24 Hour Vital Signs








  Date Time  Temp Pulse Resp B/P (MAP) Pulse Ox O2 Delivery O2 Flow Rate FiO2


 


2/17/19 18:30 98.2 94 24 114/50 (71) 100   


 


2/17/19 16:22 99.0 102 24 125/63 (83) 100   


 


2/17/19 16:07 98.4 125 27 136/78 (97) 100   


 


2/17/19 16:00  114      


 


2/17/19 12:00 97.3 95 22 120/44 (69) 100   


 


2/17/19 12:00  86      


 


2/17/19 10:12      Venturi Mask 12.0 


 


2/17/19 08:11  88 18   Non-Rebreather 15.0 


 


2/17/19 08:11      Non-Rebreather 15.0 100


 


2/17/19 08:11     91 Non-Rebreather 15.0 


 


2/17/19 08:00 97.2 83 22 107/68 (81) 90   


 


2/17/19 08:00  67      


 


2/17/19 04:00  84      


 


2/17/19 04:00 99.3 76 18 141/62 (88) 95   


 


2/17/19 02:05  92 22   Non-Rebreather 15.0 


 


2/17/19 02:05     93 Non-Rebreather 15.0 


 


2/17/19 02:05      Non-Rebreather 15.0 


 


2/17/19 00:00  78      


 


2/17/19 00:00 97.0 79 18 110/44 (66) 98   


 


2/16/19 21:00      Venturi Mask 12.0 

















Intake and Output  


 


 2/16/19 2/17/19





 19:00 07:00


 


Intake Total 80 ml 1115 ml


 


Balance 80 ml 1115 ml


 


  


 


Intake Free Water  100 ml


 


IV Total 50 ml 605 ml


 


Tube Feeding 30 ml 410 ml


 


# Voids 2 3


 


# Bowel Movements 1 








Laboratory Tests


2/17/19 07:50: 


White Blood Count 11.7H, Red Blood Count 2.63L, Hemoglobin 7.8L, Hematocrit 

24.3L, Mean Corpuscular Volume 92, Mean Corpuscular Hemoglobin 29.8, Mean 

Corpuscular Hemoglobin Concent 32.3, Red Cell Distribution Width 16.2H, 

Platelet Count 55L, Mean Platelet Volume 11.0H, Neutrophils (%) (Auto) , 

Lymphocytes (%) (Auto) , Monocytes (%) (Auto) , Eosinophils (%) (Auto) , 

Basophils (%) (Auto) , Differential Total Cells Counted 100, Neutrophils % (

Manual) 91H, Lymphocytes % (Manual) 7L, Monocytes % (Manual) 2, Eosinophils % (

Manual) 0, Basophils % (Manual) 0, Band Neutrophils 0, Platelet Estimate 

DecreasedL, Platelet Morphology Normal, Hypochromasia 1+, Anisocytosis 1+, 

Sodium Level 144, Potassium Level 4.4, Chloride Level 114H, Carbon Dioxide 

Level 19L, Anion Gap 11, Blood Urea Nitrogen 23H, Creatinine 0.7, Estimat 

Glomerular Filtration Rate , Glucose Level 183H, Calcium Level 7.8L, 

Coccidioides Antibody (Comp Fix) [Pending]


Height (Feet):  5


Height (Inches):  5.00


Weight (Pounds):  144


General Appearance:  no apparent distress, alert, confused











Josh Nunez MD Feb 17, 2019 20:22

## 2019-02-18 VITALS — DIASTOLIC BLOOD PRESSURE: 52 MMHG | SYSTOLIC BLOOD PRESSURE: 97 MMHG

## 2019-02-18 VITALS — SYSTOLIC BLOOD PRESSURE: 115 MMHG | DIASTOLIC BLOOD PRESSURE: 57 MMHG

## 2019-02-18 VITALS — DIASTOLIC BLOOD PRESSURE: 76 MMHG | SYSTOLIC BLOOD PRESSURE: 124 MMHG

## 2019-02-18 VITALS — DIASTOLIC BLOOD PRESSURE: 83 MMHG | SYSTOLIC BLOOD PRESSURE: 102 MMHG

## 2019-02-18 VITALS — DIASTOLIC BLOOD PRESSURE: 62 MMHG | SYSTOLIC BLOOD PRESSURE: 100 MMHG

## 2019-02-18 VITALS — SYSTOLIC BLOOD PRESSURE: 123 MMHG | DIASTOLIC BLOOD PRESSURE: 58 MMHG

## 2019-02-18 LAB
ADD MANUAL DIFF: YES
ANION GAP SERPL CALC-SCNC: 8 MMOL/L (ref 5–15)
BUN SERPL-MCNC: 24 MG/DL (ref 7–18)
CALCIUM SERPL-MCNC: 7.7 MG/DL (ref 8.5–10.1)
CHLORIDE SERPL-SCNC: 116 MMOL/L (ref 98–107)
CO2 SERPL-SCNC: 21 MMOL/L (ref 21–32)
CREAT SERPL-MCNC: 0.6 MG/DL (ref 0.55–1.3)
ERYTHROCYTE [DISTWIDTH] IN BLOOD BY AUTOMATED COUNT: 16.7 % (ref 11.6–14.8)
HCT VFR BLD CALC: 26.8 % (ref 37–47)
HGB BLD-MCNC: 8.7 G/DL (ref 12–16)
MCV RBC AUTO: 89 FL (ref 80–99)
PLATELET # BLD: 58 K/UL (ref 150–450)
POTASSIUM SERPL-SCNC: 4.3 MMOL/L (ref 3.5–5.1)
RBC # BLD AUTO: 3 M/UL (ref 4.2–5.4)
SODIUM SERPL-SCNC: 144 MMOL/L (ref 136–145)
WBC # BLD AUTO: 9.8 K/UL (ref 4.8–10.8)

## 2019-02-18 RX ADMIN — INSULIN ASPART SCH UNITS: 100 INJECTION, SOLUTION INTRAVENOUS; SUBCUTANEOUS at 00:30

## 2019-02-18 RX ADMIN — INSULIN ASPART SCH UNITS: 100 INJECTION, SOLUTION INTRAVENOUS; SUBCUTANEOUS at 18:00

## 2019-02-18 RX ADMIN — DEXTROSE MONOHYDRATE SCH MLS/HR: 50 INJECTION, SOLUTION INTRAVENOUS at 08:42

## 2019-02-18 RX ADMIN — OLANZAPINE SCH MG: 2.5 TABLET, FILM COATED ORAL at 08:42

## 2019-02-18 RX ADMIN — INSULIN ASPART SCH UNITS: 100 INJECTION, SOLUTION INTRAVENOUS; SUBCUTANEOUS at 12:25

## 2019-02-18 RX ADMIN — DEXTROSE MONOHYDRATE SCH MLS/HR: 50 INJECTION, SOLUTION INTRAVENOUS at 20:46

## 2019-02-18 RX ADMIN — INSULIN ASPART SCH UNITS: 100 INJECTION, SOLUTION INTRAVENOUS; SUBCUTANEOUS at 23:34

## 2019-02-18 RX ADMIN — INSULIN ASPART SCH UNITS: 100 INJECTION, SOLUTION INTRAVENOUS; SUBCUTANEOUS at 05:52

## 2019-02-18 RX ADMIN — OLANZAPINE SCH MG: 2.5 TABLET, FILM COATED ORAL at 18:30

## 2019-02-18 NOTE — INTERNAL MED PROGRESS NOTE
Subjective


Date of Service:  Feb 18, 2019


Physician Name


Cheo Ortiz


Attending Physician


Juan Carlos Magana MD





Current Medications








 Medications


  (Trade)  Dose


 Ordered  Sig/Violette


 Route


 PRN Reason  Start Time


 Stop Time Status Last Admin


Dose Admin


 


 Acetaminophen


  (Tylenol)  650 mg  Q4H  PRN


 NG


 Mild Pain/Temp > 100.5  2/18/19 16:30


 3/14/19 16:29   


 


 


 Ceftaroline


 Fosamil 400 mg/


 Sodium Chloride  55 ml @ 55


 mls/hr  Q12HR


 IV


   2/18/19 21:00


 2/23/19 08:59   


 


 


 Clonidine HCl


  (Catapres Tab)  0.1 mg  Q8H  PRN


 NG


 SBP>160mmHg  2/18/19 16:30


 3/10/19 16:29   


 


 


 Daptomycin 400 mg/


 Sodium Chloride  55 ml @ 


 100 mls/hr  Q24H


 IV


   2/18/19 18:00


 2/20/19 17:59   


 


 


 Dextrose


  (Dextrose 50%)  25 ml  Q30M  PRN


 IV


 Hypoglycemia  2/18/19 16:45


 3/9/19 13:30   


 


 


 Dextrose


  (Dextrose 50%)  50 ml  Q30M  PRN


 IV


 hypoglycemia  2/18/19 16:45


 3/9/19 13:44   


 


 


 Insulin Aspart


  (NovoLOG)    EVERY 6  HOURS


 SUBQ


   2/18/19 18:00


 3/9/19 16:29   


 


 


 Lansoprazole


  (Prevacid)  30 mg  DAILY


 NG


   2/19/19 09:00


 3/18/19 08:59   


 


 


 Olanzapine


  (ZyPREXA)  2.5 mg  BID


 NG


   2/18/19 18:00


 3/10/19 10:59   


 


 


 Ondansetron HCl


  (Zofran)  4 mg  Q6H  PRN


 IVP


 Nausea & Vomiting  2/18/19 16:30


 3/10/19 16:29   


 








Allergies:  


Coded Allergies:  


     No Known Allergies (Unverified , 3/26/14)


ROS Limited/Unobtainable:  Yes


Subjective


77 YO F admitted with dehydration and hypoxia.  Now sepsis.  Cover for Int Med-

Dr Magana.  Continues on venturi mask.  Transfered to [facility] for hypotension





Objective





Last Vital Signs








  Date Time  Temp Pulse Resp B/P (MAP) Pulse Ox O2 Delivery O2 Flow Rate FiO2


 


2/18/19 12:00  79      


 


2/18/19 09:28 98.1       


 


2/18/19 09:00      Venturi Mask 12.0 


 


2/18/19 08:00   22 97/52 (67) 95   


 


2/17/19 08:11        100











Laboratory Tests








Test


  2/18/19


07:30


 


White Blood Count


  9.8 K/UL


(4.8-10.8)


 


Red Blood Count


  3.00 M/UL


(4.20-5.40)  L


 


Hemoglobin


  8.7 G/DL


(12.0-16.0)  L


 


Hematocrit


  26.8 %


(37.0-47.0)  L


 


Mean Corpuscular Volume 89 FL (80-99)  


 


Mean Corpuscular Hemoglobin


  29.1 PG


(27.0-31.0)


 


Mean Corpuscular Hemoglobin


Concent 32.5 G/DL


(32.0-36.0)


 


Red Cell Distribution Width


  16.7 %


(11.6-14.8)  H


 


Platelet Count


  58 K/UL


(150-450)  L


 


Mean Platelet Volume


  9.8 FL


(6.5-10.1)


 


Neutrophils (%) (Auto)


  % (45.0-75.0)


 


 


Lymphocytes (%) (Auto)


  % (20.0-45.0)


 


 


Monocytes (%) (Auto)  % (1.0-10.0)  


 


Eosinophils (%) (Auto)  % (0.0-3.0)  


 


Basophils (%) (Auto)  % (0.0-2.0)  


 


Differential Total Cells


Counted 100  


 


 


Neutrophils % (Manual) 88 % (45-75)  H


 


Lymphocytes % (Manual) 4 % (20-45)  L


 


Monocytes % (Manual) 2 % (1-10)  


 


Eosinophils % (Manual) 0 % (0-3)  


 


Basophils % (Manual) 0 % (0-2)  


 


Band Neutrophils 6 % (0-8)  


 


Platelet Estimate Decreased  L


 


Platelet Morphology Normal  


 


Anisocytosis 1+  


 


Sodium Level


  144 MMOL/L


(136-145)


 


Potassium Level


  4.3 MMOL/L


(3.5-5.1)


 


Chloride Level


  116 MMOL/L


()  H


 


Carbon Dioxide Level


  21 MMOL/L


(21-32)


 


Anion Gap


  8 mmol/L


(5-15)


 


Blood Urea Nitrogen


  24 mg/dL


(7-18)  H


 


Creatinine


  0.6 MG/DL


(0.55-1.30)


 


Estimat Glomerular Filtration


Rate  mL/min (>60)  


 


 


Glucose Level


  138 MG/DL


()  H


 


Calcium Level


  7.7 MG/DL


(8.5-10.1)  L











Microbiology








 Date/Time


Source Procedure


Growth Status


 


 


 2/16/19 08:45


Blood Blood Culture - Preliminary


Staphylococcus Aureus Resulted


 


 2/16/19 08:20


Blood Blood Culture - Preliminary


Staphylococcus Aureus Resulted





 2/18/19 03:25


Sputum Expectorated Gram Stain - Final Resulted


 


 2/18/19 03:25


Sputum Expectorated Sputum Culture


Pending Resulted

















Intake and Output  


 


 2/17/19 2/18/19





 18:59 06:59


 


Intake Total 55 ml 55 ml


 


Balance 55 ml 55 ml


 


  


 


IV Total 55 ml 55 ml


 


# Voids 1 1


 


# Bowel Movements 1 1








Objective


Objective


General: No acute distress, awake and less responsive, Sleepy, cachectic.


HEENT: NCAT, sclera anicteric, PERRL, NG tube.


Neck: Supple, no significant jugular venous distention, 


Lungs: Non-rebreather mask; Poor inspiratory effort, decreased air in the bases 

, Bilateral Wheeze and Rales.


Heart: Regular rate and rhythm, normal S1/S2, no murmur.


Abdomen: soft, nontender, nondistended. Normoactive bowel sounds.


 / Rectal: Refused and deferred.


Extremities: No Cyanosis , No clubbing,  Left UE edema.  Lateral lower 

extremity / feet dressing intact.


Neuro: A&O x 1, Able to move all extremities


Skin: warm, no rashes or lesions, ecchymosis in bilateral upper extremity noted.





Assessment/Plan


1. Sepsis /  METHACILLIN RESISTANT STAPHYLOCOCCUS AUREUS bacteremia, lactic 

acidosis.


2. Failure to thrive with severe protein calorie malnutrition.


3. Coronary artery disease.


4. Diabetes, type 2.


5. Hypertension.


6. Sick sinus syndrome.


7. Hypercholesterolemia.


8. Aortic stenosis.


9. Pulmonary hypertension.


10. Alzheimer dementia.


11.  Acute tubular necrosis/acute kidney injury most likely secondary to 

dehydration and sepsis.


12. Sacral decubitus ulcer, stage III


13. Severe dehydration


14.  Hypokalemia.


15.  Abnormal liver function test


16.  Anemia/thrombocytopenia


17.  Bilateral pneumonia ?septic emboli?





TREATMENT:


1.  Patient on broad spectrum antibiotics with vancomycin and Zosyn IV.  Will 

require transesophageal echocardiogram to R/O endocarditis-see ID note


2. Coronary artery disease.  The patient is status post coronary artery


bypass graft in 2017.


3. Diabetes, type 2.  The patient has been started on NovoLog sliding


scale.  


4. Hypertension.  The patient is to continue on atenolol and losartan as


above.


5. Sick sinus syndrome.  The patient is status post pacemaker


implantation.


6. Hypercholesterolemia.  Continue simvastatin as above.


7. Aortic stenosis.


8. Pulmonary hypertension.


9. Alzheimer dementia.


10.  Anemia/thrombocytopenia-S/P 1 unit PRBC on 2/11/19.  Heme/Onc consult.  

Hold heparin


11.  Septic pulmonary emboli-await MARTHA on Tuesday 2/19/19-see cardiology note.





Restart tube feeding at rate of 45 cc/hr.


CODE STATUS is full code as per [facility],


DVT prophylaxis: D/C Heparin subcu due to thrombocytopenia











Cheo Ortiz MD Feb 18, 2019 16:50

## 2019-02-18 NOTE — INFECTIOUS DISEASES PROG NOTE
Assessment/Plan


Assessment/Plan


79 yo female with PMHx DM, CAD, Alzheimer dementia and Hip fracture who 

presents with dehydration and weakness. 





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 

until proven otherwise- r/o probable PPM infection- doubt TB


    -2/14 CT c/a/p w/: Interim development of multiple bilateral mostly upper 

lobe cavitary pulmonary parenchymal lesions. Given normal appearing chest 

radiograph on 2/7/2019, these are overwhelmingly likely infectious/inflammatory 

in nature. Most likely represent septic pulmonary emboli. The possibility of 

mycobacterial infection should also be included, deemed less likely given 

absence of adenopathy but still possible. Differential considerations also 

include fungal infections, noninfectious inflammatory processes. Other 

noncavitary nodules are also demonstrated, with the same differential. Large 

right and small-to-moderate left pleural effusions. Compressive atelectasis of 

the majority of the right lower lobe. Infiltrates seen in the right upper lobe. 

Bilateral pacemaker. Evidence of central venoocclusive disease. Wedge-shaped 

area of low-attenuation in the upper pole of the spleen, suspicious for infarct.


   -2d Echo (limited study due to contractures): no vegetations seen


    -2/10 Bcx 4/4MRSA; 2/11 Bcx 2/4 MRSA; 2/12 4/4  MRSA; 2/14 Bcx 4/4 MRSA


    2/8/19 Blood Cx  4/4 MRSA


    2/7/19 Wound Cx MRSA, ESBL E.coli (S ZOsyn), K. pna (S Zosyn)


   


 Leukocytosis, recurrent, increasing


  Low grade fever; improving


   Positive UA 


   CXR neg





Thrombocytopenia- HIT vs medication related


   


Pressure ulcers


  Not infected





Right hip fracture with hemiarthroplasty Oct 2018


DM


HTN


Sick sinus syndrome sp Pacemaker


HLD


CAD - SP MI and CABG


Aortic stenosis.


Pulmonary hypertension.


Alzheimer dementia.





PLAN





- Continue Daptomycin #6 (abx d #11) and Ceftaroline #4 given persistent MRSA 

Bacteremia


   - repeat 2 sets of Bcx


   -2/13 SP Vancomycin #6


   -2/13 SP Zosyn #4


   -2/10 SP Cefepime #3





-Will need MARTHA to look for endocarditis and/or PPM infection if consistent with 

goals of care


-placed on airborne isolation


   -AFB s/cx x3, MTB PCR x1


- f/u repeat cultures


- Monitor CBC and temps


- wound care


- Nutritional support


-f/u cocci, crAg, histo, blasto serologies


-Discuss goals of care as patient with poor px given persistent MRSA bacteremia

, likely endocarditis and PPM infection and advanced age and multiple 

comorbidities 





Thank you for this consult. We will continue to follow the patient during this 

hospitalization.





Subjective


Allergies:  


Coded Allergies:  


     No Known Allergies (Unverified , 3/26/14)


Subjective


; afebrile


still bacteremic


afb sputum collection pending





Objective


Vital Signs





Last 24 Hour Vital Signs








  Date Time  Temp Pulse Resp B/P (MAP) Pulse Ox O2 Delivery O2 Flow Rate FiO2


 


2/18/19 09:28 98.1       


 


2/18/19 09:00      Venturi Mask 12.0 


 


2/18/19 04:00  71      


 


2/18/19 04:00 98.1 77 18 124/76 (92) 100   


 


2/18/19 00:00 97.9 83 18 123/58 (79) 100   


 


2/18/19 00:00  80      


 


2/17/19 21:00      Venturi Mask 12.0 


 


2/17/19 20:00 98.2 87 18 134/59 (84) 100   


 


2/17/19 20:00  82      


 


2/17/19 18:30 98.2 94 24 114/50 (71) 100   


 


2/17/19 16:22 99.0 102 24 125/63 (83) 100   


 


2/17/19 16:07 98.4 125 27 136/78 (97) 100   


 


2/17/19 16:00  114      


 


2/17/19 12:00 97.3 95 22 120/44 (69) 100   


 


2/17/19 12:00  86      


 


2/17/19 10:12      Venturi Mask 12.0 








Height (Feet):  5


Height (Inches):  5.00


Weight (Pounds):  144


Objective


Gen:  Moaning, Awake but no following


HEENT: NCAT, MMM, EOMI, PERRL


LUNGS:  CTAB, No W


CARDS: RRR, S1, S2, No M/R/G, 


ABD: Soft, NT, ND, + BS


Ext: Poor circulation to Ext (cool to touch) , Pulses 2+ B/L (DP, Rad): 


SKIN:  Dry, No rashes, Large sacral ulcer. Mild odor no surrounding cellulitis, 

foot with eschar





Microbiology








 Date/Time


Source Procedure


Growth Status


 


 


 2/16/19 08:45


Blood Blood Culture - Preliminary


Staphylococcus Aureus Resulted


 


 2/16/19 08:20


Blood Blood Culture - Preliminary


Staphylococcus Aureus Resulted











Laboratory Tests








Test


  2/18/19


07:30


 


White Blood Count


  9.8 K/UL


(4.8-10.8)


 


Red Blood Count


  3.00 M/UL


(4.20-5.40)  L


 


Hemoglobin


  8.7 G/DL


(12.0-16.0)  L


 


Hematocrit


  26.8 %


(37.0-47.0)  L


 


Mean Corpuscular Volume 89 FL (80-99)  


 


Mean Corpuscular Hemoglobin


  29.1 PG


(27.0-31.0)


 


Mean Corpuscular Hemoglobin


Concent 32.5 G/DL


(32.0-36.0)


 


Red Cell Distribution Width


  16.7 %


(11.6-14.8)  H


 


Platelet Count


  58 K/UL


(150-450)  L


 


Mean Platelet Volume


  9.8 FL


(6.5-10.1)


 


Neutrophils (%) (Auto)


  % (45.0-75.0)


 


 


Lymphocytes (%) (Auto)


  % (20.0-45.0)


 


 


Monocytes (%) (Auto)  % (1.0-10.0)  


 


Eosinophils (%) (Auto)  % (0.0-3.0)  


 


Basophils (%) (Auto)  % (0.0-2.0)  


 


Neutrophils % (Manual) Pending  


 


Lymphocytes % (Manual) Pending  


 


Platelet Estimate Pending  


 


Platelet Morphology Pending  


 


Sodium Level


  144 MMOL/L


(136-145)


 


Potassium Level


  4.3 MMOL/L


(3.5-5.1)


 


Chloride Level


  116 MMOL/L


()  H


 


Carbon Dioxide Level


  21 MMOL/L


(21-32)


 


Anion Gap


  8 mmol/L


(5-15)


 


Blood Urea Nitrogen


  24 mg/dL


(7-18)  H


 


Creatinine


  0.6 MG/DL


(0.55-1.30)


 


Estimat Glomerular Filtration


Rate  mL/min (>60)  


 


 


Glucose Level


  138 MG/DL


()  H


 


Calcium Level


  7.7 MG/DL


(8.5-10.1)  L











Current Medications








 Medications


  (Trade)  Dose


 Ordered  Sig/Violette


 Route


 PRN Reason  Start Time


 Stop Time Status Last Admin


Dose Admin


 


 Acetaminophen


  (Tylenol)  650 mg  Q4H  PRN


 NG


 Mild Pain/Temp > 100.5  2/15/19 14:43


 3/14/19 14:42  2/18/19 08:58


 


 


 Ceftaroline


 Fosamil 400 mg/


 Sodium Chloride  55 ml @ 55


 mls/hr  Q12HR


 IV


   2/16/19 09:00


 2/23/19 08:59  2/18/19 08:42


 


 


 Clonidine HCl


  (Catapres Tab)  0.1 mg  Q8H  PRN


 NG


 SBP>160mmHg  2/16/19 14:00


 3/10/19 13:59   


 


 


 Daptomycin 400 mg/


 Sodium Chloride  55 ml @ 


 100 mls/hr  Q24H


 IV


   2/15/19 18:00


 2/20/19 17:59  2/17/19 18:04


 


 


 Dextrose


  (Dextrose 50%)  25 ml  Q30M  PRN


 IV


 Hypoglycemia  2/15/19 14:45


 3/9/19 13:30   


 


 


 Dextrose


  (Dextrose 50%)  50 ml  Q30M  PRN


 IV


 hypoglycemia  2/15/19 14:45


 3/9/19 13:44   


 


 


 Insulin Aspart


  (NovoLOG)    EVERY 6  HOURS


 SUBQ


   2/15/19 18:00


 3/9/19 16:29  2/18/19 05:52


 


 


 Lansoprazole


  (Prevacid)  30 mg  DAILY


 NG


   2/16/19 09:00


 3/18/19 08:59  2/18/19 08:42


 


 


 Olanzapine


  (ZyPREXA)  2.5 mg  BID


 NG


   2/16/19 18:00


 3/10/19 10:59  2/18/19 08:42


 


 


 Ondansetron HCl


  (Zofran)  4 mg  Q6H  PRN


 IVP


 Nausea & Vomiting  2/15/19 14:44


 3/10/19 14:43   


 

















Olivia Rosas M.D. Feb 18, 2019 10:04

## 2019-02-18 NOTE — GENERAL PROGRESS NOTE
Assessment/Plan


Problem List:  


(1) Acute encephalopathy


Assessment & Plan:  metabolic


ICD Codes:  G93.40 - Encephalopathy, unspecified


SNOMED:  92864305, 680075446


(2) Delirium


ICD Codes:  R41.0 - Disorientation, unspecified


SNOMED:  8857983


(3) Alzheimer's dementia


ICD Codes:  G30.9 - Alzheimer's disease, unspecified; F02.80 - Dementia in 

other diseases classified elsewhere without behavioral disturbance


SNOMED:  18820906


Assessment/Plan


Zyprexa 


the pt lacks capacity


soft restraints if needed





Subjective


Allergies:  


Coded Allergies:  


     No Known Allergies (Unverified , 3/26/14)


Subjective


cont to be agitated 


waxing and waning





Objective





Last 24 Hour Vital Signs








  Date Time  Temp Pulse Resp B/P (MAP) Pulse Ox O2 Delivery O2 Flow Rate FiO2


 


2/18/19 20:00      Non-Rebreather 15.0 


 


2/18/19 20:00 98.2 73 18 115/57 (76) 100   


 


2/18/19 16:00 98.6 89 26 102/83 (89) 100   


 


2/18/19 16:00      Non-Rebreather 15.0 


 


2/18/19 16:00  74      


 


2/18/19 12:00 98.0 74 22 100/62 (75) 100   


 


2/18/19 12:00  79      


 


2/18/19 09:28 98.1       


 


2/18/19 09:00      Venturi Mask 12.0 


 


2/18/19 08:00 97.9 87 22 97/52 (67) 95   


 


2/18/19 07:50  75      


 


2/18/19 04:00  71      


 


2/18/19 04:00 98.1 77 18 124/76 (92) 100   


 


2/18/19 00:00 97.9 83 18 123/58 (79) 100   


 


2/18/19 00:00  80      

















Intake and Output  


 


 2/17/19 2/18/19





 18:59 06:59


 


Intake Total 55 ml 55 ml


 


Balance 55 ml 55 ml


 


  


 


IV Total 55 ml 55 ml


 


# Voids 1 1


 


# Bowel Movements 1 1








Laboratory Tests


2/18/19 07:30: 


White Blood Count 9.8, Red Blood Count 3.00L, Hemoglobin 8.7L, Hematocrit 26.8L

, Mean Corpuscular Volume 89, Mean Corpuscular Hemoglobin 29.1, Mean 

Corpuscular Hemoglobin Concent 32.5, Red Cell Distribution Width 16.7H, 

Platelet Count 58L, Mean Platelet Volume 9.8, Neutrophils (%) (Auto) , 

Lymphocytes (%) (Auto) , Monocytes (%) (Auto) , Eosinophils (%) (Auto) , 

Basophils (%) (Auto) , Differential Total Cells Counted 100, Neutrophils % (

Manual) 88H, Lymphocytes % (Manual) 4L, Monocytes % (Manual) 2, Eosinophils % (

Manual) 0, Basophils % (Manual) 0, Band Neutrophils 6, Platelet Estimate 

DecreasedL, Platelet Morphology Normal, Anisocytosis 1+, Sodium Level 144, 

Potassium Level 4.3, Chloride Level 116H, Carbon Dioxide Level 21, Anion Gap 8, 

Blood Urea Nitrogen 24H, Creatinine 0.6, Estimat Glomerular Filtration Rate , 

Glucose Level 138H, Calcium Level 7.7L


Height (Feet):  5


Height (Inches):  5.00


Weight (Pounds):  144











Josh Nunez MD Feb 18, 2019 21:44

## 2019-02-18 NOTE — SURGERY PROGRESS NOTE
Surgery Progress Note


Subjective


Additional Comments


leukocytosis resolved.  appreciate ID input





Objective





Last 24 Hour Vital Signs








  Date Time  Temp Pulse Resp B/P (MAP) Pulse Ox O2 Delivery O2 Flow Rate FiO2


 


2/18/19 09:28 98.1       


 


2/18/19 09:00      Venturi Mask 12.0 


 


2/18/19 08:00 97.9 87 22 97/52 (67) 95   


 


2/18/19 07:50  75      


 


2/18/19 04:00  71      


 


2/18/19 04:00 98.1 77 18 124/76 (92) 100   


 


2/18/19 00:00 97.9 83 18 123/58 (79) 100   


 


2/18/19 00:00  80      


 


2/17/19 21:00      Venturi Mask 12.0 


 


2/17/19 20:00 98.2 87 18 134/59 (84) 100   


 


2/17/19 20:00  82      


 


2/17/19 18:30 98.2 94 24 114/50 (71) 100   


 


2/17/19 16:22 99.0 102 24 125/63 (83) 100   








I&O











Intake and Output  


 


 2/17/19 2/18/19





 18:59 06:59


 


Intake Total 55 ml 55 ml


 


Balance 55 ml 55 ml


 


  


 


IV Total 55 ml 55 ml


 


# Voids 1 1


 


# Bowel Movements 1 1








Dressing:  other


Wound:  other


Drains:  other


Cardiovascular:  RSR


Respiratory:  clear


Abdomen:  soft, present bowel sounds, non-distended


Extremities:  other





Laboratory Tests








Test


  2/18/19


07:30


 


White Blood Count


  9.8 K/UL


(4.8-10.8)


 


Red Blood Count


  3.00 M/UL


(4.20-5.40)  L


 


Hemoglobin


  8.7 G/DL


(12.0-16.0)  L


 


Hematocrit


  26.8 %


(37.0-47.0)  L


 


Mean Corpuscular Volume 89 FL (80-99)  


 


Mean Corpuscular Hemoglobin


  29.1 PG


(27.0-31.0)


 


Mean Corpuscular Hemoglobin


Concent 32.5 G/DL


(32.0-36.0)


 


Red Cell Distribution Width


  16.7 %


(11.6-14.8)  H


 


Platelet Count


  58 K/UL


(150-450)  L


 


Mean Platelet Volume


  9.8 FL


(6.5-10.1)


 


Neutrophils (%) (Auto)


  % (45.0-75.0)


 


 


Lymphocytes (%) (Auto)


  % (20.0-45.0)


 


 


Monocytes (%) (Auto)  % (1.0-10.0)  


 


Eosinophils (%) (Auto)  % (0.0-3.0)  


 


Basophils (%) (Auto)  % (0.0-2.0)  


 


Differential Total Cells


Counted 100  


 


 


Neutrophils % (Manual) 88 % (45-75)  H


 


Lymphocytes % (Manual) 4 % (20-45)  L


 


Monocytes % (Manual) 2 % (1-10)  


 


Eosinophils % (Manual) 0 % (0-3)  


 


Basophils % (Manual) 0 % (0-2)  


 


Band Neutrophils 6 % (0-8)  


 


Platelet Estimate Decreased  L


 


Platelet Morphology Normal  


 


Anisocytosis 1+  


 


Sodium Level


  144 MMOL/L


(136-145)


 


Potassium Level


  4.3 MMOL/L


(3.5-5.1)


 


Chloride Level


  116 MMOL/L


()  H


 


Carbon Dioxide Level


  21 MMOL/L


(21-32)


 


Anion Gap


  8 mmol/L


(5-15)


 


Blood Urea Nitrogen


  24 mg/dL


(7-18)  H


 


Creatinine


  0.6 MG/DL


(0.55-1.30)


 


Estimat Glomerular Filtration


Rate  mL/min (>60)  


 


 


Glucose Level


  138 MG/DL


()  H


 


Calcium Level


  7.7 MG/DL


(8.5-10.1)  L











Plan


Problems:  


(1) Sepsis


Assessment & Plan:  Leukocytosis resolved 


LFT's elevated w/ t bili / d bili - improved 


AM labs ordered 


hydrate


US Impression: Limited exam, as described. Note inability to visualize the 

spleen Negative for gallstones or dilated bile ducts Questionable bilateral 

renal parapelvic cysts Small to moderate right pleural effusion


no acute surgical intervention planned. 


MARTHA for septic emboli


consider removing pacemaker as harboring sepsis source 


tube feeds as tolerated 


will follow with recs. 





(2) Sacral decubitus ulcer, stage III


Assessment & Plan:  DTPI sacrum.Maroon- indurated discoloration with opening at 

sacral coccygeal area(L)3.8cm x (W)1.4cm with entire wound measuring (L)10cmx(W)

12cm.Small amt malodorous brown exudate.


Stable dry eschar R heel with dry peeling skin periwound (L)2cm x (W)3.5cm. 

Periwound is also red and boggy. 


Small dry eschar noted to lateral R 5th metatarsal (L)1.4cm x (W)0.3cm.  


L heel is boggy but colour is dusky . 


Resolving skin tear lateral R tibia. Wound bed is clean and dry.  





Tx. Plan:


Cleanse Sacral wound with Saline.Apply Therahoney to opening at sacrococcygeal 

area.Cavilon skin barrier to DTPI. Cover with Optifoam drsg .Change Daily and 

prn.


            


Apply Betadine to R heel  and Lateral R 5th metatarsal.Cover with Abd pad .Wrap 

with kerlix daily and prn.


            


Apply Cavilon Skin Barrier to L heel.Cover with Optifoam drsg .Change every 7 

days and prn.


            


Reposition at least every 2hours or as tolerated.


            


Off-load heels with pillow.





(3) Rectal bleed


(4) Protein-calorie malnutrition, severe


(5) Dehydration











Tuan Patricia Feb 18, 2019 16:12

## 2019-02-18 NOTE — NUR
NURSE NOTES:



Patient transferred to [facility] per Dr. Junior. Received report from MARINA Gonzalez RN. Patient 
alert and oriented x 1, [place]-speaking. Receiving O2 via non-rebreather mask @ 15 L/min, 
no s/s of respiratory distress noted. Left NGT in place, feeding held during transfer. Left 
upper arm midline, left thumb 24g and right hand 22g saline locks patent and asymptomatic. 
Bed locked in lowest position with side rails up x 3. All needs attended to. Call light 
within reach. Will continue to monitor.

## 2019-02-18 NOTE — PULMONOLOGY PROGRESS NOTE
Assessment/Plan


Problems:  


(1) Sepsis


(2) Thrombocytopenia


(3) ATN (acute tubular necrosis)


(4) Pulmonary hypertension


(5) Sacral decubitus ulcer, stage III


(6) Alzheimer's dementia


(7) Protein-calorie malnutrition, severe


(8) HTN (hypertension)


(9) Pacemaker


(10) CAD (coronary artery disease)


Assessment/Plan


on NRM


/u PLT, better today up to 57


failed swallow study


Morphine prn


d/w wound care nurse, 


pan cultures, still positive


iv fluids


check electrolytes


sliding scale


check electrolytes daily


broad spectrum abx


dvt prophylaxis


insulin coverage.


social service consult


might need removal of pacemaker leads, d/W dr Rosas





Subjective


ROS Limited/Unobtainable:  No


Constitutional:  Reports: no symptoms


HEENT:  Repors: no symptoms


Respiratory:  Reports: no symptoms


Allergies:  


Coded Allergies:  


     No Known Allergies (Unverified , 3/26/14)





Objective





Last 24 Hour Vital Signs








  Date Time  Temp Pulse Resp B/P (MAP) Pulse Ox O2 Delivery O2 Flow Rate FiO2


 


2/18/19 09:28 98.1       


 


2/18/19 09:00      Venturi Mask 12.0 


 


2/18/19 08:00 97.9 87 22 97/52 (67) 95   


 


2/18/19 07:50  75      


 


2/18/19 04:00  71      


 


2/18/19 04:00 98.1 77 18 124/76 (92) 100   


 


2/18/19 00:00 97.9 83 18 123/58 (79) 100   


 


2/18/19 00:00  80      


 


2/17/19 21:00      Venturi Mask 12.0 


 


2/17/19 20:00 98.2 87 18 134/59 (84) 100   


 


2/17/19 20:00  82      


 


2/17/19 18:30 98.2 94 24 114/50 (71) 100   


 


2/17/19 16:22 99.0 102 24 125/63 (83) 100   


 


2/17/19 16:07 98.4 125 27 136/78 (97) 100   


 


2/17/19 16:00  114      


 


2/17/19 12:00 97.3 95 22 120/44 (69) 100   


 


2/17/19 12:00  86      

















Intake and Output  


 


 2/17/19 2/18/19





 19:00 07:00


 


Intake Total 55 ml 55 ml


 


Balance 55 ml 55 ml


 


  


 


IV Total 55 ml 55 ml


 


# Voids 1 1


 


# Bowel Movements 1 1








Objective


General Appearance:  cachectic


HEENT:  normocephalic, somnolent


Respiratory/Chest:  chest wall non-tender,loud rhonchi


Cardiovascular:  normal peripheral pulses, normal rate


Abdomen:  normal bowel sounds, soft, non tender


Extremities:  no cyanosis


Skin:  no rash





Microbiology








 Date/Time


Source Procedure


Growth Status


 


 


 2/16/19 08:45


Blood Blood Culture - Preliminary


Staphylococcus Aureus Resulted


 


 2/16/19 08:20


Blood Blood Culture - Preliminary


Staphylococcus Aureus Resulted








Laboratory Tests


2/18/19 07:30: 


White Blood Count 9.8, Red Blood Count 3.00L, Hemoglobin 8.7L, Hematocrit 26.8L

, Mean Corpuscular Volume 89, Mean Corpuscular Hemoglobin 29.1, Mean 

Corpuscular Hemoglobin Concent 32.5, Red Cell Distribution Width 16.7H, 

Platelet Count 58L, Mean Platelet Volume 9.8, Neutrophils (%) (Auto) , 

Lymphocytes (%) (Auto) , Monocytes (%) (Auto) , Eosinophils (%) (Auto) , 

Basophils (%) (Auto) , Neutrophils % (Manual) [Pending], Lymphocytes % (Manual) 

[Pending], Platelet Estimate [Pending], Platelet Morphology [Pending], Sodium 

Level 144, Potassium Level 4.3, Chloride Level 116H, Carbon Dioxide Level 21, 

Anion Gap 8, Blood Urea Nitrogen 24H, Creatinine 0.6, Estimat Glomerular 

Filtration Rate , Glucose Level 138H, Calcium Level 7.7L





Current Medications








 Medications


  (Trade)  Dose


 Ordered  Sig/Violette


 Route


 PRN Reason  Start Time


 Stop Time Status Last Admin


Dose Admin


 


 Acetaminophen


  (Tylenol)  650 mg  Q4H  PRN


 NG


 Mild Pain/Temp > 100.5  2/15/19 14:43


 3/14/19 14:42  2/18/19 08:58


 


 


 Ceftaroline


 Fosamil 400 mg/


 Sodium Chloride  55 ml @ 55


 mls/hr  Q12HR


 IV


   2/16/19 09:00


 2/23/19 08:59  2/18/19 08:42


 


 


 Clonidine HCl


  (Catapres Tab)  0.1 mg  Q8H  PRN


 NG


 SBP>160mmHg  2/16/19 14:00


 3/10/19 13:59   


 


 


 Daptomycin 400 mg/


 Sodium Chloride  55 ml @ 


 100 mls/hr  Q24H


 IV


   2/15/19 18:00


 2/20/19 17:59  2/17/19 18:04


 


 


 Dextrose


  (Dextrose 50%)  25 ml  Q30M  PRN


 IV


 Hypoglycemia  2/15/19 14:45


 3/9/19 13:30   


 


 


 Dextrose


  (Dextrose 50%)  50 ml  Q30M  PRN


 IV


 hypoglycemia  2/15/19 14:45


 3/9/19 13:44   


 


 


 Insulin Aspart


  (NovoLOG)    EVERY 6  HOURS


 SUBQ


   2/15/19 18:00


 3/9/19 16:29  2/18/19 05:52


 


 


 Lansoprazole


  (Prevacid)  30 mg  DAILY


 NG


   2/16/19 09:00


 3/18/19 08:59  2/18/19 08:42


 


 


 Olanzapine


  (ZyPREXA)  2.5 mg  BID


 NG


   2/16/19 18:00


 3/10/19 10:59  2/18/19 08:42


 


 


 Ondansetron HCl


  (Zofran)  4 mg  Q6H  PRN


 IVP


 Nausea & Vomiting  2/15/19 14:44


 3/10/19 14:43   


 

















Korin Junior MD Feb 18, 2019 12:02

## 2019-02-18 NOTE — DIAGNOSTIC IMAGING REPORT
--------------- APPROVED REPORT --------------





CPT Code: 97851



Present Symptoms

Shortness of breath 





RIGHT LEG:  Venous imaging reveals recanalized chronic thrombus in the common femoral 

vein. Remainder of the deep venous system within normal limits. No evidence of thrombus 

within the superficial femoral, popliteal or tibial  segments. Greater saphenous vein 

also within normal limits. Doppler indicates normal spontaneous flow within these 

segments.



LEFT LEG: Venous imaging reveals a patent deep venous system. There is no evidence of 

thrombus within the femoral, popliteal or tibial segments. The greater saphenous vein is 

also within normal limits. Doppler indicates normal spontaneous flow within these 

segments.

There is no evidence of acute deep vein thrombosis.

## 2019-02-18 NOTE — NUR
NURSE NOTES:

1600-  Gave report to ESCOBAR Escoto.  Moved patient per Dr. Junior's order to [facility].  Patient is 
alert x1.   Patient is on non-rebreather mask at100% pulse ox.  V/s 104/64, 79 heart rate, 
98.2 F and 22 respiration.   Patient is SR at 78. Patient had one BM today at 15:00.   
Feeding is Glucerna 1.2 at 55 ml/hr.  New feeding needed and was endorsed.  Patient had 
purewic with 400ml of dark wesly urine.  Ensure belonging of bracelet transferred.  Endorsed 
wound care.  Returned heart monitor back to unit.

## 2019-02-18 NOTE — GI PROGRESS NOTE
Assessment/Plan


Problems:  


(1) Severe anemia


ICD Codes:  D64.9 - Anemia, unspecified


SNOMED:  820972061


(2) Protein-calorie malnutrition, severe


ICD Codes:  E43 - Unspecified severe protein-calorie malnutrition


SNOMED:  896226196


(3) Rectal bleed


ICD Codes:  K62.5 - Hemorrhage of anus and rectum


SNOMED:  49755850


(4) Upper GI bleed


ICD Codes:  K92.2 - Gastrointestinal hemorrhage, unspecified


SNOMED:  87437072


(5) Severe malnutrition


ICD Codes:  E43 - Unspecified severe protein-calorie malnutrition


SNOMED:  46898710


(6) Weakness


ICD Codes:  R53.1 - Weakness


SNOMED:  58913777


(7) Dysphasia


ICD Codes:  R47.02 - Dysphasia


SNOMED:  87449374


(8) Dehydration


ICD Codes:  E86.0 - Dehydration


SNOMED:  37516571


(9) Acute encephalopathy


ICD Codes:  G93.40 - Encephalopathy, unspecified


SNOMED:  94457770, 952127121


(10) Sepsis


ICD Codes:  A41.9 - Sepsis, unspecified organism


SNOMED:  47320042


(11) Diabetes mellitus, type II


ICD Codes:  E11.9 - Type 2 diabetes mellitus without complications


SNOMED:  68526517


(12) Colitis


ICD Codes:  K52.9 - Noninfective gastroenteritis and colitis, unspecified


SNOMED:  34749692


Status:  unchanged


Status Narrative


Discussed with Dr. Moreno


Assessment/Plan


On airborne isolation


EGD on hold per cardiology recs, pending MARTHA on Tuesday to rule out endocarditis


on ppi


stool ob positive>> monitor H&H and transfuse as needed


NGTF


repeat labs





The patient was seen and examined at bedside and all new and available data was 

reviewed in the patients chart. I agree with the above findings, impression 

and plan.  (Patient seen earlier today. Signature stamp does not reflect 

patient encounter time.). - Cal Moreno MD





Subjective


Subjective


Limited





Objective





Last 24 Hour Vital Signs








  Date Time  Temp Pulse Resp B/P (MAP) Pulse Ox O2 Delivery O2 Flow Rate FiO2


 


2/18/19 09:28 98.1       


 


2/18/19 09:00      Venturi Mask 12.0 


 


2/18/19 08:00 97.9 87 22 97/52 (67) 95   


 


2/18/19 07:50  75      


 


2/18/19 04:00  71      


 


2/18/19 04:00 98.1 77 18 124/76 (92) 100   


 


2/18/19 00:00 97.9 83 18 123/58 (79) 100   


 


2/18/19 00:00  80      


 


2/17/19 21:00      Venturi Mask 12.0 


 


2/17/19 20:00 98.2 87 18 134/59 (84) 100   


 


2/17/19 20:00  82      


 


2/17/19 18:30 98.2 94 24 114/50 (71) 100   


 


2/17/19 16:22 99.0 102 24 125/63 (83) 100   


 


2/17/19 16:07 98.4 125 27 136/78 (97) 100   


 


2/17/19 16:00  114      


 


2/17/19 12:00 97.3 95 22 120/44 (69) 100   


 


2/17/19 12:00  86      


 


2/17/19 10:12      Venturi Mask 12.0 

















Intake and Output  


 


 2/17/19 2/18/19





 19:00 07:00


 


Intake Total 55 ml 55 ml


 


Balance 55 ml 55 ml


 


  


 


IV Total 55 ml 55 ml


 


# Voids 1 1


 


# Bowel Movements 1 1











Laboratory Tests








Test


  2/18/19


07:30


 


White Blood Count


  9.8 K/UL


(4.8-10.8)


 


Red Blood Count


  3.00 M/UL


(4.20-5.40)  L


 


Hemoglobin


  8.7 G/DL


(12.0-16.0)  L


 


Hematocrit


  26.8 %


(37.0-47.0)  L


 


Mean Corpuscular Volume 89 FL (80-99)  


 


Mean Corpuscular Hemoglobin


  29.1 PG


(27.0-31.0)


 


Mean Corpuscular Hemoglobin


Concent 32.5 G/DL


(32.0-36.0)


 


Red Cell Distribution Width


  16.7 %


(11.6-14.8)  H


 


Platelet Count


  58 K/UL


(150-450)  L


 


Mean Platelet Volume


  9.8 FL


(6.5-10.1)


 


Neutrophils (%) (Auto)


  % (45.0-75.0)


 


 


Lymphocytes (%) (Auto)


  % (20.0-45.0)


 


 


Monocytes (%) (Auto)  % (1.0-10.0)  


 


Eosinophils (%) (Auto)  % (0.0-3.0)  


 


Basophils (%) (Auto)  % (0.0-2.0)  


 


Neutrophils % (Manual) Pending  


 


Lymphocytes % (Manual) Pending  


 


Platelet Estimate Pending  


 


Platelet Morphology Pending  


 


Sodium Level


  144 MMOL/L


(136-145)


 


Potassium Level


  4.3 MMOL/L


(3.5-5.1)


 


Chloride Level


  116 MMOL/L


()  H


 


Carbon Dioxide Level


  21 MMOL/L


(21-32)


 


Anion Gap


  8 mmol/L


(5-15)


 


Blood Urea Nitrogen


  24 mg/dL


(7-18)  H


 


Creatinine


  0.6 MG/DL


(0.55-1.30)


 


Estimat Glomerular Filtration


Rate  mL/min (>60)  


 


 


Glucose Level


  138 MG/DL


()  H


 


Calcium Level


  7.7 MG/DL


(8.5-10.1)  L








Height (Feet):  5


Height (Inches):  5.00


Weight (Pounds):  144


General Appearance:  mild distress


Cardiovascular:  normal rate


Respiratory/Chest:  other - Venturi mask


Abdominal Exam:  other - NGT











Colin Milton NP Feb 18, 2019 10:05

## 2019-02-18 NOTE — NUR
HAND-OFF: 



Report given to ANA Guerrero RN. Patient noted with BS of 59, PRN medication of D50 50 ml given 
via right midline catheter, BS now 107. Dr. Junior made aware

## 2019-02-18 NOTE — NUR
NURSE NOTES:

Received pt. and report from ESCOBAR De La Rosa. Observed pt. resting in bed with both eyes closed. 
Pt. is A/O x1; responds to name only. Cardiac monitor is in placed. Pt. has a DOMINIC midline; 
intact, asymptomatic, patent, and saline locked. Pt. has a NGT currently feeding Glucerna 
1.2 @ 55cc/hr. Call light is within reach, bed is in the lowest position and locked. Pt. is 
currently on airborne precaution. No acute distress noted at this time. Will continue plan 
of care.

## 2019-02-18 NOTE — NUR
NURSE NOTES:

Received patient from ESCOBAR Escoto. Patient is awake and resting comfortably in bed. Patient 
is non-verbal, eyes opened and making contact. On 15L non-rebreather mask and showing no 
signs and symptoms of pain and/or distress. Will continue plan of care.

## 2019-02-18 NOTE — CARDIOLOGY PROGRESS NOTE
Assessment/Plan


Assessment/Plan


staph sepsis, probably endocarditis  


severe malnutrition    


multiple decubiti  


advanced dementia





the patient looks better today, if she improves, consider MARTHA to assess 

endocarditis on her leads and consider leads removal





Subjective


Subjective


The patient looks a little better, she is not screaming so much and is slightly 

more alert


has severe generalzied pain all over





Objective





Last 24 Hour Vital Signs








  Date Time  Temp Pulse Resp B/P (MAP) Pulse Ox O2 Delivery O2 Flow Rate FiO2


 


2/18/19 16:00 98.6 89 26 102/83 (89) 100   


 


2/18/19 16:00      Non-Rebreather 15.0 


 


2/18/19 12:00 98.0 74 22 100/62 (75) 100   


 


2/18/19 12:00  79      


 


2/18/19 09:28 98.1       


 


2/18/19 09:00      Venturi Mask 12.0 


 


2/18/19 08:00 97.9 87 22 97/52 (67) 95   


 


2/18/19 07:50  75      


 


2/18/19 04:00  71      


 


2/18/19 04:00 98.1 77 18 124/76 (92) 100   


 


2/18/19 00:00 97.9 83 18 123/58 (79) 100   


 


2/18/19 00:00  80      


 


2/17/19 21:00      Venturi Mask 12.0 


 


2/17/19 20:00 98.2 87 18 134/59 (84) 100   


 


2/17/19 20:00  82      


 


2/17/19 18:30 98.2 94 24 114/50 (71) 100   








General Appearance:  severe distress


EENT:  PERRL/EOMI


Neck:  supple


Rhythm:  NSR


Cardiovascular:  tachycardia


Respiratory/Chest:  decreased breath sounds, accessory muscle use, crackles/

rales


Abdomen:  distended


Extremities:  other - anasarca, dependent edema heel ulcers bialterally











Intake and Output  


 


 2/17/19 2/18/19





 19:00 07:00


 


Intake Total 55 ml 55 ml


 


Balance 55 ml 55 ml


 


  


 


IV Total 55 ml 55 ml


 


# Voids 1 1


 


# Bowel Movements 1 1











Laboratory Tests








Test


  2/18/19


07:30


 


White Blood Count


  9.8 K/UL


(4.8-10.8)


 


Red Blood Count


  3.00 M/UL


(4.20-5.40)  L


 


Hemoglobin


  8.7 G/DL


(12.0-16.0)  L


 


Hematocrit


  26.8 %


(37.0-47.0)  L


 


Mean Corpuscular Volume 89 FL (80-99)  


 


Mean Corpuscular Hemoglobin


  29.1 PG


(27.0-31.0)


 


Mean Corpuscular Hemoglobin


Concent 32.5 G/DL


(32.0-36.0)


 


Red Cell Distribution Width


  16.7 %


(11.6-14.8)  H


 


Platelet Count


  58 K/UL


(150-450)  L


 


Mean Platelet Volume


  9.8 FL


(6.5-10.1)


 


Neutrophils (%) (Auto)


  % (45.0-75.0)


 


 


Lymphocytes (%) (Auto)


  % (20.0-45.0)


 


 


Monocytes (%) (Auto)  % (1.0-10.0)  


 


Eosinophils (%) (Auto)  % (0.0-3.0)  


 


Basophils (%) (Auto)  % (0.0-2.0)  


 


Differential Total Cells


Counted 100  


 


 


Neutrophils % (Manual) 88 % (45-75)  H


 


Lymphocytes % (Manual) 4 % (20-45)  L


 


Monocytes % (Manual) 2 % (1-10)  


 


Eosinophils % (Manual) 0 % (0-3)  


 


Basophils % (Manual) 0 % (0-2)  


 


Band Neutrophils 6 % (0-8)  


 


Platelet Estimate Decreased  L


 


Platelet Morphology Normal  


 


Anisocytosis 1+  


 


Sodium Level


  144 MMOL/L


(136-145)


 


Potassium Level


  4.3 MMOL/L


(3.5-5.1)


 


Chloride Level


  116 MMOL/L


()  H


 


Carbon Dioxide Level


  21 MMOL/L


(21-32)


 


Anion Gap


  8 mmol/L


(5-15)


 


Blood Urea Nitrogen


  24 mg/dL


(7-18)  H


 


Creatinine


  0.6 MG/DL


(0.55-1.30)


 


Estimat Glomerular Filtration


Rate  mL/min (>60)  


 


 


Glucose Level


  138 MG/DL


()  H


 


Calcium Level


  7.7 MG/DL


(8.5-10.1)  L











Microbiology








 Date/Time


Source Procedure


Growth Status


 


 


 2/16/19 08:45


Blood Blood Culture - Preliminary


Staphylococcus Aureus Resulted


 


 2/16/19 08:20


Blood Blood Culture - Preliminary


Staphylococcus Aureus Resulted





 2/18/19 03:25


Sputum Expectorated Gram Stain - Final Resulted


 


 2/18/19 03:25


Sputum Expectorated Sputum Culture


Pending Resulted

















Lara Tong MD Feb 18, 2019 18:12

## 2019-02-18 NOTE — GENERAL PROGRESS NOTE
Assessment/Plan


Assessment/Plan








ASSESSMENT/RECS:





1. Pancytopenia with thrombocytopenia that is severe is most likely related to 

Sepsis, lactic acidosis. In addition has received heparin. US abdomen ordered 

and shows no spleen and no cirrhosis


--> heparin discontinued


--> HIT ab test ordered pending


--> venous duplex negative


--> smear peripheral has been reviewed and negative for abnml cells


--> tumor markers as needed/prn


--> hold off on transfusion unless plt <20k


--> hold off on steriods


--> plt trend: 34--> 50k


2. Failure to thrive with severe protein calorie malnutrition.


->> calorie counts daily


--> consider mirtazapine as needed per pcp


--> getting ngtfs


3. Anemia of chronic disease


--> panel has been ordered


--> transfuse if hgb <7


4. Leukocytosis


--> on abx as per id for infection


5. Hypertension.


6. Sick sinus syndrome.


7. Hypercholesterolemia.


8. Aortic stenosis.


9. Pulmonary hypertension.


10. Alzheimer dementia.


11.  Acute tubular necrosis/acute kidney injury most likely secondary to 

dehydration and sepsis.





Greatly appreciate consultation!





Subjective


Constitutional:  Denies: no symptoms, chills, diaphoresis, fever, malaise, 

weakness, other


HEENT:  Denies: no symptoms, eye pain, blurred vision, tearing, double vision, 

ear pain, ear discharge, nose pain, nose congestion, throat pain, throat 

swelling, mouth pain, mouth swelling, other


Cardiovascular:  Denies: no symptoms, chest pain, edema, irregular heart rate, 

lightheadedness, palpitations, syncope, other


Respiratory:  Denies: no symptoms, cough, orthopnea, shortness of breath, SOB 

with excertion, SOB at rest, sputum, stridor, wheezing, other


Gastrointestinal/Abdominal:  Denies: no symptoms, abdomen distended, abdominal 

pain, black stools, tarry stools, blood in stool, constipated, diarrhea, 

difficulty swallowing, nausea, poor appetite, poor fluid intake, rectal bleeding

, vomiting, other


Genitourinary:  Denies: no symptoms, burning, discharge, frequency, flank pain, 

hematuria, incontinence, pain, urgency, other


Neurologic/Psychiatric:  Denies: no symptoms, anxiety, depressed, emotional 

problems, headache, numbness, paresthesia, pre-existing deficit, seizure, 

tingling, tremors, weakness, other


Endocrine:  Denies: no symptoms, excessive sweating, flushing, intolerance to 

cold, intolerance to heat, increased hunger, increased thirst, increased urine, 

unexplained weight gain, unexplained weight loss, other


Allergies:  


Coded Allergies:  


     No Known Allergies (Unverified , 3/26/14)


Subjective


2/14:seen by bedside, Possible EGD tomorrow to evaluate upper GI bleed, today's 

EGD was canceled by anesthesia, plt remains low at 34, hgb trending up . 


2/15: Scheduled for EGD today,CT w/ cavitary lung lessions; placed on isolation

, remains bacteremic, wbc 12.


2/17: continues to be somewhat sob, imaging reviewed, labs are better this am, 

plts uptrending, getting ngtfs


2/18: awake, comfortable, no events





Objective





Last 24 Hour Vital Signs








  Date Time  Temp Pulse Resp B/P (MAP) Pulse Ox O2 Delivery O2 Flow Rate FiO2


 


2/18/19 09:28 98.1       


 


2/18/19 09:00      Venturi Mask 12.0 


 


2/18/19 08:00 97.9 87 22 97/52 (67) 95   


 


2/18/19 07:50  75      


 


2/18/19 04:00  71      


 


2/18/19 04:00 98.1 77 18 124/76 (92) 100   


 


2/18/19 00:00 97.9 83 18 123/58 (79) 100   


 


2/18/19 00:00  80      


 


2/17/19 21:00      Venturi Mask 12.0 


 


2/17/19 20:00 98.2 87 18 134/59 (84) 100   


 


2/17/19 20:00  82      


 


2/17/19 18:30 98.2 94 24 114/50 (71) 100   


 


2/17/19 16:22 99.0 102 24 125/63 (83) 100   

















Intake and Output  


 


 2/17/19 2/18/19





 19:00 07:00


 


Intake Total 55 ml 55 ml


 


Balance 55 ml 55 ml


 


  


 


IV Total 55 ml 55 ml


 


# Voids 1 1


 


# Bowel Movements 1 1








Laboratory Tests


2/18/19 07:30: 


White Blood Count 9.8, Red Blood Count 3.00L, Hemoglobin 8.7L, Hematocrit 26.8L

, Mean Corpuscular Volume 89, Mean Corpuscular Hemoglobin 29.1, Mean 

Corpuscular Hemoglobin Concent 32.5, Red Cell Distribution Width 16.7H, 

Platelet Count 58L, Mean Platelet Volume 9.8, Neutrophils (%) (Auto) , 

Lymphocytes (%) (Auto) , Monocytes (%) (Auto) , Eosinophils (%) (Auto) , 

Basophils (%) (Auto) , Differential Total Cells Counted 100, Neutrophils % (

Manual) 88H, Lymphocytes % (Manual) 4L, Monocytes % (Manual) 2, Eosinophils % (

Manual) 0, Basophils % (Manual) 0, Band Neutrophils 6, Platelet Estimate 

DecreasedL, Platelet Morphology Normal, Anisocytosis 1+, Sodium Level 144, 

Potassium Level 4.3, Chloride Level 116H, Carbon Dioxide Level 21, Anion Gap 8, 

Blood Urea Nitrogen 24H, Creatinine 0.6, Estimat Glomerular Filtration Rate , 

Glucose Level 138H, Calcium Level 7.7L


Height (Feet):  5


Height (Inches):  5.00


Weight (Pounds):  144


Objective





GENERAL: Awake responsive to deep stimuli with opening of her eyes, in no 

apparent distress.


HEENT:  Eyes, pupils equal responsive to light and accommodation. ++ ngtf


CHEST: Poor inspiratory effort, decreased air in the bases no wheezes or 

rhonchi was appreciated


CARDIOVASCULAR:  Regular rhythm and rate.  S1 and S2 are normal without murmurs 

or gallops.


ABDOMEN:  Soft, nontender, and nondistended.  Positive bowel sounds.  No 

rebounding or guarding noted.


EXTREMITIES:  Negative for clubbing, cyanosis, or edema, muscle atrophy in 

bilateral lower extremity


RECTAL/GENITAL:  Not performed.


NEUROLOGIC:somewhat responsive, better than before











Darian Topete MD Feb 18, 2019 16:15

## 2019-02-19 VITALS — DIASTOLIC BLOOD PRESSURE: 71 MMHG | SYSTOLIC BLOOD PRESSURE: 153 MMHG

## 2019-02-19 VITALS — SYSTOLIC BLOOD PRESSURE: 158 MMHG | DIASTOLIC BLOOD PRESSURE: 81 MMHG

## 2019-02-19 VITALS — SYSTOLIC BLOOD PRESSURE: 134 MMHG | DIASTOLIC BLOOD PRESSURE: 60 MMHG

## 2019-02-19 VITALS — DIASTOLIC BLOOD PRESSURE: 55 MMHG | SYSTOLIC BLOOD PRESSURE: 113 MMHG

## 2019-02-19 VITALS — SYSTOLIC BLOOD PRESSURE: 129 MMHG | DIASTOLIC BLOOD PRESSURE: 63 MMHG

## 2019-02-19 VITALS — DIASTOLIC BLOOD PRESSURE: 66 MMHG | SYSTOLIC BLOOD PRESSURE: 147 MMHG

## 2019-02-19 LAB
ADD MANUAL DIFF: NO
ANION GAP SERPL CALC-SCNC: 9 MMOL/L (ref 5–15)
BUN SERPL-MCNC: 24 MG/DL (ref 7–18)
CALCIUM SERPL-MCNC: 7.8 MG/DL (ref 8.5–10.1)
CHLORIDE SERPL-SCNC: 116 MMOL/L (ref 98–107)
CO2 SERPL-SCNC: 19 MMOL/L (ref 21–32)
CREAT SERPL-MCNC: 0.7 MG/DL (ref 0.55–1.3)
ERYTHROCYTE [DISTWIDTH] IN BLOOD BY AUTOMATED COUNT: 19.9 % (ref 11.6–14.8)
HCT VFR BLD CALC: 30.8 % (ref 37–47)
HGB BLD-MCNC: 9.6 G/DL (ref 12–16)
MCV RBC AUTO: 91 FL (ref 80–99)
PLATELET # BLD: 87 K/UL (ref 150–450)
POTASSIUM SERPL-SCNC: 4.8 MMOL/L (ref 3.5–5.1)
RBC # BLD AUTO: 3.36 M/UL (ref 4.2–5.4)
SODIUM SERPL-SCNC: 144 MMOL/L (ref 136–145)
WBC # BLD AUTO: 7.4 K/UL (ref 4.8–10.8)

## 2019-02-19 RX ADMIN — ACETAMINOPHEN PRN MG: 160 SOLUTION ORAL at 18:46

## 2019-02-19 RX ADMIN — INSULIN ASPART SCH UNITS: 100 INJECTION, SOLUTION INTRAVENOUS; SUBCUTANEOUS at 23:57

## 2019-02-19 RX ADMIN — ACETAMINOPHEN PRN MG: 160 SOLUTION ORAL at 10:54

## 2019-02-19 RX ADMIN — OLANZAPINE SCH MG: 2.5 TABLET, FILM COATED ORAL at 18:45

## 2019-02-19 RX ADMIN — INSULIN ASPART SCH UNITS: 100 INJECTION, SOLUTION INTRAVENOUS; SUBCUTANEOUS at 12:50

## 2019-02-19 RX ADMIN — DAPTOMYCIN SCH MLS/HR: 500 INJECTION, POWDER, LYOPHILIZED, FOR SOLUTION INTRAVENOUS at 18:46

## 2019-02-19 RX ADMIN — OLANZAPINE SCH MG: 2.5 TABLET, FILM COATED ORAL at 08:43

## 2019-02-19 RX ADMIN — INSULIN ASPART SCH UNITS: 100 INJECTION, SOLUTION INTRAVENOUS; SUBCUTANEOUS at 18:50

## 2019-02-19 RX ADMIN — INSULIN ASPART SCH UNITS: 100 INJECTION, SOLUTION INTRAVENOUS; SUBCUTANEOUS at 05:51

## 2019-02-19 RX ADMIN — DEXTROSE MONOHYDRATE SCH MLS/HR: 50 INJECTION, SOLUTION INTRAVENOUS at 21:33

## 2019-02-19 RX ADMIN — DEXTROSE MONOHYDRATE SCH MLS/HR: 50 INJECTION, SOLUTION INTRAVENOUS at 08:43

## 2019-02-19 NOTE — NUR
Social Service Note



SW has left multiple messages for patient's dgt Shoshana [phone], with no return call.  
Will monitor and discussed with primary nurse if dgt calls to inform ROBBIE.

## 2019-02-19 NOTE — NUR
NURSE NOTES:

Left message for Dr Tong regarding MARTHA scheduled for this morning. Patient was unable to 
have MARTHA done this morning. Patient is on 100% non-rebreather mask at this time. Also 
notified cardiology that Dr Tong has been notified. Cardiology reported that when Dr Nguyen was notified by them, he reported that he would also prefer not to do the MARTHA this 
morning due to the patient's respiratory instability.

## 2019-02-19 NOTE — DIAGNOSTIC IMAGING REPORT
Indication: Shortness of breath

 

Technique: One view of the chest

 

Comparison: 2/10/2019

 

Findings: Bilateral pacemakers are again demonstrated. There is increased, now large,

pleural effusion on the right. Hazy right-sided parenchymal disease persists. There

is a small amount of pleural fluid on the left. The left lung is otherwise clear.

Nasogastric tube again demonstrated. The heart remains enlarged.

 

Impression: Increased and now large right-sided pleural effusion, compared to prior

study of 2/10/2019. Note that a large pleural effusion is evident on recent CT scan

of 5 days earlier

 

Otherwise stable findings as described

## 2019-02-19 NOTE — GENERAL PROGRESS NOTE
Assessment/Plan


Assessment/Plan








ASSESSMENT/RECS:





1. Pancytopenia with thrombocytopenia that is severe is most likely related to 

Sepsis, lactic acidosis. In addition has received heparin. US abdomen ordered 

and shows no spleen and no cirrhosis


--> heparin discontinued


--> HIT ab test reviewed


--> venous duplex negative


--> smear peripheral has been reviewed and negative for abnml cells


--> tumor markers as needed/prn


--> hold off on transfusion unless plt <20k


--> hold off on steriods


--> plt trend: 34--> 50k


2. Failure to thrive with severe protein calorie malnutrition.


->> calorie counts daily


--> consider mirtazapine as needed per pcp


--> getting ngtfs


3. Anemia of chronic disease


--> panel has been reviewed


--> transfuse if hgb <7


4. Leukocytosis


--> on abx as per id for infection


5. Hypertension.


6. Sick sinus syndrome.


7. Hypercholesterolemia.


8. Aortic stenosis.


9. Pulmonary hypertension.


10. Alzheimer dementia.


11.  Acute tubular necrosis/acute kidney injury most likely secondary to 

dehydration and sepsis.





Greatly appreciate consultation!





Subjective


Constitutional:  Denies: no symptoms, chills, diaphoresis, fever, malaise, 

weakness, other


HEENT:  Denies: no symptoms, eye pain, blurred vision, tearing, double vision, 

ear pain, ear discharge, nose pain, nose congestion, throat pain, throat 

swelling, mouth pain, mouth swelling, other


Cardiovascular:  Denies: no symptoms, chest pain, edema, irregular heart rate, 

lightheadedness, palpitations, syncope, other


Respiratory:  Denies: no symptoms, cough, orthopnea, shortness of breath, SOB 

with excertion, SOB at rest, sputum, stridor, wheezing, other


Gastrointestinal/Abdominal:  Denies: no symptoms, abdomen distended, abdominal 

pain, black stools, tarry stools, blood in stool, constipated, diarrhea, 

difficulty swallowing, nausea, poor appetite, poor fluid intake, rectal bleeding

, vomiting, other


Genitourinary:  Denies: no symptoms, burning, discharge, frequency, flank pain, 

hematuria, incontinence, pain, urgency, other


Neurologic/Psychiatric:  Denies: no symptoms, anxiety, depressed, emotional 

problems, headache, numbness, paresthesia, pre-existing deficit, seizure, 

tingling, tremors, weakness, other


Endocrine:  Denies: no symptoms, excessive sweating, flushing, intolerance to 

cold, intolerance to heat, increased hunger, increased thirst, increased urine, 

unexplained weight gain, unexplained weight loss, other


Hematologic/Lymphatic:  Denies: no symptoms, anemia, easy bleeding, easy 

bruising, other


Allergies:  


Coded Allergies:  


     No Known Allergies (Unverified , 3/26/14)


Subjective


2/14:seen by bedside, Possible EGD tomorrow to evaluate upper GI bleed, today's 

EGD was canceled by anesthesia, plt remains low at 34, hgb trending up . 


2/15: Scheduled for EGD today,CT w/ cavitary lung lessions; placed on isolation

, remains bacteremic, wbc 12.


2/17: continues to be somewhat sob, imaging reviewed, labs are better this am, 

plts uptrending, getting ngtfs


2/18: awake, comfortable, no events


2/19: Pt is confused, screaming, Left upper extremity ecchymosis, edema. no 

abscess,ID following, plt trending up.





Objective





Last 24 Hour Vital Signs








  Date Time  Temp Pulse Resp B/P (MAP) Pulse Ox O2 Delivery O2 Flow Rate FiO2


 


2/19/19 20:00  70      


 


2/19/19 20:00      Non-Rebreather 15.0 


 


2/19/19 16:00 98.1 68 22 113/55 (74) 99   


 


2/19/19 16:00      Non-Rebreather 15.0 


 


2/19/19 16:00  83      


 


2/19/19 12:00 97.9 107 24 153/71 (98) 99   


 


2/19/19 12:00      Non-Rebreather 15.0 


 


2/19/19 11:58  102      


 


2/19/19 09:19  81 20   Non-Rebreather 15.0 


 


2/19/19 09:19      Non-Rebreather 15.0 100


 


2/19/19 09:19     93 Non-Rebreather 15.0 


 


2/19/19 08:00 97.9 90 22 158/81 (106) 100   


 


2/19/19 07:57      Non-Rebreather 15.0 


 


2/19/19 07:44  75      


 


2/19/19 04:00      Non-Rebreather 15.0 


 


2/19/19 04:00 98.4 86 19 147/66 (93) 100   


 


2/19/19 03:20  79      


 


2/19/19 00:04  78      


 


2/19/19 00:00      Non-Rebreather 15.0 


 


2/19/19 00:00 97.7 80 20 129/63 (85) 99   

















Intake and Output  


 


 2/18/19 2/19/19





 18:59 06:59


 


Intake Total 160 ml 770 ml


 


Output Total 600 ml 


 


Balance -440 ml 770 ml


 


  


 


IV Total  110 ml


 


Tube Feeding 110 ml 660 ml


 


Other 50 ml 


 


Output Urine Total 600 ml 


 


# Voids 3 


 


# Bowel Movements 2 2








Laboratory Tests


2/19/19 04:15: 


White Blood Count 7.4, Red Blood Count 3.36L, Hemoglobin 9.6L, Hematocrit 30.8L

, Mean Corpuscular Volume 91, Mean Corpuscular Hemoglobin 28.6, Mean 

Corpuscular Hemoglobin Concent 31.3L, Red Cell Distribution Width 19.9H, 

Platelet Count 87L, Mean Platelet Volume 10.1, Neutrophils (%) (Auto) , 

Lymphocytes (%) (Auto) , Monocytes (%) (Auto) , Eosinophils (%) (Auto) , 

Basophils (%) (Auto) , Sodium Level 144, Potassium Level 4.8, Chloride Level 

116H, Carbon Dioxide Level 19L, Anion Gap 9, Blood Urea Nitrogen 24H, 

Creatinine 0.7, Estimat Glomerular Filtration Rate , Glucose Level 205H, 

Calcium Level 7.8L


Height (Feet):  5


Height (Inches):  5.00


Weight (Pounds):  144


Objective





GENERAL: Awake responsive to deep stimuli with opening of her eyes, in no 

apparent distress.


HEENT:  Eyes, pupils equal responsive to light and accommodation. ++ ngtf


CHEST: Poor inspiratory effort, decreased air in the bases no wheezes or 

rhonchi was appreciated


CARDIOVASCULAR:  Regular rhythm and rate.  S1 and S2 are normal without murmurs 

or gallops.


ABDOMEN:  Soft, nontender, and nondistended.  Positive bowel sounds.  No 

rebounding or guarding noted.


EXTREMITIES:  Negative for clubbing, cyanosis, or edema, muscle atrophy in 

bilateral lower extremity


RECTAL/GENITAL:  Not performed.


NEUROLOGIC:somewhat responsive, better than before











Darian Topete MD Feb 19, 2019 21:33

## 2019-02-19 NOTE — NUR
NURSE NOTES:

Arterial and venous duplex of the left upper arm ordered STAT this morning. Called vascular 
lab to verify that they are on their way. Will follow up and assist in holding the patient's 
arm for the test if needed.

## 2019-02-19 NOTE — NUR
NURSE NOTES:

Received patient from ESCOBAR Russell. Patient VS stable at this time. patient confused at this 
time. Patient alert to name but confused about everything else. Patient does not respond to 
questions at this time. Patients eyes open. Patient yells out but does not form sentences at 
this time. Patient on nonrebreather mask at 15L at this time. Patient tolerating mask with 
oxygen saturation of 99% at this time. Patient has a left nares nasogastric tube that is 
patent, asymptomatic, and running Glucerna 1.2 at 55mL/hr at this time. Patient has a 
purewick that is patent and draining at this time. Patient has a sacral stage 3 pressure 
ulcer. Will monitor. Patient is on a pressure release mattress at this time. Patient has 
bilateral heal DTI. Patient will be turned every 2 hours and as needed. Patient has a left 
upper arm midline that is patent, asymptomatic, and saline locked at this time. Patient has 
a right hand 22G PIV and a left thumb 24G PIV that are both patent and asymptomatic at this 
time. Patient bed in low position with bed alarm on and call light in reach at this time. 
Patient's blood sugar dropped yesterday around 6pm. Dr Junior is aware. Patient's blood 
sugar was stable throughout the night. Patient has an order for EGF and MARTHA, but the patient 
has not been stable enough for these tests to be performed. Will follow up and continue to 
monitor at this time. Patient bed in low position with bed alarm on and call light in reach 
at this time. 

-------------------------------------------------------------------------------

Addendum: 02/19/19 at 1104 by Mervat Yoo RN

-------------------------------------------------------------------------------

Patient's left upper arm is red and swollen at this time. This was not endorsed to me. Will 
follow up with MD to ensure that they are aware.

## 2019-02-19 NOTE — NUR
RD ASSESSMENT & RECOMMENDATIONS

SEE CARE ACTIVITY FOR COMPLETE ASSESSMENT



DAILY ESTIMATED NEEDS:

Needs based on Wound, DM, wasting, sepsis / 55kg 

30-35  kcals/kg 

[id number]  total kcals

1.25-2  g protein/kg

  g total protein 

25-30  mL/kg

[id number]  total fluid mLs



NUTRITION DIAGNOSIS:

* Increased kcal/prot needs R/T wound healing as evidenced by pt w/

stage 3 sacral wound per MD

* Swallowing difficulty R/T dysphagia, respiratory status as evidenced by

per SLP, pt at high risk for silent aspiration, rec for nonoral feedings,

on NGT feeding.





CURRENT TF:Glucerna 1.2 @ 55ml/hr x 24 hrs 





ENTERAL NUTRITION RECOMMENDATIONS:

Glucerna 1.2 @ 60ml/hr x 24 hrs  

to provide 1440ml, 1728kcal, 86g prot, 1159ml free water 



- INCREASE GOAL TO 60ml/hr to better meet est needs

- Flush per MD/ HOB over 30 degrees







ADDITIONAL RECOMMENDATIONS:

* RE-calibrate bedscale for accurate CBW (w/ added WC mattress) 

* Wound healing: Vit C 500mg BID, Harjeet 1pkt BID 

                             + ZnSO4 220mg dailyx 10 days  

* Lytes daily, replete as needed  

. 
## 2019-02-19 NOTE — NUR
Dr Tong called back. I told her that we were not able to do the MARTHA this morning due to 
the patient being unstable, and she reported "If the patient needs MARTHA, I will do the MARTHA."

## 2019-02-19 NOTE — NUR
NURSE NOTES:

Cardiology and GI lab called to confirm MARTHA scheduled for today. Patient is not NPO at this 
time. It was endorsed to me that the patient is not stable enough for MARTHA this morning. Will 
follow up with Dr Nguyen.

## 2019-02-19 NOTE — NUR
NURSE NOTES:

A nurse that was assisting this patient's nurse yesterday reported that this patient's arm 
was swollen yesterday as well.

## 2019-02-19 NOTE — NUR
NURSE NOTES:

Report received from ESCOBAR Crowell. Pt A/Ox1. Bahamian speaking. Cardiac monitor shows SR. Pt on 
a non-rebreather mask 15 litera @100%. Tolerating settings well. Pt has an NGT in place 
located in the left nare with glucerna 1.2 running at 55 ml/hr. Purewick present and working 
well. Accuchecks to be done q6h. Multiple wounds present. Pt has swelling and redness 
located on AKIN, painful to touch. Pt has a DOMINIC midline present asymptomatic and patent. Pt 
scheduled to have CT w/contrast on arm but no consent has been signed yet. MARTHA and EGD also 
scheduled but pt unable to tolerate as pt on non-rebreather mask. Will continue to monitor 
and with patients plan of care.

## 2019-02-19 NOTE — GENERAL PROGRESS NOTE
Assessment/Plan


Problem List:  


(1) Acute encephalopathy


Assessment & Plan:  metabolic


ICD Codes:  G93.40 - Encephalopathy, unspecified


SNOMED:  65892025, [id number]


(2) Delirium


ICD Codes:  R41.0 - Disorientation, unspecified


SNOMED:  6298419


(3) Alzheimer's dementia


ICD Codes:  G30.9 - Alzheimer's disease, unspecified; F02.80 - Dementia in 

other diseases classified elsewhere without behavioral disturbance


SNOMED:  26268954


Assessment/Plan


Zyprexa 


the pt lacks capacity


soft restraints if needed





Subjective


Allergies:  


Coded Allergies:  


     No Known Allergies (Unverified , 3/26/14)


Subjective


cont to be agitated 


waxing and waning 


stable





Objective





Last 24 Hour Vital Signs








  Date Time  Temp Pulse Resp B/P (MAP) Pulse Ox O2 Delivery O2 Flow Rate FiO2


 


2/19/19 09:19  81 20   Non-Rebreather 15.0 


 


2/19/19 09:19      Non-Rebreather 15.0 100


 


2/19/19 09:19     93 Non-Rebreather 15.0 


 


2/19/19 08:00 97.9 90 22 158/81 (106) 100   


 


2/19/19 07:57      Non-Rebreather 15.0 


 


2/19/19 07:44  75      


 


2/19/19 04:00      Non-Rebreather 15.0 


 


2/19/19 04:00 98.4 86 19 147/66 (93) 100   


 


2/19/19 03:20  79      


 


2/19/19 00:04  78      


 


2/19/19 00:00      Non-Rebreather 15.0 


 


2/19/19 00:00 97.7 80 20 129/63 (85) 99   


 


2/18/19 20:00      Non-Rebreather 15.0 


 


2/18/19 20:00 98.2 73 18 115/57 (76) 100   


 


2/18/19 20:00  81      


 


2/18/19 16:00 98.6 89 26 102/83 (89) 100   


 


2/18/19 16:00      Non-Rebreather 15.0 


 


2/18/19 16:00  74      

















Intake and Output  


 


 2/18/19 2/19/19





 19:00 07:00


 


Intake Total 270 ml 715 ml


 


Output Total 600 ml 


 


Balance -330 ml 715 ml


 


  


 


IV Total 55 ml 55 ml


 


Tube Feeding 165 ml 660 ml


 


Other 50 ml 


 


Output Urine Total 600 ml 


 


# Voids 3 


 


# Bowel Movements 2 2








Laboratory Tests


2/19/19 04:15: 


White Blood Count 7.4, Red Blood Count 3.36L, Hemoglobin 9.6L, Hematocrit 30.8L

, Mean Corpuscular Volume 91, Mean Corpuscular Hemoglobin 28.6, Mean 

Corpuscular Hemoglobin Concent 31.3L, Red Cell Distribution Width 19.9H, 

Platelet Count 87L, Mean Platelet Volume 10.1, Neutrophils (%) (Auto) , 

Lymphocytes (%) (Auto) , Monocytes (%) (Auto) , Eosinophils (%) (Auto) , 

Basophils (%) (Auto) , Sodium Level 144, Potassium Level 4.8, Chloride Level 

116H, Carbon Dioxide Level 19L, Anion Gap 9, Blood Urea Nitrogen 24H, 

Creatinine 0.7, Estimat Glomerular Filtration Rate , Glucose Level 205H, 

Calcium Level 7.8L


Height (Feet):  5


Height (Inches):  5.00


Weight (Pounds):  144


General Appearance:  no apparent distress, lethargic, confused, agitated











Josh Nunez MD Feb 19, 2019 12:56

## 2019-02-19 NOTE — INTERNAL MED PROGRESS NOTE
Subjective


Date of Service:  Feb 19, 2019


Physician Name


OrtizCheo


Attending Physician


Juan Carlos Magana MD





Current Medications








 Medications


  (Trade)  Dose


 Ordered  Sig/Violette


 Route


 PRN Reason  Start Time


 Stop Time Status Last Admin


Dose Admin


 


 Acetaminophen


  (Tylenol)  650 mg  Q4H  PRN


 NG


 Mild Pain/Temp > 100.5  2/18/19 16:30


 3/14/19 16:29  2/19/19 10:54


 


 


 Al Hydroxide/Mg


 Hydroxide


  (Mylanta)  15 ml  Q1H  PRN


 ORAL


 gi upset  2/19/19 07:45


 2/19/19 18:00   


 


 


 Atropine Sulfate


  (Atropine)  0.5 mg  Q5M  PRN


 IV


 bpm less than 45  2/19/19 07:45


 2/19/19 18:00   


 


 


 Ceftaroline


 Fosamil 400 mg/


 Sodium Chloride  55 ml @ 55


 mls/hr  Q12HR


 IV


   2/18/19 21:00


 2/23/19 08:59  2/19/19 08:43


 


 


 Clonidine HCl


  (Catapres Tab)  0.1 mg  Q8H  PRN


 NG


 SBP>160mmHg  2/18/19 16:30


 3/10/19 16:29   


 


 


 Daptomycin 600 mg/


 Sodium Chloride  55 ml @ 


 100 mls/hr  Q24H


 IV


   2/19/19 18:00


 2/26/19 17:59   


 


 


 Dextrose


  (Dextrose 50%)  25 ml  Q30M  PRN


 IV


 Hypoglycemia  2/18/19 16:45


 3/9/19 13:30   


 


 


 Dextrose


  (Dextrose 50%)  50 ml  Q30M  PRN


 IV


 hypoglycemia  2/18/19 16:45


 3/9/19 13:44  2/18/19 19:28


 


 


 Diphenhydramine


 HCl


  (Benadryl)  25 mg  Q15M  PRN


 IVP


 Itching  2/19/19 07:45


 2/19/19 18:00   


 


 


 Fentanyl Citrate


  (Sublimaze 100


 mcg/2 mL)  25 mcg  Q10M  PRN


 IV


 Moderate Pain (Pain Scale 4-6)  2/19/19 07:45


 2/19/19 18:00   


 


 


 Hydralazine HCl


  (Apresoline)  5 mg  Q30M  PRN


 IV


 SBP>160 OR___/DBP>90 OR___  2/19/19 07:45


 2/19/19 18:00   


 


 


 Insulin Aspart


  (NovoLOG)    EVERY 6  HOURS


 SUBQ


   2/18/19 18:00


 3/9/19 16:29  2/19/19 12:50


 


 


 Lansoprazole


  (Prevacid)  30 mg  DAILY


 NG


   2/19/19 09:00


 3/18/19 08:59  2/19/19 08:43


 


 


 Midazolam HCl


  (Versed 2mg/2ml


 vial)  1 mg  Q15M  PRN


 IVP


 For Anxiety  2/19/19 07:45


 2/19/19 18:00   


 


 


 Olanzapine


  (ZyPREXA)  2.5 mg  BID


 NG


   2/18/19 18:00


 3/10/19 10:59  2/19/19 08:43


 


 


 Ondansetron HCl


  (Zofran)  4 mg  Q1H  PRN


 IVP


 Nausea & Vomiting  2/19/19 07:45


 2/19/19 18:00   


 


 


 Ondansetron HCl


  (Zofran)  4 mg  Q6H  PRN


 IVP


 Nausea & Vomiting  2/18/19 16:30


 3/10/19 16:29   


 








Allergies:  


Coded Allergies:  


     No Known Allergies (Unverified , 3/26/14)


ROS Limited/Unobtainable:  Yes


Subjective


79 YO F admitted with dehydration and hypoxia.  Now sepsis.  Cover for Int Med-

Dr Magana.  Continues on non rebreather mask.  FAWN





Objective





Last Vital Signs








  Date Time  Temp Pulse Resp B/P (MAP) Pulse Ox O2 Delivery O2 Flow Rate FiO2


 


2/19/19 11:58  102      


 


2/19/19 09:19   20   Non-Rebreather 15.0 


 


2/19/19 09:19        100


 


2/19/19 09:19     93   


 


2/19/19 08:00 97.9   158/81 (106)    











Laboratory Tests








Test


  2/19/19


04:15


 


White Blood Count


  7.4 K/UL


(4.8-10.8)


 


Red Blood Count


  3.36 M/UL


(4.20-5.40)  L


 


Hemoglobin


  9.6 G/DL


(12.0-16.0)  L


 


Hematocrit


  30.8 %


(37.0-47.0)  L


 


Mean Corpuscular Volume 91 FL (80-99)  


 


Mean Corpuscular Hemoglobin


  28.6 PG


(27.0-31.0)


 


Mean Corpuscular Hemoglobin


Concent 31.3 G/DL


(32.0-36.0)  L


 


Red Cell Distribution Width


  19.9 %


(11.6-14.8)  H


 


Platelet Count


  87 K/UL


(150-450)  L


 


Mean Platelet Volume


  10.1 FL


(6.5-10.1)


 


Neutrophils (%) (Auto)


  % (45.0-75.0)


 


 


Lymphocytes (%) (Auto)


  % (20.0-45.0)


 


 


Monocytes (%) (Auto)  % (1.0-10.0)  


 


Eosinophils (%) (Auto)  % (0.0-3.0)  


 


Basophils (%) (Auto)  % (0.0-2.0)  


 


Sodium Level


  144 MMOL/L


(136-145)


 


Potassium Level


  4.8 MMOL/L


(3.5-5.1)


 


Chloride Level


  116 MMOL/L


()  H


 


Carbon Dioxide Level


  19 MMOL/L


(21-32)  L


 


Anion Gap


  9 mmol/L


(5-15)


 


Blood Urea Nitrogen


  24 mg/dL


(7-18)  H


 


Creatinine


  0.7 MG/DL


(0.55-1.30)


 


Estimat Glomerular Filtration


Rate  mL/min (>60)  


 


 


Glucose Level


  205 MG/DL


()  H


 


Calcium Level


  7.8 MG/DL


(8.5-10.1)  L











Microbiology








 Date/Time


Source Procedure


Growth Status


 


 


 2/18/19 07:30


Blood Blood Culture - Preliminary Resulted


 


 2/18/19 07:20


Blood Blood Culture - Preliminary Resulted





 2/18/19 03:25


Sputum AFB Specimen Processing Tissue - Final Resulted





 2/18/19 03:25


Sputum Acid Fast Bacilli Smear - Final Resulted


 


 2/18/19 03:25


Sputum Acid Fast Bacilli Culture


Pending Resulted





 2/18/19 03:25


Sputum Expectorated Gram Stain - Final Resulted


 


 2/18/19 03:25 Sputum Culture - Preliminary


Gram Negative Bacillus 1


Gram Negative Bacillus 2 Resulted





 2/17/19 06:52


Sputum AFB Specimen Processing Tissue - Final Resulted





 2/17/19 06:52


Sputum Acid Fast Bacilli Smear - Final Resulted


 


 2/17/19 06:52


Sputum Acid Fast Bacilli Culture


Pending Resulted

















Intake and Output  


 


 2/18/19 2/19/19





 19:00 07:00


 


Intake Total 270 ml 715 ml


 


Output Total 600 ml 


 


Balance -330 ml 715 ml


 


  


 


IV Total 55 ml 55 ml


 


Tube Feeding 165 ml 660 ml


 


Other 50 ml 


 


Output Urine Total 600 ml 


 


# Voids 3 


 


# Bowel Movements 2 2








Objective


Objective


General: No acute distress, awake and less responsive, Sleepy, cachectic.


HEENT: NCAT, sclera anicteric, PERRL, NG tube.


Neck: Supple, no significant jugular venous distention, 


Lungs: Non-rebreather mask; Poor inspiratory effort, decreased air in the bases 

, Bilateral Wheeze and Rales.


Heart: Regular rate and rhythm, normal S1/S2, no murmur.


Abdomen: soft, nontender, nondistended. Normoactive bowel sounds.


 / Rectal: Refused and deferred.


Extremities: No Cyanosis , No clubbing,  Left UE edema.  Lateral lower 

extremity / feet dressing intact.


Neuro: A&O x 1, Able to move all extremities


Skin: warm, no rashes or lesions, ecchymosis in bilateral upper extremity noted.





Assessment/Plan


1. Sepsis /  METHACILLIN RESISTANT STAPHYLOCOCCUS AUREUS bacteremia, lactic 

acidosis.


2. Failure to thrive with severe protein calorie malnutrition.


3. Coronary artery disease.


4. Diabetes, type 2.


5. Hypertension.


6. Sick sinus syndrome.


7. Hypercholesterolemia.


8. Aortic stenosis.


9. Pulmonary hypertension.


10. Alzheimer dementia.


11.  Acute tubular necrosis/acute kidney injury most likely secondary to 

dehydration and sepsis.


12. Sacral decubitus ulcer, stage III


13. Severe dehydration


14.  Hypokalemia.


15.  Abnormal liver function test


16.  Anemia/thrombocytopenia


17.  Bilateral pneumonia ?septic emboli?





TREATMENT:


1.  Patient on broad spectrum antibiotics with vancomycin and Zosyn IV.  Will 

require transesophageal echocardiogram to R/O endocarditis-see ID note


2. Coronary artery disease.  The patient is status post coronary artery


bypass graft in 2017.


3. Diabetes, type 2.  The patient has been started on NovoLog sliding


scale.  


4. Hypertension.  The patient is to continue on atenolol and losartan as


above.


5. Sick sinus syndrome.  The patient is status post pacemaker


implantation.


6. Hypercholesterolemia.  Continue simvastatin as above.


7. Aortic stenosis.


8. Pulmonary hypertension.


9. Alzheimer dementia.


10.  Anemia/thrombocytopenia-S/P 1 unit PRBC on 2/11/19.  Heme/Onc consult.  

Hold heparin


11.  Septic pulmonary emboli-await MARTHA on Tuesday 2/19/19-see cardiology note-

needs NG removed for MARTHA-Discussed with Dr Tong





Restart tube feeding at rate of 45 cc/hr.


CODE STATUS is full code as per POLST,


DVT prophylaxis: D/C Heparin subcu due to thrombocytopenia











Cheo Ortiz MD Feb 19, 2019 15:10

## 2019-02-19 NOTE — GI PROGRESS NOTE
Assessment/Plan


Problems:  


(1) Severe anemia


ICD Codes:  D64.9 - Anemia, unspecified


SNOMED:  706694889


(2) Protein-calorie malnutrition, severe


ICD Codes:  E43 - Unspecified severe protein-calorie malnutrition


SNOMED:  988761770


(3) Rectal bleed


ICD Codes:  K62.5 - Hemorrhage of anus and rectum


SNOMED:  92869994


(4) Upper GI bleed


ICD Codes:  K92.2 - Gastrointestinal hemorrhage, unspecified


SNOMED:  97032258


(5) Severe malnutrition


ICD Codes:  E43 - Unspecified severe protein-calorie malnutrition


SNOMED:  47962534


(6) Weakness


ICD Codes:  R53.1 - Weakness


SNOMED:  88476420


(7) Dysphasia


ICD Codes:  R47.02 - Dysphasia


SNOMED:  03015535


(8) Dehydration


ICD Codes:  E86.0 - Dehydration


SNOMED:  93886015


(9) Acute encephalopathy


ICD Codes:  G93.40 - Encephalopathy, unspecified


SNOMED:  32472903, 370588030


(10) Sepsis


ICD Codes:  A41.9 - Sepsis, unspecified organism


SNOMED:  31780694


(11) Diabetes mellitus, type II


ICD Codes:  E11.9 - Type 2 diabetes mellitus without complications


SNOMED:  05428240


(12) Colitis


ICD Codes:  K52.9 - Noninfective gastroenteritis and colitis, unspecified


SNOMED:  18732376


Status:  unchanged


Status Narrative


Discussed with Dr. Moreno


Assessment/Plan


On airborne isolation


EGD on hold


per cardiology recs, pending MARTHA to rule out endocarditis


on ppi


stool ob positive>> monitor H&H and transfuse as needed


NGTF


repeat labs





The patient was seen and examined at bedside and all new and available data was 

reviewed in the patients chart. I agree with the above findings, impression 

and plan.  (Patient seen earlier today. Signature stamp does not reflect 

patient encounter time.). - Cal Moreno MD





Subjective


Subjective


Limited





Objective





Last 24 Hour Vital Signs








  Date Time  Temp Pulse Resp B/P (MAP) Pulse Ox O2 Delivery O2 Flow Rate FiO2


 


2/19/19 09:19  81 20   Non-Rebreather 15.0 


 


2/19/19 09:19      Non-Rebreather 15.0 100


 


2/19/19 09:19     93 Non-Rebreather 15.0 


 


2/19/19 08:00 97.9 90 22 158/81 (106) 100   


 


2/19/19 07:57      Non-Rebreather 15.0 


 


2/19/19 07:44  75      


 


2/19/19 04:00      Non-Rebreather 15.0 


 


2/19/19 04:00 98.4 86 19 147/66 (93) 100   


 


2/19/19 03:20  79      


 


2/19/19 00:04  78      


 


2/19/19 00:00      Non-Rebreather 15.0 


 


2/19/19 00:00 97.7 80 20 129/63 (85) 99   


 


2/18/19 20:00      Non-Rebreather 15.0 


 


2/18/19 20:00 98.2 73 18 115/57 (76) 100   


 


2/18/19 20:00  81      


 


2/18/19 16:00 98.6 89 26 102/83 (89) 100   


 


2/18/19 16:00      Non-Rebreather 15.0 


 


2/18/19 16:00  74      

















Intake and Output  


 


 2/18/19 2/19/19





 19:00 07:00


 


Intake Total 270 ml 715 ml


 


Output Total 600 ml 


 


Balance -330 ml 715 ml


 


  


 


IV Total 55 ml 55 ml


 


Tube Feeding 165 ml 660 ml


 


Other 50 ml 


 


Output Urine Total 600 ml 


 


# Voids 3 


 


# Bowel Movements 2 2











Laboratory Tests








Test


  2/19/19


04:15


 


White Blood Count


  7.4 K/UL


(4.8-10.8)


 


Red Blood Count


  3.36 M/UL


(4.20-5.40)  L


 


Hemoglobin


  9.6 G/DL


(12.0-16.0)  L


 


Hematocrit


  30.8 %


(37.0-47.0)  L


 


Mean Corpuscular Volume 91 FL (80-99)  


 


Mean Corpuscular Hemoglobin


  28.6 PG


(27.0-31.0)


 


Mean Corpuscular Hemoglobin


Concent 31.3 G/DL


(32.0-36.0)  L


 


Red Cell Distribution Width


  19.9 %


(11.6-14.8)  H


 


Platelet Count


  87 K/UL


(150-450)  L


 


Mean Platelet Volume


  10.1 FL


(6.5-10.1)


 


Neutrophils (%) (Auto)


  % (45.0-75.0)


 


 


Lymphocytes (%) (Auto)


  % (20.0-45.0)


 


 


Monocytes (%) (Auto)  % (1.0-10.0)  


 


Eosinophils (%) (Auto)  % (0.0-3.0)  


 


Basophils (%) (Auto)  % (0.0-2.0)  


 


Sodium Level


  144 MMOL/L


(136-145)


 


Potassium Level


  4.8 MMOL/L


(3.5-5.1)


 


Chloride Level


  116 MMOL/L


()  H


 


Carbon Dioxide Level


  19 MMOL/L


(21-32)  L


 


Anion Gap


  9 mmol/L


(5-15)


 


Blood Urea Nitrogen


  24 mg/dL


(7-18)  H


 


Creatinine


  0.7 MG/DL


(0.55-1.30)


 


Estimat Glomerular Filtration


Rate  mL/min (>60)  


 


 


Glucose Level


  205 MG/DL


()  H


 


Calcium Level


  7.8 MG/DL


(8.5-10.1)  L








Height (Feet):  5


Height (Inches):  5.00


Weight (Pounds):  144


General Appearance:  WD/WN, no apparent distress, alert


Cardiovascular:  normal rate


Respiratory/Chest:  normal breath sounds, no respiratory distress, other - 

Nonrebreather


Abdominal Exam:  normal bowel sounds, non tender, soft, GT site - Clean dry and 

intact


Extremities:  non-tender











Colin Milton NP Feb 19, 2019 12:39

## 2019-02-19 NOTE — CARDIOLOGY PROGRESS NOTE
Assessment/Plan


Assessment/Plan


remains very ill, confused and agitated


her left arm erythema of concern, possible cellulitis, rule out DVT, ordered 

arterial and venous duplex stat, D/W Dr Ortiz





Subjective


Subjective


The patient is confused and screaming, but occasionally is responding to 

questions





Objective





Last 24 Hour Vital Signs








  Date Time  Temp Pulse Resp B/P (MAP) Pulse Ox O2 Delivery O2 Flow Rate FiO2


 


2/19/19 16:00 98.1 68 22 113/55 (74) 99   


 


2/19/19 16:00      Non-Rebreather 15.0 


 


2/19/19 16:00  83      


 


2/19/19 12:00 97.9 107 24 153/71 (98) 99   


 


2/19/19 12:00      Non-Rebreather 15.0 


 


2/19/19 11:58  102      


 


2/19/19 09:19  81 20   Non-Rebreather 15.0 


 


2/19/19 09:19      Non-Rebreather 15.0 100


 


2/19/19 09:19     93 Non-Rebreather 15.0 


 


2/19/19 08:00 97.9 90 22 158/81 (106) 100   


 


2/19/19 07:57      Non-Rebreather 15.0 


 


2/19/19 07:44  75      


 


2/19/19 04:00      Non-Rebreather 15.0 


 


2/19/19 04:00 98.4 86 19 147/66 (93) 100   


 


2/19/19 03:20  79      


 


2/19/19 00:04  78      


 


2/19/19 00:00      Non-Rebreather 15.0 


 


2/19/19 00:00 97.7 80 20 129/63 (85) 99   


 


2/18/19 20:00      Non-Rebreather 15.0 


 


2/18/19 20:00 98.2 73 18 115/57 (76) 100   


 


2/18/19 20:00  81      








General Appearance:  moderate distress


EENT:  PERRL/EOMI, other - NG tube


Neck:  no JVD


Rhythm:  ST


Cardiovascular:  regular rhythm, systolic murmur


Respiratory/Chest:  accessory muscle use, crackles/rales


Abdomen:  non tender


Extremities:  other - left arm is dark red, which is new development from 

yesterday


Pulses:  decreased: radial (L)


Neurologic:  disoriented











Intake and Output  


 


 2/18/19 2/19/19





 19:00 07:00


 


Intake Total 270 ml 715 ml


 


Output Total 600 ml 


 


Balance -330 ml 715 ml


 


  


 


IV Total 55 ml 55 ml


 


Tube Feeding 165 ml 660 ml


 


Other 50 ml 


 


Output Urine Total 600 ml 


 


# Voids 3 


 


# Bowel Movements 2 2











Laboratory Tests








Test


  2/19/19


04:15


 


White Blood Count


  7.4 K/UL


(4.8-10.8)


 


Red Blood Count


  3.36 M/UL


(4.20-5.40)  L


 


Hemoglobin


  9.6 G/DL


(12.0-16.0)  L


 


Hematocrit


  30.8 %


(37.0-47.0)  L


 


Mean Corpuscular Volume 91 FL (80-99)  


 


Mean Corpuscular Hemoglobin


  28.6 PG


(27.0-31.0)


 


Mean Corpuscular Hemoglobin


Concent 31.3 G/DL


(32.0-36.0)  L


 


Red Cell Distribution Width


  19.9 %


(11.6-14.8)  H


 


Platelet Count


  87 K/UL


(150-450)  L


 


Mean Platelet Volume


  10.1 FL


(6.5-10.1)


 


Neutrophils (%) (Auto)


  % (45.0-75.0)


 


 


Lymphocytes (%) (Auto)


  % (20.0-45.0)


 


 


Monocytes (%) (Auto)  % (1.0-10.0)  


 


Eosinophils (%) (Auto)  % (0.0-3.0)  


 


Basophils (%) (Auto)  % (0.0-2.0)  


 


Sodium Level


  144 MMOL/L


(136-145)


 


Potassium Level


  4.8 MMOL/L


(3.5-5.1)


 


Chloride Level


  116 MMOL/L


()  H


 


Carbon Dioxide Level


  19 MMOL/L


(21-32)  L


 


Anion Gap


  9 mmol/L


(5-15)


 


Blood Urea Nitrogen


  24 mg/dL


(7-18)  H


 


Creatinine


  0.7 MG/DL


(0.55-1.30)


 


Estimat Glomerular Filtration


Rate  mL/min (>60)  


 


 


Glucose Level


  205 MG/DL


()  H


 


Calcium Level


  7.8 MG/DL


(8.5-10.1)  L











Microbiology








 Date/Time


Source Procedure


Growth Status


 


 


 2/18/19 07:30


Blood Blood Culture - Preliminary Resulted


 


 2/18/19 07:20


Blood Blood Culture - Preliminary Resulted





 2/18/19 03:25


Sputum AFB Specimen Processing Tissue - Final Resulted





 2/18/19 03:25


Sputum Acid Fast Bacilli Smear - Final Resulted


 


 2/18/19 03:25


Sputum Acid Fast Bacilli Culture


Pending Resulted





 2/18/19 03:25


Sputum Expectorated Gram Stain - Final Resulted


 


 2/18/19 03:25 Sputum Culture - Preliminary


Gram Negative Bacillus 1


Gram Negative Bacillus 2 Resulted





 2/17/19 06:52


Sputum AFB Specimen Processing Tissue - Final Resulted





 2/17/19 06:52


Sputum Acid Fast Bacilli Smear - Final Resulted


 


 2/17/19 06:52


Sputum Acid Fast Bacilli Culture


Pending Resulted

















Lara Tong MD Feb 19, 2019 18:38

## 2019-02-19 NOTE — NUR
HAND-OFF: 

Report given to ESCOBAR Canela. Patient VS stable at this time with no sign of acute distress. 
patient has an order for CT of the left arm. Awaiting call back from daughter with consent 
for contrast at this time.

## 2019-02-19 NOTE — SURGERY PROGRESS NOTE
Surgery Progress Note


Subjective


Additional Comments


Left upper extremity ecchymosis.  small IV site ulceration at left hand dorsal 

aspect.  edema. no abscess.





Objective





Last 24 Hour Vital Signs








  Date Time  Temp Pulse Resp B/P (MAP) Pulse Ox O2 Delivery O2 Flow Rate FiO2


 


2/19/19 16:00 98.1 68 22 113/55 (74) 99   


 


2/19/19 16:00      Non-Rebreather 15.0 


 


2/19/19 16:00  83      


 


2/19/19 12:00 97.9 107 24 153/71 (98) 99   


 


2/19/19 12:00      Non-Rebreather 15.0 


 


2/19/19 11:58  102      


 


2/19/19 09:19  81 20   Non-Rebreather 15.0 


 


2/19/19 09:19      Non-Rebreather 15.0 100


 


2/19/19 09:19     93 Non-Rebreather 15.0 


 


2/19/19 08:00 97.9 90 22 158/81 (106) 100   


 


2/19/19 07:57      Non-Rebreather 15.0 


 


2/19/19 07:44  75      


 


2/19/19 04:00      Non-Rebreather 15.0 


 


2/19/19 04:00 98.4 86 19 147/66 (93) 100   


 


2/19/19 03:20  79      


 


2/19/19 00:04  78      


 


2/19/19 00:00      Non-Rebreather 15.0 


 


2/19/19 00:00 97.7 80 20 129/63 (85) 99   


 


2/18/19 20:00      Non-Rebreather 15.0 


 


2/18/19 20:00 98.2 73 18 115/57 (76) 100   


 


2/18/19 20:00  81      








I&O











Intake and Output  


 


 2/18/19 2/19/19





 19:00 07:00


 


Intake Total 270 ml 715 ml


 


Output Total 600 ml 


 


Balance -330 ml 715 ml


 


  


 


IV Total 55 ml 55 ml


 


Tube Feeding 165 ml 660 ml


 


Other 50 ml 


 


Output Urine Total 600 ml 


 


# Voids 3 


 


# Bowel Movements 2 2








Dressing:  other


Wound:  other


Drains:  other


Cardiovascular:  RSR


Respiratory:  clear


Abdomen:  soft, non-tender, present bowel sounds


Extremities:  other





Laboratory Tests








Test


  2/19/19


04:15


 


White Blood Count


  7.4 K/UL


(4.8-10.8)


 


Red Blood Count


  3.36 M/UL


(4.20-5.40)  L


 


Hemoglobin


  9.6 G/DL


(12.0-16.0)  L


 


Hematocrit


  30.8 %


(37.0-47.0)  L


 


Mean Corpuscular Volume 91 FL (80-99)  


 


Mean Corpuscular Hemoglobin


  28.6 PG


(27.0-31.0)


 


Mean Corpuscular Hemoglobin


Concent 31.3 G/DL


(32.0-36.0)  L


 


Red Cell Distribution Width


  19.9 %


(11.6-14.8)  H


 


Platelet Count


  87 K/UL


(150-450)  L


 


Mean Platelet Volume


  10.1 FL


(6.5-10.1)


 


Neutrophils (%) (Auto)


  % (45.0-75.0)


 


 


Lymphocytes (%) (Auto)


  % (20.0-45.0)


 


 


Monocytes (%) (Auto)  % (1.0-10.0)  


 


Eosinophils (%) (Auto)  % (0.0-3.0)  


 


Basophils (%) (Auto)  % (0.0-2.0)  


 


Sodium Level


  144 MMOL/L


(136-145)


 


Potassium Level


  4.8 MMOL/L


(3.5-5.1)


 


Chloride Level


  116 MMOL/L


()  H


 


Carbon Dioxide Level


  19 MMOL/L


(21-32)  L


 


Anion Gap


  9 mmol/L


(5-15)


 


Blood Urea Nitrogen


  24 mg/dL


(7-18)  H


 


Creatinine


  0.7 MG/DL


(0.55-1.30)


 


Estimat Glomerular Filtration


Rate  mL/min (>60)  


 


 


Glucose Level


  205 MG/DL


()  H


 


Calcium Level


  7.8 MG/DL


(8.5-10.1)  L











Plan


Problems:  


(1) Sepsis


Assessment & Plan:  Leukocytosis resolved 


LFT's elevated w/ t bili / d bili - improved 


AM labs ordered 


hydrate


US Impression: Limited exam, as described. Note inability to visualize the 

spleen Negative for gallstones or dilated bile ducts Questionable bilateral 

renal parapelvic cysts Small to moderate right pleural effusion


no acute surgical intervention planned. 


MARTHA for septic emboli


consider removing pacemaker as harboring sepsis source 


tube feeds as tolerated 


will follow with recs. 





(2) Sacral decubitus ulcer, stage III


Assessment & Plan:  DTPI sacrum.Maroon- indurated discoloration with opening at 

sacral coccygeal area(L)3.8cm x (W)1.4cm with entire wound measuring (L)10cmx(W)

12cm.Small amt malodorous brown exudate.


Stable dry eschar R heel with dry peeling skin periwound (L)2cm x (W)3.5cm. 

Periwound is also red and boggy. 


Small dry eschar noted to lateral R 5th metatarsal (L)1.4cm x (W)0.3cm.  


L heel is boggy but colour is dusky . 


Resolving skin tear lateral R tibia. Wound bed is clean and dry.  





Tx. Plan:


Cleanse Sacral wound with Saline.Apply Therahoney to opening at sacrococcygeal 

area.Cavilon skin barrier to DTPI. Cover with Optifoam drsg .Change Daily and 

prn.


            


Apply Betadine to R heel  and Lateral R 5th metatarsal.Cover with Abd pad .Wrap 

with kerlix daily and prn.


            


Apply Cavilon Skin Barrier to L heel.Cover with Optifoam drsg .Change every 7 

days and prn.


            


Reposition at least every 2hours or as tolerated.


            


Off-load heels with pillow.





(3) Rectal bleed


(4) Protein-calorie malnutrition, severe


(5) Dehydration


(6) IV infiltration


Assessment & Plan:  left hand IV infiltration prior and now with edema/

irritation noted + ecchymosis on hand and arm 


keep hand and arm elevated  


medication reaction and will need to be monitored for worsening 


poor tissue / protoplasm 














Tuan Patricia Feb 19, 2019 18:21

## 2019-02-19 NOTE — PULMONOLOGY PROGRESS NOTE
Assessment/Plan


Problems:  


(1) Sepsis


(2) Thrombocytopenia


(3) ATN (acute tubular necrosis)


(4) Pulmonary hypertension


(5) Sacral decubitus ulcer, stage III


(6) Alzheimer's dementia


(7) Protein-calorie malnutrition, severe


(8) HTN (hypertension)


(9) Pacemaker


(10) CAD (coronary artery disease)


Assessment/Plan


on NRM


/u PLT, better today up to 80


failed swallow study


Morphine prn


d/w wound care nurse, 


pan cultures, still positive


iv fluids


check electrolytes


sliding scale


check electrolytes daily


broad spectrum abx


dvt prophylaxis


insulin coverage.


social service consult


might need removal of pacemaker leads, d/W dr Rosas, Cardiology wants to wait 

until pt hemodynamically better to do an MARTHA to  find out if the they are 

infected.





Subjective


ROS Limited/Unobtainable:  No


Constitutional:  Reports: no symptoms


HEENT:  Repors: no symptoms


Respiratory:  Reports: no symptoms


Allergies:  


Coded Allergies:  


     No Known Allergies (Unverified , 3/26/14)





Objective





Last 24 Hour Vital Signs








  Date Time  Temp Pulse Resp B/P (MAP) Pulse Ox O2 Delivery O2 Flow Rate FiO2


 


2/19/19 09:19  81 20   Non-Rebreather 15.0 


 


2/19/19 09:19      Non-Rebreather 15.0 100


 


2/19/19 09:19     93 Non-Rebreather 15.0 


 


2/19/19 08:00 97.9 90 22 158/81 (106) 100   


 


2/19/19 07:57      Non-Rebreather 15.0 


 


2/19/19 07:44  75      


 


2/19/19 04:00      Non-Rebreather 15.0 


 


2/19/19 04:00 98.4 86 19 147/66 (93) 100   


 


2/19/19 03:20  79      


 


2/19/19 00:04  78      


 


2/19/19 00:00      Non-Rebreather 15.0 


 


2/19/19 00:00 97.7 80 20 129/63 (85) 99   


 


2/18/19 20:00      Non-Rebreather 15.0 


 


2/18/19 20:00 98.2 73 18 115/57 (76) 100   


 


2/18/19 20:00  81      


 


2/18/19 16:00 98.6 89 26 102/83 (89) 100   


 


2/18/19 16:00      Non-Rebreather 15.0 


 


2/18/19 16:00  74      


 


2/18/19 12:00 98.0 74 22 100/62 (75) 100   


 


2/18/19 12:00  79      

















Intake and Output  


 


 2/18/19 2/19/19





 19:00 07:00


 


Intake Total 270 ml 715 ml


 


Output Total 600 ml 


 


Balance -330 ml 715 ml


 


  


 


IV Total 55 ml 55 ml


 


Tube Feeding 165 ml 660 ml


 


Other 50 ml 


 


Output Urine Total 600 ml 


 


# Voids 3 


 


# Bowel Movements 2 2








Objective


General Appearance:  cachectic


HEENT:  normocephalic, somnolent


Respiratory/Chest:  chest wall non-tender,loud rhonchi


Cardiovascular:  normal peripheral pulses, normal rate


Abdomen:  normal bowel sounds, soft, non tender


Extremities:  no cyanosis


Skin:  no rash





Microbiology








 Date/Time


Source Procedure


Growth Status


 


 


 2/18/19 07:30


Blood Blood Culture - Preliminary Resulted


 


 2/18/19 07:20


Blood Blood Culture - Preliminary Resulted





 2/18/19 03:25


Sputum Expectorated Gram Stain - Final Resulted


 


 2/18/19 03:25 Sputum Culture - Preliminary


Gram Negative Bacillus 1


Gram Negative Bacillus 2 Resulted








Laboratory Tests


2/19/19 04:15: 


White Blood Count 7.4, Red Blood Count 3.36L, Hemoglobin 9.6L, Hematocrit 30.8L

, Mean Corpuscular Volume 91, Mean Corpuscular Hemoglobin 28.6, Mean 

Corpuscular Hemoglobin Concent 31.3L, Red Cell Distribution Width 19.9H, 

Platelet Count 87L, Mean Platelet Volume 10.1, Neutrophils (%) (Auto) , 

Lymphocytes (%) (Auto) , Monocytes (%) (Auto) , Eosinophils (%) (Auto) , 

Basophils (%) (Auto) , Sodium Level 144, Potassium Level 4.8, Chloride Level 

116H, Carbon Dioxide Level 19L, Anion Gap 9, Blood Urea Nitrogen 24H, 

Creatinine 0.7, Estimat Glomerular Filtration Rate , Glucose Level 205H, 

Calcium Level 7.8L





Current Medications








 Medications


  (Trade)  Dose


 Ordered  Sig/Violette


 Route


 PRN Reason  Start Time


 Stop Time Status Last Admin


Dose Admin


 


 Acetaminophen


  (Tylenol)  650 mg  Q4H  PRN


 NG


 Mild Pain/Temp > 100.5  2/18/19 16:30


 3/14/19 16:29  2/19/19 10:54


 


 


 Al Hydroxide/Mg


 Hydroxide


  (Mylanta)  15 ml  Q1H  PRN


 ORAL


 gi upset  2/19/19 07:45


 2/19/19 18:00   


 


 


 Atropine Sulfate


  (Atropine)  0.5 mg  Q5M  PRN


 IV


 bpm less than 45  2/19/19 07:45


 2/19/19 18:00   


 


 


 Ceftaroline


 Fosamil 400 mg/


 Sodium Chloride  55 ml @ 55


 mls/hr  Q12HR


 IV


   2/18/19 21:00


 2/23/19 08:59  2/19/19 08:43


 


 


 Clonidine HCl


  (Catapres Tab)  0.1 mg  Q8H  PRN


 NG


 SBP>160mmHg  2/18/19 16:30


 3/10/19 16:29   


 


 


 Daptomycin 400 mg/


 Sodium Chloride  55 ml @ 


 100 mls/hr  Q24H


 IV


   2/18/19 18:00


 2/20/19 17:59  2/18/19 18:30


 


 


 Dextrose


  (Dextrose 50%)  25 ml  Q30M  PRN


 IV


 Hypoglycemia  2/18/19 16:45


 3/9/19 13:30   


 


 


 Dextrose


  (Dextrose 50%)  50 ml  Q30M  PRN


 IV


 hypoglycemia  2/18/19 16:45


 3/9/19 13:44  2/18/19 19:28


 


 


 Diphenhydramine


 HCl


  (Benadryl)  25 mg  Q15M  PRN


 IVP


 Itching  2/19/19 07:45


 2/19/19 18:00   


 


 


 Fentanyl Citrate


  (Sublimaze 100


 mcg/2 mL)  25 mcg  Q10M  PRN


 IV


 Moderate Pain (Pain Scale 4-6)  2/19/19 07:45


 2/19/19 18:00   


 


 


 Hydralazine HCl


  (Apresoline)  5 mg  Q30M  PRN


 IV


 SBP>160 OR___/DBP>90 OR___  2/19/19 07:45


 2/19/19 18:00   


 


 


 Insulin Aspart


  (NovoLOG)    EVERY 6  HOURS


 SUBQ


   2/18/19 18:00


 3/9/19 16:29  2/19/19 05:51


 


 


 Lansoprazole


  (Prevacid)  30 mg  DAILY


 NG


   2/19/19 09:00


 3/18/19 08:59  2/19/19 08:43


 


 


 Midazolam HCl


  (Versed 2mg/2ml


 vial)  1 mg  Q15M  PRN


 IVP


 For Anxiety  2/19/19 07:45


 2/19/19 18:00   


 


 


 Olanzapine


  (ZyPREXA)  2.5 mg  BID


 NG


   2/18/19 18:00


 3/10/19 10:59  2/19/19 08:43


 


 


 Ondansetron HCl


  (Zofran)  4 mg  Q1H  PRN


 IVP


 Nausea & Vomiting  2/19/19 07:45


 2/19/19 18:00   


 


 


 Ondansetron HCl


  (Zofran)  4 mg  Q6H  PRN


 IVP


 Nausea & Vomiting  2/18/19 16:30


 3/10/19 16:29   


 

















Korin Junior MD Feb 19, 2019 11:10

## 2019-02-19 NOTE — NUR
NURSE NOTES:

Called family for consent for contrast for CT of the left arm. Left message. Awaiting call 
back.

## 2019-02-19 NOTE — ANETHESIA PREOPERATIVE EVAL
Anesthesia Pre-op PMH/ROS


General


Date of Evaluation:  Feb 19, 2019


Time of Evaluation:  07:46


Anesthesiologist:  luz


Mallampati Score


Class I : Soft palate, uvula, fauces, pillars visible


Class II: Soft palate, uvula, fauces visible


Class III: Soft palate, base of uvula visible


Class IV: Only hard plate visible


Surgeon:  shoaib lord


Diagnosis:  possible endocarditis


Surgical Procedure:  salomon


Allergies:  


Coded Allergies:  


     No Known Allergies (Unverified , 3/26/14)


NPO Date:  Feb 14, 2019


NPO Time:  00:00





Anesthesia Pre-op Phys. Exam


Physician Exam





Last Vital Signs








  Date Time  Temp Pulse Resp B/P (MAP) Pulse Ox O2 Delivery O2 Flow Rate FiO2


 


2/19/19 04:00      Non-Rebreather 15.0 


 


2/19/19 04:00 98.4 86 19 147/66 (93) 100   


 


2/17/19 08:11        100











Airway Exam


Mallampati Score:  Class II





Anesthesia Pre-op A/P


Labs





Hematology








Test


  2/19/19


04:15


 


White Blood Count


  7.4 K/UL


(4.8-10.8)


 


Red Blood Count


  3.36 M/UL


(4.20-5.40)  L


 


Hemoglobin


  9.6 G/DL


(12.0-16.0)  L


 


Hematocrit


  30.8 %


(37.0-47.0)  L


 


Mean Corpuscular Volume 91 FL (80-99)  


 


Mean Corpuscular Hemoglobin


  28.6 PG


(27.0-31.0)


 


Mean Corpuscular Hemoglobin


Concent 31.3 G/DL


(32.0-36.0)  L


 


Red Cell Distribution Width


  19.9 %


(11.6-14.8)  H


 


Platelet Count


  87 K/UL


(150-450)  L


 


Mean Platelet Volume


  10.1 FL


(6.5-10.1)


 


Neutrophils (%) (Auto)


  % (45.0-75.0)


 


 


Lymphocytes (%) (Auto)


  % (20.0-45.0)


 


 


Monocytes (%) (Auto)  % (1.0-10.0)  


 


Eosinophils (%) (Auto)  % (0.0-3.0)  


 


Basophils (%) (Auto)  % (0.0-2.0)  








Chemistry








Test


  2/19/19


04:15


 


Sodium Level


  144 MMOL/L


(136-145)


 


Potassium Level


  4.8 MMOL/L


(3.5-5.1)


 


Chloride Level


  116 MMOL/L


()  H


 


Carbon Dioxide Level


  19 MMOL/L


(21-32)  L


 


Anion Gap


  9 mmol/L


(5-15)


 


Blood Urea Nitrogen


  24 mg/dL


(7-18)  H


 


Creatinine


  0.7 MG/DL


(0.55-1.30)


 


Estimat Glomerular Filtration


Rate  mL/min (>60)  


 


 


Glucose Level


  205 MG/DL


()  H


 


Calcium Level


  7.8 MG/DL


(8.5-10.1)  L

















Zoraida Bragg MD Feb 19, 2019 07:52

## 2019-02-19 NOTE — INFECTIOUS DISEASES PROG NOTE
Assessment/Plan


Assessment/Plan


79 yo female with PMHx DM, CAD, Alzheimer dementia and Hip fracture who 

presents with dehydration and weakness. 





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 

until proven otherwise- r/o probable PPM infection- doubt TB


    -2/14 CT c/a/p w/: Interim development of multiple bilateral mostly upper 

lobe cavitary pulmonary parenchymal lesions. Given normal appearing chest 

radiograph on 2/7/2019, these are overwhelmingly likely infectious/inflammatory 

in nature. Most likely represent septic pulmonary emboli. The possibility of 

mycobacterial infection should also be included, deemed less likely given 

absence of adenopathy but still possible. Differential considerations also 

include fungal infections, noninfectious inflammatory processes. Other 

noncavitary nodules are also demonstrated, with the same differential. Large 

right and small-to-moderate left pleural effusions. Compressive atelectasis of 

the majority of the right lower lobe. Infiltrates seen in the right upper lobe. 

Bilateral pacemaker. Evidence of central venoocclusive disease. Wedge-shaped 

area of low-attenuation in the upper pole of the spleen, suspicious for infarct.


   -2d Echo (limited study due to contractures): no vegetations seen


    -2/10 Bcx 4/4MRSA; 2/11 Bcx 2/4 MRSA; 2/12 4/4  MRSA; 2/14 Bcx 4/4 MRSA


    2/8/19 Blood Cx  4/4 MRSA


    2/7/19 Wound Cx MRSA, ESBL E.coli (S ZOsyn), K. pna (S Zosyn)


   


 Leukocytosis, recurrent, increasing


  Low grade fever; improving


   Positive UA 


   CXR neg





Thrombocytopenia- HIT vs medication related


   


Pressure ulcers


  Not infected





Right hip fracture with hemiarthroplasty Oct 2018


DM


HTN


Sick sinus syndrome sp Pacemaker


HLD


CAD - SP MI and CABG


Aortic stenosis.


Pulmonary hypertension.


Alzheimer dementia.





PLAN





- Continue Daptomycin #7 (abx d #12) and Ceftaroline #5 given persistent MRSA 

Bacteremia


   - repeat 2 sets of Bcx daily until clearance of bacteremia


   -2/13 SP Vancomycin #6


   -2/13 SP Zosyn #4


   -2/10 SP Cefepime #3





-Will need MARTHA and removal of PPM for bacteremia clearance/source control if 

consistent with goals of  care


   -plan for MARTHA when more stable





-placed on airborne isolation


   -f/u AFB s/cx x3, MTB PCR x1


- f/u repeat cultures


- Monitor CBC and temps


- wound care


- Nutritional support


-f/u cocci, crAg, histo, blasto serologies


-CXR





Thank you for this consult. We will continue to follow the patient during this 

hospitalization.





Subjective


Allergies:  


Coded Allergies:  


     No Known Allergies (Unverified , 3/26/14)


Subjective


; afebrile


still bacteremic


afb sputum collection pending


on NRB





Objective


Vital Signs





Last 24 Hour Vital Signs








  Date Time  Temp Pulse Resp B/P (MAP) Pulse Ox O2 Delivery O2 Flow Rate FiO2


 


2/19/19 09:19  81 20   Non-Rebreather 15.0 


 


2/19/19 09:19      Non-Rebreather 15.0 100


 


2/19/19 09:19     93 Non-Rebreather 15.0 


 


2/19/19 08:00 97.9 90 22 158/81 (106) 100   


 


2/19/19 07:57      Non-Rebreather 15.0 


 


2/19/19 07:44  75      


 


2/19/19 04:00      Non-Rebreather 15.0 


 


2/19/19 04:00 98.4 86 19 147/66 (93) 100   


 


2/19/19 03:20  79      


 


2/19/19 00:04  78      


 


2/19/19 00:00      Non-Rebreather 15.0 


 


2/19/19 00:00 97.7 80 20 129/63 (85) 99   


 


2/18/19 20:00      Non-Rebreather 15.0 


 


2/18/19 20:00 98.2 73 18 115/57 (76) 100   


 


2/18/19 20:00  81      


 


2/18/19 16:00 98.6 89 26 102/83 (89) 100   


 


2/18/19 16:00      Non-Rebreather 15.0 


 


2/18/19 16:00  74      


 


2/18/19 12:00 98.0 74 22 100/62 (75) 100   


 


2/18/19 12:00  79      








Height (Feet):  5


Height (Inches):  5.00


Weight (Pounds):  144


Objective


Gen:  Moaning, Awake but no following


HEENT: NCAT, MMM, EOMI, PERRL


LUNGS:  CTAB, No W


CARDS: RRR, S1, S2, No M/R/G, 


ABD: Soft, NT, ND, + BS


Ext: Poor circulation to Ext (cool to touch) , Pulses 2+ B/L (DP, Rad): 


SKIN:  Dry, No rashes, Large sacral ulcer. Mild odor no surrounding cellulitis, 

foot with eschar





Microbiology








 Date/Time


Source Procedure


Growth Status


 


 


 2/18/19 07:30


Blood Blood Culture - Preliminary Resulted


 


 2/18/19 07:20


Blood Blood Culture - Preliminary Resulted





 2/18/19 03:25


Sputum Expectorated Gram Stain - Final Resulted


 


 2/18/19 03:25 Sputum Culture - Preliminary


Gram Negative Bacillus 1


Gram Negative Bacillus 2 Resulted











Laboratory Tests








Test


  2/19/19


04:15


 


White Blood Count


  7.4 K/UL


(4.8-10.8)


 


Red Blood Count


  3.36 M/UL


(4.20-5.40)  L


 


Hemoglobin


  9.6 G/DL


(12.0-16.0)  L


 


Hematocrit


  30.8 %


(37.0-47.0)  L


 


Mean Corpuscular Volume 91 FL (80-99)  


 


Mean Corpuscular Hemoglobin


  28.6 PG


(27.0-31.0)


 


Mean Corpuscular Hemoglobin


Concent 31.3 G/DL


(32.0-36.0)  L


 


Red Cell Distribution Width


  19.9 %


(11.6-14.8)  H


 


Platelet Count


  87 K/UL


(150-450)  L


 


Mean Platelet Volume


  10.1 FL


(6.5-10.1)


 


Neutrophils (%) (Auto)


  % (45.0-75.0)


 


 


Lymphocytes (%) (Auto)


  % (20.0-45.0)


 


 


Monocytes (%) (Auto)  % (1.0-10.0)  


 


Eosinophils (%) (Auto)  % (0.0-3.0)  


 


Basophils (%) (Auto)  % (0.0-2.0)  


 


Sodium Level


  144 MMOL/L


(136-145)


 


Potassium Level


  4.8 MMOL/L


(3.5-5.1)


 


Chloride Level


  116 MMOL/L


()  H


 


Carbon Dioxide Level


  19 MMOL/L


(21-32)  L


 


Anion Gap


  9 mmol/L


(5-15)


 


Blood Urea Nitrogen


  24 mg/dL


(7-18)  H


 


Creatinine


  0.7 MG/DL


(0.55-1.30)


 


Estimat Glomerular Filtration


Rate  mL/min (>60)  


 


 


Glucose Level


  205 MG/DL


()  H


 


Calcium Level


  7.8 MG/DL


(8.5-10.1)  L











Current Medications








 Medications


  (Trade)  Dose


 Ordered  Sig/Violette


 Route


 PRN Reason  Start Time


 Stop Time Status Last Admin


Dose Admin


 


 Acetaminophen


  (Tylenol)  650 mg  Q4H  PRN


 NG


 Mild Pain/Temp > 100.5  2/18/19 16:30


 3/14/19 16:29  2/19/19 10:54


 


 


 Al Hydroxide/Mg


 Hydroxide


  (Mylanta)  15 ml  Q1H  PRN


 ORAL


 gi upset  2/19/19 07:45


 2/19/19 18:00   


 


 


 Atropine Sulfate


  (Atropine)  0.5 mg  Q5M  PRN


 IV


 bpm less than 45  2/19/19 07:45


 2/19/19 18:00   


 


 


 Ceftaroline


 Fosamil 400 mg/


 Sodium Chloride  55 ml @ 55


 mls/hr  Q12HR


 IV


   2/18/19 21:00


 2/23/19 08:59  2/19/19 08:43


 


 


 Clonidine HCl


  (Catapres Tab)  0.1 mg  Q8H  PRN


 NG


 SBP>160mmHg  2/18/19 16:30


 3/10/19 16:29   


 


 


 Daptomycin 400 mg/


 Sodium Chloride  55 ml @ 


 100 mls/hr  Q24H


 IV


   2/18/19 18:00


 2/20/19 17:59  2/18/19 18:30


 


 


 Dextrose


  (Dextrose 50%)  25 ml  Q30M  PRN


 IV


 Hypoglycemia  2/18/19 16:45


 3/9/19 13:30   


 


 


 Dextrose


  (Dextrose 50%)  50 ml  Q30M  PRN


 IV


 hypoglycemia  2/18/19 16:45


 3/9/19 13:44  2/18/19 19:28


 


 


 Diphenhydramine


 HCl


  (Benadryl)  25 mg  Q15M  PRN


 IVP


 Itching  2/19/19 07:45


 2/19/19 18:00   


 


 


 Fentanyl Citrate


  (Sublimaze 100


 mcg/2 mL)  25 mcg  Q10M  PRN


 IV


 Moderate Pain (Pain Scale 4-6)  2/19/19 07:45


 2/19/19 18:00   


 


 


 Hydralazine HCl


  (Apresoline)  5 mg  Q30M  PRN


 IV


 SBP>160 OR___/DBP>90 OR___  2/19/19 07:45


 2/19/19 18:00   


 


 


 Insulin Aspart


  (NovoLOG)    EVERY 6  HOURS


 SUBQ


   2/18/19 18:00


 3/9/19 16:29  2/19/19 05:51


 


 


 Lansoprazole


  (Prevacid)  30 mg  DAILY


 NG


   2/19/19 09:00


 3/18/19 08:59  2/19/19 08:43


 


 


 Midazolam HCl


  (Versed 2mg/2ml


 vial)  1 mg  Q15M  PRN


 IVP


 For Anxiety  2/19/19 07:45


 2/19/19 18:00   


 


 


 Olanzapine


  (ZyPREXA)  2.5 mg  BID


 NG


   2/18/19 18:00


 3/10/19 10:59  2/19/19 08:43


 


 


 Ondansetron HCl


  (Zofran)  4 mg  Q1H  PRN


 IVP


 Nausea & Vomiting  2/19/19 07:45


 2/19/19 18:00   


 


 


 Ondansetron HCl


  (Zofran)  4 mg  Q6H  PRN


 IVP


 Nausea & Vomiting  2/18/19 16:30


 3/10/19 16:29   


 

















Olivia Rosas M.D. Feb 19, 2019 11:18

## 2019-02-20 VITALS — DIASTOLIC BLOOD PRESSURE: 64 MMHG | SYSTOLIC BLOOD PRESSURE: 133 MMHG

## 2019-02-20 VITALS — SYSTOLIC BLOOD PRESSURE: 152 MMHG | DIASTOLIC BLOOD PRESSURE: 55 MMHG

## 2019-02-20 VITALS — DIASTOLIC BLOOD PRESSURE: 64 MMHG | SYSTOLIC BLOOD PRESSURE: 135 MMHG

## 2019-02-20 VITALS — SYSTOLIC BLOOD PRESSURE: 139 MMHG | DIASTOLIC BLOOD PRESSURE: 68 MMHG

## 2019-02-20 VITALS — DIASTOLIC BLOOD PRESSURE: 58 MMHG | SYSTOLIC BLOOD PRESSURE: 139 MMHG

## 2019-02-20 VITALS — SYSTOLIC BLOOD PRESSURE: 147 MMHG | DIASTOLIC BLOOD PRESSURE: 62 MMHG

## 2019-02-20 LAB
ADD MANUAL DIFF: YES
ALBUMIN SERPL-MCNC: 1.1 G/DL (ref 3.4–5)
ALBUMIN/GLOB SERPL: 0.3 {RATIO} (ref 1–2.7)
ALP SERPL-CCNC: 116 U/L (ref 46–116)
ALT SERPL-CCNC: 23 U/L (ref 12–78)
ANION GAP SERPL CALC-SCNC: 8 MMOL/L (ref 5–15)
AST SERPL-CCNC: 26 U/L (ref 15–37)
BILIRUB SERPL-MCNC: 0.7 MG/DL (ref 0.2–1)
BUN SERPL-MCNC: 27 MG/DL (ref 7–18)
CALCIUM SERPL-MCNC: 8 MG/DL (ref 8.5–10.1)
CHLORIDE SERPL-SCNC: 117 MMOL/L (ref 98–107)
CO2 SERPL-SCNC: 22 MMOL/L (ref 21–32)
CREAT SERPL-MCNC: 0.7 MG/DL (ref 0.55–1.3)
ERYTHROCYTE [DISTWIDTH] IN BLOOD BY AUTOMATED COUNT: 19.9 % (ref 11.6–14.8)
GLOBULIN SER-MCNC: 4.1 G/DL
HCT VFR BLD CALC: 27.1 % (ref 37–47)
HGB BLD-MCNC: 8.6 G/DL (ref 12–16)
MCV RBC AUTO: 91 FL (ref 80–99)
PHOSPHATE SERPL-MCNC: 2.6 MG/DL (ref 2.5–4.9)
PLATELET # BLD: 78 K/UL (ref 150–450)
POTASSIUM SERPL-SCNC: 4.9 MMOL/L (ref 3.5–5.1)
RBC # BLD AUTO: 2.98 M/UL (ref 4.2–5.4)
SODIUM SERPL-SCNC: 147 MMOL/L (ref 136–145)
WBC # BLD AUTO: 6.9 K/UL (ref 4.8–10.8)

## 2019-02-20 RX ADMIN — INSULIN ASPART SCH UNITS: 100 INJECTION, SOLUTION INTRAVENOUS; SUBCUTANEOUS at 12:00

## 2019-02-20 RX ADMIN — INSULIN ASPART SCH UNITS: 100 INJECTION, SOLUTION INTRAVENOUS; SUBCUTANEOUS at 18:26

## 2019-02-20 RX ADMIN — INSULIN ASPART SCH UNITS: 100 INJECTION, SOLUTION INTRAVENOUS; SUBCUTANEOUS at 05:48

## 2019-02-20 RX ADMIN — DEXTROSE MONOHYDRATE SCH MLS/HR: 50 INJECTION, SOLUTION INTRAVENOUS at 10:10

## 2019-02-20 RX ADMIN — DAPTOMYCIN SCH MLS/HR: 500 INJECTION, POWDER, LYOPHILIZED, FOR SOLUTION INTRAVENOUS at 18:21

## 2019-02-20 RX ADMIN — DEXTROSE MONOHYDRATE SCH MLS/HR: 50 INJECTION, SOLUTION INTRAVENOUS at 22:36

## 2019-02-20 RX ADMIN — OLANZAPINE SCH MG: 2.5 TABLET, FILM COATED ORAL at 09:07

## 2019-02-20 RX ADMIN — MEROPENEM SCH MLS/HR: 1 INJECTION INTRAVENOUS at 18:21

## 2019-02-20 RX ADMIN — OLANZAPINE SCH MG: 2.5 TABLET, FILM COATED ORAL at 18:22

## 2019-02-20 NOTE — NUR
NURSE NOTES:

Left message for Dr Junior regarding Magnesium of 1.7 this morning. Awaiting call back at 
this time.

## 2019-02-20 NOTE — NUR
NURSE NOTES:

Patient blood sugar is now 65. Patient will be given another 25mL of D50 at this time.
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
## 2019-02-20 NOTE — DIAGNOSTIC IMAGING REPORT
Indication: Left arm diffuse redness and swelling

 

Technique: IV administration nonionic contrast.  Spiral acquisitions obtained through

the left upper arm and forearm   Multiplanar  reconstructions were generated. Total

dose length product 356 mGycm.  CTDIvol(s) 6 mGy. Radiation dose was minimized using

automated exposure control

 

Comparison: none

 

Findings: The subcutaneous fat and deep fat are diffusely edematous. No discrete

rim-enhancing collection is demonstrated. The upper arm an forearm musculature is

slightly heterogeneous, likely edematous. The large arteries are normal in

appearance. Veins are unopacified, presumably due to early scanning prior to venous

phase. The bones are unremarkable. No definite osteolytic lesions or unusual

periosteal reaction. A left chest pacemaker is incidentally noted.

 

Impression: Diffuse edema of the subcutaneous and deep fat and likely upper arm and

forearm musculature. Given stated clinical history, possibly secondary to cellulitis

and myositis, but could also be hemodynamic in origin. No discrete rim-enhancing

collection to suggest abscess

 

No CT evidence of acute osteomyelitis. Note, however, somewhat limited sensitivity of

CT for such. Consider  bone scan for further evaluation if there is high clinical

suspicion

 

 

 

The CT scanner at [facility] is accredited by the [employer] College of

Radiology and the scans are performed using protocols designed to limit radiation

exposure to as low as reasonably achievable to attain images of sufficient resolution

adequate for diagnostic evaluation.

## 2019-02-20 NOTE — NUR
NURSE NOTES:

Patient is now positive for ESBL of the sputum. Will notify Dr Rosas when she comes in to 
see the patient. Patient has already been positive for ESBL during this admission.

## 2019-02-20 NOTE — NUR
NURSE NOTES:

Left message for Dr Ortiz regarding low blood sugar of 58 for which 50mL of D50 was given.

## 2019-02-20 NOTE — CARDIOLOGY PROGRESS NOTE
Assessment/Plan


Assessment/Plan


continue to monitor, MARTHA early next week, will need NG tube out for MARTHA





Subjective


Subjective


The patient is confused and screaming, but occasionally is responding to 

questions, she looks slightly better





Objective





Last 24 Hour Vital Signs








  Date Time  Temp Pulse Resp B/P (MAP) Pulse Ox O2 Delivery O2 Flow Rate FiO2


 


2/20/19 20:00      Non-Rebreather 15.0 


 


2/20/19 20:00 97.9 89 20 135/64 (87) 100   


 


2/20/19 16:00 97.3 91 15 133/64 (87) 100   


 


2/20/19 16:00  82      


 


2/20/19 16:00      Non-Rebreather 15.0 


 


2/20/19 12:00      Non-Rebreather 15.0 


 


2/20/19 12:00  76      


 


2/20/19 12:00 97.5 75 21 139/58 (85) 100   


 


2/20/19 08:00      Non-Rebreather 15.0 


 


2/20/19 08:00 98.1 75 14 139/68 (91) 100   


 


2/20/19 07:47  64      


 


2/20/19 04:00 98.0 66 16 152/55 (87) 100   


 


2/20/19 04:00      Non-Rebreather 15.0 


 


2/20/19 04:00  72      


 


2/20/19 00:00  74      


 


2/20/19 00:00      Non-Rebreather 15.0 


 


2/20/19 00:00 98.4 67 13 147/62 (90) 100   








General Appearance:  thin, agitated


EENT:  PERRL/EOMI


Neck:  supple, no JVD


Rhythm:  NSR


Cardiovascular:  normal rate, systolic murmur


Respiratory/Chest:  accessory muscle use


Abdomen:  distended


Extremities:  other - left arm severe erhythema


Neurologic:  other - confused











Intake and Output  


 


 2/19/19 2/20/19





 19:00 07:00


 


Intake Total 710 ml 845 ml


 


Output Total  80 ml


 


Balance 710 ml 765 ml


 


  


 


Intake Free Water  75 ml


 


IV Total  110 ml


 


Tube Feeding 660 ml 660 ml


 


Other 50 ml 


 


Output Urine Total  80 ml


 


# Voids 2 2


 


# Bowel Movements 2 2











Laboratory Tests








Test


  2/20/19


03:45


 


White Blood Count


  6.9 K/UL


(4.8-10.8)


 


Red Blood Count


  2.98 M/UL


(4.20-5.40)  L


 


Hemoglobin


  8.6 G/DL


(12.0-16.0)  L


 


Hematocrit


  27.1 %


(37.0-47.0)  L


 


Mean Corpuscular Volume 91 FL (80-99)  


 


Mean Corpuscular Hemoglobin


  28.9 PG


(27.0-31.0)


 


Mean Corpuscular Hemoglobin


Concent 31.8 G/DL


(32.0-36.0)  L


 


Red Cell Distribution Width


  19.9 %


(11.6-14.8)  H


 


Platelet Count


  78 K/UL


(150-450)  L


 


Mean Platelet Volume


  9.2 FL


(6.5-10.1)


 


Neutrophils (%) (Auto)


  % (45.0-75.0)


 


 


Lymphocytes (%) (Auto)


  % (20.0-45.0)


 


 


Monocytes (%) (Auto)  % (1.0-10.0)  


 


Eosinophils (%) (Auto)  % (0.0-3.0)  


 


Basophils (%) (Auto)  % (0.0-2.0)  


 


Differential Total Cells


Counted 100  


 


 


Neutrophils % (Manual) 75 % (45-75)  


 


Lymphocytes % (Manual) 9 % (20-45)  L


 


Monocytes % (Manual) 4 % (1-10)  


 


Eosinophils % (Manual) 0 % (0-3)  


 


Basophils % (Manual) 0 % (0-2)  


 


Band Neutrophils 12 % (0-8)  H


 


Platelet Estimate Decreased  L


 


Platelet Morphology Normal  


 


Anisocytosis 2+  


 


Erythrocyte Sedimentation Rate


  79 MM/HR


(0-30)  H


 


Sodium Level


  147 MMOL/L


(136-145)  H


 


Potassium Level


  4.9 MMOL/L


(3.5-5.1)


 


Chloride Level


  117 MMOL/L


()  H


 


Carbon Dioxide Level


  22 MMOL/L


(21-32)


 


Anion Gap


  8 mmol/L


(5-15)


 


Blood Urea Nitrogen


  27 mg/dL


(7-18)  H


 


Creatinine


  0.7 MG/DL


(0.55-1.30)


 


Estimat Glomerular Filtration


Rate  mL/min (>60)  


 


 


Glucose Level


  135 MG/DL


()  H


 


Calcium Level


  8.0 MG/DL


(8.5-10.1)  L


 


Phosphorus Level


  2.6 MG/DL


(2.5-4.9)


 


Magnesium Level


  1.7 MG/DL


(1.8-2.4)  L


 


Total Bilirubin


  0.7 MG/DL


(0.2-1.0)


 


Aspartate Amino Transf


(AST/SGOT) 26 U/L (15-37)


 


 


Alanine Aminotransferase


(ALT/SGPT) 23 U/L (12-78)


 


 


Alkaline Phosphatase


  116 U/L


()


 


C-Reactive Protein,


Quantitative 22.3 mg/dL


(0.00-0.90)  H


 


Total Protein


  5.2 G/DL


(6.4-8.2)  L


 


Albumin


  1.1 G/DL


(3.4-5.0)  L


 


Globulin 4.1 g/dL  


 


Albumin/Globulin Ratio


  0.3 (1.0-2.7)


L











Microbiology








 Date/Time


Source Procedure


Growth Status


 


 


 2/19/19 04:20


Blood Blood Culture - Preliminary Resulted


 


 2/19/19 04:00


Blood Blood Culture - Preliminary Resulted


 


 2/18/19 07:30


Blood Blood Culture - Preliminary


Staphylococcus Aureus Resulted


 


 2/18/19 07:20


Blood Blood Culture - Preliminary


Staphylococcus Aureus Resulted





 2/18/19 23:50


Sputum AFB Specimen Processing Tissue - Final Resulted





 2/18/19 23:50


Sputum Acid Fast Bacilli Smear - Final Resulted


 


 2/18/19 23:50


Sputum Acid Fast Bacilli Culture


Pending Resulted





 2/18/19 03:25


Sputum AFB Specimen Processing Tissue - Final Resulted





 2/18/19 03:25


Sputum Acid Fast Bacilli Smear - Final Resulted


 


 2/18/19 03:25


Sputum Acid Fast Bacilli Culture


Pending Resulted





 2/18/19 03:25


Sputum Expectorated Gram Stain - Final Resulted


 


 2/18/19 03:25 Sputum Culture - Preliminary


Klebsiella Pneumoniae Esbl


Escherichia Coli Resulted

















Lara Tong MD Feb 20, 2019 21:34

## 2019-02-20 NOTE — SURGERY PROGRESS NOTE
Surgery Progress Note


Subjective


Additional Comments


no acute events.  still with tender swelling of left arm. CT noted.





Objective





Last 24 Hour Vital Signs








  Date Time  Temp Pulse Resp B/P (MAP) Pulse Ox O2 Delivery O2 Flow Rate FiO2


 


2/20/19 12:00      Non-Rebreather 15.0 


 


2/20/19 12:00  76      


 


2/20/19 12:00 97.5 75 21 139/58 (85) 100   


 


2/20/19 08:00      Non-Rebreather 15.0 


 


2/20/19 08:00 98.1 75 14 139/68 (91) 100   


 


2/20/19 07:47  64      


 


2/20/19 04:00 98.0 66 16 152/55 (87) 100   


 


2/20/19 04:00      Non-Rebreather 15.0 


 


2/20/19 04:00  72      


 


2/20/19 00:00  74      


 


2/20/19 00:00      Non-Rebreather 15.0 


 


2/20/19 00:00 98.4 67 13 147/62 (90) 100   


 


2/19/19 20:00  70      


 


2/19/19 20:00      Non-Rebreather 15.0 


 


2/19/19 20:00 97.7 85 22 134/60 (84) 100   


 


2/19/19 16:00 98.1 68 22 113/55 (74) 99   


 


2/19/19 16:00      Non-Rebreather 15.0 


 


2/19/19 16:00  83      








I&O











Intake and Output  


 


 2/19/19 2/20/19





 19:00 07:00


 


Intake Total 710 ml 845 ml


 


Output Total  80 ml


 


Balance 710 ml 765 ml


 


  


 


Intake Free Water  75 ml


 


IV Total  110 ml


 


Tube Feeding 660 ml 660 ml


 


Other 50 ml 


 


Output Urine Total  80 ml


 


# Voids 2 2


 


# Bowel Movements 2 2








Dressing:  other


Wound:  other


Drains:  other


Cardiovascular:  RSR


Respiratory:  clear, decreased breath sounds


Abdomen:  soft, present bowel sounds


Extremities:  edema, tenderness, no cyanosis





Laboratory Tests








Test


  2/20/19


03:45


 


White Blood Count


  6.9 K/UL


(4.8-10.8)


 


Red Blood Count


  2.98 M/UL


(4.20-5.40)  L


 


Hemoglobin


  8.6 G/DL


(12.0-16.0)  L


 


Hematocrit


  27.1 %


(37.0-47.0)  L


 


Mean Corpuscular Volume 91 FL (80-99)  


 


Mean Corpuscular Hemoglobin


  28.9 PG


(27.0-31.0)


 


Mean Corpuscular Hemoglobin


Concent 31.8 G/DL


(32.0-36.0)  L


 


Red Cell Distribution Width


  19.9 %


(11.6-14.8)  H


 


Platelet Count


  78 K/UL


(150-450)  L


 


Mean Platelet Volume


  9.2 FL


(6.5-10.1)


 


Neutrophils (%) (Auto)


  % (45.0-75.0)


 


 


Lymphocytes (%) (Auto)


  % (20.0-45.0)


 


 


Monocytes (%) (Auto)  % (1.0-10.0)  


 


Eosinophils (%) (Auto)  % (0.0-3.0)  


 


Basophils (%) (Auto)  % (0.0-2.0)  


 


Differential Total Cells


Counted 100  


 


 


Neutrophils % (Manual) 75 % (45-75)  


 


Lymphocytes % (Manual) 9 % (20-45)  L


 


Monocytes % (Manual) 4 % (1-10)  


 


Eosinophils % (Manual) 0 % (0-3)  


 


Basophils % (Manual) 0 % (0-2)  


 


Band Neutrophils 12 % (0-8)  H


 


Platelet Estimate Decreased  L


 


Platelet Morphology Normal  


 


Anisocytosis 2+  


 


Erythrocyte Sedimentation Rate


  79 MM/HR


(0-30)  H


 


Sodium Level


  147 MMOL/L


(136-145)  H


 


Potassium Level


  4.9 MMOL/L


(3.5-5.1)


 


Chloride Level


  117 MMOL/L


()  H


 


Carbon Dioxide Level


  22 MMOL/L


(21-32)


 


Anion Gap


  8 mmol/L


(5-15)


 


Blood Urea Nitrogen


  27 mg/dL


(7-18)  H


 


Creatinine


  0.7 MG/DL


(0.55-1.30)


 


Estimat Glomerular Filtration


Rate  mL/min (>60)  


 


 


Glucose Level


  135 MG/DL


()  H


 


Calcium Level


  8.0 MG/DL


(8.5-10.1)  L


 


Phosphorus Level


  2.6 MG/DL


(2.5-4.9)


 


Magnesium Level


  1.7 MG/DL


(1.8-2.4)  L


 


Total Bilirubin


  0.7 MG/DL


(0.2-1.0)


 


Aspartate Amino Transf


(AST/SGOT) 26 U/L (15-37)


 


 


Alanine Aminotransferase


(ALT/SGPT) 23 U/L (12-78)


 


 


Alkaline Phosphatase


  116 U/L


()


 


C-Reactive Protein,


Quantitative 22.3 mg/dL


(0.00-0.90)  H


 


Total Protein


  5.2 G/DL


(6.4-8.2)  L


 


Albumin


  1.1 G/DL


(3.4-5.0)  L


 


Globulin 4.1 g/dL  


 


Albumin/Globulin Ratio


  0.3 (1.0-2.7)


L











Plan


Problems:  


(1) Sepsis


Assessment & Plan:  Leukocytosis resolved 


LFT's elevated w/ t bili / d bili - improved 


AM labs ordered 


hydrate


US Impression: Limited exam, as described. Note inability to visualize the 

spleen Negative for gallstones or dilated bile ducts Questionable bilateral 

renal parapelvic cysts Small to moderate right pleural effusion


no acute surgical intervention planned. 


MARTHA for septic emboli


consider removing pacemaker as harboring sepsis source 


tube feeds as tolerated 


will follow with recs. 





(2) Sacral decubitus ulcer, stage III


Assessment & Plan:  DTPI sacrum.Maroon- indurated discoloration with opening at 

sacral coccygeal area(L)3.8cm x (W)1.4cm with entire wound measuring (L)10cmx(W)

12cm.Small amt malodorous brown exudate.


Stable dry eschar R heel with dry peeling skin periwound (L)2cm x (W)3.5cm. 

Periwound is also red and boggy. 


Small dry eschar noted to lateral R 5th metatarsal (L)1.4cm x (W)0.3cm.  


L heel is boggy but colour is dusky . 


Resolving skin tear lateral R tibia. Wound bed is clean and dry.  





Tx. Plan:


Cleanse Sacral wound with Saline.Apply Therahoney to opening at sacrococcygeal 

area.Cavilon skin barrier to DTPI. Cover with Optifoam drsg .Change Daily and 

prn.


            


Apply Betadine to R heel  and Lateral R 5th metatarsal.Cover with Abd pad .Wrap 

with kerlix daily and prn.


            


Apply Cavilon Skin Barrier to L heel.Cover with Optifoam drsg .Change every 7 

days and prn.


            


Reposition at least every 2hours or as tolerated.


            


Off-load heels with pillow.





(3) Rectal bleed


(4) Protein-calorie malnutrition, severe


(5) Dehydration


(6) IV infiltration


Assessment & Plan:  left hand IV infiltration prior and now with edema/

irritation noted + ecchymosis on hand and arm 


keep hand and arm elevated  


medication reaction and will need to be monitored for worsening 


poor tissue / protoplasm 





CT - Diffuse edema of the subcutaneous and deep fat and likely upper arm and


forearm musculature. Given stated clinical history, possibly secondary to 

cellulitis


and myositis, but could also be hemodynamic in origin. No discrete rim-enhancing


collection to suggest abscess


 


No CT evidence of acute osteomyelitis. Note, however, somewhat limited 

sensitivity of


CT for such. Consider  bone scan for further evaluation if there is high 

clinical


suspicion














Tuan Patricia Feb 20, 2019 15:35

## 2019-02-20 NOTE — NUR
NURSE NOTES:

Received bedside report from ESCOBAR Canela.Patient stable,SR on cardiac monitor,no s/s of pain,no 
respiratory distress noted,tolerated well nonrebreather mask @15 L,Sat O2 100%,NGT on L 
nares running with Glucerna 1.2 @55 ml/hr,BS active in all quadrants,pt on P 200 mattress 
d/t pressure ulcers,IV on a DOMINIC midline,last time dressing changed on 2/13/19,bed secured 
call light within a reach,will continue to monitor and follow POC.

## 2019-02-20 NOTE — INFECTIOUS DISEASES PROG NOTE
Assessment/Plan


Assessment/Plan


79 yo female with PMHx DM, CAD, Alzheimer dementia and Hip fracture who 

presents with dehydration and weakness. 





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 

until proven otherwise- r/o probable PPM infection- doubt TB


    -2/14 CT c/a/p w/: Interim development of multiple bilateral mostly upper 

lobe cavitary pulmonary parenchymal lesions. Given normal appearing chest 

radiograph on 2/7/2019, these are overwhelmingly likely infectious/inflammatory 

in nature. Most likely represent septic pulmonary emboli. The possibility of 

mycobacterial infection should also be included, deemed less likely given 

absence of adenopathy but still possible. Differential considerations also 

include fungal infections, noninfectious inflammatory processes. Other 

noncavitary nodules are also demonstrated, with the same differential. Large 

right and small-to-moderate left pleural effusions. Compressive atelectasis of 

the majority of the right lower lobe. Infiltrates seen in the right upper lobe. 

Bilateral pacemaker. Evidence of central venoocclusive disease. Wedge-shaped 

area of low-attenuation in the upper pole of the spleen, suspicious for infarct.


   -2d Echo (limited study due to contractures): no vegetations seen


    -2/10 Bcx 4/4MRSA; 2/11 Bcx 2/4 MRSA; 2/12 4/4  MRSA; 2/14 Bcx 4/4 MRSA


    2/8/19 Blood Cx  4/4 MRSA


    2/7/19 Wound Cx MRSA, ESBL E.coli (S ZOsyn), K. pna (S Zosyn)








Gram neg bacteremia- ?source- UTI vs [place] infection vs from lungs


  -2/19 BCx 4/4 GNR





r/o Probable PNA


  -2/19 CXR: Increased and now large right-sided pleural effusion, compared to 

prior study of 2/10/2019. Note that a large pleural effusion is evident on 

recent CT scan of 5 days earlier


  -sp cx ESBL K.pna and ESBL E>coli (both S Meropenem, Zosyn)





L arm cellulitis  from IV infiltration


  -CT L arm:  Diffuse edema of the subcutaneous and deep fat and likely upper 

arm and forearm musculature. Given stated clinical history, possibly secondary 

to cellulitis and myositis, but could also be hemodynamic in origin. No 

discrete rim-enhancing collection to suggest abscess. No CT evidence of acute 

osteomyelitis. 


   


 Leukocytosis, SP


  Low grade fever; SP


   Positive UA 


   CXR neg





Thrombocytopenia- HIT vs medication related


   


Pressure ulcers


  Not infected





Right hip fracture with hemiarthroplasty Oct 2018


DM


HTN


Sick sinus syndrome sp Pacemaker


HLD


CAD - SP MI and CABG


Aortic stenosis.


Pulmonary hypertension.


Alzheimer dementia.





PLAN





- Continue Daptomycin #8 (abx d #13) and Ceftaroline #6 given persistent MRSA 

Bacteremia


-Add Meropenem for GNR bacteremia and ESBL in sputum


   - repeat 2 sets of Bcx daily until clearance of bacteremia


   -2/13 SP Vancomycin #6


   -2/13 SP Zosyn #4


   -2/10 SP Cefepime #3





-Will need MARTHA and removal of PPM for bacteremia clearance/source control if 

consistent with goals of  care


   -plan for MARTHA when more stable





-consider thoracentesis





-D/c airborne isolation


   -f/u final AFB cx x3





- f/u repeat cultures


- Monitor CBC and temps


- wound care


- Nutritional support


-f/u cocci, crAg, histo, blasto serologies


-Sx/ fu


-L arm elevation








Thank you for this consult. We will continue to follow the patient during this 

hospitalization.





Subjective


Allergies:  


Coded Allergies:  


     No Known Allergies (Unverified , 3/26/14)


Subjective


; afebrile


still bacteremic but now GNR ??


afb sputum smear neg x3 and neg MTB PCR


L arm swellign and erythema


on NRB





Objective


Vital Signs





Last 24 Hour Vital Signs








  Date Time  Temp Pulse Resp B/P (MAP) Pulse Ox O2 Delivery O2 Flow Rate FiO2


 


2/20/19 12:00      Non-Rebreather 15.0 


 


2/20/19 12:00  76      


 


2/20/19 12:00 97.5 75 21 139/58 (85) 100   


 


2/20/19 08:00      Non-Rebreather 15.0 


 


2/20/19 08:00 98.1 75 14 139/68 (91) 100   


 


2/20/19 07:47  64      


 


2/20/19 04:00 98.0 66 16 152/55 (87) 100   


 


2/20/19 04:00      Non-Rebreather 15.0 


 


2/20/19 04:00  72      


 


2/20/19 00:00  74      


 


2/20/19 00:00      Non-Rebreather 15.0 


 


2/20/19 00:00 98.4 67 13 147/62 (90) 100   


 


2/19/19 20:00  70      


 


2/19/19 20:00      Non-Rebreather 15.0 


 


2/19/19 20:00 97.7 85 22 134/60 (84) 100   








Height (Feet):  5


Height (Inches):  5.00


Weight (Pounds):  147


Objective


Gen:  Moaning, Awake but no following


HEENT: NCAT, MMM, EOMI, PERRL


LUNGS:  CTAB, No W


CARDS: RRR, S1, S2, No M/R/G, 


ABD: Soft, NT, ND, + BS


Ext: Poor circulation to Ext (cool to touch) , Pulses 2+ B/L (DP, Rad): 


SKIN:  Dry, No rashes, Large sacral ulcer. Mild odor no surrounding cellulitis, 

foot with eschar





Microbiology








 Date/Time


Source Procedure


Growth Status


 


 


 2/19/19 04:20


Blood Blood Culture - Preliminary Resulted


 


 2/19/19 04:00


Blood Blood Culture - Preliminary Resulted


 


 2/18/19 07:30


Blood Blood Culture - Preliminary


Staphylococcus Aureus Resulted


 


 2/18/19 07:20


Blood Blood Culture - Preliminary


Staphylococcus Aureus Resulted





 2/18/19 23:50


Sputum AFB Specimen Processing Tissue - Final Resulted





 2/18/19 23:50


Sputum Acid Fast Bacilli Smear - Final Resulted


 


 2/18/19 23:50


Sputum Acid Fast Bacilli Culture


Pending Resulted





 2/18/19 03:25


Sputum AFB Specimen Processing Tissue - Final Resulted





 2/18/19 03:25


Sputum Acid Fast Bacilli Smear - Final Resulted


 


 2/18/19 03:25


Sputum Acid Fast Bacilli Culture


Pending Resulted





 2/18/19 03:25


Sputum Expectorated Gram Stain - Final Resulted


 


 2/18/19 03:25 Sputum Culture - Preliminary


Klebsiella Pneumoniae Esbl


Escherichia Coli Resulted











Laboratory Tests








Test


  2/20/19


03:45


 


White Blood Count


  6.9 K/UL


(4.8-10.8)


 


Red Blood Count


  2.98 M/UL


(4.20-5.40)  L


 


Hemoglobin


  8.6 G/DL


(12.0-16.0)  L


 


Hematocrit


  27.1 %


(37.0-47.0)  L


 


Mean Corpuscular Volume 91 FL (80-99)  


 


Mean Corpuscular Hemoglobin


  28.9 PG


(27.0-31.0)


 


Mean Corpuscular Hemoglobin


Concent 31.8 G/DL


(32.0-36.0)  L


 


Red Cell Distribution Width


  19.9 %


(11.6-14.8)  H


 


Platelet Count


  78 K/UL


(150-450)  L


 


Mean Platelet Volume


  9.2 FL


(6.5-10.1)


 


Neutrophils (%) (Auto)


  % (45.0-75.0)


 


 


Lymphocytes (%) (Auto)


  % (20.0-45.0)


 


 


Monocytes (%) (Auto)  % (1.0-10.0)  


 


Eosinophils (%) (Auto)  % (0.0-3.0)  


 


Basophils (%) (Auto)  % (0.0-2.0)  


 


Differential Total Cells


Counted 100  


 


 


Neutrophils % (Manual) 75 % (45-75)  


 


Lymphocytes % (Manual) 9 % (20-45)  L


 


Monocytes % (Manual) 4 % (1-10)  


 


Eosinophils % (Manual) 0 % (0-3)  


 


Basophils % (Manual) 0 % (0-2)  


 


Band Neutrophils 12 % (0-8)  H


 


Platelet Estimate Decreased  L


 


Platelet Morphology Normal  


 


Anisocytosis 2+  


 


Erythrocyte Sedimentation Rate


  79 MM/HR


(0-30)  H


 


Sodium Level


  147 MMOL/L


(136-145)  H


 


Potassium Level


  4.9 MMOL/L


(3.5-5.1)


 


Chloride Level


  117 MMOL/L


()  H


 


Carbon Dioxide Level


  22 MMOL/L


(21-32)


 


Anion Gap


  8 mmol/L


(5-15)


 


Blood Urea Nitrogen


  27 mg/dL


(7-18)  H


 


Creatinine


  0.7 MG/DL


(0.55-1.30)


 


Estimat Glomerular Filtration


Rate  mL/min (>60)  


 


 


Glucose Level


  135 MG/DL


()  H


 


Calcium Level


  8.0 MG/DL


(8.5-10.1)  L


 


Phosphorus Level


  2.6 MG/DL


(2.5-4.9)


 


Magnesium Level


  1.7 MG/DL


(1.8-2.4)  L


 


Total Bilirubin


  0.7 MG/DL


(0.2-1.0)


 


Aspartate Amino Transf


(AST/SGOT) 26 U/L (15-37)


 


 


Alanine Aminotransferase


(ALT/SGPT) 23 U/L (12-78)


 


 


Alkaline Phosphatase


  116 U/L


()


 


C-Reactive Protein,


Quantitative 22.3 mg/dL


(0.00-0.90)  H


 


Total Protein


  5.2 G/DL


(6.4-8.2)  L


 


Albumin


  1.1 G/DL


(3.4-5.0)  L


 


Globulin 4.1 g/dL  


 


Albumin/Globulin Ratio


  0.3 (1.0-2.7)


L











Current Medications








 Medications


  (Trade)  Dose


 Ordered  Sig/Violette


 Route


 PRN Reason  Start Time


 Stop Time Status Last Admin


Dose Admin


 


 Acetaminophen


  (Tylenol)  650 mg  Q4H  PRN


 NG


 Mild Pain/Temp > 100.5  2/18/19 16:30


 3/14/19 16:29  2/19/19 18:46


 


 


 Ceftaroline


 Fosamil 400 mg/


 Sodium Chloride  55 ml @ 55


 mls/hr  Q12HR


 IV


   2/18/19 21:00


 2/23/19 08:59  2/20/19 10:10


 


 


 Clonidine HCl


  (Catapres Tab)  0.1 mg  Q8H  PRN


 NG


 SBP>160mmHg  2/18/19 16:30


 3/10/19 16:29   


 


 


 Daptomycin 600 mg/


 Sodium Chloride  55 ml @ 


 100 mls/hr  Q24H


 IV


   2/19/19 18:00


 2/26/19 17:59  2/19/19 18:46


 


 


 Dextrose


  (Dextrose 50%)  25 ml  Q30M  PRN


 IV


 Hypoglycemia  2/18/19 16:45


 3/9/19 13:30  2/20/19 13:21


 


 


 Dextrose


  (Dextrose 50%)  50 ml  Q30M  PRN


 IV


 hypoglycemia  2/18/19 16:45


 3/9/19 13:44  2/18/19 19:28


 


 


 Insulin Aspart


  (NovoLOG)    EVERY 6  HOURS


 SUBQ


   2/18/19 18:00


 3/9/19 16:29  2/20/19 05:48


 


 


 Lansoprazole


  (Prevacid)  30 mg  DAILY


 NG


   2/19/19 09:00


 3/18/19 08:59  2/20/19 09:07


 


 


 Olanzapine


  (ZyPREXA)  2.5 mg  BID


 NG


   2/18/19 18:00


 3/10/19 10:59  2/20/19 09:07


 


 


 Ondansetron HCl


  (Zofran)  4 mg  Q6H  PRN


 IVP


 Nausea & Vomiting  2/18/19 16:30


 3/10/19 16:29   


 

















Olivia Rosas M.D. Feb 20, 2019 16:15

## 2019-02-20 NOTE — NUR
SI:     RESP FAILURE

T. 97.5 HR 75 RR 21 B/P 134/58

 BUN 27

RIGHT UPPER ARM CT=NEGATIVE FOR OSTEOMYELITIS







IS:     CEFTAROLINE IV

DAPTOMYCIN IV

MAG IV

ZYPREXA PO

*******STEP DOWN STATUS******

## 2019-02-20 NOTE — NUR
NURSE NOTES:

Report received from ESCOBAR Crowell. Pt A/Ox1. Haitian speaking. Cardiac monitor shows SR. Pt on 
a non-rebreather mask 15 liters @100%. Tolerating settings well. Pt has an NGT in place 
located in the left nare with glucerna 1.2 running at 55 ml/hr. Purewick present and working 
well. Multiple wounds present. Pt has swelling and redness located on AKIN, painful to touch. 
Pt has a DOMINIC midline present asymptomatic and patent. Will continue to monitor and with 
patients plan of care.

## 2019-02-20 NOTE — PULMONOLOGY PROGRESS NOTE
Assessment/Plan


Problems:  


(1) Sepsis


(2) Thrombocytopenia


(3) ATN (acute tubular necrosis)


(4) Pulmonary hypertension


(5) Sacral decubitus ulcer, stage III


(6) Alzheimer's dementia


(7) Protein-calorie malnutrition, severe


(8) HTN (hypertension)


(9) Pacemaker


(10) CAD (coronary artery disease)


Assessment/Plan


on NRM


/u PLT, better today up to78


failed swallow study


Morphine prn


d/w wound care nurse, 


pan cultures, still positive


BC are still positive from 2/18 and 1/19


check electrolytes


sliding scale


check electrolytes daily


broad spectrum abx


dvt prophylaxis


insulin coverage.


social service consult


might need removal of pacemaker leads, d/W dr Rosas, Cardiology wants to wait 

until pt hemodynamically better to do an MARTHA to  find out if the they are 

infected.





Subjective


ROS Limited/Unobtainable:  Yes


Interval Events:  on 100% NRM


Constitutional:  Reports: no symptoms


Allergies:  


Coded Allergies:  


     No Known Allergies (Unverified , 3/26/14)





Objective





Last 24 Hour Vital Signs








  Date Time  Temp Pulse Resp B/P (MAP) Pulse Ox O2 Delivery O2 Flow Rate FiO2


 


2/20/19 08:00 98.1 75 14 139/68 (91) 100   


 


2/20/19 07:47  64      


 


2/20/19 04:00 98.0 66 16 152/55 (87) 100   


 


2/20/19 04:00      Non-Rebreather 15.0 


 


2/20/19 04:00  72      


 


2/20/19 00:00  74      


 


2/20/19 00:00      Non-Rebreather 15.0 


 


2/20/19 00:00 98.4 67 13 147/62 (90) 100   


 


2/19/19 20:00  70      


 


2/19/19 20:00      Non-Rebreather 15.0 


 


2/19/19 20:00 97.7 85 22 134/60 (84) 100   


 


2/19/19 16:00 98.1 68 22 113/55 (74) 99   


 


2/19/19 16:00      Non-Rebreather 15.0 


 


2/19/19 16:00  83      


 


2/19/19 12:00 97.9 107 24 153/71 (98) 99   


 


2/19/19 12:00      Non-Rebreather 15.0 


 


2/19/19 11:58  102      

















Intake and Output  


 


 2/19/19 2/20/19





 19:00 07:00


 


Intake Total 710 ml 790 ml


 


Output Total  80 ml


 


Balance 710 ml 710 ml


 


  


 


Intake Free Water  75 ml


 


IV Total  110 ml


 


Tube Feeding 660 ml 605 ml


 


Other 50 ml 


 


Output Urine Total  80 ml


 


# Voids 2 2


 


# Bowel Movements 2 2








Objective


General Appearance:  cachectic


HEENT:  normocephalic, somnolent


Respiratory/Chest:  chest wall non-tender,loud rhonchi


Cardiovascular:  normal peripheral pulses, normal rate


Abdomen:  normal bowel sounds, soft, non tender


Extremities:  no cyanosis


Skin:  no rash





Microbiology








 Date/Time


Source Procedure


Growth Status


 


 


 2/19/19 04:20


Blood Blood Culture - Preliminary Resulted


 


 2/19/19 04:00


Blood Blood Culture - Preliminary Resulted


 


 2/18/19 07:30


Blood Blood Culture - Preliminary


Staphylococcus Aureus Resulted


 


 2/18/19 07:20


Blood Blood Culture - Preliminary


Staphylococcus Aureus Resulted





 2/18/19 03:25


Sputum AFB Specimen Processing Tissue - Final Resulted





 2/18/19 03:25


Sputum Acid Fast Bacilli Smear - Final Resulted


 


 2/18/19 03:25


Sputum Acid Fast Bacilli Culture


Pending Resulted





 2/18/19 03:25


Sputum Expectorated Gram Stain - Final Resulted


 


 2/18/19 03:25 Sputum Culture - Preliminary


Klebsiella Pneumoniae Esbl


Escherichia Coli Resulted








Laboratory Tests


2/20/19 03:45: 


White Blood Count 6.9, Red Blood Count 2.98L, Hemoglobin 8.6L, Hematocrit 27.1L

, Mean Corpuscular Volume 91, Mean Corpuscular Hemoglobin 28.9, Mean 

Corpuscular Hemoglobin Concent 31.8L, Red Cell Distribution Width 19.9H, 

Platelet Count 78L, Mean Platelet Volume 9.2, Neutrophils (%) (Auto) , 

Lymphocytes (%) (Auto) , Monocytes (%) (Auto) , Eosinophils (%) (Auto) , 

Basophils (%) (Auto) , Neutrophils % (Manual) [Pending], Lymphocytes % (Manual) 

[Pending], Platelet Estimate [Pending], Platelet Morphology [Pending], 

Erythrocyte Sedimentation Rate 79H, Sodium Level 147H, Potassium Level 4.9, 

Chloride Level 117H, Carbon Dioxide Level 22, Anion Gap 8, Blood Urea Nitrogen 

27H, Creatinine 0.7, Estimat Glomerular Filtration Rate , Glucose Level 135H, 

Calcium Level 8.0L, Phosphorus Level 2.6, Magnesium Level 1.7L, Total Bilirubin 

0.7, Aspartate Amino Transf (AST/SGOT) 26, Alanine Aminotransferase (ALT/SGPT) 

23, Alkaline Phosphatase 116, C-Reactive Protein, Quantitative 22.3H, Total 

Protein 5.2L, Albumin 1.1L, Globulin 4.1, Albumin/Globulin Ratio 0.3L





Current Medications








 Medications


  (Trade)  Dose


 Ordered  Sig/Violette


 Route


 PRN Reason  Start Time


 Stop Time Status Last Admin


Dose Admin


 


 Acetaminophen


  (Tylenol)  650 mg  Q4H  PRN


 NG


 Mild Pain/Temp > 100.5  2/18/19 16:30


 3/14/19 16:29  2/19/19 18:46


 


 


 Ceftaroline


 Fosamil 400 mg/


 Sodium Chloride  55 ml @ 55


 mls/hr  Q12HR


 IV


   2/18/19 21:00


 2/23/19 08:59  2/20/19 10:10


 


 


 Clonidine HCl


  (Catapres Tab)  0.1 mg  Q8H  PRN


 NG


 SBP>160mmHg  2/18/19 16:30


 3/10/19 16:29   


 


 


 Daptomycin 600 mg/


 Sodium Chloride  55 ml @ 


 100 mls/hr  Q24H


 IV


   2/19/19 18:00


 2/26/19 17:59  2/19/19 18:46


 


 


 Dextrose


  (Dextrose 50%)  25 ml  Q30M  PRN


 IV


 Hypoglycemia  2/18/19 16:45


 3/9/19 13:30   


 


 


 Dextrose


  (Dextrose 50%)  50 ml  Q30M  PRN


 IV


 hypoglycemia  2/18/19 16:45


 3/9/19 13:44  2/18/19 19:28


 


 


 Insulin Aspart


  (NovoLOG)    EVERY 6  HOURS


 SUBQ


   2/18/19 18:00


 3/9/19 16:29  2/20/19 05:48


 


 


 Lansoprazole


  (Prevacid)  30 mg  DAILY


 NG


   2/19/19 09:00


 3/18/19 08:59  2/20/19 09:07


 


 


 Olanzapine


  (ZyPREXA)  2.5 mg  BID


 NG


   2/18/19 18:00


 3/10/19 10:59  2/20/19 09:07


 


 


 Ondansetron HCl


  (Zofran)  4 mg  Q6H  PRN


 IVP


 Nausea & Vomiting  2/18/19 16:30


 3/10/19 16:29   


 

















Korin Junior MD Feb 20, 2019 11:07

## 2019-02-20 NOTE — GI PROGRESS NOTE
Assessment/Plan


Problems:  


(1) Severe anemia


ICD Codes:  D64.9 - Anemia, unspecified


SNOMED:  194820573


(2) Protein-calorie malnutrition, severe


ICD Codes:  E43 - Unspecified severe protein-calorie malnutrition


SNOMED:  701740280


(3) Rectal bleed


ICD Codes:  K62.5 - Hemorrhage of anus and rectum


SNOMED:  32489101


(4) Upper GI bleed


ICD Codes:  K92.2 - Gastrointestinal hemorrhage, unspecified


SNOMED:  78333627


(5) Severe malnutrition


ICD Codes:  E43 - Unspecified severe protein-calorie malnutrition


SNOMED:  72032006


(6) Weakness


ICD Codes:  R53.1 - Weakness


SNOMED:  01636549


(7) Dysphasia


ICD Codes:  R47.02 - Dysphasia


SNOMED:  96002514


(8) Dehydration


ICD Codes:  E86.0 - Dehydration


SNOMED:  06541427


(9) Acute encephalopathy


ICD Codes:  G93.40 - Encephalopathy, unspecified


SNOMED:  66792675, 843968789


(10) Sepsis


ICD Codes:  A41.9 - Sepsis, unspecified organism


SNOMED:  80336855


(11) Diabetes mellitus, type II


ICD Codes:  E11.9 - Type 2 diabetes mellitus without complications


SNOMED:  02302430


(12) Colitis


ICD Codes:  K52.9 - Noninfective gastroenteritis and colitis, unspecified


SNOMED:  60644021


Status:  unchanged


Status Narrative


Discussed with Dr. Moreno.


Assessment/Plan


On airborne isolation


EGD on hold


per cardiology recs, pending MARTHA to rule out endocarditis


on ppi


stool ob positive>> monitor H&H and transfuse as needed


NGTF


repeat labs





The patient was seen and examined at bedside and all new and available data was 

reviewed in the patients chart. I agree with the above findings, impression 

and plan.  (Patient seen earlier today. Signature stamp does not reflect 

patient encounter time.). - Cal Moreno MD





Subjective


Subjective


Limited





Objective





Last 24 Hour Vital Signs








  Date Time  Temp Pulse Resp B/P (MAP) Pulse Ox O2 Delivery O2 Flow Rate FiO2


 


2/20/19 12:00      Non-Rebreather 15.0 


 


2/20/19 08:00      Non-Rebreather 15.0 


 


2/20/19 08:00 98.1 75 14 139/68 (91) 100   


 


2/20/19 07:47  64      


 


2/20/19 04:00 98.0 66 16 152/55 (87) 100   


 


2/20/19 04:00      Non-Rebreather 15.0 


 


2/20/19 04:00  72      


 


2/20/19 00:00  74      


 


2/20/19 00:00      Non-Rebreather 15.0 


 


2/20/19 00:00 98.4 67 13 147/62 (90) 100   


 


2/19/19 20:00  70      


 


2/19/19 20:00      Non-Rebreather 15.0 


 


2/19/19 20:00 97.7 85 22 134/60 (84) 100   


 


2/19/19 16:00 98.1 68 22 113/55 (74) 99   


 


2/19/19 16:00      Non-Rebreather 15.0 


 


2/19/19 16:00  83      

















Intake and Output  


 


 2/19/19 2/20/19





 19:00 07:00


 


Intake Total 710 ml 790 ml


 


Output Total  80 ml


 


Balance 710 ml 710 ml


 


  


 


Intake Free Water  75 ml


 


IV Total  110 ml


 


Tube Feeding 660 ml 605 ml


 


Other 50 ml 


 


Output Urine Total  80 ml


 


# Voids 2 2


 


# Bowel Movements 2 2











Laboratory Tests








Test


  2/20/19


03:45


 


White Blood Count


  6.9 K/UL


(4.8-10.8)


 


Red Blood Count


  2.98 M/UL


(4.20-5.40)  L


 


Hemoglobin


  8.6 G/DL


(12.0-16.0)  L


 


Hematocrit


  27.1 %


(37.0-47.0)  L


 


Mean Corpuscular Volume 91 FL (80-99)  


 


Mean Corpuscular Hemoglobin


  28.9 PG


(27.0-31.0)


 


Mean Corpuscular Hemoglobin


Concent 31.8 G/DL


(32.0-36.0)  L


 


Red Cell Distribution Width


  19.9 %


(11.6-14.8)  H


 


Platelet Count


  78 K/UL


(150-450)  L


 


Mean Platelet Volume


  9.2 FL


(6.5-10.1)


 


Neutrophils (%) (Auto)


  % (45.0-75.0)


 


 


Lymphocytes (%) (Auto)


  % (20.0-45.0)


 


 


Monocytes (%) (Auto)  % (1.0-10.0)  


 


Eosinophils (%) (Auto)  % (0.0-3.0)  


 


Basophils (%) (Auto)  % (0.0-2.0)  


 


Differential Total Cells


Counted 100  


 


 


Neutrophils % (Manual) 75 % (45-75)  


 


Lymphocytes % (Manual) 9 % (20-45)  L


 


Monocytes % (Manual) 4 % (1-10)  


 


Eosinophils % (Manual) 0 % (0-3)  


 


Basophils % (Manual) 0 % (0-2)  


 


Band Neutrophils 12 % (0-8)  H


 


Platelet Estimate Decreased  L


 


Platelet Morphology Normal  


 


Anisocytosis 2+  


 


Erythrocyte Sedimentation Rate


  79 MM/HR


(0-30)  H


 


Sodium Level


  147 MMOL/L


(136-145)  H


 


Potassium Level


  4.9 MMOL/L


(3.5-5.1)


 


Chloride Level


  117 MMOL/L


()  H


 


Carbon Dioxide Level


  22 MMOL/L


(21-32)


 


Anion Gap


  8 mmol/L


(5-15)


 


Blood Urea Nitrogen


  27 mg/dL


(7-18)  H


 


Creatinine


  0.7 MG/DL


(0.55-1.30)


 


Estimat Glomerular Filtration


Rate  mL/min (>60)  


 


 


Glucose Level


  135 MG/DL


()  H


 


Calcium Level


  8.0 MG/DL


(8.5-10.1)  L


 


Phosphorus Level


  2.6 MG/DL


(2.5-4.9)


 


Magnesium Level


  1.7 MG/DL


(1.8-2.4)  L


 


Total Bilirubin


  0.7 MG/DL


(0.2-1.0)


 


Aspartate Amino Transf


(AST/SGOT) 26 U/L (15-37)


 


 


Alanine Aminotransferase


(ALT/SGPT) 23 U/L (12-78)


 


 


Alkaline Phosphatase


  116 U/L


()


 


C-Reactive Protein,


Quantitative 22.3 mg/dL


(0.00-0.90)  H


 


Total Protein


  5.2 G/DL


(6.4-8.2)  L


 


Albumin


  1.1 G/DL


(3.4-5.0)  L


 


Globulin 4.1 g/dL  


 


Albumin/Globulin Ratio


  0.3 (1.0-2.7)


L








Height (Feet):  5


Height (Inches):  5.00


Weight (Pounds):  147


General Appearance:  no apparent distress


Cardiovascular:  normal rate


Respiratory/Chest:  other - non rebreather


Abdominal Exam:  soft











Colin Milton NP Feb 20, 2019 12:36

## 2019-02-20 NOTE — NUR
NURSE NOTES:

Patient blood sugar is 60 at this time. Patient feeding discovered to be off from 0930 to 
1130 this morning because it did not get turned back on after the patient went to CT. 
Patient given 25mL D50 and doctor will be notified.

## 2019-02-20 NOTE — NUR
NURSE NOTES:

Received patient from [facility]. Patient VS stable at this time with no sign of acute 
distress. Patient has been sinus rhythm on the cardiac monitor throughout the night. Patient 
is sleeping at this time. Patient is not yelling as she was yesterday. Patient is on 100% 
nonrebreather mask at this time. Patient saturation stable at 100% at this time. Will follow 
up with RT and MD to see if we can ween her off of nonrebreather today. Patient has an NGT 
in the left nares at this time that is patent, in place, and asymptomatic. Patient is 
running G tube feeding Glucerna 1.2 at 55mL/hr at this time. Patient has a purewick at this 
time. Patient is on pressure release mattress. Patient has sacral stage 3 pressure ulcer at 
this time. Patient has swollen and red left arm. Venous and arterial duplex were negative. 
Dr Patricia noted that the reaction may be a result of IV infiltration. Dr Rosas ordered 
a CT with contrast. The daughter of the patient has been called twice with no call back at 
this time. Consent still needs to be obtained before the CT can be performed. Patient has a 
right upper arm midline at this time that is patent and saline locked at this time. Patient 
has an order for MARTHA and EGD. Neither can be performed today due to the patient remaining on 
100% nonrebreather. Patient bed in low position with bed alarm on can call light in reach at 
this time.

## 2019-02-20 NOTE — GENERAL PROGRESS NOTE
Assessment/Plan


Assessment/Plan








ASSESSMENT/RECS:





1. Pancytopenia with thrombocytopenia that is severe is most likely related to 

Sepsis, lactic acidosis. In addition has received heparin. US abdomen ordered 

and shows no spleen and no cirrhosis


--> heparin discontinued


--> HIT ab test reviewed


--> venous duplex negative


--> smear peripheral has been reviewed and negative for abnml cells


--> tumor markers as needed/prn


--> hold off on transfusion unless plt <20k


--> hold off on steriods


--> plt trend: 34--> 50k


2. Failure to thrive with severe protein calorie malnutrition.


->> calorie counts daily


--> consider mirtazapine as needed per pcp


--> getting ngtfs


3. Anemia of chronic disease


--> panel has been reviewed


--> transfuse if hgb <7


4. Leukocytosis


--> on abx as per id for infection


5. Hypertension.


6. Sick sinus syndrome.


7. Hypercholesterolemia.


8. Aortic stenosis.


9. Pulmonary hypertension.


10. Alzheimer dementia.


11.  Acute tubular necrosis/acute kidney injury most likely secondary to 

dehydration and sepsis.





Greatly appreciate consultation!





Subjective


Allergies:  


Coded Allergies:  


     No Known Allergies (Unverified , 3/26/14)


Subjective


2/14:seen by bedside, Possible EGD tomorrow to evaluate upper GI bleed, today's 

EGD was canceled by anesthesia, plt remains low at 34, hgb trending up . 


2/15: Scheduled for EGD today,CT w/ cavitary lung lessions; placed on isolation

, remains bacteremic, wbc 12.


2/17: continues to be somewhat sob, imaging reviewed, labs are better this am, 

plts uptrending, getting ngtfs


2/18: awake, comfortable, no events


2/19: Pt is confused, screaming, Left upper extremity ecchymosis, edema. no 

abscess,ID following, plt trending up.


2/20: awake, comfortable, no acute distress.





Objective





Last 24 Hour Vital Signs








  Date Time  Temp Pulse Resp B/P (MAP) Pulse Ox O2 Delivery O2 Flow Rate FiO2


 


2/20/19 20:00      Non-Rebreather 15.0 


 


2/20/19 20:00 97.9 89 20 135/64 (87) 100   


 


2/20/19 16:00 97.3 91 15 133/64 (87) 100   


 


2/20/19 16:00  82      


 


2/20/19 16:00      Non-Rebreather 15.0 


 


2/20/19 12:00      Non-Rebreather 15.0 


 


2/20/19 12:00  76      


 


2/20/19 12:00 97.5 75 21 139/58 (85) 100   


 


2/20/19 08:00      Non-Rebreather 15.0 


 


2/20/19 08:00 98.1 75 14 139/68 (91) 100   


 


2/20/19 07:47  64      


 


2/20/19 04:00 98.0 66 16 152/55 (87) 100   


 


2/20/19 04:00      Non-Rebreather 15.0 


 


2/20/19 04:00  72      


 


2/20/19 00:00  74      


 


2/20/19 00:00      Non-Rebreather 15.0 


 


2/20/19 00:00 98.4 67 13 147/62 (90) 100   

















Intake and Output  


 


 2/19/19 2/20/19





 19:00 07:00


 


Intake Total 710 ml 845 ml


 


Output Total  80 ml


 


Balance 710 ml 765 ml


 


  


 


Intake Free Water  75 ml


 


IV Total  110 ml


 


Tube Feeding 660 ml 660 ml


 


Other 50 ml 


 


Output Urine Total  80 ml


 


# Voids 2 2


 


# Bowel Movements 2 2








Laboratory Tests


2/20/19 03:45: 


White Blood Count 6.9, Red Blood Count 2.98L, Hemoglobin 8.6L, Hematocrit 27.1L

, Mean Corpuscular Volume 91, Mean Corpuscular Hemoglobin 28.9, Mean 

Corpuscular Hemoglobin Concent 31.8L, Red Cell Distribution Width 19.9H, 

Platelet Count 78L, Mean Platelet Volume 9.2, Neutrophils (%) (Auto) , 

Lymphocytes (%) (Auto) , Monocytes (%) (Auto) , Eosinophils (%) (Auto) , 

Basophils (%) (Auto) , Differential Total Cells Counted 100, Neutrophils % (

Manual) 75, Lymphocytes % (Manual) 9L, Monocytes % (Manual) 4, Eosinophils % (

Manual) 0, Basophils % (Manual) 0, Band Neutrophils 12H, Platelet Estimate 

DecreasedL, Platelet Morphology Normal, Anisocytosis 2+, Erythrocyte 

Sedimentation Rate 79H, Sodium Level 147H, Potassium Level 4.9, Chloride Level 

117H, Carbon Dioxide Level 22, Anion Gap 8, Blood Urea Nitrogen 27H, Creatinine 

0.7, Estimat Glomerular Filtration Rate , Glucose Level 135H, Calcium Level 8.0L

, Phosphorus Level 2.6, Magnesium Level 1.7L, Total Bilirubin 0.7, Aspartate 

Amino Transf (AST/SGOT) 26, Alanine Aminotransferase (ALT/SGPT) 23, Alkaline 

Phosphatase 116, C-Reactive Protein, Quantitative 22.3H, Total Protein 5.2L, 

Albumin 1.1L, Globulin 4.1, Albumin/Globulin Ratio 0.3L


Height (Feet):  5


Height (Inches):  5.00


Weight (Pounds):  147


Objective





GENERAL: Awake responsive to deep stimuli with opening of her eyes, in no 

apparent distress.


HEENT:  Eyes, pupils equal responsive to light and accommodation. ++ ngtf


CHEST: Poor inspiratory effort, decreased air in the bases no wheezes or 

rhonchi was appreciated


CARDIOVASCULAR:  Regular rhythm and rate.  S1 and S2 are normal without murmurs 

or gallops.


ABDOMEN:  Soft, nontender, and nondistended.  Positive bowel sounds.  No 

rebounding or guarding noted.


EXTREMITIES:  Negative for clubbing, cyanosis, or edema, muscle atrophy in 

bilateral lower extremity


RECTAL/GENITAL:  Not performed.


NEUROLOGIC:somewhat responsive, better than before











Darian Topete MD Feb 20, 2019 21:06

## 2019-02-20 NOTE — INTERNAL MED PROGRESS NOTE
Subjective


Date of Service:  Feb 20, 2019


Physician Name


Cheo Ortiz


Attending Physician


Juan Carlos Magana MD





Current Medications








 Medications


  (Trade)  Dose


 Ordered  Sig/Violette


 Route


 PRN Reason  Start Time


 Stop Time Status Last Admin


Dose Admin


 


 Acetaminophen


  (Tylenol)  650 mg  Q4H  PRN


 NG


 Mild Pain/Temp > 100.5  2/18/19 16:30


 3/14/19 16:29  2/19/19 18:46


 


 


 Ceftaroline


 Fosamil 400 mg/


 Sodium Chloride  55 ml @ 55


 mls/hr  Q12HR


 IV


   2/18/19 21:00


 2/23/19 08:59  2/20/19 10:10


 


 


 Clonidine HCl


  (Catapres Tab)  0.1 mg  Q8H  PRN


 NG


 SBP>160mmHg  2/18/19 16:30


 3/10/19 16:29   


 


 


 Daptomycin 600 mg/


 Sodium Chloride  55 ml @ 


 100 mls/hr  Q24H


 IV


   2/19/19 18:00


 2/26/19 17:59  2/20/19 18:21


 


 


 Dextrose


  (Dextrose 50%)  25 ml  Q30M  PRN


 IV


 Hypoglycemia  2/18/19 16:45


 3/9/19 13:30  2/20/19 13:21


 


 


 Dextrose


  (Dextrose 50%)  50 ml  Q30M  PRN


 IV


 hypoglycemia  2/18/19 16:45


 3/9/19 13:44  2/18/19 19:28


 


 


 Insulin Aspart


  (NovoLOG)    EVERY 6  HOURS


 SUBQ


   2/18/19 18:00


 3/9/19 16:29  2/20/19 18:26


 


 


 Lansoprazole


  (Prevacid)  30 mg  DAILY


 NG


   2/19/19 09:00


 3/18/19 08:59  2/20/19 09:07


 


 


 Meropenem 1 gm/


 Sodium Chloride  55 ml @ 


 110 mls/hr  Q12H


 IVPB


   2/20/19 18:00


 2/25/19 17:59  2/20/19 18:21


 


 


 Olanzapine


  (ZyPREXA)  2.5 mg  BID


 NG


   2/18/19 18:00


 3/10/19 10:59  2/20/19 18:22


 


 


 Ondansetron HCl


  (Zofran)  4 mg  Q6H  PRN


 IVP


 Nausea & Vomiting  2/18/19 16:30


 3/10/19 16:29   


 








Allergies:  


Coded Allergies:  


     No Known Allergies (Unverified , 3/26/14)


ROS Limited/Unobtainable:  Yes


Subjective


77 YO F admitted with dehydration and hypoxia.  Now sepsis.  Cover for Int Med-

Dr Magana.  Continues on non rebreather mask.  FAWN





Objective





Last Vital Signs








  Date Time  Temp Pulse Resp B/P (MAP) Pulse Ox O2 Delivery O2 Flow Rate FiO2


 


2/20/19 16:00 97.3 91 15 133/64 (87) 100   


 


2/20/19 16:00      Non-Rebreather 15.0 


 


2/19/19 09:19        100











Laboratory Tests








Test


  2/20/19


03:45


 


White Blood Count


  6.9 K/UL


(4.8-10.8)


 


Red Blood Count


  2.98 M/UL


(4.20-5.40)  L


 


Hemoglobin


  8.6 G/DL


(12.0-16.0)  L


 


Hematocrit


  27.1 %


(37.0-47.0)  L


 


Mean Corpuscular Volume 91 FL (80-99)  


 


Mean Corpuscular Hemoglobin


  28.9 PG


(27.0-31.0)


 


Mean Corpuscular Hemoglobin


Concent 31.8 G/DL


(32.0-36.0)  L


 


Red Cell Distribution Width


  19.9 %


(11.6-14.8)  H


 


Platelet Count


  78 K/UL


(150-450)  L


 


Mean Platelet Volume


  9.2 FL


(6.5-10.1)


 


Neutrophils (%) (Auto)


  % (45.0-75.0)


 


 


Lymphocytes (%) (Auto)


  % (20.0-45.0)


 


 


Monocytes (%) (Auto)  % (1.0-10.0)  


 


Eosinophils (%) (Auto)  % (0.0-3.0)  


 


Basophils (%) (Auto)  % (0.0-2.0)  


 


Differential Total Cells


Counted 100  


 


 


Neutrophils % (Manual) 75 % (45-75)  


 


Lymphocytes % (Manual) 9 % (20-45)  L


 


Monocytes % (Manual) 4 % (1-10)  


 


Eosinophils % (Manual) 0 % (0-3)  


 


Basophils % (Manual) 0 % (0-2)  


 


Band Neutrophils 12 % (0-8)  H


 


Platelet Estimate Decreased  L


 


Platelet Morphology Normal  


 


Anisocytosis 2+  


 


Erythrocyte Sedimentation Rate


  79 MM/HR


(0-30)  H


 


Sodium Level


  147 MMOL/L


(136-145)  H


 


Potassium Level


  4.9 MMOL/L


(3.5-5.1)


 


Chloride Level


  117 MMOL/L


()  H


 


Carbon Dioxide Level


  22 MMOL/L


(21-32)


 


Anion Gap


  8 mmol/L


(5-15)


 


Blood Urea Nitrogen


  27 mg/dL


(7-18)  H


 


Creatinine


  0.7 MG/DL


(0.55-1.30)


 


Estimat Glomerular Filtration


Rate  mL/min (>60)  


 


 


Glucose Level


  135 MG/DL


()  H


 


Calcium Level


  8.0 MG/DL


(8.5-10.1)  L


 


Phosphorus Level


  2.6 MG/DL


(2.5-4.9)


 


Magnesium Level


  1.7 MG/DL


(1.8-2.4)  L


 


Total Bilirubin


  0.7 MG/DL


(0.2-1.0)


 


Aspartate Amino Transf


(AST/SGOT) 26 U/L (15-37)


 


 


Alanine Aminotransferase


(ALT/SGPT) 23 U/L (12-78)


 


 


Alkaline Phosphatase


  116 U/L


()


 


C-Reactive Protein,


Quantitative 22.3 mg/dL


(0.00-0.90)  H


 


Total Protein


  5.2 G/DL


(6.4-8.2)  L


 


Albumin


  1.1 G/DL


(3.4-5.0)  L


 


Globulin 4.1 g/dL  


 


Albumin/Globulin Ratio


  0.3 (1.0-2.7)


L











Microbiology








 Date/Time


Source Procedure


Growth Status


 


 


 2/19/19 04:20


Blood Blood Culture - Preliminary Resulted


 


 2/19/19 04:00


Blood Blood Culture - Preliminary Resulted


 


 2/18/19 07:30


Blood Blood Culture - Preliminary


Staphylococcus Aureus Resulted


 


 2/18/19 07:20


Blood Blood Culture - Preliminary


Staphylococcus Aureus Resulted





 2/18/19 23:50


Sputum AFB Specimen Processing Tissue - Final Resulted





 2/18/19 23:50


Sputum Acid Fast Bacilli Smear - Final Resulted


 


 2/18/19 23:50


Sputum Acid Fast Bacilli Culture


Pending Resulted





 2/18/19 03:25


Sputum AFB Specimen Processing Tissue - Final Resulted





 2/18/19 03:25


Sputum Acid Fast Bacilli Smear - Final Resulted


 


 2/18/19 03:25


Sputum Acid Fast Bacilli Culture


Pending Resulted





 2/18/19 03:25


Sputum Expectorated Gram Stain - Final Resulted


 


 2/18/19 03:25 Sputum Culture - Preliminary


Klebsiella Pneumoniae Esbl


Escherichia Coli Resulted

















Intake and Output  


 


 2/19/19 2/20/19





 19:00 07:00


 


Intake Total 710 ml 845 ml


 


Output Total  80 ml


 


Balance 710 ml 765 ml


 


  


 


Intake Free Water  75 ml


 


IV Total  110 ml


 


Tube Feeding 660 ml 660 ml


 


Other 50 ml 


 


Output Urine Total  80 ml


 


# Voids 2 2


 


# Bowel Movements 2 2








Objective


Objective


General: No acute distress, awake and less responsive, Sleepy, cachectic.


HEENT: NCAT, sclera anicteric, PERRL, NG tube.


Neck: Supple, no significant jugular venous distention, 


Lungs: Non-rebreather mask; Poor inspiratory effort, decreased air in the bases 

, Bilateral Wheeze and Rales.


Heart: Regular rate and rhythm, normal S1/S2, no murmur.


Abdomen: soft, nontender, nondistended. Normoactive bowel sounds.


 / Rectal: Refused and deferred.


Extremities: No Cyanosis , No clubbing,  Left UE edema.  Lateral lower 

extremity / feet dressing intact.


Neuro: A&O x 1, Able to move all extremities


Skin: warm, no rashes or lesions, ecchymosis in bilateral upper extremity noted.





Assessment/Plan


1. Sepsis /  METHACILLIN RESISTANT STAPHYLOCOCCUS AUREUS bacteremia, lactic 

acidosis.


2. Failure to thrive with severe protein calorie malnutrition.


3. Coronary artery disease.


4. Diabetes, type 2.


5. Hypertension.


6. Sick sinus syndrome.


7. Hypercholesterolemia.


8. Aortic stenosis.


9. Pulmonary hypertension.


10. Alzheimer dementia.


11.  Acute tubular necrosis/acute kidney injury most likely secondary to 

dehydration and sepsis.


12. Sacral decubitus ulcer, stage III


13. Severe dehydration


14.  Hypokalemia.


15.  Abnormal liver function test


16.  Anemia/thrombocytopenia


17.  Bilateral pneumonia ?septic emboli?





TREATMENT:


1.  Patient on broad spectrum antibiotics with meropenem and ceftaroline.  Will 

require transesophageal echocardiogram to R/O endocarditis-see ID note


2. Coronary artery disease.  The patient is status post coronary artery


bypass graft in 2017.


3. Diabetes, type 2.  The patient has been started on NovoLog sliding


scale.  


4. Hypertension.  The patient is to continue on atenolol and losartan as


above.


5. Sick sinus syndrome.  The patient is status post pacemaker


implantation.


6. Hypercholesterolemia.  Continue simvastatin as above.


7. Aortic stenosis.


8. Pulmonary hypertension.


9. Alzheimer dementia.


10.  Anemia/thrombocytopenia-S/P 1 unit PRBC on 2/11/19.  Heme/Onc consult.  

Hold heparin


11.  Septic pulmonary emboli-await MARTHA on Tuesday 2/19/19-see cardiology note-

needs NG removed for MARTHA-Discussed with Dr Tong





Restart tube feeding at rate of 45 cc/hr.


CODE STATUS is full code as per POLST,


DVT prophylaxis: D/C Heparin subcu due to thrombocytopenia











Cheo Ortiz MD Feb 20, 2019 19:32

## 2019-02-21 VITALS — SYSTOLIC BLOOD PRESSURE: 144 MMHG | DIASTOLIC BLOOD PRESSURE: 69 MMHG

## 2019-02-21 VITALS — DIASTOLIC BLOOD PRESSURE: 50 MMHG | SYSTOLIC BLOOD PRESSURE: 150 MMHG

## 2019-02-21 VITALS — SYSTOLIC BLOOD PRESSURE: 127 MMHG | DIASTOLIC BLOOD PRESSURE: 70 MMHG

## 2019-02-21 VITALS — SYSTOLIC BLOOD PRESSURE: 115 MMHG | DIASTOLIC BLOOD PRESSURE: 64 MMHG

## 2019-02-21 VITALS — DIASTOLIC BLOOD PRESSURE: 72 MMHG | SYSTOLIC BLOOD PRESSURE: 137 MMHG

## 2019-02-21 VITALS — DIASTOLIC BLOOD PRESSURE: 70 MMHG | SYSTOLIC BLOOD PRESSURE: 113 MMHG

## 2019-02-21 VITALS — SYSTOLIC BLOOD PRESSURE: 137 MMHG | DIASTOLIC BLOOD PRESSURE: 57 MMHG

## 2019-02-21 LAB
ADD MANUAL DIFF: YES
ANION GAP SERPL CALC-SCNC: 7 MMOL/L (ref 5–15)
BUN SERPL-MCNC: 28 MG/DL (ref 7–18)
CALCIUM SERPL-MCNC: 7.9 MG/DL (ref 8.5–10.1)
CHLORIDE SERPL-SCNC: 118 MMOL/L (ref 98–107)
CK SERPL-CCNC: 137 U/L (ref 26–308)
CO2 SERPL-SCNC: 23 MMOL/L (ref 21–32)
CREAT SERPL-MCNC: 0.7 MG/DL (ref 0.55–1.3)
ERYTHROCYTE [DISTWIDTH] IN BLOOD BY AUTOMATED COUNT: 18.9 % (ref 11.6–14.8)
HCT VFR BLD CALC: 26 % (ref 37–47)
HGB BLD-MCNC: 8.3 G/DL (ref 12–16)
MCV RBC AUTO: 91 FL (ref 80–99)
PLATELET # BLD: 93 K/UL (ref 150–450)
POTASSIUM SERPL-SCNC: 5.1 MMOL/L (ref 3.5–5.1)
RBC # BLD AUTO: 2.86 M/UL (ref 4.2–5.4)
SODIUM SERPL-SCNC: 148 MMOL/L (ref 136–145)
WBC # BLD AUTO: 6.6 K/UL (ref 4.8–10.8)

## 2019-02-21 RX ADMIN — MEROPENEM SCH MLS/HR: 1 INJECTION INTRAVENOUS at 18:25

## 2019-02-21 RX ADMIN — DEXTROSE MONOHYDRATE SCH MLS/HR: 50 INJECTION, SOLUTION INTRAVENOUS at 09:46

## 2019-02-21 RX ADMIN — INSULIN ASPART SCH UNITS: 100 INJECTION, SOLUTION INTRAVENOUS; SUBCUTANEOUS at 18:23

## 2019-02-21 RX ADMIN — OLANZAPINE SCH MG: 2.5 TABLET, FILM COATED ORAL at 18:23

## 2019-02-21 RX ADMIN — INSULIN ASPART SCH UNITS: 100 INJECTION, SOLUTION INTRAVENOUS; SUBCUTANEOUS at 00:28

## 2019-02-21 RX ADMIN — DAPTOMYCIN SCH MLS/HR: 500 INJECTION, POWDER, LYOPHILIZED, FOR SOLUTION INTRAVENOUS at 18:23

## 2019-02-21 RX ADMIN — INSULIN ASPART SCH UNITS: 100 INJECTION, SOLUTION INTRAVENOUS; SUBCUTANEOUS at 05:57

## 2019-02-21 RX ADMIN — MEROPENEM SCH MLS/HR: 1 INJECTION INTRAVENOUS at 05:57

## 2019-02-21 RX ADMIN — DEXTROSE MONOHYDRATE SCH MLS/HR: 50 INJECTION, SOLUTION INTRAVENOUS at 21:06

## 2019-02-21 RX ADMIN — INSULIN ASPART SCH UNITS: 100 INJECTION, SOLUTION INTRAVENOUS; SUBCUTANEOUS at 12:00

## 2019-02-21 RX ADMIN — OLANZAPINE SCH MG: 2.5 TABLET, FILM COATED ORAL at 08:48

## 2019-02-21 NOTE — GI PROGRESS NOTE
Assessment/Plan


Problems:  


(1) Severe anemia


ICD Codes:  D64.9 - Anemia, unspecified


SNOMED:  849455096


(2) Protein-calorie malnutrition, severe


ICD Codes:  E43 - Unspecified severe protein-calorie malnutrition


SNOMED:  450805153


(3) Rectal bleed


ICD Codes:  K62.5 - Hemorrhage of anus and rectum


SNOMED:  35120739


(4) Upper GI bleed


ICD Codes:  K92.2 - Gastrointestinal hemorrhage, unspecified


SNOMED:  35030575


(5) Severe malnutrition


ICD Codes:  E43 - Unspecified severe protein-calorie malnutrition


SNOMED:  35579855


(6) Weakness


ICD Codes:  R53.1 - Weakness


SNOMED:  99591140


(7) Dysphasia


ICD Codes:  R47.02 - Dysphasia


SNOMED:  52873549


(8) Dehydration


ICD Codes:  E86.0 - Dehydration


SNOMED:  71288031


(9) Acute encephalopathy


ICD Codes:  G93.40 - Encephalopathy, unspecified


SNOMED:  86553140, 945406554


(10) Sepsis


ICD Codes:  A41.9 - Sepsis, unspecified organism


SNOMED:  46747322


(11) Diabetes mellitus, type II


ICD Codes:  E11.9 - Type 2 diabetes mellitus without complications


SNOMED:  01037027


(12) Colitis


ICD Codes:  K52.9 - Noninfective gastroenteritis and colitis, unspecified


SNOMED:  08458600


Status:  unchanged


Status Narrative


Discussed with Dr. Moreno.


Assessment/Plan


off airborne isolation


Hgb downtrend


MARTHA next week


OB stool positive





EGD on hold until respiratory status stabilizes.


on ppi


stool ob positive>> monitor H&H and transfuse as needed


NGTF


cardiology recs


repeat labs





The patient was seen and examined at bedside and all new and available data was 

reviewed in the patients chart. I agree with the above findings, impression 

and plan.  (Patient seen earlier today. Signature stamp does not reflect 

patient encounter time.). - Cal Moreno MD





Subjective


Subjective


Limited





Objective





Last 24 Hour Vital Signs








  Date Time  Temp Pulse Resp B/P (MAP) Pulse Ox O2 Delivery O2 Flow Rate FiO2


 


2/21/19 08:00 97.8 82 16 137/57 (83) 95   


 


2/21/19 08:00  75      


 


2/21/19 08:00      Non-Rebreather 15.0 


 


2/21/19 04:00 97.9 62 20 127/70 (89) 96   


 


2/21/19 03:29      Non-Rebreather 15.0 


 


2/21/19 03:29  68      


 


2/21/19 00:00 97.7 52 20 144/69 (94) 100   


 


2/20/19 23:25  70      


 


2/20/19 20:00      Non-Rebreather 15.0 


 


2/20/19 20:00 97.9 89 20 135/64 (87) 100   


 


2/20/19 19:01  75      


 


2/20/19 16:00 97.3 91 15 133/64 (87) 100   


 


2/20/19 16:00  82      


 


2/20/19 16:00      Non-Rebreather 15.0 


 


2/20/19 12:00      Non-Rebreather 15.0 


 


2/20/19 12:00  76      


 


2/20/19 12:00 97.5 75 21 139/58 (85) 100   

















Intake and Output  


 


 2/20/19 2/21/19





 19:00 07:00


 


Intake Total 695 ml 760 ml


 


Output Total 50 ml 450 ml


 


Balance 645 ml 310 ml


 


  


 


Intake Free Water  100 ml


 


IV Total 255 ml 110 ml


 


Tube Feeding 440 ml 550 ml


 


Output Urine Total 50 ml 450 ml


 


# Voids 2 


 


# Bowel Movements 1 2











Laboratory Tests








Test


  2/21/19


04:05


 


White Blood Count


  6.6 K/UL


(4.8-10.8)


 


Red Blood Count


  2.86 M/UL


(4.20-5.40)  L


 


Hemoglobin


  8.3 G/DL


(12.0-16.0)  L


 


Hematocrit


  26.0 %


(37.0-47.0)  L


 


Mean Corpuscular Volume 91 FL (80-99)  


 


Mean Corpuscular Hemoglobin


  28.9 PG


(27.0-31.0)


 


Mean Corpuscular Hemoglobin


Concent 31.8 G/DL


(32.0-36.0)  L


 


Red Cell Distribution Width


  18.9 %


(11.6-14.8)  H


 


Platelet Count


  93 K/UL


(150-450)  L


 


Mean Platelet Volume


  10.6 FL


(6.5-10.1)  H


 


Neutrophils (%) (Auto)


  % (45.0-75.0)


 


 


Lymphocytes (%) (Auto)


  % (20.0-45.0)


 


 


Monocytes (%) (Auto)  % (1.0-10.0)  


 


Eosinophils (%) (Auto)  % (0.0-3.0)  


 


Basophils (%) (Auto)  % (0.0-2.0)  


 


Differential Total Cells


Counted 100  


 


 


Neutrophils % (Manual) 92 % (45-75)  H


 


Lymphocytes % (Manual) 5 % (20-45)  L


 


Monocytes % (Manual) 2 % (1-10)  


 


Eosinophils % (Manual) 0 % (0-3)  


 


Basophils % (Manual) 0 % (0-2)  


 


Band Neutrophils 1 % (0-8)  


 


Platelet Estimate Decreased  L


 


Platelet Morphology Normal  


 


Hypochromasia 1+  


 


Anisocytosis 2+  


 


Sodium Level


  148 MMOL/L


(136-145)  H


 


Potassium Level


  5.1 MMOL/L


(3.5-5.1)


 


Chloride Level


  118 MMOL/L


()  H


 


Carbon Dioxide Level


  23 MMOL/L


(21-32)


 


Anion Gap


  7 mmol/L


(5-15)


 


Blood Urea Nitrogen


  28 mg/dL


(7-18)  H


 


Creatinine


  0.7 MG/DL


(0.55-1.30)


 


Estimat Glomerular Filtration


Rate  mL/min (>60)  


 


 


Glucose Level


  166 MG/DL


()  H


 


Calcium Level


  7.9 MG/DL


(8.5-10.1)  L


 


Total Creatine Kinase


  137 U/L


()








Height (Feet):  5


Height (Inches):  5.00


Weight (Pounds):  147


General Appearance:  no apparent distress


Cardiovascular:  normal rate


Respiratory/Chest:  normal breath sounds, no respiratory distress


Abdominal Exam:  normal bowel sounds, non tender, soft, GT site - c/d/i


Extremities:  non-tender











Colin Milton NP Feb 21, 2019 11:36

## 2019-02-21 NOTE — INFECTIOUS DISEASES PROG NOTE
Assessment/Plan


Assessment/Plan


77 yo female with PMHx DM, CAD, Alzheimer dementia and Hip fracture who 

presents with dehydration and weakness. 





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 

until proven otherwise- r/o probable PPM infection- doubt TB


    -2/14 CT c/a/p w/: Interim development of multiple bilateral mostly upper 

lobe cavitary pulmonary parenchymal lesions. Given normal appearing chest 

radiograph on 2/7/2019, these are overwhelmingly likely infectious/inflammatory 

in nature. Most likely represent septic pulmonary emboli. The possibility of 

mycobacterial infection should also be included, deemed less likely given 

absence of adenopathy but still possible. Differential considerations also 

include fungal infections, noninfectious inflammatory processes. Other 

noncavitary nodules are also demonstrated, with the same differential. Large 

right and small-to-moderate left pleural effusions. Compressive atelectasis of 

the majority of the right lower lobe. Infiltrates seen in the right upper lobe. 

Bilateral pacemaker. Evidence of central venoocclusive disease. Wedge-shaped 

area of low-attenuation in the upper pole of the spleen, suspicious for infarct.


   -2d Echo (limited study due to contractures): no vegetations seen


    -2/10 Bcx 4/4MRSA; 2/11 Bcx 2/4 MRSA; 2/12 4/4  MRSA; 2/14 Bcx 4/4 MRSA; 2/ 20 Bcx 4/4 GPC clusters


    2/8/19 Blood Cx  4/4 MRSA


    2/7/19 Wound Cx MRSA, ESBL E.coli (S ZOsyn), K. pna (S Zosyn)





Neg: Histo, Blasto ab





Gram neg bacteremia- ?source- UTI vs [place] infection vs from lungs


  -2/19 BCx 4/4 GNR, 2/4 S.aureus





r/o Probable PNA


  -2/19 CXR: Increased and now large right-sided pleural effusion, compared to 

prior study of 2/10/2019. Note that a large pleural effusion is evident on 

recent CT scan of 5 days earlier


  -sp cx ESBL K.pna and ESBL E>coli (both S Meropenem, Zosyn)





L arm cellulitis  from IV infiltration


  -CT L arm:  Diffuse edema of the subcutaneous and deep fat and likely upper 

arm and forearm musculature. Given stated clinical history, possibly secondary 

to cellulitis and myositis, but could also be hemodynamic in origin. No 

discrete rim-enhancing collection to suggest abscess. No CT evidence of acute 

osteomyelitis. 


   


 Leukocytosis, SP


  Low grade fever; SP


   Positive UA 


   CXR neg





Thrombocytopenia- HIT vs medication related


   


Pressure ulcers


  Not infected





Right hip fracture with hemiarthroplasty Oct 2018


DM


HTN


Sick sinus syndrome sp Pacemaker


HLD


CAD - SP MI and CABG


Aortic stenosis.


Pulmonary hypertension.


Alzheimer dementia.





PLAN





- Continue Daptomycin #9 (abx d #14) and Ceftaroline #7 given persistent MRSA 

Bacteremia


-Cont Meropenem #2 for GNR bacteremia and ESBL in sputum


   - repeat 2 sets of Bcx daily-q48hrs until clearance of bacteremia


   -2/13 SP Vancomycin #6


   -2/13 SP Zosyn #4


   -2/10 SP Cefepime #3





-Will need MARTHA and removal of PPM for bacteremia clearance/source control if 

consistent with goals of  care


   -plan for MARTHA when more stable, probably next week





-consider thoracentesis for Large R pleural effusion- have to consider also 

pleural effusion as source of continued bacteremia








   -f/u final AFB cx x3





- f/u repeat cultures


- Monitor CBC and temps


- wound care


- Nutritional support


-f/u cocci


-Sx/ fu


-L arm elevation








Thank you for this consult. We will continue to follow the patient during this 

hospitalization.





Subjective


Allergies:  


Coded Allergies:  


     No Known Allergies (Unverified , 3/26/14)


Subjective


; afebrile


still bacteremic 


on NRB


plan for MARTHA next week





Objective


Vital Signs





Last 24 Hour Vital Signs








  Date Time  Temp Pulse Resp B/P (MAP) Pulse Ox O2 Delivery O2 Flow Rate FiO2


 


2/21/19 12:00  92      


 


2/21/19 12:00 98.7 94 16 137/72 (93) 98   


 


2/21/19 12:00      Non-Rebreather 15.0 


 


2/21/19 08:00 97.8 82 16 137/57 (83) 95   


 


2/21/19 08:00  75      


 


2/21/19 08:00      Non-Rebreather 15.0 


 


2/21/19 04:00 97.9 62 20 127/70 (89) 96   


 


2/21/19 03:29      Non-Rebreather 15.0 


 


2/21/19 03:29  68      


 


2/21/19 00:00 97.7 52 20 144/69 (94) 100   


 


2/20/19 23:25  70      


 


2/20/19 20:00      Non-Rebreather 15.0 


 


2/20/19 20:00 97.9 89 20 135/64 (87) 100   


 


2/20/19 19:01  75      


 


2/20/19 16:00 97.3 91 15 133/64 (87) 100   


 


2/20/19 16:00  82      


 


2/20/19 16:00      Non-Rebreather 15.0 








Height (Feet):  5


Height (Inches):  5.00


Weight (Pounds):  147


Objective


Gen:  Moaning, Awake but no following


HEENT: NCAT, MMM, EOMI, PERRL


LUNGS:  CTAB, No W


CARDS: RRR, S1, S2, No M/R/G, 


ABD: Soft, NT, ND, + BS


Ext: Poor circulation to Ext (cool to touch) , Pulses 2+ B/L (DP, Rad): 


SKIN:  Dry, No rashes, Large sacral ulcer. Mild odor no surrounding cellulitis, 

foot with eschar





Microbiology








 Date/Time


Source Procedure


Growth Status


 


 


 2/20/19 03:50


Blood Blood Culture - Preliminary Resulted


 


 2/20/19 03:45


Blood Blood Culture - Preliminary Resulted


 


 2/19/19 04:20


Blood Blood Culture - Preliminary


Gram Negative Bacillus 1 Resulted


 


 2/19/19 04:00


Blood Blood Culture - Preliminary


Gram Negative Bacillus 1


Staphylococcus Aureus Resulted





 2/18/19 23:50


Sputum AFB Specimen Processing Tissue - Final Resulted





 2/18/19 23:50


Sputum Acid Fast Bacilli Smear - Final Resulted


 


 2/18/19 23:50


Sputum Acid Fast Bacilli Culture


Pending Resulted











Laboratory Tests








Test


  2/21/19


04:05


 


White Blood Count


  6.6 K/UL


(4.8-10.8)


 


Red Blood Count


  2.86 M/UL


(4.20-5.40)  L


 


Hemoglobin


  8.3 G/DL


(12.0-16.0)  L


 


Hematocrit


  26.0 %


(37.0-47.0)  L


 


Mean Corpuscular Volume 91 FL (80-99)  


 


Mean Corpuscular Hemoglobin


  28.9 PG


(27.0-31.0)


 


Mean Corpuscular Hemoglobin


Concent 31.8 G/DL


(32.0-36.0)  L


 


Red Cell Distribution Width


  18.9 %


(11.6-14.8)  H


 


Platelet Count


  93 K/UL


(150-450)  L


 


Mean Platelet Volume


  10.6 FL


(6.5-10.1)  H


 


Neutrophils (%) (Auto)


  % (45.0-75.0)


 


 


Lymphocytes (%) (Auto)


  % (20.0-45.0)


 


 


Monocytes (%) (Auto)  % (1.0-10.0)  


 


Eosinophils (%) (Auto)  % (0.0-3.0)  


 


Basophils (%) (Auto)  % (0.0-2.0)  


 


Differential Total Cells


Counted 100  


 


 


Neutrophils % (Manual) 92 % (45-75)  H


 


Lymphocytes % (Manual) 5 % (20-45)  L


 


Monocytes % (Manual) 2 % (1-10)  


 


Eosinophils % (Manual) 0 % (0-3)  


 


Basophils % (Manual) 0 % (0-2)  


 


Band Neutrophils 1 % (0-8)  


 


Platelet Estimate Decreased  L


 


Platelet Morphology Normal  


 


Hypochromasia 1+  


 


Anisocytosis 2+  


 


Sodium Level


  148 MMOL/L


(136-145)  H


 


Potassium Level


  5.1 MMOL/L


(3.5-5.1)


 


Chloride Level


  118 MMOL/L


()  H


 


Carbon Dioxide Level


  23 MMOL/L


(21-32)


 


Anion Gap


  7 mmol/L


(5-15)


 


Blood Urea Nitrogen


  28 mg/dL


(7-18)  H


 


Creatinine


  0.7 MG/DL


(0.55-1.30)


 


Estimat Glomerular Filtration


Rate  mL/min (>60)  


 


 


Glucose Level


  166 MG/DL


()  H


 


Calcium Level


  7.9 MG/DL


(8.5-10.1)  L


 


Total Creatine Kinase


  137 U/L


()











Current Medications








 Medications


  (Trade)  Dose


 Ordered  Sig/Violette


 Route


 PRN Reason  Start Time


 Stop Time Status Last Admin


Dose Admin


 


 Acetaminophen


  (Tylenol)  650 mg  Q4H  PRN


 NG


 Mild Pain/Temp > 100.5  2/18/19 16:30


 3/14/19 16:29  2/19/19 18:46


 


 


 Ceftaroline


 Fosamil 400 mg/


 Sodium Chloride  55 ml @ 55


 mls/hr  Q12HR


 IV


   2/18/19 21:00


 2/23/19 08:59  2/21/19 09:46


 


 


 Clonidine HCl


  (Catapres Tab)  0.1 mg  Q8H  PRN


 NG


 SBP>160mmHg  2/18/19 16:30


 3/10/19 16:29   


 


 


 Daptomycin 600 mg/


 Sodium Chloride  55 ml @ 


 100 mls/hr  Q24H


 IV


   2/19/19 18:00


 2/26/19 17:59  2/20/19 18:21


 


 


 Dextrose


  (Dextrose 50%)  25 ml  Q30M  PRN


 IV


 Hypoglycemia  2/18/19 16:45


 3/9/19 13:30  2/20/19 13:21


 


 


 Dextrose


  (Dextrose 50%)  50 ml  Q30M  PRN


 IV


 hypoglycemia  2/18/19 16:45


 3/9/19 13:44  2/18/19 19:28


 


 


 Insulin Aspart


  (NovoLOG)    EVERY 6  HOURS


 SUBQ


   2/18/19 18:00


 3/9/19 16:29  2/21/19 00:28


 


 


 Lansoprazole


  (Prevacid)  30 mg  DAILY


 NG


   2/19/19 09:00


 3/18/19 08:59  2/21/19 08:48


 


 


 Meropenem 1 gm/


 Sodium Chloride  55 ml @ 


 110 mls/hr  Q12H


 IVPB


   2/20/19 18:00


 2/25/19 17:59  2/21/19 05:57


 


 


 Olanzapine


  (ZyPREXA)  2.5 mg  BID


 NG


   2/18/19 18:00


 3/10/19 10:59  2/21/19 08:48


 


 


 Ondansetron HCl


  (Zofran)  4 mg  Q6H  PRN


 IVP


 Nausea & Vomiting  2/18/19 16:30


 3/10/19 16:29   


 

















Olivia Rosas M.D. Feb 21, 2019 14:39

## 2019-02-21 NOTE — PULMONOLOGY PROGRESS NOTE
Assessment/Plan


Problems:  


(1) Sepsis


(2) Thrombocytopenia


(3) ATN (acute tubular necrosis)


(4) Pulmonary hypertension


(5) Sacral decubitus ulcer, stage III


(6) Alzheimer's dementia


(7) Protein-calorie malnutrition, severe


(8) HTN (hypertension)


(9) Pacemaker


(10) CAD (coronary artery disease)


Assessment/Plan


on NRM


/u PLT, better today up to 93


failed swallow study


Morphine prn


d/w wound care nurse, 


pan cultures, still positive


BC are still positive from 2/18 and 1/19, now for GN


check electrolytes


sliding scale


check electrolytes daily


broad spectrum abx


dvt prophylaxis


insulin coverage.


social service consult


might need removal of pacemaker leads, d/W dr Rosas, Cardiology wants to wait 

until pt hemodynamically better to do an MARTHA to  find out if the they are 

infected.





Subjective


ROS Limited/Unobtainable:  No


Interval Events:  on face mask


Allergies:  


Coded Allergies:  


     No Known Allergies (Unverified , 3/26/14)





Objective





Last 24 Hour Vital Signs








  Date Time  Temp Pulse Resp B/P (MAP) Pulse Ox O2 Delivery O2 Flow Rate FiO2


 


2/21/19 08:00 97.8 82 16 137/57 (83) 95   


 


2/21/19 08:00  75      


 


2/21/19 08:00      Non-Rebreather 15.0 


 


2/21/19 04:00 97.9 62 20 127/70 (89) 96   


 


2/21/19 03:29      Non-Rebreather 15.0 


 


2/21/19 03:29  68      


 


2/21/19 00:00 97.7 52 20 144/69 (94) 100   


 


2/20/19 23:25  70      


 


2/20/19 20:00      Non-Rebreather 15.0 


 


2/20/19 20:00 97.9 89 20 135/64 (87) 100   


 


2/20/19 19:01  75      


 


2/20/19 16:00 97.3 91 15 133/64 (87) 100   


 


2/20/19 16:00  82      


 


2/20/19 16:00      Non-Rebreather 15.0 

















Intake and Output  


 


 2/20/19 2/21/19





 19:00 07:00


 


Intake Total 695 ml 760 ml


 


Output Total 50 ml 450 ml


 


Balance 645 ml 310 ml


 


  


 


Intake Free Water  100 ml


 


IV Total 255 ml 110 ml


 


Tube Feeding 440 ml 550 ml


 


Output Urine Total 50 ml 450 ml


 


# Voids 2 


 


# Bowel Movements 1 2








Objective


General Appearance:  cachectic


HEENT:  normocephalic, somnolent


Respiratory/Chest:  chest wall non-tender,loud rhonchi


Cardiovascular:  normal peripheral pulses, normal rate


Abdomen:  normal bowel sounds, soft, non tender


Extremities:  no cyanosis


Skin:  no rash





Microbiology








 Date/Time


Source Procedure


Growth Status


 


 


 2/19/19 04:20


Blood Blood Culture - Preliminary


Gram Negative Bacillus 1 Resulted


 


 2/19/19 04:00


Blood Blood Culture - Preliminary


Gram Negative Bacillus 1


Staphylococcus Aureus Resulted





 2/18/19 23:50


Sputum AFB Specimen Processing Tissue - Final Resulted





 2/18/19 23:50


Sputum Acid Fast Bacilli Smear - Final Resulted


 


 2/18/19 23:50


Sputum Acid Fast Bacilli Culture


Pending Resulted








Laboratory Tests


2/21/19 04:05: 


White Blood Count 6.6, Red Blood Count 2.86L, Hemoglobin 8.3L, Hematocrit 26.0L

, Mean Corpuscular Volume 91, Mean Corpuscular Hemoglobin 28.9, Mean 

Corpuscular Hemoglobin Concent 31.8L, Red Cell Distribution Width 18.9H, 

Platelet Count 93L, Mean Platelet Volume 10.6H, Neutrophils (%) (Auto) , 

Lymphocytes (%) (Auto) , Monocytes (%) (Auto) , Eosinophils (%) (Auto) , 

Basophils (%) (Auto) , Differential Total Cells Counted 100, Neutrophils % (

Manual) 92H, Lymphocytes % (Manual) 5L, Monocytes % (Manual) 2, Eosinophils % (

Manual) 0, Basophils % (Manual) 0, Band Neutrophils 1, Platelet Estimate 

DecreasedL, Platelet Morphology Normal, Hypochromasia 1+, Anisocytosis 2+, 

Sodium Level 148H, Potassium Level 5.1, Chloride Level 118H, Carbon Dioxide 

Level 23, Anion Gap 7, Blood Urea Nitrogen 28H, Creatinine 0.7, Estimat 

Glomerular Filtration Rate , Glucose Level 166H, Calcium Level 7.9L, Total 

Creatine Kinase 137





Current Medications








 Medications


  (Trade)  Dose


 Ordered  Sig/Violette


 Route


 PRN Reason  Start Time


 Stop Time Status Last Admin


Dose Admin


 


 Acetaminophen


  (Tylenol)  650 mg  Q4H  PRN


 NG


 Mild Pain/Temp > 100.5  2/18/19 16:30


 3/14/19 16:29  2/19/19 18:46


 


 


 Ceftaroline


 Fosamil 400 mg/


 Sodium Chloride  55 ml @ 55


 mls/hr  Q12HR


 IV


   2/18/19 21:00


 2/23/19 08:59  2/21/19 09:46


 


 


 Clonidine HCl


  (Catapres Tab)  0.1 mg  Q8H  PRN


 NG


 SBP>160mmHg  2/18/19 16:30


 3/10/19 16:29   


 


 


 Daptomycin 600 mg/


 Sodium Chloride  55 ml @ 


 100 mls/hr  Q24H


 IV


   2/19/19 18:00


 2/26/19 17:59  2/20/19 18:21


 


 


 Dextrose


  (Dextrose 50%)  25 ml  Q30M  PRN


 IV


 Hypoglycemia  2/18/19 16:45


 3/9/19 13:30  2/20/19 13:21


 


 


 Dextrose


  (Dextrose 50%)  50 ml  Q30M  PRN


 IV


 hypoglycemia  2/18/19 16:45


 3/9/19 13:44  2/18/19 19:28


 


 


 Insulin Aspart


  (NovoLOG)    EVERY 6  HOURS


 SUBQ


   2/18/19 18:00


 3/9/19 16:29  2/21/19 00:28


 


 


 Lansoprazole


  (Prevacid)  30 mg  DAILY


 NG


   2/19/19 09:00


 3/18/19 08:59  2/21/19 08:48


 


 


 Meropenem 1 gm/


 Sodium Chloride  55 ml @ 


 110 mls/hr  Q12H


 IVPB


   2/20/19 18:00


 2/25/19 17:59  2/21/19 05:57


 


 


 Olanzapine


  (ZyPREXA)  2.5 mg  BID


 NG


   2/18/19 18:00


 3/10/19 10:59  2/21/19 08:48


 


 


 Ondansetron HCl


  (Zofran)  4 mg  Q6H  PRN


 IVP


 Nausea & Vomiting  2/18/19 16:30


 3/10/19 16:29   


 

















Korin Junior MD Feb 21, 2019 12:02

## 2019-02-21 NOTE — CARDIOLOGY PROGRESS NOTE
Assessment/Plan


Assessment/Plan


continue to monitor, MARTHA early next week, will need NG tube out for MARTHA will d/

w GI





Subjective


Subjective


The patient is confused and screaming, but occasionally is responding to 

questions, she looks slightly better





Objective





Last 24 Hour Vital Signs








  Date Time  Temp Pulse Resp B/P (MAP) Pulse Ox O2 Delivery O2 Flow Rate FiO2


 


2/21/19 20:00      Nasal Cannula 2.0 


 


2/21/19 20:00 98.4 99 24 115/64 (81) 98   


 


2/21/19 16:00      Non-Rebreather 15.0 


 


2/21/19 16:00 98.9 91 19 113/70 (84) 100   


 


2/21/19 16:00  106      


 


2/21/19 12:00  92      


 


2/21/19 12:00 98.7 94 16 137/72 (93) 98   


 


2/21/19 12:00      Non-Rebreather 15.0 


 


2/21/19 08:00 97.8 82 16 137/57 (83) 95   


 


2/21/19 08:00  75      


 


2/21/19 08:00      Non-Rebreather 15.0 


 


2/21/19 04:00 97.9 62 20 127/70 (89) 96   


 


2/21/19 03:29      Non-Rebreather 15.0 


 


2/21/19 03:29  68      


 


2/21/19 00:00 97.7 52 20 144/69 (94) 100   


 


2/20/19 23:25  70      








General Appearance:  other - severely ill appearing


EENT:  PERRL/EOMI, other - mucosae dry


Neck:  no JVD


Rhythm:  NSR


Cardiovascular:  normal rate


Respiratory/Chest:  rhonchi - bilaterally


Abdomen:  distended


Extremities:  other -  both arms erythema, but darker


Neurologic:  CNs II-XII grossly normal











Intake and Output  


 


 2/20/19 2/21/19





 19:00 07:00


 


Intake Total 695 ml 815 ml


 


Output Total 50 ml 450 ml


 


Balance 645 ml 365 ml


 


  


 


Intake Free Water  100 ml


 


IV Total 255 ml 110 ml


 


Tube Feeding 440 ml 605 ml


 


Output Urine Total 50 ml 450 ml


 


# Voids 2 


 


# Bowel Movements 1 2











Laboratory Tests








Test


  2/21/19


04:05 2/21/19


15:58


 


White Blood Count


  6.6 K/UL


(4.8-10.8) 


 


 


Red Blood Count


  2.86 M/UL


(4.20-5.40)  L 


 


 


Hemoglobin


  8.3 G/DL


(12.0-16.0)  L 


 


 


Hematocrit


  26.0 %


(37.0-47.0)  L 


 


 


Mean Corpuscular Volume 91 FL (80-99)   


 


Mean Corpuscular Hemoglobin


  28.9 PG


(27.0-31.0) 


 


 


Mean Corpuscular Hemoglobin


Concent 31.8 G/DL


(32.0-36.0)  L 


 


 


Red Cell Distribution Width


  18.9 %


(11.6-14.8)  H 


 


 


Platelet Count


  93 K/UL


(150-450)  L 


 


 


Mean Platelet Volume


  10.6 FL


(6.5-10.1)  H 


 


 


Neutrophils (%) (Auto)


  % (45.0-75.0)


  


 


 


Lymphocytes (%) (Auto)


  % (20.0-45.0)


  


 


 


Monocytes (%) (Auto)  % (1.0-10.0)   


 


Eosinophils (%) (Auto)  % (0.0-3.0)   


 


Basophils (%) (Auto)  % (0.0-2.0)   


 


Differential Total Cells


Counted 100  


  


 


 


Neutrophils % (Manual) 92 % (45-75)  H 


 


Lymphocytes % (Manual) 5 % (20-45)  L 


 


Monocytes % (Manual) 2 % (1-10)   


 


Eosinophils % (Manual) 0 % (0-3)   


 


Basophils % (Manual) 0 % (0-2)   


 


Band Neutrophils 1 % (0-8)   


 


Platelet Estimate Decreased  L 


 


Platelet Morphology Normal   


 


Hypochromasia 1+   


 


Anisocytosis 2+   


 


Sodium Level


  148 MMOL/L


(136-145)  H 


 


 


Potassium Level


  5.1 MMOL/L


(3.5-5.1) 


 


 


Chloride Level


  118 MMOL/L


()  H 


 


 


Carbon Dioxide Level


  23 MMOL/L


(21-32) 


 


 


Anion Gap


  7 mmol/L


(5-15) 


 


 


Blood Urea Nitrogen


  28 mg/dL


(7-18)  H 


 


 


Creatinine


  0.7 MG/DL


(0.55-1.30) 


 


 


Estimat Glomerular Filtration


Rate  mL/min (>60)  


  


 


 


Glucose Level


  166 MG/DL


()  H 


 


 


Calcium Level


  7.9 MG/DL


(8.5-10.1)  L 


 


 


Total Creatine Kinase


  137 U/L


() 


 


 


Arterial Blood pH


  


  7.467


(7.350-7.450)


 


Arterial Blood Partial


Pressure CO2 


  32.5 mmHg


(35.0-45.0)  L


 


Arterial Blood Partial


Pressure O2 


  413.6 mmHg


(75.0-100.0)  H


 


Arterial Blood HCO3


  


  23.0 mmol/L


(22.0-26.0)


 


Arterial Blood Oxygen


Saturation 


  99.0 %


()


 


Arterial Blood Base Excess  -0.4 (-2-2)  


 


Frank Test  Positive  











Microbiology








 Date/Time


Source Procedure


Growth Status


 


 


 2/20/19 03:50


Blood Blood Culture - Preliminary Resulted


 


 2/20/19 03:45


Blood Blood Culture - Preliminary Resulted


 


 2/19/19 04:20


Blood Blood Culture - Preliminary


Gram Negative Bacillus 1 Resulted


 


 2/19/19 04:00


Blood Blood Culture - Preliminary


Gram Negative Bacillus 1


Staphylococcus Aureus Resulted





 2/18/19 23:50


Sputum AFB Specimen Processing Tissue - Final Resulted





 2/18/19 23:50


Sputum Acid Fast Bacilli Smear - Final Resulted


 


 2/18/19 23:50


Sputum Acid Fast Bacilli Culture


Pending Resulted

















Lara Tong MD Feb 21, 2019 21:24

## 2019-02-21 NOTE — SURGERY PROGRESS NOTE
Surgery Progress Note


Subjective


Additional Comments


EGD planned for tomorrow. labs noted.  exam unchanged.





Objective





Last 24 Hour Vital Signs








  Date Time  Temp Pulse Resp B/P (MAP) Pulse Ox O2 Delivery O2 Flow Rate FiO2


 


2/21/19 08:00 97.8 82 16 137/57 (83) 95   


 


2/21/19 08:00  75      


 


2/21/19 08:00      Non-Rebreather 15.0 


 


2/21/19 04:00 97.9 62 20 127/70 (89) 96   


 


2/21/19 03:29      Non-Rebreather 15.0 


 


2/21/19 03:29  68      


 


2/21/19 00:00 97.7 52 20 144/69 (94) 100   


 


2/20/19 23:25  70      


 


2/20/19 20:00      Non-Rebreather 15.0 


 


2/20/19 20:00 97.9 89 20 135/64 (87) 100   


 


2/20/19 19:01  75      


 


2/20/19 16:00 97.3 91 15 133/64 (87) 100   


 


2/20/19 16:00  82      


 


2/20/19 16:00      Non-Rebreather 15.0 








I&O











Intake and Output  


 


 2/20/19 2/21/19





 19:00 07:00


 


Intake Total 695 ml 760 ml


 


Output Total 50 ml 450 ml


 


Balance 645 ml 310 ml


 


  


 


Intake Free Water  100 ml


 


IV Total 255 ml 110 ml


 


Tube Feeding 440 ml 550 ml


 


Output Urine Total 50 ml 450 ml


 


# Voids 2 


 


# Bowel Movements 1 2








Dressing:  other


Wound:  other


Drains:  other


Cardiovascular:  RSR


Respiratory:  decreased breath sounds


Abdomen:  soft, present bowel sounds, non-distended


Extremities:  edema, tenderness, other





Laboratory Tests








Test


  2/21/19


04:05


 


White Blood Count


  6.6 K/UL


(4.8-10.8)


 


Red Blood Count


  2.86 M/UL


(4.20-5.40)  L


 


Hemoglobin


  8.3 G/DL


(12.0-16.0)  L


 


Hematocrit


  26.0 %


(37.0-47.0)  L


 


Mean Corpuscular Volume 91 FL (80-99)  


 


Mean Corpuscular Hemoglobin


  28.9 PG


(27.0-31.0)


 


Mean Corpuscular Hemoglobin


Concent 31.8 G/DL


(32.0-36.0)  L


 


Red Cell Distribution Width


  18.9 %


(11.6-14.8)  H


 


Platelet Count


  93 K/UL


(150-450)  L


 


Mean Platelet Volume


  10.6 FL


(6.5-10.1)  H


 


Neutrophils (%) (Auto)


  % (45.0-75.0)


 


 


Lymphocytes (%) (Auto)


  % (20.0-45.0)


 


 


Monocytes (%) (Auto)  % (1.0-10.0)  


 


Eosinophils (%) (Auto)  % (0.0-3.0)  


 


Basophils (%) (Auto)  % (0.0-2.0)  


 


Differential Total Cells


Counted 100  


 


 


Neutrophils % (Manual) 92 % (45-75)  H


 


Lymphocytes % (Manual) 5 % (20-45)  L


 


Monocytes % (Manual) 2 % (1-10)  


 


Eosinophils % (Manual) 0 % (0-3)  


 


Basophils % (Manual) 0 % (0-2)  


 


Band Neutrophils 1 % (0-8)  


 


Platelet Estimate Decreased  L


 


Platelet Morphology Normal  


 


Hypochromasia 1+  


 


Anisocytosis 2+  


 


Sodium Level


  148 MMOL/L


(136-145)  H


 


Potassium Level


  5.1 MMOL/L


(3.5-5.1)


 


Chloride Level


  118 MMOL/L


()  H


 


Carbon Dioxide Level


  23 MMOL/L


(21-32)


 


Anion Gap


  7 mmol/L


(5-15)


 


Blood Urea Nitrogen


  28 mg/dL


(7-18)  H


 


Creatinine


  0.7 MG/DL


(0.55-1.30)


 


Estimat Glomerular Filtration


Rate  mL/min (>60)  


 


 


Glucose Level


  166 MG/DL


()  H


 


Calcium Level


  7.9 MG/DL


(8.5-10.1)  L


 


Total Creatine Kinase


  137 U/L


()











Plan


Problems:  


(1) Sepsis


Assessment & Plan:  Leukocytosis resolved 


LFT's elevated w/ t bili / d bili - improved 


AM labs ordered 


hydrate


US Impression: Limited exam, as described. Note inability to visualize the 

spleen Negative for gallstones or dilated bile ducts Questionable bilateral 

renal parapelvic cysts Small to moderate right pleural effusion


no acute surgical intervention planned. 


MARTHA for septic emboli


consider removing pacemaker as harboring sepsis source if MARTHA demonstrates 

lesions


tube feeds as tolerated 


will follow with recs. 





(2) Sacral decubitus ulcer, stage III


Assessment & Plan:  DTPI sacrum.Maroon- indurated discoloration with opening at 

sacral coccygeal area(L)3.8cm x (W)1.4cm with entire wound measuring (L)10cmx(W)

12cm.Small amt malodorous brown exudate.


Stable dry eschar R heel with dry peeling skin periwound (L)2cm x (W)3.5cm. 

Periwound is also red and boggy. 


Small dry eschar noted to lateral R 5th metatarsal (L)1.4cm x (W)0.3cm.  


L heel is boggy but colour is dusky . 


Resolving skin tear lateral R tibia. Wound bed is clean and dry.  





Tx. Plan:


Cleanse Sacral wound with Saline.Apply Therahoney to opening at sacrococcygeal 

area.Cavilon skin barrier to DTPI. Cover with Optifoam drsg .Change Daily and 

prn.


            


Apply Betadine to R heel  and Lateral R 5th metatarsal.Cover with Abd pad .Wrap 

with kerlix daily and prn.


            


Apply Cavilon Skin Barrier to L heel.Cover with Optifoam drsg .Change every 7 

days and prn.


            


Reposition at least every 2hours or as tolerated.


            


Off-load heels with pillow.





(3) Rectal bleed


(4) Protein-calorie malnutrition, severe


(5) Dehydration


(6) IV infiltration


Assessment & Plan:  left hand IV infiltration prior and now with edema/

irritation noted + ecchymosis on hand and arm 


keep hand and arm elevated  


medication reaction and will need to be monitored for worsening 


poor tissue / protoplasm 





CT - Diffuse edema of the subcutaneous and deep fat and likely upper arm and


forearm musculature. Given stated clinical history, possibly secondary to 

cellulitis


and myositis, but could also be hemodynamic in origin. No discrete rim-enhancing


collection to suggest abscess


 


No CT evidence of acute osteomyelitis. Note, however, somewhat limited 

sensitivity of


CT for such. Consider  bone scan for further evaluation if there is high 

clinical


suspicion














Tuan Patricia Feb 21, 2019 12:55

## 2019-02-21 NOTE — INTERNAL MED PROGRESS NOTE
Subjective


Date of Service:  Feb 21, 2019


Physician Name


Cheo Ortiz


Attending Physician


Juan Carlos Magana MD





Current Medications








 Medications


  (Trade)  Dose


 Ordered  Sig/Violette


 Route


 PRN Reason  Start Time


 Stop Time Status Last Admin


Dose Admin


 


 Acetaminophen


  (Tylenol)  650 mg  Q4H  PRN


 NG


 Mild Pain/Temp > 100.5  2/18/19 16:30


 3/14/19 16:29  2/19/19 18:46


 


 


 Ceftaroline


 Fosamil 400 mg/


 Sodium Chloride  55 ml @ 55


 mls/hr  Q12HR


 IV


   2/18/19 21:00


 2/23/19 08:59  2/21/19 09:46


 


 


 Clonidine HCl


  (Catapres Tab)  0.1 mg  Q8H  PRN


 NG


 SBP>160mmHg  2/18/19 16:30


 3/10/19 16:29   


 


 


 Daptomycin 600 mg/


 Sodium Chloride  55 ml @ 


 100 mls/hr  Q24H


 IV


   2/19/19 18:00


 2/26/19 17:59  2/21/19 18:23


 


 


 Dextrose


  (Dextrose 50%)  25 ml  Q30M  PRN


 IV


 Hypoglycemia  2/18/19 16:45


 3/9/19 13:30  2/20/19 13:21


 


 


 Dextrose


  (Dextrose 50%)  50 ml  Q30M  PRN


 IV


 hypoglycemia  2/18/19 16:45


 3/9/19 13:44  2/18/19 19:28


 


 


 Insulin Aspart


  (NovoLOG)    EVERY 6  HOURS


 SUBQ


   2/18/19 18:00


 3/9/19 16:29  2/21/19 18:23


 


 


 Lansoprazole


  (Prevacid)  30 mg  DAILY


 NG


   2/19/19 09:00


 3/18/19 08:59  2/21/19 08:48


 


 


 Meropenem 1 gm/


 Sodium Chloride  55 ml @ 


 110 mls/hr  Q12H


 IVPB


   2/20/19 18:00


 2/25/19 17:59  2/21/19 18:25


 


 


 Olanzapine


  (ZyPREXA)  2.5 mg  BID


 NG


   2/18/19 18:00


 3/10/19 10:59  2/21/19 18:23


 


 


 Ondansetron HCl


  (Zofran)  4 mg  Q6H  PRN


 IVP


 Nausea & Vomiting  2/18/19 16:30


 3/10/19 16:29   


 








Allergies:  


Coded Allergies:  


     No Known Allergies (Unverified , 3/26/14)


ROS Limited/Unobtainable:  Yes


Subjective


79 YO F admitted with dehydration and hypoxia.  Now sepsis.  Cover for Int Med-

Dr Magana.  Continues on non rebreather mask.  FAWN





Objective





Last Vital Signs








  Date Time  Temp Pulse Resp B/P (MAP) Pulse Ox O2 Delivery O2 Flow Rate FiO2


 


2/21/19 16:00      Non-Rebreather 15.0 


 


2/21/19 16:00 98.9 91 19 113/70 (84) 100   


 


2/19/19 09:19        100











Laboratory Tests








Test


  2/21/19


04:05 2/21/19


15:58


 


White Blood Count


  6.6 K/UL


(4.8-10.8) 


 


 


Red Blood Count


  2.86 M/UL


(4.20-5.40)  L 


 


 


Hemoglobin


  8.3 G/DL


(12.0-16.0)  L 


 


 


Hematocrit


  26.0 %


(37.0-47.0)  L 


 


 


Mean Corpuscular Volume 91 FL (80-99)   


 


Mean Corpuscular Hemoglobin


  28.9 PG


(27.0-31.0) 


 


 


Mean Corpuscular Hemoglobin


Concent 31.8 G/DL


(32.0-36.0)  L 


 


 


Red Cell Distribution Width


  18.9 %


(11.6-14.8)  H 


 


 


Platelet Count


  93 K/UL


(150-450)  L 


 


 


Mean Platelet Volume


  10.6 FL


(6.5-10.1)  H 


 


 


Neutrophils (%) (Auto)


  % (45.0-75.0)


  


 


 


Lymphocytes (%) (Auto)


  % (20.0-45.0)


  


 


 


Monocytes (%) (Auto)  % (1.0-10.0)   


 


Eosinophils (%) (Auto)  % (0.0-3.0)   


 


Basophils (%) (Auto)  % (0.0-2.0)   


 


Differential Total Cells


Counted 100  


  


 


 


Neutrophils % (Manual) 92 % (45-75)  H 


 


Lymphocytes % (Manual) 5 % (20-45)  L 


 


Monocytes % (Manual) 2 % (1-10)   


 


Eosinophils % (Manual) 0 % (0-3)   


 


Basophils % (Manual) 0 % (0-2)   


 


Band Neutrophils 1 % (0-8)   


 


Platelet Estimate Decreased  L 


 


Platelet Morphology Normal   


 


Hypochromasia 1+   


 


Anisocytosis 2+   


 


Sodium Level


  148 MMOL/L


(136-145)  H 


 


 


Potassium Level


  5.1 MMOL/L


(3.5-5.1) 


 


 


Chloride Level


  118 MMOL/L


()  H 


 


 


Carbon Dioxide Level


  23 MMOL/L


(21-32) 


 


 


Anion Gap


  7 mmol/L


(5-15) 


 


 


Blood Urea Nitrogen


  28 mg/dL


(7-18)  H 


 


 


Creatinine


  0.7 MG/DL


(0.55-1.30) 


 


 


Estimat Glomerular Filtration


Rate  mL/min (>60)  


  


 


 


Glucose Level


  166 MG/DL


()  H 


 


 


Calcium Level


  7.9 MG/DL


(8.5-10.1)  L 


 


 


Total Creatine Kinase


  137 U/L


() 


 


 


Arterial Blood pH


  


  7.467


(7.350-7.450)


 


Arterial Blood Partial


Pressure CO2 


  32.5 mmHg


(35.0-45.0)  L


 


Arterial Blood Partial


Pressure O2 


  413.6 mmHg


(75.0-100.0)  H


 


Arterial Blood HCO3


  


  23.0 mmol/L


(22.0-26.0)


 


Arterial Blood Oxygen


Saturation 


  99.0 %


()


 


Arterial Blood Base Excess  -0.4 (-2-2)  


 


Frank Test  Positive  











Microbiology








 Date/Time


Source Procedure


Growth Status


 


 


 2/20/19 03:50


Blood Blood Culture - Preliminary Resulted


 


 2/20/19 03:45


Blood Blood Culture - Preliminary Resulted


 


 2/19/19 04:20


Blood Blood Culture - Preliminary


Gram Negative Bacillus 1 Resulted


 


 2/19/19 04:00


Blood Blood Culture - Preliminary


Gram Negative Bacillus 1


Staphylococcus Aureus Resulted





 2/18/19 23:50


Sputum AFB Specimen Processing Tissue - Final Resulted





 2/18/19 23:50


Sputum Acid Fast Bacilli Smear - Final Resulted


 


 2/18/19 23:50


Sputum Acid Fast Bacilli Culture


Pending Resulted

















Intake and Output  


 


 2/20/19 2/21/19





 19:00 07:00


 


Intake Total 695 ml 815 ml


 


Output Total 50 ml 450 ml


 


Balance 645 ml 365 ml


 


  


 


Intake Free Water  100 ml


 


IV Total 255 ml 110 ml


 


Tube Feeding 440 ml 605 ml


 


Output Urine Total 50 ml 450 ml


 


# Voids 2 


 


# Bowel Movements 1 2








Objective


Objective


General: No acute distress, awake and less responsive, Sleepy, cachectic.


HEENT: NCAT, sclera anicteric, PERRL, NG tube.


Neck: Supple, no significant jugular venous distention, 


Lungs: Non-rebreather mask; Poor inspiratory effort, decreased air in the bases 

, Bilateral Wheeze and Rales.


Heart: Regular rate and rhythm, normal S1/S2, no murmur.


Abdomen: soft, nontender, nondistended. Normoactive bowel sounds.


 / Rectal: Refused and deferred.


Extremities: No Cyanosis , No clubbing,  Left UE edema.  Lateral lower 

extremity / feet dressing intact.


Neuro: A&O x 1, Able to move all extremities


Skin: warm, no rashes or lesions, ecchymosis in bilateral upper extremity noted.





Assessment/Plan


1. Sepsis /  METHACILLIN RESISTANT STAPHYLOCOCCUS AUREUS bacteremia, lactic 

acidosis.


2. Failure to thrive with severe protein calorie malnutrition.


3. Coronary artery disease.


4. Diabetes, type 2.


5. Hypertension.


6. Sick sinus syndrome.


7. Hypercholesterolemia.


8. Aortic stenosis.


9. Pulmonary hypertension.


10. Alzheimer dementia.


11.  Acute tubular necrosis/acute kidney injury most likely secondary to 

dehydration and sepsis.


12. Sacral decubitus ulcer, stage III


13. Severe dehydration


14.  Hypokalemia.


15.  Abnormal liver function test


16.  Anemia/thrombocytopenia


17.  Bilateral pneumonia ?septic emboli?





TREATMENT:


1.  Patient on broad spectrum antibiotics with meropenem and ceftaroline.  Will 

require transesophageal echocardiogram to R/O endocarditis-see ID note


2. Coronary artery disease.  The patient is status post coronary artery


bypass graft in 2017.


3. Diabetes, type 2.  The patient has been started on NovoLog sliding


scale.  


4. Hypertension.  The patient is to continue on atenolol and losartan as


above.


5. Sick sinus syndrome.  The patient is status post pacemaker


implantation.


6. Hypercholesterolemia.  Continue simvastatin as above.


7. Aortic stenosis.


8. Pulmonary hypertension.


9. Alzheimer dementia.


10.  Anemia/thrombocytopenia-S/P 1 unit PRBC on 2/11/19.  Heme/Onc consult.  

Hold heparin


11.  Septic pulmonary emboli-await MARTHA on Tuesday 2/19/19-see cardiology note-

needs NG removed for MARTHA-Discussed with Dr Tong





Restart tube feeding at rate of 45 cc/hr.


CODE STATUS is full code as per POLST,


DVT prophylaxis: D/C Heparin subcu due to thrombocytopenia











Cheo Ortiz MD Feb 21, 2019 19:33

## 2019-02-21 NOTE — NUR
NURSE NOTES:



Received pt from SDU via hospital bed. In no acute distress. NG tube intact and patent. AKIN 
midline intact and patent. Bed in lowest position, HOB elevated, call light within reach. 
Will continue plan of care.

## 2019-02-21 NOTE — NUR
NURSE NOTES:



Received report from ORLY Orozco RN. Patient asleep in bed, responsive to tactile stimuli. 
Receiving O2 via non-rebreather mask @ 15 L/min, no s/s of respiratory distress noted. Left 
NGT in place with feeding of Glucerna 1.2 running @ 55 cc/hr, no residuals noted. HoB 
elevated. Female external catheter in place and attached to low, continuous suction. Right 
upper arm midline patent and asymptomatic. Bed locked in lowest position with side rails up 
x 3. All needs attended to. Call light within reach. Will continue to monitor.

## 2019-02-21 NOTE — NUR
NURSE NOTES:

Transferred patient to Tele 208-2,no s/s of respiratory distress or pain noted,tolerated 
portable oxygen well with 2 L/min,belongings list signed,report given to ESCOBAR Steel.

## 2019-02-21 NOTE — NUR
P.T Note:

P.T evaluation completed and treatment initiated. Please refer to P.T evaluation for current 
functional status. Pt is alert, O x 4  and cooperative. Pt appeared fatigued and exhausted 
even with conversation. Pt denied c/o pain and was agreeable to participate in   P.T 
evaluation. Pt currently require  MIN A X 1 for bed mobility and transfer activities using 
the FWW. Pt  was only able to ambulate with FWW, MIN A  not more than 4 steps, due to 
dizziness ,  fatigue and feeling exhausted. Pt is limited by generalized weakness, poor 
activity tolerance, SOB   and symptomatic  orthostatics resulting to dizziness  :  BP was 
99/66 in sitting then dropped to 66/33 mmhg after standing and gait/ambulation assessment. 
BP recovered to 110/68 mmhg after pt was positioned to Trendelenberg  position. RN 
notified/aware. Skilled P.T service is warranted to improve strength, endurance /activity 
tolerance to improve her ADL/functional mobility independence and safety. Recommend SNF for 
further rehab VS home with P.T depending on progress. DME to include CYNTHIA Lock for this 
referral.  

-------------------------------------------------------------------------------

Addendum: 02/21/19 at 1215 by WILLY CARDENAS PT

-------------------------------------------------------------------------------



CORRECTION : THE ABOVE P.T  NOTES WAS A WRONG ENTRY!! PLEASE DISREGARD THE ABOVE P.T NOTES. 
THANK YOU.

## 2019-02-21 NOTE — NUR
NURSE NOTES:

Received bedside report from ESCOBAR Escoto.Patient stable,SR on cardiac monitor,no s/s of 
pain,no respiratory distress noted,tolerated well 2 L/min via N/A Sat O2 %,NGT on L 
nares running with Glucerna 1.2 @55 ml/hr,BS active in all quadrants,pt on P 200 mattress 
d/t pressure ulcers,IV on a DOMINIC midline,last time dressing changed on 2/21/19,bed secured 
call light within a reach,will continue to monitor and follow POC.

## 2019-02-22 VITALS — DIASTOLIC BLOOD PRESSURE: 73 MMHG | SYSTOLIC BLOOD PRESSURE: 134 MMHG

## 2019-02-22 VITALS — SYSTOLIC BLOOD PRESSURE: 106 MMHG | DIASTOLIC BLOOD PRESSURE: 69 MMHG

## 2019-02-22 VITALS — SYSTOLIC BLOOD PRESSURE: 147 MMHG | DIASTOLIC BLOOD PRESSURE: 64 MMHG

## 2019-02-22 VITALS — DIASTOLIC BLOOD PRESSURE: 91 MMHG | SYSTOLIC BLOOD PRESSURE: 161 MMHG

## 2019-02-22 VITALS — SYSTOLIC BLOOD PRESSURE: 155 MMHG | DIASTOLIC BLOOD PRESSURE: 60 MMHG

## 2019-02-22 VITALS — DIASTOLIC BLOOD PRESSURE: 77 MMHG | SYSTOLIC BLOOD PRESSURE: 150 MMHG

## 2019-02-22 LAB
ADD MANUAL DIFF: YES
ANION GAP SERPL CALC-SCNC: 8 MMOL/L (ref 5–15)
BUN SERPL-MCNC: 29 MG/DL (ref 7–18)
CALCIUM SERPL-MCNC: 8.5 MG/DL (ref 8.5–10.1)
CHLORIDE SERPL-SCNC: 117 MMOL/L (ref 98–107)
CO2 SERPL-SCNC: 24 MMOL/L (ref 21–32)
CREAT SERPL-MCNC: 0.8 MG/DL (ref 0.55–1.3)
ERYTHROCYTE [DISTWIDTH] IN BLOOD BY AUTOMATED COUNT: 18.8 % (ref 11.6–14.8)
HCT VFR BLD CALC: 27.1 % (ref 37–47)
HGB BLD-MCNC: 8.6 G/DL (ref 12–16)
MCV RBC AUTO: 92 FL (ref 80–99)
PLATELET # BLD: 123 K/UL (ref 150–450)
POTASSIUM SERPL-SCNC: 4.8 MMOL/L (ref 3.5–5.1)
RBC # BLD AUTO: 2.95 M/UL (ref 4.2–5.4)
SODIUM SERPL-SCNC: 149 MMOL/L (ref 136–145)
WBC # BLD AUTO: 6.2 K/UL (ref 4.8–10.8)

## 2019-02-22 RX ADMIN — ACETAMINOPHEN PRN MG: 160 SOLUTION ORAL at 13:01

## 2019-02-22 RX ADMIN — DEXTROSE MONOHYDRATE SCH MLS/HR: 50 INJECTION, SOLUTION INTRAVENOUS at 09:00

## 2019-02-22 RX ADMIN — OLANZAPINE SCH MG: 2.5 TABLET, FILM COATED ORAL at 17:31

## 2019-02-22 RX ADMIN — MEROPENEM SCH MLS/HR: 1 INJECTION INTRAVENOUS at 06:27

## 2019-02-22 RX ADMIN — INSULIN ASPART SCH UNITS: 100 INJECTION, SOLUTION INTRAVENOUS; SUBCUTANEOUS at 11:33

## 2019-02-22 RX ADMIN — INSULIN ASPART SCH UNITS: 100 INJECTION, SOLUTION INTRAVENOUS; SUBCUTANEOUS at 00:00

## 2019-02-22 RX ADMIN — MEROPENEM SCH MLS/HR: 1 INJECTION INTRAVENOUS at 17:31

## 2019-02-22 RX ADMIN — INSULIN ASPART SCH UNITS: 100 INJECTION, SOLUTION INTRAVENOUS; SUBCUTANEOUS at 07:17

## 2019-02-22 RX ADMIN — DAPTOMYCIN SCH MLS/HR: 500 INJECTION, POWDER, LYOPHILIZED, FOR SOLUTION INTRAVENOUS at 17:31

## 2019-02-22 RX ADMIN — OLANZAPINE SCH MG: 2.5 TABLET, FILM COATED ORAL at 10:17

## 2019-02-22 RX ADMIN — INSULIN ASPART SCH UNITS: 100 INJECTION, SOLUTION INTRAVENOUS; SUBCUTANEOUS at 17:35

## 2019-02-22 RX ADMIN — DEXTROSE MONOHYDRATE SCH MLS/HR: 50 INJECTION, SOLUTION INTRAVENOUS at 21:39

## 2019-02-22 NOTE — NUR
NURSE NOTES:

Received pt. and report from ESCOBAR Parada. Observed pt. resting in bed with both eyes closed. 
Pt. is A/O x1; responds to name only. Cardiac monitor is in placed. Pt. has a DOMINIC midline; 
intact, asymptomatic, patent, and saline locked. Pt. has a NGT currently feeding Glucerna 
1.2 @ 55cc/hr. Aspiration precaution noted; HOB is at 30 degrees. Call light is within 
reach, bed is in the lowest position and locked. No acute distress noted at this time. Will 
continue plan of care.

## 2019-02-22 NOTE — GENERAL PROGRESS NOTE
Assessment/Plan


Assessment/Plan








ASSESSMENT/RECS:





1. Pancytopenia with thrombocytopenia that is severe is most likely related to 

Sepsis, lactic acidosis. In addition has received heparin. US abdomen ordered 

and shows no spleen and no cirrhosis


--> heparin discontinued, does not require it


--> HIT ab test reviewed


--> venous duplex negative


--> smear peripheral has been reviewed and negative for abnml cells


--> tumor markers as needed/prn


--> hold off on transfusion unless plt <20k


--> hold off on steriods


--> plt trend: 34--> 50k-->78k-->93k-->123k


2. Failure to thrive with severe protein calorie malnutrition.


->> calorie counts daily


--> consider mirtazapine as needed per pcp


--> getting ngtfs


3. Anemia of chronic disease


--> panel has been reviewed


--> transfuse if hgb <7


4. Leukocytosis


--> on abx as per id for infection


--> trend as needed, has multople infections


5. Hypertension.


6. Sick sinus syndrome.


7. Hypercholesterolemia.


8. Aortic stenosis.


9. Pulmonary hypertension.


10. Alzheimer dementia.


11. Acute tubular necrosis/acute kidney injury most likely secondary to 

dehydration and sepsis.





Greatly appreciate consultation!





Subjective


ROS Limited/Unobtainable:  Yes


Allergies:  


Coded Allergies:  


     No Known Allergies (Unverified , 3/26/14)


Subjective


2/14:seen by bedside, Possible EGD tomorrow to evaluate upper GI bleed, today's 

EGD was canceled by anesthesia, plt remains low at 34, hgb trending up . 


2/15: Scheduled for EGD today,CT w/ cavitary lung lessions; placed on isolation

, remains bacteremic, wbc 12.


2/17: continues to be somewhat sob, imaging reviewed, labs are better this am, 

plts uptrending, getting ngtfs


2/18: awake, comfortable, no events


2/19: Pt is confused, screaming, Left upper extremity ecchymosis, edema. no 

abscess,ID following, plt trending up.


2/20: awake, comfortable, no acute distress.


2/21: no events, trending plt is trending up slowly, on abx


2/22: seen by bedside, remains lethargic, no events





Objective





Last 24 Hour Vital Signs








  Date Time  Temp Pulse Resp B/P (MAP) Pulse Ox O2 Delivery O2 Flow Rate FiO2


 


2/22/19 20:00 98.0 84 20 106/69 (81) 93   


 


2/22/19 16:04 96.6 97 20 150/77 (101) 93   


 


2/22/19 16:00  91      


 


2/22/19 13:31 98.8       


 


2/22/19 12:00  106      


 


2/22/19 12:00 96.3 96 20 161/91 (114) 99   


 


2/22/19 09:00      Nasal Cannula 2.0 


 


2/22/19 08:15     94 Nasal Cannula 2.0 28


 


2/22/19 08:15      Nasal Cannula 2.0 28


 


2/22/19 08:00  83      


 


2/22/19 08:00 98.8 103 20 134/73 (93) 100   


 


2/22/19 04:00 99.2 83 20 155/60 (91) 96   


 


2/22/19 04:00  83      


 


2/22/19 00:00  86      


 


2/22/19 00:00 98.8 86 20 147/64 (91) 97   

















Intake and Output  


 


 2/21/19 2/22/19





 19:00 07:00


 


Intake Total 925 ml 810 ml


 


Output Total 200 ml 600 ml


 


Balance 725 ml 210 ml


 


  


 


Intake Free Water  150 ml


 


IV Total 165 ml 55 ml


 


Tube Feeding 660 ml 605 ml


 


Other 100 ml 


 


Output Urine Total 200 ml 600 ml


 


# Bowel Movements 1 1








Laboratory Tests


2/22/19 06:45: 


White Blood Count 6.2, Red Blood Count 2.95L, Hemoglobin 8.6L, Hematocrit 27.1L

, Mean Corpuscular Volume 92, Mean Corpuscular Hemoglobin 29.2, Mean 

Corpuscular Hemoglobin Concent 31.8L, Red Cell Distribution Width 18.8H, 

Platelet Count 123L, Mean Platelet Volume 8.6, Neutrophils (%) (Auto) , 

Lymphocytes (%) (Auto) , Monocytes (%) (Auto) , Eosinophils (%) (Auto) , 

Basophils (%) (Auto) , Differential Total Cells Counted 100, Neutrophils % (

Manual) 87H, Lymphocytes % (Manual) 7L, Monocytes % (Manual) 3, Eosinophils % (

Manual) 0, Basophils % (Manual) 0, Band Neutrophils 3, Platelet Estimate 

DecreasedL, Platelet Morphology Normal, Hypochromasia 1+, Anisocytosis 1+, 

Sodium Level 149H, Potassium Level 4.8, Chloride Level 117H, Carbon Dioxide 

Level 24, Anion Gap 8, Blood Urea Nitrogen 29H, Creatinine 0.8, Estimat 

Glomerular Filtration Rate , Glucose Level 163H, Calcium Level 8.5


Height (Feet):  5


Height (Inches):  5.00


Weight (Pounds):  147


Objective





GENERAL: Awake responsive to deep stimuli with opening of her eyes


HEENT:  Eyes, pupils equal responsive. ++ ngtf


CHEST: Poor inspiratory effort, decreased


CARDIOVASCULAR:  Regular rhythm and rate.  S1 and S2


ABDOMEN:  Soft, nontender, and nondistended.  Positive bowel sounds. 


EXTREMITIES:  Negative for clubbing, cyanosis, or edema, muscle atrophy in b/l 

extremkity


RECTAL/GENITAL:  Not performed.


NEUROLOGIC:somewhat responsive, improved











Darian Topete MD Feb 22, 2019 23:29

## 2019-02-22 NOTE — INFECTIOUS DISEASES PROG NOTE
Assessment/Plan


Assessment/Plan


77 yo female with PMHx DM, CAD, Alzheimer dementia and Hip fracture who 

presents with dehydration and weakness. 





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 

until proven otherwise- r/o probable PPM infection- doubt TB


    -2/14 CT c/a/p w/: Interim development of multiple bilateral mostly upper 

lobe cavitary pulmonary parenchymal lesions. Given normal appearing chest 

radiograph on 2/7/2019, these are overwhelmingly likely infectious/inflammatory 

in nature. Most likely represent septic pulmonary emboli. The possibility of 

mycobacterial infection should also be included, deemed less likely given 

absence of adenopathy but still possible. Differential considerations also 

include fungal infections, noninfectious inflammatory processes. Other 

noncavitary nodules are also demonstrated, with the same differential. Large 

right and small-to-moderate left pleural effusions. Compressive atelectasis of 

the majority of the right lower lobe. Infiltrates seen in the right upper lobe. 

Bilateral pacemaker. Evidence of central venoocclusive disease. Wedge-shaped 

area of low-attenuation in the upper pole of the spleen, suspicious for infarct.


   -2d Echo (limited study due to contractures): no vegetations seen


    -2/10 Bcx 4/4MRSA; 2/11 Bcx 2/4 MRSA; 2/12 4/4  MRSA; 2/14 Bcx 4/4 MRSA; 2/ 20 Bcx 4/4 GPC clusters


    2/8/19 Blood Cx  4/4 MRSA


    2/7/19 Wound Cx MRSA, ESBL E.coli (S ZOsyn), K. pna (S Zosyn)





Neg: Histo, Blasto ab





Gram neg bacteremia- ?source- UTI vs [place] infection vs from lungs


  -2/19 BCx 4/4 ESBL K.pna, 2/4 S.aureus





r/o Probable PNA


  -2/19 CXR: Increased and now large right-sided pleural effusion, compared to 

prior study of 2/10/2019. Note that a large pleural effusion is evident on 

recent CT scan of 5 days earlier


  -sp cx ESBL K.pna and ESBL E>coli (both S Meropenem, Zosyn)





L arm cellulitis  from IV infiltration


  -CT L arm:  Diffuse edema of the subcutaneous and deep fat and likely upper 

arm and forearm musculature. Given stated clinical history, possibly secondary 

to cellulitis and myositis, but could also be hemodynamic in origin. No 

discrete rim-enhancing collection to suggest abscess. No CT evidence of acute 

osteomyelitis. 


   


 Leukocytosis, SP


  Low grade fever; SP


   Positive UA 


   CXR neg





Thrombocytopenia- HIT vs medication related


   


Pressure ulcers


  Not infected





Right hip fracture with hemiarthroplasty Oct 2018


DM


HTN


Sick sinus syndrome sp Pacemaker


HLD


CAD - SP MI and CABG


Aortic stenosis.


Pulmonary hypertension.


Alzheimer dementia.





PLAN





- Continue Daptomycin #10 (abx d #15) and Ceftaroline #8 given persistent MRSA 

Bacteremia


    -monitor CPK





-Cont Meropenem #3 for GNR bacteremia and ESBL in sputum


   - repeat 2 sets of Bcx daily-q48hrs until clearance of bacteremia


   -2/13 SP Vancomycin #6


   -2/13 SP Zosyn #4


   -2/10 SP Cefepime #3





-Will need MARTHA and removal of PPM for bacteremia clearance/source control if 

consistent with goals of  care


   -plan for MARTHA when more stable, probably next week





-consider thoracentesis for Large R pleural effusion- have to consider also 

pleural effusion as source of continued bacteremia








   -f/u final AFB cx x3





- f/u repeat cultures


- Monitor CBC and temps


- wound care


- Nutritional support


-f/u cocci


-Sx/ fu


-L arm elevation








Thank you for this consult. We will continue to follow the patient during this 

hospitalization.





Subjective


Allergies:  


Coded Allergies:  


     No Known Allergies (Unverified , 3/26/14)


Subjective


; afebrile


still bacteremic 


on 2l NC


plan for MARTHA next week





Objective


Vital Signs





Last 24 Hour Vital Signs








  Date Time  Temp Pulse Resp B/P (MAP) Pulse Ox O2 Delivery O2 Flow Rate FiO2


 


2/22/19 16:04 96.6 97 20 150/77 (101) 93   


 


2/22/19 13:31 98.8       


 


2/22/19 12:00  106      


 


2/22/19 12:00 96.3 96 20 161/91 (114) 99   


 


2/22/19 09:00      Nasal Cannula 2.0 


 


2/22/19 08:15     94 Nasal Cannula 2.0 28


 


2/22/19 08:15      Nasal Cannula 2.0 28


 


2/22/19 08:00  83      


 


2/22/19 08:00 98.8 103 20 134/73 (93) 100   


 


2/22/19 04:00 99.2 83 20 155/60 (91) 96   


 


2/22/19 04:00  83      


 


2/22/19 00:00  86      


 


2/22/19 00:00 98.8 86 20 147/64 (91) 97   


 


2/21/19 22:15 98.6 94 20 150/76 (100) 96   


 


2/21/19 20:00  101      


 


2/21/19 20:00      Nasal Cannula 2.0 


 


2/21/19 20:00 98.4 99 24 115/64 (81) 98   








Height (Feet):  5


Height (Inches):  5.00


Weight (Pounds):  147


Objective


Gen:  Moaning, Awake but no following


HEENT: NCAT, MMM, EOMI, PERRL


LUNGS:  CTAB, No W


CARDS: RRR, S1, S2, No M/R/G, 


ABD: Soft, NT, ND, + BS


Ext: Poor circulation to Ext (cool to touch) , Pulses 2+ B/L (DP, Rad): 


SKIN:  Dry, No rashes, Large sacral ulcer. Mild odor no surrounding cellulitis, 

foot with eschar





Microbiology








 Date/Time


Source Procedure


Growth Status


 


 


 2/20/19 23:15


Blood Blood Culture - Preliminary Resulted


 


 2/20/19 03:50


Blood Blood Culture - Preliminary


Staphylococcus Aureus Resulted


 


 2/20/19 03:45


Blood Blood Culture - Preliminary


Staphylococcus Aureus Resulted











Laboratory Tests








Test


  2/22/19


06:45


 


White Blood Count


  6.2 K/UL


(4.8-10.8)


 


Red Blood Count


  2.95 M/UL


(4.20-5.40)  L


 


Hemoglobin


  8.6 G/DL


(12.0-16.0)  L


 


Hematocrit


  27.1 %


(37.0-47.0)  L


 


Mean Corpuscular Volume 92 FL (80-99)  


 


Mean Corpuscular Hemoglobin


  29.2 PG


(27.0-31.0)


 


Mean Corpuscular Hemoglobin


Concent 31.8 G/DL


(32.0-36.0)  L


 


Red Cell Distribution Width


  18.8 %


(11.6-14.8)  H


 


Platelet Count


  123 K/UL


(150-450)  L


 


Mean Platelet Volume


  8.6 FL


(6.5-10.1)


 


Neutrophils (%) (Auto)


  % (45.0-75.0)


 


 


Lymphocytes (%) (Auto)


  % (20.0-45.0)


 


 


Monocytes (%) (Auto)  % (1.0-10.0)  


 


Eosinophils (%) (Auto)  % (0.0-3.0)  


 


Basophils (%) (Auto)  % (0.0-2.0)  


 


Differential Total Cells


Counted 100  


 


 


Neutrophils % (Manual) 87 % (45-75)  H


 


Lymphocytes % (Manual) 7 % (20-45)  L


 


Monocytes % (Manual) 3 % (1-10)  


 


Eosinophils % (Manual) 0 % (0-3)  


 


Basophils % (Manual) 0 % (0-2)  


 


Band Neutrophils 3 % (0-8)  


 


Platelet Estimate Decreased  L


 


Platelet Morphology Normal  


 


Hypochromasia 1+  


 


Anisocytosis 1+  


 


Sodium Level


  149 MMOL/L


(136-145)  H


 


Potassium Level


  4.8 MMOL/L


(3.5-5.1)


 


Chloride Level


  117 MMOL/L


()  H


 


Carbon Dioxide Level


  24 MMOL/L


(21-32)


 


Anion Gap


  8 mmol/L


(5-15)


 


Blood Urea Nitrogen


  29 mg/dL


(7-18)  H


 


Creatinine


  0.8 MG/DL


(0.55-1.30)


 


Estimat Glomerular Filtration


Rate  mL/min (>60)  


 


 


Glucose Level


  163 MG/DL


()  H


 


Calcium Level


  8.5 MG/DL


(8.5-10.1)











Current Medications








 Medications


  (Trade)  Dose


 Ordered  Sig/Violette


 Route


 PRN Reason  Start Time


 Stop Time Status Last Admin


Dose Admin


 


 Acetaminophen


  (Tylenol)  650 mg  Q4H  PRN


 NG


 Mild Pain/Temp > 100.5  2/22/19 00:30


 3/14/19 16:29  2/22/19 13:01


 


 


 Ceftaroline


 Fosamil 400 mg/


 Sodium Chloride  55 ml @ 55


 mls/hr  Q12HR


 IV


   2/22/19 09:00


 2/27/19 08:59  2/22/19 09:00


 


 


 Clonidine HCl


  (Catapres Tab)  0.1 mg  Q8H  PRN


 NG


 SBP>160mmHg  2/22/19 00:30


 3/10/19 16:29   


 


 


 Daptomycin 600 mg/


 Sodium Chloride  55 ml @ 


 100 mls/hr  Q24H


 IV


   2/22/19 18:00


 2/26/19 17:59  2/22/19 17:31


 


 


 Dextrose


  (Dextrose 50%)  25 ml  Q30M  PRN


 IV


 Hypoglycemia  2/21/19 22:45


 3/9/19 13:30   


 


 


 Dextrose


  (Dextrose 50%)  50 ml  Q30M  PRN


 IV


 hypoglycemia  2/21/19 22:45


 3/9/19 13:44   


 


 


 Insulin Aspart


  (NovoLOG)    EVERY 6  HOURS


 SUBQ


   2/22/19 00:00


 3/9/19 16:29  2/22/19 17:35


 


 


 Lansoprazole


  (Prevacid)  30 mg  DAILY


 NG


   2/22/19 09:00


 3/18/19 08:59  2/22/19 10:17


 


 


 Meropenem 1 gm/


 Sodium Chloride  55 ml @ 


 110 mls/hr  Q12H


 IVPB


   2/22/19 06:00


 2/25/19 17:59  2/22/19 17:31


 


 


 Olanzapine


  (ZyPREXA)  2.5 mg  BID


 NG


   2/22/19 09:00


 3/10/19 10:59  2/22/19 17:31


 


 


 Ondansetron HCl


  (Zofran)  4 mg  Q6H  PRN


 IVP


 Nausea & Vomiting  2/21/19 22:30


 3/10/19 16:29   


 

















Olivia Rosas M.D. Feb 22, 2019 17:54

## 2019-02-22 NOTE — NUR
RD ASSESSMENT & RECOMMENDATIONS

SEE CARE ACTIVITY FOR COMPLETE ASSESSMENT



DAILY ESTIMATED NEEDS:

Needs based on Wound, DM, wasting, sepsis / 55kg 

30-35  kcals/kg 

0312-4877  total kcals

1.25-2  g protein/kg

  g total protein 

25-30  mL/kg

7618-9824  total fluid mLs



NUTRITION DIAGNOSIS:

* Increased kcal/prot needs R/T wound healing as evidenced by pt w/

stage 3 sacral wound per MD

* Swallowing difficulty R/T dysphagia, respiratory status as evidenced by

per SLP, pt at high risk for silent aspiration, rec for nonoral feedings,

on NGT feeding.



 



CURRENT TF:Glucerna 1.2 @ 55ml/hr x 24 hrs  





ENTERAL NUTRITION RECOMMENDATIONS:

Glucerna 1.2 @ 60ml/hr x 24 hrs  to provide 1440ml, 1728kcal, 86g prot, 1159ml free water 



- INCREASE GOAL TO 60ml/hr to better meet est needs

- Flush per MD/ HOB over 30 degrees



 



ADDITIONAL RECOMMENDATIONS:

* RE-calibrate bedscale for accurate CBW (w/ added WC mattress) 

* Wound healing: Vit C 500mg BID, Harjeet 1pkt BID 

                             + ZnSO4 220mg dailyx 10 days  

* Lytes daily, replete as needed  

. 
## 2019-02-22 NOTE — CARDIOLOGY PROGRESS NOTE
Assessment/Plan


Assessment/Plan


the patient does not clear bacteremia, remains very ill, her Oxygen saturation 

is low, she is not a candidate for surgery (removal of pacemaker leads) or even 

MARTHA, she is very cachectic and malnourished, will continue monitor, hopefully 

she sill improve. at present time cannot proceed with MARTHA due to low O2 

saturation





Subjective


Subjective


lethargic and confused, does not interact





Objective





Last 24 Hour Vital Signs








  Date Time  Temp Pulse Resp B/P (MAP) Pulse Ox O2 Delivery O2 Flow Rate FiO2


 


2/22/19 16:04 96.6 97 20 150/77 (101) 93   


 


2/22/19 16:00  91      


 


2/22/19 13:31 98.8       


 


2/22/19 12:00  106      


 


2/22/19 12:00 96.3 96 20 161/91 (114) 99   


 


2/22/19 09:00      Nasal Cannula 2.0 


 


2/22/19 08:15     94 Nasal Cannula 2.0 28


 


2/22/19 08:15      Nasal Cannula 2.0 28


 


2/22/19 08:00  83      


 


2/22/19 08:00 98.8 103 20 134/73 (93) 100   


 


2/22/19 04:00 99.2 83 20 155/60 (91) 96   


 


2/22/19 04:00  83      


 


2/22/19 00:00  86      


 


2/22/19 00:00 98.8 86 20 147/64 (91) 97   








General Appearance:  severe distress, lethargic, other - cachectic


EENT:  PERRL/EOMI


Neck:  no JVD


Rhythm:  NSR


Cardiovascular:  systolic murmur


Respiratory/Chest:  crackles/rales


Abdomen:  no mass


Extremities:  other - anasarca











Intake and Output  


 


 2/21/19 2/22/19





 19:00 07:00


 


Intake Total 925 ml 810 ml


 


Output Total 200 ml 600 ml


 


Balance 725 ml 210 ml


 


  


 


Intake Free Water  150 ml


 


IV Total 165 ml 55 ml


 


Tube Feeding 660 ml 605 ml


 


Other 100 ml 


 


Output Urine Total 200 ml 600 ml


 


# Bowel Movements 1 1











Laboratory Tests








Test


  2/22/19


06:45


 


White Blood Count


  6.2 K/UL


(4.8-10.8)


 


Red Blood Count


  2.95 M/UL


(4.20-5.40)  L


 


Hemoglobin


  8.6 G/DL


(12.0-16.0)  L


 


Hematocrit


  27.1 %


(37.0-47.0)  L


 


Mean Corpuscular Volume 92 FL (80-99)  


 


Mean Corpuscular Hemoglobin


  29.2 PG


(27.0-31.0)


 


Mean Corpuscular Hemoglobin


Concent 31.8 G/DL


(32.0-36.0)  L


 


Red Cell Distribution Width


  18.8 %


(11.6-14.8)  H


 


Platelet Count


  123 K/UL


(150-450)  L


 


Mean Platelet Volume


  8.6 FL


(6.5-10.1)


 


Neutrophils (%) (Auto)


  % (45.0-75.0)


 


 


Lymphocytes (%) (Auto)


  % (20.0-45.0)


 


 


Monocytes (%) (Auto)  % (1.0-10.0)  


 


Eosinophils (%) (Auto)  % (0.0-3.0)  


 


Basophils (%) (Auto)  % (0.0-2.0)  


 


Differential Total Cells


Counted 100  


 


 


Neutrophils % (Manual) 87 % (45-75)  H


 


Lymphocytes % (Manual) 7 % (20-45)  L


 


Monocytes % (Manual) 3 % (1-10)  


 


Eosinophils % (Manual) 0 % (0-3)  


 


Basophils % (Manual) 0 % (0-2)  


 


Band Neutrophils 3 % (0-8)  


 


Platelet Estimate Decreased  L


 


Platelet Morphology Normal  


 


Hypochromasia 1+  


 


Anisocytosis 1+  


 


Sodium Level


  149 MMOL/L


(136-145)  H


 


Potassium Level


  4.8 MMOL/L


(3.5-5.1)


 


Chloride Level


  117 MMOL/L


()  H


 


Carbon Dioxide Level


  24 MMOL/L


(21-32)


 


Anion Gap


  8 mmol/L


(5-15)


 


Blood Urea Nitrogen


  29 mg/dL


(7-18)  H


 


Creatinine


  0.8 MG/DL


(0.55-1.30)


 


Estimat Glomerular Filtration


Rate  mL/min (>60)  


 


 


Glucose Level


  163 MG/DL


()  H


 


Calcium Level


  8.5 MG/DL


(8.5-10.1)











Microbiology








 Date/Time


Source Procedure


Growth Status


 


 


 2/20/19 23:15


Blood Blood Culture - Preliminary Resulted


 


 2/20/19 03:50


Blood Blood Culture - Preliminary


Staphylococcus Aureus Resulted


 


 2/20/19 03:45


Blood Blood Culture - Preliminary


Staphylococcus Aureus Resulted

















Lara Tong MD Feb 22, 2019 22:57

## 2019-02-22 NOTE — INTERNAL MED PROGRESS NOTE
Subjective


Physician Name


Juan Carlos Magana


Attending Physician


Juan Carlos Magana MD





Current Medications








 Medications


  (Trade)  Dose


 Ordered  Sig/Violette


 Route


 PRN Reason  Start Time


 Stop Time Status Last Admin


Dose Admin


 


 Acetaminophen


  (Tylenol)  650 mg  Q4H  PRN


 NG


 Mild Pain/Temp > 100.5  2/22/19 00:30


 3/14/19 16:29  2/22/19 13:01


 


 


 Ceftaroline


 Fosamil 400 mg/


 Sodium Chloride  55 ml @ 55


 mls/hr  Q12HR


 IV


   2/22/19 09:00


 2/27/19 08:59  2/22/19 09:00


 


 


 Clonidine HCl


  (Catapres Tab)  0.1 mg  Q8H  PRN


 NG


 SBP>160mmHg  2/22/19 00:30


 3/10/19 16:29   


 


 


 Daptomycin 600 mg/


 Sodium Chloride  55 ml @ 


 100 mls/hr  Q24H


 IV


   2/22/19 18:00


 2/26/19 17:59   


 


 


 Dextrose


  (Dextrose 50%)  25 ml  Q30M  PRN


 IV


 Hypoglycemia  2/21/19 22:45


 3/9/19 13:30   


 


 


 Dextrose


  (Dextrose 50%)  50 ml  Q30M  PRN


 IV


 hypoglycemia  2/21/19 22:45


 3/9/19 13:44   


 


 


 Insulin Aspart


  (NovoLOG)    EVERY 6  HOURS


 SUBQ


   2/22/19 00:00


 3/9/19 16:29  2/22/19 07:17


 


 


 Lansoprazole


  (Prevacid)  30 mg  DAILY


 NG


   2/22/19 09:00


 3/18/19 08:59  2/22/19 10:17


 


 


 Meropenem 1 gm/


 Sodium Chloride  55 ml @ 


 110 mls/hr  Q12H


 IVPB


   2/22/19 06:00


 2/25/19 17:59  2/22/19 06:27


 


 


 Olanzapine


  (ZyPREXA)  2.5 mg  BID


 NG


   2/22/19 09:00


 3/10/19 10:59  2/22/19 10:17


 


 


 Ondansetron HCl


  (Zofran)  4 mg  Q6H  PRN


 IVP


 Nausea & Vomiting  2/21/19 22:30


 3/10/19 16:29   


 








Allergies:  


Coded Allergies:  


     No Known Allergies (Unverified , 3/26/14)


Subjective


Awake, more responsive, no acute distress open eyes,  single word responding





Objective





Last Vital Signs








  Date Time  Temp Pulse Resp B/P (MAP) Pulse Ox O2 Delivery O2 Flow Rate FiO2


 


2/22/19 13:31 98.8       


 


2/22/19 12:00  106      


 


2/22/19 09:00      Nasal Cannula 2.0 


 


2/22/19 08:15     94   28


 


2/22/19 08:00   20 134/73 (93)    











Laboratory Tests








Test


  2/21/19


15:58 2/22/19


06:45


 


Arterial Blood pH


  7.467


(7.350-7.450) 


 


 


Arterial Blood Partial


Pressure CO2 32.5 mmHg


(35.0-45.0)  L 


 


 


Arterial Blood Partial


Pressure O2 413.6 mmHg


(75.0-100.0)  H 


 


 


Arterial Blood HCO3


  23.0 mmol/L


(22.0-26.0) 


 


 


Arterial Blood Oxygen


Saturation 99.0 %


() 


 


 


Arterial Blood Base Excess -0.4 (-2-2)   


 


Frank Test Positive   


 


White Blood Count


  


  6.2 K/UL


(4.8-10.8)


 


Red Blood Count


  


  2.95 M/UL


(4.20-5.40)  L


 


Hemoglobin


  


  8.6 G/DL


(12.0-16.0)  L


 


Hematocrit


  


  27.1 %


(37.0-47.0)  L


 


Mean Corpuscular Volume  92 FL (80-99)  


 


Mean Corpuscular Hemoglobin


  


  29.2 PG


(27.0-31.0)


 


Mean Corpuscular Hemoglobin


Concent 


  31.8 G/DL


(32.0-36.0)  L


 


Red Cell Distribution Width


  


  18.8 %


(11.6-14.8)  H


 


Platelet Count


  


  123 K/UL


(150-450)  L


 


Mean Platelet Volume


  


  8.6 FL


(6.5-10.1)


 


Neutrophils (%) (Auto)


  


  % (45.0-75.0)


 


 


Lymphocytes (%) (Auto)


  


  % (20.0-45.0)


 


 


Monocytes (%) (Auto)   % (1.0-10.0)  


 


Eosinophils (%) (Auto)   % (0.0-3.0)  


 


Basophils (%) (Auto)   % (0.0-2.0)  


 


Differential Total Cells


Counted 


  100  


 


 


Neutrophils % (Manual)  87 % (45-75)  H


 


Lymphocytes % (Manual)  7 % (20-45)  L


 


Monocytes % (Manual)  3 % (1-10)  


 


Eosinophils % (Manual)  0 % (0-3)  


 


Basophils % (Manual)  0 % (0-2)  


 


Band Neutrophils  3 % (0-8)  


 


Platelet Estimate  Decreased  L


 


Platelet Morphology  Normal  


 


Hypochromasia  1+  


 


Anisocytosis  1+  


 


Sodium Level


  


  149 MMOL/L


(136-145)  H


 


Potassium Level


  


  4.8 MMOL/L


(3.5-5.1)


 


Chloride Level


  


  117 MMOL/L


()  H


 


Carbon Dioxide Level


  


  24 MMOL/L


(21-32)


 


Anion Gap


  


  8 mmol/L


(5-15)


 


Blood Urea Nitrogen


  


  29 mg/dL


(7-18)  H


 


Creatinine


  


  0.8 MG/DL


(0.55-1.30)


 


Estimat Glomerular Filtration


Rate 


   mL/min (>60)  


 


 


Glucose Level


  


  163 MG/DL


()  H


 


Calcium Level


  


  8.5 MG/DL


(8.5-10.1)











Microbiology








 Date/Time


Source Procedure


Growth Status


 


 


 2/20/19 23:15


Blood Blood Culture - Preliminary Resulted


 


 2/20/19 03:50


Blood Blood Culture - Preliminary


Staphylococcus Aureus Resulted


 


 2/20/19 03:45


Blood Blood Culture - Preliminary


Staphylococcus Aureus Resulted

















Intake and Output  


 


 2/21/19 2/22/19





 19:00 07:00


 


Intake Total 925 ml 810 ml


 


Output Total 200 ml 600 ml


 


Balance 725 ml 210 ml


 


  


 


Intake Free Water  150 ml


 


IV Total 165 ml 55 ml


 


Tube Feeding 660 ml 605 ml


 


Other 100 ml 


 


Output Urine Total 200 ml 600 ml


 


# Bowel Movements 1 1








Objective


General: Awake and  more responsive, no acute distress..


HEENT: NCAT, sclera anicteric, PERRL, EOMI, NG tube.


Neck: Supple, no significant jugular venous distention, 


Lungs: Poor inspiratory effort, decreased air in the bases , no Wheeze or Rales.


Heart: Regular rate and rhythm, normal S1/S2, no murmur.


Abdomen: soft, nontender, nondistended. Normoactive bowel sounds.


 / Rectal: Refused and deferred.


Extremities: No Cyanosis , No clubbing,  Left less UE edema.  Lateral lower 

extremity / feet dressing intact. RUE Piccline.


Neuro: A&O x 1, Able to move all extremities slowly.


Skin: warm, no rashes or lesions, ecchymosis in bilateral upper extremity 

noted.Stage 4 Decubi ulceration.





Assessment/Plan


Assessment/Plan


1. Sepsis /  High grade persistent MRSA bacteremia w/ septic emboli- Infective 

endocarditis until proven otherwise- r/o probable PPM infection


2. Failure to thrive with severe protein calorie malnutrition.


3. Coronary artery disease.


4. Diabetes, type 2.


5. Hypertension.


6. Sick sinus syndrome.


7. Hypercholesterolemia.


8. Aortic stenosis.


9. Pulmonary hypertension.


10. Alzheimer dementia.


11.  Acute tubular necrosis/acute kidney injury most likely secondary to 

dehydration and sepsis.


12. Sacral decubitus ulcer, stage III


13. Severe dehydration


14.  Hypokalemia.


15.  Abnormal liver function test





TREATMENT:


1.  Antibiotics with Daptomycin, meropenem, and ceftriaxone,  monitor culture


2. Coronary artery disease.  The patient is status post coronary artery


bypass graft in 2017.


3. Diabetes, type 2.  The patient has been started on NovoLog sliding


scale.  


4. Hypertension.  The patient is to continue on atenolol and losartan as


above.


5. Sick sinus syndrome.  The patient is status post pacemaker


implantation.


6. Hypercholesterolemia.  Continue simvastatin as above.


7. Aortic stenosis.


8. Pulmonary hypertension.


9. Alzheimer dementia.





Monitor laboratory as well as culture.


Tube feeding at rate of 55 cc/hr.


CODE STATUS is full code as per POLST,


DVT prophylaxis: Heparin subcu.











Juan Carlos Magana MD Feb 22, 2019 15:13

## 2019-02-22 NOTE — NUR
NURSE NOTES:

Dr Rosas informed regarding repeat blood culture result Gram Positive cocci in clusters, 
from blood taken on 02/20/19. 

-------------------------------------------------------------------------------

Addendum: 02/22/19 at 1622 by ALMA DELIA LEROY RN

-------------------------------------------------------------------------------

No further orders regarding previous note.

## 2019-02-22 NOTE — PULMONOLOGY PROGRESS NOTE
Assessment/Plan


Problems:  


(1) Sepsis


(2) Thrombocytopenia


(3) ATN (acute tubular necrosis)


(4) Pulmonary hypertension


(5) Sacral decubitus ulcer, stage III


(6) Alzheimer's dementia


(7) Protein-calorie malnutrition, severe


(8) HTN (hypertension)


(9) Pacemaker


(10) CAD (coronary artery disease)


Assessment/Plan


on NC 


/u PLT, better today up to 123


failed swallow study


Morphine prn


d/w wound care nurse, 


pan cultures, still positive


BC are still positive from 2/18 and 1/19, now for GN


check electrolytes


sliding scale


check electrolytes daily


broad spectrum abx


dvt prophylaxis


insulin coverage.


social service consult


might need removal of pacemaker leads, d/W dr Rosas, Cardiology wants to wait 

until pt hemodynamically better to do an MARTHA to  find out if the they are 

infected.





Subjective


Constitutional:  Reports: no symptoms


HEENT:  Repors: no symptoms


Respiratory:  Reports: no symptoms


Allergies:  


Coded Allergies:  


     No Known Allergies (Unverified , 3/26/14)





Objective





Last 24 Hour Vital Signs








  Date Time  Temp Pulse Resp B/P (MAP) Pulse Ox O2 Delivery O2 Flow Rate FiO2


 


2/22/19 09:00      Nasal Cannula 2.0 


 


2/22/19 08:00 98.8 103 20 134/73 (93) 100   


 


2/22/19 04:00 99.2 83 20 155/60 (91) 96   


 


2/22/19 04:00  83      


 


2/22/19 00:00  86      


 


2/22/19 00:00 98.8 86 20 147/64 (91) 97   


 


2/21/19 22:15 98.6 94 20 150/76 (100) 96   


 


2/21/19 20:00  101      


 


2/21/19 20:00      Nasal Cannula 2.0 


 


2/21/19 20:00 98.4 99 24 115/64 (81) 98   


 


2/21/19 16:00      Non-Rebreather 15.0 


 


2/21/19 16:00 98.9 91 19 113/70 (84) 100   


 


2/21/19 16:00  106      

















Intake and Output  


 


 2/21/19 2/22/19





 19:00 07:00


 


Intake Total 925 ml 810 ml


 


Output Total 200 ml 600 ml


 


Balance 725 ml 210 ml


 


  


 


Intake Free Water  150 ml


 


IV Total 165 ml 55 ml


 


Tube Feeding 660 ml 605 ml


 


Other 100 ml 


 


Output Urine Total 200 ml 600 ml


 


# Bowel Movements 1 1








Objective


General Appearance:  cachectic


HEENT:  normocephalic, somnolent


Respiratory/Chest:  chest wall non-tender,loud rhonchi


Cardiovascular:  normal peripheral pulses, normal rate


Abdomen:  normal bowel sounds, soft, non tender


Extremities:  no cyanosis


Skin:  no rash





Microbiology








 Date/Time


Source Procedure


Growth Status


 


 


 2/20/19 23:15


Blood Blood Culture - Preliminary Resulted


 


 2/20/19 03:50


Blood Blood Culture - Preliminary


Staphylococcus Aureus Resulted


 


 2/20/19 03:45


Blood Blood Culture - Preliminary


Staphylococcus Aureus Resulted








Laboratory Tests


2/21/19 15:58: 


Arterial Blood pH 7.467H, Arterial Blood Partial Pressure CO2 32.5L, Arterial 

Blood Partial Pressure O2 413.6H, Arterial Blood HCO3 23.0, Arterial Blood 

Oxygen Saturation 99.0, Arterial Blood Base Excess -0.4, Frank Test Positive


2/22/19 06:45: 


White Blood Count 6.2, Red Blood Count 2.95L, Hemoglobin 8.6L, Hematocrit 27.1L

, Mean Corpuscular Volume 92, Mean Corpuscular Hemoglobin 29.2, Mean 

Corpuscular Hemoglobin Concent 31.8L, Red Cell Distribution Width 18.8H, 

Platelet Count 123L, Mean Platelet Volume 8.6, Neutrophils (%) (Auto) , 

Lymphocytes (%) (Auto) , Monocytes (%) (Auto) , Eosinophils (%) (Auto) , 

Basophils (%) (Auto) , Differential Total Cells Counted 100, Neutrophils % (

Manual) 87H, Lymphocytes % (Manual) 7L, Monocytes % (Manual) 3, Eosinophils % (

Manual) 0, Basophils % (Manual) 0, Band Neutrophils 3, Platelet Estimate 

DecreasedL, Platelet Morphology Normal, Hypochromasia 1+, Anisocytosis 1+, 

Sodium Level 149H, Potassium Level 4.8, Chloride Level 117H, Carbon Dioxide 

Level 24, Anion Gap 8, Blood Urea Nitrogen 29H, Creatinine 0.8, Estimat 

Glomerular Filtration Rate , Glucose Level 163H, Calcium Level 8.5





Current Medications








 Medications


  (Trade)  Dose


 Ordered  Sig/Violette


 Route


 PRN Reason  Start Time


 Stop Time Status Last Admin


Dose Admin


 


 Acetaminophen


  (Tylenol)  650 mg  Q4H  PRN


 NG


 Mild Pain/Temp > 100.5  2/22/19 00:30


 3/14/19 16:29   


 


 


 Ceftaroline


 Fosamil 400 mg/


 Sodium Chloride  55 ml @ 55


 mls/hr  Q12HR


 IV


   2/22/19 09:00


 2/23/19 08:59  2/22/19 09:00


 


 


 Clonidine HCl


  (Catapres Tab)  0.1 mg  Q8H  PRN


 NG


 SBP>160mmHg  2/22/19 00:30


 3/10/19 16:29   


 


 


 Daptomycin 600 mg/


 Sodium Chloride  55 ml @ 


 100 mls/hr  Q24H


 IV


   2/22/19 18:00


 2/26/19 17:59   


 


 


 Dextrose


  (Dextrose 50%)  25 ml  Q30M  PRN


 IV


 Hypoglycemia  2/21/19 22:45


 3/9/19 13:30   


 


 


 Dextrose


  (Dextrose 50%)  50 ml  Q30M  PRN


 IV


 hypoglycemia  2/21/19 22:45


 3/9/19 13:44   


 


 


 Insulin Aspart


  (NovoLOG)    EVERY 6  HOURS


 SUBQ


   2/22/19 00:00


 3/9/19 16:29  2/22/19 07:17


 


 


 Lansoprazole


  (Prevacid)  30 mg  DAILY


 NG


   2/22/19 09:00


 3/18/19 08:59  2/22/19 10:17


 


 


 Meropenem 1 gm/


 Sodium Chloride  55 ml @ 


 110 mls/hr  Q12H


 IVPB


   2/22/19 06:00


 2/25/19 17:59  2/22/19 06:27


 


 


 Olanzapine


  (ZyPREXA)  2.5 mg  BID


 NG


   2/22/19 09:00


 3/10/19 10:59  2/22/19 10:17


 


 


 Ondansetron HCl


  (Zofran)  4 mg  Q6H  PRN


 IVP


 Nausea & Vomiting  2/21/19 22:30


 3/10/19 16:29   


 

















Korin Junior MD Feb 22, 2019 12:33

## 2019-02-22 NOTE — DIAGNOSTIC IMAGING REPORT
Indication: Dyspnea

 

Comparison:  None

 

A single view chest radiograph was obtained.

 

Findings:

 

Cardiomegaly demonstrated. Some vascular prominence demonstrated within the lungs.

Bilateral pacemakers are present. NG tube is in good position. Hazy opacity in the

right lower lung field may be a pleural effusion.

 

IMPRESSION:

 

Suspected right pleural effusion.

 

Suspected mild pulmonary vascular congestion

 

No interval change appreciated

## 2019-02-22 NOTE — GENERAL PROGRESS NOTE
Assessment/Plan


Assessment/Plan








ASSESSMENT/RECS:





1. Pancytopenia with thrombocytopenia that is severe is most likely related to 

Sepsis, lactic acidosis. In addition has received heparin. US abdomen ordered 

and shows no spleen and no cirrhosis


--> heparin discontinued, does not require it


--> HIT ab test reviewed


--> venous duplex negative


--> smear peripheral has been reviewed and negative for abnml cells


--> tumor markers as needed/prn


--> hold off on transfusion unless plt <20k


--> hold off on steriods


--> plt trend: 34--> 50k-->78k-->93k-->123k


2. Failure to thrive with severe protein calorie malnutrition.


->> calorie counts daily


--> consider mirtazapine as needed per pcp


--> getting ngtfs


3. Anemia of chronic disease


--> panel has been reviewed


--> transfuse if hgb <7


4. Leukocytosis


--> on abx as per id for infection


--> trend as needed, has multople infections


5. Hypertension.


6. Sick sinus syndrome.


7. Hypercholesterolemia.


8. Aortic stenosis.


9. Pulmonary hypertension.


10. Alzheimer dementia.


11. Acute tubular necrosis/acute kidney injury most likely secondary to 

dehydration and sepsis.





Greatly appreciate consultation!





Subjective


Constitutional:  Denies: no symptoms, chills, diaphoresis, fever, malaise, 

weakness, other


Cardiovascular:  Denies: no symptoms, chest pain, edema, irregular heart rate, 

lightheadedness, palpitations, syncope, other


Respiratory:  Denies: no symptoms, cough, orthopnea, shortness of breath, SOB 

with excertion, SOB at rest, sputum, stridor, wheezing, other


Genitourinary:  Denies: no symptoms, burning, discharge, frequency, flank pain, 

hematuria, incontinence, pain, urgency, other


Neurologic/Psychiatric:  Denies: no symptoms, anxiety, depressed, emotional 

problems, headache, numbness, paresthesia, pre-existing deficit, seizure, 

tingling, tremors, weakness, other


Hematologic/Lymphatic:  Denies: no symptoms, anemia, easy bleeding, easy 

bruising, other


Allergies:  


Coded Allergies:  


     No Known Allergies (Unverified , 3/26/14)


Subjective


2/14:seen by bedside, Possible EGD tomorrow to evaluate upper GI bleed, today's 

EGD was canceled by anesthesia, plt remains low at 34, hgb trending up . 


2/15: Scheduled for EGD today,CT w/ cavitary lung lessions; placed on isolation

, remains bacteremic, wbc 12.


2/17: continues to be somewhat sob, imaging reviewed, labs are better this am, 

plts uptrending, getting ngtfs


2/18: awake, comfortable, no events


2/19: Pt is confused, screaming, Left upper extremity ecchymosis, edema. no 

abscess,ID following, plt trending up.


2/20: awake, comfortable, no acute distress.


2/21: no events, trending plt is trending up slowly, on abx





Objective





Last 24 Hour Vital Signs








  Date Time  Temp Pulse Resp B/P (MAP) Pulse Ox O2 Delivery O2 Flow Rate FiO2


 


2/22/19 16:04 96.6 97 20 150/77 (101) 93   


 


2/22/19 13:31 98.8       


 


2/22/19 12:00  106      


 


2/22/19 12:00 96.3 96 20 161/91 (114) 99   


 


2/22/19 09:00      Nasal Cannula 2.0 


 


2/22/19 08:15     94 Nasal Cannula 2.0 28


 


2/22/19 08:15      Nasal Cannula 2.0 28


 


2/22/19 08:00  83      


 


2/22/19 08:00 98.8 103 20 134/73 (93) 100   


 


2/22/19 04:00 99.2 83 20 155/60 (91) 96   


 


2/22/19 04:00  83      


 


2/22/19 00:00  86      


 


2/22/19 00:00 98.8 86 20 147/64 (91) 97   


 


2/21/19 22:15 98.6 94 20 150/76 (100) 96   


 


2/21/19 20:00  101      


 


2/21/19 20:00      Nasal Cannula 2.0 


 


2/21/19 20:00 98.4 99 24 115/64 (81) 98   

















Intake and Output  


 


 2/21/19 2/22/19





 19:00 07:00


 


Intake Total 925 ml 810 ml


 


Output Total 200 ml 600 ml


 


Balance 725 ml 210 ml


 


  


 


Intake Free Water  150 ml


 


IV Total 165 ml 55 ml


 


Tube Feeding 660 ml 605 ml


 


Other 100 ml 


 


Output Urine Total 200 ml 600 ml


 


# Bowel Movements 1 1








Laboratory Tests


2/22/19 06:45: 


White Blood Count 6.2, Red Blood Count 2.95L, Hemoglobin 8.6L, Hematocrit 27.1L

, Mean Corpuscular Volume 92, Mean Corpuscular Hemoglobin 29.2, Mean 

Corpuscular Hemoglobin Concent 31.8L, Red Cell Distribution Width 18.8H, 

Platelet Count 123L, Mean Platelet Volume 8.6, Neutrophils (%) (Auto) , 

Lymphocytes (%) (Auto) , Monocytes (%) (Auto) , Eosinophils (%) (Auto) , 

Basophils (%) (Auto) , Differential Total Cells Counted 100, Neutrophils % (

Manual) 87H, Lymphocytes % (Manual) 7L, Monocytes % (Manual) 3, Eosinophils % (

Manual) 0, Basophils % (Manual) 0, Band Neutrophils 3, Platelet Estimate 

DecreasedL, Platelet Morphology Normal, Hypochromasia 1+, Anisocytosis 1+, 

Sodium Level 149H, Potassium Level 4.8, Chloride Level 117H, Carbon Dioxide 

Level 24, Anion Gap 8, Blood Urea Nitrogen 29H, Creatinine 0.8, Estimat 

Glomerular Filtration Rate , Glucose Level 163H, Calcium Level 8.5


Height (Feet):  5


Height (Inches):  5.00


Weight (Pounds):  147


Objective





GENERAL: Awake responsive to deep stimuli with opening of her eyes


HEENT:  Eyes, pupils equal responsive. ++ ngtf


CHEST: Poor inspiratory effort, decreased


CARDIOVASCULAR:  Regular rhythm and rate.  S1 and S2


ABDOMEN:  Soft, nontender, and nondistended.  Positive bowel sounds. 


EXTREMITIES:  Negative for clubbing, cyanosis, or edema, muscle atrophy in b/l 

extremkity


RECTAL/GENITAL:  Not performed.


NEUROLOGIC:somewhat responsive, improved











Darian Topete MD Feb 22, 2019 18:19

## 2019-02-22 NOTE — SURGERY PROGRESS NOTE
Surgery Progress Note


Subjective


Additional Comments


no acute events.  MARTHA planned for next week.





Objective





Last 24 Hour Vital Signs








  Date Time  Temp Pulse Resp B/P (MAP) Pulse Ox O2 Delivery O2 Flow Rate FiO2


 


2/22/19 13:31 98.8       


 


2/22/19 12:00  106      


 


2/22/19 09:00      Nasal Cannula 2.0 


 


2/22/19 08:15     94 Nasal Cannula 2.0 28


 


2/22/19 08:15      Nasal Cannula 2.0 28


 


2/22/19 08:00  83      


 


2/22/19 08:00 98.8 103 20 134/73 (93) 100   


 


2/22/19 04:00 99.2 83 20 155/60 (91) 96   


 


2/22/19 04:00  83      


 


2/22/19 00:00  86      


 


2/22/19 00:00 98.8 86 20 147/64 (91) 97   


 


2/21/19 22:15 98.6 94 20 150/76 (100) 96   


 


2/21/19 20:00  101      


 


2/21/19 20:00      Nasal Cannula 2.0 


 


2/21/19 20:00 98.4 99 24 115/64 (81) 98   


 


2/21/19 16:00      Non-Rebreather 15.0 


 


2/21/19 16:00 98.9 91 19 113/70 (84) 100   


 


2/21/19 16:00  106      








I&O











Intake and Output  


 


 2/21/19 2/22/19





 19:00 07:00


 


Intake Total 925 ml 810 ml


 


Output Total 200 ml 600 ml


 


Balance 725 ml 210 ml


 


  


 


Intake Free Water  150 ml


 


IV Total 165 ml 55 ml


 


Tube Feeding 660 ml 605 ml


 


Other 100 ml 


 


Output Urine Total 200 ml 600 ml


 


# Bowel Movements 1 1








Dressing:  other


Wound:  other


Drains:  other


Cardiovascular:  RSR


Respiratory:  clear


Abdomen:  soft, non-tender, present bowel sounds, non-distended


Extremities:  no cyanosis





Laboratory Tests








Test


  2/21/19


15:58 2/22/19


06:45


 


Arterial Blood pH


  7.467


(7.350-7.450) 


 


 


Arterial Blood Partial


Pressure CO2 32.5 mmHg


(35.0-45.0)  L 


 


 


Arterial Blood Partial


Pressure O2 413.6 mmHg


(75.0-100.0)  H 


 


 


Arterial Blood HCO3


  23.0 mmol/L


(22.0-26.0) 


 


 


Arterial Blood Oxygen


Saturation 99.0 %


() 


 


 


Arterial Blood Base Excess -0.4 (-2-2)   


 


Frank Test Positive   


 


White Blood Count


  


  6.2 K/UL


(4.8-10.8)


 


Red Blood Count


  


  2.95 M/UL


(4.20-5.40)  L


 


Hemoglobin


  


  8.6 G/DL


(12.0-16.0)  L


 


Hematocrit


  


  27.1 %


(37.0-47.0)  L


 


Mean Corpuscular Volume  92 FL (80-99)  


 


Mean Corpuscular Hemoglobin


  


  29.2 PG


(27.0-31.0)


 


Mean Corpuscular Hemoglobin


Concent 


  31.8 G/DL


(32.0-36.0)  L


 


Red Cell Distribution Width


  


  18.8 %


(11.6-14.8)  H


 


Platelet Count


  


  123 K/UL


(150-450)  L


 


Mean Platelet Volume


  


  8.6 FL


(6.5-10.1)


 


Neutrophils (%) (Auto)


  


  % (45.0-75.0)


 


 


Lymphocytes (%) (Auto)


  


  % (20.0-45.0)


 


 


Monocytes (%) (Auto)   % (1.0-10.0)  


 


Eosinophils (%) (Auto)   % (0.0-3.0)  


 


Basophils (%) (Auto)   % (0.0-2.0)  


 


Differential Total Cells


Counted 


  100  


 


 


Neutrophils % (Manual)  87 % (45-75)  H


 


Lymphocytes % (Manual)  7 % (20-45)  L


 


Monocytes % (Manual)  3 % (1-10)  


 


Eosinophils % (Manual)  0 % (0-3)  


 


Basophils % (Manual)  0 % (0-2)  


 


Band Neutrophils  3 % (0-8)  


 


Platelet Estimate  Decreased  L


 


Platelet Morphology  Normal  


 


Hypochromasia  1+  


 


Anisocytosis  1+  


 


Sodium Level


  


  149 MMOL/L


(136-145)  H


 


Potassium Level


  


  4.8 MMOL/L


(3.5-5.1)


 


Chloride Level


  


  117 MMOL/L


()  H


 


Carbon Dioxide Level


  


  24 MMOL/L


(21-32)


 


Anion Gap


  


  8 mmol/L


(5-15)


 


Blood Urea Nitrogen


  


  29 mg/dL


(7-18)  H


 


Creatinine


  


  0.8 MG/DL


(0.55-1.30)


 


Estimat Glomerular Filtration


Rate 


   mL/min (>60)  


 


 


Glucose Level


  


  163 MG/DL


()  H


 


Calcium Level


  


  8.5 MG/DL


(8.5-10.1)











Plan


Problems:  


(1) Sepsis


Assessment & Plan:  Leukocytosis resolved 


LFT's elevated w/ t bili / d bili - improved 


AM labs ordered 


hydrate


US Impression: Limited exam, as described. Note inability to visualize the 

spleen Negative for gallstones or dilated bile ducts Questionable bilateral 

renal parapelvic cysts Small to moderate right pleural effusion


no acute surgical intervention planned. 


MARTHA for septic emboli


consider removing pacemaker as harboring sepsis source if MARTHA demonstrates 

lesions


tube feeds as tolerated 


will follow with recs. 





(2) Sacral decubitus ulcer, stage III


Assessment & Plan:  DTPI sacrum.Maroon- indurated discoloration with opening at 

sacral coccygeal area(L)3.8cm x (W)1.4cm with entire wound measuring (L)10cmx(W)

12cm.Small amt malodorous brown exudate.


Stable dry eschar R heel with dry peeling skin periwound (L)2cm x (W)3.5cm. 

Periwound is also red and boggy. 


Small dry eschar noted to lateral R 5th metatarsal (L)1.4cm x (W)0.3cm.  


L heel is boggy but colour is dusky . 


Resolving skin tear lateral R tibia. Wound bed is clean and dry.  





Tx. Plan:


Cleanse Sacral wound with Saline.Apply Therahoney to opening at sacrococcygeal 

area.Cavilon skin barrier to DTPI. Cover with Optifoam drsg .Change Daily and 

prn.


            


Apply Betadine to R heel  and Lateral R 5th metatarsal.Cover with Abd pad .Wrap 

with kerlix daily and prn.


            


Apply Cavilon Skin Barrier to L heel.Cover with Optifoam drsg .Change every 7 

days and prn.


            


Reposition at least every 2hours or as tolerated.


            


Off-load heels with pillow.





(3) Rectal bleed


(4) Protein-calorie malnutrition, severe


(5) Dehydration


(6) IV infiltration


Assessment & Plan:  left hand IV infiltration prior and now with edema/

irritation noted + ecchymosis on hand and arm 


keep hand and arm elevated  


medication reaction and will need to be monitored for worsening 


poor tissue / protoplasm 





CT - Diffuse edema of the subcutaneous and deep fat and likely upper arm and


forearm musculature. Given stated clinical history, possibly secondary to 

cellulitis


and myositis, but could also be hemodynamic in origin. No discrete rim-enhancing


collection to suggest abscess


 


No CT evidence of acute osteomyelitis. Note, however, somewhat limited 

sensitivity of


CT for such. Consider  bone scan for further evaluation if there is high 

clinical


suspicion














Tuan Patricia Feb 22, 2019 14:53

## 2019-02-22 NOTE — NUR
NURSE NOTES:

Received report from Damián CODY. Pt is asleep in bed, awakens to name/voice, does not respond 
verbally when communicating with pt, however has no signs/symptoms of pain or discomfort at 
this time. Pt is on 2L of oxygen via nasal cannula. On NG feeding Glucerna 1.2 infusing at 
55mL/hour, tolerating well with no episodes of vomiting or residual. IV access right midline 
double-lumen, patent/intact, dressing changed 2/21/19. Skin has dressings on sacral, 
bilateral heels, and left lower arm, currently dry/intact, will reassess and change during 
my shift as needed/ordered. Pt is on P200 mattress. External catheter in place, draining 
clear/dark yellow urine. Bed is placed in 30degree/position, three side rails up, brakes 
engaged, alarm on, call light within easy reach. Will continue to monitor pt and follow plan 
of care per MD orders and protocol.

## 2019-02-22 NOTE — GI PROGRESS NOTE
Assessment/Plan


Problems:  


(1) Severe anemia


ICD Codes:  D64.9 - Anemia, unspecified


SNOMED:  074606963


(2) Protein-calorie malnutrition, severe


ICD Codes:  E43 - Unspecified severe protein-calorie malnutrition


SNOMED:  983474258


(3) Rectal bleed


ICD Codes:  K62.5 - Hemorrhage of anus and rectum


SNOMED:  01299002


(4) Upper GI bleed


ICD Codes:  K92.2 - Gastrointestinal hemorrhage, unspecified


SNOMED:  52541745


(5) Severe malnutrition


ICD Codes:  E43 - Unspecified severe protein-calorie malnutrition


SNOMED:  26458710


(6) Weakness


ICD Codes:  R53.1 - Weakness


SNOMED:  24665513


(7) Dysphasia


ICD Codes:  R47.02 - Dysphasia


SNOMED:  62940829


(8) Dehydration


ICD Codes:  E86.0 - Dehydration


SNOMED:  63199917


(9) Acute encephalopathy


ICD Codes:  G93.40 - Encephalopathy, unspecified


SNOMED:  71932209, 096355549


(10) Sepsis


ICD Codes:  A41.9 - Sepsis, unspecified organism


SNOMED:  92377244


(11) Diabetes mellitus, type II


ICD Codes:  E11.9 - Type 2 diabetes mellitus without complications


SNOMED:  21536570


(12) Colitis


ICD Codes:  K52.9 - Noninfective gastroenteritis and colitis, unspecified


SNOMED:  51994052


Status:  progressing, unchanged


Status Narrative


Discussed with Dr. Moreno


Assessment/Plan


off airborne isolation


MARTHA next week


OB stool positive





EGD on hold until respiratory status stabilizes.


on ppi


stool ob positive>> monitor H&H and transfuse as needed >> will add another OB 

stool today


[facility]


cardiology recs


repeat labs





The patient was seen and examined at bedside and all new and available data was 

reviewed in the patients chart. I agree with the above findings, impression 

and plan.  (Patient seen earlier today. Signature stamp does not reflect 

patient encounter time.). - Cal Moreno MD





Subjective


Subjective


Limited





Objective





Last 24 Hour Vital Signs








  Date Time  Temp Pulse Resp B/P (MAP) Pulse Ox O2 Delivery O2 Flow Rate FiO2


 


2/22/19 04:00 99.2 83 20 155/60 (91) 96   


 


2/22/19 04:00  83      


 


2/22/19 00:00  86      


 


2/22/19 00:00 98.8 86 20 147/64 (91) 97   


 


2/21/19 22:15 98.6 94 20 150/76 (100) 96   


 


2/21/19 20:00  101      


 


2/21/19 20:00      Nasal Cannula 2.0 


 


2/21/19 20:00 98.4 99 24 115/64 (81) 98   


 


2/21/19 16:00      Non-Rebreather 15.0 


 


2/21/19 16:00 98.9 91 19 113/70 (84) 100   


 


2/21/19 16:00  106      


 


2/21/19 12:00  92      


 


2/21/19 12:00 98.7 94 16 137/72 (93) 98   


 


2/21/19 12:00      Non-Rebreather 15.0 

















Intake and Output  


 


 2/21/19 2/22/19





 18:59 06:59


 


Intake Total 925 ml 865 ml


 


Output Total 200 ml 600 ml


 


Balance 725 ml 265 ml


 


  


 


Intake Free Water  150 ml


 


IV Total 165 ml 55 ml


 


Tube Feeding 660 ml 660 ml


 


Other 100 ml 


 


Output Urine Total 200 ml 600 ml


 


# Bowel Movements 1 1











Laboratory Tests








Test


  2/21/19


15:58 2/22/19


06:45


 


Arterial Blood pH


  7.467


(7.350-7.450) 


 


 


Arterial Blood Partial


Pressure CO2 32.5 mmHg


(35.0-45.0)  L 


 


 


Arterial Blood Partial


Pressure O2 413.6 mmHg


(75.0-100.0)  H 


 


 


Arterial Blood HCO3


  23.0 mmol/L


(22.0-26.0) 


 


 


Arterial Blood Oxygen


Saturation 99.0 %


() 


 


 


Arterial Blood Base Excess -0.4 (-2-2)   


 


Frank Test Positive   


 


White Blood Count


  


  6.2 K/UL


(4.8-10.8)


 


Red Blood Count


  


  2.95 M/UL


(4.20-5.40)  L


 


Hemoglobin


  


  8.6 G/DL


(12.0-16.0)  L


 


Hematocrit


  


  27.1 %


(37.0-47.0)  L


 


Mean Corpuscular Volume  92 FL (80-99)  


 


Mean Corpuscular Hemoglobin


  


  29.2 PG


(27.0-31.0)


 


Mean Corpuscular Hemoglobin


Concent 


  31.8 G/DL


(32.0-36.0)  L


 


Red Cell Distribution Width


  


  18.8 %


(11.6-14.8)  H


 


Platelet Count


  


  123 K/UL


(150-450)  L


 


Mean Platelet Volume


  


  8.6 FL


(6.5-10.1)


 


Neutrophils (%) (Auto)


  


  % (45.0-75.0)


 


 


Lymphocytes (%) (Auto)


  


  % (20.0-45.0)


 


 


Monocytes (%) (Auto)   % (1.0-10.0)  


 


Eosinophils (%) (Auto)   % (0.0-3.0)  


 


Basophils (%) (Auto)   % (0.0-2.0)  


 


Neutrophils % (Manual)  Pending  


 


Lymphocytes % (Manual)  Pending  


 


Platelet Estimate  Pending  


 


Platelet Morphology  Pending  


 


Sodium Level


  


  149 MMOL/L


(136-145)  H


 


Potassium Level


  


  4.8 MMOL/L


(3.5-5.1)


 


Chloride Level


  


  117 MMOL/L


()  H


 


Carbon Dioxide Level


  


  24 MMOL/L


(21-32)


 


Anion Gap


  


  8 mmol/L


(5-15)


 


Blood Urea Nitrogen


  


  29 mg/dL


(7-18)  H


 


Creatinine


  


  0.8 MG/DL


(0.55-1.30)


 


Estimat Glomerular Filtration


Rate 


   mL/min (>60)  


 


 


Glucose Level


  


  163 MG/DL


()  H


 


Calcium Level


  


  8.5 MG/DL


(8.5-10.1)








Height (Feet):  5


Height (Inches):  5.00


Weight (Pounds):  147


General Appearance:  WD/WN, no apparent distress, alert, thin


Cardiovascular:  normal rate


Respiratory/Chest:  normal breath sounds, no respiratory distress


Abdominal Exam:  normal bowel sounds, non tender, soft


Extremities:  non-tender











Colin Milton NP Feb 22, 2019 09:55

## 2019-02-23 VITALS — SYSTOLIC BLOOD PRESSURE: 134 MMHG | DIASTOLIC BLOOD PRESSURE: 45 MMHG

## 2019-02-23 VITALS — DIASTOLIC BLOOD PRESSURE: 64 MMHG | SYSTOLIC BLOOD PRESSURE: 155 MMHG

## 2019-02-23 VITALS — SYSTOLIC BLOOD PRESSURE: 131 MMHG | DIASTOLIC BLOOD PRESSURE: 90 MMHG

## 2019-02-23 VITALS — DIASTOLIC BLOOD PRESSURE: 93 MMHG | SYSTOLIC BLOOD PRESSURE: 144 MMHG

## 2019-02-23 VITALS — SYSTOLIC BLOOD PRESSURE: 169 MMHG | DIASTOLIC BLOOD PRESSURE: 86 MMHG

## 2019-02-23 VITALS — DIASTOLIC BLOOD PRESSURE: 76 MMHG | SYSTOLIC BLOOD PRESSURE: 157 MMHG

## 2019-02-23 LAB
ADD MANUAL DIFF: YES
ANION GAP SERPL CALC-SCNC: 6 MMOL/L (ref 5–15)
BUN SERPL-MCNC: 30 MG/DL (ref 7–18)
CALCIUM SERPL-MCNC: 8.3 MG/DL (ref 8.5–10.1)
CHLORIDE SERPL-SCNC: 119 MMOL/L (ref 98–107)
CO2 SERPL-SCNC: 25 MMOL/L (ref 21–32)
CREAT SERPL-MCNC: 0.8 MG/DL (ref 0.55–1.3)
ERYTHROCYTE [DISTWIDTH] IN BLOOD BY AUTOMATED COUNT: 19.9 % (ref 11.6–14.8)
HCT VFR BLD CALC: 26.6 % (ref 37–47)
HGB BLD-MCNC: 8.3 G/DL (ref 12–16)
MCV RBC AUTO: 93 FL (ref 80–99)
PLATELET # BLD: 153 K/UL (ref 150–450)
POTASSIUM SERPL-SCNC: 4.7 MMOL/L (ref 3.5–5.1)
RBC # BLD AUTO: 2.87 M/UL (ref 4.2–5.4)
SODIUM SERPL-SCNC: 150 MMOL/L (ref 136–145)
WBC # BLD AUTO: 6.5 K/UL (ref 4.8–10.8)

## 2019-02-23 PROCEDURE — 0BH17EZ INSERTION OF ENDOTRACHEAL AIRWAY INTO TRACHEA, VIA NATURAL OR ARTIFICIAL OPENING: ICD-10-PCS

## 2019-02-23 RX ADMIN — DEXTROSE MONOHYDRATE SCH MLS/HR: 50 INJECTION, SOLUTION INTRAVENOUS at 08:39

## 2019-02-23 RX ADMIN — OLANZAPINE SCH MG: 2.5 TABLET, FILM COATED ORAL at 08:39

## 2019-02-23 RX ADMIN — OLANZAPINE SCH MG: 2.5 TABLET, FILM COATED ORAL at 17:47

## 2019-02-23 RX ADMIN — MEROPENEM SCH MLS/HR: 1 INJECTION INTRAVENOUS at 05:59

## 2019-02-23 RX ADMIN — INSULIN ASPART SCH UNITS: 100 INJECTION, SOLUTION INTRAVENOUS; SUBCUTANEOUS at 00:19

## 2019-02-23 RX ADMIN — INSULIN ASPART SCH UNITS: 100 INJECTION, SOLUTION INTRAVENOUS; SUBCUTANEOUS at 17:43

## 2019-02-23 RX ADMIN — DAPTOMYCIN SCH MLS/HR: 500 INJECTION, POWDER, LYOPHILIZED, FOR SOLUTION INTRAVENOUS at 18:33

## 2019-02-23 RX ADMIN — ACETAMINOPHEN PRN MG: 160 SOLUTION ORAL at 08:40

## 2019-02-23 RX ADMIN — ACETAMINOPHEN PRN MG: 160 SOLUTION ORAL at 03:54

## 2019-02-23 RX ADMIN — INSULIN ASPART SCH UNITS: 100 INJECTION, SOLUTION INTRAVENOUS; SUBCUTANEOUS at 11:30

## 2019-02-23 RX ADMIN — DEXTROSE MONOHYDRATE SCH MLS/HR: 50 INJECTION, SOLUTION INTRAVENOUS at 20:30

## 2019-02-23 RX ADMIN — INSULIN ASPART SCH UNITS: 100 INJECTION, SOLUTION INTRAVENOUS; SUBCUTANEOUS at 05:59

## 2019-02-23 NOTE — NUR
NURSE NOTES:

Pt arrived via hospital bed from 208-2 post arrest, intubated, On the process of connecting 
her to the bedside monitor, pt lost pulses. When connected to the monitor it showed Vfib. 
Code blue announced. See Code blue sheet.

## 2019-02-23 NOTE — CARDIOLOGY PROGRESS NOTE
Assessment/Plan


Assessment/Plan


remains very ill and not a candidate for any procedures, will follow





Subjective


Subjective


the patient was sedated after she tried to pull her NG tube and is very 

lethargic





Objective





Last 24 Hour Vital Signs








  Date Time  Temp Pulse Resp B/P (MAP) Pulse Ox O2 Delivery O2 Flow Rate FiO2


 


2/23/19 20:36      Nasal Cannula 2.0 28


 


2/23/19 20:36  92 20   Nasal Cannula 2.0 28


 


2/23/19 20:36     95 Nasal Cannula 2.0 28


 


2/23/19 20:00  89      


 


2/23/19 20:00 98.3 89 20 157/76 (103) 96   


 


2/23/19 16:00 98.3 115 22 155/64 (94) 97   


 


2/23/19 15:57  94      


 


2/23/19 12:00 98.3 105 22 134/45 (74) 100   


 


2/23/19 11:48  95      


 


2/23/19 09:10 98.2       


 


2/23/19 09:00      Nasal Cannula 2.0 


 


2/23/19 08:39    169/86    


 


2/23/19 08:00 98.2 110 22 169/86 (113) 100   


 


2/23/19 07:42  86      


 


2/23/19 04:00  110      


 


2/23/19 04:00 97.7 105 20 131/90 (104) 100   


 


2/23/19 00:00  86      


 


2/23/19 00:00 97.8 98 20 144/93 (110) 98   








General Appearance:  lethargic, other - very ill appearing


Neck:  supple, no JVD


Rhythm:  NSR


Cardiovascular:  tachycardia, systolic murmur


Respiratory/Chest:  crackles/rales


Abdomen:  no mass


Extremities:  other - anasarca due to malnutrition


Neurologic:  disoriented











Intake and Output  


 


 2/22/19 2/23/19





 19:00 07:00


 


Output Total 500 ml 


 


Balance -500 ml 


 


  


 


Output Urine Total 500 ml 


 


# Voids  2


 


# Bowel Movements 1 2











Laboratory Tests








Test


  2/23/19


05:10 2/23/19


07:40


 


Stool Occult Blood


  Negative


(NEGATIVE) 


 


 


White Blood Count


  


  6.5 K/UL


(4.8-10.8)


 


Red Blood Count


  


  2.87 M/UL


(4.20-5.40)  L


 


Hemoglobin


  


  8.3 G/DL


(12.0-16.0)  L


 


Hematocrit


  


  26.6 %


(37.0-47.0)  L


 


Mean Corpuscular Volume  93 FL (80-99)  


 


Mean Corpuscular Hemoglobin


  


  29.0 PG


(27.0-31.0)


 


Mean Corpuscular Hemoglobin


Concent 


  31.3 G/DL


(32.0-36.0)  L


 


Red Cell Distribution Width


  


  19.9 %


(11.6-14.8)  H


 


Platelet Count


  


  153 K/UL


(150-450)


 


Mean Platelet Volume


  


  10.1 FL


(6.5-10.1)


 


Neutrophils (%) (Auto)


  


  % (45.0-75.0)


 


 


Lymphocytes (%) (Auto)


  


  % (20.0-45.0)


 


 


Monocytes (%) (Auto)   % (1.0-10.0)  


 


Eosinophils (%) (Auto)   % (0.0-3.0)  


 


Basophils (%) (Auto)   % (0.0-2.0)  


 


Differential Total Cells


Counted 


  100  


 


 


Neutrophils % (Manual)  84 % (45-75)  H


 


Lymphocytes % (Manual)  9 % (20-45)  L


 


Monocytes % (Manual)  7 % (1-10)  


 


Eosinophils % (Manual)  0 % (0-3)  


 


Basophils % (Manual)  0 % (0-2)  


 


Band Neutrophils  0 % (0-8)  


 


Platelet Estimate  Adequate  


 


Platelet Morphology  Normal  


 


Hypochromasia  1+  


 


Anisocytosis  1+  


 


Sodium Level


  


  150 MMOL/L


(136-145)  H


 


Potassium Level


  


  4.7 MMOL/L


(3.5-5.1)


 


Chloride Level


  


  119 MMOL/L


()  H


 


Carbon Dioxide Level


  


  25 MMOL/L


(21-32)


 


Anion Gap


  


  6 mmol/L


(5-15)


 


Blood Urea Nitrogen


  


  30 mg/dL


(7-18)  H


 


Creatinine


  


  0.8 MG/DL


(0.55-1.30)


 


Estimat Glomerular Filtration


Rate 


   mL/min (>60)  


 


 


Glucose Level


  


  139 MG/DL


()  H


 


Calcium Level


  


  8.3 MG/DL


(8.5-10.1)  L











Microbiology








 Date/Time


Source Procedure


Growth Status


 


 


 2/20/19 23:15


Blood Blood Culture - Preliminary


Staphylococcus Aureus Resulted

















Lara Tong MD Feb 23, 2019 22:10

## 2019-02-23 NOTE — NUR
NURSE NOTES:



Patient asleep, on 2 L via NC, shows sinus rhythm at 80s in the cardiac monitor. Pt noted 
soiled, cleaned with help of other 2 nurses. Pt with BM, cleaned and dressing on sacral area 
changed d/t soilage. Turned and repositioned, HOB elevated. Made comfortable, call light 
within reach. Will continue to monitor.

## 2019-02-23 NOTE — INTERNAL MED PROGRESS NOTE
Subjective


Date of Service:  Feb 23, 2019


Physician Name


hCeo Ortiz


Attending Physician


Juan Carlos Magana MD





Current Medications








 Medications


  (Trade)  Dose


 Ordered  Sig/Violette


 Route


 PRN Reason  Start Time


 Stop Time Status Last Admin


Dose Admin


 


 Acetaminophen


  (Tylenol)  650 mg  Q4H  PRN


 NG


 Mild Pain/Temp > 100.5  2/22/19 00:30


 3/14/19 16:29  2/23/19 08:40


 


 


 Albuterol/


 Ipratropium


  (Albuterol/


 Ipratropium)  3 ml  Q4HRT  PRN


 HHN


 sob  2/23/19 10:45


 2/28/19 10:44   


 


 


 Ceftaroline


 Fosamil 400 mg/


 Sodium Chloride  55 ml @ 55


 mls/hr  Q12HR


 IV


   2/22/19 09:00


 2/27/19 08:59  2/23/19 08:39


 


 


 Clonidine HCl


  (Catapres Tab)  0.1 mg  Q8H  PRN


 NG


 SBP>160mmHg  2/22/19 00:30


 3/10/19 16:29  2/23/19 08:39


 


 


 Daptomycin 600 mg/


 Sodium Chloride  55 ml @ 


 100 mls/hr  Q24H


 IV


   2/22/19 18:00


 2/26/19 17:59  2/22/19 17:31


 


 


 Dextrose


  (Dextrose 50%)  25 ml  Q30M  PRN


 IV


 Hypoglycemia  2/21/19 22:45


 3/9/19 13:30   


 


 


 Dextrose


  (Dextrose 50%)  50 ml  Q30M  PRN


 IV


 hypoglycemia  2/21/19 22:45


 3/9/19 13:44   


 


 


 Ertapenem 1 gm/


 Sodium Chloride  55 ml @ 


 110 mls/hr  Q24H


 IVPB


   2/23/19 18:00


 2/28/19 17:59   


 


 


 Insulin Aspart


  (NovoLOG)    EVERY 6  HOURS


 SUBQ


   2/22/19 00:00


 3/9/19 16:29  2/23/19 11:30


 


 


 Lansoprazole


  (Prevacid)  30 mg  DAILY


 NG


   2/22/19 09:00


 3/18/19 08:59  2/23/19 08:40


 


 


 Olanzapine


  (ZyPREXA)  2.5 mg  BID


 NG


   2/22/19 09:00


 3/10/19 10:59  2/23/19 08:39


 


 


 Ondansetron HCl


  (Zofran)  4 mg  Q6H  PRN


 IVP


 Nausea & Vomiting  2/21/19 22:30


 3/10/19 16:29   


 








Allergies:  


Coded Allergies:  


     No Known Allergies (Unverified , 3/26/14)


ROS Limited/Unobtainable:  Yes


Subjective


77 YO F admitted with dehydration and hypoxia.  Now sepsis.  Cover for Int Yovanny-

Dr Magana.  Continues on nasal canula





Objective





Last Vital Signs








  Date Time  Temp Pulse Resp B/P (MAP) Pulse Ox O2 Delivery O2 Flow Rate FiO2


 


2/23/19 12:00 98.3 105 22 134/45 (74) 100   


 


2/23/19 09:00      Nasal Cannula 2.0 


 


2/22/19 19:35        28











Laboratory Tests








Test


  2/23/19


05:10 2/23/19


07:40


 


Stool Occult Blood


  Negative


(NEGATIVE) 


 


 


White Blood Count


  


  6.5 K/UL


(4.8-10.8)


 


Red Blood Count


  


  2.87 M/UL


(4.20-5.40)  L


 


Hemoglobin


  


  8.3 G/DL


(12.0-16.0)  L


 


Hematocrit


  


  26.6 %


(37.0-47.0)  L


 


Mean Corpuscular Volume  93 FL (80-99)  


 


Mean Corpuscular Hemoglobin


  


  29.0 PG


(27.0-31.0)


 


Mean Corpuscular Hemoglobin


Concent 


  31.3 G/DL


(32.0-36.0)  L


 


Red Cell Distribution Width


  


  19.9 %


(11.6-14.8)  H


 


Platelet Count


  


  153 K/UL


(150-450)


 


Mean Platelet Volume


  


  10.1 FL


(6.5-10.1)


 


Neutrophils (%) (Auto)


  


  % (45.0-75.0)


 


 


Lymphocytes (%) (Auto)


  


  % (20.0-45.0)


 


 


Monocytes (%) (Auto)   % (1.0-10.0)  


 


Eosinophils (%) (Auto)   % (0.0-3.0)  


 


Basophils (%) (Auto)   % (0.0-2.0)  


 


Differential Total Cells


Counted 


  100  


 


 


Neutrophils % (Manual)  84 % (45-75)  H


 


Lymphocytes % (Manual)  9 % (20-45)  L


 


Monocytes % (Manual)  7 % (1-10)  


 


Eosinophils % (Manual)  0 % (0-3)  


 


Basophils % (Manual)  0 % (0-2)  


 


Band Neutrophils  0 % (0-8)  


 


Platelet Estimate  Adequate  


 


Platelet Morphology  Normal  


 


Hypochromasia  1+  


 


Anisocytosis  1+  


 


Sodium Level


  


  150 MMOL/L


(136-145)  H


 


Potassium Level


  


  4.7 MMOL/L


(3.5-5.1)


 


Chloride Level


  


  119 MMOL/L


()  H


 


Carbon Dioxide Level


  


  25 MMOL/L


(21-32)


 


Anion Gap


  


  6 mmol/L


(5-15)


 


Blood Urea Nitrogen


  


  30 mg/dL


(7-18)  H


 


Creatinine


  


  0.8 MG/DL


(0.55-1.30)


 


Estimat Glomerular Filtration


Rate 


   mL/min (>60)  


 


 


Glucose Level


  


  139 MG/DL


()  H


 


Calcium Level


  


  8.3 MG/DL


(8.5-10.1)  L











Microbiology








 Date/Time


Source Procedure


Growth Status


 


 


 2/20/19 23:15


Blood Blood Culture - Preliminary


Staphylococcus Aureus Resulted

















Intake and Output  


 


 2/22/19 2/23/19





 19:00 07:00


 


Output Total 500 ml 


 


Balance -500 ml 


 


  


 


Output Urine Total 500 ml 


 


# Voids  2


 


# Bowel Movements 1 2








Objective


Objective


General: No acute distress, awake and less responsive, Sleepy, cachectic.


HEENT: NCAT, sclera anicteric, PERRL, NG tube.


Neck: Supple, no significant jugular venous distention, 


Lungs: Non-rebreather mask; Poor inspiratory effort, decreased air in the bases 

, Bilateral Wheeze and Rales.


Heart: Regular rate and rhythm, normal S1/S2, no murmur.


Abdomen: soft, nontender, nondistended. Normoactive bowel sounds.


 / Rectal: Refused and deferred.


Extremities: No Cyanosis , No clubbing,  Left UE edema.  Lateral lower 

extremity / feet dressing intact.


Neuro: A&O x 1, Able to move all extremities


Skin: warm, no rashes or lesions, ecchymosis in bilateral upper extremity noted.





Assessment/Plan


1. Sepsis /  METHACILLIN RESISTANT STAPHYLOCOCCUS AUREUS bacteremia, lactic 

acidosis.


2. Failure to thrive with severe protein calorie malnutrition.


3. Coronary artery disease.


4. Diabetes, type 2.


5. Hypertension.


6. Sick sinus syndrome.


7. Hypercholesterolemia.


8. Aortic stenosis.


9. Pulmonary hypertension.


10. Alzheimer dementia.


11.  Acute tubular necrosis/acute kidney injury most likely secondary to 

dehydration and sepsis.


12. Sacral decubitus ulcer, stage III


13. Severe dehydration


14.  Hypokalemia.


15.  Abnormal liver function test


16.  Anemia/thrombocytopenia


17.  Bilateral pneumonia ?septic emboli?





TREATMENT:


1.  Patient on broad spectrum antibiotics with meropenem and ceftaroline.  Will 

require transesophageal echocardiogram to R/O endocarditis-see ID note


2. Coronary artery disease.  The patient is status post coronary artery


bypass graft in 2017.


3. Diabetes, type 2.  The patient has been started on NovoLog sliding


scale.  


4. Hypertension.  The patient is to continue on atenolol and losartan as


above.


5. Sick sinus syndrome.  The patient is status post pacemaker


implantation.


6. Hypercholesterolemia.  Continue simvastatin as above.


7. Aortic stenosis.


8. Pulmonary hypertension.


9. Alzheimer dementia.


10.  Anemia/thrombocytopenia-S/P 1 unit PRBC on 2/11/19.  Heme/Onc consult.  

Hold heparin


11.  Septic pulmonary emboli-await MARTHA on Tuesday 2/19/19-see cardiology note-

needs NG removed for MARTHA-Discussed with Dr Tong





Restart tube feeding at rate of 45 cc/hr.


CODE STATUS is full code as per POLST,


DVT prophylaxis: D/C Heparin subcu due to thrombocytopenia











Cheo Ortiz MD Feb 23, 2019 17:37

## 2019-02-23 NOTE — INFECTIOUS DISEASES PROG NOTE
Assessment/Plan


Assessment/Plan


79 yo female with PMHx DM, CAD, Alzheimer dementia and Hip fracture who 

presents with dehydration and weakness. 





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 

until proven otherwise- r/o probable PPM infection- doubt TB


    -2/14 CT c/a/p w/: Interim development of multiple bilateral mostly upper 

lobe cavitary pulmonary parenchymal lesions. Given normal appearing chest 

radiograph on 2/7/2019, these are overwhelmingly likely infectious/inflammatory 

in nature. Most likely represent septic pulmonary emboli. The possibility of 

mycobacterial infection should also be included, deemed less likely given 

absence of adenopathy but still possible. Differential considerations also 

include fungal infections, noninfectious inflammatory processes. Other 

noncavitary nodules are also demonstrated, with the same differential. Large 

right and small-to-moderate left pleural effusions. Compressive atelectasis of 

the majority of the right lower lobe. Infiltrates seen in the right upper lobe. 

Bilateral pacemaker. Evidence of central venoocclusive disease. Wedge-shaped 

area of low-attenuation in the upper pole of the spleen, suspicious for infarct.


   -2d Echo (limited study due to contractures): no vegetations seen


    -2/10 Bcx 4/4MRSA; 2/11 Bcx 2/4 MRSA; 2/12 4/4  MRSA; 2/14 Bcx 4/4 MRSA; 2/ 20 Bcx 4/4 GPC clusters


    2/8/19 Blood Cx  4/4 MRSA


    2/7/19 Wound Cx MRSA, ESBL E.coli (S ZOsyn), K. pna (S Zosyn)





Neg: Histo, Blasto ab





Gram neg bacteremia- ?source- UTI vs [place] infection vs from lungs


  -2/19 BCx 4/4 ESBL K.pna, 2/4 S.aureus





r/o Probable PNA


  -2/19 CXR: Increased and now large right-sided pleural effusion, compared to 

prior study of 2/10/2019. Note that a large pleural effusion is evident on 

recent CT scan of 5 days earlier


  -sp cx ESBL K.pna and ESBL E>coli (both S Meropenem, Zosyn)





L arm cellulitis  from IV infiltration


  -CT L arm:  Diffuse edema of the subcutaneous and deep fat and likely upper 

arm and forearm musculature. Given stated clinical history, possibly secondary 

to cellulitis and myositis, but could also be hemodynamic in origin. No 

discrete rim-enhancing collection to suggest abscess. No CT evidence of acute 

osteomyelitis. 


   


 Leukocytosis, SP


  Low grade fever; SP


   Positive UA 


   CXR neg





Thrombocytopenia- HIT vs medication related


   


Pressure ulcers


  Not infected





Right hip fracture with hemiarthroplasty Oct 2018


DM


HTN


Sick sinus syndrome sp Pacemaker


HLD


CAD - SP MI and CABG


Aortic stenosis.


Pulmonary hypertension.


Alzheimer dementia.





PLAN





- Continue Daptomycin #11 (abx d #16) and Ceftaroline #9 given persistent MRSA 

Bacteremia


    -monitor CPK





-Switch Meropenem #4/10-14 to Ertapenem for ESBLbacteremia and PNA


   - repeat 2 sets of Bcx daily-q48hrs until clearance of bacteremia


   -2/13 SP Vancomycin #6


   -2/13 SP Zosyn #4


   -2/10 SP Cefepime #3





-Will need MARTHA and removal of PPM for bacteremia clearance/source control if 

consistent with goals of  care


   -plan for MARTHA when more stable, probably next week





-consider thoracentesis for Large R pleural effusion- have to consider also 

pleural effusion as source of continued bacteremia








   -f/u final AFB cx x3





- f/u repeat cultures


- Monitor CBC and temps


- wound care


- Nutritional support


-f/u cocci


-Sx/ fu


-L arm elevation








Thank you for this consult. We will continue to follow the patient during this 

hospitalization.





Subjective


Allergies:  


Coded Allergies:  


     No Known Allergies (Unverified , 3/26/14)


Subjective


; afebrile


still bacteremic 


on 2l NC


plan for MARTHA next week





Objective


Vital Signs





Last 24 Hour Vital Signs








  Date Time  Temp Pulse Resp B/P (MAP) Pulse Ox O2 Delivery O2 Flow Rate FiO2


 


2/23/19 08:39    169/86    


 


2/23/19 08:00 98.2 110 22 169/86 (113) 100   


 


2/23/19 04:00  110      


 


2/23/19 04:00 97.7 105 20 131/90 (104) 100   


 


2/23/19 00:00  86      


 


2/23/19 00:00 97.8 98 20 144/93 (110) 98   


 


2/22/19 21:00      Nasal Cannula 2.0 


 


2/22/19 20:00  82      


 


2/22/19 20:00 98.0 84 20 106/69 (81) 93   


 


2/22/19 19:35     93 Nasal Cannula 2.0 28


 


2/22/19 19:35      Nasal Cannula 2.0 28


 


2/22/19 16:04 96.6 97 20 150/77 (101) 93   


 


2/22/19 16:00  91      


 


2/22/19 13:31 98.8       


 


2/22/19 12:00  106      


 


2/22/19 12:00 96.3 96 20 161/91 (114) 99   








Height (Feet):  5


Height (Inches):  5.00


Weight (Pounds):  147


Objective


Gen:  Moaning, Awake but no following


HEENT: NCAT, MMM, EOMI, PERRL


LUNGS:  CTAB, No W


CARDS: RRR, S1, S2, No M/R/G, 


ABD: Soft, NT, ND, + BS


Ext: Poor circulation to Ext (cool to touch) , Pulses 2+ B/L (DP, Rad): 


SKIN:  Dry, No rashes, Large sacral ulcer. Mild odor no surrounding cellulitis, 

foot with eschar





Microbiology








 Date/Time


Source Procedure


Growth Status


 


 


 2/20/19 23:15


Blood Blood Culture - Preliminary


Staphylococcus Aureus Resulted











Laboratory Tests








Test


  2/23/19


05:10 2/23/19


07:40


 


Stool Occult Blood Pending   


 


White Blood Count


  


  6.5 K/UL


(4.8-10.8)


 


Red Blood Count


  


  2.87 M/UL


(4.20-5.40)  L


 


Hemoglobin


  


  8.3 G/DL


(12.0-16.0)  L


 


Hematocrit


  


  26.6 %


(37.0-47.0)  L


 


Mean Corpuscular Volume  93 FL (80-99)  


 


Mean Corpuscular Hemoglobin


  


  29.0 PG


(27.0-31.0)


 


Mean Corpuscular Hemoglobin


Concent 


  31.3 G/DL


(32.0-36.0)  L


 


Red Cell Distribution Width


  


  19.9 %


(11.6-14.8)  H


 


Platelet Count


  


  153 K/UL


(150-450)


 


Mean Platelet Volume


  


  10.1 FL


(6.5-10.1)


 


Neutrophils (%) (Auto)


  


  % (45.0-75.0)


 


 


Lymphocytes (%) (Auto)


  


  % (20.0-45.0)


 


 


Monocytes (%) (Auto)   % (1.0-10.0)  


 


Eosinophils (%) (Auto)   % (0.0-3.0)  


 


Basophils (%) (Auto)   % (0.0-2.0)  


 


Neutrophils % (Manual)  Pending  


 


Lymphocytes % (Manual)  Pending  


 


Platelet Estimate  Pending  


 


Platelet Morphology  Pending  


 


Sodium Level


  


  150 MMOL/L


(136-145)  H


 


Potassium Level


  


  4.7 MMOL/L


(3.5-5.1)


 


Chloride Level


  


  119 MMOL/L


()  H


 


Carbon Dioxide Level


  


  25 MMOL/L


(21-32)


 


Anion Gap


  


  6 mmol/L


(5-15)


 


Blood Urea Nitrogen


  


  30 mg/dL


(7-18)  H


 


Creatinine


  


  0.8 MG/DL


(0.55-1.30)


 


Estimat Glomerular Filtration


Rate 


   mL/min (>60)  


 


 


Glucose Level


  


  139 MG/DL


()  H


 


Calcium Level


  


  8.3 MG/DL


(8.5-10.1)  L











Current Medications








 Medications


  (Trade)  Dose


 Ordered  Sig/Violette


 Route


 PRN Reason  Start Time


 Stop Time Status Last Admin


Dose Admin


 


 Acetaminophen


  (Tylenol)  650 mg  Q4H  PRN


 NG


 Mild Pain/Temp > 100.5  2/22/19 00:30


 3/14/19 16:29  2/23/19 08:40


 


 


 Ceftaroline


 Fosamil 400 mg/


 Sodium Chloride  55 ml @ 55


 mls/hr  Q12HR


 IV


   2/22/19 09:00


 2/27/19 08:59  2/23/19 08:39


 


 


 Clonidine HCl


  (Catapres Tab)  0.1 mg  Q8H  PRN


 NG


 SBP>160mmHg  2/22/19 00:30


 3/10/19 16:29  2/23/19 08:39


 


 


 Daptomycin 600 mg/


 Sodium Chloride  55 ml @ 


 100 mls/hr  Q24H


 IV


   2/22/19 18:00


 2/26/19 17:59  2/22/19 17:31


 


 


 Dextrose


  (Dextrose 50%)  25 ml  Q30M  PRN


 IV


 Hypoglycemia  2/21/19 22:45


 3/9/19 13:30   


 


 


 Dextrose


  (Dextrose 50%)  50 ml  Q30M  PRN


 IV


 hypoglycemia  2/21/19 22:45


 3/9/19 13:44   


 


 


 Insulin Aspart


  (NovoLOG)    EVERY 6  HOURS


 SUBQ


   2/22/19 00:00


 3/9/19 16:29  2/23/19 05:59


 


 


 Lansoprazole


  (Prevacid)  30 mg  DAILY


 NG


   2/22/19 09:00


 3/18/19 08:59  2/23/19 08:40


 


 


 Meropenem 1 gm/


 Sodium Chloride  55 ml @ 


 110 mls/hr  Q12H


 IVPB


   2/22/19 06:00


 2/25/19 17:59  2/23/19 05:59


 


 


 Olanzapine


  (ZyPREXA)  2.5 mg  BID


 NG


   2/22/19 09:00


 3/10/19 10:59  2/23/19 08:39


 


 


 Ondansetron HCl


  (Zofran)  4 mg  Q6H  PRN


 IVP


 Nausea & Vomiting  2/21/19 22:30


 3/10/19 16:29   


 

















Olivia Rosas M.D. Feb 23, 2019 10:14

## 2019-02-23 NOTE — NUR
NURSE NOTES:

Code blue outcome unsuccessful. Pronounced by Dr Milton. Primary nurse called the family but 
there was no answer. 

she also informed Dr Magana.

## 2019-02-23 NOTE — PULMONOLOGY PROGRESS NOTE
Assessment/Plan


Assessment/Plan


ASSESSMENT 


Sepsis  with high grade persistent MRSA bacteremia w/ septic emboli-


Infective endocarditis - until proven otherwise- 


R/O probable PPM infection


Gram negative bacteremia with Klebsiella, likely due to PNA


PNA 


R pleural effusion  


Cardiomyopathy with EF 20-25% 


Pulmonary HTN


Aortic stenosis.


Failure to thrive with severe protein calorie malnutrition.


CAD


DM type 2


HTN


L arm cellulitis  


RADHA, likely due to sepsis and dehydration 


Alzheimer dementia.


Acute metabolic encephalopathy 


Sacral decubitus ulcer, stage III, POA


Severe dehydration


Hypokalemia 


Dysphagia 


GI bleeding  


Anemia of chronic disease 


Abnormal LFT -resolved 


Pancytopenia  





PLAN OF CARE


tele


O2 HHN  prn  


abx  as per ID  recs 


histoplasma,  mycobacterial tuberculosis, TB test, fungal serologies so far all 

negative 


sputum culture E. coli ESB, L Klebsiella pneumonia ESBL 


blood culture persistent   MRSA,  also showed Klebsiella pneumonia ESBL on 2/ liekly due to PNA, cleared, last blood blood culture 2/ 20 still with 

persistent MRSA


CT chest noted  


MARTHA on hold, cardio follows 


CXR with large  R  pleural effusion , mild pulmonary vascular congestion 


CT of the left arm no evidence of acute osteomyelitis 


f/up with CXR 2/25 am, may consider thoracentesis if stable 


venous duplex BLE  negative


ECHO with EF   20-25% ,mild left ventricular hypertrophy and global left 

ventricular hypokinesis, RVSP of  77 c/w severe pulmonary HTN


medical management of CHF -as per cardiologist


BP management, elevated when agitated, Clonidine prn  for now until further 

cardio recs


BS management





wound care 


strict aspiration precaution 


NG tube feeding  monitor renal parameters, lytes, correct lytes as needed, 

avoid nephrotoxic 





increase free H2O via G-tube for hypo NA 


dietary recs  implemented in  POC  


monitor H&H with goal to keep Hgb above 7  


hemoglobin hematocrit remained in baseline  


thrombocytopenia resolved 


hematologist follows 


anemia workup c/w  with anemia of chronic disease  ,  ferritin 942 


s/p  2 units of PRBC


stool OB positive , CEA WNL  GI follows 


hepatitis panel negative , HIV nonreactive  


LFT down to normal 


supportive care 


bowel regimen


pain management   





case discussed and evaluated by supervising physician





Subjective


Allergies:  


Coded Allergies:  


     No Known Allergies (Unverified , 3/26/14)


Subjective


remains confused 


trying to pull out her NGT 


no resp distress 


ST on tele





Objective





Last 24 Hour Vital Signs








  Date Time  Temp Pulse Resp B/P (MAP) Pulse Ox O2 Delivery O2 Flow Rate FiO2


 


2/23/19 04:00  110      


 


2/23/19 04:00 97.7 105 20 131/90 (104) 100   


 


2/23/19 00:00  86      


 


2/23/19 00:00 97.8 98 20 144/93 (110) 98   


 


2/22/19 21:00      Nasal Cannula 2.0 


 


2/22/19 20:00  82      


 


2/22/19 20:00 98.0 84 20 106/69 (81) 93   


 


2/22/19 19:35     93 Nasal Cannula 2.0 28


 


2/22/19 19:35      Nasal Cannula 2.0 28


 


2/22/19 16:04 96.6 97 20 150/77 (101) 93   


 


2/22/19 16:00  91      


 


2/22/19 13:31 98.8       


 


2/22/19 12:00  106      


 


2/22/19 12:00 96.3 96 20 161/91 (114) 99   


 


2/22/19 09:00      Nasal Cannula 2.0 


 


2/22/19 08:15     94 Nasal Cannula 2.0 28


 


2/22/19 08:15      Nasal Cannula 2.0 28


 


2/22/19 08:00  83      


 


2/22/19 08:00 98.8 103 20 134/73 (93) 100   

















Intake and Output  


 


 2/22/19 2/23/19





 18:59 06:59


 


Output Total 500 ml 


 


Balance -500 ml 


 


  


 


Output Urine Total 500 ml 


 


# Voids  2


 


# Bowel Movements 1 2








General Appearance:  no acute distress, cachetic, other - awake, confused,  

bedridden [place] speaking female,


HEENT:  normocephalic, atraumatic, anicteric, other - NGT with TF


Respiratory/Chest:  no respiratory distress, no accessory muscle use


Cardiovascular:  normal peripheral pulses, regular rhythm, no JVD, tachycardia 

- ST on tele


Abdomen:  normal bowel sounds, soft, non tender, non distended


Extremities:  no edema, pedal pulses normal, other - LE spastic


Neurologic/Psychiatric:  no motor/sensory deficits - bedridden , alert - 

confused


Musculoskeletal:  atrophy - BLE





Microbiology








 Date/Time


Source Procedure


Growth Status


 


 


 2/20/19 23:15


Blood Blood Culture - Preliminary


Staphylococcus Aureus Resulted








Laboratory Tests


2/23/19 05:10: Stool Occult Blood [Pending]





Current Medications








 Medications


  (Trade)  Dose


 Ordered  Sig/Violette


 Route


 PRN Reason  Start Time


 Stop Time Status Last Admin


Dose Admin


 


 Acetaminophen


  (Tylenol)  650 mg  Q4H  PRN


 NG


 Mild Pain/Temp > 100.5  2/22/19 00:30


 3/14/19 16:29  2/23/19 03:54


 


 


 Ceftaroline


 Fosamil 400 mg/


 Sodium Chloride  55 ml @ 55


 mls/hr  Q12HR


 IV


   2/22/19 09:00


 2/27/19 08:59  2/22/19 21:39


 


 


 Clonidine HCl


  (Catapres Tab)  0.1 mg  Q8H  PRN


 NG


 SBP>160mmHg  2/22/19 00:30


 3/10/19 16:29   


 


 


 Daptomycin 600 mg/


 Sodium Chloride  55 ml @ 


 100 mls/hr  Q24H


 IV


   2/22/19 18:00


 2/26/19 17:59  2/22/19 17:31


 


 


 Dextrose


  (Dextrose 50%)  25 ml  Q30M  PRN


 IV


 Hypoglycemia  2/21/19 22:45


 3/9/19 13:30   


 


 


 Dextrose


  (Dextrose 50%)  50 ml  Q30M  PRN


 IV


 hypoglycemia  2/21/19 22:45


 3/9/19 13:44   


 


 


 Insulin Aspart


  (NovoLOG)    EVERY 6  HOURS


 SUBQ


   2/22/19 00:00


 3/9/19 16:29  2/23/19 05:59


 


 


 Lansoprazole


  (Prevacid)  30 mg  DAILY


 NG


   2/22/19 09:00


 3/18/19 08:59  2/22/19 10:17


 


 


 Meropenem 1 gm/


 Sodium Chloride  55 ml @ 


 110 mls/hr  Q12H


 IVPB


   2/22/19 06:00


 2/25/19 17:59  2/23/19 05:59


 


 


 Olanzapine


  (ZyPREXA)  2.5 mg  BID


 NG


   2/22/19 09:00


 3/10/19 10:59  2/22/19 17:31


 


 


 Ondansetron HCl


  (Zofran)  4 mg  Q6H  PRN


 IVP


 Nausea & Vomiting  2/21/19 22:30


 3/10/19 16:29   


 

















Kavitha Carrero NP Feb 23, 2019 07:29

## 2019-02-23 NOTE — NUR
NURSE NOTES:



Received report from SECOBAR Thacker. Patient asleep, breathing even and unlabored on 2 L via NC. 
Per Kedar, pt AO X1, [place] speaking only and confused. On NGT connected to Glucerna 1.2 at 
55 ml/hr, tolerating fairly, HOB elevated at all times. Midline on R upper arm, 2 lumen, 
patent and intact. Pt on P200 mattress for multiple wounds, turned and repositioned at least 
q 2 hours and as tolerated. Sinus rhythm on cardiac monitor. Bed at lowest position, call 
light within reach. Will continue plan of care.

## 2019-02-23 NOTE — NUR
NURSE NOTES:

Received pt. and report from ESCOBAR De La Rosa. Observed pt. resting in bed with both eyes closed. 
Pt. is A/O x1; responds to name only. Cardiac monitor is in placed. Pt. has a DOMINIC midline; 
intact, asymptomatic, patent, and saline locked. Pt. has a NGT currently feeding Glucerna 
1.2 @ 55cc/hr. Aspiration precaution noted; HOB is at 30 degrees. Call light is within 
reach, bed is in the lowest position and locked. No acute distress noted at this time. Will 
continue plan of care.

## 2019-02-23 NOTE — NUR
NURSE NOTES:

Ms. Magan NP was at bedside of patient.  Patient made attempts to reach ng tube.  Order 
received for restraints.

## 2019-02-23 NOTE — NUR
CODE BLUE:

See Code sheet which remains on paper.responded to the code blue,regained pulse and moved to 
icu at 2338

## 2019-02-23 NOTE — NUR
NURSE NOTES:



Patient in bed, heart rate at 60 in cardiac monitor. Assessed pt, respiration slow and 
shallow, patient pale and not responsive, called code blue. Patient was intubated and 
transferred to ICU. Dr. Magana and Dr. Tong made aware.

## 2019-02-24 NOTE — NUR
NURSE NOTES:

Called the family once again and this time was able to talk to Shoshana Teran, daughter. 
According to her she cannot come tonight and will be here first thing in the morning. 
Procedure explained. No belongings, no jewelry noted on the patient. Post mortem done

## 2019-02-24 NOTE — EMERGENCY ROOM REPORT
History of Present Illness


General


Chief Complaint:  General Complaint


Source:  Medical Record





Present Illness


HPI


This is an 78-year-old female who was admitted for dehydration and altered 

mental status.  She was admitted to telemetry.  A CODE BLUE was called and 

responded.  According to nursing staff, they just finished cleaning her she 

went into cardiac arrest and became unresponsive.  There was no pulse.  CPR was 

done.  On my arrival, she already had one round of epinephrine.  I proceeded to 

intubate her and she had a total of 4 rounds of epinephrine.  She did get a 

pulse back and was transferred to the ICU.





In the ICU she coded again.  She had an episode of V. tach and was 

defibrillated once.  This put her into a PEA rhythm.  CPR was done and she had 

a total of 3 rounds of epinephrine.  She was shocked again.  There was no pulse 

and pronounced her dead at 11:52 PM.





Please see the code sheet for full information.


Allergies:  


Coded Allergies:  


     No Known Allergies (Unverified , 3/26/14)





Patient History


Past Medical History:  see triage record, old chart reviewed


Past Surgical History:  pacemaker, other


Pertinent Family History:  none


Social History:  Denies: smoking


Pregnant Now:  No


Immunizations:  other


Reviewed Nursing Documentation:  PMH: Agreed; PSxH: Agreed





Nursing Documentation-PMH


Past Medical History:  No History, Except For


Hx Cardiac Problems:  Yes - HTN, PACEMAKER L SIDE, 3 BYPASS SUGERIES


Hx Hypertension:  Yes


Hx Pacemaker:  Yes - left and right PMs


Hx Asthma:  Yes


Hx Diabetes:  Yes - TYPE 2


Hx Cancer:  No - UNKNOWN


Hx Gastrointestinal Problems:  No - UNKNOWN, PT IS CONFUSED


Hx Neurological Problems:  Yes - DEMENTIA, ALHZEIMERS, SCHIZOPHRENIA


Hx Cerebrovascular Accident:  Yes - CVA


Hx Dementia:  Yes


Hx Memory Loss:  Yes





Review of Systems


All Other Systems:  limited - Secondary to medical condition





Physical Exam





Vital Signs








  Date Time  Temp Pulse Resp B/P (MAP) Pulse Ox O2 Delivery O2 Flow Rate FiO2


 


19 07:47  64      


 


19 08:00 98.1  14 139/68 (91) 100   


 


19 08:00      Non-Rebreather 15.0 


 


19 08:15        28





no pulse without CPR, no spontaneous respiration


General Appearance:  severe distress


ENT:  other - Oropharynx is dry.  There was a large Congealed mucus in her 

oropharynx When Iintubate her


Neck:  supple


Respiratory:  crackles, other - No spontaneous respiration


Cardiovascular #1:  other - No spontaneous cardiac activities


Gastrointestinal:  soft, other - Distended


Musculoskeletal:  other - No edema


Neurologic:  other - No response


Skin:  warm/dry





Procedures


Critical Care Time


Critical Care Time


Critical care is mandated in this patient who presented with cardiac arrest.  

Patient require my urgent intervention to attenuate the risks of metabolic 

collapse which may lead to cardiovascular collapse and death.  Critical care 

time is 35 minutes excluding any reportable procedure.  Critical care time 

included evaluation, multiple reevaluation, looking at old charts, interpreting 

laboratory and diagnostic data, discussing case with patient and family and 

consultants, and charting.





CPR/Code Blue


CPR/Code Blue Narrative


please see code sheet for full list of medications and time given. During the 

first code, she received 4 rounds of epinephrine.  The second code, she 

received 3 rounds of epinephrine and 2 rounds of defibrillation.





Intubation


Intubation :  


   Consent:  Emergent


   Intubation Method:  orotracheal


   Tube Size (cm):  7.5


   Breath Sounds after Intubation:  equal


   Intubation Complications:  no complications


   Post Intubation Xray:  No


   Attempts:  One


   Patient Tolerated:  Well


   Complications:  None


Progress


patient coded prior to chest x-ray being done. on exam, b/l BS equal. Good CO2 

color change.





Medical Decision Making


Diagnostic Impression:  


 Primary Impression:  


 Cardiac arrest


 Additional Impression:  


 Ventricular tachycardia


ER Course


Patient with cardiac arrest.  No spontaneous return of rhythm after her second 

code.  No pulse even with her pacemaker. Patient pronounced  at 1152pm. 

Dr. Magana notified.





Last Vital Signs








  Date Time  Temp Pulse Resp B/P (MAP) Pulse Ox O2 Delivery O2 Flow Rate FiO2


 


19 23:40  0  0/0 (0)    


 


19 21:00      Nasal Cannula 2.0 


 


19 20:36        28


 


19 20:36   20     


 


19 20:36     95   


 


19 20:00 98.3       








Disposition:  


Condition:  


Referrals:  


Cheo Ortiz MD (PCP)











Zhou Milton MD 2019 01:58

## 2019-02-24 NOTE — NUR
NURSE NOTES:



Called primary number of daughter (Shoshana Teran), no answer, called X 2, left message. Spoke 
with Dr. Magana and informed that pt  in ICU, also made him aware that unable to get 
in hold of family, MD said ok.

## 2019-02-25 NOTE — DISCHARGE SUMMARY
Discharge Summary


Discharge Summary


_


DEATH SUMMARY





DATE OF ADMISSION: 2/7/2019


DATE OF EXPIRATION: 2/23/2019








 





REASON FOR ADMISSION: 


78 years old female with past medical history of cerebrovascular accident, CAD, 

s/p MI and CABG,  hypertension, pacemaker due to SSS, diabetes mellitus, 

pulmonary hypertension,  aortic stenosis , Alzheimer dementia, was sent from 

the [facility] for evaluation due to generalized malaise and decreased 

oral intake.


Vital signs were stable. 


Laboratory workup revealed leukocytosis WBC 14.6, hemoglobin 11.3, hematocrit 

36.4.


Potassium 5.2.


BUN 47, creatinine 1.5.  


Lactic acid 3.7.  


Troponin - 0.131.  EKG revealed sinus rhythm , no acute ischemic changes.   


Albumin 2.1.  


Elevated LFT  with  , ALT 85, lipase 58.  


Chest x-ray revealed no acute cardiopulmonary pathology.  


X-ray of the left elbow revealed no dislocation , no soft tissue swelling , no 

fracture.  


Urinalysis revealed pyuria, but no evidence of  bacteria.  


Patient  was started on IV hydration and was admitted for further management. 








CONSULTANTS:


cardiologist Dr. Tong


pulmonary Dr. Junior


ID specialist Dr. Dorman


GI specialist Dr. Moreno


hematologist/oncologist Dr. Topete


psychiatrist Dr Barksdale  


surgeon Dr Liu


 


[facility] COURSE: 


Patient was admitted to telemetry floor.  


Patient pancultured, started on IV fluids and empiric antibiotic.


Initial blood culture revealed MRSA.  


ID specialist closely followed.  


Repeated blood culture on 2/10, 2/11, 2/12, 2/1414- and 2/16 continued to 

demonstrate persistent high-grade MRSA growth.  


Patient subsequently undergone echocardiogram  on 2/10 which revealed global 

left ventricular hypokinesis with mild left ventricular enlargement.  


Left ventricular ejection fraction estimated to be 20-25% with mild left 

ventricular hypertrophy.  Aortic stenosis noted.  No evidence of vegetation.  


Right ventricular systolic pressure of 77 consistent with severe pulmonary 

hypertension.  


Cardiologist  and pulmonologist closely followed.  


Supplemental oxygen provided as needed to keep pulse oximetry above 92%.  


Pulmonary toilet provided as needed.  


Venous duplex bilateral lower extremity revealed recanalized chronic thrombus 

on the right lower extremity, but  no evidence of acute DVT.  


Patient subsequently undergone CT of the chest ,abdomen, and pelvis.  


CT of the chest  2/14 revealed   multiple bilateral mostly upper lobe cavitary 

pulmonary parenchymal lesions.  


Given normal appearing chest radiograph on 2/7/2019, these are overwhelmingly 

likely infectious/inflammatory in nature. 


Most likely represent septic pulmonary emboli. 


The possibility of mycobacterial infection should also be included,  but deemed 

less likely , given absence of adenopathy , but still possible.


Other noncavitary nodules are also demonstrated, with the same differential


Large right and small-to-moderate left pleural effusions


Compressive atelectasis of the majority of the right lower lobe.


Infiltrates  were seen in the right upper lobe


Anasarca, with, in addition to the above mentioned pleural fluid, ascites and


generalized severe edema of the subcutaneous, mediastinal, abdominal and pelvic 

fat


Ectatic but not frankly dilated bilateral pulmonary arteries, there was 

possibly pulmonary arterial hypertension





Patient was placed on respiratory isolation to rule out Mycobacterial infection 

.


Sputum culture revealed Klebsiella pneumonia ESBL and E. coli ESBL.  


AFB smear  x3 was negative.


TB test was negative , M Tb by PCR fwas negative.  


Fungal serology was negative. 


Respiratory isolation discontinued. 


Per ID specialist,  high-grade persistent MRSA bacteremia  was due to septic 

emboli and presumed infective endocarditis, until proven otehrwise. 


Antibiotic regimen optimized as per infectious disease recommendation.  


Leukocytosis resolved.  


Cardiologist closely follow.  


Initially MARTHA was planned to rule out vegetation , however patient remained 

very ill and was not a candidate for any invasive procedure as per 

cardiologist.  


Patient also undergone CT of the left upper extremity due to clinical evidence 

of cellulitis.  


CT results revealed no evidence of acute osteomyelitis.  


Blood culture 2/19  revealed Klebsiella pneumonia ESBL along with MRSA.  


Afterwards, blood culture continued to show MRSA  only, until the last blood 

culture on 2/ 22 , which  preliminary showed no growth.  


According to ID specialist , gram-negative bacteremia was likely due to 

pneumonia.  


Patient was on extensive antibiotic regimen as per infectious disease 

recommendation.  


No leukocytosis , no fevers.  


Patient continued to have a pleural effusion , however was unstable to any 

invasive procedure and was managed conservatively.  








GI consult was requested for coffee-ground emesis through the NG tube . Hgb 5.9 

and hematocrit 18.9 on 2/14. 


EGD tentatively was scheduled for the next day.  


Patient started on the Protonix drip.  


One dose of vitamin K provided.  


Tube feeding stopped, and NG tube was connected to low intermittent suction.  


Anemia workup initiated . Stool for for occult blood was positive.  


Patient received   total of 2 units of packed red blood cells.  


Hemoglobin and hematocrit were closely monitored with goal to keep hemoglobin 

above 7.  


Anemia workup was consistent with anemia of chronic disease.  Ferritin 942 .


Hematologist followed.


Hemoglobin and hematocrit stabilized, no further episode of GI bleeding. 


Nutrition restarted  via NG tube with strict aspiration reflux precaution.


Patient noted to have elevated LFT. LFT trended down to normal.  


CEA was within normal limits.


Patient demonstrated severe thrombocytopenia with lowest platelet count of 20.  


According to hematologist,  thrombocytopenia was likely due to sepsis and 

lactic acidosis.  


Heparin discontinued.  Heparin-induced antibody screen still pending.  


Platelet count was closely monitored. Thrombocytopenia resolved,  last platelet 

count - 153.  


Hepatitis panel was negative.  HIV was nonreactive.  


Supportive care provided.  


Bowel regimen instituted.  


Pain management  was addressed as needed.


EGD was on hold , until respiratory status stabilized.  





Wound care provided as per surgeon recommendation.


Blood sugar was closely monitored.     


Renal parameters and electrolytes  were closely monitored,  electrolytes 

corrected as needed , and nephrotoxins were avoided. 


Acute kidney injury resolved.  BUN 30,  creatinine 0.8. 


Free water was increased via G-tube for elevated sodium.  


Dietary recommendation regarding protein supplements implemented  in plan of 

care.   





Patient arrested on 2./24  and was  emergently intubated.  Patient with 

evidence of ventricular tachycardia.  


ACLS protocol initiated . Unfortunately old cardiopulmonary resuscitative 

efforts were futile.  


Patient was pronounced at 11: 52 2/23 .


Cause of death:  cardiopulmonary arrest


 


FINAL DIAGNOSES: 


Status post  cardiac arrest


Sepsis with high-grade persistent MRSA bacteremia with septic emboli


Presumed infective endocarditis


Rule out probable permanent pacemaker infection


Gram-negative bacteremia with Klebsiella pneumonia ESBL,  likely due to 

pneumonia


Klebsiella pneumonia ESBL pneumonia


Right pleural effusion


Cardiomyopathy  with ejection fraction 20-25%


Pulmonary hypertension


Aortic stenosis


Sick sinus syndrome,   history of  permanent pacemaker implantation 


Failure to thrive with severe protein calorie malnutrition


Coronary artery disease , status post MI and CABG


Diabetes mellitus type 2


Hypertension


Hyperlipidemia


Left arm cellulitis


Acute kidney injury likely due to sepsis and dehydration 


Acute metabolic encephalopathy


Severe dehydration


Dysphagia


GI bleeding 


Alzheimer's dementia


Sacral decubitus ulcer , stage III , present on admission


Anemia of  chronic disease


Abnormal LFT  -resolved


Pancytopenia with severe thrombocytopenia-  most likely related to sepsis 

lactic acidosis.











Kavitha Carrero NP Feb 25, 2019 13:36

## 2019-02-26 NOTE — DIAGNOSTIC IMAGING REPORT
--------------- APPROVED REPORT --------------





CPT Code: 60543



Symptoms

Other :  Infection



Comments

Edema





LEFT UPPER EXTREMITY: Imaging of the subclavian, axillary, brachial, radial and ulnar 

arteries is within normal limits. There is no evidence of stenosis or occlusions within 

these segments.  The Doppler waveforms of the left upper extremity are multiphasic, 

consistent with normal inflow to the left upper extremity.

## 2019-02-26 NOTE — DIAGNOSTIC IMAGING REPORT
--------------- APPROVED REPORT --------------





CPT Code: 96104



Present Symptoms

Comments: Infection





LEFT UPPER EXTREMITY (Deep and superficial venous system): Imaging reveals patency of the 

internal jugular, subclavian, axillary and brachial veins. The cephalic and basilic veins 

are also patent.  Doppler indicates normal spontaneous flow within these venous segments.

## 2021-11-03 NOTE — NUR
NURSE NOTES:

15 min after D50 given, blood sugar is now 58. Patient will be given orange juice and 
reassess in 15 min. 2

## 2023-03-05 NOTE — PRE-PROCEDURE NOTE/ATTESTATION
Pre-Procedure Note/Attestation


Complete Prior to Procedure


Planned Procedure:  right


Procedure Narrative:


revision hip replacement





Indications for Procedure


Pre-Operative Diagnosis:


right dislocated hip hemiarthroplasty





Attestation


I attest that I discussed the nature of the procedure; its benefits; risks and 

complications; and alternatives (and the risks and benefits of such alternatives

), prior to the procedure, with the patient (or the patient's legal 

representative).





I attest that, if there was a reasonable possibility of needing a blood 

transfusion, the patient (or the patient's legal representative) was given the 

[facility] standardized written summary, pursuant 

to the [place] Blood Safety Act ([place] Health and Safety Code # 1645, as 

amended).





I attest that I re-evaluated the patient just prior to the surgery and that 

there has been no change in the patient's H&P, except as documented below:











Juan Antonio Bella MD Nov 20, 2018 17:29 Normal

## 2023-08-22 NOTE — GENERAL PROGRESS NOTE
Addended by: ABDIAS PASCUAL on: 8/22/2023 04:27 PM     Modules accepted: Orders     Assessment/Plan


Problem List:  


(1) Psychosis


ICD Codes:  F29 - Unspecified psychosis not due to a substance or known 

physiological condition


SNOMED:  [id number]


(2) encephalopathy due to 


Status:  stable, progressing


Assessment/Plan


encephalopathy due to metabolic condition





-seroquel prn


-provided ro/st





Subjective


Date patient seen:  Oct 4, 2018


Neurologic/Psychiatric:  Reports: anxiety, depressed, emotional problems


Allergies:  


Coded Allergies:  


     No Known Allergies (Unverified , 3/26/14)





Objective





Last 24 Hour Vital Signs








  Date Time  Temp Pulse Resp B/P (MAP) Pulse Ox O2 Delivery O2 Flow Rate FiO2


 


10/4/18 19:40 98.8 80 15 118/58 100 Nasal Cannula 3 





 98.8       


 


10/4/18 19:35 98.8 77 13 127/65 100 Nasal Cannula 3 





 98.8       


 


10/4/18 19:20  84 12 121/56 100 Nasal Cannula 3 


 


10/4/18 19:10  80 18 114/54 100 Simple Mask 6 


 


10/4/18 18:59  78 13 115/55 100 Simple Mask 6 


 


10/4/18 18:54  75 15 118/56 100 Simple Mask 6 


 


10/4/18 18:54 209.7 82 16  100   


 


10/4/18 18:51 98.7 78 26 142/71 100 Simple Mask 6 





 98.7       


 


10/4/18 15:54 98.6 78 22 152/70 (97) 99   





 98.6       


 


10/4/18 12:09 97.7 81 20 155/75 (101) 97   





 97.7       


 


10/4/18 09:00      Room Air  


 


10/4/18 08:22 94.1 95 19 157/91 (113) 100   





 94.1       


 


10/4/18 04:00 97.3 85 20 154/80 (104) 99   





 97.3       


 


10/4/18 00:00 97.9 80 22 148/73 (98) 99   





 97.9       

















Intake and Output  


 


 10/3/18 10/4/18





 19:00 07:00


 


Intake Total 500 ml 550 ml


 


Balance 500 ml 550 ml


 


  


 


IV Total 500 ml 550 ml


 


# Voids 3 7








Laboratory Tests


10/4/18 10:20: 


White Blood Count 8.0, Red Blood Count 3.31L, Hemoglobin 9.6L, Hematocrit 30.7L

, Mean Corpuscular Volume 93, Mean Corpuscular Hemoglobin 28.9, Mean 

Corpuscular Hemoglobin Concent 31.1L, Red Cell Distribution Width 17.1H, 

Platelet Count 453H, Mean Platelet Volume 7.1, Neutrophils (%) (Auto) 76.9H, 

Lymphocytes (%) (Auto) 14.2L, Monocytes (%) (Auto) 7.8, Eosinophils (%) (Auto) 

0.7, Basophils (%) (Auto) 0.3, Sodium Level 143, Potassium Level 3.8, Chloride 

Level 108H, Carbon Dioxide Level 28, Anion Gap 8, Blood Urea Nitrogen 12, 

Creatinine 0.8, Estimat Glomerular Filtration Rate , Glucose Level 139H, 

Calcium Level 8.7


Height (Feet):  5


Height (Inches):  6.00


Weight (Pounds):  120


General Appearance:  no apparent distress, alert, confused











Josh Nunez MD Oct 4, 2018 22:07